# Patient Record
Sex: FEMALE | Race: WHITE | NOT HISPANIC OR LATINO | Employment: FULL TIME | URBAN - METROPOLITAN AREA
[De-identification: names, ages, dates, MRNs, and addresses within clinical notes are randomized per-mention and may not be internally consistent; named-entity substitution may affect disease eponyms.]

---

## 2017-04-25 ENCOUNTER — OFFICE VISIT (OUTPATIENT)
Dept: FAMILY MEDICINE | Facility: CLINIC | Age: 52
End: 2017-04-25
Payer: COMMERCIAL

## 2017-04-25 VITALS
DIASTOLIC BLOOD PRESSURE: 70 MMHG | WEIGHT: 170 LBS | OXYGEN SATURATION: 96 % | HEART RATE: 94 BPM | HEIGHT: 67 IN | BODY MASS INDEX: 26.68 KG/M2 | SYSTOLIC BLOOD PRESSURE: 122 MMHG | TEMPERATURE: 99 F

## 2017-04-25 DIAGNOSIS — M25.531 RIGHT WRIST PAIN: Primary | ICD-10-CM

## 2017-04-25 DIAGNOSIS — R23.2 HOT FLASHES: ICD-10-CM

## 2017-04-25 PROCEDURE — 1160F RVW MEDS BY RX/DR IN RCRD: CPT | Mod: S$GLB,,, | Performed by: INTERNAL MEDICINE

## 2017-04-25 PROCEDURE — 99999 PR PBB SHADOW E&M-EST. PATIENT-LVL III: CPT | Mod: PBBFAC,,, | Performed by: INTERNAL MEDICINE

## 2017-04-25 PROCEDURE — 99214 OFFICE O/P EST MOD 30 MIN: CPT | Mod: S$GLB,,, | Performed by: INTERNAL MEDICINE

## 2017-04-25 NOTE — PROGRESS NOTES
Complaint: Problems with wrist and discuss hot flashes    51-year-old white female new to me whose prior PCP retired.  I probably did see her remotely.  For 1 week she's had pain in the right wrist proximal to the fifth finger.  No trauma.  She cannot remember any overuse.  It hurts to rotate the wrist as well as pick things up.  It's a shooting pain over the ulnar aspect of the wrist.  Moderate severity rated 8 out of 10 at times.  She does have a brace at home from some prior carpal tunnel syndrome.  No current symptoms of carpal tunnel syndrome.  She also has had hot flashes for about 6 months.  Moderate severity.  Her last menses was about a year and a half ago.  His been about 3 years since she saw GYN is discussed potential options, breast cancer risk, we need to balance estrogen and progesterone since she has a uterus as well as other options for hot flashes and she should probably have an evaluation and discuss all these options with her GYN    ROS:   CONST: No fevers or chills or night sweats, no other myalgias arthralgias or neurological some      Past Medical History:   Diagnosis Date    Chest pain     NEGATIVE STRESS ECHO 2002    GERD (gastroesophageal reflux disease)     Lower back pain     Migraines     Mild allergic rhinitis     hot flashes      Past Surgical History:   Procedure Laterality Date    TONSILLECTOMY      TUBAL LIGATION       Social History     Social History    Marital status:      Spouse name: N/A    Number of children: N/A    Years of education: N/A     Occupational History    Not on file.     Social History Main Topics    Smoking status: Never Smoker    Smokeless tobacco: Not on file    Alcohol use Not on file    Drug use: Not on file    Sexual activity: Not on file     Other Topics Concern    Not on file     Social History Narrative     Family history: Denies any family history of breast cancer    Vital signs as above  Gen.: Pleasant  Musculoskeletal: Both  "wrists and hands and forearms and elbows all full range of motion, no defects, the joints are stable and strength 5 out of 5.  She has no pain in the fifth MCP or carpal metacarpal joint.  There is pain on rotation and ulnar deviation of the wrist and some palpable pain with the ligaments would be and no pain on resistance to the flexor or extensor tendons of the fingers on the right.  Skin no rashes, warm to touch.    Sugar LARES was seen today for wrist pain.    Diagnoses and all orders for this visit:    Right wrist pain, new problem, new to me, no trauma and so no need for x-ray, I suspect she had an unrecognized overuse sprain of the right wrist and she will use of bracing, restrict activities in the last several weeks for healing.  Should there be ongoing or recurrent problems we discussed seeing a hand orthopedic    Hot flashes, new problem, discussed as above    Clinical note will be sensitive since I do not want any discrepancies in the available history to cause issues with the patient"This note will not be shared with the patient."       "

## 2017-05-04 DIAGNOSIS — Z12.31 OTHER SCREENING MAMMOGRAM: ICD-10-CM

## 2018-05-11 DIAGNOSIS — Z12.11 COLON CANCER SCREENING: ICD-10-CM

## 2018-07-13 DIAGNOSIS — Z12.39 BREAST CANCER SCREENING: ICD-10-CM

## 2018-11-12 ENCOUNTER — OFFICE VISIT (OUTPATIENT)
Dept: FAMILY MEDICINE | Facility: CLINIC | Age: 53
End: 2018-11-12
Payer: COMMERCIAL

## 2018-11-12 VITALS
BODY MASS INDEX: 24.85 KG/M2 | SYSTOLIC BLOOD PRESSURE: 118 MMHG | TEMPERATURE: 98 F | HEIGHT: 67 IN | DIASTOLIC BLOOD PRESSURE: 72 MMHG | OXYGEN SATURATION: 97 % | HEART RATE: 96 BPM | WEIGHT: 158.31 LBS

## 2018-11-12 DIAGNOSIS — S39.012A LUMBOSACRAL STRAIN, INITIAL ENCOUNTER: Primary | ICD-10-CM

## 2018-11-12 PROCEDURE — 99999 PR PBB SHADOW E&M-EST. PATIENT-LVL III: CPT | Mod: PBBFAC,,, | Performed by: INTERNAL MEDICINE

## 2018-11-12 PROCEDURE — 99214 OFFICE O/P EST MOD 30 MIN: CPT | Mod: S$GLB,,, | Performed by: INTERNAL MEDICINE

## 2018-11-12 PROCEDURE — 3008F BODY MASS INDEX DOCD: CPT | Mod: CPTII,S$GLB,, | Performed by: INTERNAL MEDICINE

## 2018-11-12 RX ORDER — CELECOXIB 200 MG/1
200 CAPSULE ORAL DAILY
Qty: 30 CAPSULE | Refills: 6 | Status: SHIPPED | OUTPATIENT
Start: 2018-11-12 | End: 2019-07-01 | Stop reason: SDUPTHER

## 2018-11-12 NOTE — PROGRESS NOTES
Complaint:  Back pain    53-year-old white female made her appointment today in an opening.  She reports several years of lower back pain. It is across the lower back.  It is worse in the morning and actually gets better when she moves around a little bit.  No problems during the day.  Since has been ongoing for years she feels she can't take it anymore.  She did see Dr. Serna in Orthopedics in the past for some right hip problems. Years ago she was given Celebrex.  She has been off of that for a year.  She does have some pain in the right anterior thigh area but no other pain radiating down past the knee.  She has not had any imaging x-rays or MRI.  She did lose about 30 lb and that did help her low back pain but has been frustrating and ongoing.  No acute worsening lately.  No injuries lately.  ROS:   CONST:  No other myalgias arthralgias, no neurological symptoms or deficits      Past Medical History:   Diagnosis Date    Chest pain     NEGATIVE STRESS ECHO 2002    GERD (gastroesophageal reflux disease)     Lower back pain     Migraines     Mild allergic rhinitis     hot flashes      Past Surgical History:   Procedure Laterality Date    TONSILLECTOMY      TUBAL LIGATION       Social History     Socioeconomic History    Marital status:      Spouse name: Not on file    Number of children: Not on file    Years of education: Not on file    Highest education level: Not on file   Social Needs    Financial resource strain: Not on file    Food insecurity - worry: Not on file    Food insecurity - inability: Not on file    Transportation needs - medical: Not on file    Transportation needs - non-medical: Not on file   Occupational History    Not on file   Tobacco Use    Smoking status: Never Smoker   Substance and Sexual Activity    Alcohol use: Not on file    Drug use: Not on file    Sexual activity: Not on file   Other Topics Concern    Not on file   Social History Narrative    Not on file      Family history: Denies any family history of breast cancer    Vital signs as above  Gen.: Pleasant  BACK: spine with no defect, good ROM all the way to floor w/ discomfort, good extension w/ min discomfort, stable. Both legs full ROM, no defects, joints stable, strength 5/5. Negative straight leg testing bilaterally. Skin no rashes. Paraspinal muscles tender bilateral and lower slightly.      Sugar was seen today for back pain.    Diagnoses and all orders for this visit:    Lumbosacral strain, initial encounter, appears to be a fairly classic chronic lower lumbar muscular strain, likely recurrent injury.  Explained the pathophysiology of the injury and healing.  She also has likely a chronic lumbosacral strain syndrome and I think it is time for her to rehabilitate the back in the Healthy back program.  I do not think there is any place for imaging.  I do not suspect any spine arthritis as the cause of her pain and has no sign all of any nerve impingement to suggest disc disease or significant disc disease. She does have some symptoms possibly suggestive of left lateral femoral cutaneous nerve entrapment and we discussed avoiding tight clothes and so forth.  We discussed all the negative issues of anti-inflammatories.  We discussed that the anti-inflammatories such as Celebrex will not cause any accelerated healing and only treat inflammation.  I think resuming the Celebrex at this point is a good idea along with the frequent application of heat, Healthy back program and so forth.  She agrees and accepts reassurance and better understanding about the problem.  Should she get any symptoms of facet arthropathy, nerve impingement and so forth there may be a place for imaging, referral to pain management and so forth.  -     Ambulatory Consult to [facility]    Other orders  -     celecoxib (CELEBREX) 200 MG capsule; Take 1 capsule (200 mg total) by mouth once daily.             Clinical note will be  sensitive based on patient's obvious expressed anxiety referable to the issues above, access would not be therapeutic at this pointTotal time over 25 minutes with over 50% counseling.

## 2018-12-21 ENCOUNTER — CLINICAL SUPPORT (OUTPATIENT)
Dept: REHABILITATION | Facility: HOSPITAL | Age: 53
End: 2018-12-21
Attending: INTERNAL MEDICINE
Payer: COMMERCIAL

## 2018-12-21 DIAGNOSIS — G89.29 CHRONIC BILATERAL LOW BACK PAIN WITHOUT SCIATICA: ICD-10-CM

## 2018-12-21 DIAGNOSIS — M53.86 DECREASED ROM OF LUMBAR SPINE: ICD-10-CM

## 2018-12-21 DIAGNOSIS — M54.50 CHRONIC BILATERAL LOW BACK PAIN WITHOUT SCIATICA: ICD-10-CM

## 2018-12-21 PROCEDURE — 97161 PT EVAL LOW COMPLEX 20 MIN: CPT | Mod: PN

## 2018-12-21 PROCEDURE — 97110 THERAPEUTIC EXERCISES: CPT | Mod: PN

## 2018-12-21 NOTE — PATIENT INSTRUCTIONS
Top 10 tips for back and neck pain    The spine is the pillar of the body, providing the foundation for the upper and lower extremities to attach.  Our spines withstand significant forces all day long.  There are many ways in which we can take care of our backs.  Here are a couple tips to help you keep your back in action.    1. Watch your posture in sitting.     Sit in chairs with back supports, and use a lumbar roll to maintain the normal curve of your low back. Ensure the height of your chair is such that your feet rest flat on the floor with your knees and hips level.  The average American sits 9 hours a day.  Do not sit longer than 1 hour without getting up to stretch or move.     2. Watch your posture in standing.   Maintain the normal curves of your spine in standing.   When standing tall, you should be able to draw a line down through your ear, shoulder, hip, and ankle.  Wear good shoes and consider using a standing desk mat if you stand a lot during work.  Take breaks from standing.    3. Lift correctly   Lift objects by using the strong muscles in your legs.  Get close to the object, bend your knees and your hips, and maintain the normal curve of your low back. Do not twist when lifting or carrying items. Think about the tasks you perform daily at work or home, and minimize lifting and carrying objects.  Use rolling carts or other strategies to reduce back strain.    4. Exercise regularly  Individuals who exercise regularly generally experience better health, reduced back pain, and less stress.  A good exercise program has a stretching component, a strengthening component, and an aerobic component.   Maintain the mobility of your spine by stretching daily. Strengthen your core and extremities several times a week.   Get regular cardiovascular exercise, 3-5/week.  Choose activities you like such as walking, swimming, dancing, or riding a bike.     5. Quit Smoking  Smoking increases the likelihood of  back pain.  It is thought that smoking reduces the blood supply to the discs between the vertebrae and this may lead to degeneration of these discs.  Talk to your Physician about quitting.  There are many smoking cessation options that may work for you.    6. Keep moving even when you have pain  Motion is lotion.   The majority of back pain is mechanical in nature, and will likely reduce with gentle movements, stretching, and walking.  As tempting as it may be to stay in bed when you are hurting, remember that you will likely feel better by getting up and gently moving and walking.  Limit bed rest.      7. Maintain a healthy diet  Try to maintain a healthy diet and weight.        8. Stay hydrated  The average adult is approximately 60 % water.  Staying hydrated is beneficial for all aspects of health.  In general, an adult should drink half of their body weight in ounces.  For example, if you weight 180 lbs, you should drink 90 ounces of water daily.     9. Get regular sleep   Ensure that you get a good nights rest on a regular basis. The discs in your spine hydrate when you lie down to sleep. Your spine needs the rest too.     10. See Your Physician    Make an appointment to see your Physician for back pain that is progressively worsening, and for back pain that is no better or worse with changing positions and activities.        Z LIE POSITION  Z Lie is a position that you can use to unload your back and assist with pain reduction.  Lie on your back and rest your calves on the seat on a chair or a bench.  Viewed from the side, you should resemble a Z.  Your therapist may suggest sliding the chair closer to you, so your knees are over your stomach.   Your therapist may also suggest a pillow under your buttock if needed.  Follow the directions from your therapist.  The goal of this position is to reduce your symptoms.    Z lie can be done in a variety of ways.  It can be done on a bed resting your legs on a  light and easy to lift chair

## 2018-12-21 NOTE — PLAN OF CARE
[facility] - PHYSICAL THERAPY EVALUATION     Name: Sugar Diaz  [facility] Number: [id number]      Diagnosis:   Encounter Diagnoses   Name Primary?    Chronic bilateral low back pain without sciatica     Decreased ROM of lumbar spine      Physician: James Hillman MD  Treatment Orders: PT Eval and Treat    Past Medical History:   Diagnosis Date    Chest pain     NEGATIVE STRESS ECHO 2002    GERD (gastroesophageal reflux disease)     Lower back pain     Migraines     Mild allergic rhinitis      Current Outpatient Medications   Medication Sig    celecoxib (CELEBREX) 200 MG capsule Take 1 capsule (200 mg total) by mouth once daily.     No current facility-administered medications for this visit.      Review of patient's allergies indicates:  No Known Allergies    Precautions: standard     Pattern of pain determined: 1 PEN    Evaluation Date: 12/21/2018  Authorization Period Expiration: 12/31/18  Plan of Care Expiration: 3/22/19  Reassessment Due: 1/21/19  Visit # / Visits authorized: 1/ 12    Time In: 0905  Time Out: 1030  Total Billable Time: 85 minutes     HISTORY   History of Present Illness: Pt reports that she has been having back pain for around 8 years; insidious onset without provocation.  She reports that she works in [employer] and has a sedentary desk job through the week, however, she tries to be active over the weekend with grandchildren.  Pt reports most pain with static standing with aching into back as well as aching into R hip and anterior thigh; improved with walking as well as sitting/lying down.  Pt does have a little pain in hip with sleeping on L side also.  Pt reports that she had a non specific hip fx 3 years ago which she thought was a pulled groin, NWB for 12 weeks and this healed without continued issue.  Pt reports that celebrex really helps but she wants to get off of medication as she has family hx of heart conditions/HA; notices big difference when on medication  related to pain, has not taken since Wednesday and plans to restart tomorrow.      Diagnostic Tests: From EPIC none    Pain Scale: Sugar rates pain on a scale of 0-10 to be 8 at worst; 5 currently; 1 at best using VAS.   Pain location: low back    Aggravating factors: standing  Easing Factors: better with celebrex, walking, sitting, lying down   Disturbed Sleep: none     Pattern of pain questions:  1.  Where is your pain the worst? back  2.  Is your pain constant or intermittent? constant  3.  Does bending forward make your typical pain worse? no  4.  Since the start of your back pain, has there been a change in your bowel or bladder? no  5.  What can't you do now that you use to be able to do? Able with pain    Prior Treatment: Pt was seeing pt for hip and was given several stretches which helped a little  Prior functional status: independent  DME owned/used: none  Occupation:  Works in [employer] job    Leisure: hiking, swimming, playing with grandchildren                     Pts goals:  Get off medication    Red Flag Screening:   Cough  Sneeze  Strain: (--)  Bladder/ bowel: (--)  Falls: (--)  Night pain: (--)  Unexplained weight loss: (--)  General health: good, chest pain 2 years ago (paternal hx of HA at age 40)    OBJECTIVE     Postural examination/scapula alignment: R shoulder depression (mild), forward head rounded shoulders with decreased lumbar lordosis.  Joint integrity: increased mobility in lower lumbar spine L3-5  Skin integrity: no deficit  Edema: none  Sitting: poor posture, slouched  Standing: mild R shoulder depression, mild anterior pelvic tilt   Correction of posture: better with lumbar roll    MOVEMENT LOSS    ROM Loss   Flexion minimal loss   Extension minimal loss   Side bending Right minimal loss   Side bending Left minimal loss, mild pain L hip   Rotation Right within functional limits   Rotation Left within functional limits     Lower Extremity Strength  Right LE  Left LE    Hip  flexion: 4/5 Hip flexion: 4/5   Hip extension:  4-/5 Hip extension: 3+/5, L hip pain   Hip abduction: 4/5 Hip abduction: 3+/5 L hip pain   Hip adduction:  4+/5 Hip adduction:  4+/5   Hip Internal rotation   4+/5 Hip Internal rotation 4+/5   Knee Flexion 5/5 Knee Flexion 5/5   Knee Extension 5/5 Knee Extension 5/5   Ankle dorsiflexion: 4+/5 Ankle dorsiflexion: 4+/5   Ankle plantarflexion: 5/5 Ankle plantarflexion: 5/5     Gillet's: pain with R hip flexion, L hip stance in L lateral and anterior hip  GAIT:  Assistive Device used: none  Level of Assistance: independent  Patient displays the following gait deviations:  Decreased pelvic translation, stiff gait, no antalgia noted    Special Tests:   Test Name  Test Result   Prone Instability Test (--)   SI Joint Provocation Test (--)   Straight Leg Raise (+) for L low back pain, no neural sx   Neural Tension Test (--)   Crossed Straight Leg Raise (+) L hip pain, no neural sx   Walking on toes (--)   Walking on heels  (--)     (-) scour B  (+) FADDIR on L     NEUROLOGICAL SCREENING     Sensory deficit: SILT    Reflexes:    Left Right   Patella Tendon 2+ 2+   Babinski  (--) (--)   Clonus (--) (--)     REPEATED TEST MOVEMENTS:  Repeated Flexion in Standing end range pain/stiffness in low back B   Repeated Extension in Standing better   Repeated Flexion in lying no effect   Repeated Extension in lying  better       STATIC TESTS   Sitting slouched  no effect   Sitting erect better   Standing slouched worse   Standing erect  better   Lying prone in extension  no worse     Flexibility:   90/90 HS  R-20 degrees, R low back pain  L-10 degrees without pain    Decreased B quad length, +Ely's with L testing with L hip and low back pain (120 deg knee flexion)  Decreased L hip ER with pain ~50 degrees      Baseline Isometric Testing on Med X equipment: Testing administered by PT  Date of testin18  ROM 0-60 deg   Max Peak Torque 82    Min Peak Torque 18    Flex/Ext Ratio 4.55/1    % below normative data 44   Counter weight 224   femur 4   Seat pad 0     Treatment   Time In: 0905  Time Out: 1030    PT Evaluation Completed? Yes  Discussed Plan of Care with patient: Yes    [facility] 12/21/2018   Visit Number 1   VAS Pain Rating 5   Lumbar Extension Seat Pad 0   Femur Restraint 4   Top Dead Center 24   Counterweight 224   Lumbar Flexion 60   Lumbar Extension 0   Lumbar Peak Torque 82   Min Torque 18   Test Percent Below Normative Data 66   Lumbar Weight 41       Home Exercise Program as follows:   Handouts were given to the patient. Pt demo good understanding of the education provided. Sugar demonstrated good return demonstration of activities.     - Patient received education regarding proper posture and body mechanics.  Patient was given top 10 tips handout which discusses posture seated, standing, lifting correctly, components of exercise, importance of nutrition and hydration, and importance of sleep.    - Patient received a handout regarding anticipated muscular soreness following the isometric test and strategies for management were reviewed with patient including stretching, using ice and scheduled rest.     Pt was instructed in and performed the following:   Sugar received therapeutic exercises to develop/improve posture, lumbar/cervical ROM, strength and muscular endurance for 10 minutes including the following exercises:   +supine clams, YTB  +PPT with TA activation  +figure 4 glute stretch    Assessment   This is a 53 y.o. female referred to [facility] and presents with a medical diagnosis of   Encounter Diagnoses   Name Primary?    Chronic bilateral low back pain without sciatica     Decreased ROM of lumbar spine     and demonstrates limitations as described below. Pt rehab potential is Good. Pt presents with chronic low back and more recent L hip pain that does not appear to be related to lumbar spine; no radicular sx are present.  Pt presents with s/s  consistent with decreased lumbar stability, muscular guarding including L QL and paraspinals, and L hip trochanteric bursitis as evidenced by painful palpation posterior greater trochanter, pain with passive hip IR, and painful contraction of hip abductors in standing and SL positions.  Pt to benefit from participation in skilled PT to address each issue concurrently.  Pt was issued HEP to address; cues given for proper performance of activities for home completion.     Pain Pattern: 1 PEN       Patient received education on the [facility] program, purpose of the isometric test, progression of back strengthening as well as wellness approach and systemic strengthening.  Details of the program were discussed.  Reviewed that patient should feel support/pressure from med ex restraints but no pain or discomfort and patient expressed understanding.    Based on the above history and physical examination an active physical therapy program is recommended.  Pt will continue to benefit from skilled outpatient physical therapy to address the deficits listed below in the chart, provide pt/family education and to maximize pt's level of independence in the home and community environment. .     No environmental, cultural, spiritual, developmental or education needs expressed or noted    Medical necessity is demonstrated by the following problem list.    Pt presents with the following impairments:     History  Co-morbidities and personal factors that may impact the plan of care Co-morbidities:   GERD, migraines, chest pain    Personal Factors:   no deficits     low   Examination  Body Structures and Functions, activity limitations and participation restrictions that may impact the plan of care Body Regions:   back  lower extremities  trunk    Body Systems:    ROM  strength  gross coordinated movement  motor control    Participation Restrictions:   Hiking with grandchildren, household chores, cooking/washing dishes    Activity  limitations:   Learning and applying knowledge  standing    General Tasks and Commands  no deficits    Communication  no deficits    Mobility  no deficits    Self care  no deficits    Domestic Life  shopping  cooking  doing house work (cleaning house, washing dishes, laundry)    Interactions/Relationships  no deficits    Life Areas  no deficits    Community and Social Life  community life  recreation and leisure         low   Clinical Presentation stable and uncomplicated low   Decision Making/ Complexity Score: low       GOALS: Pt is in agreement with the following goals.    Short term goals:  6 weeks or 10 visits   1.  Pt will demonstrate increased lumbar ROM by at least 3 degrees from the initial ROM value with improvements noted in functional ROM and ability to perform ADLs  2.  Pt will demonstrate increased maximum isometric torque value by 10% when compared to the initial value resulting in improved ability to perform bending, lifting, and carrying activities safely, confidently.    3.  Patient report a reduction in worst pain score by 1-2 points for improved tolerance during work and recreational activities  4.  Pt able to perform HEP correctly with minimal cueing or supervision for therapist      Long term goals: 13 weeks or 20 visits   1. Pt will demonstrate increased lumbar ROM by at least 6 degrees from initial ROM value, resulting in improved ability to perform functional fwd bending while standing and sitting.   2. Pt will demonstrate increased maximum isometric torque value by 25% when compared to the initial value resulting in improved ability to perform bending, lifting, and carrying activities safely, confidently.  3. Pt to demonstrate ability to independently control and reduce their pain through posture positioning and mechanical movements throughout a typical day.  4.  Patient will demonstrate ability to perform SLS in clinic without pain in L hip to show improved performance and decreased  "reactivity of hip complex.       Plan   Outpatient physical therapy 2x week for 13 weeks or 20 visits to include the following:   - Patient education  - Therapeutic exercise  - Manual therapy  - Performance testing   - Neuromuscular Re-education  - Therapeutic activity   - Modalities    Pt may be seen by PTA as part of the rehabilitation team.     Therapist: Louisa Brennan, PT  12/21/2018    "I certify the need for these services furnished under this plan of treatment and while under my care."    ____________________________________  Physician/Referring Practitioner    _______________  Date of Signature          "

## 2018-12-21 NOTE — PROGRESS NOTES
[facility] - PHYSICAL THERAPY EVALUATION     Name: Sugar Diaz  [facility] Number: [id number]      Diagnosis:   Encounter Diagnoses   Name Primary?    Chronic bilateral low back pain without sciatica     Decreased ROM of lumbar spine      Physician: James Hillman MD  Treatment Orders: PT Eval and Treat    Past Medical History:   Diagnosis Date    Chest pain     NEGATIVE STRESS ECHO 2002    GERD (gastroesophageal reflux disease)     Lower back pain     Migraines     Mild allergic rhinitis      Current Outpatient Medications   Medication Sig    celecoxib (CELEBREX) 200 MG capsule Take 1 capsule (200 mg total) by mouth once daily.     No current facility-administered medications for this visit.      Review of patient's allergies indicates:  No Known Allergies    Precautions: standard     Pattern of pain determined: 1 PEN    Evaluation Date: 12/21/2018  Authorization Period Expiration: 12/31/18  Plan of Care Expiration: 3/22/19  Reassessment Due: 1/21/19  Visit # / Visits authorized: 1/ 12    Time In: 0905  Time Out: 1030  Total Billable Time: 85 minutes     HISTORY   History of Present Illness: Pt reports that she has been having back pain for around 8 years; insidious onset without provocation.  She reports that she works in [employer] and has a sedentary desk job through the week, however, she tries to be active over the weekend with grandchildren.  Pt reports most pain with static standing with aching into back as well as aching into R hip and anterior thigh; improved with walking as well as sitting/lying down.  Pt does have a little pain in hip with sleeping on L side also.  Pt reports that she had a non specific hip fx 3 years ago which she thought was a pulled groin, NWB for 12 weeks and this healed without continued issue.  Pt reports that celebrex really helps but she wants to get off of medication as she has family hx of heart conditions/HA; notices big difference when on medication  related to pain, has not taken since Wednesday and plans to restart tomorrow.      Diagnostic Tests: From EPIC none    Pain Scale: Sugar rates pain on a scale of 0-10 to be 8 at worst; 5 currently; 1 at best using VAS.   Pain location: low back    Aggravating factors: standing  Easing Factors: better with celebrex, walking, sitting, lying down   Disturbed Sleep: none     Pattern of pain questions:  1.  Where is your pain the worst? back  2.  Is your pain constant or intermittent? constant  3.  Does bending forward make your typical pain worse? no  4.  Since the start of your back pain, has there been a change in your bowel or bladder? no  5.  What can't you do now that you use to be able to do? Able with pain    Prior Treatment: Pt was seeing pt for hip and was given several stretches which helped a little  Prior functional status: independent  DME owned/used: none  Occupation:  Works in [employer] job    Leisure: hiking, swimming, playing with grandchildren                     Pts goals:  Get off medication    Red Flag Screening:   Cough  Sneeze  Strain: (--)  Bladder/ bowel: (--)  Falls: (--)  Night pain: (--)  Unexplained weight loss: (--)  General health: good, chest pain 2 years ago (paternal hx of HA at age 42)    OBJECTIVE     Postural examination/scapula alignment: R shoulder depression (mild), forward head rounded shoulders with decreased lumbar lordosis.  Joint integrity: increased mobility in lower lumbar spine L3-5  Skin integrity: no deficit  Edema: none  Sitting: poor posture, slouched  Standing: mild R shoulder depression, mild anterior pelvic tilt   Correction of posture: better with lumbar roll    MOVEMENT LOSS    ROM Loss   Flexion minimal loss   Extension minimal loss   Side bending Right minimal loss   Side bending Left minimal loss, mild pain L hip   Rotation Right within functional limits   Rotation Left within functional limits     Lower Extremity Strength  Right LE  Left LE    Hip  flexion: 4/5 Hip flexion: 4/5   Hip extension:  4-/5 Hip extension: 3+/5, L hip pain   Hip abduction: 4/5 Hip abduction: 3+/5 L hip pain   Hip adduction:  4+/5 Hip adduction:  4+/5   Hip Internal rotation   4+/5 Hip Internal rotation 4+/5   Knee Flexion 5/5 Knee Flexion 5/5   Knee Extension 5/5 Knee Extension 5/5   Ankle dorsiflexion: 4+/5 Ankle dorsiflexion: 4+/5   Ankle plantarflexion: 5/5 Ankle plantarflexion: 5/5     Gillet's: pain with R hip flexion, L hip stance in L lateral and anterior hip  GAIT:  Assistive Device used: none  Level of Assistance: independent  Patient displays the following gait deviations:  Decreased pelvic translation, stiff gait, no antalgia noted    Special Tests:   Test Name  Test Result   Prone Instability Test (--)   SI Joint Provocation Test (--)   Straight Leg Raise (+) for L low back pain, no neural sx   Neural Tension Test (--)   Crossed Straight Leg Raise (+) L hip pain, no neural sx   Walking on toes (--)   Walking on heels  (--)     (-) scour B  (+) FADDIR on L     NEUROLOGICAL SCREENING     Sensory deficit: SILT    Reflexes:    Left Right   Patella Tendon 2+ 2+   Babinski  (--) (--)   Clonus (--) (--)     REPEATED TEST MOVEMENTS:  Repeated Flexion in Standing end range pain/stiffness in low back B   Repeated Extension in Standing better   Repeated Flexion in lying no effect   Repeated Extension in lying  better       STATIC TESTS   Sitting slouched  no effect   Sitting erect better   Standing slouched worse   Standing erect  better   Lying prone in extension  no worse     Flexibility:   90/90 HS  R-20 degrees, R low back pain  L-10 degrees without pain    Decreased B quad length, +Ely's with L testing with L hip and low back pain (120 deg knee flexion)  Decreased L hip ER with pain ~50 degrees      Baseline Isometric Testing on Med X equipment: Testing administered by PT  Date of testin18  ROM 0-60 deg   Max Peak Torque 82    Min Peak Torque 18    Flex/Ext Ratio 4.55/1    % below normative data 44   Counter weight 224   femur 4   Seat pad 0     Treatment   Time In: 0905  Time Out: 1030    PT Evaluation Completed? Yes  Discussed Plan of Care with patient: Yes    [facility] 12/21/2018   Visit Number 1   VAS Pain Rating 5   Lumbar Extension Seat Pad 0   Femur Restraint 4   Top Dead Center 24   Counterweight 224   Lumbar Flexion 60   Lumbar Extension 0   Lumbar Peak Torque 82   Min Torque 18   Test Percent Below Normative Data 66   Lumbar Weight 41       Home Exercise Program as follows:   Handouts were given to the patient. Pt demo good understanding of the education provided. Sugar demonstrated good return demonstration of activities.     - Patient received education regarding proper posture and body mechanics.  Patient was given top 10 tips handout which discusses posture seated, standing, lifting correctly, components of exercise, importance of nutrition and hydration, and importance of sleep.    - Patient received a handout regarding anticipated muscular soreness following the isometric test and strategies for management were reviewed with patient including stretching, using ice and scheduled rest.     Pt was instructed in and performed the following:   Sugar received therapeutic exercises to develop/improve posture, lumbar/cervical ROM, strength and muscular endurance for 10 minutes including the following exercises:   +supine clams, YTB  +PPT with TA activation  +figure 4 glute stretch    Assessment   This is a 53 y.o. female referred to [facility] and presents with a medical diagnosis of   Encounter Diagnoses   Name Primary?    Chronic bilateral low back pain without sciatica     Decreased ROM of lumbar spine     and demonstrates limitations as described below. Pt rehab potential is Good. Pt presents with chronic low back and more recent L hip pain that does not appear to be related to lumbar spine; no radicular sx are present.  Pt presents with s/s  consistent with decreased lumbar stability, muscular guarding including L QL and paraspinals, and L hip trochanteric bursitis as evidenced by painful palpation posterior greater trochanter, pain with passive hip IR, and painful contraction of hip abductors in standing and SL positions.  Pt to benefit from participation in skilled PT to address each issue concurrently.  Pt was issued HEP to address; cues given for proper performance of activities for home completion.     Pain Pattern: 1 PEN       Patient received education on the [facility] program, purpose of the isometric test, progression of back strengthening as well as wellness approach and systemic strengthening.  Details of the program were discussed.  Reviewed that patient should feel support/pressure from med ex restraints but no pain or discomfort and patient expressed understanding.    Based on the above history and physical examination an active physical therapy program is recommended.  Pt will continue to benefit from skilled outpatient physical therapy to address the deficits listed below in the chart, provide pt/family education and to maximize pt's level of independence in the home and community environment. .     No environmental, cultural, spiritual, developmental or education needs expressed or noted    Medical necessity is demonstrated by the following problem list.    Pt presents with the following impairments:     History  Co-morbidities and personal factors that may impact the plan of care Co-morbidities:   GERD, migraines, chest pain    Personal Factors:   no deficits     low   Examination  Body Structures and Functions, activity limitations and participation restrictions that may impact the plan of care Body Regions:   back  lower extremities  trunk    Body Systems:    ROM  strength  gross coordinated movement  motor control    Participation Restrictions:   Hiking with grandchildren, household chores, cooking/washing dishes    Activity  limitations:   Learning and applying knowledge  standing    General Tasks and Commands  no deficits    Communication  no deficits    Mobility  no deficits    Self care  no deficits    Domestic Life  shopping  cooking  doing house work (cleaning house, washing dishes, laundry)    Interactions/Relationships  no deficits    Life Areas  no deficits    Community and Social Life  community life  recreation and leisure         low   Clinical Presentation stable and uncomplicated low   Decision Making/ Complexity Score: low       GOALS: Pt is in agreement with the following goals.    Short term goals:  6 weeks or 10 visits   1.  Pt will demonstrate increased lumbar ROM by at least 3 degrees from the initial ROM value with improvements noted in functional ROM and ability to perform ADLs  2.  Pt will demonstrate increased maximum isometric torque value by 10% when compared to the initial value resulting in improved ability to perform bending, lifting, and carrying activities safely, confidently.    3.  Patient report a reduction in worst pain score by 1-2 points for improved tolerance during work and recreational activities  4.  Pt able to perform HEP correctly with minimal cueing or supervision for therapist      Long term goals: 13 weeks or 20 visits   1. Pt will demonstrate increased lumbar ROM by at least 6 degrees from initial ROM value, resulting in improved ability to perform functional fwd bending while standing and sitting.   2. Pt will demonstrate increased maximum isometric torque value by 25% when compared to the initial value resulting in improved ability to perform bending, lifting, and carrying activities safely, confidently.  3. Pt to demonstrate ability to independently control and reduce their pain through posture positioning and mechanical movements throughout a typical day.  4.  Patient will demonstrate ability to perform SLS in clinic without pain in L hip to show improved performance and decreased  "reactivity of hip complex.       Plan   Outpatient physical therapy 2x week for 13 weeks or 20 visits to include the following:   - Patient education  - Therapeutic exercise  - Manual therapy  - Performance testing   - Neuromuscular Re-education  - Therapeutic activity   - Modalities    Pt may be seen by PTA as part of the rehabilitation team.     Therapist: Louisa Brennan, PT  12/21/2018    "I certify the need for these services furnished under this plan of treatment and while under my care."    ____________________________________  Physician/Referring Practitioner    _______________  Date of Signature        "

## 2019-01-02 ENCOUNTER — CLINICAL SUPPORT (OUTPATIENT)
Dept: REHABILITATION | Facility: HOSPITAL | Age: 54
End: 2019-01-02
Attending: INTERNAL MEDICINE
Payer: COMMERCIAL

## 2019-01-02 DIAGNOSIS — M53.86 DECREASED ROM OF LUMBAR SPINE: ICD-10-CM

## 2019-01-02 DIAGNOSIS — G89.29 CHRONIC BILATERAL LOW BACK PAIN WITHOUT SCIATICA: ICD-10-CM

## 2019-01-02 DIAGNOSIS — M54.50 CHRONIC BILATERAL LOW BACK PAIN WITHOUT SCIATICA: ICD-10-CM

## 2019-01-02 PROCEDURE — 97110 THERAPEUTIC EXERCISES: CPT | Mod: PN

## 2019-01-02 NOTE — PROGRESS NOTES
[facility] Physical Therapy Treatment      Name: Sugar Ball [facility]  Clinic Number: [id number]  Diagnosis:   Encounter Diagnoses   Name Primary?    Chronic bilateral low back pain without sciatica     Decreased ROM of lumbar spine      Physician: James Hillman MD  Precautions: Standard   Visit #: 2    VILLALTA: 2018  PTA Visit #: 0  Time In: 400   Time Out: 500  Total Treatment Time: 60 mins (1:1 with PT for 30 mins)    Pain Pattern: 1 PEN  Date POC Signed: Send to Physician     Subjective     Sugar reports her pain isn't so bad, but when she starts walking she can feel the pain start to aggravate her lateral left hip.      Patient reports their pain to be 4 out of 10 on a 0-10 scale with 0 being no pain and 10 being the worst pain imaginable.    Pain Location: low back     Prior functional status: independent  DME owned/used: none  Occupation:  Works in [employer], [employer] job   Leisure: hiking, swimming, playing with grandchildren  Pts goals:  Get off medication     Objective     Baseline IM Testing Results:   Date of testin18  ROM 0-60 deg   Max Peak Torque 82   Min Peak Torque 18    Flex/Ext Ratio 4.55/1   % below normative data 44       Treatment      Sugar received individual therapeutic exercises to develop strength, endurance, ROM, flexibility, posture and core stabilization for 55 minutes including:    Treadmill x14 min -- next treatment decrease to 10 minutes   Hamstring stretch 3x30 sec  Glute stretch 3x30 sec  Bridges 3x10   Hooklying hip abduction 3x10 with red TBand    [facility] 2019   Visit Number 2   VAS Pain Rating 4   Treadmill Time (in min.) 14   Speed 1.8   Incline 0   Lumbar Extension Seat Pad -   Femur Restraint -   Top Dead Center -   Counterweight -   Lumbar Flexion 60   Lumbar Extension 0   Lumbar Peak Torque -   Min Torque -   Test Percent Below Normative Data -   Lumbar Weight 45   Repetitions 20   Rating of Perceived Exertion 2   Ice - Z Lie  (in min.) 10       Not performed:   Supine hip abduction 2x10  Dead bug 3x10   Quadruped hip extension 2x10     Peripheral muscle strengthening which included 1 set of 15-20 repetitions at a slow, controlled 7 second per rep pace focused on strengthening supporting musculature for improved body mechanics and functional mobility. Pt and therapist focused on proper form during treatment to ensure optimal strengthening of each targeted muscle group. Machines were utilized including torso rotation, leg extension, leg curl, chest press, upright row, tricep extension, bicep curl, leg press, and hip abduction.    Sugar received the following manual therapy techniques: Soft tissue Mobilization were applied to the: N/a for 00 minutes including:      Written Home Exercises Provided:   Pt demo good understanding of the education provided. Sugar demonstrated good return demonstration of activities.     Education provided re:   Sugar verbalized good understanding of education provided.   No spiritual or educational barriers to learning provided      Assessment     Patient tolerated initial therapy well. Pt demonstrated good tolerance to Medx lumbar extension. Pt tolerated 45 ft.lbs, 20 reps, with RPE of 2 in Medx lumbar extension. Pt was able to tolerated UE muscle strengthen without any adverse effects. Plan to exercises hip due to pain during walking. Pt cont skilled PT services to decrease functional limitations.     This is a 53 y.o. female referred to outpatient physical therapy and presents with a medical diagnosis of Chronic lower back pain without sciatica  and demonstrates limitations as described in the problem list. Pt prognosis is Good. Pt will continue to benefit from skilled outpatient physical therapy to address the deficits listed in the problem list, provide pt/family education and to maximize pt's level of independence in the home and community environment.     Goals as follows:     Short term goals:  6 weeks  or 10 visits   1.  Pt will demonstrate increased lumbar ROM by at least 3 degrees from the initial ROM value with improvements noted in functional ROM and ability to perform ADLs  2.  Pt will demonstrate increased maximum isometric torque value by 10% when compared to the initial value resulting in improved ability to perform bending, lifting, and carrying activities safely, confidently.  3.  Patient report a reduction in worst pain score by 1-2 points for improved tolerance during work and recreational activities  4.  Pt able to perform HEP correctly with minimal cueing or supervision for therapist        Long term goals: 13 weeks or 20 visits   1. Pt will demonstrate increased lumbar ROM by at least 6 degrees from initial ROM value, resulting in improved ability to perform functional fwd bending while standing and sitting.   2. Pt will demonstrate increased maximum isometric torque value by 25% when compared to the initial value resulting in improved ability to perform bending, lifting, and carrying activities safely, confidently.  3. Pt to demonstrate ability to independently control and reduce their pain through posture positioning and mechanical movements throughout a typical day.  4.  Patient will demonstrate ability to perform SLS in clinic without pain in L hip to show improved performance and decreased reactivity of hip complex.        Plan     Continue with established Plan of Care towards established PT goals.     Certification Period: 12/21/2018  to 3/21/19  Therapist: Yair Hilliard, PT  01/02/2019

## 2019-01-04 ENCOUNTER — CLINICAL SUPPORT (OUTPATIENT)
Dept: REHABILITATION | Facility: HOSPITAL | Age: 54
End: 2019-01-04
Attending: INTERNAL MEDICINE
Payer: COMMERCIAL

## 2019-01-04 DIAGNOSIS — M53.86 DECREASED ROM OF LUMBAR SPINE: ICD-10-CM

## 2019-01-04 DIAGNOSIS — M54.50 CHRONIC BILATERAL LOW BACK PAIN WITHOUT SCIATICA: ICD-10-CM

## 2019-01-04 DIAGNOSIS — G89.29 CHRONIC BILATERAL LOW BACK PAIN WITHOUT SCIATICA: ICD-10-CM

## 2019-01-04 PROCEDURE — 97110 THERAPEUTIC EXERCISES: CPT | Mod: PN

## 2019-01-04 NOTE — PROGRESS NOTES
[facility] Physical [facility] Treatment      Name: Sugar Ball [facility]  Clinic Number: [id number]  Diagnosis:   Encounter Diagnoses   Name Primary?    Chronic bilateral low back pain without sciatica     Decreased ROM of lumbar spine      Physician: James Hillman MD  Precautions: Standard   Visit #: 3    VILLALTA: 2018  PTA Visit #: 0  Time In: 1300   Time Out: 1400  Total Treatment Time: 60 mins (1:1 with PT for 30 mins)    Pain Pattern: 1 PEN  Date POC Signed: Send to Physician     Subjective     Sugar reports that pain in hip is getting much better.  She has been doing exercises at home and feels better overall. Continues to have stiffness in low back that was a little worse upon waking this morning.     Patient reports their pain to be 6 out of 10 on a 0-10 scale with 0 being no pain and 10 being the worst pain imaginable.    Pain Location: low back     Prior functional status: independent  DME owned/used: none  Occupation:  Works in [place], [employer] job   Leisure: hiking, swimming, playing with grandchildren  Pts goals:  Get off medication     Objective     Baseline IM Testing Results:   Date of testin18  ROM 0-60 deg   Max Peak Torque 82   Min Peak Torque 18    Flex/Ext Ratio 4.55/1   % below normative data 44       Treatment      Sugar received individual therapeutic exercises to develop strength, endurance, ROM, flexibility, posture and core stabilization for 55 minutes including:    Treadmill x10 min  Hamstring stretch 3x30 sec  Glute stretch 3x30 sec  Bridges 3x10   Hooklying hip abduction 3x10 with red TBand      [facility] 2019   Visit Number 3   VAS Pain Rating 6   Treadmill Time (in min.) 10   Speed 1.6   Incline 0   Lumbar Weight 45   Repetitions 2   Rating of Perceived Exertion -   Ice - Z Lie (in min.) 8       Not performed:   Supine hip abduction 2x10  Dead bug 3x10   Quadruped hip extension 2x10     Peripheral muscle strengthening which included 1 set of  15-20 repetitions at a slow, controlled 7 second per rep pace focused on strengthening supporting musculature for improved body mechanics and functional mobility. Pt and therapist focused on proper form during treatment to ensure optimal strengthening of each targeted muscle group. Machines were utilized including torso rotation, leg extension, leg curl, chest press, upright row, tricep extension, bicep curl, leg press, and hip abduction.    Sugar received the following manual therapy techniques: Soft tissue Mobilization were applied to the: N/a for 00 minutes including:      Written Home Exercises Provided:   Pt demo good understanding of the education provided. Sugar demonstrated good return demonstration of activities.     Education provided re:   Sugar verbalized good understanding of education provided.   No spiritual or educational barriers to learning provided      Assessment     Patient shows very poor tolerance to completion of MedX extension machine this visit.  Due to good response from last session, weight increase attempted to 48ft.lbs with pt able to complete 1 rep with reports of high pain in low back.  Weight decreased to 45ft.lb and re attempted, however, pt only able to complete 2 repetitions with same reports.  MedX not completed this visit due to pain with 2 attempts.  Pt was able to complete all peripheral machines with addition of LE's today without complaint.  Pt to benefit from addition of lumbar stretching next visit; manual care as needed.  Cont per POC.     This is a 53 y.o. female referred to outpatient physical therapy and presents with a medical diagnosis of Chronic lower back pain without sciatica  and demonstrates limitations as described in the problem list. Pt prognosis is Good. Pt will continue to benefit from skilled outpatient physical therapy to address the deficits listed in the problem list, provide pt/family education and to maximize pt's level of independence in the home and  community environment.     Goals as follows:     Short term goals:  6 weeks or 10 visits   1.  Pt will demonstrate increased lumbar ROM by at least 3 degrees from the initial ROM value with improvements noted in functional ROM and ability to perform ADLs  2.  Pt will demonstrate increased maximum isometric torque value by 10% when compared to the initial value resulting in improved ability to perform bending, lifting, and carrying activities safely, confidently.  3.  Patient report a reduction in worst pain score by 1-2 points for improved tolerance during work and recreational activities  4.  Pt able to perform HEP correctly with minimal cueing or supervision for therapist        Long term goals: 13 weeks or 20 visits   1. Pt will demonstrate increased lumbar ROM by at least 6 degrees from initial ROM value, resulting in improved ability to perform functional fwd bending while standing and sitting.   2. Pt will demonstrate increased maximum isometric torque value by 25% when compared to the initial value resulting in improved ability to perform bending, lifting, and carrying activities safely, confidently.  3. Pt to demonstrate ability to independently control and reduce their pain through posture positioning and mechanical movements throughout a typical day.  4.  Patient will demonstrate ability to perform SLS in clinic without pain in L hip to show improved performance and decreased reactivity of hip complex.        Plan     Continue with established Plan of Care towards established PT goals.     Certification Period: 12/21/2018  to 3/21/19  Therapist: Louisa Brennan, PT  01/04/2019

## 2019-01-07 ENCOUNTER — CLINICAL SUPPORT (OUTPATIENT)
Dept: REHABILITATION | Facility: HOSPITAL | Age: 54
End: 2019-01-07
Attending: INTERNAL MEDICINE
Payer: COMMERCIAL

## 2019-01-07 DIAGNOSIS — G89.29 CHRONIC BILATERAL LOW BACK PAIN WITHOUT SCIATICA: ICD-10-CM

## 2019-01-07 DIAGNOSIS — M53.86 DECREASED ROM OF LUMBAR SPINE: ICD-10-CM

## 2019-01-07 DIAGNOSIS — M54.50 CHRONIC BILATERAL LOW BACK PAIN WITHOUT SCIATICA: ICD-10-CM

## 2019-01-07 PROCEDURE — 97110 THERAPEUTIC EXERCISES: CPT | Mod: PN

## 2019-01-07 NOTE — PROGRESS NOTES
[facility]      Name: Sugar Frankelaney  Clinic Number: [id number]  Diagnosis:   Encounter Diagnoses   Name Primary?    Chronic bilateral low back pain without sciatica     Decreased ROM of lumbar spine      Physician: James Hillman MD  Precautions: Standard   Visit #: 3    VILLALTA: 2018  PTA Visit #: 0  Time In: 1300   Time Out: 1345  Total Treatment Time: 45mins (1:1 with PT for 45 mins)    Pain Pattern: 1 PEN  Date POC Signed: Send to Physician     Subjective     Sugar reports that she is feeling well. Pt states she does not have any pain today.   Patient reports their pain to be 0 out of 10 on a 0-10 scale with 0 being no pain and 10 being the worst pain imaginable.    Pain Location: low back     Prior functional status: independent  DME owned/used: none  Occupation:  Works in [employer] job   Leisure: hiking, swimming, playing with grandchildren  Pts goals:  Get off medication     Objective     Baseline IM Testing Results:   Date of testin18  ROM 0-60 deg   Max Peak Torque 82   Min Peak Torque 18    Flex/Ext Ratio 4.55/1   % below normative data 44       Treatment      Sugar received individual therapeutic exercises to develop strength, endurance, ROM, flexibility, posture and core stabilization for 45 minutes including:    Treadmill x10 min  Hamstring stretch 3x30 sec  Glute stretch 3x30 sec   Bridges 3x10   Hooklying hip abduction 3x10 with red TBand     Seated ball roll out trunk flexion 2 min     [facility] 2019   Visit Number 4   VAS Pain Rating 0   Treadmill Time (in min.) 10   Speed 2   Incline 0   Lumbar Extension Seat Pad -   Femur Restraint -   Top Dead Center -   Counterweight -   Lumbar Flexion 60   Lumbar Extension 0   Lumbar Peak Torque -   Min Torque -   Test Percent Below Normative Data -   Lumbar Weight 50   Repetitions 20   Rating of Perceived Exertion 2   Ice - Z Lie (in min.) 0         Not performed:   Supine  hip abduction 2x10  Dead bug 3x10   Quadruped hip extension 2x10     Peripheral muscle strengthening which included 1 set of 15-20 repetitions at a slow, controlled 7 second per rep pace focused on strengthening supporting musculature for improved body mechanics and functional mobility. Pt and therapist focused on proper form during treatment to ensure optimal strengthening of each targeted muscle group. Machines were utilized including torso rotation, leg extension, leg curl, chest press, upright row, tricep extension, bicep curl, leg press, and hip abduction.    Sugar received the following manual therapy techniques: Soft tissue Mobilization were applied to the: N/a for 00 minutes including:      Written Home Exercises Provided:   Pt demo good understanding of the education provided. Sugar demonstrated good return demonstration of activities.     Education provided re:   Sugar verbalized good understanding of education provided.   No spiritual or educational barriers to learning provided      Assessment     Patient tolerated therapy well today. Pt did not presented with any provocation in lower back pain. Pt was able to perform 20 reps,50 ft.lbs, RPE of 2 on Medx Lumbar extension. Pt did not presented with any issues with peripheral muscle strengthening. Cont per POC.     This is a 53 y.o. female referred to outpatient physical therapy and presents with a medical diagnosis of Chronic lower back pain without sciatica  and demonstrates limitations as described in the problem list. Pt prognosis is Good. Pt will continue to benefit from skilled outpatient physical therapy to address the deficits listed in the problem list, provide pt/family education and to maximize pt's level of independence in the home and community environment.     Goals as follows:     Short term goals:  6 weeks or 10 visits   1.  Pt will demonstrate increased lumbar ROM by at least 3 degrees from the initial ROM value with improvements noted in  functional ROM and ability to perform ADLs  2.  Pt will demonstrate increased maximum isometric torque value by 10% when compared to the initial value resulting in improved ability to perform bending, lifting, and carrying activities safely, confidently.  3.  Patient report a reduction in worst pain score by 1-2 points for improved tolerance during work and recreational activities  4.  Pt able to perform HEP correctly with minimal cueing or supervision for therapist        Long term goals: 13 weeks or 20 visits   1. Pt will demonstrate increased lumbar ROM by at least 6 degrees from initial ROM value, resulting in improved ability to perform functional fwd bending while standing and sitting.   2. Pt will demonstrate increased maximum isometric torque value by 25% when compared to the initial value resulting in improved ability to perform bending, lifting, and carrying activities safely, confidently.  3. Pt to demonstrate ability to independently control and reduce their pain through posture positioning and mechanical movements throughout a typical day.  4.  Patient will demonstrate ability to perform SLS in clinic without pain in L hip to show improved performance and decreased reactivity of hip complex.        Plan     Continue with established Plan of Care towards established PT goals.     Certification Period: 12/21/2018  to 3/21/19  Therapist: Yair Hilliard, PT  01/07/2019

## 2019-01-09 ENCOUNTER — CLINICAL SUPPORT (OUTPATIENT)
Dept: REHABILITATION | Facility: HOSPITAL | Age: 54
End: 2019-01-09
Attending: INTERNAL MEDICINE
Payer: COMMERCIAL

## 2019-01-09 DIAGNOSIS — M53.86 DECREASED ROM OF LUMBAR SPINE: ICD-10-CM

## 2019-01-09 DIAGNOSIS — G89.29 CHRONIC BILATERAL LOW BACK PAIN WITHOUT SCIATICA: ICD-10-CM

## 2019-01-09 DIAGNOSIS — M54.50 CHRONIC BILATERAL LOW BACK PAIN WITHOUT SCIATICA: ICD-10-CM

## 2019-01-09 PROCEDURE — 97110 THERAPEUTIC EXERCISES: CPT | Mod: PN

## 2019-01-09 NOTE — PROGRESS NOTES
[facility]      Name: Sugar Ball [facility] Number: [id number]  Diagnosis:   Encounter Diagnoses   Name Primary?    Chronic bilateral low back pain without sciatica     Decreased ROM of lumbar spine      Physician: James Hillman MD  Precautions: Standard   Visit #:    VILLALTA: 2018  PTA Visit #: 0  Time In: 4:37 pm  Time Out: 5:30 pm  Total Treatment Time: 53mins (1:1 with PT for 53 mins)    Pain Pattern: 1 PEN  Date POC Signed: Send to Physician     Subjective     Sugar reports that she is feeling well. Pt states she does not have any pain today.   Patient reports their pain to be 0 out of 10 on a 0-10 scale with 0 being no pain and 10 being the worst pain imaginable.    Pain Location: low back     Prior functional status: independent  DME owned/used: none  Occupation:  Works in [employer] job   Leisure: hiking, swimming, playing with grandchildren  Pts goals:  Get off medication     Objective     Baseline IM Testing Results:   Date of testin18  ROM 0-60 deg   Max Peak Torque 82   Min Peak Torque 18    Flex/Ext Ratio 4.55/1   % below normative data 44       Treatment      Sugar received individual therapeutic exercises to develop strength, endurance, ROM, flexibility, posture and core stabilization for 53 minutes including:    Treadmill x10 min  Hamstring stretch 3x30 sec  Glute stretch 3x30 sec   Bridges 3x10   Hooklying hip abduction 3x10 with red TBand     Seated ball roll out trunk flexion 2 min     [facility] 2019   Visit Number 5   VAS Pain Rating 0   Treadmill Time (in min.) 10   Speed 2.5   Incline 0   Lumbar Extension Seat Pad -   Femur Restraint -   Top Dead Center -   Counterweight -   Lumbar Flexion -   Lumbar Extension -   Lumbar Peak Torque -   Min Torque -   Test Percent Below Normative Data -   Lumbar Weight 57   Repetitions 20   Rating of Perceived Exertion 3   Ice - Z Lie (in min.) 0       Not performed:    Supine hip abduction 2x10  Dead bug 3x10   Quadruped hip extension 2x10     Peripheral muscle strengthening which included 1 set of 15-20 repetitions at a slow, controlled 7 second per rep pace focused on strengthening supporting musculature for improved body mechanics and functional mobility. Pt and therapist focused on proper form during treatment to ensure optimal strengthening of each targeted muscle group. Machines were utilized including torso rotation, leg extension, leg curl, chest press, upright row, tricep extension, bicep curl, leg press, and hip abduction.    Sugar received the following manual therapy techniques: Soft tissue Mobilization were applied to the: N/a for 00 minutes including:      Written Home Exercises Provided:   Pt demo good understanding of the education provided. Sugar demonstrated good return demonstration of activities.     Education provided re:   Sugar verbalized good understanding of education provided.   No spiritual or educational barriers to learning provided      Assessment     Patient tolerated therapy well today. Pt did not presented with any provocation in lower back pain. Pt was able to perform 20 reps,57 ft.lbs, RPE of 3 on Medx Lumbar extension. Pt tolerated increase of 5 lbs in all UE and LE peripheral muscle strengthening today. Pt was fatigue in the end of The session  Pt is progressing well in therapy. Cont per POC.     This is a 53 y.o. female referred to outpatient physical therapy and presents with a medical diagnosis of Chronic lower back pain without sciatica  and demonstrates limitations as described in the problem list. Pt prognosis is Good. Pt will continue to benefit from skilled outpatient physical therapy to address the deficits listed in the problem list, provide pt/family education and to maximize pt's level of independence in the home and community environment.     Goals as follows:     Short term goals:  6 weeks or 10 visits   1.  Pt will demonstrate  increased lumbar ROM by at least 3 degrees from the initial ROM value with improvements noted in functional ROM and ability to perform ADLs  2.  Pt will demonstrate increased maximum isometric torque value by 10% when compared to the initial value resulting in improved ability to perform bending, lifting, and carrying activities safely, confidently.  3.  Patient report a reduction in worst pain score by 1-2 points for improved tolerance during work and recreational activities  4.  Pt able to perform HEP correctly with minimal cueing or supervision for therapist        Long term goals: 13 weeks or 20 visits   1. Pt will demonstrate increased lumbar ROM by at least 6 degrees from initial ROM value, resulting in improved ability to perform functional fwd bending while standing and sitting.   2. Pt will demonstrate increased maximum isometric torque value by 25% when compared to the initial value resulting in improved ability to perform bending, lifting, and carrying activities safely, confidently.  3. Pt to demonstrate ability to independently control and reduce their pain through posture positioning and mechanical movements throughout a typical day.  4.  Patient will demonstrate ability to perform SLS in clinic without pain in L hip to show improved performance and decreased reactivity of hip complex.        Plan     Continue with established Plan of Care towards established PT goals.     Certification Period: 12/21/2018  to 3/21/19  Therapist: Yair Hilliard, PT  01/09/2019

## 2019-01-14 ENCOUNTER — CLINICAL SUPPORT (OUTPATIENT)
Dept: REHABILITATION | Facility: HOSPITAL | Age: 54
End: 2019-01-14
Attending: INTERNAL MEDICINE
Payer: COMMERCIAL

## 2019-01-14 DIAGNOSIS — G89.29 CHRONIC BILATERAL LOW BACK PAIN WITHOUT SCIATICA: ICD-10-CM

## 2019-01-14 DIAGNOSIS — M54.50 CHRONIC BILATERAL LOW BACK PAIN WITHOUT SCIATICA: ICD-10-CM

## 2019-01-14 DIAGNOSIS — M53.86 DECREASED ROM OF LUMBAR SPINE: ICD-10-CM

## 2019-01-14 PROCEDURE — 97110 THERAPEUTIC EXERCISES: CPT | Mod: PN

## 2019-01-14 NOTE — PROGRESS NOTES
" [facility]      [id number]  Diagnosis:   Encounter Diagnoses   Name Primary?    Chronic bilateral low back pain without sciatica     Decreased ROM of lumbar spine      Physician: James Hillman MD  Precautions: Standard   Visit #: 5 of 20 (new referral; total visits 6)   VILLALTA: 2019  PTA Visit #: 1  Time In: 1305  Time Out: 1353  Total Treatment Time: 48 minutes (1:1 with PTA for 25 mins)    Pain Pattern: 1 PEN  Date POC Signed: Send to Physician     Subjective     Sugar reports "today is a good day." Patient denies any pain currently.  Patient reports their pain to be 0 out of 10 on a 0-10 scale with 0 being no pain and 10 being the worst pain imaginable.    Pain Location: low back     Prior functional status: independent  DME owned/used: none  Occupation:  Works in [employer] job   Leisure: hiking, swimming, playing with grandchildren  Pts goals:  Get off medication     Objective     Baseline IM Testing Results:   Date of testin18  ROM 0-60 deg   Max Peak Torque 82   Min Peak Torque 18    Flex/Ext Ratio 4.55/1   % below normative data 44       Treatment      Sugar received individual therapeutic exercises to develop strength, endurance, ROM, flexibility, posture and core stabilization for 48 minutes including:    Treadmill x 10 min  Supine Hamstring stretch with strap: 3 x 30 sec ea.  Glute stretch 3x30 sec   Bridges 3 x 10   Hooklying hip abduction 3x10 with red TBand     Seated ball roll out trunk flexion x 2 minutes     [facility] 2019   Visit Number 6   VAS Pain Rating 0   Treadmill Time (in min.) 10   Speed 2.2   Incline 0   Flexion in Sitting 10   Manual Therapy 0   Lumbar Weight 60   Repetitions 15   Rating of Perceived Exertion 4   Ice - Z Lie (in min.) 0     Not performed:   Supine hip abduction 2x10  Dead bug 3x10   Quadruped hip extension 2x10     Peripheral muscle strengthening which " included 1 set of 15-20 repetitions at a slow, controlled 7 second per rep pace focused on strengthening supporting musculature for improved body mechanics and functional mobility. Pt and therapist focused on proper form during treatment to ensure optimal strengthening of each targeted muscle group. Machines were utilized including torso rotation, leg extension, leg curl, chest press, upright row, tricep extension, bicep curl, leg press, and hip abduction.    Sugar received the following manual therapy techniques: none    Written Home Exercises Provided:   Pt demo good understanding of the education provided. Sugar demonstrated good return demonstration of activities.     Education provided re:   Sugar verbalized good understanding of education provided.   No spiritual or educational barriers to learning provided      Assessment     Patient tolerated treatment session good today. Patient with fair tolerance to 5% increase in ft/lbs on MedX Lumbar Extension machine reporting increased muscle fatigue after 15 repetitions and an RPE of 4. Limited progression of weights on UE and LE peripheral machines secondary to high RPE last visit. Continue progressing patient per [facility] protocol.     This is a 53 y.o. female referred to outpatient physical therapy and presents with a medical diagnosis of Chronic lower back pain without sciatica  and demonstrates limitations as described in the problem list. Pt prognosis is Good. Pt will continue to benefit from skilled outpatient physical therapy to address the deficits listed in the problem list, provide pt/family education and to maximize pt's level of independence in the home and community environment.     Goals as follows:     Short term goals:  6 weeks or 10 visits   1.  Pt will demonstrate increased lumbar ROM by at least 3 degrees from the initial ROM value with improvements noted in functional ROM and ability to perform ADLs  2.  Pt will demonstrate increased maximum  isometric torque value by 10% when compared to the initial value resulting in improved ability to perform bending, lifting, and carrying activities safely, confidently.  3.  Patient report a reduction in worst pain score by 1-2 points for improved tolerance during work and recreational activities  4.  Pt able to perform HEP correctly with minimal cueing or supervision for therapist     Long term goals: 13 weeks or 20 visits   1. Pt will demonstrate increased lumbar ROM by at least 6 degrees from initial ROM value, resulting in improved ability to perform functional fwd bending while standing and sitting.   2. Pt will demonstrate increased maximum isometric torque value by 25% when compared to the initial value resulting in improved ability to perform bending, lifting, and carrying activities safely, confidently.  3. Pt to demonstrate ability to independently control and reduce their pain through posture positioning and mechanical movements throughout a typical day.  4.  Patient will demonstrate ability to perform SLS in clinic without pain in L hip to show improved performance and decreased reactivity of hip complex.     Plan     Continue with established Plan of Care towards established PT goals.     Certification Period: 12/21/2018  to 3/21/19    Therapist: Dee Luna, PTA  01/14/2019

## 2019-01-16 ENCOUNTER — CLINICAL SUPPORT (OUTPATIENT)
Dept: REHABILITATION | Facility: HOSPITAL | Age: 54
End: 2019-01-16
Attending: INTERNAL MEDICINE
Payer: COMMERCIAL

## 2019-01-16 DIAGNOSIS — M54.50 CHRONIC BILATERAL LOW BACK PAIN WITHOUT SCIATICA: ICD-10-CM

## 2019-01-16 DIAGNOSIS — M53.86 DECREASED ROM OF LUMBAR SPINE: ICD-10-CM

## 2019-01-16 DIAGNOSIS — G89.29 CHRONIC BILATERAL LOW BACK PAIN WITHOUT SCIATICA: ICD-10-CM

## 2019-01-16 PROCEDURE — 97110 THERAPEUTIC EXERCISES: CPT | Mod: PN

## 2019-01-16 NOTE — PROGRESS NOTES
[facility]      Name: Sugar Ball [facility] Number: [id number]  Diagnosis:   Encounter Diagnoses   Name Primary?    Chronic bilateral low back pain without sciatica     Decreased ROM of lumbar spine      Physician: James Hillman MD  Precautions: Standard   Visit #: 6 of 20 (new referral; total visits 6)   VILLALTA: 2019  PTA Visit #: 1  Time In: 1303 pm  Time Out: 13:50 pm  Total Treatment Time: 47 minutes (1:1 with PT for 30 mins)    Pain Pattern: 1 PEN  Date POC Signed: Send to Physician     Subjective     Sugar reports that she does not have any lower back pain. Pt states she is feeling good today.   Patient reports their pain to be 0 out of 10 on a 0-10 scale with 0 being no pain and 10 being the worst pain imaginable.    Pain Location: low back     Prior functional status: independent  DME owned/used: none  Occupation:  Works in [employer] job   Leisure: hiking, swimming, playing with grandchildren  Pts goals:  Get off medication     Objective     Baseline IM Testing Results:   Date of testin18  ROM 0-60 deg   Max Peak Torque 82   Min Peak Torque 18    Flex/Ext Ratio 4.55/1   % below normative data 44       Treatment      Sugar received individual therapeutic exercises to develop strength, endurance, ROM, flexibility, posture and core stabilization for 47 minutes including:    Supine Hamstring stretch with strap: 3 x 30 sec ea.  Glute stretch 3x30 sec   Bridges 3 x 10   Hooklying hip abduction 3x10 with red TBand     Seated ball roll out trunk flexion x 2 minutes   Freemotion chop (high to low)  3x10   Free motion chop (low to high) 3x10  Freemotion side step 1x10     [facility] 2019   Visit Number 7   VAS Pain Rating 0   Treadmill Time (in min.) 10   Speed 2.7   Incline 0   Flexion in Sitting -   Manual Therapy -   Lumbar Extension Seat Pad -   Femur Restraint -   Top Dead Center -   Counterweight -   Lumbar Flexion 60   Lumbar  Extension 0   Lumbar Peak Torque -   Min Torque -   Test Percent Below Normative Data -   Lumbar Weight 60   Repetitions 18   Rating of Perceived Exertion 4   Ice - Z Lie (in min.) 0         Not performed:   Supine hip abduction 2x10  Dead bug 3x10   Quadruped hip extension 2x10     Peripheral muscle strengthening which included 1 set of 15-20 repetitions at a slow, controlled 7 second per rep pace focused on strengthening supporting musculature for improved body mechanics and functional mobility. Pt and therapist focused on proper form during treatment to ensure optimal strengthening of each targeted muscle group. Machines were utilized including torso rotation, leg extension, leg curl, chest press, upright row, tricep extension, bicep curl, leg press, and hip abduction.    Sugar received the following manual therapy techniques: none    Written Home Exercises Provided:   Pt demo good understanding of the education provided. Sugar demonstrated good return demonstration of activities.     Education provided re:   Sugar verbalized good understanding of education provided.   No spiritual or educational barriers to learning provided      Assessment     Patient tolerated treatment session good today. Fait tolerance to Mexx lumbar extension with 60 ft.lbs. Pt was able to perform 18 reps with RPE of 4. Pt demonstrated minor increase of lower back pain. Pt was not able to complete 20 reps. Pt tolerated well progression of therapeutic exercises to improve core strengthening.Pt tolerated all peripheral muscle strengthning well with no adverse effectst. Continue progressing patient per [facility] protocol.     This is a 53 y.o. female referred to outpatient physical therapy and presents with a medical diagnosis of Chronic lower back pain without sciatica  and demonstrates limitations as described in the problem list. Pt prognosis is Good. Pt will continue to benefit from skilled outpatient physical therapy to address the  deficits listed in the problem list, provide pt/family education and to maximize pt's level of independence in the home and community environment.     Goals as follows:     Short term goals:  6 weeks or 10 visits   1.  Pt will demonstrate increased lumbar ROM by at least 3 degrees from the initial ROM value with improvements noted in functional ROM and ability to perform ADLs  2.  Pt will demonstrate increased maximum isometric torque value by 10% when compared to the initial value resulting in improved ability to perform bending, lifting, and carrying activities safely, confidently.  3.  Patient report a reduction in worst pain score by 1-2 points for improved tolerance during work and recreational activities  4.  Pt able to perform HEP correctly with minimal cueing or supervision for therapist     Long term goals: 13 weeks or 20 visits   1. Pt will demonstrate increased lumbar ROM by at least 6 degrees from initial ROM value, resulting in improved ability to perform functional fwd bending while standing and sitting.   2. Pt will demonstrate increased maximum isometric torque value by 25% when compared to the initial value resulting in improved ability to perform bending, lifting, and carrying activities safely, confidently.  3. Pt to demonstrate ability to independently control and reduce their pain through posture positioning and mechanical movements throughout a typical day.  4.  Patient will demonstrate ability to perform SLS in clinic without pain in L hip to show improved performance and decreased reactivity of hip complex.     Plan     Continue with established Plan of Care towards established PT goals.     Certification Period: 12/21/2018  to 3/21/19    Therapist: Yair Hilliard, PT  01/16/2019

## 2019-01-23 ENCOUNTER — CLINICAL SUPPORT (OUTPATIENT)
Dept: REHABILITATION | Facility: HOSPITAL | Age: 54
End: 2019-01-23
Attending: INTERNAL MEDICINE
Payer: COMMERCIAL

## 2019-01-23 DIAGNOSIS — G89.29 CHRONIC BILATERAL LOW BACK PAIN WITHOUT SCIATICA: ICD-10-CM

## 2019-01-23 DIAGNOSIS — M54.50 CHRONIC BILATERAL LOW BACK PAIN WITHOUT SCIATICA: ICD-10-CM

## 2019-01-23 DIAGNOSIS — M53.86 DECREASED ROM OF LUMBAR SPINE: ICD-10-CM

## 2019-01-23 PROCEDURE — 97110 THERAPEUTIC EXERCISES: CPT | Mod: PN

## 2019-01-23 NOTE — PROGRESS NOTES
[facility]      Name: Sugar Ball [facility] Number: [id number]  Diagnosis:   Encounter Diagnoses   Name Primary?    Chronic bilateral low back pain without sciatica     Decreased ROM of lumbar spine      Physician: James Hillman MD  Precautions: Standard   Visit #: 7 of 20 (new referral; total visits 6)   VILLALTA: 2019  PTA Visit #: 1  Time In: 3:04 pm  Time Out: 3:45 pm  Total Treatment Time: 43 minutes (1:1 with PT for 40 mins)    Pain Pattern: 1 PEN  Date POC Signed: Send to Physician     Subjective     Sugar reports that she does not have any lower back pain and it has been that way for some time. Pt states she is feeling good today.   Patient reports their pain to be 0 out of 10 on a 0-10 scale with 0 being no pain and 10 being the worst pain imaginable.    Pain Location: low back     Prior functional status: independent  DME owned/used: none  Occupation:  Works in [employer] job   Leisure: hiking, swimming, playing with grandchildren  Pts goals:  Get off medication     Objective     Baseline IM Testing Results:   Date of testin18  ROM 0-60 deg   Max Peak Torque 82   Min Peak Torque 18    Flex/Ext Ratio 4.55/1   % below normative data 44       Treatment      Sugar received individual therapeutic exercises to develop strength, endurance, ROM, flexibility, posture and core stabilization for 43 minutes including:    Supine Hamstring stretch with strap: 3 x 30 sec ea.  Glute stretch 3x30 sec   Bridges 3 x 10   Hooklying hip abduction 3x10 with red TBand     Seated ball roll out trunk flexion x 2 minutes   Freemotion chop (high to low)  3x10   Free motion chop (low to high) 3x10  Freemotion side step 3x10     [facility] 2019   Visit Number 8   VAS Pain Rating 0   Treadmill Time (in min.) 10   Speed 2.7   Incline 0   Flexion in Sitting -   Manual Therapy -   Lumbar Extension Seat Pad -   Femur Restraint -   Top Dead Center -    Counterweight -   Lumbar Flexion -   Lumbar Extension -   Lumbar Peak Torque -   Min Torque -   Test Percent Below Normative Data -   Lumbar Weight 60   Repetitions 15   Rating of Perceived Exertion 4   Ice - Z Lie (in min.) 0         Not performed:   Supine hip abduction 2x10  Dead bug 3x10   Quadruped hip extension 2x10     Peripheral muscle strengthening which included 1 set of 15-20 repetitions at a slow, controlled 7 second per rep pace focused on strengthening supporting musculature for improved body mechanics and functional mobility. Pt and therapist focused on proper form during treatment to ensure optimal strengthening of each targeted muscle group. Machines were utilized including torso rotation, leg extension, leg curl, chest press, upright row, tricep extension, bicep curl, leg press, and hip abduction.    Sugar received the following manual therapy techniques: none    Written Home Exercises Provided:   Pt demo good understanding of the education provided. Sugar demonstrated good return demonstration of activities.     Education provided re:   Sugar verbalized good understanding of education provided.   No spiritual or educational barriers to learning provided      Assessment     Patient tolerated treatment session good today. Mexx lumbar extension with 60 ft.lbs. Pt was able to perform 15 reps with RPE of 4. Pt did not demonstrated any lower back pain... Pt tolerated well progression of therapeutic exercises to improve core strengthening.Pt tolerated all peripheral muscle strengthning well with no adverse effectst. Continue progressing patient per [facility] protocol.     This is a 53 y.o. female referred to outpatient physical therapy and presents with a medical diagnosis of Chronic lower back pain without sciatica  and demonstrates limitations as described in the problem list. Pt prognosis is Good. Pt will continue to benefit from skilled outpatient physical therapy to address the deficits listed  in the problem list, provide pt/family education and to maximize pt's level of independence in the home and community environment.     Goals as follows:     Short term goals:  6 weeks or 10 visits   1.  Pt will demonstrate increased lumbar ROM by at least 3 degrees from the initial ROM value with improvements noted in functional ROM and ability to perform ADLs  2.  Pt will demonstrate increased maximum isometric torque value by 10% when compared to the initial value resulting in improved ability to perform bending, lifting, and carrying activities safely, confidently.  3.  Patient report a reduction in worst pain score by 1-2 points for improved tolerance during work and recreational activities  4.  Pt able to perform HEP correctly with minimal cueing or supervision for therapist     Long term goals: 13 weeks or 20 visits   1. Pt will demonstrate increased lumbar ROM by at least 6 degrees from initial ROM value, resulting in improved ability to perform functional fwd bending while standing and sitting.   2. Pt will demonstrate increased maximum isometric torque value by 25% when compared to the initial value resulting in improved ability to perform bending, lifting, and carrying activities safely, confidently.  3. Pt to demonstrate ability to independently control and reduce their pain through posture positioning and mechanical movements throughout a typical day.  4.  Patient will demonstrate ability to perform SLS in clinic without pain in L hip to show improved performance and decreased reactivity of hip complex.     Plan     Continue with established Plan of Care towards established PT goals.     Certification Period: 12/21/2018  to 3/21/19    Therapist: Yair Hilliard, PT  01/23/2019

## 2019-01-24 NOTE — PROGRESS NOTES
[facility]      Name: Sugar Ball [facility] Number: [id number]  Diagnosis:   Encounter Diagnoses   Name Primary?    Chronic bilateral low back pain without sciatica     Decreased ROM of lumbar spine      Physician: James Hillman MD  Precautions: Standard   Visit #: 8 of 20 (new referral; total visits 6)   VILLALTA: 2019  PTA Visit #: 1  Time In: 7:02 am  Time Out: 7:50 am  Total Treatment Time: 48 minutes (1:1 with PT for 48 mins)    Pain Pattern: 1 PEN  Date POC Signed: Send to Physician     Subjective     Sugar reports that she does not have any lower back pain today. She states that yesterday she didn't take her celebrex, and she didn't have any pain but she felt stiff. She reports she still remembers the stretches that she can perform from the beginning but she hasn't been doing them.   Patient reports their pain to be 0 out of 10 on a 0-10 scale with 0 being no pain and 10 being the worst pain imaginable.    Pain Location: low back     Prior functional status: independent  DME owned/used: none  Occupation:  Works in [employer] job   Leisure: hiking, swimming, playing with grandchildren  Pts goals:  Get off medication     Objective     Baseline IM Testing Results:   Date of testin18  ROM 0-60 deg   Max Peak Torque 82   Min Peak Torque 18    Flex/Ext Ratio 4.55/1   % below normative data 44       Treatment      Sugar received individual therapeutic exercises to develop strength, endurance, ROM, flexibility, posture and core stabilization for 48 minutes including:     Freemotion chop (high to low)  3x10, 7#  Free motion chop (low to high) 3x10, 7#  Freemotion side step 3x10, 3#    [facility] 2019   Visit Number 9   VAS Pain Rating 0   Treadmill Time (in min.) 10   Speed 2.7   Incline 0   Flexion in Sitting -   Manual Therapy -   Lumbar Extension Seat Pad -   Femur Restraint -   Top Dead Center -   Counterweight -   Lumbar Flexion -    Lumbar Extension -   Lumbar Peak Torque -   Min Torque -   Test Percent Below Normative Data -   Lumbar Weight 60   Repetitions 15   Rating of Perceived Exertion 4   Ice - Z Lie (in min.) 0           Not performed:   Supine Hamstring stretch with strap: 3 x 30 sec ea.  Glute stretch 3x30 sec   Bridges 3 x 10   Hooklying hip abduction 3x10 with red TBand     Seated ball roll out trunk flexion x 2 minutes  Supine hip abduction 2x10  Dead bug 3x10   Quadruped hip extension 2x10     Peripheral muscle strengthening which included 1 set of 15-20 repetitions at a slow, controlled 7 second per rep pace focused on strengthening supporting musculature for improved body mechanics and functional mobility. Pt and therapist focused on proper form during treatment to ensure optimal strengthening of each targeted muscle group. Machines were utilized including torso rotation, leg extension, leg curl, chest press, upright row, tricep extension, bicep curl, leg press, and hip abduction.    Sugar received the following manual therapy techniques: none    Written Home Exercises Provided:   Pt demo good understanding of the education provided. Sugar demonstrated good return demonstration of activities.     Education provided re:   Sugar verbalized good understanding of education provided.   No spiritual or educational barriers to learning provided      Assessment     Patient tolerated treatment session good today. Pt was educated on the wellness program through [facility] that she is able to complete after discharge from therapy.  Medx lumbar extension with 60 lbs. Pt was able to perform 15 reps with RPE of 4. Pt had minimal tightness in her back after exercise. Through all exercises, pt was unable to do 20 repetitions due to muscle fatigue. Pt tolerated progression well with focus on increasing LE, UE, core, and back strengthening. Pt tolerated all peripheral muscle strengthning well with no adverse effects. Continue progressing patient  per [facility] protocol. Pt will be reassessed next visit.     This is a 53 y.o. female referred to outpatient physical therapy and presents with a medical diagnosis of Chronic lower back pain without sciatica  and demonstrates limitations as described in the problem list. Pt prognosis is Good. Pt will continue to benefit from skilled outpatient physical therapy to address the deficits listed in the problem list, provide pt/family education and to maximize pt's level of independence in the home and community environment.     Goals as follows:     Short term goals:  6 weeks or 10 visits   1.  Pt will demonstrate increased lumbar ROM by at least 3 degrees from the initial ROM value with improvements noted in functional ROM and ability to perform ADLs  2.  Pt will demonstrate increased maximum isometric torque value by 10% when compared to the initial value resulting in improved ability to perform bending, lifting, and carrying activities safely, confidently.  3.  Patient report a reduction in worst pain score by 1-2 points for improved tolerance during work and recreational activities  4.  Pt able to perform HEP correctly with minimal cueing or supervision for therapist     Long term goals: 13 weeks or 20 visits   1. Pt will demonstrate increased lumbar ROM by at least 6 degrees from initial ROM value, resulting in improved ability to perform functional fwd bending while standing and sitting.   2. Pt will demonstrate increased maximum isometric torque value by 25% when compared to the initial value resulting in improved ability to perform bending, lifting, and carrying activities safely, confidently.  3. Pt to demonstrate ability to independently control and reduce their pain through posture positioning and mechanical movements throughout a typical day.  4.  Patient will demonstrate ability to perform SLS in clinic without pain in L hip to show improved performance and decreased reactivity of hip complex.     Plan      Continue with established Plan of Care towards established PT goals.     Certification Period: 12/21/2018  to 3/21/19    Therapist: Yair Hilliard PT & Manuela Olmstead, SPT   01/25/2019

## 2019-01-25 ENCOUNTER — CLINICAL SUPPORT (OUTPATIENT)
Dept: REHABILITATION | Facility: HOSPITAL | Age: 54
End: 2019-01-25
Attending: INTERNAL MEDICINE
Payer: COMMERCIAL

## 2019-01-25 DIAGNOSIS — M53.86 DECREASED ROM OF LUMBAR SPINE: ICD-10-CM

## 2019-01-25 DIAGNOSIS — G89.29 CHRONIC BILATERAL LOW BACK PAIN WITHOUT SCIATICA: ICD-10-CM

## 2019-01-25 DIAGNOSIS — M54.50 CHRONIC BILATERAL LOW BACK PAIN WITHOUT SCIATICA: ICD-10-CM

## 2019-01-25 PROCEDURE — 97110 THERAPEUTIC EXERCISES: CPT | Mod: PN

## 2019-02-01 ENCOUNTER — CLINICAL SUPPORT (OUTPATIENT)
Dept: REHABILITATION | Facility: HOSPITAL | Age: 54
End: 2019-02-01
Attending: INTERNAL MEDICINE
Payer: COMMERCIAL

## 2019-02-01 DIAGNOSIS — M54.50 CHRONIC BILATERAL LOW BACK PAIN WITHOUT SCIATICA: ICD-10-CM

## 2019-02-01 DIAGNOSIS — G89.29 CHRONIC BILATERAL LOW BACK PAIN WITHOUT SCIATICA: ICD-10-CM

## 2019-02-01 DIAGNOSIS — M53.86 DECREASED ROM OF LUMBAR SPINE: ICD-10-CM

## 2019-02-01 PROCEDURE — 97750 PHYSICAL PERFORMANCE TEST: CPT | Mod: PN

## 2019-02-01 PROCEDURE — 97110 THERAPEUTIC EXERCISES: CPT | Mod: PN

## 2019-02-01 NOTE — PROGRESS NOTES
[facility]      Name: Sugar Ball [facility] Number: [id number]  Diagnosis:   Encounter Diagnoses   Name Primary?    Chronic bilateral low back pain without sciatica     Decreased ROM of lumbar spine      Physician: James Hillman MD  Precautions: Standard   Visit #: 10 of 20 (new referral; total visits 6)   JIN: 2019  PTA Visit #: 1  Time In: 1:32 pm  Time Out: 2:30 pm  Total Treatment Time: 45 minutes     Pain Pattern: 1 PEN  Date POC Signed: Send to Physician     Subjective     Sugar reports that she jin does not have any back pain today.   Patient reports their pain to be 0 out of 10 on a 0-10 scale with 0 being no pain and 10 being the worst pain imaginable.    Pain Location: low back     Prior functional status: independent  DME owned/used: none  Occupation:  Works in [employer] job   Leisure: hiking, swimming, playing with grandchildren  Pts goals:  Get off medication     Objective     Baseline IM Testing Results:   Date of testin18  ROM 0-60 deg   Max Peak Torque 82   Min Peak Torque 18    Flex/Ext Ratio 4.55/1   % below normative data 44     Baseline IM Testing Results:   Date of testin19  ROM 0-63 deg   Max Peak Torque 158 ft.lbs   Min Peak Torque 93 ft.lbs   Flex/Ext Ratio 4.55/1   % above normative data 3%   Test Percent Gain in Strength from Initial  242%     Treatment      Sugar received individual therapeutic exercises to develop strength, endurance, ROM, flexibility, posture and core stabilization for 48 minutes including:     Freemotion chop (high to low)  3x10, 7#  Free motion chop (low to high) 3x10, 7#  Freemotion side step 3x10, 3#    Not performed:   Supine Hamstring stretch with strap: 3 x 30 sec ea.  Glute stretch 3x30 sec   Bridges 3 x 10   Hooklying hip abduction 3x10 with red TBand     Seated ball roll out trunk flexion x 2 minutes  Supine hip abduction 2x10  Dead bug 3x10   Quadruped hip extension 2x10      [facility] 2/1/2019   Visit Number 10   VAS Pain Rating 0   Treadmill Time (in min.) 10   Speed 2.7   Incline 0   Flexion in Sitting -   Manual Therapy -   Lumbar Extension Seat Pad 0   Femur Restraint 4   Top Dead Center 24   Counterweight 224   Lumbar Flexion 63   Lumbar Extension 0   Lumbar Peak Torque 158   Min Torque 93   Test Percent Below Normative Data -   Test Percent Gain in Strength from Initial  242   Lumbar Weight 60   Repetitions -   Rating of Perceived Exertion -   Ice - Z Lie (in min.) 0     Peripheral muscle strengthening which included 1 set of 15-20 repetitions at a slow, controlled 7 second per rep pace focused on strengthening supporting musculature for improved body mechanics and functional mobility. Pt and therapist focused on proper form during treatment to ensure optimal strengthening of each targeted muscle group. Machines were utilized including torso rotation, leg extension, leg curl, chest press, upright row, tricep extension, bicep curl, leg press, and hip abduction.    Sugar received the following manual therapy techniques: none    Written Home Exercises Provided:   Pt demo good understanding of the education provided. Sugar demonstrated good return demonstration of activities.     Education provided re:   Sugar verbalized good understanding of education provided.   No spiritual or educational barriers to learning provided      Assessment      Patient has attended 10 visits at [facility] which included MD evaluation, PT evaluation with isometric testing, and physical therapy treatment including HEP instruction, education, aerobic work, dynamic strengthening on med ex equipment for the spine, and whole body strengthening on med ex equipment with increasing weight loads.  Patient  is demonstrating increased ability to reduce symptoms, improved posture, improved   ROM, and improved  strength on med ex test average. Pt scored 3% above the normative data today. Pt was  44% below normative data in the initial eval. Pt demonstrated an 242% improvement on Medx lumbar extension muscle strength comparing to his initial data. Pt demonstrated Max peak torque of 158 ft.lbs  and Min peak torque of 93 ft.lbs today. Pt demonstrated an improvement since initial eval (Max peak torque of  82 ft.lbs and Min peak torque of 18 ft.lbs ). Pt has met 4/4 STGs today. Overall, pt is tolerating HB program well. Cont skilled PT services to decrease functional limitations.       This is a 53 y.o. female referred to outpatient physical therapy and presents with a medical diagnosis of Chronic lower back pain without sciatica  and demonstrates limitations as described in the problem list. Pt prognosis is Good. Pt will continue to benefit from skilled outpatient physical therapy to address the deficits listed in the problem list, provide pt/family education and to maximize pt's level of independence in the home and community environment.     Goals as follows:     Short term goals:  6 weeks or 10 visits updated 2/1/19  1.  Pt will demonstrate increased lumbar ROM by at least 3 degrees from the initial ROM value with improvements noted in functional ROM and ability to perform ADLs (goal met)  2.  Pt will demonstrate increased maximum isometric torque value by 10% when compared to the initial value resulting in improved ability to perform bending, lifting, and carrying activities safely, confidently. (goal met)  3.  Patient report a reduction in worst pain score by 1-2 points for improved tolerance during work and recreational activities (goal met)  4.  Pt able to perform HEP correctly with minimal cueing or supervision for therapist (goal met)     Long term goals: 13 weeks or 20 visits   1. Pt will demonstrate increased lumbar ROM by at least 6 degrees from initial ROM value, resulting in improved ability to perform functional fwd bending while standing and sitting.   2. Pt will demonstrate increased maximum  isometric torque value by 25% when compared to the initial value resulting in improved ability to perform bending, lifting, and carrying activities safely, confidently.  3. Pt to demonstrate ability to independently control and reduce their pain through posture positioning and mechanical movements throughout a typical day.  4.  Patient will demonstrate ability to perform SLS in clinic without pain in L hip to show improved performance and decreased reactivity of hip complex.     Plan   Plan to be d/c 2/8/19 to Wellness program.     Continue with established Plan of Care towards established PT goals.     Certification Period: 12/21/2018  to 3/21/19    Therapist: Yair Hilliard, PT   02/01/2019

## 2019-02-01 NOTE — PROGRESS NOTES
[facility]      Name: Sugar Ball [facility] Number: [id number]  Diagnosis:   Encounter Diagnoses   Name Primary?    Chronic bilateral low back pain without sciatica     Decreased ROM of lumbar spine      Physician: James Hillman MD  Precautions: Standard   Visit #:  20 (new referral; total visits 6)   JIN: 2019  PTA Visit #: 1  Time In: 1:00 pm  Time Out: 1:50  pm  Total Treatment Time: 30 minutes     Pain Pattern: 1 PEN  Date POC Signed: Send to Physician     Subjective     Sugar reports that she jin does not have any back pain today.   Patient reports their pain to be 0 out of 10 on a 0-10 scale with 0 being no pain and 10 being the worst pain imaginable.    Pain Location: low back     Prior functional status: independent  DME owned/used: none  Occupation:  Works in [employer] job   Leisure: hiking, swimming, playing with grandchildren  Pts goals:  Get off medication     Objective     Baseline IM Testing Results:   Date of testin18  ROM 0-60 deg   Max Peak Torque 82   Min Peak Torque 18    Flex/Ext Ratio 4.55/1   % below normative data 44     Baseline IM Testing Results:   Date of testin19  ROM 0-63 deg   Max Peak Torque 158 ft.lbs   Min Peak Torque 93 ft.lbs   Flex/Ext Ratio 4.55/1   % above normative data 3%   Test Percent Gain in Strength from Initial  242%     Treatment      Sugar received individual therapeutic exercises to develop strength, endurance, ROM, flexibility, posture and core stabilization for 48 minutes including:     Freemotion chop (high to low)  3x10, 7#  Free motion chop (low to high) 3x10, 7#  Freemotion side step 3x10, 3#    Not performed:   Supine Hamstring stretch with strap: 3 x 30 sec ea.  Glute stretch 3x30 sec   Bridges 3 x 10   Hooklying hip abduction 3x10 with red TBand     Seated ball roll out trunk flexion x 2 minutes  Supine hip abduction 2x10  Dead bug 3x10   Quadruped hip extension 2x10      [facility] 2/4/2019   Visit Number 11   VAS Pain Rating 0   Treadmill Time (in min.) 10   Speed 2.7   Incline 0   Flexion in Sitting -   Manual Therapy -   Lumbar Extension Seat Pad -   Femur Restraint -   Top Dead Center -   Counterweight -   Lumbar Flexion 63   Lumbar Extension 0   Lumbar Peak Torque -   Min Torque -   Test Percent Below Normative Data -   Test Percent Gain in Strength from Initial  -   Lumbar Weight 60   Repetitions 15   Rating of Perceived Exertion 3   Ice - Z Lie (in min.) 0       Peripheral muscle strengthening which included 1 set of 15-20 repetitions at a slow, controlled 7 second per rep pace focused on strengthening supporting musculature for improved body mechanics and functional mobility. Pt and therapist focused on proper form during treatment to ensure optimal strengthening of each targeted muscle group. Machines were utilized including torso rotation, leg extension, leg curl, chest press, upright row, tricep extension, bicep curl, leg press, and hip abduction.    Sugar received the following manual therapy techniques: none    Written Home Exercises Provided:   Pt demo good understanding of the education provided. Sugar demonstrated good return demonstration of activities.     Education provided re:   Sugar verbalized good understanding of education provided.   No spiritual or educational barriers to learning provided      Assessment     Pt tolerated PT session well. Pt will be d/c from  program on 2/8/19 to [facility]. Pt tolerated Medx lumbar extension with 60 ft. Lbs, 20 reps and RPE of 3. Pt cont to demonstrated improvement in lumbar extensors muscles. Pt does not have any lower back pain. Pt will be discharged to [facility] to maintain her Lumbar extensors, UE and LE muscle strength. Plan to be d/c on 2/8/2019.       This is a 53 y.o. female referred to outpatient physical therapy and presents with a medical diagnosis of Chronic lower  back pain without sciatica  and demonstrates limitations as described in the problem list. Pt prognosis is Good. Pt will continue to benefit from skilled outpatient physical therapy to address the deficits listed in the problem list, provide pt/family education and to maximize pt's level of independence in the home and community environment.     Goals as follows:     Short term goals:  6 weeks or 10 visits updated 2/1/19  1.  Pt will demonstrate increased lumbar ROM by at least 3 degrees from the initial ROM value with improvements noted in functional ROM and ability to perform ADLs (goal met)  2.  Pt will demonstrate increased maximum isometric torque value by 10% when compared to the initial value resulting in improved ability to perform bending, lifting, and carrying activities safely, confidently. (goal met)  3.  Patient report a reduction in worst pain score by 1-2 points for improved tolerance during work and recreational activities (goal met)  4.  Pt able to perform HEP correctly with minimal cueing or supervision for therapist (goal met)     Long term goals: 13 weeks or 20 visits   1. Pt will demonstrate increased lumbar ROM by at least 6 degrees from initial ROM value, resulting in improved ability to perform functional fwd bending while standing and sitting.   2. Pt will demonstrate increased maximum isometric torque value by 25% when compared to the initial value resulting in improved ability to perform bending, lifting, and carrying activities safely, confidently.  3. Pt to demonstrate ability to independently control and reduce their pain through posture positioning and mechanical movements throughout a typical day.  4.  Patient will demonstrate ability to perform SLS in clinic without pain in L hip to show improved performance and decreased reactivity of hip complex.     Plan   Plan to be d/c 2/8/19 to Wellness program.     Continue with established Plan of Care towards established PT goals.      Certification Period: 12/21/2018  to 3/21/19    Therapist: Yair Hilliard, PT   02/04/2019

## 2019-02-04 ENCOUNTER — CLINICAL SUPPORT (OUTPATIENT)
Dept: REHABILITATION | Facility: HOSPITAL | Age: 54
End: 2019-02-04
Attending: INTERNAL MEDICINE
Payer: COMMERCIAL

## 2019-02-04 DIAGNOSIS — G89.29 CHRONIC BILATERAL LOW BACK PAIN WITHOUT SCIATICA: ICD-10-CM

## 2019-02-04 DIAGNOSIS — M53.86 DECREASED ROM OF LUMBAR SPINE: ICD-10-CM

## 2019-02-04 DIAGNOSIS — M54.50 CHRONIC BILATERAL LOW BACK PAIN WITHOUT SCIATICA: ICD-10-CM

## 2019-02-04 PROCEDURE — 97110 THERAPEUTIC EXERCISES: CPT | Mod: PN

## 2019-02-07 NOTE — PROGRESS NOTES
[facility]      Name: Sugar Ball [facility] Number: [id number]  Diagnosis:   Encounter Diagnoses   Name Primary?    Chronic bilateral low back pain without sciatica     Decreased ROM of lumbar spine      Physician: James Hillman MD  Precautions: Standard   Visit #:  20 (new referral; total visits 6)   VILLALTA: 2019  PTA Visit #: 1  Time In: 2:00 pm  Time Out: 240  pm  Total Treatment Time: 40 minutes     Pain Pattern: 1 PEN  Date POC Signed: Send to Physician     Subjective     Sugar reports doing well and ready for discharge.    Patient reports their pain to be 0 out of 10 on a 0-10 scale with 0 being no pain and 10 being the worst pain imaginable.    Pain Location: low back     Prior functional status: independent  DME owned/used: none  Occupation:  Works in [employer] job   Leisure: hiking, swimming, playing with grandchildren  Pts goals:  Get off medication     Objective     Baseline IM Testing Results:   Date of testin18  ROM 0-60 deg   Max Peak Torque 82   Min Peak Torque 18    Flex/Ext Ratio 4.55/1   % below normative data 44     Baseline IM Testing Results:   Date of testin19  ROM 0-63 deg   Max Peak Torque 158 ft.lbs   Min Peak Torque 93 ft.lbs   Flex/Ext Ratio 4.55/1   % above normative data 3%   Test Percent Gain in Strength from Initial  242%     Treatment      Sugar received individual therapeutic exercises to develop strength, endurance, ROM, flexibility, posture and core stabilization for 40 minutes including:     Freemotion chop (high to low)  3x10, 7#  Free motion chop (low to high) 3x10, 7#  Freemotion side step 3x10, 3#    Not performed:   Supine Hamstring stretch with strap: 3 x 30 sec ea.  Glute stretch 3x30 sec   Bridges 3 x 10   Hooklying hip abduction 3x10 with red TBand     Seated ball roll out trunk flexion x 2 minutes  Supine hip abduction 2x10  Dead bug 3x10   Quadruped hip extension 2x10     [facility] 2/8/2019   Visit Number 12   VAS Pain Rating 0   Treadmill Time (in min.) 10   Speed 2.8   Incline 0   Lumbar Weight 60   Repetitions 15   Rating of Perceived Exertion 3   Ice - Z Lie (in min.) 0       Peripheral muscle strengthening which included 1 set of 15-20 repetitions at a slow, controlled 7 second per rep pace focused on strengthening supporting musculature for improved body mechanics and functional mobility. Pt and therapist focused on proper form during treatment to ensure optimal strengthening of each targeted muscle group. Machines were utilized including torso rotation, leg extension, leg curl, chest press, upright row, tricep extension, bicep curl, leg press, and hip abduction.    Sugar received the following manual therapy techniques: none    Written Home Exercises Provided:   Pt demo good understanding of the education provided. Sugar demonstrated good return demonstration of activities.     Education provided re:   Sugar verbalized good understanding of education provided.   No spiritual or educational barriers to learning provided      Assessment     Advised pt that Reddy from the wellness program will be calling her to schedule those appointments.  She demonstrates she has met all, but one of her goals.  She demonstrates a 242% increase in her strength which was measured on 2/1/19.  She is able to report 0/10 pain multiple sessions and in her daily life.  Patient is pleased with her progress and is ready for discharge to the wellness program.  She was advised on the benefits of regular exercise and transitioning to a gym as well.  She verbalizes understanding of the information provided.        This is a 53 y.o. female referred to outpatient physical therapy and presents with a medical diagnosis of Chronic lower back pain without sciatica  and demonstrates limitations as described in the problem list. Pt prognosis is Good.     Goals as follows:     Short term goals:  6 weeks or 10 visits  updated 2/1/19  1.  Pt will demonstrate increased lumbar ROM by at least 3 degrees from the initial ROM value with improvements noted in functional ROM and ability to perform ADLs (goal met)  2.  Pt will demonstrate increased maximum isometric torque value by 10% when compared to the initial value resulting in improved ability to perform bending, lifting, and carrying activities safely, confidently. (goal met)  3.  Patient report a reduction in worst pain score by 1-2 points for improved tolerance during work and recreational activities (goal met)  4.  Pt able to perform HEP correctly with minimal cueing or supervision for therapist (goal met)     Long term goals: 13 weeks or 20 visits   1. Pt will demonstrate increased lumbar ROM by at least 6 degrees from initial ROM value, resulting in improved ability to perform functional fwd bending while standing and sitting.  ROM is WFL and painfree - Maximum ROM increase was 3 degrees  2. Pt will demonstrate increased maximum isometric torque value by 25% when compared to the initial value resulting in improved ability to perform bending, lifting, and carrying activities safely, confidently.  (goal met)  3. Pt to demonstrate ability to independently control and reduce their pain through posture positioning and mechanical movements throughout a typical day.  (goal met)  4.  Patient will demonstrate ability to perform SLS in clinic without pain in L hip to show improved performance and decreased reactivity of hip complex.   (goal met)    Plan   D/c to Wellness program.     Certification Period: 12/21/2018  to 3/21/19    Therapist: Etta Mccain, PT   02/08/2019

## 2019-02-08 ENCOUNTER — CLINICAL SUPPORT (OUTPATIENT)
Dept: REHABILITATION | Facility: HOSPITAL | Age: 54
End: 2019-02-08
Attending: INTERNAL MEDICINE
Payer: COMMERCIAL

## 2019-02-08 DIAGNOSIS — M54.50 CHRONIC BILATERAL LOW BACK PAIN WITHOUT SCIATICA: ICD-10-CM

## 2019-02-08 DIAGNOSIS — M53.86 DECREASED ROM OF LUMBAR SPINE: ICD-10-CM

## 2019-02-08 DIAGNOSIS — G89.29 CHRONIC BILATERAL LOW BACK PAIN WITHOUT SCIATICA: ICD-10-CM

## 2019-02-08 PROCEDURE — 97110 THERAPEUTIC EXERCISES: CPT | Mod: PN

## 2019-03-22 LAB
CHOL/HDLC RATIO: 5.4
CHOLESTEROL, TOTAL: 217
HDLC SERPL-MCNC: 40 MG/DL
LDLC SERPL CALC-MCNC: 151 MG/DL
NONHDLC SERPL-MCNC: 177 MG/DL
TRIGL SERPL-MCNC: 135 MG/DL

## 2019-05-17 DIAGNOSIS — Z12.11 COLON CANCER SCREENING: ICD-10-CM

## 2019-07-02 RX ORDER — CELECOXIB 200 MG/1
CAPSULE ORAL
Qty: 30 CAPSULE | Refills: 0 | Status: SHIPPED | OUTPATIENT
Start: 2019-07-02 | End: 2019-08-04 | Stop reason: SDUPTHER

## 2019-08-05 RX ORDER — CELECOXIB 200 MG/1
CAPSULE ORAL
Qty: 30 CAPSULE | Refills: 0 | Status: SHIPPED | OUTPATIENT
Start: 2019-08-05 | End: 2019-11-18 | Stop reason: SDUPTHER

## 2019-09-19 DIAGNOSIS — Z12.39 BREAST CANCER SCREENING: ICD-10-CM

## 2019-11-15 ENCOUNTER — PATIENT MESSAGE (OUTPATIENT)
Dept: FAMILY MEDICINE | Facility: CLINIC | Age: 54
End: 2019-11-15

## 2019-11-18 ENCOUNTER — OFFICE VISIT (OUTPATIENT)
Dept: FAMILY MEDICINE | Facility: CLINIC | Age: 54
End: 2019-11-18
Payer: COMMERCIAL

## 2019-11-18 VITALS
HEIGHT: 67 IN | DIASTOLIC BLOOD PRESSURE: 80 MMHG | BODY MASS INDEX: 26.4 KG/M2 | OXYGEN SATURATION: 97 % | WEIGHT: 168.19 LBS | SYSTOLIC BLOOD PRESSURE: 120 MMHG | HEART RATE: 80 BPM | TEMPERATURE: 99 F

## 2019-11-18 DIAGNOSIS — L98.9 SKIN LESION OF BACK: ICD-10-CM

## 2019-11-18 DIAGNOSIS — B96.89 ACUTE BACTERIAL SINUSITIS: Primary | ICD-10-CM

## 2019-11-18 DIAGNOSIS — M54.50 CHRONIC BILATERAL LOW BACK PAIN WITHOUT SCIATICA: ICD-10-CM

## 2019-11-18 DIAGNOSIS — J01.90 ACUTE BACTERIAL SINUSITIS: Primary | ICD-10-CM

## 2019-11-18 DIAGNOSIS — G89.29 CHRONIC BILATERAL LOW BACK PAIN WITHOUT SCIATICA: ICD-10-CM

## 2019-11-18 PROCEDURE — 99999 PR PBB SHADOW E&M-EST. PATIENT-LVL IV: CPT | Mod: PBBFAC,,, | Performed by: PHYSICIAN ASSISTANT

## 2019-11-18 PROCEDURE — 99214 PR OFFICE/OUTPT VISIT, EST, LEVL IV, 30-39 MIN: ICD-10-PCS | Mod: S$GLB,,, | Performed by: PHYSICIAN ASSISTANT

## 2019-11-18 PROCEDURE — 3008F PR BODY MASS INDEX (BMI) DOCUMENTED: ICD-10-PCS | Mod: CPTII,S$GLB,, | Performed by: PHYSICIAN ASSISTANT

## 2019-11-18 PROCEDURE — 99999 PR PBB SHADOW E&M-EST. PATIENT-LVL IV: ICD-10-PCS | Mod: PBBFAC,,, | Performed by: PHYSICIAN ASSISTANT

## 2019-11-18 PROCEDURE — 3008F BODY MASS INDEX DOCD: CPT | Mod: CPTII,S$GLB,, | Performed by: PHYSICIAN ASSISTANT

## 2019-11-18 PROCEDURE — 99214 OFFICE O/P EST MOD 30 MIN: CPT | Mod: S$GLB,,, | Performed by: PHYSICIAN ASSISTANT

## 2019-11-18 RX ORDER — CELECOXIB 200 MG/1
CAPSULE ORAL
Qty: 30 CAPSULE | Refills: 0 | Status: SHIPPED | OUTPATIENT
Start: 2019-11-18 | End: 2020-05-26

## 2019-11-18 RX ORDER — AMOXICILLIN AND CLAVULANATE POTASSIUM 875; 125 MG/1; MG/1
1 TABLET, FILM COATED ORAL 2 TIMES DAILY
Qty: 20 TABLET | Refills: 0 | Status: SHIPPED | OUTPATIENT
Start: 2019-11-18 | End: 2019-11-28

## 2019-11-18 NOTE — PATIENT INSTRUCTIONS
If you have not been contacted in a week about your referral, please call Referrals Coordinator at [phone]       Acute Bacterial Rhinosinusitis (ABRS)    Acute bacterial rhinosinusitis (ABRS) is an infection of your nasal cavity and sinuses. Its caused by bacteria. Acute means that youve had symptoms for less than 12 weeks.  Understanding your sinuses  The nasal cavity is the large air-filled space behind your nose. The sinuses are a group of spaces formed by the bones of your face. They connect with your nasal cavity. ABRS causes the tissue lining these spaces to become inflamed. Mucus may not drain normally. This leads to facial pain and other symptoms.  What causes ABRS?  ABRS most often follows an upper respiratory infection caused by a virus. Bacteria then infect the lining of your nasal cavity and sinuses. But you can also get ABRS if you have:  · Nasal allergies  · Long-term nasal swelling and congestion not caused by allergies  · Blockage in the nose  Symptoms of ABRS  The symptoms of ABRS may be different for each person, and can include:  · Nasal congestion  · Runny nose  · Fluid draining from the nose down the throat (postnasal drip)  · Headache  · Cough  · Pain in the sinuses  · Thick, colored fluid from the nose (mucus)  · Fever  Diagnosing ABRS  ABRS may be diagnosed if youve had an upper respiratory infection like a cold and cough for longer than 10 to 14 days. Your health care provider will ask about your symptoms and your medical history. The provider will check your vital signs, including your temperature. Youll have a physical exam. The health care provider will check your ears, nose, and throat. You likely wont need any tests. If ABRS comes back, you may have a culture or other tests.  Treatment for ABRS  Treatment may include:  · Antibiotic medicine. This is for symptoms that last for at least 10 to 14 days.  · Nasal corticosteroid medicine. Drops or spray used in the nose can lessen  swelling and congestion.  · Over-the-counter pain medicine. This is to lessen sinus pain and pressure.  · Nasal decongestant medicine. Spray or drops may help to lessen congestion. Do not use them for more than a few days.  · Salt wash (saline irrigation). This can help to loosen mucus.  Possible complications of ABRS  ABRS may come back or become long-term (chronic).  In rare cases, ABRS may cause complications such as:   · Inflamed tissue around the brain and spinal cord (meningitis)  · Inflamed tissue around the eyes (orbital cellulitis)  · Inflamed bones around the sinuses (osteitis)  These problems may need to be treated in a hospital with intravenous (IV) antibiotic medicine or surgery.  When to call the health care provider  Call your health care provider if you have any of the following:  · Symptoms that dont get better, or get worse  · Symptoms that dont get better after 3 to 5 days on antibiotics  · Trouble seeing  · Swelling around your eyes  · Confusion or trouble staying awake   Date Last Reviewed: 3/3/2015  © 1141-6754 [employer]. [address] [place] [address], [place] [address]. All rights reserved. This information is not intended as a substitute for professional medical care. Always follow your healthcare professional's instructions.

## 2019-11-18 NOTE — PROGRESS NOTES
"Patient Name: Sugar Diaz    : 1965  MRN: [id number]    Subjective:  Sugar is a 54 y.o. female who presents today for:    Chief Complaint   Patient presents with    Sinus Problem    Cough    check lump on back       HPI  Patient has multiple medical diagnoses as listed below in the history. Patient is new to me, but known to the clinic. She complains of sinus pain and congestion for 2 weeks. She has been using Flonase and Claritin. Symptoms have worsened. The symptoms are worse in the evening and early morning. She has associated dry cough and HA. She denies fever, chills, body aches, wheezing, dyspnea or chest pain.    Additionally, she complains of "lump" on her left upper back for months. Most recently she developed tenderness. The lump has grown in size. No color change or discharge.       Past Medical History  Past Medical History:   Diagnosis Date    Chest pain     NEGATIVE STRESS ECHO     GERD (gastroesophageal reflux disease)     Lower back pain     Migraines     Mild allergic rhinitis        Past Surgical History  Past Surgical History:   Procedure Laterality Date    TONSILLECTOMY      TUBAL LIGATION         Family History  No family history on file.    Social History  Social History     Socioeconomic History    Marital status:      Spouse name: Not on file    Number of children: Not on file    Years of education: Not on file    Highest education level: Not on file   Occupational History    Not on file   Social Needs    Financial resource strain: Not hard at all    Food insecurity:     Worry: Never true     Inability: Never true    Transportation needs:     Medical: No     Non-medical: No   Tobacco Use    Smoking status: Never Smoker    Smokeless tobacco: Never Used   Substance and Sexual Activity    Alcohol use: Not on file    Drug use: Not on file    Sexual activity: Not on file   Lifestyle    Physical activity:     Days per week: 5 days     Minutes per " "session: 30 min    Stress: Not at all   Relationships    Social connections:     Talks on phone: Patient refused     Gets together: Patient refused     Attends Evangelical service: Not on file     Active member of club or organization: Patient refused     Attends meetings of clubs or organizations: Patient refused     Relationship status:    Other Topics Concern    Not on file   Social History Narrative    Not on file       Current Medications  Current Outpatient Medications on File Prior to Visit   Medication Sig Dispense Refill    [DISCONTINUED] celecoxib (CELEBREX) 200 MG capsule TAKE 1 CAPSULE(200 MG) BY MOUTH EVERY DAY 30 capsule 0     No current facility-administered medications on file prior to visit.        Allergies   Review of patient's allergies indicates:  No Known Allergies      ROS  Review of Systems   Constitutional: Negative for chills and fever.   HENT: Positive for ear pain, postnasal drip and sore throat. Negative for rhinorrhea.    Respiratory: Positive for cough. Negative for shortness of breath and wheezing.    Cardiovascular: Negative for chest pain.   Gastrointestinal: Negative for nausea.   Musculoskeletal: Negative for myalgias.   Skin: Negative for rash.        Positive skin lesion   Allergic/Immunologic: Negative for environmental allergies.   Neurological: Positive for headaches.   Hematological: Negative for adenopathy.         Objective:    /80 (BP Location: Right arm, Patient Position: Sitting, BP Method: Small (Manual))   Pulse 80   Temp 98.7 °F (37.1 °C) (Oral)   Ht 5' 7" (1.702 m)   Wt 76.3 kg (168 lb 3.4 oz)   LMP 11/15/2012   SpO2 97%   BMI 26.35 kg/m²     Physical Exam   Constitutional: Vital signs are normal.   HENT:   Head: Normocephalic.   Right Ear: Hearing, tympanic membrane, external ear and ear canal normal. Tympanic membrane is not bulging. No middle ear effusion.   Left Ear: Hearing, tympanic membrane, external ear and ear canal normal. Tympanic " membrane is not bulging.  No middle ear effusion.   Nose: Mucosal edema present. Right sinus exhibits maxillary sinus tenderness. Right sinus exhibits no frontal sinus tenderness. Left sinus exhibits maxillary sinus tenderness. Left sinus exhibits no frontal sinus tenderness.   Mouth/Throat: Uvula is midline and mucous membranes are normal. Posterior oropharyngeal erythema present. No oropharyngeal exudate or posterior oropharyngeal edema.   Cobblestoning posterior oropharynx     Eyes: Pupils are equal, round, and reactive to light. Conjunctivae, EOM and lids are normal.   Cardiovascular: Normal rate, regular rhythm, S1 normal and S2 normal.   Pulmonary/Chest: Effort normal and breath sounds normal. She has no wheezes.   Lymphadenopathy:     She has no cervical adenopathy.        Right: No supraclavicular adenopathy present.        Left: No supraclavicular adenopathy present.   Neurological: She is alert.   Skin: Skin is warm, dry and intact. Lesion noted. No rash noted.   2x2 hard, tender, mobile lesion, no color abnormality   Left upper back, trapezius    Psychiatric: She has a normal mood and affect. Judgment normal.       Assessment/Plan:  Sugar Diaz is a 54 y.o. female who presents today for :    Sugar was seen today for sinus problem, cough and check lump on back.    Diagnoses and all orders for this visit:    Acute bacterial sinusitis  -     amoxicillin-clavulanate 875-125mg (AUGMENTIN) 875-125 mg per tablet; Take 1 tablet by mouth 2 (two) times daily. for 10 days  Discussed symptomatology of acute bacterial rhinosinusitis, including risk factors and proposed benefit, side effects/risks of antibiotic therapy   Counseled patient on the clinical course of condition including symptomatology, treatment and prevention.  Counseled regarding risks, benefits, and limitations of medications.  Advised patient to seek urgent/emergent care if symptoms intensify.  Sent patient with informational material about  diagnosis.  Patient gave verbal understanding and agreement of plan.    Skin lesion of back  -     Ambulatory referral to Dermatology  Likely benign cyst or lipoma  Derm consult    Chronic bilateral low back pain without sciatica  -     celecoxib (CELEBREX) 200 MG capsule; TAKE 1 CAPSULE(200 MG) BY MOUTH EVERY DAY      Problem list issues addressed during this visit    Chronic bilateral low back pain without sciatica  Stable, controlled with NSAIDs  Continue current treatment plan  followed by PCP         Health maintenance reviewed and discussed, deferred at this time due to acute illness      I spent >50% of this 25 minute encounter counseling the patient on diagnoses, risk factors, and treatments.         Encouraged to call/return to clinic if symptoms persist or worsen    Mirtha Olivares PA-C  [facility]      Answers for HPI/ROS submitted by the patient on 11/15/2019   Cough  Chronicity: new  Onset: in the past 7 days  Progression since onset: gradually worsening  Frequency: hourly  Cough characteristics: productive of brown sputum  ear congestion: Yes  heartburn: No  hemoptysis: No  nasal congestion: Yes  sweats: No  weight loss: No  Aggravated by: cold air, lying down  asthma: No  bronchiectasis: No  bronchitis: No  COPD: No  emphysema: No  pneumonia: No  Treatments tried: OTC cough suppressant  Improvement on treatment: mild

## 2019-11-22 ENCOUNTER — TELEPHONE (OUTPATIENT)
Dept: FAMILY MEDICINE | Facility: CLINIC | Age: 54
End: 2019-11-22

## 2019-11-22 NOTE — LETTER
November 22, 2019    Sugar Frankelaney  [address]  [place] [address]             [facility]  [address]  [place] [address]  Phone: [phone]  Fax: [phone] Dear Ms. Diaz:    I have been unable to reach you by phone for your appointment to Dermatology. Please call me at the clinic [phone] to book your appointment.      If you have any questions or concerns, please don't hesitate to call.    Sincerely,        Yessica Hinds MA

## 2019-11-27 ENCOUNTER — PATIENT MESSAGE (OUTPATIENT)
Dept: FAMILY MEDICINE | Facility: CLINIC | Age: 54
End: 2019-11-27

## 2019-11-27 RX ORDER — VALACYCLOVIR HYDROCHLORIDE 1 G/1
TABLET, FILM COATED ORAL
Qty: 30 TABLET | Refills: 11 | Status: SHIPPED | OUTPATIENT
Start: 2019-11-27 | End: 2022-04-21

## 2019-12-03 ENCOUNTER — PATIENT MESSAGE (OUTPATIENT)
Dept: FAMILY MEDICINE | Facility: CLINIC | Age: 54
End: 2019-12-03

## 2019-12-03 DIAGNOSIS — J01.00 ACUTE NON-RECURRENT MAXILLARY SINUSITIS: Primary | ICD-10-CM

## 2019-12-03 RX ORDER — LORATADINE 10 MG/1
10 TABLET ORAL DAILY
Qty: 30 TABLET | Refills: 11 | Status: SHIPPED | OUTPATIENT
Start: 2019-12-03 | End: 2024-03-19

## 2019-12-03 RX ORDER — AZELASTINE 1 MG/ML
1 SPRAY, METERED NASAL 2 TIMES DAILY
Qty: 30 ML | Refills: 0 | Status: SHIPPED | OUTPATIENT
Start: 2019-12-03 | End: 2023-08-22 | Stop reason: CLARIF

## 2019-12-09 ENCOUNTER — PATIENT MESSAGE (OUTPATIENT)
Dept: FAMILY MEDICINE | Facility: CLINIC | Age: 54
End: 2019-12-09

## 2019-12-13 DIAGNOSIS — G89.29 CHRONIC BILATERAL LOW BACK PAIN WITHOUT SCIATICA: ICD-10-CM

## 2019-12-13 DIAGNOSIS — M54.50 CHRONIC BILATERAL LOW BACK PAIN WITHOUT SCIATICA: ICD-10-CM

## 2019-12-13 RX ORDER — CELECOXIB 200 MG/1
CAPSULE ORAL
Qty: 30 CAPSULE | Refills: 0 | OUTPATIENT
Start: 2019-12-13

## 2020-01-15 ENCOUNTER — PATIENT MESSAGE (OUTPATIENT)
Dept: FAMILY MEDICINE | Facility: CLINIC | Age: 55
End: 2020-01-15

## 2020-01-15 ENCOUNTER — OFFICE VISIT (OUTPATIENT)
Dept: FAMILY MEDICINE | Facility: CLINIC | Age: 55
End: 2020-01-15
Payer: COMMERCIAL

## 2020-01-15 VITALS
TEMPERATURE: 99 F | HEIGHT: 67 IN | SYSTOLIC BLOOD PRESSURE: 118 MMHG | BODY MASS INDEX: 26.37 KG/M2 | DIASTOLIC BLOOD PRESSURE: 60 MMHG | OXYGEN SATURATION: 97 % | HEART RATE: 78 BPM | WEIGHT: 168 LBS

## 2020-01-15 DIAGNOSIS — H92.02 CHRONIC LEFT EAR PAIN: Primary | ICD-10-CM

## 2020-01-15 DIAGNOSIS — G89.29 CHRONIC LEFT EAR PAIN: Primary | ICD-10-CM

## 2020-01-15 PROCEDURE — 3008F PR BODY MASS INDEX (BMI) DOCUMENTED: ICD-10-PCS | Mod: CPTII,S$GLB,, | Performed by: PHYSICIAN ASSISTANT

## 2020-01-15 PROCEDURE — 99214 PR OFFICE/OUTPT VISIT, EST, LEVL IV, 30-39 MIN: ICD-10-PCS | Mod: S$GLB,,, | Performed by: PHYSICIAN ASSISTANT

## 2020-01-15 PROCEDURE — 99999 PR PBB SHADOW E&M-EST. PATIENT-LVL IV: CPT | Mod: PBBFAC,,, | Performed by: PHYSICIAN ASSISTANT

## 2020-01-15 PROCEDURE — 99214 OFFICE O/P EST MOD 30 MIN: CPT | Mod: S$GLB,,, | Performed by: PHYSICIAN ASSISTANT

## 2020-01-15 PROCEDURE — 99999 PR PBB SHADOW E&M-EST. PATIENT-LVL IV: ICD-10-PCS | Mod: PBBFAC,,, | Performed by: PHYSICIAN ASSISTANT

## 2020-01-15 PROCEDURE — 3008F BODY MASS INDEX DOCD: CPT | Mod: CPTII,S$GLB,, | Performed by: PHYSICIAN ASSISTANT

## 2020-01-15 NOTE — PATIENT INSTRUCTIONS
If you have not been contacted in 3-5 days about your referral, please call Referrals Coordinator at [phone]

## 2020-01-15 NOTE — PROGRESS NOTES
Patient Name: Sugar Diaz    : 1965  MRN: [id number]    Subjective:  Sugar is a 54 y.o. female who presents today for:    Chief Complaint   Patient presents with    Otalgia       HPI  Patient has multiple medical diagnoses as listed below in the history. She complains of left ear pain for 3 months. The pain is dull and intermittent. She denies noticeable hearing loss or tinnitus. No dizziness or mastoid tenderness. No discharge. Patient was treated for a sinus infection which preceded the ear pain back in Nov. She reports some relief of ear pain with sinus therapies. The pain returned shortly after recovery from sinus symptoms. She denies sinus pain, nasal congestion, or past nasal drip. No fever or chills. No radiating pain. She takes Claritin daily.     Past Medical History  Past Medical History:   Diagnosis Date    Chest pain     NEGATIVE STRESS ECHO     GERD (gastroesophageal reflux disease)     Lower back pain     Migraines     Mild allergic rhinitis        Past Surgical History  Past Surgical History:   Procedure Laterality Date    TONSILLECTOMY      TUBAL LIGATION         Family History  No family history on file.    Social History  Social History     Socioeconomic History    Marital status:      Spouse name: Not on file    Number of children: Not on file    Years of education: Not on file    Highest education level: Not on file   Occupational History    Not on file   Social Needs    Financial resource strain: Not hard at all    Food insecurity:     Worry: Never true     Inability: Never true    Transportation needs:     Medical: No     Non-medical: No   Tobacco Use    Smoking status: Never Smoker    Smokeless tobacco: Never Used   Substance and Sexual Activity    Alcohol use: Not on file    Drug use: Not on file    Sexual activity: Not on file   Lifestyle    Physical activity:     Days per week: 5 days     Minutes per session: 30 min    Stress: Not at all  "  Relationships    Social connections:     Talks on phone: Patient refused     Gets together: Patient refused     Attends [employer] service: Not on file     Active member of club or organization: Patient refused     Attends meetings of clubs or organizations: Patient refused     Relationship status:    Other Topics Concern    Not on file   Social History Narrative    Not on file       Current Medications  Current Outpatient Medications on File Prior to Visit   Medication Sig Dispense Refill    azelastine (ASTELIN) 137 mcg (0.1 %) nasal spray 1 spray (137 mcg total) by Nasal route 2 (two) times daily. 30 mL 0    celecoxib (CELEBREX) 200 MG capsule TAKE 1 CAPSULE(200 MG) BY MOUTH EVERY DAY 30 capsule 0    loratadine (CLARITIN) 10 mg tablet Take 1 tablet (10 mg total) by mouth once daily. 30 tablet 11    valACYclovir (VALTREX) 1000 MG tablet Two pills at onset of fever blister and then repeat 2 pills 12 hr later 30 tablet 11     No current facility-administered medications on file prior to visit.        Allergies   Review of patient's allergies indicates:  No Known Allergies      ROS  Review of Systems   Constitutional: Negative for chills, fatigue and fever.   HENT: Positive for ear pain and hearing loss. Negative for congestion, ear discharge, postnasal drip, rhinorrhea, sinus pressure, sinus pain, sneezing, sore throat and tinnitus.    Respiratory: Negative for cough, shortness of breath and wheezing.    Cardiovascular: Negative for chest pain and palpitations.   Gastrointestinal: Negative for abdominal pain and nausea.   Musculoskeletal: Negative for myalgias.   Skin: Negative for rash.   Neurological: Negative for light-headedness and headaches.   Hematological: Negative for adenopathy.         Objective:    /60 (BP Location: Right arm, Patient Position: Sitting, BP Method: Medium (Manual))   Pulse 78   Temp 98.7 °F (37.1 °C) (Oral)   Ht 5' 7" (1.702 m)   Wt 76.2 kg (167 lb 15.9 oz)   LMP " 11/15/2012   SpO2 97%   BMI 26.31 kg/m²     Physical Exam   Constitutional: Vital signs are normal.   HENT:   Head: Normocephalic.   Right Ear: Hearing, external ear and ear canal normal. No tenderness. No foreign bodies. No mastoid tenderness. Tympanic membrane is not perforated, not erythematous and not bulging. A middle ear effusion is present. No decreased hearing is noted.   Left Ear: Hearing, external ear and ear canal normal. No tenderness. No foreign bodies. No mastoid tenderness. Tympanic membrane is not perforated, not erythematous and not bulging. A middle ear effusion is present. No decreased hearing is noted.   Nose: Nose normal. Right sinus exhibits no maxillary sinus tenderness and no frontal sinus tenderness. Left sinus exhibits no maxillary sinus tenderness and no frontal sinus tenderness.   Mouth/Throat: Uvula is midline, oropharynx is clear and moist and mucous membranes are normal. No oropharyngeal exudate, posterior oropharyngeal edema or posterior oropharyngeal erythema.   Bilateral serous middle ear effusion L>R   Eyes: Pupils are equal, round, and reactive to light. Conjunctivae, EOM and lids are normal.   Cardiovascular: Normal rate, regular rhythm, S1 normal and S2 normal.   Pulmonary/Chest: Effort normal and breath sounds normal. She has no wheezes.   Lymphadenopathy:     She has no cervical adenopathy.        Right: No supraclavicular adenopathy present.        Left: No supraclavicular adenopathy present.   Neurological: She is alert.   Skin: Skin is warm, dry and intact. No rash noted.   Psychiatric: She has a normal mood and affect. Judgment normal.       Assessment/Plan:  Sugar Diaz is a 54 y.o. female who presents today for :    Sugar was seen today for otalgia.    Diagnoses and all orders for this visit:    Chronic left ear pain  -     Ambulatory referral to ENT  I am uncertain as to the cause of the patient's ear pain. Patient does have serous fluid in bilateral middle  L>R. No signs of bacterial infection or perforated TM. Recommended tylenol PRN for pain. Continue with claritin and flonase.   Given chronicity of symptoms referral to ENT for further evaluation      I spent >50% of this 25 minute encounter counseling the patient on diagnoses, risk factors, and treatments.       Encouraged to call/return to clinic if symptoms persist or worsen    Mirtha Olivares PA-C  [facility]

## 2020-01-27 ENCOUNTER — TELEPHONE (OUTPATIENT)
Dept: FAMILY MEDICINE | Facility: CLINIC | Age: 55
End: 2020-01-27

## 2020-02-07 DIAGNOSIS — Z11.59 NEED FOR HEPATITIS C SCREENING TEST: ICD-10-CM

## 2020-03-19 ENCOUNTER — PATIENT MESSAGE (OUTPATIENT)
Dept: FAMILY MEDICINE | Facility: CLINIC | Age: 55
End: 2020-03-19

## 2020-03-20 NOTE — TELEPHONE ENCOUNTER
Whenever there is a message about another person, the message needs to be copied into that other person's chart and I need a message on the actual person, thanks      Please also forward this message back so I can respond to the daughter since the mother may not be on the my [facility]

## 2020-03-20 NOTE — TELEPHONE ENCOUNTER
Patient's daughter informed of message below.  She verbalized understanding and intent to  note today.

## 2020-03-20 NOTE — TELEPHONE ENCOUNTER
Still have not received a message on the patient in question.  It is okay to give a note for her mom stating that based on patient's age and recent medical issues, it has been medically advise that she isolate and keep social distance.  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

## 2020-05-18 DIAGNOSIS — Z12.11 COLON CANCER SCREENING: ICD-10-CM

## 2020-05-26 ENCOUNTER — OFFICE VISIT (OUTPATIENT)
Dept: URGENT CARE | Facility: CLINIC | Age: 55
End: 2020-05-26
Payer: COMMERCIAL

## 2020-05-26 VITALS
HEART RATE: 85 BPM | TEMPERATURE: 99 F | OXYGEN SATURATION: 99 % | RESPIRATION RATE: 18 BRPM | DIASTOLIC BLOOD PRESSURE: 67 MMHG | SYSTOLIC BLOOD PRESSURE: 121 MMHG

## 2020-05-26 DIAGNOSIS — S93.602A SPRAIN OF LEFT FOOT, INITIAL ENCOUNTER: Primary | ICD-10-CM

## 2020-05-26 DIAGNOSIS — S90.32XA CONTUSION OF LEFT FOOT, INITIAL ENCOUNTER: ICD-10-CM

## 2020-05-26 DIAGNOSIS — S99.922A FOOT TRAUMA, LEFT, INITIAL ENCOUNTER: ICD-10-CM

## 2020-05-26 PROCEDURE — 99214 PR OFFICE/OUTPT VISIT, EST, LEVL IV, 30-39 MIN: ICD-10-PCS | Mod: S$GLB,,, | Performed by: PHYSICIAN ASSISTANT

## 2020-05-26 PROCEDURE — 73630 XR FOOT COMPLETE 3 VIEW LEFT: ICD-10-PCS | Mod: LT,S$GLB,, | Performed by: RADIOLOGY

## 2020-05-26 PROCEDURE — 73630 X-RAY EXAM OF FOOT: CPT | Mod: LT,S$GLB,, | Performed by: RADIOLOGY

## 2020-05-26 PROCEDURE — 99214 OFFICE O/P EST MOD 30 MIN: CPT | Mod: S$GLB,,, | Performed by: PHYSICIAN ASSISTANT

## 2020-05-26 RX ORDER — MELOXICAM 7.5 MG/1
15 TABLET ORAL DAILY
Qty: 40 TABLET | Refills: 0 | Status: SHIPPED | OUTPATIENT
Start: 2020-05-26 | End: 2020-06-15

## 2020-05-26 RX ORDER — ACETAMINOPHEN AND CODEINE PHOSPHATE 300; 30 MG/1; MG/1
1 TABLET ORAL EVERY 4 HOURS PRN
Qty: 15 TABLET | Refills: 0 | Status: SHIPPED | OUTPATIENT
Start: 2020-05-26 | End: 2020-05-31

## 2020-05-26 NOTE — PATIENT INSTRUCTIONS
General Discharge Instructions     Rest, ice, repeat. (DO NOT APPLY ICE DIRECTLY TO THE SKIN.  DO NOT LEAVE ON AFFECTED BODY PART FOR MORE THAN 15 MINUTES AT AT TIME TO AVOID INJURY TO SOFT TISSUE) and elevate the affected area keeping the ace wrap on for compression.   Repeat X ray in 1 week if you still have pain. Follow up with orthopedics if the symptoms are not resolving as you thing they should     If you were prescribed a narcotic or controlled medication, do not drive or operate heavy equipment or machinery while taking these medications.  If you were prescribed antibiotics, please take them to completion.  You must understand that you've received an Urgent Care treatment only and that you may be released before all your medical problems are known or treated. You, the patient, will arrange for follow up care as instructed.  Follow up with your PCP or specialty clinic as directed in the next 1-2 weeks if not improved or as needed.  You can call [phone] to schedule an appointment with the appropriate provider.  If your condition worsens we recommend that you receive another evaluation at the emergency room immediately or contact your primary medical clinics after hours call service to discuss your concerns.  Please return here or go to the Emergency Department for any concerns or worsening of condition.      Foot Sprain    A sprain is a stretching or tearing of the ligaments that hold a joint together. There are no broken bones. Sprains generally take from 3-6 weeks to heal. A sprain may be treated with a splint, walking cast, or special boot. Mild sprains may not need any additional support.  Home care  The following guidelines will help you care for your injury at home:  · Keep your leg elevated when sitting or lying down. This is very important during the first 48 hours to reduce swelling. Stay off the injured foot as much as possible until you can walk on it without pain. If needed, you may use  crutches during the first week for this purpose. Crutches can be rented at many pharmacies or surgical/orthopedic supply stores.  · You may be given a cast shoe to wear to prevent movement in your foot. If not, you can use a sandal or any shoe that does not put pressure on the injured area until the swelling and pain go away. If using a sandal, be careful not to hit your foot against anything, since another injury could make the sprain worse.  · Apply an ice pack over the injured area for 15 to 20 minutes every 3 to 6 hours. You should do this for the first 24 to 48 hours. You can make an ice pack by filling a plastic bag that seals at the top with ice cubes and then wrapping it with a thin towel. Continue to use ice packs for relief of pain and swelling as needed. As the ice melts, avoid getting any wrap, splint, or cast wet. After 48 hours, apply heat from a warm shower or bath for 20 minutes several times daily. Alternating ice and heat may also be helpful.  · You may use over-the-counter pain medicine to control pain, unless another medicine was prescribed. If you have chronic liver or kidney disease or ever had a stomach ulcer or GI bleeding, talk with your healthcare provider before using these medicines.  · If you were given a splint or cast, keep it dry. Bathe with your splint or cast well out of the water, protected with 2 large plastic bags, rubber-banded at the top end. If a fiberglass splint or cast gets wet, you can dry it with a hair dryer.  · You may return to sports after healing, when you can run without pain.  Follow-up care  Follow up with your healthcare provider as directed. Sometimes fractures dont show up on the first X-ray. Bruises and sprains can sometimes hurt as much as a fracture. These injuries can take time to heal completely. If your symptoms dont improve or they get worse, talk with your healthcare provider. You may need a repeat X-ray.  When to seek medical advice  Call your  healthcare provider right away if any of these occur:  · The plaster cast or splint gets wet or soft  · The fiberglass cast or splint gets wet and does not dry for 24 hours  · Pain or swelling increases, or redness appears  · A bad odor comes from within the cast  · Fever of 100.4°F (38°C) or above lasting for 24 to 48 hours  · Toes on the injured foot become cold, blue, numb, or tingly  Date Last Reviewed: 11/20/2015  © 9233-6954 [employer]. [address] [place] [address], [place] [id number]. All rights reserved. This information is not intended as a substitute for professional medical care. Always follow your healthcare professional's instructions.            Foot Contusion  You have a contusion. This is also called a bruise. There is swelling and some bleeding under the skin, but no broken bones. This injury generally takes a few days to a few weeks to heal.  During that time, the bruise will typically change in color from reddish, to purple-blue, to greenish-yellow, then to yellow-brown.  Home care  · Elevate the foot to reduce pain and swelling. As much as possible, sit or lie down with the foot raised about the level of your heart. This is especially important during the first 48 hours.  · Ice the foot to help reduce pain and swelling. Wrap a cold source (ice pack or ice cubes in a plastic bag) in a thin towel. Apply to the bruised area for 20 minutes every 1 to 2 hours the first day. Continue this 3 to 4 times a day until the pain and swelling goes away.  · Unless another medicine was prescribed, you can take acetaminophen, ibuprofen, or naproxen to control pain. (If you have chronic liver or kidney disease or ever had a stomach ulcer or gastrointestinal bleeding, talk with your healthcare provider before using these medicines.)  Follow up  Follow up with your healthcare provider or our staff as advised. Call if you are not improving within 1 to 2 weeks.  When to seek medical advice   Call your  healthcare provider right away if you have any of the following:  · Increased pain or swelling  · Foot or leg becomes cold, blue, numb or tingly  · Signs of infection: Warmth, drainage, or increased redness or pain around the bruise  · Inability to move the injured foot   · Frequent bruising for unknown reasons  Date Last Reviewed: 2/1/2017  © 2307-0921 [employer]. [address] [place], [place] [id number]. All rights reserved. This information is not intended as a substitute for professional medical care. Always follow your healthcare professional's instructions.

## 2020-05-26 NOTE — LETTER
May 26, 2020      [facility] [address] [facility] [address] [facility] [address] [place] [address]  Phone: [phone]  Fax: [phone]       Patient: Sugar Diaz   YOB: 1965  Date of Visit: 05/26/2020    To Whom It May Concern:    Jovan Diaz  was at [facility] on 05/26/2020. She may return to work/school on 5/27/2020 with no restrictions. If you have any questions or concerns, or if I can be of further assistance, please do not hesitate to contact me.    Sincerely,    Scottie Naranjo PA-C

## 2020-05-26 NOTE — PROGRESS NOTES
Subjective:       Patient ID: Sugar Diaz is a 54 y.o. female.    Vitals:  temperature is 98.9 °F (37.2 °C). Her blood pressure is 121/67 and her pulse is 85. Her respiration is 18 and oxygen saturation is 99%.     Chief Complaint: Foot Injury    Patient presenting with left foot pain s/p fall this morning about 1 hour ago. Reports that she slipped on a wet surface and landed on her left foot. States that her right foot slipped out from under her and she landed on her left foot and heard a crack. States that she is unsure if the crack was just from her falling or if it came from her foot. Reports that she's fractured her left foot twice in the past before. Pain is mostly in the mid-foot but radiates to the toes (especially the 2nd digit) and to the ankle. She has not taken anything for pain. Denies any head trauma, LOC.     Foot Injury    The incident occurred 1 to 3 hours ago. The incident occurred at home. The injury mechanism was a fall. The pain is present in the left ankle. The quality of the pain is described as aching and shooting. The pain is at a severity of 10/10. The pain is severe. The pain has been constant since onset. Associated symptoms include an inability to bear weight. She reports no foreign bodies present. The symptoms are aggravated by movement and weight bearing. She has tried nothing for the symptoms.       Constitution: Negative for fatigue.   HENT: Negative for facial swelling and facial trauma.    Neck: Negative for neck stiffness.   Cardiovascular: Negative for chest trauma.   Eyes: Negative for eye trauma, double vision and blurred vision.   Gastrointestinal: Negative for abdominal trauma, abdominal pain and rectal bleeding.   Genitourinary: Negative for hematuria, missed menses, genital trauma and pelvic pain.   Musculoskeletal: Positive for pain, trauma, joint pain and joint swelling. Negative for abnormal ROM of joint.   Skin: Negative for color change, wound, abrasion,  laceration and bruising.   Neurological: Negative for dizziness, history of vertigo, light-headedness, coordination disturbances, altered mental status and loss of consciousness.   Hematologic/Lymphatic: Negative for history of bleeding disorder.   Psychiatric/Behavioral: Negative for altered mental status.       Objective:      Physical Exam   Constitutional: She is oriented to person, place, and time. She appears well-developed and well-nourished. She is cooperative.  Non-toxic appearance. She does not appear ill. No distress.   HENT:   Head: Normocephalic and atraumatic.   Right Ear: Hearing, tympanic membrane, external ear and ear canal normal.   Left Ear: Hearing, tympanic membrane, external ear and ear canal normal.   Nose: Nose normal. No mucosal edema, rhinorrhea or nasal deformity. No epistaxis. Right sinus exhibits no maxillary sinus tenderness and no frontal sinus tenderness. Left sinus exhibits no maxillary sinus tenderness and no frontal sinus tenderness.   Mouth/Throat: Uvula is midline, oropharynx is clear and moist and mucous membranes are normal. No trismus in the jaw. Normal dentition. No uvula swelling. No posterior oropharyngeal erythema.   Eyes: Conjunctivae and lids are normal. Right eye exhibits no discharge. Left eye exhibits no discharge. No scleral icterus.   Neck: Trachea normal, normal range of motion, full passive range of motion without pain and phonation normal. Neck supple.   Cardiovascular: Normal rate, regular rhythm, normal heart sounds, intact distal pulses and normal pulses.   Pulmonary/Chest: Effort normal and breath sounds normal. No respiratory distress.   Abdominal: Soft. Normal appearance and bowel sounds are normal. She exhibits no distension, no pulsatile midline mass and no mass. There is no tenderness.   Musculoskeletal: She exhibits no edema or deformity.        Left foot: There is decreased range of motion, tenderness, bony tenderness and swelling.         Feet:    Neurological: She is alert and oriented to person, place, and time. She exhibits normal muscle tone. Coordination normal.   Skin: Skin is warm, dry, intact, not diaphoretic and not pale.   Psychiatric: She has a normal mood and affect. Her speech is normal and behavior is normal. Judgment and thought content normal. Cognition and memory are normal.   Nursing note and vitals reviewed.        X-ray Foot Complete 3 View Left    Result Date: 5/26/2020  EXAMINATION: XR FOOT COMPLETE 3 VIEW LEFT CLINICAL HISTORY: .  Injury, unspecified, initial encounter TECHNIQUE: AP, lateral and oblique views of the left foot were performed. COMPARISON: Two thousand fourteen FINDINGS: Remote healed fracture mid shaft 3rd metatarsal.  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     No acute fracture dislocation. Electronically signed by: Chester Ling MD Date:    05/26/2020 Time:    08:28    Assessment:       1. Sprain of left foot, initial encounter    2. Foot trauma, left, initial encounter    3. Contusion of left foot, initial encounter        Plan:         Sprain of left foot, initial encounter  -     NON-PNEUMATIC WALKING BOOT FOR HOME USE  -     meloxicam (MOBIC) 7.5 MG tablet; Take 2 tablets (15 mg total) by mouth once daily. for 20 days  Dispense: 40 tablet; Refill: 0  -     acetaminophen-codeine 300-30mg (TYLENOL #3) 300-30 mg Tab; Take 1 tablet by mouth every 4 (four) hours as needed.  Dispense: 15 tablet; Refill: 0    Foot trauma, left, initial encounter  -     X-Ray Foot Complete 3 view Left; Future; Expected date: 05/26/2020    Contusion of left foot, initial encounter  -     NON-PNEUMATIC WALKING BOOT FOR HOME USE  -     meloxicam (MOBIC) 7.5 MG tablet; Take 2 tablets (15 mg total) by mouth once daily. for 20 days  Dispense: 40 tablet; Refill: 0  -     acetaminophen-codeine 300-30mg (TYLENOL #3) 300-30 mg Tab; Take 1 tablet by mouth every 4 (four) hours as  needed.  Dispense: 15 tablet; Refill: 0      Patient Instructions   General Discharge Instructions     Rest, ice, repeat. (DO NOT APPLY ICE DIRECTLY TO THE SKIN.  DO NOT LEAVE ON AFFECTED BODY PART FOR MORE THAN 15 MINUTES AT AT TIME TO AVOID INJURY TO SOFT TISSUE) and elevate the affected area keeping the ace wrap on for compression.   Repeat X ray in 1 week if you still have pain. Follow up with orthopedics if the symptoms are not resolving as you thing they should     If you were prescribed a narcotic or controlled medication, do not drive or operate heavy equipment or machinery while taking these medications.  If you were prescribed antibiotics, please take them to completion.  You must understand that you've received an Urgent Care treatment only and that you may be released before all your medical problems are known or treated. You, the patient, will arrange for follow up care as instructed.  Follow up with your PCP or specialty clinic as directed in the next 1-2 weeks if not improved or as needed.  You can call [phone] to schedule an appointment with the appropriate provider.  If your condition worsens we recommend that you receive another evaluation at the emergency room immediately or contact your primary medical clinics after hours call service to discuss your concerns.  Please return here or go to the Emergency Department for any concerns or worsening of condition.      Foot Sprain    A sprain is a stretching or tearing of the ligaments that hold a joint together. There are no broken bones. Sprains generally take from 3-6 weeks to heal. A sprain may be treated with a splint, walking cast, or special boot. Mild sprains may not need any additional support.  Home care  The following guidelines will help you care for your injury at home:  · Keep your leg elevated when sitting or lying down. This is very important during the first 48 hours to reduce swelling. Stay off the injured foot as much as  possible until you can walk on it without pain. If needed, you may use crutches during the first week for this purpose. Crutches can be rented at many pharmacies or surgical/orthopedic supply stores.  · You may be given a cast shoe to wear to prevent movement in your foot. If not, you can use a sandal or any shoe that does not put pressure on the injured area until the swelling and pain go away. If using a sandal, be careful not to hit your foot against anything, since another injury could make the sprain worse.  · Apply an ice pack over the injured area for 15 to 20 minutes every 3 to 6 hours. You should do this for the first 24 to 48 hours. You can make an ice pack by filling a plastic bag that seals at the top with ice cubes and then wrapping it with a thin towel. Continue to use ice packs for relief of pain and swelling as needed. As the ice melts, avoid getting any wrap, splint, or cast wet. After 48 hours, apply heat from a warm shower or bath for 20 minutes several times daily. Alternating ice and heat may also be helpful.  · You may use over-the-counter pain medicine to control pain, unless another medicine was prescribed. If you have chronic liver or kidney disease or ever had a stomach ulcer or GI bleeding, talk with your healthcare provider before using these medicines.  · If you were given a splint or cast, keep it dry. Bathe with your splint or cast well out of the water, protected with 2 large plastic bags, rubber-banded at the top end. If a fiberglass splint or cast gets wet, you can dry it with a hair dryer.  · You may return to sports after healing, when you can run without pain.  Follow-up care  Follow up with your healthcare provider as directed. Sometimes fractures dont show up on the first X-ray. Bruises and sprains can sometimes hurt as much as a fracture. These injuries can take time to heal completely. If your symptoms dont improve or they get worse, talk with your healthcare provider. You  may need a repeat X-ray.  When to seek medical advice  Call your healthcare provider right away if any of these occur:  · The plaster cast or splint gets wet or soft  · The fiberglass cast or splint gets wet and does not dry for 24 hours  · Pain or swelling increases, or redness appears  · A bad odor comes from within the cast  · Fever of 100.4°F (38°C) or above lasting for 24 to 48 hours  · Toes on the injured foot become cold, blue, numb, or tingly  Date Last Reviewed: 11/20/2015 © 2000-2017 [employer]. [address] [place] [address]. All rights reserved. This information is not intended as a substitute for professional medical care. Always follow your healthcare professional's instructions.            Foot Contusion  You have a contusion. This is also called a bruise. There is swelling and some bleeding under the skin, but no broken bones. This injury generally takes a few days to a few weeks to heal.  During that time, the bruise will typically change in color from reddish, to purple-blue, to greenish-yellow, then to yellow-brown.  Home care  · Elevate the foot to reduce pain and swelling. As much as possible, sit or lie down with the foot raised about the level of your heart. This is especially important during the first 48 hours.  · Ice the foot to help reduce pain and swelling. Wrap a cold source (ice pack or ice cubes in a plastic bag) in a thin towel. Apply to the bruised area for 20 minutes every 1 to 2 hours the first day. Continue this 3 to 4 times a day until the pain and swelling goes away.  · Unless another medicine was prescribed, you can take acetaminophen, ibuprofen, or naproxen to control pain. (If you have chronic liver or kidney disease or ever had a stomach ulcer or gastrointestinal bleeding, talk with your healthcare provider before using these medicines.)  Follow up  Follow up with your healthcare provider or our staff as advised. Call if you are not improving  within 1 to 2 weeks.  When to seek medical advice   Call your healthcare provider right away if you have any of the following:  · Increased pain or swelling  · Foot or leg becomes cold, blue, numb or tingly  · Signs of infection: Warmth, drainage, or increased redness or pain around the bruise  · Inability to move the injured foot   · Frequent bruising for unknown reasons  Date Last Reviewed: 2/1/2017  © 3066-9747 "[employer]. [address] [place] [address], [place] [address]. All rights reserved. This information is not intended as a substitute for professional medical care. Always follow your healthcare professional's instructions.

## 2020-06-29 ENCOUNTER — PATIENT OUTREACH (OUTPATIENT)
Dept: ADMINISTRATIVE | Facility: HOSPITAL | Age: 55
End: 2020-06-29

## 2020-08-17 ENCOUNTER — TELEPHONE (OUTPATIENT)
Dept: FAMILY MEDICINE | Facility: CLINIC | Age: 55
End: 2020-08-17

## 2020-08-17 NOTE — TELEPHONE ENCOUNTER
Please advise of the [facility] and CDC policy that COVID testing is not recommended for return to work purposes.    The test can stay positive for many months and so therefore it is not done.    I believe there is a letter or form that can be printed for her to bring to work if necessary regarding this policy.

## 2020-08-17 NOTE — TELEPHONE ENCOUNTER
Spoke with patient request COVID testing D/T exposure asymtomatic,need testing to return to work.Please advise,Thanks

## 2020-08-17 NOTE — TELEPHONE ENCOUNTER
----- Message from Keli Londono sent at 8/17/2020  8:37 AM CDT -----  Type: Patient Call Back    Who called:self    What is the request in detail:t is requesting a covid test    Can the clinic reply by [facility]?no    Would the patient rather a call back or a response via [facility]? call    Best call back number

## 2020-12-11 LAB
CHOL/HDLC RATIO: 4.5
CHOLESTEROL, TOTAL: 191
HDLC SERPL-MCNC: 42 MG/DL
LDLC SERPL CALC-MCNC: 128 MG/DL
NON HDL CHOL (CALC): 149
TRIGL SERPL-MCNC: 99 MG/DL

## 2021-03-23 ENCOUNTER — PATIENT OUTREACH (OUTPATIENT)
Dept: ADMINISTRATIVE | Facility: HOSPITAL | Age: 56
End: 2021-03-23

## 2021-03-23 DIAGNOSIS — Z12.39 ENCOUNTER FOR SCREENING FOR MALIGNANT NEOPLASM OF BREAST, UNSPECIFIED SCREENING MODALITY: ICD-10-CM

## 2021-03-23 DIAGNOSIS — Z12.4 SCREENING FOR CERVICAL CANCER: Primary | ICD-10-CM

## 2021-03-23 DIAGNOSIS — Z11.59 NEED FOR HEPATITIS C SCREENING TEST: ICD-10-CM

## 2021-04-05 ENCOUNTER — PATIENT MESSAGE (OUTPATIENT)
Dept: ADMINISTRATIVE | Facility: HOSPITAL | Age: 56
End: 2021-04-05

## 2021-04-12 ENCOUNTER — PATIENT MESSAGE (OUTPATIENT)
Dept: ADMINISTRATIVE | Facility: HOSPITAL | Age: 56
End: 2021-04-12

## 2021-04-15 ENCOUNTER — PATIENT MESSAGE (OUTPATIENT)
Dept: RESEARCH | Facility: HOSPITAL | Age: 56
End: 2021-04-15

## 2021-05-27 ENCOUNTER — PATIENT MESSAGE (OUTPATIENT)
Dept: ADMINISTRATIVE | Facility: HOSPITAL | Age: 56
End: 2021-05-27

## 2021-05-27 ENCOUNTER — PATIENT OUTREACH (OUTPATIENT)
Dept: ADMINISTRATIVE | Facility: HOSPITAL | Age: 56
End: 2021-05-27

## 2021-07-26 ENCOUNTER — OFFICE VISIT (OUTPATIENT)
Dept: INTERNAL MEDICINE | Facility: CLINIC | Age: 56
End: 2021-07-26
Payer: COMMERCIAL

## 2021-07-26 VITALS
HEIGHT: 67 IN | WEIGHT: 168.19 LBS | BODY MASS INDEX: 26.4 KG/M2 | SYSTOLIC BLOOD PRESSURE: 125 MMHG | DIASTOLIC BLOOD PRESSURE: 70 MMHG | HEART RATE: 75 BPM

## 2021-07-26 DIAGNOSIS — R11.0 NAUSEA: ICD-10-CM

## 2021-07-26 DIAGNOSIS — L02.91 ABSCESS: ICD-10-CM

## 2021-07-26 DIAGNOSIS — L02.212 BACK ABSCESS: Primary | ICD-10-CM

## 2021-07-26 PROCEDURE — 99999 PR PBB SHADOW E&M-EST. PATIENT-LVL III: CPT | Mod: PBBFAC,,, | Performed by: STUDENT IN AN ORGANIZED HEALTH CARE EDUCATION/TRAINING PROGRAM

## 2021-07-26 PROCEDURE — 99213 OFFICE O/P EST LOW 20 MIN: CPT | Mod: S$GLB,,, | Performed by: STUDENT IN AN ORGANIZED HEALTH CARE EDUCATION/TRAINING PROGRAM

## 2021-07-26 PROCEDURE — 99213 PR OFFICE/OUTPT VISIT, EST, LEVL III, 20-29 MIN: ICD-10-PCS | Mod: S$GLB,,, | Performed by: STUDENT IN AN ORGANIZED HEALTH CARE EDUCATION/TRAINING PROGRAM

## 2021-07-26 PROCEDURE — 99999 PR PBB SHADOW E&M-EST. PATIENT-LVL III: ICD-10-PCS | Mod: PBBFAC,,, | Performed by: STUDENT IN AN ORGANIZED HEALTH CARE EDUCATION/TRAINING PROGRAM

## 2021-07-26 RX ORDER — ONDANSETRON 4 MG/1
4 TABLET, FILM COATED ORAL EVERY 8 HOURS PRN
Qty: 15 TABLET | Refills: 0 | Status: SHIPPED | OUTPATIENT
Start: 2021-07-26 | End: 2022-04-21

## 2021-07-26 RX ORDER — CLINDAMYCIN HYDROCHLORIDE 300 MG/1
300 CAPSULE ORAL EVERY 6 HOURS
Qty: 28 CAPSULE | Refills: 0 | Status: SHIPPED | OUTPATIENT
Start: 2021-07-26 | End: 2021-08-02

## 2021-07-27 ENCOUNTER — PATIENT OUTREACH (OUTPATIENT)
Dept: ADMINISTRATIVE | Facility: OTHER | Age: 56
End: 2021-07-27

## 2021-07-27 ENCOUNTER — OFFICE VISIT (OUTPATIENT)
Dept: SURGERY | Facility: CLINIC | Age: 56
End: 2021-07-27
Payer: COMMERCIAL

## 2021-07-27 VITALS
SYSTOLIC BLOOD PRESSURE: 129 MMHG | HEIGHT: 67 IN | WEIGHT: 169.31 LBS | BODY MASS INDEX: 26.57 KG/M2 | HEART RATE: 92 BPM | DIASTOLIC BLOOD PRESSURE: 76 MMHG

## 2021-07-27 DIAGNOSIS — Z12.11 COLON CANCER SCREENING: Primary | ICD-10-CM

## 2021-07-27 DIAGNOSIS — L02.212 BACK ABSCESS: ICD-10-CM

## 2021-07-27 PROCEDURE — 1160F PR REVIEW ALL MEDS BY PRESCRIBER/CLIN PHARMACIST DOCUMENTED: ICD-10-PCS | Mod: CPTII,S$GLB,, | Performed by: SURGERY

## 2021-07-27 PROCEDURE — 99204 OFFICE O/P NEW MOD 45 MIN: CPT | Mod: S$GLB,,, | Performed by: SURGERY

## 2021-07-27 PROCEDURE — 1159F PR MEDICATION LIST DOCUMENTED IN MEDICAL RECORD: ICD-10-PCS | Mod: CPTII,S$GLB,, | Performed by: SURGERY

## 2021-07-27 PROCEDURE — 1159F MED LIST DOCD IN RCRD: CPT | Mod: CPTII,S$GLB,, | Performed by: SURGERY

## 2021-07-27 PROCEDURE — 99999 PR PBB SHADOW E&M-EST. PATIENT-LVL III: CPT | Mod: PBBFAC,,, | Performed by: SURGERY

## 2021-07-27 PROCEDURE — 99204 PR OFFICE/OUTPT VISIT, NEW, LEVL IV, 45-59 MIN: ICD-10-PCS | Mod: S$GLB,,, | Performed by: SURGERY

## 2021-07-27 PROCEDURE — 99999 PR PBB SHADOW E&M-EST. PATIENT-LVL III: ICD-10-PCS | Mod: PBBFAC,,, | Performed by: SURGERY

## 2021-07-27 PROCEDURE — 3008F PR BODY MASS INDEX (BMI) DOCUMENTED: ICD-10-PCS | Mod: CPTII,S$GLB,, | Performed by: SURGERY

## 2021-07-27 PROCEDURE — 3008F BODY MASS INDEX DOCD: CPT | Mod: CPTII,S$GLB,, | Performed by: SURGERY

## 2021-07-27 PROCEDURE — 1160F RVW MEDS BY RX/DR IN RCRD: CPT | Mod: CPTII,S$GLB,, | Performed by: SURGERY

## 2021-07-27 PROCEDURE — 1125F PR PAIN SEVERITY QUANTIFIED, PAIN PRESENT: ICD-10-PCS | Mod: CPTII,S$GLB,, | Performed by: SURGERY

## 2021-07-27 PROCEDURE — 1125F AMNT PAIN NOTED PAIN PRSNT: CPT | Mod: CPTII,S$GLB,, | Performed by: SURGERY

## 2021-08-03 ENCOUNTER — OFFICE VISIT (OUTPATIENT)
Dept: SURGERY | Facility: CLINIC | Age: 56
End: 2021-08-03
Payer: COMMERCIAL

## 2021-08-03 VITALS
SYSTOLIC BLOOD PRESSURE: 132 MMHG | DIASTOLIC BLOOD PRESSURE: 72 MMHG | HEART RATE: 91 BPM | WEIGHT: 169 LBS | BODY MASS INDEX: 26.53 KG/M2 | HEIGHT: 67 IN

## 2021-08-03 DIAGNOSIS — L02.212 BACK ABSCESS: Primary | ICD-10-CM

## 2021-08-03 PROCEDURE — 99999 PR PBB SHADOW E&M-EST. PATIENT-LVL III: ICD-10-PCS | Mod: PBBFAC,,, | Performed by: SURGERY

## 2021-08-03 PROCEDURE — 99213 PR OFFICE/OUTPT VISIT, EST, LEVL III, 20-29 MIN: ICD-10-PCS | Mod: S$GLB,,, | Performed by: SURGERY

## 2021-08-03 PROCEDURE — 3075F PR MOST RECENT SYSTOLIC BLOOD PRESS GE 130-139MM HG: ICD-10-PCS | Mod: CPTII,S$GLB,, | Performed by: SURGERY

## 2021-08-03 PROCEDURE — 1159F MED LIST DOCD IN RCRD: CPT | Mod: CPTII,S$GLB,, | Performed by: SURGERY

## 2021-08-03 PROCEDURE — 3078F DIAST BP <80 MM HG: CPT | Mod: CPTII,S$GLB,, | Performed by: SURGERY

## 2021-08-03 PROCEDURE — 3075F SYST BP GE 130 - 139MM HG: CPT | Mod: CPTII,S$GLB,, | Performed by: SURGERY

## 2021-08-03 PROCEDURE — 3008F BODY MASS INDEX DOCD: CPT | Mod: CPTII,S$GLB,, | Performed by: SURGERY

## 2021-08-03 PROCEDURE — 1126F AMNT PAIN NOTED NONE PRSNT: CPT | Mod: CPTII,S$GLB,, | Performed by: SURGERY

## 2021-08-03 PROCEDURE — 1126F PR PAIN SEVERITY QUANTIFIED, NO PAIN PRESENT: ICD-10-PCS | Mod: CPTII,S$GLB,, | Performed by: SURGERY

## 2021-08-03 PROCEDURE — 3008F PR BODY MASS INDEX (BMI) DOCUMENTED: ICD-10-PCS | Mod: CPTII,S$GLB,, | Performed by: SURGERY

## 2021-08-03 PROCEDURE — 3078F PR MOST RECENT DIASTOLIC BLOOD PRESSURE < 80 MM HG: ICD-10-PCS | Mod: CPTII,S$GLB,, | Performed by: SURGERY

## 2021-08-03 PROCEDURE — 99999 PR PBB SHADOW E&M-EST. PATIENT-LVL III: CPT | Mod: PBBFAC,,, | Performed by: SURGERY

## 2021-08-03 PROCEDURE — 99213 OFFICE O/P EST LOW 20 MIN: CPT | Mod: S$GLB,,, | Performed by: SURGERY

## 2021-08-03 PROCEDURE — 1159F PR MEDICATION LIST DOCUMENTED IN MEDICAL RECORD: ICD-10-PCS | Mod: CPTII,S$GLB,, | Performed by: SURGERY

## 2021-08-20 ENCOUNTER — TELEPHONE (OUTPATIENT)
Dept: FAMILY MEDICINE | Facility: CLINIC | Age: 56
End: 2021-08-20

## 2021-08-20 ENCOUNTER — PATIENT MESSAGE (OUTPATIENT)
Dept: FAMILY MEDICINE | Facility: CLINIC | Age: 56
End: 2021-08-20

## 2021-08-24 ENCOUNTER — PATIENT MESSAGE (OUTPATIENT)
Dept: FAMILY MEDICINE | Facility: CLINIC | Age: 56
End: 2021-08-24

## 2021-09-09 ENCOUNTER — TELEPHONE (OUTPATIENT)
Dept: SURGERY | Facility: CLINIC | Age: 56
End: 2021-09-09

## 2021-09-16 ENCOUNTER — TELEPHONE (OUTPATIENT)
Dept: GASTROENTEROLOGY | Facility: CLINIC | Age: 56
End: 2021-09-16

## 2021-09-20 ENCOUNTER — PATIENT MESSAGE (OUTPATIENT)
Dept: ADMINISTRATIVE | Facility: HOSPITAL | Age: 56
End: 2021-09-20

## 2021-09-20 ENCOUNTER — PATIENT OUTREACH (OUTPATIENT)
Dept: ADMINISTRATIVE | Facility: HOSPITAL | Age: 56
End: 2021-09-20

## 2021-12-15 ENCOUNTER — PATIENT OUTREACH (OUTPATIENT)
Dept: ADMINISTRATIVE | Facility: HOSPITAL | Age: 56
End: 2021-12-15
Payer: COMMERCIAL

## 2022-01-31 ENCOUNTER — OFFICE VISIT (OUTPATIENT)
Dept: OBSTETRICS AND GYNECOLOGY | Facility: CLINIC | Age: 57
End: 2022-01-31
Payer: COMMERCIAL

## 2022-01-31 VITALS
WEIGHT: 174 LBS | DIASTOLIC BLOOD PRESSURE: 72 MMHG | HEIGHT: 67 IN | BODY MASS INDEX: 27.31 KG/M2 | SYSTOLIC BLOOD PRESSURE: 138 MMHG

## 2022-01-31 DIAGNOSIS — Z12.31 BREAST CANCER SCREENING BY MAMMOGRAM: ICD-10-CM

## 2022-01-31 DIAGNOSIS — N95.0 PMB (POSTMENOPAUSAL BLEEDING): ICD-10-CM

## 2022-01-31 DIAGNOSIS — Z01.419 ENCOUNTER FOR GYNECOLOGICAL EXAMINATION WITHOUT ABNORMAL FINDING: Primary | ICD-10-CM

## 2022-01-31 PROCEDURE — 87624 HPV HI-RISK TYP POOLED RSLT: CPT | Performed by: OBSTETRICS & GYNECOLOGY

## 2022-01-31 PROCEDURE — 3075F SYST BP GE 130 - 139MM HG: CPT | Mod: CPTII,S$GLB,, | Performed by: OBSTETRICS & GYNECOLOGY

## 2022-01-31 PROCEDURE — 3075F PR MOST RECENT SYSTOLIC BLOOD PRESS GE 130-139MM HG: ICD-10-PCS | Mod: CPTII,S$GLB,, | Performed by: OBSTETRICS & GYNECOLOGY

## 2022-01-31 PROCEDURE — 3078F PR MOST RECENT DIASTOLIC BLOOD PRESSURE < 80 MM HG: ICD-10-PCS | Mod: CPTII,S$GLB,, | Performed by: OBSTETRICS & GYNECOLOGY

## 2022-01-31 PROCEDURE — 1159F MED LIST DOCD IN RCRD: CPT | Mod: CPTII,S$GLB,, | Performed by: OBSTETRICS & GYNECOLOGY

## 2022-01-31 PROCEDURE — 99386 PREV VISIT NEW AGE 40-64: CPT | Mod: S$GLB,,, | Performed by: OBSTETRICS & GYNECOLOGY

## 2022-01-31 PROCEDURE — 3008F PR BODY MASS INDEX (BMI) DOCUMENTED: ICD-10-PCS | Mod: CPTII,S$GLB,, | Performed by: OBSTETRICS & GYNECOLOGY

## 2022-01-31 PROCEDURE — 3078F DIAST BP <80 MM HG: CPT | Mod: CPTII,S$GLB,, | Performed by: OBSTETRICS & GYNECOLOGY

## 2022-01-31 PROCEDURE — 88175 CYTOPATH C/V AUTO FLUID REDO: CPT | Performed by: OBSTETRICS & GYNECOLOGY

## 2022-01-31 PROCEDURE — 1159F PR MEDICATION LIST DOCUMENTED IN MEDICAL RECORD: ICD-10-PCS | Mod: CPTII,S$GLB,, | Performed by: OBSTETRICS & GYNECOLOGY

## 2022-01-31 PROCEDURE — 99999 PR PBB SHADOW E&M-EST. PATIENT-LVL III: CPT | Mod: PBBFAC,,, | Performed by: OBSTETRICS & GYNECOLOGY

## 2022-01-31 PROCEDURE — 3008F BODY MASS INDEX DOCD: CPT | Mod: CPTII,S$GLB,, | Performed by: OBSTETRICS & GYNECOLOGY

## 2022-01-31 PROCEDURE — 99386 PR PREVENTIVE VISIT,NEW,40-64: ICD-10-PCS | Mod: S$GLB,,, | Performed by: OBSTETRICS & GYNECOLOGY

## 2022-01-31 PROCEDURE — 99999 PR PBB SHADOW E&M-EST. PATIENT-LVL III: ICD-10-PCS | Mod: PBBFAC,,, | Performed by: OBSTETRICS & GYNECOLOGY

## 2022-01-31 NOTE — PROGRESS NOTES
Subjective:       Patient ID: Sugar Diaz is a 56 y.o. female.    Chief Complaint:  Gynecologic Exam      History of Present Illness  HPI  Annual Exam-Postmenopausal  Patient presents for annual exam. The patient has no complaints today. The patient is sexually active. GYN screening history: last pap: was normal. The patient is not taking hormone replacement therapy. Patient reports post-menopausal vaginal bleeding. The patient wears seatbelts: yes. The patient participates in regular exercise: yes. Has the patient ever been transfused or tattooed?: yes. The patient reports that there is not domestic violence in her life.    Reports having some vaginal spotting last week for 1 day.    GYN & OB History  Patient's last menstrual period was 11/15/2012.   Date of Last Pap: 2022    OB History    Para Term  AB Living   3 3 3     3   SAB IAB Ectopic Multiple Live Births           3      # Outcome Date GA Lbr Bryson/2nd Weight Sex Delivery Anes PTL Lv   3 Term      Vag-Spont      2 Term      Vag-Spont      1 Term      Vag-Spont          Review of Systems  Review of Systems   Constitutional: Negative.    HENT: Negative.    Eyes: Negative.    Respiratory: Negative.    Cardiovascular: Negative.    Gastrointestinal: Negative.    Endocrine: Negative.    Genitourinary: Negative.  Positive for postmenopausal bleeding.   Musculoskeletal: Negative.    Integumentary:  Negative.   Neurological: Negative.    Hematological: Negative.    Psychiatric/Behavioral: Negative.    Breast: negative.            Objective:    Physical Exam:   Constitutional: She is oriented to person, place, and time. She appears well-nourished.    HENT:   Head: Normocephalic and atraumatic.    Eyes: EOM are normal. Right eye exhibits normal extraocular motion. Left eye exhibits normal extraocular motion.    Neck: No thyromegaly present.    Cardiovascular: Normal rate.     Pulmonary/Chest: Effort normal. No respiratory distress. Right  breast exhibits no mass, no skin change and no tenderness. Left breast exhibits no mass, no skin change and no tenderness. Breasts are symmetrical.        Abdominal: Soft. She exhibits no distension and no mass. There is no abdominal tenderness.     Genitourinary:    Vagina, uterus, right adnexa and left adnexa normal.      Pelvic exam was performed with patient supine.   The external female genitalia was normal.   No external genitalia lesions identified,Genitalia hair distrobution normal .   Labial bartholins normal.There is no rash or lesion on the right labia. There is no rash or lesion on the left labia. Cervix is normal. Right adnexum displays no tenderness and no fullness. Left adnexum displays no tenderness and no fullness. No  no vaginal discharge or bleeding in the vagina.    No signs of injury in the vagina.   Vagina was moist.Cervix exhibits no motion tenderness and no friability. Uterus consistancy normal. and Uerus contour normal  Uterus is not tender. Normal urethral meatus.Urethral Meatus exhibits: urethral lesion and prolapsedUrethra findings: no urethral mass and no tendernessBladder findings: no bladder distention and no bladder tenderness          Musculoskeletal: Normal range of motion.      Lymphadenopathy:     She has no cervical adenopathy.    Neurological: She is oriented to person, place, and time.   Cranial Nerves II-XII grossly intact.    Skin: No rash noted. No erythema.    Psychiatric: She has a normal mood and affect. Her behavior is normal.          Assessment:        1. Encounter for gynecological examination without abnormal finding    2. Breast cancer screening by mammogram    3. PMB (postmenopausal bleeding)               Plan:      1. Encounter for gynecological examination without abnormal finding  - Pap and HPV done today.  -   Screening tests as ordered.  - Diet and exercise encouraged.    Counseling: injury prevention: Driving under the influence of  alcohol  Seatbelts  Perimenopause/Menopause  Stress management techniques  indications for and frequency of periodic gynecologic exam  reviewed current Pap guidelines. Explained new understanding of natural history of cervical disease and improved Paps. Recommended guideline concordant care.  Healthy life style choices including diet and exercise, 1200 mg calcium and 600 IU until age 71 then 800 IU vitamin D supplementation daily, healthy sexual decision making, development of healthy and safe relationships.     - Liquid-Based Pap Smear, Screening  - HPV High Risk Genotypes, PCR    2. Breast cancer screening by mammogram  - Self breast exams encouraged   - Mammo Digital Screening Bilat w/ Octavio; Future    3. PMB (postmenopausal bleeding)  - Pelvic ultrasound ordered.  Will call patient with results.  - Patient instructed to call office if she has not heard anything 2 wks after ultrasound.   - US Pelvis Comp with Transvag NON-OB (xpd; Future

## 2022-02-08 ENCOUNTER — PATIENT MESSAGE (OUTPATIENT)
Dept: OBSTETRICS AND GYNECOLOGY | Facility: CLINIC | Age: 57
End: 2022-02-08
Payer: COMMERCIAL

## 2022-02-11 ENCOUNTER — PATIENT MESSAGE (OUTPATIENT)
Dept: OBSTETRICS AND GYNECOLOGY | Facility: CLINIC | Age: 57
End: 2022-02-11
Payer: COMMERCIAL

## 2022-04-11 ENCOUNTER — OFFICE VISIT (OUTPATIENT)
Dept: SURGERY | Facility: CLINIC | Age: 57
End: 2022-04-11
Payer: COMMERCIAL

## 2022-04-11 VITALS
DIASTOLIC BLOOD PRESSURE: 74 MMHG | BODY MASS INDEX: 27.3 KG/M2 | HEIGHT: 67 IN | SYSTOLIC BLOOD PRESSURE: 118 MMHG | HEART RATE: 85 BPM | WEIGHT: 173.94 LBS

## 2022-04-11 DIAGNOSIS — L02.212 BACK ABSCESS: Primary | ICD-10-CM

## 2022-04-11 DIAGNOSIS — L72.9 INFECTED CYST OF SKIN: ICD-10-CM

## 2022-04-11 DIAGNOSIS — L08.9 INFECTED CYST OF SKIN: ICD-10-CM

## 2022-04-11 PROCEDURE — 1159F PR MEDICATION LIST DOCUMENTED IN MEDICAL RECORD: ICD-10-PCS | Mod: CPTII,S$GLB,, | Performed by: SURGERY

## 2022-04-11 PROCEDURE — 10060 INCISION & DRAINAGE: ICD-10-PCS | Mod: S$GLB,,, | Performed by: SURGERY

## 2022-04-11 PROCEDURE — 3078F DIAST BP <80 MM HG: CPT | Mod: CPTII,S$GLB,, | Performed by: SURGERY

## 2022-04-11 PROCEDURE — 99214 OFFICE O/P EST MOD 30 MIN: CPT | Mod: 25,S$GLB,, | Performed by: SURGERY

## 2022-04-11 PROCEDURE — 10060 I&D ABSCESS SIMPLE/SINGLE: CPT | Mod: S$GLB,,, | Performed by: SURGERY

## 2022-04-11 PROCEDURE — 3074F PR MOST RECENT SYSTOLIC BLOOD PRESSURE < 130 MM HG: ICD-10-PCS | Mod: CPTII,S$GLB,, | Performed by: SURGERY

## 2022-04-11 PROCEDURE — 3008F PR BODY MASS INDEX (BMI) DOCUMENTED: ICD-10-PCS | Mod: CPTII,S$GLB,, | Performed by: SURGERY

## 2022-04-11 PROCEDURE — 99999 PR PBB SHADOW E&M-EST. PATIENT-LVL IV: ICD-10-PCS | Mod: PBBFAC,,, | Performed by: SURGERY

## 2022-04-11 PROCEDURE — 99999 PR PBB SHADOW E&M-EST. PATIENT-LVL IV: CPT | Mod: PBBFAC,,, | Performed by: SURGERY

## 2022-04-11 PROCEDURE — 99214 PR OFFICE/OUTPT VISIT, EST, LEVL IV, 30-39 MIN: ICD-10-PCS | Mod: 25,S$GLB,, | Performed by: SURGERY

## 2022-04-11 PROCEDURE — 3008F BODY MASS INDEX DOCD: CPT | Mod: CPTII,S$GLB,, | Performed by: SURGERY

## 2022-04-11 PROCEDURE — 3078F PR MOST RECENT DIASTOLIC BLOOD PRESSURE < 80 MM HG: ICD-10-PCS | Mod: CPTII,S$GLB,, | Performed by: SURGERY

## 2022-04-11 PROCEDURE — 1159F MED LIST DOCD IN RCRD: CPT | Mod: CPTII,S$GLB,, | Performed by: SURGERY

## 2022-04-11 PROCEDURE — 3074F SYST BP LT 130 MM HG: CPT | Mod: CPTII,S$GLB,, | Performed by: SURGERY

## 2022-04-11 RX ORDER — SODIUM CHLORIDE 9 MG/ML
INJECTION, SOLUTION INTRAVENOUS CONTINUOUS
Status: CANCELLED | OUTPATIENT
Start: 2022-04-11

## 2022-04-11 RX ORDER — CLINDAMYCIN HYDROCHLORIDE 300 MG/1
300 CAPSULE ORAL EVERY 6 HOURS
Qty: 20 CAPSULE | Refills: 0 | Status: CANCELLED | OUTPATIENT
Start: 2022-04-11 | End: 2022-04-16

## 2022-04-11 NOTE — H&P (VIEW-ONLY)
Surgery Clinic Note    Sugar Diaz is a 56 y.o. year old female in clinic today for follow up of left upper back abscess/infected cyst. It has becomes infected again. I&D today (see procedure note).  Will plan for excision in OR.  No f/c/n/v/sob/cp    ROS:  Negative except above      PE:  Vitals:    04/11/22 1053   BP: 118/74   Pulse: 85     NAD  No belabored breathing  Left upper back: 2x2 cm fluctuant cellulitic cyst    A/P:  Sugar Diaz is a 56 y.o. year old female w infected cyst of left upper back    -I&D today. See procedure note  -clinda for 5 days  -pre op for definitive excision in OR on 4/28/22. Risks and side effects discussed: recurrence, pain, bleeding, scar.    Thomas Hurst  General Surgery - [facility]  4/11/2022

## 2022-04-11 NOTE — PROCEDURES
"Incision & Drainage    Date/Time: 4/11/2022 3:45 PM  Performed by: Thomas Hurst MD  Authorized by: Thomas Hurst MD     Time out: Immediately prior to procedure a "time out" was called to verify the correct patient, procedure, equipment, support staff and site/side marked as required.    Consent Done?:  Yes (Verbal)    Type:  Cyst  Body area:  Trunk  Location details:  Back  Scalpel size:  11  Incision type:  Single straight  Incision depth: dermal    Complexity:  Complex  Drainage:  Pus  Drainage amount:  Moderate  Wound treatment:  Incision, wound left open, drainage, removal of cyst capsule and expression of material      "

## 2022-04-12 ENCOUNTER — TELEPHONE (OUTPATIENT)
Dept: SURGERY | Facility: CLINIC | Age: 57
End: 2022-04-12
Payer: COMMERCIAL

## 2022-04-12 RX ORDER — CLINDAMYCIN HYDROCHLORIDE 300 MG/1
300 CAPSULE ORAL EVERY 6 HOURS
Qty: 20 CAPSULE | Refills: 0 | Status: SHIPPED | OUTPATIENT
Start: 2022-04-12 | End: 2022-04-21 | Stop reason: ALTCHOICE

## 2022-04-12 NOTE — TELEPHONE ENCOUNTER
Spoke with  and informed her that her prescription was sent over to her pharmacy, pt verbalized understanding.    ----- Message from Hayley Gordillo sent at 4/12/2022 10:41 AM CDT -----  Type: Patient Call Back    Who called: self     What is the request in detail: Patient states at her appointment yesterday she was told she was being prescribed an antibiotic. Patient called her pharmacy and was told they do not have a prescription for her. Would like to know if she is still receiving this medication.     [id number] - [place], [place] - [address] AT [facility] [address]  [address]  [address]  [place] [address]  Phone: [phone] Fax: [phone]    Can the clinic reply by [facility]? No     Would the patient rather a call back or a response via [facility]? Call back     Best call back number: [phone]

## 2022-04-21 ENCOUNTER — HOSPITAL ENCOUNTER (OUTPATIENT)
Dept: PREADMISSION TESTING | Facility: HOSPITAL | Age: 57
Discharge: HOME | End: 2022-04-21
Attending: SURGERY
Payer: COMMERCIAL

## 2022-04-21 VITALS
HEIGHT: 67 IN | TEMPERATURE: 98 F | BODY MASS INDEX: 27.48 KG/M2 | DIASTOLIC BLOOD PRESSURE: 66 MMHG | OXYGEN SATURATION: 99 % | WEIGHT: 175.06 LBS | HEART RATE: 85 BPM | SYSTOLIC BLOOD PRESSURE: 119 MMHG | RESPIRATION RATE: 18 BRPM

## 2022-04-21 DIAGNOSIS — Z01.818 PREOPERATIVE TESTING: Primary | ICD-10-CM

## 2022-04-21 LAB
ALBUMIN SERPL BCP-MCNC: 3.7 G/DL (ref 3.5–5.2)
ALP SERPL-CCNC: 68 U/L (ref 55–135)
ALT SERPL W/O P-5'-P-CCNC: 20 U/L (ref 10–44)
ANION GAP SERPL CALC-SCNC: 8 MMOL/L (ref 8–16)
AST SERPL-CCNC: 20 U/L (ref 10–40)
BASOPHILS # BLD AUTO: 0.04 K/UL (ref 0–0.2)
BASOPHILS NFR BLD: 0.5 % (ref 0–1.9)
BILIRUB SERPL-MCNC: 0.3 MG/DL (ref 0.1–1)
BUN SERPL-MCNC: 11 MG/DL (ref 6–20)
CALCIUM SERPL-MCNC: 8.9 MG/DL (ref 8.7–10.5)
CHLORIDE SERPL-SCNC: 105 MMOL/L (ref 95–110)
CO2 SERPL-SCNC: 26 MMOL/L (ref 23–29)
CREAT SERPL-MCNC: 0.8 MG/DL (ref 0.5–1.4)
DIFFERENTIAL METHOD: NORMAL
EOSINOPHIL # BLD AUTO: 0.2 K/UL (ref 0–0.5)
EOSINOPHIL NFR BLD: 2.6 % (ref 0–8)
ERYTHROCYTE [DISTWIDTH] IN BLOOD BY AUTOMATED COUNT: 12.5 % (ref 11.5–14.5)
EST. GFR  (AFRICAN AMERICAN): >60 ML/MIN/1.73 M^2
EST. GFR  (NON AFRICAN AMERICAN): >60 ML/MIN/1.73 M^2
GLUCOSE SERPL-MCNC: 178 MG/DL (ref 70–110)
HCT VFR BLD AUTO: 41.5 % (ref 37–48.5)
HGB BLD-MCNC: 14.1 G/DL (ref 12–16)
IMM GRANULOCYTES # BLD AUTO: 0.02 K/UL (ref 0–0.04)
IMM GRANULOCYTES NFR BLD AUTO: 0.3 % (ref 0–0.5)
LYMPHOCYTES # BLD AUTO: 2.3 K/UL (ref 1–4.8)
LYMPHOCYTES NFR BLD: 28.3 % (ref 18–48)
MCH RBC QN AUTO: 30.8 PG (ref 27–31)
MCHC RBC AUTO-ENTMCNC: 34 G/DL (ref 32–36)
MCV RBC AUTO: 91 FL (ref 82–98)
MONOCYTES # BLD AUTO: 0.7 K/UL (ref 0.3–1)
MONOCYTES NFR BLD: 8.7 % (ref 4–15)
NEUTROPHILS # BLD AUTO: 4.8 K/UL (ref 1.8–7.7)
NEUTROPHILS NFR BLD: 59.6 % (ref 38–73)
NRBC BLD-RTO: 0 /100 WBC
PLATELET # BLD AUTO: 372 K/UL (ref 150–450)
PMV BLD AUTO: 9.2 FL (ref 9.2–12.9)
POTASSIUM SERPL-SCNC: 3.9 MMOL/L (ref 3.5–5.1)
PROT SERPL-MCNC: 7 G/DL (ref 6–8.4)
RBC # BLD AUTO: 4.58 M/UL (ref 4–5.4)
SODIUM SERPL-SCNC: 139 MMOL/L (ref 136–145)
WBC # BLD AUTO: 7.96 K/UL (ref 3.9–12.7)

## 2022-04-21 PROCEDURE — 93010 EKG 12-LEAD: ICD-10-PCS | Mod: ,,, | Performed by: INTERNAL MEDICINE

## 2022-04-21 PROCEDURE — 93010 ELECTROCARDIOGRAM REPORT: CPT | Mod: ,,, | Performed by: INTERNAL MEDICINE

## 2022-04-21 PROCEDURE — 93005 ELECTROCARDIOGRAM TRACING: CPT

## 2022-04-21 PROCEDURE — 85025 COMPLETE CBC W/AUTO DIFF WBC: CPT | Performed by: SURGERY

## 2022-04-21 PROCEDURE — 80053 COMPREHEN METABOLIC PANEL: CPT | Performed by: SURGERY

## 2022-04-21 NOTE — PRE-PROCEDURE INSTRUCTIONS
Pre-operative instructions, medication directives and pain scales reviewed with patient. All questions the patient had were answered. Re-assurance about surgical procedure and day of surgery routine given as needed, patient verbalized understanding of the pre-op instructions.    Pt instructed to maintain NPO status starting the midnight before surgery (AM meds OK with a sip of water) and to avoid NSAIDs and vitamin/herbal supplements until after surgery. She was given Hibiclens and instructed on its use. She verbalized understanding. Her  will accompany her on the day of surgery. Pre-Op Center contact info provided for any additional questions.

## 2022-04-21 NOTE — DISCHARGE INSTRUCTIONS
Before 7 AM, enter through the Emergency Entrance..   After 7 AM enter through the Main Entrance.      Your procedure  is scheduled for Thursday, April 28, 2022.     Call [phone] between 2pm and 5pm on Wednesday, April 27, 2022 to find out your arrival time for the day of surgery.    You may use the main entrance to the hospital on the [facility] side, or the entrance that is next to the garage.    You may have two visitors.  Visiting hours for non-COVID-19 patients expanded to 24/7 (still restricted to one visitor)  Youth visitation changed from age 18 to age 12.      You will be going to the Same Day Surgery Unit on the 2nd floor of the hospital.    Important instructions:  Do not eat anything after midnight.  You may have plain water, non carbonated.  You may also have Gatorade or Powerade after midnight.    Stop all fluids 2 hours before your surgery.    It is okay to brush your teeth.  Do not have gum, candy or mints.    SEE MEDICATION SHEET.   TAKE MEDICATIONS AS DIRECTED WITH SIPS OF WATER.    STOP taking Aspirin, Ibuprofen,  Advil, Motrin, Mobic(meloxicam), Aleve (naproxen), Fish oil, and Vitamin E for at least 7 days before your surgery.     You may take Tylenol if needed which is not a blood thinner.    Please shower the night before and the morning of your surgery.        Use Hibiclens soap as instructed by your pre op nurse.   Please place clean linens on your bed the night before surgery. Please wear fresh clean clothing after each shower.    No shaving of procedural area at least 4-5 days before surgery due to increased risk of skin irritation and/or possible infection.    Female patients may be asked for a urine specimen on the morning of the surgery.  Please check with your nurse before using the restroom.  Contact lenses and removable denture work may not be worn during your procedure.    You may wear deodorant only. If you are having breast surgery, do not wear deodorant on the  operative side.    Do not wear powder, body lotion, perfume/cologne or make-up.    Do not wear any jewelry or have any metal on your body.    You will be asked to remove any dentures or partials for the procedure.    If you are going home on the same day of surgery, you must arrange for a family member or a friend to drive you home.  Public transportation is prohibited.  You will not be able to drive home if you were given anesthesia or sedation.    Patients who want to have their Post-op prescriptions filled from our in-house [facility], bring a Credit/Debit Card  or cash with you. A co-pay may be required.  The pharmacy closes at 5:30 pm.    Children under 18 years of age require a parent/guardian present the entire time that they are here.    Wear loose fitting clothes allowing for bandages.    Please leave money and valuables home.      You may bring your cell phone.    Call the doctor if fever or illness should occur before your surgery.    Call [phone] to contact us here if needed.

## 2022-04-27 ENCOUNTER — ANESTHESIA EVENT (OUTPATIENT)
Dept: SURGERY | Facility: HOSPITAL | Age: 57
End: 2022-04-27
Payer: COMMERCIAL

## 2022-04-28 ENCOUNTER — ANESTHESIA (OUTPATIENT)
Dept: SURGERY | Facility: HOSPITAL | Age: 57
End: 2022-04-28
Payer: COMMERCIAL

## 2022-04-28 ENCOUNTER — HOSPITAL ENCOUNTER (OUTPATIENT)
Facility: HOSPITAL | Age: 57
Discharge: HOME | End: 2022-04-28
Attending: SURGERY | Admitting: SURGERY
Payer: COMMERCIAL

## 2022-04-28 VITALS
TEMPERATURE: 97 F | HEART RATE: 74 BPM | RESPIRATION RATE: 18 BRPM | DIASTOLIC BLOOD PRESSURE: 58 MMHG | OXYGEN SATURATION: 100 % | WEIGHT: 175.06 LBS | SYSTOLIC BLOOD PRESSURE: 108 MMHG | BODY MASS INDEX: 27.42 KG/M2

## 2022-04-28 DIAGNOSIS — L08.9 INFECTED CYST OF SKIN: Primary | ICD-10-CM

## 2022-04-28 DIAGNOSIS — Z01.818 PREOPERATIVE TESTING: ICD-10-CM

## 2022-04-28 DIAGNOSIS — L02.212 BACK ABSCESS: ICD-10-CM

## 2022-04-28 DIAGNOSIS — L72.9 INFECTED CYST OF SKIN: Primary | ICD-10-CM

## 2022-04-28 LAB — POCT GLUCOSE: 124 MG/DL (ref 70–110)

## 2022-04-28 PROCEDURE — 25000003 PHARM REV CODE 250: Performed by: SURGERY

## 2022-04-28 PROCEDURE — 25000003 PHARM REV CODE 250: Performed by: STUDENT IN AN ORGANIZED HEALTH CARE EDUCATION/TRAINING PROGRAM

## 2022-04-28 PROCEDURE — 71000016 HC POSTOP RECOV ADDL HR: Performed by: SURGERY

## 2022-04-28 PROCEDURE — 37000009 HC ANESTHESIA EA ADD 15 MINS: Performed by: SURGERY

## 2022-04-28 PROCEDURE — 12031 PR LAYR CLOS WND TRUNK,ARM,LEG <2.5 CM: ICD-10-PCS | Mod: ,,, | Performed by: SURGERY

## 2022-04-28 PROCEDURE — D9220A PRA ANESTHESIA: Mod: ANES,,, | Performed by: ANESTHESIOLOGY

## 2022-04-28 PROCEDURE — 36000706: Performed by: SURGERY

## 2022-04-28 PROCEDURE — 88304 TISSUE EXAM BY PATHOLOGIST: CPT | Mod: 26,,, | Performed by: PATHOLOGY

## 2022-04-28 PROCEDURE — 88304 TISSUE EXAM BY PATHOLOGIST: CPT | Performed by: PATHOLOGY

## 2022-04-28 PROCEDURE — 12031 INTMD RPR S/A/T/EXT 2.5 CM/<: CPT | Mod: ,,, | Performed by: SURGERY

## 2022-04-28 PROCEDURE — 88304 PR  SURG PATH,LEVEL III: ICD-10-PCS | Mod: 26,,, | Performed by: PATHOLOGY

## 2022-04-28 PROCEDURE — D9220A PRA ANESTHESIA: ICD-10-PCS | Mod: CRNA,,, | Performed by: STUDENT IN AN ORGANIZED HEALTH CARE EDUCATION/TRAINING PROGRAM

## 2022-04-28 PROCEDURE — 11401 PR EXC SKIN BENIG 0.6-1 CM TRUNK,ARM,LEG: ICD-10-PCS | Mod: 51,,, | Performed by: SURGERY

## 2022-04-28 PROCEDURE — 11401 EXC TR-EXT B9+MARG 0.6-1 CM: CPT | Mod: 51,,, | Performed by: SURGERY

## 2022-04-28 PROCEDURE — 63600175 PHARM REV CODE 636 W HCPCS: Performed by: SURGERY

## 2022-04-28 PROCEDURE — 25000003 PHARM REV CODE 250: Performed by: ANESTHESIOLOGY

## 2022-04-28 PROCEDURE — 82962 GLUCOSE BLOOD TEST: CPT | Performed by: SURGERY

## 2022-04-28 PROCEDURE — 63600175 PHARM REV CODE 636 W HCPCS: Performed by: STUDENT IN AN ORGANIZED HEALTH CARE EDUCATION/TRAINING PROGRAM

## 2022-04-28 PROCEDURE — D9220A PRA ANESTHESIA: ICD-10-PCS | Mod: ANES,,, | Performed by: ANESTHESIOLOGY

## 2022-04-28 PROCEDURE — 36000707: Performed by: SURGERY

## 2022-04-28 PROCEDURE — D9220A PRA ANESTHESIA: Mod: CRNA,,, | Performed by: STUDENT IN AN ORGANIZED HEALTH CARE EDUCATION/TRAINING PROGRAM

## 2022-04-28 PROCEDURE — 71000015 HC POSTOP RECOV 1ST HR: Performed by: SURGERY

## 2022-04-28 PROCEDURE — 37000008 HC ANESTHESIA 1ST 15 MINUTES: Performed by: SURGERY

## 2022-04-28 RX ORDER — PROPOFOL 10 MG/ML
VIAL (ML) INTRAVENOUS
Status: DISCONTINUED | OUTPATIENT
Start: 2022-04-28 | End: 2022-04-28

## 2022-04-28 RX ORDER — ACETAMINOPHEN 500 MG
1000 TABLET ORAL
Status: COMPLETED | OUTPATIENT
Start: 2022-04-28 | End: 2022-04-28

## 2022-04-28 RX ORDER — LIDOCAINE HYDROCHLORIDE 20 MG/ML
INJECTION INTRAVENOUS
Status: DISCONTINUED | OUTPATIENT
Start: 2022-04-28 | End: 2022-04-28

## 2022-04-28 RX ORDER — LIDOCAINE HYDROCHLORIDE 10 MG/ML
1 INJECTION, SOLUTION EPIDURAL; INFILTRATION; INTRACAUDAL; PERINEURAL ONCE
Status: DISCONTINUED | OUTPATIENT
Start: 2022-04-28 | End: 2022-04-28 | Stop reason: HOSPADM

## 2022-04-28 RX ORDER — CEFAZOLIN SODIUM 1 G/50ML
2 SOLUTION INTRAVENOUS
Status: COMPLETED | OUTPATIENT
Start: 2022-04-28 | End: 2022-04-28

## 2022-04-28 RX ORDER — PHENYLEPHRINE HYDROCHLORIDE 10 MG/ML
INJECTION INTRAVENOUS
Status: DISCONTINUED | OUTPATIENT
Start: 2022-04-28 | End: 2022-04-28

## 2022-04-28 RX ORDER — ONDANSETRON 2 MG/ML
INJECTION INTRAMUSCULAR; INTRAVENOUS
Status: DISCONTINUED | OUTPATIENT
Start: 2022-04-28 | End: 2022-04-28

## 2022-04-28 RX ORDER — SODIUM CHLORIDE 9 MG/ML
INJECTION, SOLUTION INTRAVENOUS CONTINUOUS
Status: DISCONTINUED | OUTPATIENT
Start: 2022-04-28 | End: 2022-04-28 | Stop reason: HOSPADM

## 2022-04-28 RX ORDER — PROPOFOL 10 MG/ML
VIAL (ML) INTRAVENOUS CONTINUOUS PRN
Status: DISCONTINUED | OUTPATIENT
Start: 2022-04-28 | End: 2022-04-28

## 2022-04-28 RX ORDER — LIDOCAINE HYDROCHLORIDE 20 MG/ML
INJECTION, SOLUTION INFILTRATION; PERINEURAL
Status: DISCONTINUED | OUTPATIENT
Start: 2022-04-28 | End: 2022-04-28 | Stop reason: HOSPADM

## 2022-04-28 RX ORDER — MIDAZOLAM HYDROCHLORIDE 1 MG/ML
INJECTION INTRAMUSCULAR; INTRAVENOUS
Status: DISCONTINUED | OUTPATIENT
Start: 2022-04-28 | End: 2022-04-28

## 2022-04-28 RX ORDER — ACETAMINOPHEN 10 MG/ML
1000 INJECTION, SOLUTION INTRAVENOUS ONCE
Status: DISCONTINUED | OUTPATIENT
Start: 2022-04-28 | End: 2022-04-28 | Stop reason: HOSPADM

## 2022-04-28 RX ORDER — FENTANYL CITRATE 50 UG/ML
INJECTION, SOLUTION INTRAMUSCULAR; INTRAVENOUS
Status: DISCONTINUED | OUTPATIENT
Start: 2022-04-28 | End: 2022-04-28

## 2022-04-28 RX ORDER — HYDROCODONE BITARTRATE AND ACETAMINOPHEN 5; 325 MG/1; MG/1
1 TABLET ORAL EVERY 6 HOURS PRN
Qty: 15 TABLET | Refills: 0 | Status: SHIPPED | OUTPATIENT
Start: 2022-04-28 | End: 2023-03-06

## 2022-04-28 RX ORDER — SODIUM CHLORIDE 0.9 % (FLUSH) 0.9 %
10 SYRINGE (ML) INJECTION
Status: DISCONTINUED | OUTPATIENT
Start: 2022-04-28 | End: 2022-04-28 | Stop reason: HOSPADM

## 2022-04-28 RX ADMIN — CEFAZOLIN SODIUM 1 G: 1 SOLUTION INTRAVENOUS at 09:04

## 2022-04-28 RX ADMIN — ACETAMINOPHEN 1000 MG: 500 TABLET ORAL at 08:04

## 2022-04-28 RX ADMIN — SODIUM CHLORIDE: 0.9 INJECTION, SOLUTION INTRAVENOUS at 09:04

## 2022-04-28 RX ADMIN — FENTANYL CITRATE 25 MCG: 50 INJECTION, SOLUTION INTRAMUSCULAR; INTRAVENOUS at 09:04

## 2022-04-28 RX ADMIN — PROPOFOL 50 MG: 10 INJECTION, EMULSION INTRAVENOUS at 09:04

## 2022-04-28 RX ADMIN — MIDAZOLAM HYDROCHLORIDE 2 MG: 1 INJECTION, SOLUTION INTRAMUSCULAR; INTRAVENOUS at 09:04

## 2022-04-28 RX ADMIN — PHENYLEPHRINE HYDROCHLORIDE 200 MCG: 10 INJECTION INTRAVENOUS at 09:04

## 2022-04-28 RX ADMIN — LIDOCAINE HYDROCHLORIDE 100 MG: 20 INJECTION, SOLUTION INTRAVENOUS at 09:04

## 2022-04-28 RX ADMIN — PROPOFOL 60 MCG/KG/MIN: 10 INJECTION, EMULSION INTRAVENOUS at 09:04

## 2022-04-28 RX ADMIN — ONDANSETRON 4 MG: 2 INJECTION, SOLUTION INTRAMUSCULAR; INTRAVENOUS at 09:04

## 2022-04-28 RX ADMIN — PROPOFOL 10 MG: 10 INJECTION, EMULSION INTRAVENOUS at 09:04

## 2022-04-28 NOTE — TRANSFER OF CARE
Anesthesia Transfer of Care Note    Patient: Sugar Diaz    Procedure(s) Performed: Procedure(s) (LRB):  EXCISION, CYST-BACK (Left)    Patient location: [facility]    Anesthesia Type: general and MAC    Transport from OR: Transported from OR on room air with adequate spontaneous ventilation    Post pain: adequate analgesia    Post assessment: no apparent anesthetic complications and tolerated procedure well    Post vital signs: stable    Level of consciousness: awake    Nausea/Vomiting: no nausea/vomiting    Complications: none    Transfer of care protocol was followed      Last vitals:   Visit Vitals  /60   Pulse 83   Temp 36.6 °C (97.8 °F) (Oral)   Resp 18   Wt 79.4 kg (175 lb 0.7 oz)   LMP 11/15/2012   SpO2 95%   Breastfeeding No   BMI 27.42 kg/m²

## 2022-04-28 NOTE — INTERVAL H&P NOTE
The patient has been examined and the H&P has been reviewed:    I concur with the findings and no changes have occurred since H&P was written.    Surgery risks, benefits and alternative options discussed and understood by patient/family.    There are no hospital problems to display for this patient.

## 2022-04-28 NOTE — DISCHARGE SUMMARY
[facility] - Surgery  Discharge Note  Short Stay    Procedure(s) (LRB):  EXCISION, CYST-BACK (Left)    OUTCOME: Patient tolerated treatment/procedure well without complication and is now ready for discharge.    DISPOSITION: Home or Self Care    FINAL DIAGNOSIS: Skin Cyst, Upper back    FOLLOWUP: In clinic    DISCHARGE INSTRUCTIONS:    Discharge Procedure Orders   Diet Adult Regular     Notify your health care provider if you experience any of the following:  temperature >100.4     Notify your health care provider if you experience any of the following:  persistent nausea and vomiting or diarrhea     Notify your health care provider if you experience any of the following:  severe uncontrolled pain     Notify your health care provider if you experience any of the following:  redness, tenderness, or signs of infection (pain, swelling, redness, odor or green/yellow discharge around incision site)     Notify your health care provider if you experience any of the following:  difficulty breathing or increased cough     No dressing needed   Order Comments: Keep wound clean and dry. Ok to rinse with soap and water. Do not soak in tub/pool.     Activity as tolerated        TIME SPENT ON DISCHARGE: 15 minutes

## 2022-04-28 NOTE — DISCHARGE INSTRUCTIONS
[facility]  Office # [phone]    Discharge Instructions for Same Day Surgery     Call the office for and appointment if one has not already been made.     Diet: Drink plenty of fluids the first 48 hours and you may resume your   usual diet.     Activity: No heavy lifting (over 10 pounds), pushing or pulling until your   post op visit. Your doctor's office may have told you to limit your lifting to less weight, or even no weight.  Be sure to follow those instructions.    Note: You may ride in a car and you may drive when comfortable.     Do not drive, drink alcohol, or sign legal documents for 24 hours, or if taking narcotic pain medication.    Dressings: Remove the dressing 24 hours after surgery. You may shower  24 hours after surgery and you may wash your hair.     If you have steri strips ( appears to be strips of white tape) on   your incision, leave them on. In 5-7 days they will begin to fall off.    Drains: If you have a drain, measure and record the drainage. Bring this information with you on your office visit.     Medical: Call the doctor for any of the following problems: fever above 101,   severe pain, bleeding, or abdominal distention (swelling).   If constipated you may take any stool softener you choose.     Occasionally small areas of skin numbness or an unpleasant skin sensation can result. Also, you may find that your incision is swollen and tender for a few days.  Some redness around sutures and staples is a normal reaction, but if the discomfort persists or worsens, call you doctor.             Fall Prevention  Millions of people fall every year and injure themselves. You may have had anesthesia or sedation which may increase your risk of falling. You may have health issues that put you at an increased risk of falling.     Here are ways to reduce your risk of falling.    Make your home safe by keeping walkways clear of objects you may trip over.  Use non-slip pads under  rugs. Do not use area rugs or small throw rugs.  Use non-slip mats in bathtubs and showers.  Install handrails and lights on staircases.  Do not walk in poorly lit areas.  Do not stand on chairs or wobbly ladders.  Use caution when reaching overhead or looking upward. This position can cause a loss of balance.  Be sure your shoes fit properly, have non-slip bottoms and are in good condition.   Wear shoes both inside and out. Avoid going barefoot or wearing slippers.  Be cautious when going up and down stairs, curbs, and when walking on uneven sidewalks.  If your balance is poor, consider using a cane or walker.  If your fall was related to alcohol use, stop or limit alcohol intake.   If your fall was related to use of sleeping medicines, talk to your doctor about this. You may need to reduce your dosage at bedtime if you awaken during the night to go to the bathroom.    To reduce the need for nighttime bathroom trips:  Avoid drinking fluids for several hours before going to bed  Empty your bladder before going to bed  Men can keep a urinal at the bedside  Stay as active as you can. Balance, flexibility, strength, and endurance all come from exercise. They all play a role in preventing falls. Ask your healthcare provider which types of activity are right for you.  Get your vision checked on a regular basis.  If you have pets, know where they are before you stand up or walk so you don't trip over them.  Use night lights.

## 2022-04-28 NOTE — ANESTHESIA POSTPROCEDURE EVALUATION
Anesthesia Post Evaluation    Patient: Sugar Diaz    Procedure(s) Performed: Procedure(s) (LRB):  EXCISION, CYST-BACK (Left)    Final Anesthesia Type: MAC      Patient location during evaluation: [facility]  Patient participation: Yes- Able to Participate  Level of consciousness: awake and alert  Post-procedure vital signs: reviewed and stable  Pain management: adequate  Airway patency: patent    PONV status at discharge: No PONV  Anesthetic complications: no      Cardiovascular status: hemodynamically stable  Respiratory status: unassisted and spontaneous ventilation  Hydration status: euvolemic  Follow-up not needed.          Vitals Value Taken Time   /58 04/28/22 1109   Temp 36.3 °C (97.4 °F) 04/28/22 1109   Pulse 74 04/28/22 1109   Resp 18 04/28/22 1109   SpO2 100 % 04/28/22 1109         No case tracking events are documented in the log.      Pain/Cruz Score: Pain Rating Prior to Med Admin: 0 (4/28/2022  8:48 AM)  Cruz Score: 10 (4/28/2022 11:19 AM)  Modified Cruz Score: 20 (4/28/2022 11:19 AM)

## 2022-04-28 NOTE — ANESTHESIA PREPROCEDURE EVALUATION
04/28/2022  Sugar Diaz is a 56 y.o., female.      Pre-op Assessment    I have reviewed the Patient Summary Reports.     I have reviewed the Nursing Notes.    I have reviewed the Medications.     Review of Systems  Anesthesia Hx:  No problems with previous Anesthesia Denies Hx of Anesthetic complications  Neg history of prior surgery. Denies Family Hx of Anesthesia complications.   Denies Personal Hx of Anesthesia complications.   Social:  Non-Smoker, No Alcohol Use    Hematology/Oncology:  Hematology Normal   Oncology Normal     EENT/Dental:EENT/Dental Normal   Cardiovascular:  Cardiovascular Normal Exercise tolerance: good     Pulmonary:  Pulmonary Normal    Renal/:  Renal/ Normal     Musculoskeletal:  Musculoskeletal Normal    Neurological:  Neurology Normal    Endocrine:  Endocrine Normal    Dermatological:  Skin Normal    Psych:  Psychiatric Normal           Physical Exam  General: Well nourished    Airway:  Mallampati: II   Mouth Opening: Normal  Tongue: Normal  Neck ROM: Normal ROM    Dental:  Intact    Chest/Lungs:  Clear to auscultation    Heart:  Rate: Normal  Rhythm: Regular Rhythm  Sounds: Normal        Anesthesia Plan  Type of Anesthesia, risks & benefits discussed:    Anesthesia Type: MAC  Intra-op Monitoring Plan: Standard ASA Monitors  Induction:  IV  Informed Consent: Informed consent signed with the Patient and all parties understand the risks and agree with anesthesia plan.  All questions answered. Patient consented to blood products? Yes  ASA Score: 1    Ready For Surgery From Anesthesia Perspective.     .

## 2022-04-28 NOTE — OP NOTE
[facility] [place]ative Note    SUMMARY     Surgery Date: 4/28/2022     Surgeon(s) and Role:     * Thomas Hurst MD - Primary    Assisting Surgeon: None    Pre-op Diagnosis:  Back abscess [L02.212]    Post-op Diagnosis:  Post-Op Diagnosis Codes:     * Back abscess [L02.212]    Procedure(s) (LRB):  EXCISION, CYST-BACK (Left)    Anesthesia: Local MAC    Indication for procedure: Sugar Diaz is 56 y.o. female with hx of recurrent infected cyst of the back, most recently had a recurrence and is status post incision and drainage. After discussion of disease process, we discussed options and have elected for operation to perform excision of cyst of the skin of the back.    Description of Procedure: After consent was obtained, patient was taken to the OR. The upper back was prepped and draped in a standard sterile fashion after local MAC anesthesia was started. Time out was performed. Antibiotics were started with Ancef. We began the procedure by drawing an ellipse palpating this scar and the punctum of the prior cyst infection site.  We injected lidocaine through the subcutaneous tissue a proposed incision site and then cut with a 15 blade down through the skin and dermis into subcutaneous fat around the cyst.  We then used Bovie electrocautery and encircled the cyst.  Of note we did inject the cyst saline this helped define the edges and it enlarged to proximally 1 cm diameter spherical specimen.  We excised completely getting around the recurrent cyst so that we had normal fat circumferentially.  We then used Bovie electrocautery for hemostasis and sent the specimen to pathology as back cyst.  We then closed the skin with 2 layers running dermal 3-0 Vicryl followed by subcuticular 4 Monocryl and covered this with Dermabond.  All counts were correct x2. Patient was aroused from anesthesia and taken to the recovery room in a stable condition having suffered no issues at this time.    Description of the  findings of the procedure:  1 cm x 1 cm x 1 cm subcutaneous cyst    Estimated Blood Loss: * No values recorded between 4/28/2022  9:44 AM and 4/28/2022 10:02 AM *         Specimens:   Specimen (24h ago, onward)             Start     Ordered    04/28/22 0949  Specimen to Pathology, Surgery General Surgery  Once        Comments: Pre-op Diagnosis: Back abscess [L02.212]Procedure(s):EXCISION, CYST-BACK Number of specimens: 1Name of specimens: back cyst     References:    Click here for ordering Quick Tip   Question Answer Comment   Procedure Type: General Surgery    Specimen Class: Routine/Screening    Which provider would you like to cc? KRYSTEN ERNST    Release to patient Immediate        04/28/22 0956                Drains/Implants: None

## 2022-05-02 LAB
FINAL PATHOLOGIC DIAGNOSIS: NORMAL
GROSS: NORMAL
Lab: NORMAL

## 2022-05-04 ENCOUNTER — OFFICE VISIT (OUTPATIENT)
Dept: SURGERY | Facility: CLINIC | Age: 57
End: 2022-05-04
Payer: COMMERCIAL

## 2022-05-04 VITALS
HEIGHT: 67 IN | SYSTOLIC BLOOD PRESSURE: 104 MMHG | DIASTOLIC BLOOD PRESSURE: 66 MMHG | BODY MASS INDEX: 27.48 KG/M2 | WEIGHT: 175.06 LBS | HEART RATE: 83 BPM

## 2022-05-04 DIAGNOSIS — L02.212 BACK ABSCESS: Primary | ICD-10-CM

## 2022-05-04 PROCEDURE — 99024 POSTOP FOLLOW-UP VISIT: CPT | Mod: S$GLB,,, | Performed by: SURGERY

## 2022-05-04 PROCEDURE — 1159F MED LIST DOCD IN RCRD: CPT | Mod: CPTII,S$GLB,, | Performed by: SURGERY

## 2022-05-04 PROCEDURE — 1159F PR MEDICATION LIST DOCUMENTED IN MEDICAL RECORD: ICD-10-PCS | Mod: CPTII,S$GLB,, | Performed by: SURGERY

## 2022-05-04 PROCEDURE — 3078F DIAST BP <80 MM HG: CPT | Mod: CPTII,S$GLB,, | Performed by: SURGERY

## 2022-05-04 PROCEDURE — 99999 PR PBB SHADOW E&M-EST. PATIENT-LVL III: CPT | Mod: PBBFAC,,, | Performed by: SURGERY

## 2022-05-04 PROCEDURE — 3074F PR MOST RECENT SYSTOLIC BLOOD PRESSURE < 130 MM HG: ICD-10-PCS | Mod: CPTII,S$GLB,, | Performed by: SURGERY

## 2022-05-04 PROCEDURE — 99024 PR POST-OP FOLLOW-UP VISIT: ICD-10-PCS | Mod: S$GLB,,, | Performed by: SURGERY

## 2022-05-04 PROCEDURE — 3074F SYST BP LT 130 MM HG: CPT | Mod: CPTII,S$GLB,, | Performed by: SURGERY

## 2022-05-04 PROCEDURE — 3078F PR MOST RECENT DIASTOLIC BLOOD PRESSURE < 80 MM HG: ICD-10-PCS | Mod: CPTII,S$GLB,, | Performed by: SURGERY

## 2022-05-04 PROCEDURE — 3008F PR BODY MASS INDEX (BMI) DOCUMENTED: ICD-10-PCS | Mod: CPTII,S$GLB,, | Performed by: SURGERY

## 2022-05-04 PROCEDURE — 99999 PR PBB SHADOW E&M-EST. PATIENT-LVL III: ICD-10-PCS | Mod: PBBFAC,,, | Performed by: SURGERY

## 2022-05-04 PROCEDURE — 3008F BODY MASS INDEX DOCD: CPT | Mod: CPTII,S$GLB,, | Performed by: SURGERY

## 2022-05-04 NOTE — PROGRESS NOTES
Surgery Clinic Note    Sugar Diaz is a 56 y.o. year old female in clinic today for follow up of back cyst excision. Doing well.  No f/c/n/v/sob/cp    ROS:  Negative except above    Pathology:  4/28/22  Final Pathologic Diagnosis Skin and subcutaneous tissue (submitted as back cyst):   -Cystic cavity within the deep dermis lined by chronic inflammation with   foreign body giant cell reaction and peripheral organizing fibrous tissue.      PE:  Vitals:    05/04/22 1056   BP: 104/66   Pulse: 83     NAD  No belabored breathing  No skin changes  Incision c/d/I    A/P:  Sugar Diaz is a 56 y.o. year old female s/p excision cyst excision    -rtc prn    Thomas Hurst  General Surgery - [facility]  5/4/2022

## 2022-05-31 ENCOUNTER — PATIENT MESSAGE (OUTPATIENT)
Dept: ADMINISTRATIVE | Facility: HOSPITAL | Age: 57
End: 2022-05-31
Payer: COMMERCIAL

## 2022-06-14 ENCOUNTER — PATIENT MESSAGE (OUTPATIENT)
Dept: FAMILY MEDICINE | Facility: CLINIC | Age: 57
End: 2022-06-14
Payer: COMMERCIAL

## 2022-06-14 RX ORDER — VALACYCLOVIR HYDROCHLORIDE 1 G/1
TABLET, FILM COATED ORAL
Qty: 30 TABLET | Refills: 11 | Status: SHIPPED | OUTPATIENT
Start: 2022-06-14

## 2023-03-02 ENCOUNTER — PATIENT MESSAGE (OUTPATIENT)
Dept: FAMILY MEDICINE | Facility: CLINIC | Age: 58
End: 2023-03-02
Payer: COMMERCIAL

## 2023-03-02 NOTE — LETTER
March 2, 2023      [facility]  [address]  [place] [address]  Phone: [phone]  Fax: [phone]       Patient: Sugar Diaz  YOB: 1965  Date of Visit: 03/02/2023    To Whom It May Concern:    Sugar Diaz was seen on  03/02/2023. She may return to workl on 3/3/2023 with no restrictions. If you have any questions or concerns, or if I can be of further assistance, please do not hesitate to contact my office.    Sincerely,        Paul Orozco LPN

## 2023-03-06 ENCOUNTER — PATIENT OUTREACH (OUTPATIENT)
Dept: ADMINISTRATIVE | Facility: HOSPITAL | Age: 58
End: 2023-03-06
Payer: COMMERCIAL

## 2023-03-06 ENCOUNTER — OFFICE VISIT (OUTPATIENT)
Dept: FAMILY MEDICINE | Facility: CLINIC | Age: 58
End: 2023-03-06
Payer: COMMERCIAL

## 2023-03-06 VITALS
RESPIRATION RATE: 18 BRPM | WEIGHT: 166.25 LBS | OXYGEN SATURATION: 97 % | HEIGHT: 67 IN | SYSTOLIC BLOOD PRESSURE: 112 MMHG | DIASTOLIC BLOOD PRESSURE: 58 MMHG | HEART RATE: 103 BPM | BODY MASS INDEX: 26.09 KG/M2 | TEMPERATURE: 98 F

## 2023-03-06 DIAGNOSIS — Z12.11 COLON CANCER SCREENING: Primary | ICD-10-CM

## 2023-03-06 DIAGNOSIS — B96.89 ACUTE BACTERIAL BRONCHITIS: Primary | ICD-10-CM

## 2023-03-06 DIAGNOSIS — Z12.11 ENCOUNTER FOR COLORECTAL CANCER SCREENING: ICD-10-CM

## 2023-03-06 DIAGNOSIS — E11.00 TYPE 2 DIABETES MELLITUS WITH HYPEROSMOLARITY WITHOUT COMA, WITHOUT LONG-TERM CURRENT USE OF INSULIN: ICD-10-CM

## 2023-03-06 DIAGNOSIS — J20.8 ACUTE BACTERIAL BRONCHITIS: Primary | ICD-10-CM

## 2023-03-06 DIAGNOSIS — Z12.12 ENCOUNTER FOR COLORECTAL CANCER SCREENING: ICD-10-CM

## 2023-03-06 DIAGNOSIS — Z12.31 ENCOUNTER FOR SCREENING MAMMOGRAM FOR BREAST CANCER: ICD-10-CM

## 2023-03-06 PROCEDURE — 3008F BODY MASS INDEX DOCD: CPT | Mod: CPTII,S$GLB,, | Performed by: PHYSICIAN ASSISTANT

## 2023-03-06 PROCEDURE — 99999 PR PBB SHADOW E&M-EST. PATIENT-LVL IV: CPT | Mod: PBBFAC,,, | Performed by: PHYSICIAN ASSISTANT

## 2023-03-06 PROCEDURE — 99214 PR OFFICE/OUTPT VISIT, EST, LEVL IV, 30-39 MIN: ICD-10-PCS | Mod: S$GLB,,, | Performed by: PHYSICIAN ASSISTANT

## 2023-03-06 PROCEDURE — 99214 OFFICE O/P EST MOD 30 MIN: CPT | Mod: S$GLB,,, | Performed by: PHYSICIAN ASSISTANT

## 2023-03-06 PROCEDURE — 3074F PR MOST RECENT SYSTOLIC BLOOD PRESSURE < 130 MM HG: ICD-10-PCS | Mod: CPTII,S$GLB,, | Performed by: PHYSICIAN ASSISTANT

## 2023-03-06 PROCEDURE — 3008F PR BODY MASS INDEX (BMI) DOCUMENTED: ICD-10-PCS | Mod: CPTII,S$GLB,, | Performed by: PHYSICIAN ASSISTANT

## 2023-03-06 PROCEDURE — 3078F DIAST BP <80 MM HG: CPT | Mod: CPTII,S$GLB,, | Performed by: PHYSICIAN ASSISTANT

## 2023-03-06 PROCEDURE — 1159F PR MEDICATION LIST DOCUMENTED IN MEDICAL RECORD: ICD-10-PCS | Mod: CPTII,S$GLB,, | Performed by: PHYSICIAN ASSISTANT

## 2023-03-06 PROCEDURE — 1160F RVW MEDS BY RX/DR IN RCRD: CPT | Mod: CPTII,S$GLB,, | Performed by: PHYSICIAN ASSISTANT

## 2023-03-06 PROCEDURE — 1160F PR REVIEW ALL MEDS BY PRESCRIBER/CLIN PHARMACIST DOCUMENTED: ICD-10-PCS | Mod: CPTII,S$GLB,, | Performed by: PHYSICIAN ASSISTANT

## 2023-03-06 PROCEDURE — 99999 PR PBB SHADOW E&M-EST. PATIENT-LVL IV: ICD-10-PCS | Mod: PBBFAC,,, | Performed by: PHYSICIAN ASSISTANT

## 2023-03-06 PROCEDURE — 3074F SYST BP LT 130 MM HG: CPT | Mod: CPTII,S$GLB,, | Performed by: PHYSICIAN ASSISTANT

## 2023-03-06 PROCEDURE — 3078F PR MOST RECENT DIASTOLIC BLOOD PRESSURE < 80 MM HG: ICD-10-PCS | Mod: CPTII,S$GLB,, | Performed by: PHYSICIAN ASSISTANT

## 2023-03-06 PROCEDURE — 1159F MED LIST DOCD IN RCRD: CPT | Mod: CPTII,S$GLB,, | Performed by: PHYSICIAN ASSISTANT

## 2023-03-06 RX ORDER — PROMETHAZINE HYDROCHLORIDE AND DEXTROMETHORPHAN HYDROBROMIDE 6.25; 15 MG/5ML; MG/5ML
5 SYRUP ORAL NIGHTLY PRN
Qty: 180 ML | Refills: 0 | Status: SHIPPED | OUTPATIENT
Start: 2023-03-06 | End: 2023-03-16

## 2023-03-06 RX ORDER — BENZONATATE 200 MG/1
200 CAPSULE ORAL 3 TIMES DAILY PRN
Qty: 30 CAPSULE | Refills: 0 | Status: SHIPPED | OUTPATIENT
Start: 2023-03-06 | End: 2023-03-16

## 2023-03-06 RX ORDER — AZITHROMYCIN 250 MG/1
TABLET, FILM COATED ORAL
Qty: 6 TABLET | Refills: 0 | Status: SHIPPED | OUTPATIENT
Start: 2023-03-06 | End: 2023-05-18 | Stop reason: CLARIF

## 2023-03-06 NOTE — PROGRESS NOTES
Subjective:       Patient ID: Sugar Diaz is a 57 y.o. female.    Chief Complaint: Cough, Headache, and Fatigue    Cough  This is a new problem. The problem has been unchanged. The cough is Productive of sputum. Associated symptoms include ear congestion, a fever (101.3), headaches, myalgias and a sore throat (with coughing). Pertinent negatives include no chest pain, ear pain, hemoptysis, postnasal drip, shortness of breath or wheezing. Treatments tried: tamiflu, tylenol, ibuprofen, nyquil, dayquil. Her past medical history is significant for environmental allergies. There is no history of asthma, bronchitis or pneumonia.   Social History     Socioeconomic History    Marital status:    Tobacco Use    Smoking status: Never    Smokeless tobacco: Never   Substance and Sexual Activity    Alcohol use: Not Currently    Drug use: Never    Sexual activity: Yes     Partners: Male     Birth control/protection: Post-menopausal       Review of Systems   Constitutional:  Positive for fatigue and fever (101.3).   HENT:  Positive for sore throat (with coughing). Negative for ear pain and postnasal drip.    Respiratory:  Positive for cough. Negative for hemoptysis, shortness of breath and wheezing.    Cardiovascular:  Negative for chest pain.   Musculoskeletal:  Positive for myalgias.   Allergic/Immunologic: Positive for environmental allergies.   Neurological:  Positive for headaches.       Objective:      Physical Exam  Constitutional:       Appearance: Normal appearance.   HENT:      Head: Normocephalic and atraumatic.      Right Ear: Tympanic membrane normal.      Left Ear: Tympanic membrane normal.      Nose:      Right Sinus: No maxillary sinus tenderness or frontal sinus tenderness.      Left Sinus: No maxillary sinus tenderness or frontal sinus tenderness.   Cardiovascular:      Rate and Rhythm: Normal rate and regular rhythm.   Pulmonary:      Effort: Pulmonary effort is normal. No respiratory distress.       Breath sounds: Normal breath sounds. No wheezing.   Neurological:      General: No focal deficit present.      Mental Status: She is alert and oriented to person, place, and time.       Assessment:       Problem List Items Addressed This Visit    None  Visit Diagnoses       Acute bacterial bronchitis    -  Primary    Relevant Medications    azithromycin (Z-NATALIA) 250 MG tablet    promethazine-dextromethorphan (PROMETHAZINE-DM) 6.25-15 mg/5 mL Syrp    benzonatate (TESSALON) 200 MG capsule    Encounter for colorectal cancer screening        Encounter for screening mammogram for breast cancer        Relevant Orders    Mammo Digital Screening Bilat              Plan:         Sugar was seen today for cough, headache and fatigue.    Diagnoses and all orders for this visit:    Acute bacterial bronchitis  -     azithromycin (Z-NATALIA) 250 MG tablet; Take 2 pills day 1, then 1 pill day 2-5  -     promethazine-dextromethorphan (PROMETHAZINE-DM) 6.25-15 mg/5 mL Syrp; Take 5 mLs by mouth nightly as needed.  -     benzonatate (TESSALON) 200 MG capsule; Take 1 capsule (200 mg total) by mouth 3 (three) times daily as needed for Cough.  -     fluids, rest, call if no relief    Encounter for colorectal cancer screening  -     Cancel: Ambulatory referral/consult to Endo Procedure ; Future    Encounter for screening mammogram for breast cancer  -     Mammo Digital Screening Bilat; Future

## 2023-03-06 NOTE — PROGRESS NOTES
Health Maintenance Due   Topic     Hepatitis C Screening  Consult pcp    HIV Screening  Consult pcp    TETANUS VACCINE  Consult pcp    Mammogram  Pending order    Hemoglobin A1c (Diabetic Prevention Screening)  Consult pcp    Colorectal Cancer Screening  Pending order    Shingles Vaccine (1 of 2)     COVID-19 Vaccine (2 - Booster for Speedy series) Not offered at this office

## 2023-03-06 NOTE — LETTER
March 6, 2023    Sugar Diaz  [address]  [place] [address]             [facility]  [address] [facility] [place] [address]  Phone: [phone]  Fax: [phone] Sugar Diaz was seen in my clinic on 3/6/23. Please excuse her absence.  She may return to work on 3/8/23.    If you have any questions or concerns, please don't hesitate to call.    Sincerely,       Aarti Matt PA-C

## 2023-03-29 ENCOUNTER — PATIENT MESSAGE (OUTPATIENT)
Dept: OBSTETRICS AND GYNECOLOGY | Facility: CLINIC | Age: 58
End: 2023-03-29
Payer: COMMERCIAL

## 2023-03-29 DIAGNOSIS — N95.0 PMB (POSTMENOPAUSAL BLEEDING): Primary | ICD-10-CM

## 2023-04-12 ENCOUNTER — PATIENT MESSAGE (OUTPATIENT)
Dept: ADMINISTRATIVE | Facility: HOSPITAL | Age: 58
End: 2023-04-12
Payer: COMMERCIAL

## 2023-04-13 ENCOUNTER — HOSPITAL ENCOUNTER (OUTPATIENT)
Dept: RADIOLOGY | Facility: HOSPITAL | Age: 58
Discharge: HOME | End: 2023-04-13
Attending: OBSTETRICS & GYNECOLOGY
Payer: COMMERCIAL

## 2023-04-13 DIAGNOSIS — N95.0 PMB (POSTMENOPAUSAL BLEEDING): ICD-10-CM

## 2023-04-13 PROCEDURE — 76830 US PELVIS COMP WITH TRANSVAG NON-OB (XPD): ICD-10-PCS | Mod: 26,,, | Performed by: RADIOLOGY

## 2023-04-13 PROCEDURE — 76830 TRANSVAGINAL US NON-OB: CPT | Mod: 26,,, | Performed by: RADIOLOGY

## 2023-04-13 PROCEDURE — 76856 US EXAM PELVIC COMPLETE: CPT | Mod: TC

## 2023-04-13 PROCEDURE — 76856 US EXAM PELVIC COMPLETE: CPT | Mod: 26,,, | Performed by: RADIOLOGY

## 2023-04-13 PROCEDURE — 76856 US PELVIS COMP WITH TRANSVAG NON-OB (XPD): ICD-10-PCS | Mod: 26,,, | Performed by: RADIOLOGY

## 2023-04-27 ENCOUNTER — PROCEDURE VISIT (OUTPATIENT)
Dept: OBSTETRICS AND GYNECOLOGY | Facility: CLINIC | Age: 58
End: 2023-04-27
Payer: COMMERCIAL

## 2023-04-27 VITALS
BODY MASS INDEX: 26.14 KG/M2 | DIASTOLIC BLOOD PRESSURE: 80 MMHG | SYSTOLIC BLOOD PRESSURE: 124 MMHG | WEIGHT: 166.88 LBS

## 2023-04-27 DIAGNOSIS — N88.2 CERVICAL STENOSIS (UTERINE CERVIX): ICD-10-CM

## 2023-04-27 DIAGNOSIS — N95.0 PMB (POSTMENOPAUSAL BLEEDING): Primary | ICD-10-CM

## 2023-04-27 PROCEDURE — 58100 PR BIOPSY OF UTERUS LINING: ICD-10-PCS | Mod: 53,S$GLB,, | Performed by: OBSTETRICS & GYNECOLOGY

## 2023-04-27 PROCEDURE — 58100 BIOPSY OF UTERUS LINING: CPT | Mod: 53,S$GLB,, | Performed by: OBSTETRICS & GYNECOLOGY

## 2023-04-27 NOTE — PROCEDURES
Procedures    Procedure Note:  Biopsy (Gynecological)  Date/Time: 2023 /3:10 PM   Performed by: Omaira Evans MD  Authorized by: Omaira Evans MD     Consent Done?:  Yes (Written)  Patient was prepped and draped in the normal sterile fashion.  Local anesthesia used?: No      Biopsy Location:  Uterus  Estimated blood loss (cc):  0  Patient tolerated the procedure well with no immediate complications.    57 y.o.  here for endometrial biopsy. Indication: postmenopausal bleeding    Patient's last menstrual period was 11/15/2012.   Possibility of pregnancy: no.     Risks reviewed and consent signed. Prior to performing the procedure, a time-out was performed.  The following components of the time out were conducted:  Verification of patient identity  Verification of the exact nature of procedure  Verification of surgical site and side  Review of allergies  Verification of the presence of a signed informed consent  Patient queried about history of vasovagal reaction/fainting    Procedure:     Uterus palpated: anteverted     Vagina prepared.     Tenaculum applied.   Unable to perform procedure secondary to cervical stenosis and patient discomfort.  Procedure terminated.  Will do hysteroscopy D&C in the OR.    Procedure performed without complication.  Procedure caused discomfort and should only be repeated with alternative approach    Follow-up by telephone to review results in 1-2 weeks.      Omaira Evans MD  2023 3:10 PM        1. PMB (postmenopausal bleeding)  - Specimen to Pathology, Ob/Gyn  - Endometrial biopsy    2. Cervical stenosis (uterine cervix)  - Case Request Operating Room: HYSTEROSCOPY, WITH DILATION AND CURETTAGE OF UTERUS

## 2023-05-02 ENCOUNTER — PATIENT MESSAGE (OUTPATIENT)
Dept: OBSTETRICS AND GYNECOLOGY | Facility: CLINIC | Age: 58
End: 2023-05-02
Payer: COMMERCIAL

## 2023-05-15 ENCOUNTER — ANESTHESIA EVENT (OUTPATIENT)
Dept: SURGERY | Facility: HOSPITAL | Age: 58
End: 2023-05-15
Payer: COMMERCIAL

## 2023-05-18 ENCOUNTER — OFFICE VISIT (OUTPATIENT)
Dept: OBSTETRICS AND GYNECOLOGY | Facility: CLINIC | Age: 58
End: 2023-05-18
Payer: COMMERCIAL

## 2023-05-18 ENCOUNTER — HOSPITAL ENCOUNTER (OUTPATIENT)
Dept: PREADMISSION TESTING | Facility: HOSPITAL | Age: 58
Discharge: HOME | End: 2023-05-18
Attending: OBSTETRICS & GYNECOLOGY
Payer: COMMERCIAL

## 2023-05-18 VITALS
WEIGHT: 165.38 LBS | OXYGEN SATURATION: 99 % | TEMPERATURE: 98 F | RESPIRATION RATE: 16 BRPM | HEIGHT: 67 IN | DIASTOLIC BLOOD PRESSURE: 70 MMHG | SYSTOLIC BLOOD PRESSURE: 110 MMHG | HEART RATE: 89 BPM | BODY MASS INDEX: 25.96 KG/M2

## 2023-05-18 VITALS — SYSTOLIC BLOOD PRESSURE: 110 MMHG | BODY MASS INDEX: 25.9 KG/M2 | WEIGHT: 165.38 LBS | DIASTOLIC BLOOD PRESSURE: 70 MMHG

## 2023-05-18 DIAGNOSIS — Z01.818 PREOP TESTING: Primary | ICD-10-CM

## 2023-05-18 DIAGNOSIS — N95.0 PMB (POSTMENOPAUSAL BLEEDING): ICD-10-CM

## 2023-05-18 DIAGNOSIS — N88.2 CERVICAL STENOSIS (UTERINE CERVIX): ICD-10-CM

## 2023-05-18 DIAGNOSIS — N95.0 PMB (POSTMENOPAUSAL BLEEDING): Primary | ICD-10-CM

## 2023-05-18 LAB
ALBUMIN SERPL BCP-MCNC: 3.7 G/DL (ref 3.5–5.2)
ALP SERPL-CCNC: 81 U/L (ref 55–135)
ALT SERPL W/O P-5'-P-CCNC: 16 U/L (ref 10–44)
ANION GAP SERPL CALC-SCNC: 12 MMOL/L (ref 8–16)
AST SERPL-CCNC: 18 U/L (ref 10–40)
BASOPHILS # BLD AUTO: 0.02 K/UL (ref 0–0.2)
BASOPHILS NFR BLD: 0.2 % (ref 0–1.9)
BILIRUB SERPL-MCNC: 0.2 MG/DL (ref 0.1–1)
BUN SERPL-MCNC: 11 MG/DL (ref 6–20)
CALCIUM SERPL-MCNC: 9.3 MG/DL (ref 8.7–10.5)
CHLORIDE SERPL-SCNC: 104 MMOL/L (ref 95–110)
CO2 SERPL-SCNC: 24 MMOL/L (ref 23–29)
CREAT SERPL-MCNC: 0.8 MG/DL (ref 0.5–1.4)
DIFFERENTIAL METHOD: NORMAL
EOSINOPHIL # BLD AUTO: 0.2 K/UL (ref 0–0.5)
EOSINOPHIL NFR BLD: 1.7 % (ref 0–8)
ERYTHROCYTE [DISTWIDTH] IN BLOOD BY AUTOMATED COUNT: 12.7 % (ref 11.5–14.5)
EST. GFR  (NO RACE VARIABLE): >60 ML/MIN/1.73 M^2
GLUCOSE SERPL-MCNC: 201 MG/DL (ref 70–110)
HCT VFR BLD AUTO: 40.7 % (ref 37–48.5)
HGB BLD-MCNC: 13.8 G/DL (ref 12–16)
IMM GRANULOCYTES # BLD AUTO: 0.01 K/UL (ref 0–0.04)
IMM GRANULOCYTES NFR BLD AUTO: 0.1 % (ref 0–0.5)
LYMPHOCYTES # BLD AUTO: 2.2 K/UL (ref 1–4.8)
LYMPHOCYTES NFR BLD: 26.1 % (ref 18–48)
MCH RBC QN AUTO: 29.9 PG (ref 27–31)
MCHC RBC AUTO-ENTMCNC: 33.9 G/DL (ref 32–36)
MCV RBC AUTO: 88 FL (ref 82–98)
MONOCYTES # BLD AUTO: 0.6 K/UL (ref 0.3–1)
MONOCYTES NFR BLD: 6.4 % (ref 4–15)
NEUTROPHILS # BLD AUTO: 5.6 K/UL (ref 1.8–7.7)
NEUTROPHILS NFR BLD: 65.5 % (ref 38–73)
NRBC BLD-RTO: 0 /100 WBC
PLATELET # BLD AUTO: 394 K/UL (ref 150–450)
PMV BLD AUTO: 9.6 FL (ref 9.2–12.9)
POTASSIUM SERPL-SCNC: 3.4 MMOL/L (ref 3.5–5.1)
PROT SERPL-MCNC: 7 G/DL (ref 6–8.4)
RBC # BLD AUTO: 4.62 M/UL (ref 4–5.4)
SODIUM SERPL-SCNC: 140 MMOL/L (ref 136–145)
WBC # BLD AUTO: 8.59 K/UL (ref 3.9–12.7)

## 2023-05-18 PROCEDURE — 36415 COLL VENOUS BLD VENIPUNCTURE: CPT | Performed by: OBSTETRICS & GYNECOLOGY

## 2023-05-18 PROCEDURE — 93010 ELECTROCARDIOGRAM REPORT: CPT | Mod: ,,, | Performed by: INTERNAL MEDICINE

## 2023-05-18 PROCEDURE — 99499 UNLISTED E&M SERVICE: CPT | Mod: S$GLB,,, | Performed by: OBSTETRICS & GYNECOLOGY

## 2023-05-18 PROCEDURE — 80053 COMPREHEN METABOLIC PANEL: CPT | Performed by: OBSTETRICS & GYNECOLOGY

## 2023-05-18 PROCEDURE — 99999 PR PBB SHADOW E&M-EST. PATIENT-LVL III: ICD-10-PCS | Mod: PBBFAC,,, | Performed by: OBSTETRICS & GYNECOLOGY

## 2023-05-18 PROCEDURE — 99499 NO LOS: ICD-10-PCS | Mod: S$GLB,,, | Performed by: OBSTETRICS & GYNECOLOGY

## 2023-05-18 PROCEDURE — 93010 EKG 12-LEAD: ICD-10-PCS | Mod: ,,, | Performed by: INTERNAL MEDICINE

## 2023-05-18 PROCEDURE — 85025 COMPLETE CBC W/AUTO DIFF WBC: CPT | Performed by: OBSTETRICS & GYNECOLOGY

## 2023-05-18 PROCEDURE — 93005 ELECTROCARDIOGRAM TRACING: CPT

## 2023-05-18 PROCEDURE — 99999 PR PBB SHADOW E&M-EST. PATIENT-LVL III: CPT | Mod: PBBFAC,,, | Performed by: OBSTETRICS & GYNECOLOGY

## 2023-05-18 RX ORDER — SODIUM CHLORIDE, SODIUM LACTATE, POTASSIUM CHLORIDE, CALCIUM CHLORIDE 600; 310; 30; 20 MG/100ML; MG/100ML; MG/100ML; MG/100ML
INJECTION, SOLUTION INTRAVENOUS CONTINUOUS
Status: CANCELLED | OUTPATIENT
Start: 2023-05-18

## 2023-05-18 RX ORDER — MUPIROCIN 20 MG/G
OINTMENT TOPICAL
Status: CANCELLED | OUTPATIENT
Start: 2023-05-18

## 2023-05-18 RX ORDER — FAMOTIDINE 20 MG/1
20 TABLET, FILM COATED ORAL
Status: CANCELLED | OUTPATIENT
Start: 2023-05-18

## 2023-05-18 NOTE — H&P
Subjective:      Patient ID: Sugar Diaz is a 57 y.o. female.    Chief Complaint:  Pre-op Exam      History of Present Illness  HPI  Patient has been having postmenopausal bleeding.  Ultrasound showed a thickened endometrium.  In office endometrial biopsy was attempted, but unsuccessful due to cervical stenosis.    GYN & OB History  Patient's last menstrual period was 11/15/2012.   Date of Last Pap: No result found    OB History    Para Term  AB Living   3 3 3     3   SAB IAB Ectopic Multiple Live Births           3      # Outcome Date GA Lbr Bryson/2nd Weight Sex Delivery Anes PTL Lv   3 Term      Vag-Spont      2 Term      Vag-Spont      1 Term      Vag-Spont        Past Medical History:  Past Medical History:   Diagnosis Date    Chest pain     NEGATIVE STRESS ECHO     GERD (gastroesophageal reflux disease)     Lower back pain     Migraines     Mild allergic rhinitis        Past Surgical History:  Past Surgical History:   Procedure Laterality Date    CYST REMOVAL Left 2022    Procedure: EXCISION, CYST-BACK;  Surgeon: Thomas Hurst MD;  Location: [facility];  Service: General;  Laterality: Left;  left upper back  RN PREOP ON 22-NF    TONSILLECTOMY      TUBAL LIGATION         Family History:  Family History   Problem Relation Age of Onset    No Known Problems Mother     Heart disease Father     No Known Problems Sister     No Known Problems Sister        Allergies:  Review of patient's allergies indicates:  No Known Allergies    Medications:  Current Outpatient Medications on File Prior to Visit   Medication Sig Dispense Refill    azithromycin (Z-NATALIA) 250 MG tablet Take 2 pills day 1, then 1 pill day 2-5 6 tablet 0    valACYclovir (VALTREX) 1000 MG tablet Two pills at onset of fever blister and then repeat 2 pills 12 hr later 30 tablet 11    azelastine (ASTELIN) 137 mcg (0.1 %) nasal spray 1 spray (137 mcg total) by Nasal route 2 (two) times daily. 30 mL 0    loratadine (CLARITIN)  10 mg tablet Take 1 tablet (10 mg total) by mouth once daily. 30 tablet 11     No current facility-administered medications on file prior to visit.       Social History:  Social History     Tobacco Use    Smoking status: Never    Smokeless tobacco: Never   Substance Use Topics    Alcohol use: Not Currently    Drug use: Never             Review of Systems  Review of Systems   Constitutional: Negative.    HENT: Negative.     Eyes: Negative.    Respiratory: Negative.     Cardiovascular: Negative.    Gastrointestinal: Negative.    Endocrine: Negative.    Genitourinary: Negative.  Positive for postmenopausal bleeding.   Musculoskeletal: Negative.    Integumentary:  Negative.   Neurological: Negative.    Hematological: Negative.    Psychiatric/Behavioral: Negative.     Breast: negative.         Objective:     Physical Exam:   Constitutional: She is oriented to person, place, and time. She appears well-developed.    HENT:   Head: Normocephalic and atraumatic.    Eyes: EOM are normal.     Cardiovascular:  Normal rate.             Pulmonary/Chest: Effort normal.        Abdominal: Soft. There is no abdominal tenderness.             Musculoskeletal: Normal range of motion.       Neurological: She is oriented to person, place, and time.    Skin: Skin is warm.    Psychiatric: She has a normal mood and affect.       Assessment:     1. PMB (postmenopausal bleeding)    2. Cervical stenosis (uterine cervix)               Plan:     - Risks, benefits, and alternatives of  hysteroscopy D&C reviewed with patient.  She voiced understanding.  All questions answered.  Consents are signed and on the chart.     1. PMB (postmenopausal bleeding)  - Place in Outpatient; Standing  - Full code; Standing  - Vital signs; Standing  - Insert peripheral IV; Standing  - POCT glucose; Standing  - Notify physician if BS > 180 for hysterectomy patients; Standing  - Chlorhexidine (CHG) 2% Wipes; Standing  - Notify Physician/Vital Signs Parameters Urine  output less than 0.5mL/kg/hr (with indwelling catheter) or 30 mL/hr (without indwelling catheter) or blood glucose greater than 200 mg/dL; Standing  - Notify physician; Standing  - Notify Physician - Potential Need of Opioid Reversal; Standing  - Diet NPO; Standing  - Place sequential compression device; Standing  - Chlorohexidine Gluconate Bath; Standing  - CBC auto differential; Standing  - Pregnancy, urine rapid; Standing  - Type & Screen Pre Op; Standing    2. Cervical stenosis (uterine cervix)  - Place in Outpatient; Standing  - Full code; Standing  - Vital signs; Standing  - Insert peripheral IV; Standing  - POCT glucose; Standing  - Notify physician if BS > 180 for hysterectomy patients; Standing  - Chlorhexidine (CHG) 2% Wipes; Standing  - Notify Physician/Vital Signs Parameters Urine output less than 0.5mL/kg/hr (with indwelling catheter) or 30 mL/hr (without indwelling catheter) or blood glucose greater than 200 mg/dL; Standing  - Notify physician; Standing  - Notify Physician - Potential Need of Opioid Reversal; Standing  - Diet NPO; Standing  - Place sequential compression device; Standing  - Chlorohexidine Gluconate Bath; Standing  - CBC auto differential; Standing  - Pregnancy, urine rapid; Standing  - Type & Screen Pre Op; Standing

## 2023-05-18 NOTE — H&P (VIEW-ONLY)
Subjective:      Patient ID: Sugar Diaz is a 57 y.o. female.    Chief Complaint:  Pre-op Exam      History of Present Illness  HPI  Patient has been having postmenopausal bleeding.  Ultrasound showed a thickened endometrium.  In office endometrial biopsy was attempted, but unsuccessful due to cervical stenosis.    GYN & OB History  Patient's last menstrual period was 11/15/2012.   Date of Last Pap: No result found    OB History    Para Term  AB Living   3 3 3     3   SAB IAB Ectopic Multiple Live Births           3      # Outcome Date GA Lbr Bryson/2nd Weight Sex Delivery Anes PTL Lv   3 Term      Vag-Spont      2 Term      Vag-Spont      1 Term      Vag-Spont        Past Medical History:  Past Medical History:   Diagnosis Date    Chest pain     NEGATIVE STRESS ECHO     GERD (gastroesophageal reflux disease)     Lower back pain     Migraines     Mild allergic rhinitis        Past Surgical History:  Past Surgical History:   Procedure Laterality Date    CYST REMOVAL Left 2022    Procedure: EXCISION, CYST-BACK;  Surgeon: Thomas Hurst MD;  Location: [facility];  Service: General;  Laterality: Left;  left upper back  RN PREOP ON 22-NF    TONSILLECTOMY      TUBAL LIGATION         Family History:  Family History   Problem Relation Age of Onset    No Known Problems Mother     Heart disease Father     No Known Problems Sister     No Known Problems Sister        Allergies:  Review of patient's allergies indicates:  No Known Allergies    Medications:  Current Outpatient Medications on File Prior to Visit   Medication Sig Dispense Refill    azithromycin (Z-NATALIA) 250 MG tablet Take 2 pills day 1, then 1 pill day 2-5 6 tablet 0    valACYclovir (VALTREX) 1000 MG tablet Two pills at onset of fever blister and then repeat 2 pills 12 hr later 30 tablet 11    azelastine (ASTELIN) 137 mcg (0.1 %) nasal spray 1 spray (137 mcg total) by Nasal route 2 (two) times daily. 30 mL 0    loratadine (CLARITIN)  10 mg tablet Take 1 tablet (10 mg total) by mouth once daily. 30 tablet 11     No current facility-administered medications on file prior to visit.       Social History:  Social History     Tobacco Use    Smoking status: Never    Smokeless tobacco: Never   Substance Use Topics    Alcohol use: Not Currently    Drug use: Never             Review of Systems  Review of Systems   Constitutional: Negative.    HENT: Negative.     Eyes: Negative.    Respiratory: Negative.     Cardiovascular: Negative.    Gastrointestinal: Negative.    Endocrine: Negative.    Genitourinary: Negative.  Positive for postmenopausal bleeding.   Musculoskeletal: Negative.    Integumentary:  Negative.   Neurological: Negative.    Hematological: Negative.    Psychiatric/Behavioral: Negative.     Breast: negative.         Objective:     Physical Exam:   Constitutional: She is oriented to person, place, and time. She appears well-developed.    HENT:   Head: Normocephalic and atraumatic.    Eyes: EOM are normal.     Cardiovascular:  Normal rate.             Pulmonary/Chest: Effort normal.        Abdominal: Soft. There is no abdominal tenderness.             Musculoskeletal: Normal range of motion.       Neurological: She is oriented to person, place, and time.    Skin: Skin is warm.    Psychiatric: She has a normal mood and affect.       Assessment:     1. PMB (postmenopausal bleeding)    2. Cervical stenosis (uterine cervix)               Plan:     - Risks, benefits, and alternatives of  hysteroscopy D&C reviewed with patient.  She voiced understanding.  All questions answered.  Consents are signed and on the chart.     1. PMB (postmenopausal bleeding)  - Place in Outpatient; Standing  - Full code; Standing  - Vital signs; Standing  - Insert peripheral IV; Standing  - POCT glucose; Standing  - Notify physician if BS > 180 for hysterectomy patients; Standing  - Chlorhexidine (CHG) 2% Wipes; Standing  - Notify Physician/Vital Signs Parameters Urine  output less than 0.5mL/kg/hr (with indwelling catheter) or 30 mL/hr (without indwelling catheter) or blood glucose greater than 200 mg/dL; Standing  - Notify physician; Standing  - Notify Physician - Potential Need of Opioid Reversal; Standing  - Diet NPO; Standing  - Place sequential compression device; Standing  - Chlorohexidine Gluconate Bath; Standing  - CBC auto differential; Standing  - Pregnancy, urine rapid; Standing  - Type & Screen Pre Op; Standing    2. Cervical stenosis (uterine cervix)  - Place in Outpatient; Standing  - Full code; Standing  - Vital signs; Standing  - Insert peripheral IV; Standing  - POCT glucose; Standing  - Notify physician if BS > 180 for hysterectomy patients; Standing  - Chlorhexidine (CHG) 2% Wipes; Standing  - Notify Physician/Vital Signs Parameters Urine output less than 0.5mL/kg/hr (with indwelling catheter) or 30 mL/hr (without indwelling catheter) or blood glucose greater than 200 mg/dL; Standing  - Notify physician; Standing  - Notify Physician - Potential Need of Opioid Reversal; Standing  - Diet NPO; Standing  - Place sequential compression device; Standing  - Chlorohexidine Gluconate Bath; Standing  - CBC auto differential; Standing  - Pregnancy, urine rapid; Standing  - Type & Screen Pre Op; Standing

## 2023-05-18 NOTE — DISCHARGE INSTRUCTIONS
Before 7 AM, enter through the Emergency Entrance..   After 7 AM enter through the Main Entrance.      Your procedure  is scheduled for __5/23/23________.    Call [phone] between 2pm and 5pm on ___5/22/23____to find out your arrival time for the day of surgery.    You may have one visitor.  No children allowed.     You will be going to the Same Day Surgery Unit on the 2nd floor of the hospital.    Important instructions:  Do not eat anything after midnight.  You may have plain water, non carbonated.  You may also have Gatorade or Powerade after midnight.    Stop all fluids 2 hours before your surgery.    It is okay to brush your teeth.  Do not have gum, candy or mints.    SEE MEDICATION SHEET.   TAKE MEDICATIONS AS DIRECTED WITH SIPS OF WATER.      STOP taking Aspirin, Ibuprofen,  Advil, Motrin, Mobic(meloxicam), Aleve (naproxen), Fish oil, and Vitamin E for at least 7 days before your surgery.     You may take Tylenol if needed which is not a blood thinner.    Please shower the night before and the morning of your surgery.      Follow any Prep Instructions given by your surgeon.    Use Chlorhexidine soap as instructed by your pre op nurse.   Please place clean linens on your bed the night before surgery. Please wear fresh clean clothing after each shower.    No shaving of procedural area at least 4-5 days before surgery due to increased risk of skin irritation and/or possible infection.    Female patients may be asked for a urine specimen on the morning of the surgery.  Please check with your nurse before using the restroom.    Contact lenses and removable denture work may not be worn during your procedure.    You may wear deodorant only. If you are having breast surgery, do not wear deodorant on the operative side.    Do not wear powder, body lotion, perfume/cologne or make-up.    Do not wear any jewelry or have any metal on your body.    You will be asked to remove any dentures or partials for the  procedure.    If you are going home on the same day of surgery, you must arrange for a family member or a friend to drive you home.  Public transportation is prohibited.  You will not be able to drive home if you were given anesthesia or sedation.    Patients who want to have their Post-op prescriptions filled from our in-house [facility], bring a Credit/Debit Card or cash with you. A co-pay may be required.  The pharmacy closes at 5:30 pm.    Wear loose fitting clothes allowing for bandages.    Please leave money and valuables home.      You may bring your cell phone.    Call the doctor if fever or illness should occur before your surgery.    Call [phone] to contact us here if needed.                            CLOTHES ON DAY OF SURGERY  ABDOMINAL surgery:  wear loose, comfortable clothing.  Nothing tight around the abdomen.  NO JEANS

## 2023-05-18 NOTE — ANESTHESIA PREPROCEDURE EVALUATION
05/18/2023    Pre-operative evaluation for Procedure(s) (LRB):  HYSTEROSCOPY, WITH DILATION AND CURETTAGE OF UTERUS (N/A)    Sugar Diaz is a 57 y.o. female     Patient Active Problem List   Diagnosis    Chronic bilateral low back pain without sciatica    Decreased ROM of lumbar spine       Review of patient's allergies indicates:  No Known Allergies    No current facility-administered medications on file prior to encounter.     Current Outpatient Medications on File Prior to Encounter   Medication Sig Dispense Refill    azelastine (ASTELIN) 137 mcg (0.1 %) nasal spray 1 spray (137 mcg total) by Nasal route 2 (two) times daily. 30 mL 0    loratadine (CLARITIN) 10 mg tablet Take 1 tablet (10 mg total) by mouth once daily. 30 tablet 11    valACYclovir (VALTREX) 1000 MG tablet Two pills at onset of fever blister and then repeat 2 pills 12 hr later 30 tablet 11       Past Surgical History:   Procedure Laterality Date    CYST REMOVAL Left 4/28/2022    Procedure: EXCISION, CYST-BACK;  Surgeon: Thomas Hurst MD;  Location: [facility] OR;  Service: General;  Laterality: Left;  left upper back  RN PREOP ON 4/21/22-NF    TONSILLECTOMY      TUBAL LIGATION         Social History     Socioeconomic History    Marital status:    Tobacco Use    Smoking status: Never    Smokeless tobacco: Never   Substance and Sexual Activity    Alcohol use: Not Currently    Drug use: Never    Sexual activity: Yes     Partners: Male     Birth control/protection: Post-menopausal     LABS  CBC  Lab Results   Component Value Date    WBC 8.59 05/18/2023    HGB 13.8 05/18/2023    HCT 40.7 05/18/2023    MCV 88 05/18/2023     05/18/2023       BMP  Lab Results   Component Value Date     05/18/2023    K 3.4 (L) 05/18/2023     05/18/2023    CO2 24 05/18/2023    BUN 11 05/18/2023    CREATININE 0.8 05/18/2023    CALCIUM 9.3 05/18/2023    ANIONGAP 12 05/18/2023    EGFRNORACEVR >60 05/18/2023         Pre-op  Assessment    I have reviewed the Patient Summary Reports.     I have reviewed the Nursing Notes. I have reviewed the NPO Status.   I have reviewed the Medications.     Review of Systems  Anesthesia Hx:  No problems with previous Anesthesia  Denies Family Hx of Anesthesia complications.   Denies Personal Hx of Anesthesia complications.   Social:  Non-Smoker, No Alcohol Use    Hematology/Oncology:  Hematology Normal   Oncology Normal     EENT/Dental:EENT/Dental Normal   Cardiovascular:   Exercise tolerance: good Denies Hypertension.     Pulmonary:  Pulmonary Normal    Renal/:  Renal/ Normal     Hepatic/GI:   Denies GERD.    Musculoskeletal:  Musculoskeletal Normal    Neurological:   Denies CVA. Headaches Denies Seizures.    Endocrine:  Endocrine Normal    Dermatological:  Skin Normal    Psych:  Psychiatric Normal           Physical Exam  General: Well nourished, Cooperative and Alert    Airway:  Mouth Opening: Normal  TM Distance: Normal  Tongue: Normal  Neck ROM: Normal ROM    Dental:  Intact    Chest/Lungs:  Normal Respiratory Rate    Heart:  Rate: Normal  Rhythm: Regular Rhythm  Sounds: Normal        Anesthesia Plan  Type of Anesthesia, risks & benefits discussed:    Anesthesia Type: Gen Supraglottic Airway  Intra-op Monitoring Plan: Standard ASA Monitors  Induction:  IV  Informed Consent: Informed consent signed with the Patient and all parties understand the risks and agree with anesthesia plan.  All questions answered. Patient consented to blood products? Yes  ASA Score: 2    Ready For Surgery From Anesthesia Perspective.     .

## 2023-05-22 ENCOUNTER — LAB VISIT (OUTPATIENT)
Dept: LAB | Facility: HOSPITAL | Age: 58
End: 2023-05-22
Attending: OBSTETRICS & GYNECOLOGY
Payer: COMMERCIAL

## 2023-05-22 DIAGNOSIS — Z01.818 PREOP TESTING: ICD-10-CM

## 2023-05-22 LAB
ABO + RH BLD: NORMAL
B-HCG UR QL: NEGATIVE
BLD GP AB SCN CELLS X3 SERPL QL: NORMAL
SPECIMEN OUTDATE: NORMAL

## 2023-05-22 PROCEDURE — 86900 BLOOD TYPING SEROLOGIC ABO: CPT | Performed by: OBSTETRICS & GYNECOLOGY

## 2023-05-22 PROCEDURE — 36415 COLL VENOUS BLD VENIPUNCTURE: CPT | Performed by: OBSTETRICS & GYNECOLOGY

## 2023-05-22 PROCEDURE — 81025 URINE PREGNANCY TEST: CPT | Performed by: OBSTETRICS & GYNECOLOGY

## 2023-05-23 ENCOUNTER — ANESTHESIA (OUTPATIENT)
Dept: SURGERY | Facility: HOSPITAL | Age: 58
End: 2023-05-23
Payer: COMMERCIAL

## 2023-05-23 ENCOUNTER — HOSPITAL ENCOUNTER (OUTPATIENT)
Facility: HOSPITAL | Age: 58
Discharge: HOME | End: 2023-05-23
Attending: OBSTETRICS & GYNECOLOGY | Admitting: OBSTETRICS & GYNECOLOGY
Payer: COMMERCIAL

## 2023-05-23 VITALS
RESPIRATION RATE: 16 BRPM | TEMPERATURE: 98 F | OXYGEN SATURATION: 98 % | HEART RATE: 72 BPM | DIASTOLIC BLOOD PRESSURE: 68 MMHG | SYSTOLIC BLOOD PRESSURE: 147 MMHG

## 2023-05-23 DIAGNOSIS — N88.2 CERVICAL STENOSIS (UTERINE CERVIX): ICD-10-CM

## 2023-05-23 DIAGNOSIS — N95.0 PMB (POSTMENOPAUSAL BLEEDING): ICD-10-CM

## 2023-05-23 DIAGNOSIS — Z98.890 STATUS POST HYSTEROSCOPY: Primary | ICD-10-CM

## 2023-05-23 LAB — POCT GLUCOSE: 110 MG/DL (ref 70–110)

## 2023-05-23 PROCEDURE — 63600175 PHARM REV CODE 636 W HCPCS: Performed by: NURSE ANESTHETIST, CERTIFIED REGISTERED

## 2023-05-23 PROCEDURE — 36000706: Performed by: OBSTETRICS & GYNECOLOGY

## 2023-05-23 PROCEDURE — 25000003 PHARM REV CODE 250: Performed by: NURSE ANESTHETIST, CERTIFIED REGISTERED

## 2023-05-23 PROCEDURE — 88381 MICRODISSECTION MANUAL: CPT | Performed by: PATHOLOGY

## 2023-05-23 PROCEDURE — 88342 CHG IMMUNOCYTOCHEMISTRY: ICD-10-PCS | Mod: 26,,, | Performed by: PATHOLOGY

## 2023-05-23 PROCEDURE — 27201423 OPTIME MED/SURG SUP & DEVICES STERILE SUPPLY: Performed by: OBSTETRICS & GYNECOLOGY

## 2023-05-23 PROCEDURE — 37000009 HC ANESTHESIA EA ADD 15 MINS: Performed by: OBSTETRICS & GYNECOLOGY

## 2023-05-23 PROCEDURE — 88305 TISSUE EXAM BY PATHOLOGIST: CPT | Performed by: PATHOLOGY

## 2023-05-23 PROCEDURE — 81288 MLH1 GENE: CPT | Performed by: PATHOLOGY

## 2023-05-23 PROCEDURE — 36000707: Performed by: OBSTETRICS & GYNECOLOGY

## 2023-05-23 PROCEDURE — 88341 IMHCHEM/IMCYTCHM EA ADD ANTB: CPT | Performed by: PATHOLOGY

## 2023-05-23 PROCEDURE — 37000008 HC ANESTHESIA 1ST 15 MINUTES: Performed by: OBSTETRICS & GYNECOLOGY

## 2023-05-23 PROCEDURE — 58558 HYSTEROSCOPY BIOPSY: CPT | Mod: ,,, | Performed by: OBSTETRICS & GYNECOLOGY

## 2023-05-23 PROCEDURE — 71000015 HC POSTOP RECOV 1ST HR: Performed by: OBSTETRICS & GYNECOLOGY

## 2023-05-23 PROCEDURE — 25000003 PHARM REV CODE 250: Performed by: ANESTHESIOLOGY

## 2023-05-23 PROCEDURE — 88342 IMHCHEM/IMCYTCHM 1ST ANTB: CPT | Performed by: PATHOLOGY

## 2023-05-23 PROCEDURE — 88342 IMHCHEM/IMCYTCHM 1ST ANTB: CPT | Mod: 26,,, | Performed by: PATHOLOGY

## 2023-05-23 PROCEDURE — 88305 TISSUE EXAM BY PATHOLOGIST: ICD-10-PCS | Mod: 26,,, | Performed by: PATHOLOGY

## 2023-05-23 PROCEDURE — D9220A PRA ANESTHESIA: Mod: CRNA,,, | Performed by: NURSE ANESTHETIST, CERTIFIED REGISTERED

## 2023-05-23 PROCEDURE — 71000016 HC POSTOP RECOV ADDL HR: Performed by: OBSTETRICS & GYNECOLOGY

## 2023-05-23 PROCEDURE — 71000033 HC RECOVERY, INTIAL HOUR: Performed by: OBSTETRICS & GYNECOLOGY

## 2023-05-23 PROCEDURE — 88341 IMHCHEM/IMCYTCHM EA ADD ANTB: CPT | Mod: 26,,, | Performed by: PATHOLOGY

## 2023-05-23 PROCEDURE — D9220A PRA ANESTHESIA: ICD-10-PCS | Mod: ANES,,, | Performed by: ANESTHESIOLOGY

## 2023-05-23 PROCEDURE — D9220A PRA ANESTHESIA: ICD-10-PCS | Mod: CRNA,,, | Performed by: NURSE ANESTHETIST, CERTIFIED REGISTERED

## 2023-05-23 PROCEDURE — 25000003 PHARM REV CODE 250: Performed by: OBSTETRICS & GYNECOLOGY

## 2023-05-23 PROCEDURE — 88341 PR IHC OR ICC EACH ADD'L SINGLE ANTIBODY  STAINPR: ICD-10-PCS | Mod: 26,,, | Performed by: PATHOLOGY

## 2023-05-23 PROCEDURE — D9220A PRA ANESTHESIA: Mod: ANES,,, | Performed by: ANESTHESIOLOGY

## 2023-05-23 PROCEDURE — 63600175 PHARM REV CODE 636 W HCPCS: Performed by: ANESTHESIOLOGY

## 2023-05-23 PROCEDURE — 88305 TISSUE EXAM BY PATHOLOGIST: CPT | Mod: 26,,, | Performed by: PATHOLOGY

## 2023-05-23 PROCEDURE — 58558 PR HYSTEROSCOPY,W/ENDO BX: ICD-10-PCS | Mod: ,,, | Performed by: OBSTETRICS & GYNECOLOGY

## 2023-05-23 RX ORDER — KETOROLAC TROMETHAMINE 30 MG/ML
30 INJECTION, SOLUTION INTRAMUSCULAR; INTRAVENOUS ONCE
Status: DISCONTINUED | OUTPATIENT
Start: 2023-05-23 | End: 2023-05-23 | Stop reason: HOSPADM

## 2023-05-23 RX ORDER — PROCHLORPERAZINE EDISYLATE 5 MG/ML
5 INJECTION INTRAMUSCULAR; INTRAVENOUS EVERY 6 HOURS PRN
Status: DISCONTINUED | OUTPATIENT
Start: 2023-05-23 | End: 2023-05-23 | Stop reason: HOSPADM

## 2023-05-23 RX ORDER — SODIUM CHLORIDE, SODIUM LACTATE, POTASSIUM CHLORIDE, CALCIUM CHLORIDE 600; 310; 30; 20 MG/100ML; MG/100ML; MG/100ML; MG/100ML
INJECTION, SOLUTION INTRAVENOUS CONTINUOUS
Status: DISCONTINUED | OUTPATIENT
Start: 2023-05-23 | End: 2023-05-23 | Stop reason: HOSPADM

## 2023-05-23 RX ORDER — MORPHINE SULFATE 4 MG/ML
3 INJECTION, SOLUTION INTRAMUSCULAR; INTRAVENOUS
Status: DISCONTINUED | OUTPATIENT
Start: 2023-05-23 | End: 2023-05-23 | Stop reason: HOSPADM

## 2023-05-23 RX ORDER — LIDOCAINE HYDROCHLORIDE 20 MG/ML
INJECTION INTRAVENOUS
Status: DISCONTINUED | OUTPATIENT
Start: 2023-05-23 | End: 2023-05-23

## 2023-05-23 RX ORDER — SODIUM CHLORIDE 0.9 % (FLUSH) 0.9 %
10 SYRINGE (ML) INJECTION
Status: DISCONTINUED | OUTPATIENT
Start: 2023-05-23 | End: 2023-05-23 | Stop reason: HOSPADM

## 2023-05-23 RX ORDER — ACETAMINOPHEN 500 MG
1000 TABLET ORAL EVERY 6 HOURS PRN
Qty: 100 TABLET | Refills: 2 | Status: ON HOLD | OUTPATIENT
Start: 2023-05-23 | End: 2023-11-09 | Stop reason: HOSPADM

## 2023-05-23 RX ORDER — DIPHENHYDRAMINE HCL 25 MG
25 CAPSULE ORAL EVERY 4 HOURS PRN
Status: DISCONTINUED | OUTPATIENT
Start: 2023-05-23 | End: 2023-05-23 | Stop reason: HOSPADM

## 2023-05-23 RX ORDER — HYDROCODONE BITARTRATE AND ACETAMINOPHEN 5; 325 MG/1; MG/1
1 TABLET ORAL EVERY 4 HOURS PRN
Status: DISCONTINUED | OUTPATIENT
Start: 2023-05-23 | End: 2023-05-23 | Stop reason: HOSPADM

## 2023-05-23 RX ORDER — MIDAZOLAM HYDROCHLORIDE 1 MG/ML
INJECTION, SOLUTION INTRAMUSCULAR; INTRAVENOUS
Status: DISCONTINUED | OUTPATIENT
Start: 2023-05-23 | End: 2023-05-23

## 2023-05-23 RX ORDER — PHENYLEPHRINE HYDROCHLORIDE 10 MG/ML
INJECTION INTRAVENOUS
Status: DISCONTINUED | OUTPATIENT
Start: 2023-05-23 | End: 2023-05-23

## 2023-05-23 RX ORDER — DEXAMETHASONE SODIUM PHOSPHATE 4 MG/ML
INJECTION, SOLUTION INTRA-ARTICULAR; INTRALESIONAL; INTRAMUSCULAR; INTRAVENOUS; SOFT TISSUE
Status: DISCONTINUED | OUTPATIENT
Start: 2023-05-23 | End: 2023-05-23

## 2023-05-23 RX ORDER — ONDANSETRON 4 MG/1
8 TABLET, ORALLY DISINTEGRATING ORAL EVERY 8 HOURS PRN
Status: DISCONTINUED | OUTPATIENT
Start: 2023-05-23 | End: 2023-05-23 | Stop reason: HOSPADM

## 2023-05-23 RX ORDER — HYDROMORPHONE HYDROCHLORIDE 2 MG/ML
0.2 INJECTION, SOLUTION INTRAMUSCULAR; INTRAVENOUS; SUBCUTANEOUS EVERY 5 MIN PRN
Status: DISCONTINUED | OUTPATIENT
Start: 2023-05-23 | End: 2023-05-23 | Stop reason: HOSPADM

## 2023-05-23 RX ORDER — IBUPROFEN 600 MG/1
600 TABLET ORAL EVERY 6 HOURS PRN
Status: DISCONTINUED | OUTPATIENT
Start: 2023-05-23 | End: 2023-05-23 | Stop reason: HOSPADM

## 2023-05-23 RX ORDER — FENTANYL CITRATE 50 UG/ML
INJECTION, SOLUTION INTRAMUSCULAR; INTRAVENOUS
Status: DISCONTINUED | OUTPATIENT
Start: 2023-05-23 | End: 2023-05-23

## 2023-05-23 RX ORDER — PROPOFOL 10 MG/ML
VIAL (ML) INTRAVENOUS
Status: DISCONTINUED | OUTPATIENT
Start: 2023-05-23 | End: 2023-05-23

## 2023-05-23 RX ORDER — MUPIROCIN 20 MG/G
OINTMENT TOPICAL
Status: DISCONTINUED | OUTPATIENT
Start: 2023-05-23 | End: 2023-05-23 | Stop reason: HOSPADM

## 2023-05-23 RX ORDER — HYDROMORPHONE HYDROCHLORIDE 2 MG/ML
1 INJECTION, SOLUTION INTRAMUSCULAR; INTRAVENOUS; SUBCUTANEOUS EVERY 6 HOURS PRN
Status: DISCONTINUED | OUTPATIENT
Start: 2023-05-23 | End: 2023-05-23 | Stop reason: HOSPADM

## 2023-05-23 RX ORDER — ACETAMINOPHEN 500 MG
1000 TABLET ORAL
Status: COMPLETED | OUTPATIENT
Start: 2023-05-23 | End: 2023-05-23

## 2023-05-23 RX ORDER — DIPHENHYDRAMINE HYDROCHLORIDE 50 MG/ML
25 INJECTION INTRAMUSCULAR; INTRAVENOUS EVERY 4 HOURS PRN
Status: DISCONTINUED | OUTPATIENT
Start: 2023-05-23 | End: 2023-05-23 | Stop reason: HOSPADM

## 2023-05-23 RX ORDER — FAMOTIDINE 20 MG/1
20 TABLET, FILM COATED ORAL
Status: COMPLETED | OUTPATIENT
Start: 2023-05-23 | End: 2023-05-23

## 2023-05-23 RX ORDER — ONDANSETRON 2 MG/ML
INJECTION INTRAMUSCULAR; INTRAVENOUS
Status: DISCONTINUED | OUTPATIENT
Start: 2023-05-23 | End: 2023-05-23

## 2023-05-23 RX ORDER — IBUPROFEN 800 MG/1
800 TABLET ORAL EVERY 8 HOURS PRN
Qty: 60 TABLET | Refills: 2 | Status: ON HOLD | OUTPATIENT
Start: 2023-05-23 | End: 2023-11-09 | Stop reason: HOSPADM

## 2023-05-23 RX ADMIN — ACETAMINOPHEN 1000 MG: 500 TABLET ORAL at 09:05

## 2023-05-23 RX ADMIN — FENTANYL CITRATE 50 MCG: 0.05 INJECTION, SOLUTION INTRAMUSCULAR; INTRAVENOUS at 01:05

## 2023-05-23 RX ADMIN — FAMOTIDINE 20 MG: 20 TABLET ORAL at 09:05

## 2023-05-23 RX ADMIN — DEXAMETHASONE SODIUM PHOSPHATE 4 MG: 4 INJECTION, SOLUTION INTRAMUSCULAR; INTRAVENOUS at 01:05

## 2023-05-23 RX ADMIN — MIDAZOLAM HYDROCHLORIDE 2 MG: 1 INJECTION, SOLUTION INTRAMUSCULAR; INTRAVENOUS at 12:05

## 2023-05-23 RX ADMIN — PROPOFOL 200 MG: 10 INJECTION, EMULSION INTRAVENOUS at 12:05

## 2023-05-23 RX ADMIN — ONDANSETRON 4 MG: 2 INJECTION, SOLUTION INTRAMUSCULAR; INTRAVENOUS at 01:05

## 2023-05-23 RX ADMIN — PHENYLEPHRINE HYDROCHLORIDE 100 MCG: 10 INJECTION INTRAVENOUS at 01:05

## 2023-05-23 RX ADMIN — SODIUM CHLORIDE, SODIUM LACTATE, POTASSIUM CHLORIDE, AND CALCIUM CHLORIDE: .6; .31; .03; .02 INJECTION, SOLUTION INTRAVENOUS at 12:05

## 2023-05-23 RX ADMIN — LIDOCAINE HYDROCHLORIDE 100 MG: 20 INJECTION, SOLUTION INTRAVENOUS at 12:05

## 2023-05-23 RX ADMIN — GLYCOPYRROLATE 0.2 MG: 0.2 INJECTION, SOLUTION INTRAMUSCULAR; INTRAVITREAL at 12:05

## 2023-05-23 RX ADMIN — FENTANYL CITRATE 50 MCG: 0.05 INJECTION, SOLUTION INTRAMUSCULAR; INTRAVENOUS at 12:05

## 2023-05-23 NOTE — ANESTHESIA POSTPROCEDURE EVALUATION
Anesthesia Post Evaluation    Patient: Sugar Diaz    Procedure(s) Performed: Procedure(s) (LRB):  HYSTEROSCOPY, WITH DILATION AND CURETTAGE OF UTERUS (N/A)    Final Anesthesia Type: general      Patient location during evaluation: PACU  Patient participation: Yes- Able to Participate  Level of consciousness: awake and alert and oriented  Post-procedure vital signs: reviewed and stable  Pain management: adequate  Airway patency: patent    PONV status at discharge: No PONV  Anesthetic complications: no      Cardiovascular status: blood pressure returned to baseline, hemodynamically stable and stable  Respiratory status: unassisted, spontaneous ventilation and room air  Hydration status: euvolemic  Follow-up not needed.          Vitals Value Taken Time   /70 05/23/23 1423   Temp 36.7 °C (98 °F) 05/23/23 1423   Pulse 67 05/23/23 1423   Resp 16 05/23/23 1423   SpO2 98 % 05/23/23 1423         Event Time   Out of Recovery 14:12:13         Pain/Cruz Score: Pain Rating Prior to Med Admin: 0 (5/23/2023  9:51 AM)  Cruz Score: 10 (5/23/2023  2:23 PM)

## 2023-05-23 NOTE — BRIEF OP NOTE
[facility] [place] - Surgery  OBGYN  Brief Operative Note    SUMMARY     Date of Procedure: 5/23/2023     Procedure: Procedure(s) (LRB):  HYSTEROSCOPY, WITH DILATION AND CURETTAGE OF UTERUS (N/A)       Surgeon(s) and Role:     * Omaira Evans MD - Primary    Pre-Operative Diagnosis: Cervical stenosis (uterine cervix) [N88.2]    Post-Operative Diagnosis: Post-Op Diagnosis Codes:     * Cervical stenosis (uterine cervix) [N88.2]    Anesthesia: Choice    Description of the Findings of the Procedure:   - fungating tissues in the posterior aspect of the uterus  - uterus sounded to 10 cm.      Complications: No    Estimated Blood Loss (EBL): minimal    Implants: * No implants in log *    Specimens:   Specimen (24h ago, onward)      None                    Condition: Good    Disposition: PACU - hemodynamically stable.    Attestation: I was present and scrubbed for the entire procedure.

## 2023-05-23 NOTE — TRANSFER OF CARE
Anesthesia Transfer of Care Note    Patient: Sugar Diaz    Procedure(s) Performed: Procedure(s) (LRB):  HYSTEROSCOPY, WITH DILATION AND CURETTAGE OF UTERUS (N/A)    Patient location: PACU    Anesthesia Type: general    Transport from OR: Transported from OR on room air with adequate spontaneous ventilation    Post pain: adequate analgesia    Post assessment: no apparent anesthetic complications and tolerated procedure well    Post vital signs: stable    Level of consciousness: awake    Nausea/Vomiting: no nausea/vomiting    Complications: none    Transfer of care protocol was followed      Last vitals:   Visit Vitals  BP (!) 164/90 (BP Location: Left arm, Patient Position: Lying)   Pulse 83   Temp 37 °C (98.6 °F) (Oral)   Resp 14   LMP 11/15/2012   SpO2 97%   Breastfeeding No

## 2023-05-23 NOTE — ANESTHESIA PROCEDURE NOTES
Intubation    Date/Time: 5/23/2023 12:56 PM  Performed by: Carey Bess CRNA  Authorized by: Cynthia Cochran MD     Intubation:     Induction:  Intravenous    Intubated:  Postinduction    Mask Ventilation:  N/a    Attempts:  1    Attempted By:  Student    Difficult Airway Encountered?: No      Complications:  None    Airway Device:  Supraglottic airway/LMA    Airway Device Size:  4.0    Style/Cuff Inflation:  Cuffed (inflated to minimal occlusive pressure)    Secured at:  The lips    Placement Verified By:  Capnometry    Complicating Factors:  Obesity    Findings Post-Intubation:  BS equal bilateral and atraumatic/condition of teeth unchanged

## 2023-05-23 NOTE — DISCHARGE SUMMARY
[facility]  Obstetrics & Gynecology  Discharge Summary    Patient Name: Sugar Diaz  MRN: [id number]  Admission Date: 5/23/2023  Hospital Length of Stay: 0 days  Discharge Date and Time:  05/23/2023 1:43 PM  Attending Physician: Omaira Evans MD   Discharging Provider: Omaira Evans MD  Primary Care Provider: James Hillman MD    HPI: Patient admitted for scheduled hysteroscopy D&C.    Hospital Course: Patient was admitted and a hysteroscopy D&C was performed on 5/23/2023. Please see operative report for complete  details. Following surgery, patient was transferred to the floor for routine post operative care. She had  an uncomplicated post operative course with no acute events. Her vital signs remained stable and  afebrile throughout her hospital stay. Her urine output was adequate and her physical exam was  appropriate. She was meeting all post operative milestones upon discharge.  She was ambulating and voiding without difficulty. She was tolerating a regular diet, and her vital signs  were stable and afebrile.     Procedure(s) (LRB):  HYSTEROSCOPY, WITH DILATION AND CURETTAGE OF UTERUS (N/A)     Consults:     Significant Diagnostic Studies: Labs: CBC No results for input(s): WBC, HGB, HCT, PLT in the last 48 hours.    Pending Diagnostic Studies:       Procedure Component Value Units Date/Time    Specimen to Pathology, Surgery Gynecology and Obstetrics [[id number]] Collected: 05/23/23 1342    Order Status: Sent Lab Status: In process Updated: 05/23/23 1342    Specimen: Tissue           Final Active Diagnoses:    Diagnosis Date Noted POA    PRINCIPAL PROBLEM:  Status post hysteroscopy [Z98.890] 05/23/2023 Not Applicable    Cervical stenosis (uterine cervix) [N88.2] 05/23/2023 Unknown    PMB (postmenopausal bleeding) [N95.0] 05/23/2023 Unknown      Problems Resolved During this Admission:        Discharged Condition: good    Disposition:     Follow Up:   Follow-up Information       Omaira Evans  MD. Go in 2 week(s).    Specialty: Obstetrics and Gynecology  Why: For wound re-check  Contact information:  [address]  [address]  [facility] [phone]                           Patient Instructions:      Diet general     Pelvic Rest     No dressing needed     Call MD for:  temperature >100.4     Call MD for:  persistent nausea and vomiting     Call MD for:  severe uncontrolled pain     Call MD for:  difficulty breathing, headache or visual disturbances     Call MD for:  redness, tenderness, or signs of infection (pain, swelling, redness, odor or green/yellow discharge around incision site)     Call MD for:  hives     Call MD for:  persistent dizziness or light-headedness     Call MD for:  extreme fatigue     Medications:  Reconciled Home Medications:      Medication List        START taking these medications      acetaminophen 500 MG tablet  Commonly known as: TYLENOL EXTRA STRENGTH  Take 2 tablets (1,000 mg total) by mouth every 6 (six) hours as needed for Pain.     ibuprofen 800 MG tablet  Commonly known as: ADVIL,MOTRIN  Take 1 tablet (800 mg total) by mouth every 8 (eight) hours as needed for Pain.            CONTINUE taking these medications      azelastine 137 mcg (0.1 %) nasal spray  Commonly known as: ASTELIN  1 spray (137 mcg total) by Nasal route 2 (two) times daily.     loratadine 10 mg tablet  Commonly known as: CLARITIN  Take 1 tablet (10 mg total) by mouth once daily.     valACYclovir 1000 MG tablet  Commonly known as: VALTREX  Two pills at onset of fever blister and then repeat 2 pills 12 hr later              Omaira Evans MD  Obstetrics & Gynecology  [facility]

## 2023-05-23 NOTE — DISCHARGE INSTRUCTIONS
BATHING:                   You may shower after your dressing is removed, but no tub baths, hot tubs, saunas or swimming until you see the doctor.        ACTIVITY LEVEL: If you have received sedation or an anesthetic, you may feel sleepy for   several hours. Rest until you are more awake. Gradually resume your normal activities  No heavy lifting or straining, nothing over 10 lbs., like a gallon of milk.  Pelvic rest- no sex, tampons or douching until follow up or instructed by doctor.  DIET:  You may resume your home diet. If nausea is present, increase your diet gradually with fluids and bland foods.    Medications:  Pain medication should be taken only if needed and as directed. If antibiotics are prescribed, the medication should be taken until completed. You will be given an updated list of you medications.  No driving, alcoholic beverages or signing legal documents for next 24 hours or while taking pain medication    CALL THE DOCTOR:   For any obvious bleeding (some dried blood over the incision is normal).     Redness, swelling, foul smell around incision or fever over 101.  Shortness of breath, Coughing up Bloody Sputum or Pains or Swelling in your calves.  Persistent pain or nausea not relieved by medication.  If vaginal bleeding is in excess of a normal period.  Problems urinating    If any unusual problems or difficulties occur contact your doctor. If you cannot contact your doctor but feel your signs and symptoms warrant a physicians attention return to the emergency room.    Fall Prevention  Millions of people fall every year and injure themselves. You may have had anesthesia or sedation which may increase your risk of falling. You may have health issues that put you at an increased risk of falling.     Here are ways to reduce your risk of falling.    Make your home safe by keeping walkways clear of objects you may trip over.  Use non-slip pads under rugs. Do not use area rugs or small throw  rugs.  Use non-slip mats in bathtubs and showers.  Install handrails and lights on staircases.  Do not walk in poorly lit areas.  Do not stand on chairs or wobbly ladders.  Use caution when reaching overhead or looking upward. This position can cause a loss of balance.  Be sure your shoes fit properly, have non-slip bottoms and are in good condition.   Wear shoes both inside and out. Avoid going barefoot or wearing slippers.  Be cautious when going up and down stairs, curbs, and when walking on uneven sidewalks.  If your balance is poor, consider using a cane or walker.  If your fall was related to alcohol use, stop or limit alcohol intake.   If your fall was related to use of sleeping medicines, talk to your doctor about this. You may need to reduce your dosage at bedtime if you awaken during the night to go to the bathroom.    To reduce the need for nighttime bathroom trips:  Avoid drinking fluids for several hours before going to bed  Empty your bladder before going to bed  Men can keep a urinal at the bedside  Stay as active as you can. Balance, flexibility, strength, and endurance all come from exercise. They all play a role in preventing falls. Ask your healthcare provider which types of activity are right for you.  Get your vision checked on a regular basis.  If you have pets, know where they are before you stand up or walk so you don't trip over them.  Use night lights.

## 2023-05-23 NOTE — OP NOTE
[facility] - [place] - Surgery  OBGYN  Operative Note    SUMMARY     Date of Procedure: 5/23/2023     Procedure: Procedure(s) (LRB):  HYSTEROSCOPY, WITH DILATION AND CURETTAGE OF UTERUS (N/A)       Surgeon(s) and Role:     * Omaira Evans MD - Primary    Pre-Operative Diagnosis: Cervical stenosis (uterine cervix) [N88.2]    Post-Operative Diagnosis: Post-Op Diagnosis Codes:     * Cervical stenosis (uterine cervix) [N88.2]    Anesthesia: Choice    Technical Procedures Used:   Procedure in Detail:  The patient was taken to the operating room where anesthesia was obtained without difficulty.  The patient was placed in the dorsal lithotomy position and prepped and draped in a normal sterile fashion.  A time-out was performed.    A speculum was inserted into the vagina.  The cervix was visualized and grasped with a tenaculum for retraction.  The cervix was then gently serially dilated using Chon dilators.      A diagnostic hysteroscope was inserted through the cervical os and the endometrial cavity was visualized.  A fungating lesions was on the posterior aspect of the uterus.  The tubal ostia were visualized bilaterally.  The hysteroscope was removed, and sharp curettage of the endometrium was then performed.  The specimen was sent for pathologic diagnosis.    All instruments were removed from the vagina.  Hemostasis was noted.  Sponge, lap, and needle counts were correct x 2.  The patient was taken to the recovery room in stable condition.        Description of the Findings of the Procedure:   - fungating tissues in the posterior aspect of the uterus  - uterus sounded to 10 cm.      Complications: No    Estimated Blood Loss (EBL): minimal    Implants: * No implants in log *    Specimens:   Specimen (24h ago, onward)      None                    Condition: Good    Disposition: PACU - hemodynamically stable.    Attestation: I was present and scrubbed for the entire procedure.

## 2023-05-31 ENCOUNTER — PATIENT MESSAGE (OUTPATIENT)
Dept: OBSTETRICS AND GYNECOLOGY | Facility: CLINIC | Age: 58
End: 2023-05-31

## 2023-05-31 ENCOUNTER — OFFICE VISIT (OUTPATIENT)
Dept: OBSTETRICS AND GYNECOLOGY | Facility: CLINIC | Age: 58
End: 2023-05-31
Payer: COMMERCIAL

## 2023-05-31 VITALS — WEIGHT: 166 LBS | SYSTOLIC BLOOD PRESSURE: 110 MMHG | DIASTOLIC BLOOD PRESSURE: 70 MMHG | BODY MASS INDEX: 26 KG/M2

## 2023-05-31 DIAGNOSIS — Z98.890 STATUS POST HYSTEROSCOPY: ICD-10-CM

## 2023-05-31 DIAGNOSIS — C54.1 ENDOMETRIAL CANCER: ICD-10-CM

## 2023-05-31 DIAGNOSIS — N95.0 PMB (POSTMENOPAUSAL BLEEDING): Primary | ICD-10-CM

## 2023-05-31 PROCEDURE — 3008F BODY MASS INDEX DOCD: CPT | Mod: CPTII,S$GLB,, | Performed by: OBSTETRICS & GYNECOLOGY

## 2023-05-31 PROCEDURE — 1159F MED LIST DOCD IN RCRD: CPT | Mod: CPTII,S$GLB,, | Performed by: OBSTETRICS & GYNECOLOGY

## 2023-05-31 PROCEDURE — 3074F PR MOST RECENT SYSTOLIC BLOOD PRESSURE < 130 MM HG: ICD-10-PCS | Mod: CPTII,S$GLB,, | Performed by: OBSTETRICS & GYNECOLOGY

## 2023-05-31 PROCEDURE — 3074F SYST BP LT 130 MM HG: CPT | Mod: CPTII,S$GLB,, | Performed by: OBSTETRICS & GYNECOLOGY

## 2023-05-31 PROCEDURE — 3078F PR MOST RECENT DIASTOLIC BLOOD PRESSURE < 80 MM HG: ICD-10-PCS | Mod: CPTII,S$GLB,, | Performed by: OBSTETRICS & GYNECOLOGY

## 2023-05-31 PROCEDURE — 99999 PR PBB SHADOW E&M-EST. PATIENT-LVL III: CPT | Mod: PBBFAC,,, | Performed by: OBSTETRICS & GYNECOLOGY

## 2023-05-31 PROCEDURE — 1159F PR MEDICATION LIST DOCUMENTED IN MEDICAL RECORD: ICD-10-PCS | Mod: CPTII,S$GLB,, | Performed by: OBSTETRICS & GYNECOLOGY

## 2023-05-31 PROCEDURE — 3078F DIAST BP <80 MM HG: CPT | Mod: CPTII,S$GLB,, | Performed by: OBSTETRICS & GYNECOLOGY

## 2023-05-31 PROCEDURE — 99024 PR POST-OP FOLLOW-UP VISIT: ICD-10-PCS | Mod: S$GLB,,, | Performed by: OBSTETRICS & GYNECOLOGY

## 2023-05-31 PROCEDURE — 99024 POSTOP FOLLOW-UP VISIT: CPT | Mod: S$GLB,,, | Performed by: OBSTETRICS & GYNECOLOGY

## 2023-05-31 PROCEDURE — 99999 PR PBB SHADOW E&M-EST. PATIENT-LVL III: ICD-10-PCS | Mod: PBBFAC,,, | Performed by: OBSTETRICS & GYNECOLOGY

## 2023-05-31 PROCEDURE — 3008F PR BODY MASS INDEX (BMI) DOCUMENTED: ICD-10-PCS | Mod: CPTII,S$GLB,, | Performed by: OBSTETRICS & GYNECOLOGY

## 2023-05-31 NOTE — PROGRESS NOTES
Subjective:      Patient ID: Sugar Diaz is a 57 y.o. female.    Chief Complaint:  No chief complaint on file.      History of Present Illness  HPI  Postoperative Follow-up  Patient presents to the clinic 1 weeks status post hysteroscopy D&C for abnormal uterine bleeding. Eating a regular diet without difficulty. Bowel movements are normal. The patient is not having any pain.    GYN & OB History  Patient's last menstrual period was 11/15/2012.   Date of Last Pap: No result found    OB History    Para Term  AB Living   3 3 3     3   SAB IAB Ectopic Multiple Live Births           3      # Outcome Date GA Lbr Bryson/2nd Weight Sex Delivery Anes PTL Lv   3 Term      Vag-Spont      2 Term      Vag-Spont      1 Term      Vag-Spont          Review of Systems  Review of Systems   Constitutional: Negative.    HENT: Negative.     Eyes: Negative.    Respiratory: Negative.     Cardiovascular: Negative.    Gastrointestinal: Negative.    Endocrine: Negative.    Genitourinary: Negative.    Musculoskeletal: Negative.    Integumentary:  Negative.   Neurological: Negative.    Hematological: Negative.    Psychiatric/Behavioral: Negative.     Breast: negative.         Objective:     Physical Exam:   Constitutional: She is oriented to person, place, and time. She appears well-developed.    HENT:   Head: Normocephalic and atraumatic.    Eyes: EOM are normal.     Cardiovascular:  Normal rate.             Pulmonary/Chest: Effort normal.        Abdominal: Soft. There is no abdominal tenderness.             Musculoskeletal: Normal range of motion.       Neurological: She is oriented to person, place, and time.    Skin: Skin is warm.    Psychiatric: She has a normal mood and affect.         Adenocarcinoma  FIGO 1  Assessment:     1. PMB (postmenopausal bleeding)    2. Status post hysteroscopy    3. Endometrial cancer               Plan:     1. PMB (postmenopausal bleeding)  2. Status post hysteroscopy  - Pathology  reviewed in detail.  All questions answered.    3. Endometrial cancer  - Ambulatory referral/consult to Gynecologic Oncology; Future

## 2023-06-01 ENCOUNTER — TELEPHONE (OUTPATIENT)
Dept: GYNECOLOGIC ONCOLOGY | Facility: CLINIC | Age: 58
End: 2023-06-01
Payer: COMMERCIAL

## 2023-06-01 NOTE — TELEPHONE ENCOUNTER
Spoke with our patient about her insurance, schedule appointment she voiced understanding of the date, time and location. All questions answered Provider Scheduling Coord.  Gynecologic Oncology MA/PAR /Preceptor Ahmet Dawson   [id number] Detailed

## 2023-06-01 NOTE — TELEPHONE ENCOUNTER
----- Message from Ahmet Dawson MA sent at 6/1/2023  3:08 PM CDT -----  Left voicemail Dr. Rodríguez can see the patient on tomorrow Friday the 2nd referral -Dr. Evans.

## 2023-06-01 NOTE — TELEPHONE ENCOUNTER
Left voicemail Dr. Rodríguez can see the patient on tomorrow Friday the 2nd referral -Dr. Evans. ---- Message from Lavell Rodríguez MD sent at 5/31/2023  2:08 PM CDT -----  Thanks Omaira!    I am OK adding her on Ahmet.  Thank you.  ----- Message -----  From: Omaira Evans MD  Sent: 5/31/2023   1:40 PM CDT  To: Lavell Rodríguez MD, [facility] Staff    Hey,    I have a patient that the path showed endometrial adenocarcinoma Figo Grade 1.  They are waiting on a stain, so the final pathology report is not back yet.    I have talked to her and she is aware of the diagnosis.  I was hoping you could take care of her.  I just put in the GYN/ONC referral.      Thanks,  Omaira Evans

## 2023-06-02 ENCOUNTER — ANESTHESIA EVENT (OUTPATIENT)
Dept: SURGERY | Facility: OTHER | Age: 58
End: 2023-06-02
Payer: COMMERCIAL

## 2023-06-02 ENCOUNTER — OFFICE VISIT (OUTPATIENT)
Dept: GYNECOLOGIC ONCOLOGY | Facility: CLINIC | Age: 58
End: 2023-06-02
Payer: COMMERCIAL

## 2023-06-02 ENCOUNTER — HOSPITAL ENCOUNTER (OUTPATIENT)
Dept: PREADMISSION TESTING | Facility: OTHER | Age: 58
Discharge: HOME | End: 2023-06-02
Attending: OBSTETRICS & GYNECOLOGY
Payer: COMMERCIAL

## 2023-06-02 ENCOUNTER — TELEPHONE (OUTPATIENT)
Dept: GYNECOLOGIC ONCOLOGY | Facility: CLINIC | Age: 58
End: 2023-06-02

## 2023-06-02 ENCOUNTER — PATIENT MESSAGE (OUTPATIENT)
Dept: ADMINISTRATIVE | Facility: HOSPITAL | Age: 58
End: 2023-06-02
Payer: COMMERCIAL

## 2023-06-02 VITALS
WEIGHT: 165.5 LBS | BODY MASS INDEX: 25.98 KG/M2 | DIASTOLIC BLOOD PRESSURE: 61 MMHG | OXYGEN SATURATION: 97 % | SYSTOLIC BLOOD PRESSURE: 134 MMHG | HEART RATE: 81 BPM | HEIGHT: 67 IN

## 2023-06-02 VITALS
DIASTOLIC BLOOD PRESSURE: 65 MMHG | HEIGHT: 67 IN | HEART RATE: 88 BPM | WEIGHT: 165.5 LBS | SYSTOLIC BLOOD PRESSURE: 137 MMHG | BODY MASS INDEX: 25.98 KG/M2

## 2023-06-02 DIAGNOSIS — Z01.818 PREOP TESTING: Primary | ICD-10-CM

## 2023-06-02 DIAGNOSIS — E66.3 OVERWEIGHT (BMI 25.0-29.9): ICD-10-CM

## 2023-06-02 DIAGNOSIS — C54.1 MALIGNANT NEOPLASM OF ENDOMETRIUM: Primary | ICD-10-CM

## 2023-06-02 LAB
ABO + RH BLD: NORMAL
BLD GP AB SCN CELLS X3 SERPL QL: NORMAL
SPECIMEN OUTDATE: NORMAL

## 2023-06-02 PROCEDURE — 99205 PR OFFICE/OUTPT VISIT, NEW, LEVL V, 60-74 MIN: ICD-10-PCS | Mod: 57,S$GLB,, | Performed by: OBSTETRICS & GYNECOLOGY

## 2023-06-02 PROCEDURE — 99999 PR PBB SHADOW E&M-EST. PATIENT-LVL V: CPT | Mod: PBBFAC,,, | Performed by: OBSTETRICS & GYNECOLOGY

## 2023-06-02 PROCEDURE — 86900 BLOOD TYPING SEROLOGIC ABO: CPT | Performed by: ANESTHESIOLOGY

## 2023-06-02 PROCEDURE — 3008F BODY MASS INDEX DOCD: CPT | Mod: CPTII,S$GLB,, | Performed by: OBSTETRICS & GYNECOLOGY

## 2023-06-02 PROCEDURE — 1159F MED LIST DOCD IN RCRD: CPT | Mod: CPTII,S$GLB,, | Performed by: OBSTETRICS & GYNECOLOGY

## 2023-06-02 PROCEDURE — 1160F PR REVIEW ALL MEDS BY PRESCRIBER/CLIN PHARMACIST DOCUMENTED: ICD-10-PCS | Mod: CPTII,S$GLB,, | Performed by: OBSTETRICS & GYNECOLOGY

## 2023-06-02 PROCEDURE — 3078F DIAST BP <80 MM HG: CPT | Mod: CPTII,S$GLB,, | Performed by: OBSTETRICS & GYNECOLOGY

## 2023-06-02 PROCEDURE — 1160F RVW MEDS BY RX/DR IN RCRD: CPT | Mod: CPTII,S$GLB,, | Performed by: OBSTETRICS & GYNECOLOGY

## 2023-06-02 PROCEDURE — 99205 OFFICE O/P NEW HI 60 MIN: CPT | Mod: 57,S$GLB,, | Performed by: OBSTETRICS & GYNECOLOGY

## 2023-06-02 PROCEDURE — 3075F PR MOST RECENT SYSTOLIC BLOOD PRESS GE 130-139MM HG: ICD-10-PCS | Mod: CPTII,S$GLB,, | Performed by: OBSTETRICS & GYNECOLOGY

## 2023-06-02 PROCEDURE — 99999 PR PBB SHADOW E&M-EST. PATIENT-LVL V: ICD-10-PCS | Mod: PBBFAC,,, | Performed by: OBSTETRICS & GYNECOLOGY

## 2023-06-02 PROCEDURE — 3075F SYST BP GE 130 - 139MM HG: CPT | Mod: CPTII,S$GLB,, | Performed by: OBSTETRICS & GYNECOLOGY

## 2023-06-02 PROCEDURE — 3008F PR BODY MASS INDEX (BMI) DOCUMENTED: ICD-10-PCS | Mod: CPTII,S$GLB,, | Performed by: OBSTETRICS & GYNECOLOGY

## 2023-06-02 PROCEDURE — 1159F PR MEDICATION LIST DOCUMENTED IN MEDICAL RECORD: ICD-10-PCS | Mod: CPTII,S$GLB,, | Performed by: OBSTETRICS & GYNECOLOGY

## 2023-06-02 PROCEDURE — 36415 COLL VENOUS BLD VENIPUNCTURE: CPT | Performed by: ANESTHESIOLOGY

## 2023-06-02 PROCEDURE — 3078F PR MOST RECENT DIASTOLIC BLOOD PRESSURE < 80 MM HG: ICD-10-PCS | Mod: CPTII,S$GLB,, | Performed by: OBSTETRICS & GYNECOLOGY

## 2023-06-02 RX ORDER — LIDOCAINE HYDROCHLORIDE 10 MG/ML
1 INJECTION, SOLUTION EPIDURAL; INFILTRATION; INTRACAUDAL; PERINEURAL ONCE
Status: CANCELLED | OUTPATIENT
Start: 2023-06-02 | End: 2023-06-02

## 2023-06-02 RX ORDER — FAMOTIDINE 20 MG/1
20 TABLET, FILM COATED ORAL
Status: CANCELLED | OUTPATIENT
Start: 2023-06-02 | End: 2023-06-02

## 2023-06-02 RX ORDER — ACETAMINOPHEN 500 MG
1000 TABLET ORAL
Status: CANCELLED | OUTPATIENT
Start: 2023-06-02 | End: 2023-06-02

## 2023-06-02 RX ORDER — HEPARIN SODIUM 5000 [USP'U]/ML
5000 INJECTION, SOLUTION INTRAVENOUS; SUBCUTANEOUS ONCE
Status: CANCELLED | OUTPATIENT
Start: 2023-06-02

## 2023-06-02 RX ORDER — SODIUM CHLORIDE, SODIUM LACTATE, POTASSIUM CHLORIDE, CALCIUM CHLORIDE 600; 310; 30; 20 MG/100ML; MG/100ML; MG/100ML; MG/100ML
INJECTION, SOLUTION INTRAVENOUS CONTINUOUS
Status: CANCELLED | OUTPATIENT
Start: 2023-06-02

## 2023-06-02 RX ORDER — DIPHENHYDRAMINE HCL 25 MG
25 CAPSULE ORAL EVERY 6 HOURS PRN
COMMUNITY

## 2023-06-02 RX ORDER — LIDOCAINE HYDROCHLORIDE 10 MG/ML
0.5 INJECTION, SOLUTION EPIDURAL; INFILTRATION; INTRACAUDAL; PERINEURAL ONCE
Status: CANCELLED | OUTPATIENT
Start: 2023-06-02 | End: 2023-06-02

## 2023-06-02 NOTE — H&P (VIEW-ONLY)
REFERRING PROVIDER  Omaira Eavns MD     HISTORY OF PRESENT CONDITION  CC: type 1 endometrial cancer  Sugar Diaz is a 57 y.o.  who presents in consultation at for an opinion regarding grade 1 endometrioid endometrial cancer.    +PO. -N/V. +Flatus. +BM. +VB characterized as 4 pads/day. -VD, changes in stool or urine. -Abdominal or pelvic pain. -Unintentional weight loss.      REVIEW OF SYSTEMS  All systems reviewed and negative except as noted in HPI.    OBJECTIVE   Vitals:    23 0859   BP: 137/65   Pulse: 88      Body mass index is 25.92 kg/m².      1. General: Well appearing, no apparent distress, alert and oriented.  2. Lymph: Neck symmetric without cervical or supraclavicular adenopathy or mass.  3. Lungs: Normal respiratory rate, no accessory muscle use.  4. Cardiac: Normal rate  5. Psych: Normal affect.  6. Abdomen:  non-distended, soft, non-tender, are no masses, no ascites, no hepatosplenomegaly.  7. Skin: Warm, dry, no rashes or lesions.   8. Extremities: Bilateral lower extremities without edema or tenderness.  9. Genitourinary: Deferred               ECOG status: 0    LABORATORY DATA  Lab data reviewed.    RADIOLOGICAL DATA  Radiology data reviewed.    PATHOLOGY DATA  Pathology data reviewed.    ASSESSMENT / PLAN     1. Malignant neoplasm of endometrium    2. Overweight (BMI 25.0-29.9)       23: Hb 13.8, Plt 394, Cr 0.8  23: Hysteroscopy, D&C: grade 1 endometrioid EC  23: Pelvic US: 7 cm uterus  22: Pap: NIL    I discussed with the patient that the primary treatment for type 1 endometrial cancer is surgery. This includes a total hysterectomy, bilateral salpingo-oophorectomy, and sentinel lymph node biopsies. I will plan to perform this in a laparoscopic fashion using the robot. The procedure and its risks and benefits were discussed in detail.      PATIENT EDUCATION  Ready to learn, no apparent learning barriers were identified; learning preferences include  listening.  Explained diagnosis and treatment plan; patient expressed understanding of the content.    INFORMED CONSENT  Discussed the risks, benefits, and alternatives of the procedure and of possible blood transfusion.  Discussed the necessity of other members of the healthcare team participating in the procedure.  All questions answered and consent given.    Lavell Rodríguez

## 2023-06-02 NOTE — ANESTHESIA PREPROCEDURE EVALUATION
06/02/2023    Pre-operative evaluation for Procedure(s) (LRB):  HYSTEROSCOPY, WITH DILATION AND CURETTAGE OF UTERUS (N/A)    Sugar Diaz is a 57 y.o. female     Patient Active Problem List   Diagnosis    Chronic bilateral low back pain without sciatica    Decreased ROM of lumbar spine    Status post hysteroscopy    Cervical stenosis (uterine cervix)    PMB (postmenopausal bleeding)       Review of patient's allergies indicates:  No Known Allergies    Current Outpatient Medications on File Prior to Visit   Medication Sig Dispense Refill    acetaminophen (TYLENOL EXTRA STRENGTH) 500 MG tablet Take 2 tablets (1,000 mg total) by mouth every 6 (six) hours as needed for Pain. 100 tablet 2    azelastine (ASTELIN) 137 mcg (0.1 %) nasal spray 1 spray (137 mcg total) by Nasal route 2 (two) times daily. 30 mL 0    diphenhydrAMINE (BENADRYL) 25 mg capsule Take 25 mg by mouth every 6 (six) hours as needed for Itching.      ibuprofen (ADVIL,MOTRIN) 800 MG tablet Take 1 tablet (800 mg total) by mouth every 8 (eight) hours as needed for Pain. 60 tablet 2    loratadine (CLARITIN) 10 mg tablet Take 1 tablet (10 mg total) by mouth once daily. 30 tablet 11    valACYclovir (VALTREX) 1000 MG tablet Two pills at onset of fever blister and then repeat 2 pills 12 hr later 30 tablet 11     No current facility-administered medications on file prior to visit.       Past Surgical History:   Procedure Laterality Date    CYST REMOVAL Left 04/28/2022    Procedure: EXCISION, CYST-BACK;  Surgeon: Thomas Hurst MD;  Location: [facility] OR;  Service: General;  Laterality: Left;  left upper back  RN PREOP ON 4/21/22-NF    HYSTEROSCOPY WITH DILATION AND CURETTAGE OF UTERUS N/A 05/23/2023    Procedure: HYSTEROSCOPY, WITH DILATION AND CURETTAGE OF UTERUS;  Surgeon: Omaira Evans MD;  Location: [facility] OR;  Service: OB/GYN;  Laterality: N/A;  DESTINY HAMMAD MARY  [phone] TEXTED HAMMAD ON 5/2/2023 @ 3:51PM. HAMMAD RESPONDED ON  5/2/2023 @ 3:51PM-  RN PREOP 5/18/23  T & S; UPT ORDERED AND SCHEDULED 5/22/23    TONSILLECTOMY      TONSILLECTOMY      TUBAL LIGATION         Social History     Socioeconomic History    Marital status:    Tobacco Use    Smoking status: Never    Smokeless tobacco: Never   Substance and Sexual Activity    Alcohol use: Not Currently    Drug use: Never    Sexual activity: Yes     Partners: Male     Birth control/protection: Post-menopausal     Social Determinants of Health     Financial Resource Strain: Low Risk     Difficulty of Paying Living Expenses: Not hard at all   Food Insecurity: No Food Insecurity    Worried About Running Out of Food in the Last Year: Never true    Ran Out of Food in the Last Year: Never true   Transportation Needs: No Transportation Needs    Lack of Transportation (Medical): No    Lack of Transportation (Non-Medical): No   Physical Activity: Insufficiently Active    Days of Exercise per Week: 3 days    Minutes of Exercise per Session: 20 min   Stress: No Stress Concern Present    Feeling of Stress : Not at all   Social Connections: Unknown    Frequency of Communication with Friends and Family: More than three times a week    Frequency of Social Gatherings with Friends and Family: More than three times a week    Active Member of Clubs or Organizations: Patient refused    Attends Club or Organization Meetings: Patient refused    Marital Status:    Housing Stability: Low Risk     Unable to Pay for Housing in the Last Year: No    Number of Places Lived in the Last Year: 1    Unstable Housing in the Last Year: No     LABS  CBC  Lab Results   Component Value Date    WBC 8.59 05/18/2023    HGB 13.8 05/18/2023    HCT 40.7 05/18/2023    MCV 88 05/18/2023     05/18/2023       BMP  Lab Results   Component Value Date     05/18/2023    K 3.4 (L) 05/18/2023     05/18/2023    CO2 24 05/18/2023    BUN 11 05/18/2023    CREATININE 0.8 05/18/2023     CALCIUM 9.3 05/18/2023    ANIONGAP 12 05/18/2023    EGFRNORACEVR >60 05/18/2023         Pre-op Assessment    I have reviewed the Patient Summary Reports.     I have reviewed the Nursing Notes. I have reviewed the NPO Status.   I have reviewed the Medications.     Review of Systems  Anesthesia Hx:  No problems with previous Anesthesia  History of prior surgery of interest to airway management or planning: Previous anesthesia: General 5/23 D&C with general anesthesia.  Airway issues documented on chart review include laryngeal mask airway used  Denies Family Hx of Anesthesia complications.   Denies Personal Hx of Anesthesia complications.   Social:  Non-Smoker, No Alcohol Use    Hematology/Oncology:  Hematology Normal   Oncology Normal     EENT/Dental:   chronic allergic rhinitis   Cardiovascular:   Exercise tolerance: good Denies Hypertension.     Pulmonary:  Pulmonary Normal    Renal/:  Renal/ Normal     Hepatic/GI:  Hepatic/GI Normal  Denies GERD.    Musculoskeletal:  Musculoskeletal Normal    Neurological:  Neurology Normal    Endocrine:  Endocrine Normal    Dermatological:  Skin Normal    Psych:  Psychiatric Normal           Physical Exam  General: Well nourished, Cooperative, Alert and Oriented    Airway:  Mouth Opening: Normal  TM Distance: Normal  Tongue: Normal  Neck ROM: Normal ROM    Dental:  Intact        Anesthesia Plan  Type of Anesthesia, risks & benefits discussed:    Anesthesia Type: Gen ETT  Intra-op Monitoring Plan: Standard ASA Monitors  Post Op Pain Control Plan: multimodal analgesia  Induction:  IV  Airway Plan: Video  Informed Consent: Informed consent signed with the Patient and all parties understand the risks and agree with anesthesia plan.  All questions answered. Patient consented to blood products? Yes  ASA Score: 2  Anesthesia Plan Notes: Lab in epic WNL x K 3.4  T&S today    Ready For Surgery From Anesthesia Perspective.     .

## 2023-06-02 NOTE — PROGRESS NOTES
REFERRING PROVIDER  Omaira Evans MD     HISTORY OF PRESENT CONDITION  CC: type 1 endometrial cancer  Sugar Diaz is a 57 y.o.  who presents in consultation at for an opinion regarding grade 1 endometrioid endometrial cancer.    +PO. -N/V. +Flatus. +BM. +VB characterized as 4 pads/day. -VD, changes in stool or urine. -Abdominal or pelvic pain. -Unintentional weight loss.      REVIEW OF SYSTEMS  All systems reviewed and negative except as noted in HPI.    OBJECTIVE   Vitals:    23 0859   BP: 137/65   Pulse: 88      Body mass index is 25.92 kg/m².      1. General: Well appearing, no apparent distress, alert and oriented.  2. Lymph: Neck symmetric without cervical or supraclavicular adenopathy or mass.  3. Lungs: Normal respiratory rate, no accessory muscle use.  4. Cardiac: Normal rate  5. Psych: Normal affect.  6. Abdomen:  non-distended, soft, non-tender, are no masses, no ascites, no hepatosplenomegaly.  7. Skin: Warm, dry, no rashes or lesions.   8. Extremities: Bilateral lower extremities without edema or tenderness.  9. Genitourinary: Deferred               ECOG status: 0    LABORATORY DATA  Lab data reviewed.    RADIOLOGICAL DATA  Radiology data reviewed.    PATHOLOGY DATA  Pathology data reviewed.    ASSESSMENT / PLAN     1. Malignant neoplasm of endometrium    2. Overweight (BMI 25.0-29.9)       23: Hb 13.8, Plt 394, Cr 0.8  23: Hysteroscopy, D&C: grade 1 endometrioid EC  23: Pelvic US: 7 cm uterus  22: Pap: NIL    I discussed with the patient that the primary treatment for type 1 endometrial cancer is surgery. This includes a total hysterectomy, bilateral salpingo-oophorectomy, and sentinel lymph node biopsies. I will plan to perform this in a laparoscopic fashion using the robot. The procedure and its risks and benefits were discussed in detail.      PATIENT EDUCATION  Ready to learn, no apparent learning barriers were identified; learning preferences include  listening.  Explained diagnosis and treatment plan; patient expressed understanding of the content.    INFORMED CONSENT  Discussed the risks, benefits, and alternatives of the procedure and of possible blood transfusion.  Discussed the necessity of other members of the healthcare team participating in the procedure.  All questions answered and consent given.    Lavell Rodríguez

## 2023-06-02 NOTE — DISCHARGE INSTRUCTIONS
Information to Prepare you for your Surgery    PRE-ADMIT TESTING -  [phone]    [address] [facility]          Your surgery has been scheduled at [facility]. We are pleased to have the opportunity to serve you. For Further Information please call [phone].    On the day of surgery please report to the Information Desk on the 1st floor.    CONTACT YOUR PHYSICIAN'S OFFICE THE DAY PRIOR TO YOUR SURGERY TO OBTAIN YOUR ARRIVAL TIME.     The evening before surgery do not eat anything after 9 p.m. ( this includes hard candy, chewing gum and mints).  You may only have GATORADE, POWERADE AND WATER  from 9 p.m. until you leave your home.   DO NOT DRINK ANY LIQUIDS ON THE WAY TO THE HOSPITAL.      Why does your anesthesiologist allow you to drink Gatorade/Powerade before surgery?  Gatorade/Powerade helps to increase your comfort before surgery and to decrease your nausea after surgery. The carbohydrates in Gatorade/Powerade help reduce your body's stress response to surgery.  If you are a diabetic-drink only water prior to surgery.       Patients may have 2 visitors pre and post procedure. Only 2 visitors will be allowed in the Surgical building with the patient. No one under the age of 12 will be allowed into the facility.    SPECIAL MEDICATION INSTRUCTIONS: TAKE medications checked off by the Anesthesiologist on your Medication List.    Angiogram Patients: Take medications as instructed by your physician, including aspirin.     Surgery Patients:    If you take ASPIRIN - Your PHYSICIAN/SURGEON will need to inform you IF/OR when you need to stop taking aspirin prior to your surgery.     The week prior to surgery do not ot take any medications containing IBUPROFEN or NSAIDS ( Advil, Motrin, Goodys, BC, Aleve, Naproxen etc) If you are not sure if you should take a medicine please call your surgeon's office.  Ok to take Tylenol    Do Not Wear any make-up  (especially eye make-up) to surgery. Please remove any false eyelashes or eyelash extensions. If you arrive the day of surgery with makeup/eyelashes on you will be required to remove prior to surgery. (There is a risk of corneal abrasions if eye makeup/eyelash extensions are not removed)      Leave all valuables at home.   Do Not wear any jewelry or watches, including any metal in body piercings. Jewelry must be removed prior to coming to the hospital.  There is a possibility that rings that are unable to be removed may be cut off if they are on the surgical extremity.    Please remove all hair extensions, wigs, clips and any other metal accessories/ ornaments from your hair.  These items may pose a flammable/fire risk in Surgery and must be removed.    Do not shave your surgical area at least 5 days prior to your surgery. The surgical prep will be performed at the hospital according to Infection Control regulations.    Contact Lens must be removed before surgery. Either do not wear the contact lens or bring a case and solution for storage.  Please bring a container for eyeglasses or dentures as required.  Bring any paperwork your physician has provided, such as consent forms,  history and physicals, doctor's orders, etc.   Bring comfortable clothes that are loose fitting to wear upon discharge. Take into consideration the type of surgery being performed.  Maintain your diet as advised per your physician the day prior to surgery.      Adequate rest the night before surgery is advised.   Park in the Parking lot behind the hospital or in the [facility] across the street from the parking lot. Parking is complimentary.  If you will be discharged the same day as your procedure, please arrange for a responsible adult to drive you home or to accompany you if traveling by taxi.   YOU WILL NOT BE PERMITTED TO DRIVE OR TO LEAVE THE HOSPITAL ALONE AFTER SURGERY.   If you are being discharged the same day, it is  strongly recommended that you arrange for someone to remain with you for the first 24 hrs following your surgery.    The Surgeon will speak to your family/visitor after your surgery regarding the outcome of your surgery and post op care.  The Surgeon may speak to you after your surgery, but there is a possibility you may not remember the details.  Please check with your family members regarding the conversation with the Surgeon.    We strongly recommend whoever is bringing you home be present for discharge instructions.  This will ensure a thorough understanding for your post op home care.    ALL CHILDREN MUST ALWAYS BE ACCOMPANIED BY AN ADULT.    Visitors-Refer to current Visitor policy handouts.    Thank you for your cooperation.  The Staff of [facility].            Bathing Instructions with Hibiclens    Shower the evening before and morning of your procedure with Chlorhexidine (Hibiclens)  do not use Chlorhexidine on your face or genitals. Do not get in your eyes.  Wash your face with water and your regular face wash/soap  Use your regular shampoo  Apply Chlorhexidine (Hibiclens) directly on your skin or on a wet washcloth and wash gently. When showering: Move away from the shower stream when applying Chlorhexidine (Hibiclens) to avoid rinsing off too soon.  Rinse thoroughly with warm water  Do not dilute Chlorhexidine (Hibiclens)   Dry off as usual, do not use any deodorant, powder, body lotions, perfume, after shave or cologne.

## 2023-06-05 ENCOUNTER — HOSPITAL ENCOUNTER (OUTPATIENT)
Facility: OTHER | Age: 58
Discharge: HOME | End: 2023-06-06
Attending: OBSTETRICS & GYNECOLOGY | Admitting: OBSTETRICS & GYNECOLOGY
Payer: COMMERCIAL

## 2023-06-05 ENCOUNTER — ANESTHESIA (OUTPATIENT)
Dept: SURGERY | Facility: OTHER | Age: 58
End: 2023-06-05
Payer: COMMERCIAL

## 2023-06-05 DIAGNOSIS — Z90.710 STATUS POST HYSTERECTOMY: Primary | ICD-10-CM

## 2023-06-05 DIAGNOSIS — C54.1 ENDOMETRIAL CANCER: ICD-10-CM

## 2023-06-05 DIAGNOSIS — C54.1 MALIGNANT NEOPLASM OF ENDOMETRIUM: ICD-10-CM

## 2023-06-05 PROBLEM — C55 ENDOMETRIOID ADENOCARCINOMA OF UTERUS: Status: ACTIVE | Noted: 2023-06-05

## 2023-06-05 PROCEDURE — 71000039 HC RECOVERY, EACH ADD'L HOUR: Performed by: OBSTETRICS & GYNECOLOGY

## 2023-06-05 PROCEDURE — 64763: ICD-10-PCS | Mod: AS,51,RT, | Performed by: PHYSICIAN ASSISTANT

## 2023-06-05 PROCEDURE — D9220A PRA ANESTHESIA: Mod: ANES,,, | Performed by: ANESTHESIOLOGY

## 2023-06-05 PROCEDURE — 38900 IO MAP OF SENT LYMPH NODE: CPT | Mod: 50,,, | Performed by: OBSTETRICS & GYNECOLOGY

## 2023-06-05 PROCEDURE — 25000003 PHARM REV CODE 250: Performed by: OBSTETRICS & GYNECOLOGY

## 2023-06-05 PROCEDURE — 37000009 HC ANESTHESIA EA ADD 15 MINS: Performed by: OBSTETRICS & GYNECOLOGY

## 2023-06-05 PROCEDURE — D9220A PRA ANESTHESIA: Mod: CRNA,,, | Performed by: NURSE ANESTHETIST, CERTIFIED REGISTERED

## 2023-06-05 PROCEDURE — 25000003 PHARM REV CODE 250: Performed by: STUDENT IN AN ORGANIZED HEALTH CARE EDUCATION/TRAINING PROGRAM

## 2023-06-05 PROCEDURE — 38571 PR LAP,PELVIC LYMPHADENECTOMY: ICD-10-PCS | Mod: 51,,, | Performed by: OBSTETRICS & GYNECOLOGY

## 2023-06-05 PROCEDURE — P9045 ALBUMIN (HUMAN), 5%, 250 ML: HCPCS | Mod: JZ,JG | Performed by: NURSE ANESTHETIST, CERTIFIED REGISTERED

## 2023-06-05 PROCEDURE — 38571 LAPAROSCOPY LYMPHADENECTOMY: CPT | Mod: 51,,, | Performed by: OBSTETRICS & GYNECOLOGY

## 2023-06-05 PROCEDURE — 97162 PT EVAL MOD COMPLEX 30 MIN: CPT

## 2023-06-05 PROCEDURE — 97116 GAIT TRAINING THERAPY: CPT

## 2023-06-05 PROCEDURE — 36000712 HC OR TIME LEV V 1ST 15 MIN: Performed by: OBSTETRICS & GYNECOLOGY

## 2023-06-05 PROCEDURE — 38900 PR INTRAOPERATIVE SENTINEL LYMPH NODE ID W DYE INJECTION: ICD-10-PCS | Mod: AS,50,, | Performed by: PHYSICIAN ASSISTANT

## 2023-06-05 PROCEDURE — 27201423 OPTIME MED/SURG SUP & DEVICES STERILE SUPPLY: Performed by: OBSTETRICS & GYNECOLOGY

## 2023-06-05 PROCEDURE — 88112 PR  CYTOPATH, CELL ENHANCE TECH: ICD-10-PCS | Mod: 26,,, | Performed by: STUDENT IN AN ORGANIZED HEALTH CARE EDUCATION/TRAINING PROGRAM

## 2023-06-05 PROCEDURE — 38571 PR LAP,PELVIC LYMPHADENECTOMY: ICD-10-PCS | Mod: AS,51,, | Performed by: PHYSICIAN ASSISTANT

## 2023-06-05 PROCEDURE — 38571 LAPAROSCOPY LYMPHADENECTOMY: CPT | Mod: AS,51,, | Performed by: PHYSICIAN ASSISTANT

## 2023-06-05 PROCEDURE — 25000003 PHARM REV CODE 250: Performed by: NURSE ANESTHETIST, CERTIFIED REGISTERED

## 2023-06-05 PROCEDURE — 38900 IO MAP OF SENT LYMPH NODE: CPT | Mod: AS,50,, | Performed by: PHYSICIAN ASSISTANT

## 2023-06-05 PROCEDURE — 71000033 HC RECOVERY, INTIAL HOUR: Performed by: OBSTETRICS & GYNECOLOGY

## 2023-06-05 PROCEDURE — 63600175 PHARM REV CODE 636 W HCPCS: Performed by: OBSTETRICS & GYNECOLOGY

## 2023-06-05 PROCEDURE — 58571 PR LAPAROSCOPY W TOT HYSTERECTUTERUS <=250 GRAM  W TUBE/OVARY: ICD-10-PCS | Mod: 22,,, | Performed by: OBSTETRICS & GYNECOLOGY

## 2023-06-05 PROCEDURE — 64763: ICD-10-PCS | Mod: 51,RT,, | Performed by: OBSTETRICS & GYNECOLOGY

## 2023-06-05 PROCEDURE — 58571 TLH W/T/O 250 G OR LESS: CPT | Mod: 22,,, | Performed by: OBSTETRICS & GYNECOLOGY

## 2023-06-05 PROCEDURE — 88112 CYTOPATH CELL ENHANCE TECH: CPT | Mod: 26,,, | Performed by: STUDENT IN AN ORGANIZED HEALTH CARE EDUCATION/TRAINING PROGRAM

## 2023-06-05 PROCEDURE — 25000003 PHARM REV CODE 250: Performed by: ANESTHESIOLOGY

## 2023-06-05 PROCEDURE — 88305 TISSUE EXAM BY PATHOLOGIST: CPT | Performed by: STUDENT IN AN ORGANIZED HEALTH CARE EDUCATION/TRAINING PROGRAM

## 2023-06-05 PROCEDURE — 63600175 PHARM REV CODE 636 W HCPCS: Performed by: ANESTHESIOLOGY

## 2023-06-05 PROCEDURE — 88112 CYTOPATH CELL ENHANCE TECH: CPT | Performed by: STUDENT IN AN ORGANIZED HEALTH CARE EDUCATION/TRAINING PROGRAM

## 2023-06-05 PROCEDURE — 58571 PR LAPAROSCOPY W TOT HYSTERECTUTERUS <=250 GRAM  W TUBE/OVARY: ICD-10-PCS | Mod: AS,22,, | Performed by: PHYSICIAN ASSISTANT

## 2023-06-05 PROCEDURE — D9220A PRA ANESTHESIA: ICD-10-PCS | Mod: CRNA,,, | Performed by: NURSE ANESTHETIST, CERTIFIED REGISTERED

## 2023-06-05 PROCEDURE — D9220A PRA ANESTHESIA: ICD-10-PCS | Mod: ANES,,, | Performed by: ANESTHESIOLOGY

## 2023-06-05 PROCEDURE — 36000713 HC OR TIME LEV V EA ADD 15 MIN: Performed by: OBSTETRICS & GYNECOLOGY

## 2023-06-05 PROCEDURE — 58571 TLH W/T/O 250 G OR LESS: CPT | Mod: AS,22,, | Performed by: PHYSICIAN ASSISTANT

## 2023-06-05 PROCEDURE — 38900 PR INTRAOPERATIVE SENTINEL LYMPH NODE ID W DYE INJECTION: ICD-10-PCS | Mod: 50,,, | Performed by: OBSTETRICS & GYNECOLOGY

## 2023-06-05 PROCEDURE — 63600175 PHARM REV CODE 636 W HCPCS: Performed by: NURSE ANESTHETIST, CERTIFIED REGISTERED

## 2023-06-05 PROCEDURE — 64763 INCISE HIP/THIGH NERVE: CPT | Mod: AS,51,RT, | Performed by: PHYSICIAN ASSISTANT

## 2023-06-05 PROCEDURE — 88305 TISSUE EXAM BY PATHOLOGIST: ICD-10-PCS | Mod: 26,,, | Performed by: STUDENT IN AN ORGANIZED HEALTH CARE EDUCATION/TRAINING PROGRAM

## 2023-06-05 PROCEDURE — 88305 TISSUE EXAM BY PATHOLOGIST: CPT | Mod: 26,,, | Performed by: STUDENT IN AN ORGANIZED HEALTH CARE EDUCATION/TRAINING PROGRAM

## 2023-06-05 PROCEDURE — 94761 N-INVAS EAR/PLS OXIMETRY MLT: CPT

## 2023-06-05 PROCEDURE — 63600175 PHARM REV CODE 636 W HCPCS: Performed by: STUDENT IN AN ORGANIZED HEALTH CARE EDUCATION/TRAINING PROGRAM

## 2023-06-05 PROCEDURE — 37000008 HC ANESTHESIA 1ST 15 MINUTES: Performed by: OBSTETRICS & GYNECOLOGY

## 2023-06-05 PROCEDURE — 64763 INCISE HIP/THIGH NERVE: CPT | Mod: 51,RT,, | Performed by: OBSTETRICS & GYNECOLOGY

## 2023-06-05 RX ORDER — CELECOXIB 100 MG/1
100 CAPSULE ORAL DAILY
Status: DISCONTINUED | OUTPATIENT
Start: 2023-06-06 | End: 2023-06-06 | Stop reason: HOSPADM

## 2023-06-05 RX ORDER — SENNOSIDES 8.6 MG/1
8.6 TABLET ORAL 2 TIMES DAILY
Status: DISCONTINUED | OUTPATIENT
Start: 2023-06-05 | End: 2023-06-06 | Stop reason: HOSPADM

## 2023-06-05 RX ORDER — SODIUM CHLORIDE 0.9 % (FLUSH) 0.9 %
3 SYRINGE (ML) INJECTION
Status: DISCONTINUED | OUTPATIENT
Start: 2023-06-05 | End: 2023-06-05 | Stop reason: HOSPADM

## 2023-06-05 RX ORDER — ROCURONIUM BROMIDE 10 MG/ML
INJECTION, SOLUTION INTRAVENOUS
Status: DISCONTINUED | OUTPATIENT
Start: 2023-06-05 | End: 2023-06-05

## 2023-06-05 RX ORDER — MUPIROCIN 20 MG/G
OINTMENT TOPICAL 2 TIMES DAILY
Status: DISCONTINUED | OUTPATIENT
Start: 2023-06-05 | End: 2023-06-06 | Stop reason: HOSPADM

## 2023-06-05 RX ORDER — SENNOSIDES 8.6 MG/1
1 TABLET ORAL DAILY PRN
Qty: 60 TABLET | Refills: 0 | Status: SHIPPED | OUTPATIENT
Start: 2023-06-05 | End: 2023-08-01 | Stop reason: SDUPTHER

## 2023-06-05 RX ORDER — DEXTROMETHORPHAN HYDROBROMIDE, GUAIFENESIN 5; 100 MG/5ML; MG/5ML
650 LIQUID ORAL EVERY 6 HOURS PRN
Qty: 60 TABLET | Refills: 0 | Status: SHIPPED | OUTPATIENT
Start: 2023-06-05

## 2023-06-05 RX ORDER — IBUPROFEN 600 MG/1
600 TABLET ORAL EVERY 6 HOURS PRN
Qty: 60 TABLET | Refills: 1 | Status: SHIPPED | OUTPATIENT
Start: 2023-06-05 | End: 2023-08-22 | Stop reason: CLARIF

## 2023-06-05 RX ORDER — ZOLPIDEM TARTRATE 5 MG/1
5 TABLET ORAL NIGHTLY PRN
Status: DISCONTINUED | OUTPATIENT
Start: 2023-06-05 | End: 2023-06-06 | Stop reason: HOSPADM

## 2023-06-05 RX ORDER — NALOXONE HCL 0.4 MG/ML
0.02 VIAL (ML) INJECTION
Status: DISCONTINUED | OUTPATIENT
Start: 2023-06-05 | End: 2023-06-06 | Stop reason: HOSPADM

## 2023-06-05 RX ORDER — FAMOTIDINE 20 MG/1
20 TABLET, FILM COATED ORAL
Status: COMPLETED | OUTPATIENT
Start: 2023-06-05 | End: 2023-06-05

## 2023-06-05 RX ORDER — INDOCYANINE GREEN AND WATER 25 MG
KIT INJECTION
Status: DISCONTINUED | OUTPATIENT
Start: 2023-06-05 | End: 2023-06-05 | Stop reason: HOSPADM

## 2023-06-05 RX ORDER — HEPARIN SODIUM 5000 [USP'U]/ML
5000 INJECTION, SOLUTION INTRAVENOUS; SUBCUTANEOUS ONCE
Status: COMPLETED | OUTPATIENT
Start: 2023-06-05 | End: 2023-06-05

## 2023-06-05 RX ORDER — LIDOCAINE HYDROCHLORIDE 10 MG/ML
0.5 INJECTION, SOLUTION EPIDURAL; INFILTRATION; INTRACAUDAL; PERINEURAL ONCE
Status: DISCONTINUED | OUTPATIENT
Start: 2023-06-05 | End: 2023-06-05 | Stop reason: HOSPADM

## 2023-06-05 RX ORDER — MIDAZOLAM HYDROCHLORIDE 1 MG/ML
INJECTION INTRAMUSCULAR; INTRAVENOUS
Status: DISCONTINUED | OUTPATIENT
Start: 2023-06-05 | End: 2023-06-05

## 2023-06-05 RX ORDER — SODIUM CHLORIDE, SODIUM LACTATE, POTASSIUM CHLORIDE, CALCIUM CHLORIDE 600; 310; 30; 20 MG/100ML; MG/100ML; MG/100ML; MG/100ML
INJECTION, SOLUTION INTRAVENOUS CONTINUOUS
Status: DISCONTINUED | OUTPATIENT
Start: 2023-06-05 | End: 2023-06-06 | Stop reason: HOSPADM

## 2023-06-05 RX ORDER — FENTANYL CITRATE 50 UG/ML
INJECTION, SOLUTION INTRAMUSCULAR; INTRAVENOUS
Status: DISCONTINUED | OUTPATIENT
Start: 2023-06-05 | End: 2023-06-05

## 2023-06-05 RX ORDER — ACETAMINOPHEN 500 MG
1000 TABLET ORAL
Status: COMPLETED | OUTPATIENT
Start: 2023-06-05 | End: 2023-06-05

## 2023-06-05 RX ORDER — KETAMINE HCL IN 0.9 % NACL 50 MG/5 ML
SYRINGE (ML) INTRAVENOUS
Status: DISCONTINUED | OUTPATIENT
Start: 2023-06-05 | End: 2023-06-05

## 2023-06-05 RX ORDER — MEPERIDINE HYDROCHLORIDE 25 MG/ML
12.5 INJECTION INTRAMUSCULAR; INTRAVENOUS; SUBCUTANEOUS ONCE AS NEEDED
Status: DISCONTINUED | OUTPATIENT
Start: 2023-06-05 | End: 2023-06-05 | Stop reason: HOSPADM

## 2023-06-05 RX ORDER — OXYCODONE HYDROCHLORIDE 5 MG/1
5 TABLET ORAL
Status: DISCONTINUED | OUTPATIENT
Start: 2023-06-05 | End: 2023-06-05 | Stop reason: HOSPADM

## 2023-06-05 RX ORDER — PROPOFOL 10 MG/ML
VIAL (ML) INTRAVENOUS
Status: DISCONTINUED | OUTPATIENT
Start: 2023-06-05 | End: 2023-06-05

## 2023-06-05 RX ORDER — HYDROMORPHONE HYDROCHLORIDE 2 MG/ML
0.4 INJECTION, SOLUTION INTRAMUSCULAR; INTRAVENOUS; SUBCUTANEOUS EVERY 5 MIN PRN
Status: DISCONTINUED | OUTPATIENT
Start: 2023-06-05 | End: 2023-06-05 | Stop reason: HOSPADM

## 2023-06-05 RX ORDER — HYDROMORPHONE HYDROCHLORIDE 2 MG/ML
0.4 INJECTION, SOLUTION INTRAMUSCULAR; INTRAVENOUS; SUBCUTANEOUS
Status: DISCONTINUED | OUTPATIENT
Start: 2023-06-05 | End: 2023-06-06 | Stop reason: HOSPADM

## 2023-06-05 RX ORDER — ADHESIVE BANDAGE
30 BANDAGE TOPICAL 2 TIMES DAILY
Status: DISCONTINUED | OUTPATIENT
Start: 2023-06-05 | End: 2023-06-06 | Stop reason: HOSPADM

## 2023-06-05 RX ORDER — OXYCODONE HYDROCHLORIDE 5 MG/1
5 TABLET ORAL EVERY 4 HOURS PRN
Status: DISCONTINUED | OUTPATIENT
Start: 2023-06-05 | End: 2023-06-06 | Stop reason: HOSPADM

## 2023-06-05 RX ORDER — OXYCODONE HYDROCHLORIDE 5 MG/1
5 TABLET ORAL EVERY 4 HOURS PRN
Qty: 10 TABLET | Refills: 0 | Status: SHIPPED | OUTPATIENT
Start: 2023-06-05 | End: 2023-08-22 | Stop reason: CLARIF

## 2023-06-05 RX ORDER — CEFAZOLIN SODIUM 1 G/3ML
2 INJECTION, POWDER, FOR SOLUTION INTRAMUSCULAR; INTRAVENOUS
Status: COMPLETED | OUTPATIENT
Start: 2023-06-05 | End: 2023-06-05

## 2023-06-05 RX ORDER — KETOROLAC TROMETHAMINE 30 MG/ML
15 INJECTION, SOLUTION INTRAMUSCULAR; INTRAVENOUS EVERY 6 HOURS
Status: COMPLETED | OUTPATIENT
Start: 2023-06-05 | End: 2023-06-06

## 2023-06-05 RX ORDER — PROCHLORPERAZINE EDISYLATE 5 MG/ML
5 INJECTION INTRAMUSCULAR; INTRAVENOUS EVERY 30 MIN PRN
Status: DISCONTINUED | OUTPATIENT
Start: 2023-06-05 | End: 2023-06-05 | Stop reason: HOSPADM

## 2023-06-05 RX ORDER — LIDOCAINE HYDROCHLORIDE 20 MG/ML
INJECTION INTRAVENOUS
Status: DISCONTINUED | OUTPATIENT
Start: 2023-06-05 | End: 2023-06-05

## 2023-06-05 RX ORDER — BUPIVACAINE HYDROCHLORIDE 2.5 MG/ML
INJECTION, SOLUTION EPIDURAL; INFILTRATION; INTRACAUDAL
Status: DISCONTINUED | OUTPATIENT
Start: 2023-06-05 | End: 2023-06-05 | Stop reason: HOSPADM

## 2023-06-05 RX ORDER — KETOROLAC TROMETHAMINE 30 MG/ML
INJECTION, SOLUTION INTRAMUSCULAR; INTRAVENOUS
Status: DISCONTINUED | OUTPATIENT
Start: 2023-06-05 | End: 2023-06-05

## 2023-06-05 RX ORDER — DEXAMETHASONE SODIUM PHOSPHATE 4 MG/ML
INJECTION, SOLUTION INTRA-ARTICULAR; INTRALESIONAL; INTRAMUSCULAR; INTRAVENOUS; SOFT TISSUE
Status: DISCONTINUED | OUTPATIENT
Start: 2023-06-05 | End: 2023-06-05

## 2023-06-05 RX ORDER — ACETAMINOPHEN 500 MG
1000 TABLET ORAL EVERY 6 HOURS
Status: DISCONTINUED | OUTPATIENT
Start: 2023-06-05 | End: 2023-06-06 | Stop reason: HOSPADM

## 2023-06-05 RX ORDER — ONDANSETRON 2 MG/ML
INJECTION INTRAMUSCULAR; INTRAVENOUS
Status: DISCONTINUED | OUTPATIENT
Start: 2023-06-05 | End: 2023-06-05

## 2023-06-05 RX ORDER — IBUPROFEN 200 MG
200 TABLET ORAL EVERY 6 HOURS
Status: DISCONTINUED | OUTPATIENT
Start: 2023-06-06 | End: 2023-06-06 | Stop reason: HOSPADM

## 2023-06-05 RX ORDER — PROCHLORPERAZINE EDISYLATE 5 MG/ML
5 INJECTION INTRAMUSCULAR; INTRAVENOUS EVERY 6 HOURS PRN
Status: DISCONTINUED | OUTPATIENT
Start: 2023-06-05 | End: 2023-06-06 | Stop reason: HOSPADM

## 2023-06-05 RX ORDER — SODIUM CHLORIDE, SODIUM LACTATE, POTASSIUM CHLORIDE, CALCIUM CHLORIDE 600; 310; 30; 20 MG/100ML; MG/100ML; MG/100ML; MG/100ML
INJECTION, SOLUTION INTRAVENOUS CONTINUOUS
Status: DISCONTINUED | OUTPATIENT
Start: 2023-06-05 | End: 2023-06-06

## 2023-06-05 RX ORDER — OXYCODONE HYDROCHLORIDE 5 MG/1
10 TABLET ORAL EVERY 4 HOURS PRN
Status: DISCONTINUED | OUTPATIENT
Start: 2023-06-05 | End: 2023-06-06 | Stop reason: HOSPADM

## 2023-06-05 RX ORDER — ALBUMIN HUMAN 50 G/1000ML
SOLUTION INTRAVENOUS CONTINUOUS PRN
Status: DISCONTINUED | OUTPATIENT
Start: 2023-06-05 | End: 2023-06-05

## 2023-06-05 RX ORDER — LIDOCAINE HYDROCHLORIDE 10 MG/ML
1 INJECTION, SOLUTION EPIDURAL; INFILTRATION; INTRACAUDAL; PERINEURAL ONCE
Status: DISCONTINUED | OUTPATIENT
Start: 2023-06-05 | End: 2023-06-05 | Stop reason: HOSPADM

## 2023-06-05 RX ORDER — ONDANSETRON 2 MG/ML
4 INJECTION INTRAMUSCULAR; INTRAVENOUS EVERY 6 HOURS PRN
Status: DISCONTINUED | OUTPATIENT
Start: 2023-06-05 | End: 2023-06-06 | Stop reason: HOSPADM

## 2023-06-05 RX ADMIN — ALBUMIN (HUMAN): 2.5 SOLUTION INTRAVENOUS at 07:06

## 2023-06-05 RX ADMIN — FENTANYL CITRATE 100 MCG: 50 INJECTION, SOLUTION INTRAMUSCULAR; INTRAVENOUS at 07:06

## 2023-06-05 RX ADMIN — MIDAZOLAM HYDROCHLORIDE 2 MG: 1 INJECTION, SOLUTION INTRAMUSCULAR; INTRAVENOUS at 06:06

## 2023-06-05 RX ADMIN — ONDANSETRON HYDROCHLORIDE 4 MG: 2 INJECTION INTRAMUSCULAR; INTRAVENOUS at 10:06

## 2023-06-05 RX ADMIN — HEPARIN SODIUM 5000 UNITS: 5000 INJECTION INTRAVENOUS; SUBCUTANEOUS at 05:06

## 2023-06-05 RX ADMIN — Medication 25 MG: at 07:06

## 2023-06-05 RX ADMIN — MUPIROCIN: 20 OINTMENT TOPICAL at 01:06

## 2023-06-05 RX ADMIN — KETOROLAC TROMETHAMINE 30 MG: 30 INJECTION, SOLUTION INTRAMUSCULAR; INTRAVENOUS at 10:06

## 2023-06-05 RX ADMIN — ROCURONIUM BROMIDE 50 MG: 10 SOLUTION INTRAVENOUS at 07:06

## 2023-06-05 RX ADMIN — ACETAMINOPHEN 1000 MG: 500 TABLET, FILM COATED ORAL at 05:06

## 2023-06-05 RX ADMIN — SENNOSIDES 8.6 MG: 8.6 TABLET, FILM COATED ORAL at 01:06

## 2023-06-05 RX ADMIN — HYDROMORPHONE HYDROCHLORIDE 0.4 MG: 2 INJECTION INTRAMUSCULAR; INTRAVENOUS; SUBCUTANEOUS at 11:06

## 2023-06-05 RX ADMIN — FENTANYL CITRATE 50 MCG: 50 INJECTION, SOLUTION INTRAMUSCULAR; INTRAVENOUS at 10:06

## 2023-06-05 RX ADMIN — DEXAMETHASONE SODIUM PHOSPHATE 4 MG: 4 INJECTION, SOLUTION INTRAMUSCULAR; INTRAVENOUS at 10:06

## 2023-06-05 RX ADMIN — FAMOTIDINE 20 MG: 20 TABLET, FILM COATED ORAL at 05:06

## 2023-06-05 RX ADMIN — ROCURONIUM BROMIDE 20 MG: 10 SOLUTION INTRAVENOUS at 09:06

## 2023-06-05 RX ADMIN — CEFAZOLIN 2 G: 330 INJECTION, POWDER, FOR SOLUTION INTRAMUSCULAR; INTRAVENOUS at 07:06

## 2023-06-05 RX ADMIN — SENNOSIDES 8.6 MG: 8.6 TABLET, FILM COATED ORAL at 09:06

## 2023-06-05 RX ADMIN — ZOLPIDEM TARTRATE 5 MG: 5 TABLET ORAL at 09:06

## 2023-06-05 RX ADMIN — OXYCODONE HYDROCHLORIDE 5 MG: 5 TABLET ORAL at 09:06

## 2023-06-05 RX ADMIN — LIDOCAINE HYDROCHLORIDE 100 MG: 20 INJECTION, SOLUTION INTRAVENOUS at 07:06

## 2023-06-05 RX ADMIN — PROPOFOL 160 MG: 10 INJECTION, EMULSION INTRAVENOUS at 07:06

## 2023-06-05 RX ADMIN — ACETAMINOPHEN 1000 MG: 500 TABLET, FILM COATED ORAL at 01:06

## 2023-06-05 RX ADMIN — SUGAMMADEX 150 MG: 100 INJECTION, SOLUTION INTRAVENOUS at 10:06

## 2023-06-05 RX ADMIN — SODIUM CHLORIDE, POTASSIUM CHLORIDE, SODIUM LACTATE AND CALCIUM CHLORIDE: 600; 310; 30; 20 INJECTION, SOLUTION INTRAVENOUS at 12:06

## 2023-06-05 RX ADMIN — MAGNESIUM HYDROXIDE 2400 MG: 400 SUSPENSION ORAL at 01:06

## 2023-06-05 RX ADMIN — ROCURONIUM BROMIDE 30 MG: 10 SOLUTION INTRAVENOUS at 08:06

## 2023-06-05 RX ADMIN — MAGNESIUM HYDROXIDE 2400 MG: 400 SUSPENSION ORAL at 09:06

## 2023-06-05 RX ADMIN — KETOROLAC TROMETHAMINE 15 MG: 30 INJECTION, SOLUTION INTRAMUSCULAR; INTRAVENOUS at 05:06

## 2023-06-05 RX ADMIN — Medication 25 MG: at 09:06

## 2023-06-05 RX ADMIN — SODIUM CHLORIDE, SODIUM LACTATE, POTASSIUM CHLORIDE, AND CALCIUM CHLORIDE: 600; 310; 30; 20 INJECTION, SOLUTION INTRAVENOUS at 06:06

## 2023-06-05 NOTE — OR NURSING
Pt states pain tolerable. Falls off to sleep. No change from previous assessment. Prepared for transfer to inpt room. Report by phone to Shannan CARROLL.  updated by phone and sent to room. Pt placed on transport.

## 2023-06-05 NOTE — PT/OT/SLP EVAL
"Physical Therapy Evaluation    Patient Name:  Sugar Diaz   MRN:  [id number]    Recommendations:     Discharge Recommendations: outpatient PT   Discharge Equipment Recommendations: walker, rolling   Barriers to discharge: None    Assessment:     Sugar Diaz is a 57 y.o. female admitted with a medical diagnosis of Status post hysterectomy.  She presents with the following impairments/functional limitations: weakness, impaired balance, impaired endurance, impaired cardiopulmonary response to activity, impaired functional mobility, gait instability, decreased lower extremity function, impaired muscle length, pain .     Patient evaluated by PT and goals established. Patient ambulated 30 feet in room with CGA using RW, no significant gait deficits noted. Patient required minimal assist for bed mobility/movement of RLE due to pain and difficulty with hip adduction on the RLE.  Discharge recommendation home with outpatient PT. Discharge DME includes RW. PT will continue to follow and progress as tolerated. Please see progress note for detailed plan of care and recommendations.     Rehab Prognosis: Good; patient would benefit from acute skilled PT services to address these deficits and reach maximum level of function.    Recent Surgery: Procedure(s) (LRB):  XI ROBOTIC HYSTERECTOMY,WITH SALPINGO-OOPHORECTOMY (Bilateral)  BIOPSY, sentinel LYMPH NODE (Left)  injection, LYMPH NODE, SENTINEL (Bilateral)  LYMPHADENECTOMY, PELVIS (Right)  REPAIR, OBTURATOR NERVE (Right) Day of Surgery    Plan:     During this hospitalization, patient to be seen 3 x/week to address the identified rehab impairments via gait training, therapeutic activities and progress toward the following goals:    Plan of Care Expires:  07/05/23    Subjective     Chief Complaint: "my right leg feels so weird, and my stomach area hurts"  Patient/Family Comments/goals: Patient agrees to participate in PT intervention.   Pain/Comfort:  Pain Rating 1:  " (Patient reports abdominal pain and pain in anterior/medial portion of RLE but patient does not provide pain rating .)  Pain Addressed 1: Reposition, Distraction    Patients cultural, spiritual, Scientologist conflicts given the current situation: no    Living Environment:  Patient lives with her  in a H with no steps to enter.   Prior to admission, patients level of function was independent with all functional mobility and ADLs.  Equipment used at home: none.  DME owned (not currently used): wheelchair.  Upon discharge, patient will have assistance from  .    Objective:     Communicated with nursing prior to session.  Patient found supine with peripheral IV, blood pressure cuff  upon PT entry to room.    General Precautions: Standard, fall  Orthopedic Precautions:N/A   Braces: N/A  Respiratory Status: Room air    Exams:  Cognition:   Patient is oriented to person, place, time and situation.  Pt follows approximately 100% of verbal  commands.    Mood: Pleasant and cooperative.   Safety Awareness: good  Musculoskeletal:  BMI: 25  Posture:  slouched shoulders  LE ROM/Strength:   R ROM: WNL  L ROM: Wnl  R Strength:   Knee extension:4 /5  Dorsiflexion: 4/5   L Strength:   Knee extension: 5/5  Dorsiflexion: 5/5   Neuromuscular:  Sensation: Intact to light touch bilateral LEs.   Tone/Reflexes: No impairments identified with functional mobility. No formal testing performed.   Balance:   Static sitting: good  Static standing: good  Dynamic standing: fair   Visual-vestibular: No impairments identified with functional mobility. No formal testing performed.  Integument: Visible skin intact  Cardiopulmonary:  Edema: none noted       Functional Mobility:  Bed Mobility:     Supine to Sit: minimum assistance  Sit to Supine: minimum assistance  Transfers:     Sit to Stand:  contact guard assistance with rolling walker  Gait: Patient ambulated 30 feet around room with CGA using the RW. Fair  walker, velocity and step  length noted with no major gait deficits. Patient complains of pain in RLE/ groin area with ambulation. Patient educated on safe and proper use of RW with good carry over       AM-PAC 6 CLICK MOBILITY  Total Score:17       Treatment & Education:  Patient educated on POC, purpose of PT, discharge planning, transfers, bed mobility, log rolling, safe use of AD, and gait.     Gait training as stated above.     Patient educated on and performed the following supine exercises:   Quad sets, ankle pumps, glute sets, heel slides, hip abduction and adduction (patient unable to activity adduct RLE and requires minimal assist, patient instructed to ask for assisstance from family when performing this exercise if needed)    Patient left supine with all lines intact, call button in reach, and family present.    GOALS:   Multidisciplinary Problems       Physical Therapy Goals          Problem: Physical Therapy    Goal Priority Disciplines Outcome Goal Variances Interventions   Physical Therapy Goal     PT, PT/OT Ongoing, Progressing     Description: Goals to be met by: 2023    Patient will increase functional independence with mobility by performin. Sit<>stand with mod (I) with LRAD.  2. Gait x 150 feet with mod (I) with LRAD.  3.Bed mobility with independence.                           History:     Past Medical History:   Diagnosis Date    Chest pain     NEGATIVE STRESS ECHO 2002    GERD (gastroesophageal reflux disease)     Lower back pain     Migraines     Mild allergic rhinitis     Postmenopausal bleeding        Past Surgical History:   Procedure Laterality Date    CYST REMOVAL Left 2022    Procedure: EXCISION, CYST-BACK;  Surgeon: Thomas Hurst MD;  Location: [facility];  Service: General;  Laterality: Left;  left upper back  RN PREOP ON 22-NF    HYSTEROSCOPY WITH DILATION AND CURETTAGE OF UTERUS N/A 2023    Procedure: HYSTEROSCOPY, WITH DILATION AND CURETTAGE OF UTERUS;  Surgeon: Omaira COMER  MD Nathan;  Location: [facility];  Service: OB/GYN;  Laterality: N/A;  DESTINY HERNANDEZ  [phone] TEXTED [facility] ON 5/2/2023 @ 3:51PM. [facility] RESPONDED ON 5/2/2023 @ 3:51PM-LO  RN PREOP 5/18/23  T & S; UPT ORDERED AND SCHEDULED 5/22/23    TONSILLECTOMY      TONSILLECTOMY      TUBAL LIGATION         Time Tracking:     PT Received On: 06/05/23  PT Start Time: 1657     PT Stop Time: 1716  PT Total Time (min): 19 min     Billable Minutes: Evaluation 10 and Gait Training 9      06/05/2023

## 2023-06-05 NOTE — CONSULTS
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  This consultation was requested by:    The patient location is:Spiritism       Inpatient consult to Neurology  Consult performed by: Donny Marti MD  Consult ordered by: Elsa Owen MD         Subjective:     History of Present Illness:  Sugar Diaz is a 57 y.o. female with a history of endometrial carcinoma who was s/p robotic hysterectomy and bilateral salpingo-oophorectomy (POD0).  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  Neurology consulted for further recommendations.    Upon evaluation, she is still recovering from anesthesia but is oriented X 3 and follows commands appropriately.  She complains of low back pain but denies any radiating pain down the lower extremities.    Patient information was obtained from patient and primary team.    Past Medical History:   Diagnosis Date    Chest pain 2002    NEGATIVE STRESS ECHO 2002    GERD (gastroesophageal reflux disease)     Lower back pain     Migraines     Mild allergic rhinitis     Postmenopausal bleeding        Past Surgical History:   Procedure Laterality Date    CYST REMOVAL Left 04/28/2022    Procedure: EXCISION, CYST-BACK;  Surgeon: Thomas Hurst MD;  Location: [facility] OR;  Service: General;  Laterality: Left;  left upper back  RN PREOP ON 4/21/22-NF    HYSTEROSCOPY WITH DILATION AND CURETTAGE OF UTERUS N/A 05/23/2023    Procedure: HYSTEROSCOPY, WITH DILATION AND CURETTAGE OF UTERUS;  Surgeon: Omaira Evans MD;  Location: [facility] OR;  Service: OB/GYN;  Laterality: N/A;  DESTINY HERNANDEZ  [phone] TEXTED [facility] ON 5/2/2023 @ 3:51PM. [facility] RESPONDED ON 5/2/2023 @ 3:51PM-  RN PREOP 5/18/23  T & S; UPT ORDERED AND SCHEDULED 5/22/23    TONSILLECTOMY      TONSILLECTOMY      TUBAL LIGATION         Family History   Problem Relation Age of Onset  "   No Known Problems Mother     Heart disease Father     No Known Problems Sister     No Known Problems Sister         Social History     Tobacco Use    Smoking status: Never    Smokeless tobacco: Never   Substance Use Topics    Alcohol use: Not Currently        Medications:   Current Facility-Administered Medications:     acetaminophen tablet 1,000 mg, 1,000 mg, Oral, Q6H, Sera Hernandez MD    HYDROmorphone (PF) injection 0.4 mg, 0.4 mg, Intravenous, Q2H PRN, Sera Hernandez MD    ketorolac injection 15 mg, 15 mg, Intravenous, Q6H **FOLLOWED BY** [START ON 6/6/2023] ibuprofen tablet 200 mg, 200 mg, Oral, Q6H, Sera Hernandez MD    lactated ringers infusion, , Intravenous, Continuous, Kaye Aguirre MD, New Bag at 06/05/23 0622    lactated ringers infusion, , Intravenous, Continuous, Sera Hernandez MD    magnesium hydroxide 400 mg/5 ml suspension 2,400 mg, 30 mL, Oral, BID, Sera Hernandez MD    mupirocin 2 % ointment, , Nasal, BID, Sera Hernandez MD    naloxone 0.4 mg/mL injection 0.02 mg, 0.02 mg, Intravenous, PRN, Sera Hernandez MD    ondansetron injection 4 mg, 4 mg, Intravenous, Q6H PRN, Sera Hernandez MD    oxyCODONE immediate release tablet 10 mg, 10 mg, Oral, Q4H PRN, Sera Hernandez MD    oxyCODONE immediate release tablet 5 mg, 5 mg, Oral, Q4H PRN, Sera Hernandez MD    prochlorperazine injection Soln 5 mg, 5 mg, Intravenous, Q6H PRN, Sera Hernandez MD    senna tablet 8.6 mg, 8.6 mg, Oral, BID, Sera Hernandez MD    zolpidem tablet 5 mg, 5 mg, Oral, Nightly PRN, Sera Hernandez MD    Allergies: Review of patient's allergies indicates:  No Known Allergies    Review Of Systems: ROS      Objective:     Vitals: Blood pressure 133/67, pulse 75, temperature 97.8 °F (36.6 °C), temperature source Temporal, resp. rate 16, height 5' 7" (1.702 m), weight 74.8 kg (165 lb), last menstrual period 11/15/2012, SpO2 99 %, not currently breastfeeding. Reviewed for " date of service     Constitutional: Well-developed, well-nourished, appears stated age    Neurological:    Mental status: Alert and oriented to person, place, time, and situation; follows commands  Cranial nerves:   CN III, IV, VI: Extraocular movements full, no nystagmus visualized  CN V: Symmetric sensation to touch     Motor:  5/5 in all major muscle groups of the upper and lower extremities.  In the right lower extremity, there is maybe subtle weakness with right hip adduction (4+/5)  Sensory: Intact to touch in all four extremities.  No sensory loss over the obturator nerve distribution.  Deep tendon reflexes:  DTRs 3+ in the lower extremities.  Plantar downgoing.  No clonus.      Labs:Reviewed for date of service  Radiology (i.e. PET Scan, X-ray, CT, MRI): Reviewed for date of service    Assessment - Diagnosis - Goals:     Impression and recommendations: Sugar Diaz 57 y.o. female with a history of endometrial carcinoma who underwent robotic hysterectomy with bilateral salpingo-oophorectomy today.  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  There is no evidence of any gross weakness with hip adduction on the right - maybe 4+/5, although she is currently in pain and this could be a confounding factor.  Prognosis will likely be good, although another exam at a later date would be helpful once she has recovered from the effects of anesthesia and her pain is well controlled.  Recommend PT/OT      Consultation ended:  3:39 PM     Total time spent with patient: < 30 Minutes      The attending portion of this evaluation, treatment, and documentation was performed per Donny Marti MD via audiovisual.    This is a consult performed through audio-visual using [employer]. Patient and provider are in different locations. History and physical exam are limited due to the nature of this encounter.       Thank you for  this consult. Please do not hesitate to contact us with questions.

## 2023-06-05 NOTE — OP NOTE
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  Normal liver capsule.  Normal omentum.  8 cm uterus.  Normal tubes and ovaries.  The sigmoid colon was densely adhered to the left posterior cul-de-sac.  Bilateral sentinel lymph node mapping: left obturator space and right internal iliac vein.  4 cm bulky obturator space lymph node with obvious extracapsular invasion into the obturator vein and obturator nerve.  There was brisk bleeding from the obturator vein which was controlled with bipolar coagulation.  There was also bleeding from the lymph node during the dissection which made it more difficult to dissect it off the structures.  The capsule of the lymph node was entered during dissection which resulted in gross spillage in the obturator space.  The capsule grossly involved the obturator space and resulted in full transection of the obturator nerve during dissection; removal of the nerve was inherent to this portion of the procedure.  There was no gross residual disease.  The edges were re approximated but due to tension I elected against "cleaning the cauterized edges" before re approximation.  The obturator nerve was re-approximated with an end-to-end anastomosis using 4 interrupted 4-0 Prolene sutures on a vascular needle. Frozen pathology of the bulky right obturator lymph node was consistent with metastatic adenocarcinoma.     Repair of obturator nerve:          Procedure in Detail: The " patient was prepped and draped in synchronous position in [facility]. After sterile prep and drape, the cervix was injected at 3 and 9 o'clock with ICG dye, and a Hanson catheter was then inserted. Gloves and gowns were changed, and we began by directly entering the abdomen.  A small incision was made at the umbilicus using a Hossan trocar, the peritoneal cavity was entered, and a pneumoperitoneum was established including trocar placement.  The laparoscope was inserted and examination of the peritoneal cavity revealed the operative findings above.  Intraperitoneal anatomy was normal and we proceeded with placement of our robotic trocars..  The remaining 3 robotic ports and assistant port were placed under direct vision and the robot was docked.  Intraperitoneal anatomy was normal and we proceeded with the assigned procedure..  We began by dividing the round ligament, developing the pararectal space, and identifying the ureter. [place] lymph nodes were visualized and removed unilaterally on the left side with special attention to key structures.  There was obvious metastatic disease in the right obturator space.  The paravesical and pararectal spaces were developed, and a right pelvic lymphadenectomy was performed in to remove the bulky lymph node in the obturator space.  Please see the operative findings above.  The obturator nerve was fully transected and re-approximated with interrupted 4-0 Prolene sutures.  The right pelvic lymph node was placed in a bag and removed through the vagina at the end of the case.  We then proceeded with hysterectomy.  Beginning on the right side, the ureter was identified, and the ovarian vessels were sealed and transected using bipolar coagulation. The cardinal ligaments were skeletonized, the ureter visualized laterally, and the uterine vessels were ligated and divided using bipolar coagulation. An identical procedure was performed on the left side.  The bladder flap was  developed and the uterosacral ligaments were transected.   The vagina was incised, and a circumferential incision was made.  The tubes, ovaries, uterus, and cervix were removed through the vagina..  The vagina was closed in a double fashion using  Stratafix suture, incorporating the uterosacral ligaments into the cuff closure. The ureters were followed from the pelvic brim to the vaginal cuff closure, and were not compromised. Hemostasis was satisfactory. The trocars were removed, and the fascia of assistant port was closed with a Pascual-Mccloud using Vicryl suture. The camera port was closed with Vicryl suture. The skin of all sites was closed with 4-0 Monocryl.  The patient was taken to recovery in stable condition.    Salt, needle, sponge, and instrument count was correct.  I was present and scrubbed for the entirety of the case.     ASSISTANT SURGEONS  Kaity Dimas's presence was critical to the completion of this case due to a qualified resident not being available.    UNUSUAL CIRCUMSTANCES  Yes. The extent of the disease, including disease distribution, and the aggressive biology of the cancer made the procedure significantly more difficult to perform.    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 LEFT SENTINEL LYMPH NODE2. RIGHT PELVIC LYMPH NODES (SENT FROZEN) 3. UTERUS CERVIX BILATERAL TUBES AND OVARIES4. RIGHT PELVIC LYMPH NODES #2     References:    Click here for ordering Quick Tip   Question Answer Comment   Procedure Type: Gynecology and Obstetrics    Specimen Class: Known or suspected malignancy    Which provider would  you like to cc? RAYMOND TORRES    Release to patient Immediate        06/05/23 1012    06/05/23 0826  Cytology, Fluid/Wash/Brush  Once        Question Answer Comment   Source: Peritoneal/abdominal/pelvic wash    Clinical Information: pelvic washings    Specific Site: pelvis    Other Requests: n/a    Release to patient Immediate        06/05/23 0825    06/05/23 0816  Cytology, Fluid/Wash/Brush  Once,   Status:  Canceled        Question Answer Comment   Source: Peritoneal/abdominal/pelvic wash    Clinical Information: pelvic washigns    Specific Site: bilateral pelvic    Other Requests: n/a    Release to patient Immediate        06/05/23 0816                     ESTIMATED BLOOD LOSS  50 mL

## 2023-06-05 NOTE — TRANSFER OF CARE
Anesthesia Transfer of Care Note    Patient: Sugar Diaz    Procedure(s) Performed: Procedure(s) (LRB):  XI ROBOTIC HYSTERECTOMY,WITH SALPINGO-OOPHORECTOMY (Bilateral)  BIOPSY, sentinel LYMPH NODE (Left)  injection, LYMPH NODE, SENTINEL (Bilateral)  LYMPHADENECTOMY, PELVIS (Right)  REPAIR, OBTURATOR NERVE (Right)    Patient location: PACU    Anesthesia Type: general    Transport from OR: Transported from OR on 2-3 L/min O2 by NC with adequate spontaneous ventilation    Post pain: adequate analgesia    Post assessment: no apparent anesthetic complications    Post vital signs: stable    Level of consciousness: responds to stimulation and sedated    Nausea/Vomiting: no nausea/vomiting    Complications: none    Transfer of care protocol was followed      Last vitals:   Visit Vitals  BP (!) 140/73 (BP Location: Right arm, Patient Position: Lying)   Pulse 83   Temp 36.6 °C (97.8 °F) (Skin)   Resp 16   LMP 11/15/2012   SpO2 96%   Breastfeeding No

## 2023-06-05 NOTE — ANESTHESIA POSTPROCEDURE EVALUATION
Anesthesia Post Evaluation    Patient: Sugar Diaz    Procedure(s) Performed: Procedure(s) (LRB):  XI ROBOTIC HYSTERECTOMY,WITH SALPINGO-OOPHORECTOMY (Bilateral)  BIOPSY, sentinel LYMPH NODE (Left)  injection, LYMPH NODE, SENTINEL (Bilateral)  LYMPHADENECTOMY, PELVIS (Right)  REPAIR, OBTURATOR NERVE (Right)    Final Anesthesia Type: general      Patient location during evaluation: PACU  Patient participation: Yes- Able to Participate  Level of consciousness: awake and alert  Post-procedure vital signs: reviewed and stable  Pain management: adequate  Airway patency: patent    PONV status at discharge: No PONV  Anesthetic complications: no      Cardiovascular status: blood pressure returned to baseline  Respiratory status: unassisted, spontaneous ventilation and room air  Hydration status: euvolemic  Follow-up not needed.          Vitals Value Taken Time   /57 06/05/23 1134   Temp 36.6 °C (97.8 °F) 06/05/23 1131   Pulse 76 06/05/23 1142   Resp 16 06/05/23 1129   SpO2 98 % 06/05/23 1142   Vitals shown include unvalidated device data.      Event Time   Out of Recovery 11:48:45         Pain/Cruz Score: Pain Rating Prior to Med Admin: 7 (6/5/2023 11:29 AM)  Cruz Score: 8 (6/5/2023 11:16 AM)

## 2023-06-05 NOTE — PLAN OF CARE
Problem: Physical Therapy  Goal: Physical Therapy Goal  Description: Goals to be met by: 2023    Patient will increase functional independence with mobility by performin. Sit<>stand with mod (I) with LRAD.  2. Gait x 150 feet with mod (I) with LRAD.  3.Bed mobility with independence.      Outcome: Ongoing, Progressing     Patient evaluated by PT and goals established. Patient ambulated 30 feet in room with CGA using RW, no significant gait deficits noted. Patient required minimal assist for bed mobility/movement of RLE due to pain and difficulty with hip adduction on the RLE.  Discharge recommendation home with outpatient PT. Discharge DME includes RW. PT will continue to follow and progress as tolerated. Please see progress note for detailed plan of care and recommendations.

## 2023-06-05 NOTE — OPERATIVE NOTE ADDENDUM
Certification of Assistant at Surgery       Surgery Date: 6/5/2023     Participating Surgeons:  Surgeon(s) and Role:     * Lavell Rodríguez MD - Primary     * Elsa Owen MD - Resident - Assisting    Procedures:  Procedure(s) (LRB):  XI ROBOTIC HYSTERECTOMY,WITH SALPINGO-OOPHORECTOMY (Bilateral)  BIOPSY, sentinel LYMPH NODE (Left)  injection, LYMPH NODE, SENTINEL (Bilateral)  LYMPHADENECTOMY, PELVIS (Right)  REPAIR, OBTURATOR NERVE (Right)    Assistant Surgeon's Certification of Necessity:  I understand that section 1842 (b) (6) (d) of the Social Security Act generally prohibits Medicare Part B reasonable charge payment for the services of assistants at surgery in teaching hospitals when qualified residents are available to furnish such services. I certify that the services for which payment is claimed were medically necessary, and that no qualified resident was available to perform the services. I further understand that these services are subject to post-payment review by the Medicare carrier.    Kaity Santos PA-C    06/05/2023  1:45 PM

## 2023-06-05 NOTE — ANESTHESIA PROCEDURE NOTES
Intubation    Date/Time: 6/5/2023 7:09 AM  Performed by: Maria Del Carmen Driscoll CRNA  Authorized by: Rashid Lobo MD     Intubation:     Induction:  Inhalational - mask    Intubated:  Postinduction    Mask Ventilation:  Easy with oral airway    Attempted By:  CRNA    Method of Intubation:  Video laryngoscopy    Blade:  Aldo 3    Laryngeal View Grade: Grade I - full view of cords      Difficult Airway Encountered?: No      Complications:  Dental trauma    Airway Device:  Oral endotracheal tube    Airway Device Size:  7.5    Style/Cuff Inflation:  Cuffed    Inflation Amount (mL):  5    Tube secured:  22    Secured at:  The lips    Placement Verified By:  Capnometry    Complicating Factors:  None

## 2023-06-05 NOTE — INTERVAL H&P NOTE
The patient has been examined and the H&P has been reviewed:    I concur with the findings and no changes have occurred since H&P was written.    Surgery risks, benefits and alternative options discussed and understood by patient/family.    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

## 2023-06-06 VITALS
HEART RATE: 89 BPM | BODY MASS INDEX: 25.9 KG/M2 | TEMPERATURE: 98 F | RESPIRATION RATE: 16 BRPM | HEIGHT: 67 IN | OXYGEN SATURATION: 96 % | WEIGHT: 165 LBS | SYSTOLIC BLOOD PRESSURE: 127 MMHG | DIASTOLIC BLOOD PRESSURE: 62 MMHG

## 2023-06-06 PROCEDURE — 94761 N-INVAS EAR/PLS OXIMETRY MLT: CPT

## 2023-06-06 PROCEDURE — 97165 OT EVAL LOW COMPLEX 30 MIN: CPT

## 2023-06-06 PROCEDURE — 25000003 PHARM REV CODE 250: Performed by: STUDENT IN AN ORGANIZED HEALTH CARE EDUCATION/TRAINING PROGRAM

## 2023-06-06 PROCEDURE — 63600175 PHARM REV CODE 636 W HCPCS: Performed by: STUDENT IN AN ORGANIZED HEALTH CARE EDUCATION/TRAINING PROGRAM

## 2023-06-06 PROCEDURE — 97116 GAIT TRAINING THERAPY: CPT

## 2023-06-06 PROCEDURE — 99900035 HC TECH TIME PER 15 MIN (STAT)

## 2023-06-06 PROCEDURE — 25000003 PHARM REV CODE 250: Performed by: OBSTETRICS & GYNECOLOGY

## 2023-06-06 RX ORDER — POTASSIUM CHLORIDE 20 MEQ/1
40 TABLET, EXTENDED RELEASE ORAL ONCE
Status: COMPLETED | OUTPATIENT
Start: 2023-06-06 | End: 2023-06-06

## 2023-06-06 RX ORDER — HEPARIN SODIUM 5000 [USP'U]/ML
5000 INJECTION, SOLUTION INTRAVENOUS; SUBCUTANEOUS EVERY 8 HOURS
Status: DISCONTINUED | OUTPATIENT
Start: 2023-06-06 | End: 2023-06-06 | Stop reason: HOSPADM

## 2023-06-06 RX ADMIN — ACETAMINOPHEN 1000 MG: 500 TABLET, FILM COATED ORAL at 12:06

## 2023-06-06 RX ADMIN — KETOROLAC TROMETHAMINE 15 MG: 30 INJECTION, SOLUTION INTRAMUSCULAR; INTRAVENOUS at 01:06

## 2023-06-06 RX ADMIN — KETOROLAC TROMETHAMINE 15 MG: 30 INJECTION, SOLUTION INTRAMUSCULAR; INTRAVENOUS at 05:06

## 2023-06-06 RX ADMIN — CELECOXIB 100 MG: 100 CAPSULE ORAL at 09:06

## 2023-06-06 RX ADMIN — KETOROLAC TROMETHAMINE 15 MG: 30 INJECTION, SOLUTION INTRAMUSCULAR; INTRAVENOUS at 12:06

## 2023-06-06 RX ADMIN — HEPARIN SODIUM 5000 UNITS: 5000 INJECTION INTRAVENOUS; SUBCUTANEOUS at 05:06

## 2023-06-06 RX ADMIN — SENNOSIDES 8.6 MG: 8.6 TABLET, FILM COATED ORAL at 09:06

## 2023-06-06 RX ADMIN — POTASSIUM CHLORIDE 40 MEQ: 1500 TABLET, EXTENDED RELEASE ORAL at 06:06

## 2023-06-06 RX ADMIN — MAGNESIUM HYDROXIDE 2400 MG: 400 SUSPENSION ORAL at 09:06

## 2023-06-06 RX ADMIN — HEPARIN SODIUM 5000 UNITS: 5000 INJECTION INTRAVENOUS; SUBCUTANEOUS at 01:06

## 2023-06-06 RX ADMIN — ACETAMINOPHEN 1000 MG: 500 TABLET, FILM COATED ORAL at 05:06

## 2023-06-06 RX ADMIN — MUPIROCIN: 20 OINTMENT TOPICAL at 09:06

## 2023-06-06 NOTE — PLAN OF CARE
MSW met patient and  (Jimmy) at bedside.  Patient alert, oriented x3.  Patient is independent prior to admission and denies the use of HH or DME.  Patient address and PCP is correct on facesheet.  Patient  will transport patient home at discharge.     06/06/23 1010   Discharge Assessment   Assessment Type Discharge Planning Assessment   Confirmed/corrected address, phone number and insurance Yes   Confirmed Demographics Correct on Facesheet   Source of Information patient   Communicated AVELINA with patient/caregiver Date not available/Unable to determine   People in Home spouse   Do you expect to return to your current living situation? Yes   Prior to hospitilization cognitive status: Alert/Oriented   Current cognitive status: Alert/Oriented   Equipment Currently Used at Home none   Readmission within 30 days? No   Do you take prescription medications? No   How do you get to doctors appointments? car, drives self   Are you on dialysis? No   Do you take coumadin? No   Discharge Plan A Home with family   DME Needed Upon Discharge  none   Discharge Plan discussed with: Patient   Transition of Care Barriers None     [facility] ([address])  Initial Discharge Assessment       Primary Care Provider: James Hillman MD    Admission Diagnosis: Malignant neoplasm of endometrium [C54.1]  Endometrial cancer [C54.1]  History of robot-assisted laparoscopic hysterectomy [Z90.710]    Admission Date: 6/5/2023  Expected Discharge Date:     Transition of Care Barriers: (P) None    Payor: [employer] / Plan: [facility] / Product Type: Commercial /     Extended Emergency Contact Information  Primary Emergency Contact: Jimmy Diaz  Address: [facility]           [place] [address] [place]  Mobile Phone: [phone]  Relation: Spouse    Discharge Plan A: (P) Home with family         [id number] - [place] - [address] AT [facility]  [address]  [address]  [place] [address]  Phone: [phone] Fax: [phone]      Initial Assessment (most recent)       Adult Discharge Assessment - 06/06/23 1010          Discharge Assessment    Assessment Type Discharge Planning Assessment (P)      Confirmed/corrected address, phone number and insurance Yes (P)      Confirmed Demographics Correct on Facesheet (P)      Source of Information patient (P)      Communicated AVELINA with patient/caregiver Date not available/Unable to determine (P)      People in Home spouse (P)      Do you expect to return to your current living situation? Yes (P)      Prior to hospitilization cognitive status: Alert/Oriented (P)      Current cognitive status: Alert/Oriented (P)      Equipment Currently Used at Home none (P)      Readmission within 30 days? No (P)      Do you take prescription medications? No (P)      How do you get to doctors appointments? car, drives self (P)      Are you on dialysis? No (P)      Do you take coumadin? No (P)      Discharge Plan A Home with family (P)      DME Needed Upon Discharge  none (P)      Discharge Plan discussed with: Patient (P)      Transition of Care Barriers None (P)

## 2023-06-06 NOTE — PROGRESS NOTES
Progress Note  Gynecology    Admit Date: 2023  LOS: 0    Reason for Admission:  Status post hysterectomy    SUBJECTIVE:     Sugar Diaz is a 57 y.o.  who is POD#1 s/p RA-TLH/BSO/SLND for the treatment of Grade 1 Endometriod Endometrial Cancer.    Pt doing well this morning. Pain is well controlled. She has had minimal PO intake. She denies N/V. Ambulating without difficulty. Voiding without difficulty. Passing flatus. She is not yet having bowel movements.    OBJECTIVE:     Vital Signs   Temp:  [97.6 °F (36.4 °C)-98.6 °F (37 °C)] 98.4 °F (36.9 °C)  Pulse:  [75-87] 78  Resp:  [16-20] 18  SpO2:  [95 %-99 %] 95 %  BP: (133-154)/(65-75) 154/72      Intake/Output Summary (Last 24 hours) at 2023 0615  Last data filed at 2023 1131  Gross per 24 hour   Intake 1750 ml   Output 550 ml   Net 1200 ml         Physical Exam:  Gen: A&Ox3, NAD  CV: RR  Pulm: normal respiratory effort  Abd: active bowel sounds, soft, non-distended, non-tender to palpation without rebound or guarding  Inc: 5 port site, c/d/i  Ext: PPP, no peripheral edema, TEDs/SCDs in place  : Deferred  MSK: no sensory loss over obturator nerve distribution bilaterally. Mild weakness in right hip adduction     Laboratory:  No results found for this or any previous visit (from the past 24 hour(s)).      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       Assessment: 57 y.o.  POD#1 RATLH/BSO/SLND/Obturator Nerve Repair    Plan:   1. Post-op   - Routine post-op advances  - Continue PRN pain medications  - Encourage ambulation   - Encourage IS  - Voiding spontaneously, multiple unmeasured voids overnight  - Continue IVF until tolerating regular diet.  - Antiemetics prn nausea/vomiting.    2. Obturator Nerve Injury Intraop  - No sensory deficits noted at obturator  nerve distribution. Mild weakness with right hip adduction  -Neurology consulted, recommend PT/OT  - PT/OT evaluated> will need home health PT/OT at discharge    Dispo: As patient meets appropriate post-op milestones, plan for discharge to home.    Alyssa Coulter MD PGY-3  Obstetrics and Gynecology

## 2023-06-06 NOTE — PT/OT/SLP PROGRESS
"Physical Therapy Treatment    Patient Name:  Sugar Diaz   MRN:  [id number]    Recommendations:     Discharge Recommendations: outpatient PT (MD / pt requesting HH)  Discharge Equipment Recommendations: walker, rolling  Barriers to discharge: None    Assessment:     Sugar Diaz is a 57 y.o. female admitted with a medical diagnosis of Status post hysterectomy.  She presents with the following impairments/functional limitations: weakness, impaired endurance, impaired sensation, impaired self care skills, impaired functional mobility, decreased lower extremity function, pain.    Pt tolerated treatment well with no adverse reactions. Pt is progressing toward meeting goals. Pt performed in-room ambulation without RW. Demonstrated improved tolerance to out of bed mobility, however, continues to be limited secondary to RLE pain / impaired sensation. Pt and  instructed on activity modifications for bed mobility so that patient can utilize leg abductors as opposed to leg adductors. PT will continue to follow and progress goals as tolerated. Recommend discharge to home with outpatient PT.     Rehab Prognosis: Good; patient would benefit from acute skilled PT services to address these deficits and reach maximum level of function.    Recent Surgery: Procedure(s) (LRB):  XI ROBOTIC HYSTERECTOMY,WITH SALPINGO-OOPHORECTOMY (Bilateral)  BIOPSY, sentinel LYMPH NODE (Left)  injection, LYMPH NODE, SENTINEL (Bilateral)  LYMPHADENECTOMY, PELVIS (Right)  REPAIR, OBTURATOR NERVE (Right) 1 Day Post-Op    Plan:     During this hospitalization, patient to be seen 3 x/week to address the identified rehab impairments via gait training, therapeutic activities, therapeutic exercises, neuromuscular re-education and progress toward the following goals:    Plan of Care Expires:  07/05/23    Subjective     Chief Complaint: right leg pain / "weird" sensation  Patient/Family Comments/goals: Pt expressed concern about re-damaging " nerve  Pain/Comfort:  Pain Rating 1:  (Patient reports abdominal pain and pain in anterior/medial portion of RLE but patient does not provide pain rating)  Pain Addressed 1: Reposition, Distraction      Objective:     Patient found HOB elevated with peripheral IV, SCD upon PT entry to room.     General Precautions: Standard, fall  Orthopedic Precautions: N/A  Braces: N/A  Respiratory Status: Room air     Functional Mobility:  Bed Mobility:     Supine to Sit: minimum assistance (for assistance at trunk)   Sit to Supine: minimum assistance (assistance for lifting of RLE)  Transfers:     Sit to Stand:  stand by assistance with no AD  Gait: x45 and x15 feet with stand by assistance with no AD. Pt with decreased speed, walker, and bilateral step length. Decreased stability, however, no LOB noted.     AM-PAC 6 CLICK MOBILITY  Turning over in bed (including adjusting bedclothes, sheets and blankets)?: 3  Sitting down on and standing up from a chair with arms (e.g., wheelchair, bedside commode, etc.): 3  Moving from lying on back to sitting on the side of the bed?: 3  Moving to and from a bed to a chair (including a wheelchair)?: 3  Need to walk in hospital room?: 3  Climbing 3-5 steps with a railing?: 3  Basic Mobility Total Score: 18     Treatment & Education:  Bed mobility, transfer, and gait training as described above.    PT educated patient and spouse re:   PT plan of care/role of PT  Use of activity modifications  Fall and safety precautions  Gait deviations  Use of call light (don't get up without assistance)  Pt and spouse verbalized understanding     The patient is safe to transfer with RN assist  Patient encouraged to sit up in chair throughout the day to prevent deconditioning.     Patient left HOB elevated with all lines intact, call button in reach, and  present..    GOALS:   Multidisciplinary Problems       Physical Therapy Goals          Problem: Physical Therapy    Goal Priority Disciplines Outcome  Goal Variances Interventions   Physical Therapy Goal     PT, PT/OT Ongoing, Progressing     Description: Goals to be met by: 2023    Patient will increase functional independence with mobility by performin. Sit<>stand with mod (I) with LRAD.  2. Gait x 150 feet with mod (I) with LRAD.  3.Bed mobility with independence.                           Time Tracking:     PT Received On: 23  PT Start Time: 826     PT Stop Time: 836  PT Total Time (min): 10 min     Billable Minutes: Gait Training 10    Treatment Type: Treatment  PT/PTA: PT     Number of PTA visits since last PT visit: 0     2023

## 2023-06-06 NOTE — PT/OT/SLP EVAL
Occupational Therapy   Evaluation & Discharge Summary    Name: Sugar Diaz  MRN: [id number]  Admitting Diagnosis: Status post hysterectomy  Recent Surgery: Procedure(s) (LRB):  XI ROBOTIC HYSTERECTOMY,WITH SALPINGO-OOPHORECTOMY (Bilateral)  BIOPSY, sentinel LYMPH NODE (Left)  injection, LYMPH NODE, SENTINEL (Bilateral)  LYMPHADENECTOMY, PELVIS (Right)  REPAIR, OBTURATOR NERVE (Right) 1 Day Post-Op    Recommendations:     Discharge Recommendations: outpatient PT  Discharge Equipment Recommendations:  walker, rolling  Barriers to discharge:  None    Assessment:     Sugar Diaz is a 57 y.o. female with a medical diagnosis of Status post hysterectomy.  She presents with no pain. Performance deficits affecting function: weakness, decreased lower extremity function, impaired functional mobility, pain, impaired sensation.  Pt agreeable to participating in therapy upon arrival to room.  Pt demonstrates strength and ROM in (B) UE needed for ADLs that is WNL, and is able to perform sit <> stand transfer independently.  Pt able to perform grooming task standing at sink without assist.    Overall, pt tolerated therapy well.  PTA pt reports being (I) with ADLs and functional mobility. Pt does not appear to have any acute OT needs; however, some weakness and decreased sensation noted in RLE during bed mobility.  Pt to d/c from OT. OP PT is recommended upon d/c from acute care to further address deficits and help pt improve overall functional independence.       Rehab Prognosis: Good; patient would benefit from acute skilled OT services to address these deficits and reach maximum level of function.       Plan:     Patient to d/c from OT; OP PT recommended.    Subjective     Chief Complaint: weakness in RLE with hip adduction  Patient/Family Comments/goals: Return home; resume PLOF    Occupational Profile:  Living Environment: Pt lives with  in [facility], [address].  Bathroom has tub/shower.  Previous level of function:  (I) with ADLs, iADLs, and mobility   Roles and Routines: Wife, did not mention hobbies  Equipment Used at Home: none  Assistance upon Discharge:  or other family members able to provide assist as needed    Pain/Comfort:  Pain Rating 1: 0/10 (no pain at rest; pt indicated pain with movement)  Pain Rating Post-Intervention 1: 0/10    Patients cultural, spiritual, Episcopal conflicts given the current situation: no    Objective:     Communicated with: PT prior to session.  Patient found HOB elevated with peripheral IV, SCD upon OT entry to room.   present during session.    General Precautions: Standard, fall  Orthopedic Precautions: N/A  Braces: N/A  Respiratory Status: Room air    Occupational Performance:    Bed Mobility:    Supine <> sit: Min A  Scooting: CGA    Functional Mobility/Transfers:  Sit <> Stand: Independent x 1 trial from EOB  Functional Mobility: Pt ambulated  ~15 ft in room with supervision-independence.  No instances of LOB noted.    Activities of Daily Living:  Grooming: Independent for washing hands while standing at sink.    Cognitive/Visual Perceptual:  Cognitive/Psychosocial Skills:    -       Oriented to: Person, Place, Time, and Situation   -       Follows Commands/attention: Follows multistep commands  -       Communication: clear/fluent  -       Memory: No Deficits noted  -       Safety awareness/insight to disability: intact   -       Mood/Affect/Coping skills/emotional control: Cooperative and Pleasant    Physical Exam:  Postural examination/scapula alignment:    -       No postural abnormalities identified  Skin integrity: Visible skin intact  Edema:  None noted  Sensation: -       Intact for (B UE; reports some numbness in RLE  Motor Planning: WNL  Dominant hand: Right  Upper Extremity Range of Motion:    -       Right Upper Extremity: WNL  -       Left Upper Extremity: WNL  Upper Extremity Strength:   -       Right Upper Extremity: WNL; 5/5 all muscle groups  -       Left  Upper Extremity: WNL; 5/5 all muscle groups   Strength: 5/5 both hands  Fine Motor Coordination: -       Intact  Gross motor coordination: WFL  Balance: Sitting- Independent; Standing- Independent     AMPAC 6 Click ADL:  AMPAC Total Score: 21    Treatment & Education:  *Pt educated on role of OT in acute care setting and log rolling technique with bed mobility     Patient left sitting EOB with all lines intact, call button in reach, MD notified, and  present    GOALS:   Multidisciplinary Problems       Occupational Therapy Goals       Not on file              Multidisciplinary Problems (Resolved)          Problem: Occupational Therapy    Goal Priority Disciplines Outcome Interventions   Occupational Therapy Goal   (Resolved)     OT, PT/OT Met                        History:     Past Medical History:   Diagnosis Date    Chest pain 2002    NEGATIVE STRESS ECHO 2002    GERD (gastroesophageal reflux disease)     Lower back pain     Migraines     Mild allergic rhinitis     Postmenopausal bleeding          Past Surgical History:   Procedure Laterality Date    CYST REMOVAL Left 04/28/2022    Procedure: EXCISION, CYST-BACK;  Surgeon: Thomas Hurst MD;  Location: [facility] OR;  Service: General;  Laterality: Left;  left upper back  RN PREOP ON 4/21/22-NF    HYSTEROSCOPY WITH DILATION AND CURETTAGE OF UTERUS N/A 05/23/2023    Procedure: HYSTEROSCOPY, WITH DILATION AND CURETTAGE OF UTERUS;  Surgeon: Omaira Evans MD;  Location: [facility] OR;  Service: OB/GYN;  Laterality: N/A;  [facility]  [phone] TEXTED H[facility] ON 5/2/2023 @ 3:51PM. [facility] RESPONDED ON 5/2/2023 @ 3:51PM-LO  RN PREOP 5/18/23  T & S; UPT ORDERED AND SCHEDULED 5/22/23    LYMPH NODE BIOPSY Left 6/5/2023    Procedure: BIOPSY, sentinel LYMPH NODE;  Surgeon: Lavell Rodríguez MD;  Location: [facility] OR;  Service: OB/GYN;  Laterality: Left;    LYMPHADENECTOMY, PELVIS Right 6/5/2023    Procedure: LYMPHADENECTOMY, PELVIS;  Surgeon: Lavell Rodríguez MD;   Location: [facility] OR;  Service: OB/GYN;  Laterality: Right;    MAPPING, LYMPH NODE, SENTINEL Bilateral 6/5/2023    Procedure: injection, LYMPH NODE, SENTINEL;  Surgeon: Lavell Rodríguez MD;  Location: [facility] OR;  Service: OB/GYN;  Laterality: Bilateral;    NERVE REPAIR Right 6/5/2023    Procedure: REPAIR, OBTURATOR NERVE;  Surgeon: Lavell Rodríguez MD;  Location: [facility] OR;  Service: OB/GYN;  Laterality: Right;    TONSILLECTOMY      TONSILLECTOMY      TUBAL LIGATION      XI ROBOTIC HYSTERECTOMY, WITH SALPINGO-OOPHORECTOMY Bilateral 6/5/2023    Procedure: XI ROBOTIC HYSTERECTOMY,WITH SALPINGO-OOPHORECTOMY;  Surgeon: Lavell Rodríguez MD;  Location: [facility];  Service: OB/GYN;  Laterality: Bilateral;  removed through vagina       Time Tracking:     OT Date of Treatment: 06/06/23  OT Start Time: 1341  OT Stop Time: 1350  OT Total Time (min): 9 min    Billable Minutes:Evaluation 9    JUSTINA Guerrier  6/6/2023

## 2023-06-06 NOTE — DISCHARGE SUMMARY
[facility] ([address])  Obstetrics & Gynecology  Discharge Summary    Patient Name: Sugar Diaz  MRN: [id number]  Admission Date: 6/5/2023  Hospital Length of Stay: 0 days  Discharge Date and Time:  06/06/2023 1:50 PM  Attending Physician: Lavell Rodríguez MD   Discharging Provider: Alyssa Coulter MD  Primary Care Provider: James Hillman MD    HPI: Ms. Diaz is a 57 year old female with endometroid endometrial cancer who presents for RA-TLH/BSO/SNLD    Hospital Course:   06/05/2023: POD 0 s/p RA-TLH/BSO/SLND/Obturator nerve repair. See op note for details. Neurology and PT/OT consulted due to right obturator nerve injury.  06/06/2023: POD 1:  Ambulating well. PT and neurology recommend outpatient PT/OT. No sensory deficits noted at obturator nerve distribution. Mild weakness with right hip adduction. Meeting post op milestones and stable for discharge.    Procedure(s) (LRB):  XI ROBOTIC HYSTERECTOMY,WITH SALPINGO-OOPHORECTOMY (Bilateral)  BIOPSY, sentinel LYMPH NODE (Left)  injection, LYMPH NODE, SENTINEL (Bilateral)  LYMPHADENECTOMY, PELVIS (Right)  REPAIR, OBTURATOR NERVE (Right)     Consults:   Consults (From admission, onward)          Status Ordering Provider     Inpatient consult to Social Work  Once        Provider:  (Not yet assigned)    ALYSSA Cramer     Inpatient consult to Neurology  Once        Provider:  Donny Marti MD    Completed KATHLEEN BALES            Significant Diagnostic Studies: N/A    Pending Diagnostic Studies:       Procedure Component Value Units Date/Time    Cytology, Fluid/Wash/Brush [[id number]] Collected: 06/05/23 0826    Order Status: Sent Lab Status: In process Updated: 06/05/23 0840    Specimen: Body Fluid     Specimen to Pathology, Surgery Gynecology and Obstetrics [[id number]] Collected: 06/05/20 0942    Order Status: Sent Lab Status: In process Updated: 06/05/23 1513    Specimen: Tissue           Final Active Diagnoses:    Diagnosis  "Date Noted POA    PRINCIPAL PROBLEM:  S/p RA-TLH/BSO/SLND [Z90.710] 06/05/2023 No    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    Specialty: Gynecologic Oncology  Why: Post-op Visit  Contact information:  [address]  [address]  [place] [address]  [phone]                           Patient Instructions:      WALKER FOR HOME USE     Order Specific Question Answer Comments   Type of Walker: Adult (5'4"-6'6")    With wheels? Yes    Height: 5' 7" (1.702 m)    Weight: 74.8 kg (165 lb)    Length of need (1-99 months): 99    Does patient have medical equipment at home? none    Please check all that apply: Patient's condition impairs ambulation.      Ambulatory referral/consult to Physical/Occupational Therapy   Standing Status: Future   Referral Priority: Routine Referral Type: Physical Medicine   Referral Reason: Specialty Services Required   Number of Visits Requested: 1     Diet general     Lifting restrictions   Order Comments: No lifting greater than 15 pounds for six weeks.     Other restrictions (specify):   Order Comments: PELVIC REST (IF YOU HAD A HYSTERECTOMY):  No douching, tampons, or intercourse for 6 weeks.    If prescribed, vaginal estrogen cream may be used during the postoperative period.     DRIVING:  No driving while on narcotics. Driving may be resumed initially with a competent passenger one to two weeks after surgery if no longer taking narcotics.     EXERCISE:  For six weeks your exercise should be limited to walking. You may walk as far as you wish, as long as you increase your level of exertion gradually and avoid slippery surfaces. You may climb stairs as needed to get around, but should not use stair climbing for exercise.     Remove dressing in 24 hours   Order Comments: If you have a " bandage on wound, you may remove it the day after dismissal.  If you had steri-strips remove them once they begin to peel off (usually 2 weeks).  If your steri-strips still haven't come off in 2 weeks, please remove them.  Keep incision clean and dry.  Inspect the incision daily for signs and symptoms of infection.     Wound care routine (specify)   Order Comments: WOUND CARE:  If you have a band-aid or bandage on your wound, you may remove it the day after dismissal.  You may wash the wound with mild soap and water.   You may shower at any time but should avoid immersing any abdominal incisions in water for at least two weeks after surgery or until the wound is completely healed. If given, please shower with Hibiclens soap until bottle is completely finished. Keep your wound clean and dry.  You should observe your incision for signs of infection which include redness, warmth, drainage or fever.     Call MD for:  temperature >100.4     Call MD for:  persistent nausea and vomiting     Call MD for:  severe uncontrolled pain     Call MD for:  difficulty breathing, headache or visual disturbances     Call MD for:  redness, tenderness, or signs of infection (pain, swelling, redness, odor or green/yellow discharge around incision site)     Call MD for:  hives     Call MD for:   Order Comments: inability to void,urine is ketchup colored or you have large clots, vaginal bleeding is heavier than a period.    VAGINAL DISCHARGE: You may develop a vaginal discharge and intermittent vaginal spotting after surgery and up to 6 weeks postoperatively.  The discharge may have an odor and may change in color but it is normal.  This is due to dissolving stiches.  Contact your surgical team if you develop vaginal or vulvar irritation along with a discharge.  Also contact your surgical team if you have vaginal discharge that smells like urine or stool.    CONSTIPATION REMEDIES: Patients are often constipated after surgery or with use of  oral narcotic medicine. You should continue to take the stool softener, Senokot-S during the next six weeks, and consume adequate amounts of water.  If you have not had a bowel movement for 3 days after dismissal, or are uncomfortable and unable to pass stool, please try one or all of the following measures:  1.  Milk of Magnesia - 30 cc by mouth every 12 hours   2.  Dulcolax suppository - One suppository per rectum every 4-6 hours   3.  Metamucil, Fibercon or other bulk former - use as directed  4.  Fleets Enema      PAIN MEDICATIONS:   Take your pain medications as instructed. It is best to take pain medications before your pain becomes severe. This will allow you to take less medication yet have better pain relief. For the first 2 or 3 days it may be helpful to take your pain medications on a regular schedule (e.g. every 4 to 6 hours). This will help you to keep your pain under better control. You should then begin to take fewer medications each day until you no longer need them. Do not take pain medication on an empty stomach. This may lead to nausea and vomiting.     Activity as tolerated   Order Comments: PELVIC REST:  No douching, tampons, or intercourse for 6 weeks.    If prescribed, vaginal estrogen cream may be used during the postoperative period.     DRIVING:  No driving while on narcotics. Driving may be resumed initially with a competent passenger one to two weeks after surgery if no longer taking narcotics.     EXERCISE:  For six weeks your exercise should be limited to walking. You may walk as far as you wish, as long as you increase your level of exertion gradually and avoid slippery surfaces. You may climb stairs as needed to get around, but should not use stair climbing for exercise.     Shower on day dressing removed (No bath)   Order Comments: You may shower at any time but should avoid immersing any abdominal incisions in water for at least 2 weeks after surgery or until the wound is completely  healed.  If given, please shower with Hibaclens soap until bottle is completely finish     Medications:  Reconciled Home Medications:      Medication List        START taking these medications      oxyCODONE 5 MG immediate release tablet  Commonly known as: ROXICODONE  Take 1 tablet (5 mg total) by mouth every 4 (four) hours as needed for Pain.     SENNA 8.6 mg tablet  Generic drug: senna  Take 1 tablet by mouth daily as needed for Constipation.            CHANGE how you take these medications      * acetaminophen 500 MG tablet  Commonly known as: TYLENOL EXTRA STRENGTH  Take 2 tablets (1,000 mg total) by mouth every 6 (six) hours as needed for Pain.  What changed: Another medication with the same name was added. Make sure you understand how and when to take each.     * acetaminophen 650 MG Tbsr  Commonly known as: TYLENOL  Take 1 tablet (650 mg total) by mouth every 6 (six) hours as needed (pain).  What changed: You were already taking a medication with the same name, and this prescription was added. Make sure you understand how and when to take each.     * ibuprofen 800 MG tablet  Commonly known as: ADVIL,MOTRIN  Take 1 tablet (800 mg total) by mouth every 8 (eight) hours as needed for Pain.  What changed: Another medication with the same name was added. Make sure you understand how and when to take each.     * ibuprofen 600 MG tablet  Commonly known as: ADVIL,MOTRIN  Take 1 tablet (600 mg total) by mouth every 6 (six) hours as needed for Pain.  What changed: You were already taking a medication with the same name, and this prescription was added. Make sure you understand how and when to take each.           * This list has 4 medication(s) that are the same as other medications prescribed for you. Read the directions carefully, and ask your doctor or other care provider to review them with you.                CONTINUE taking these medications      azelastine 137 mcg (0.1 %) nasal spray  Commonly known as:  ASTELIN  1 spray (137 mcg total) by Nasal route 2 (two) times daily.     diphenhydrAMINE 25 mg capsule  Commonly known as: BENADRYL  Take 25 mg by mouth every 6 (six) hours as needed for Itching.     loratadine 10 mg tablet  Commonly known as: CLARITIN  Take 1 tablet (10 mg total) by mouth once daily.     valACYclovir 1000 MG tablet  Commonly known as: VALTREX  Two pills at onset of fever blister and then repeat 2 pills 12 hr later              Alyssa Coulter MD  Obstetrics & Gynecology  [facility] ([address])

## 2023-06-06 NOTE — PLAN OF CARE
Patient is AAOx4. Spouse at bedside. Patient reported pain during the night and medications were administered per orders. Incisions to abdomen, CDI. Ambulates with stand-by assist. No acute events to note. Patient resting comfortably at present. Bed locked in lowest position with side rails up x 2. Call light within reach of patient. Will continue purposeful rounding.

## 2023-06-06 NOTE — PLAN OF CARE
Problem: Occupational Therapy  Goal: Occupational Therapy Goal  Outcome: Met     OT evaluation complete with no major deficits identified with ADLs or functional mobility as part of daily routine.  Pt is performing at baseline and does not require OT services in the acute care setting at this time.      Pt to d/c from OT.  Pt is experiencing some decreased sensation and weakness in RLE.  OP PT is recommended upon d/c from acute care to further address deficits and help pt improve overall functional independence. Full evaluation note to follow.    JUSTINA Guerrier  6/6/2023

## 2023-06-07 DIAGNOSIS — C54.1 MALIGNANT NEOPLASM OF ENDOMETRIUM: ICD-10-CM

## 2023-06-07 LAB
FINAL PATHOLOGIC DIAGNOSIS: NORMAL
Lab: NORMAL

## 2023-06-08 ENCOUNTER — PATIENT MESSAGE (OUTPATIENT)
Dept: GYNECOLOGIC ONCOLOGY | Facility: CLINIC | Age: 58
End: 2023-06-08
Payer: COMMERCIAL

## 2023-06-08 DIAGNOSIS — Z90.710 STATUS POST HYSTERECTOMY: Primary | ICD-10-CM

## 2023-06-08 DIAGNOSIS — C54.1 MALIGNANT NEOPLASM OF ENDOMETRIUM: Primary | ICD-10-CM

## 2023-06-08 DIAGNOSIS — C54.1 MALIGNANT NEOPLASM OF ENDOMETRIUM: ICD-10-CM

## 2023-06-08 DIAGNOSIS — S74.8X9A: ICD-10-CM

## 2023-06-08 DIAGNOSIS — G47.01 INSOMNIA DUE TO MEDICAL CONDITION: ICD-10-CM

## 2023-06-08 RX ORDER — ZOLPIDEM TARTRATE 5 MG/1
5 TABLET ORAL NIGHTLY PRN
Qty: 20 TABLET | Refills: 0 | Status: SHIPPED | OUTPATIENT
Start: 2023-06-08 | End: 2023-09-12 | Stop reason: SDUPTHER

## 2023-06-09 ENCOUNTER — CLINICAL SUPPORT (OUTPATIENT)
Dept: REHABILITATION | Facility: OTHER | Age: 58
End: 2023-06-09
Payer: COMMERCIAL

## 2023-06-09 DIAGNOSIS — R29.898 RIGHT LEG WEAKNESS: ICD-10-CM

## 2023-06-09 DIAGNOSIS — C54.1 MALIGNANT NEOPLASM OF ENDOMETRIUM: ICD-10-CM

## 2023-06-09 DIAGNOSIS — S74.8X9A: ICD-10-CM

## 2023-06-09 DIAGNOSIS — Z74.09 DECREASED MOBILITY AND ENDURANCE: ICD-10-CM

## 2023-06-09 PROBLEM — M54.50 CHRONIC BILATERAL LOW BACK PAIN WITHOUT SCIATICA: Status: RESOLVED | Noted: 2018-12-21 | Resolved: 2023-06-09

## 2023-06-09 PROBLEM — M53.86 DECREASED ROM OF LUMBAR SPINE: Status: RESOLVED | Noted: 2018-12-21 | Resolved: 2023-06-09

## 2023-06-09 PROBLEM — G89.29 CHRONIC BILATERAL LOW BACK PAIN WITHOUT SCIATICA: Status: RESOLVED | Noted: 2018-12-21 | Resolved: 2023-06-09

## 2023-06-09 PROCEDURE — 97161 PT EVAL LOW COMPLEX 20 MIN: CPT | Mod: PN | Performed by: PHYSICAL THERAPIST

## 2023-06-09 PROCEDURE — 97110 THERAPEUTIC EXERCISES: CPT | Mod: PN | Performed by: PHYSICAL THERAPIST

## 2023-06-09 NOTE — PLAN OF CARE
[facility]   Physical Therapy Initial Evaluation      Date: 6/9/2023   Name: Sugar Ball [facility] Number: [id number]    Therapy Diagnosis:   Encounter Diagnoses   Name Primary?    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 She notes difficulty with lateral stepping and rolling over. She is taking half her prescription dose of pain medication to make it last. She is doing better today than she was a few days ago.    Falls: none    Imaging: no imaging pertinent to this injury    Prior Therapy: [facility] program ([id number])  Social History: lives in a house with stairs, with family  Prior Level of Function: no deficits  Current Level of Function: significant pain and difficulty with certain movements    Pain: mild to severe R anterior/lateral hip pain  Aggravating Factors: movement, transfers  Easing Factors: activity, modification, rest, pain medication    Patients goals: improve pain, increase mobility     Medical History:   Past Medical History:   Diagnosis Date    Chest pain 2002    NEGATIVE STRESS ECHO 2002    GERD (gastroesophageal reflux disease)     Lower back pain     Migraines     Mild allergic rhinitis     Postmenopausal bleeding         Surgical History:   Sugar Diaz  has a past surgical history that includes TONSILLECTOMY; Tubal ligation; Cyst Removal (Left, 04/28/2022); Hysteroscopy with dilation and curettage of uterus (N/A, 05/23/2023); Tonsillectomy; xi robotic hysterectomy, with salpingo-oophorectomy (Bilateral, 6/5/2023); Lymph node biopsy (Left, 6/5/2023); mapping, lymph node, sentinel (Bilateral, 6/5/2023); lymphadenectomy, pelvis (Right, 6/5/2023); and Nerve repair (Right, 6/5/2023).    Medications:   Sugar has a current medication list which includes the following prescription(s): acetaminophen, acetaminophen, azelastine, diphenhydramine, ibuprofen, ibuprofen, loratadine, oxycodone, senna, valacyclovir, and zolpidem.    Allergies:   Review of patient's allergies indicates:  No Known Allergies     OBJECTIVE     Posture: slouched  Palpation: tenderness to palpation of R iliopsoas, rectus femoris, gracilis, adductor longus, adductor ronald  Transfers: independent, but grimaces  Gait: hesitant, no significant antalgia, increased double-stance time      Lower Extremity Strength 6/9/23   R hip flexion 3-/5   R hip external rotation 3/5   R hip abduction 3-/5   R hip adduction 2/5   R knee flexion 3/5   R knee extension 3/5   L hip flexion 4/5   L hip external rotation 4/5   L hip abduction 4/5   L hip adduction 4/5   L knee flexion 4/5   L knee extension 4/5   *gentle MMT due to post-op status    Hip Range of Motion 6/9/23   R flexion 90°   R extension 0°   R external rotation 15°   R internal rotation 15°   R abduction 15°   R adduction WNL   L flexion WNL   L extension WNL   L external rotation WNL   L internal rotation WNL   L abduction WNL   L adduction WNL       Limitation/Restriction for FOTO Upper Leg Survey    Therapist reviewed FOTO scores for Sugar Diaz on 6/9/2023.   FOTO documents entered into EPIC - see Media section.    Limitation Score: 34% (56% impairment)     TREATMENT     Total Treatment time  (time-based codes) separate from Evaluation: 20 minutes   Sugar received the treatments listed below:      Therapeutic exercises to develop strength, endurance and core stabilization for 20 minutes -  Supine heel slides (R), x3 - painful but doable  Hooklying hip external rotation/internal rotation (R) - too painful  Supine quad set (R), x2  Seated long-arc quads (R), x5  Seated hip adduction isometric with ball, x6 with 25% effort  Home Exercise Program building/review    PATIENT EDUCATION AND HOME EXERCISES     Education provided: obturator nerve anatomy, timelines for nerve healing, pt prognosis, PT plan of care    Written Home Exercises Provided: yes. Exercises were reviewed and Sugar was able to demonstrate them prior to the end of the session. Sugar demonstrated good understanding of the education provided. See EMR under 'Patient Instructions' for exercises provided during therapy sessions.    ASSESSMENT     Sugar is a 57 y.o. female referred to outpatient physical therapy with a medical diagnosis of malignant neoplasm of endometrium, injury of obturator nerve during surgery. Patient presents with deficits to functional mobility, R lower extremity strength and R hip range of motion, as well as myofascial dysfunction about the R anterior hip. Symptoms are consistent with obturator nerve injury and post-op pain. Symptoms inhibit her ability to perform transfers/rolling and ADLs.    Patient prognosis is Good.   Patient will benefit from skilled outpatient physical therapy to address the deficits stated above and in the chart below, provide patient /family education, and to maximize patient's level of independence.     Plan of care discussed with patient: yes  Patient's spiritual, cultural and educational needs considered and patient is agreeable to the plan of care and goals as stated below:     Anticipated Barriers for therapy: none    Medical Necessity is demonstrated by the following  History  Co-morbidities  and personal factors that may impact the plan of care Co-morbidities:   TONSILLECTOMY; Tubal ligation; Cyst Removal (Left, 04/28/2022); Hysteroscopy with dilation and curettage of uterus (N/A, 05/23/2023); Tonsillectomy; xi robotic hysterectomy, with salpingo-oophorectomy (Bilateral, 6/5/2023); Lymph node biopsy (Left, 6/5/2023); mapping, lymph node, sentinel (Bilateral, 6/5/2023); lymphadenectomy, pelvis (Right, 6/5/2023); and Nerve repair (Right, 6/5/2023)  Chest pain    NEGATIVE STRESS ECHO 2002   GERD (gastroesophageal reflux disease)    Lower back pain    Migraines    Mild allergic rhinitis    Postmenopausal bleeding        Personal Factors:   no deficits     moderate   Examination  Body Structures and Functions, activity limitations and participation restrictions that may impact the plan of care Body Regions:   lower extremities    Body Systems:    ROM  strength  balance  gait  transfers    Participation Restrictions:   Transfers, rolling, ADLs    Activity limitations:   Learning and applying knowledge  no deficits    General Tasks and Commands  no deficits    Communication  no deficits    Mobility  Walking, lifting/carrying objects, driving, transfers    Self care  no deficits    Domestic Life  doing house work (cleaning house, washing dishes, laundry)    Interactions/Relationships  no deficits    Life Areas  no deficits    Community and Social Life  community life  recreation and leisure         low   Clinical Presentation stable and uncomplicated low   Decision Making/ Complexity Score: low         Short Term Goals (6 weeks)  Pt will report pain of no more than moderate in the R hip with regular activity  Pt will demonstrate R knee and hip strength of at least 4-/5 for improved support of the hip with functional mobility  Pt will demonstrate log roll transfer with minimal/no cueing to reduce strain on abdominal wall/R hip with bed/mat transfers  Pt will be independent with introductory HEP    Long Term  Goals (12 weeks)  Pt will report pain of no more than mild in the R hip with regular activity  Pt will demonstrate R hip and knee strength of at least 4+/5 for improved support of the hip with functional mobility  Pt will demonstrate R hip range of motion WFL for improved positional tolerance and functional mobility  Pt will score at least 67% on FOTO Upper Leg Survey  to demonstrate overall functional improvement  Pt will be independent with comprehensive HEP      PLAN     Plan of Care Certification: 6/9/2023 to 9/9/2023    Outpatient Physical Therapy: 2 times weekly for 12 weeks to include the following interventions - Therapeutic Exercise, Manual Therapy, Therapeutic Activity, Neuromuscular Re-education, Electrical Stimulation (Unattended)      Jesusita Rapp, PT, DPT

## 2023-06-13 ENCOUNTER — PATIENT MESSAGE (OUTPATIENT)
Dept: GYNECOLOGIC ONCOLOGY | Facility: CLINIC | Age: 58
End: 2023-06-13
Payer: COMMERCIAL

## 2023-06-13 ENCOUNTER — CLINICAL SUPPORT (OUTPATIENT)
Dept: REHABILITATION | Facility: OTHER | Age: 58
End: 2023-06-13
Payer: COMMERCIAL

## 2023-06-13 ENCOUNTER — DOCUMENTATION ONLY (OUTPATIENT)
Dept: REHABILITATION | Facility: OTHER | Age: 58
End: 2023-06-13

## 2023-06-13 DIAGNOSIS — R29.898 RIGHT LEG WEAKNESS: Primary | ICD-10-CM

## 2023-06-13 DIAGNOSIS — Z74.09 DECREASED MOBILITY AND ENDURANCE: ICD-10-CM

## 2023-06-13 DIAGNOSIS — C77.2 SECONDARY MALIGNANCY OF RETROPERITONEAL LYMPH NODES: ICD-10-CM

## 2023-06-13 DIAGNOSIS — Z90.710 STATUS POST HYSTERECTOMY: ICD-10-CM

## 2023-06-13 DIAGNOSIS — C54.1 MALIGNANT NEOPLASM OF ENDOMETRIUM: Primary | ICD-10-CM

## 2023-06-13 LAB
COMMENT: NORMAL
FINAL PATHOLOGIC DIAGNOSIS: NORMAL
FINAL PATHOLOGIC DIAGNOSIS: NORMAL
FROZEN SECTION DIAGNOSIS: NORMAL
GROSS: NORMAL
Lab: NORMAL
Lab: NORMAL
SUPPLEMENTAL DIAGNOSIS: NORMAL
SUPPLEMENTAL DIAGNOSIS: NORMAL

## 2023-06-13 PROCEDURE — 97112 NEUROMUSCULAR REEDUCATION: CPT | Mod: PN,CQ

## 2023-06-13 PROCEDURE — 97110 THERAPEUTIC EXERCISES: CPT | Mod: PN,CQ

## 2023-06-13 NOTE — PROGRESS NOTES
"[facility] OUTPATIENT THERAP[facility] AND [facility]LLNESS   Physical Therapy Treatment Note     Name: Sugar Ball [facility] Number: [id number]    Therapy Diagnosis:   Encounter Diagnoses   Name Primary?    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 States she has been performing her HEP as directed.   She was compliant with home exercise program.  Response to previous treatment: tolerated well.   Functional change: n/a    Prior Level of Function: no deficits  Current Level of Function: significant pain and difficulty with certain movements     Pain: mild to severe R anterior/lateral hip pain  Aggravating Factors: movement, transfers  Easing Factors: activity, modification, rest, pain medication     Patients goals: improve pain, increase mobility    OBJECTIVE     Objective Measures updated at progress report unless specified.     Treatment     Sugar received the treatments listed below:      Therapeutic exercises to develop strength, endurance and core stabilization for 20 minutes -  Supine heel slides (R), x3 - too painful today     R quad set, 5" hold, 10x - painful  +R SAQ, 5"  hold 2x10  R LAQ, 3x10  +Supine glute sets 5" hold 20x        neuromuscular re-education activities to improve: Balance, Coordination, Kinesthetic, Sense, and Proprioception for 23 minutes. The following activities were " "included:    +Supine abdominal bracing with red theraball, 5" hold x20  Seated hip adduction isometric with ball, 2x10 with approx 25% effort  +Hooklying hip isometrics into pilates ring, 5" hold 20x with approx 25% effort  +Supine TrA 5" hold x20    therapeutic activities to improve functional performance for 00 minutes, including:  NP        Patient Education and Home Exercises     Home Exercises Provided and Patient Education Provided     Education provided:   - Exercise form and rationale     Written Home Exercises Provided: Patient instructed to cont prior HEP. Exercises were reviewed and Sugar was able to demonstrate them prior to the end of the session.  Sugar demonstrated good  understanding of the education provided. See EMR under Patient Instructions for exercises provided during therapy sessions    ASSESSMENT     Sugar tolerated exercise fair this visit. Weakness in glutes and quads are notable as well as with hip flexors with transitional movements. Added gentle core/hip strengthening with emphasis on TrA for improved core strength. Limited tx today secondary to late arrival/time constraints and low endurance.     Sugar Is progressing well towards her goals.   Patient prognosis is Good.     Pt will continue to benefit from skilled outpatient physical therapy to address the deficits listed in the problem list box on initial evaluation, provide pt/family education and to maximize pt's level of independence in the home and community environment.     Pt's spiritual, cultural and educational needs considered and pt agreeable to plan of care and goals.     Anticipated Barriers for therapy: none    Short Term Goals (6 weeks)  Pt will report pain of no more than moderate in the R hip with regular activity, (progressing, not met)   Pt will demonstrate R knee and hip strength of at least 4-/5 for improved support of the hip with functional mobility. ( progressing, not met )   Pt will demonstrate log roll transfer " with minimal/no cueing to reduce strain on abdominal wall/R hip with bed/mat transfers.  (progressing, not met )  Pt will be independent with introductory HEP (progressing, not met )     Long Term Goals (12 weeks)  Pt will report pain of no more than mild in the R hip with regular activity (progressing, not met )  Pt will demonstrate R hip and knee strength of at least 4+/5 for improved support of the hip with functional mobility (progressing, not met )  Pt will demonstrate R hip range of motion WFL for improved positional tolerance and functional mobility (progressing, not met )  Pt will score at least 67% on FOTO Upper Leg Survey  to demonstrate overall functional improvement (progressing, not met )  Pt will be independent with comprehensive HEP (progressing, not met )    PLAN     Plan of Care Certification: 6/9/2023 to 9/9/2023    Focus on hip/LE ROM as well as strength and endurance with emphasis on ADL performance.      Outpatient Physical Therapy: 2 times weekly for 12 weeks to include the following interventions - Therapeutic Exercise, Manual Therapy, Therapeutic Activity, Neuromuscular Re-education, Electrical Stimulation (Unattended)    Jose Brooke, PTA

## 2023-06-13 NOTE — PROGRESS NOTES
Physical Therapist and Physical Therapist Assistant met face to face to discuss patient's treatment plan and progress towards established goals. Pt will be seen by a physical therapist minimally every 6th visit or every 30 days.    Jose Brooke, PTA  6/13/2023

## 2023-06-14 ENCOUNTER — TELEPHONE (OUTPATIENT)
Dept: GYNECOLOGIC ONCOLOGY | Facility: CLINIC | Age: 58
End: 2023-06-14
Payer: COMMERCIAL

## 2023-06-14 DIAGNOSIS — C55 ENDOMETRIOID ADENOCARCINOMA OF UTERUS: Primary | ICD-10-CM

## 2023-06-14 DIAGNOSIS — C54.1 MALIGNANT NEOPLASM OF ENDOMETRIUM: ICD-10-CM

## 2023-06-14 NOTE — TELEPHONE ENCOUNTER
----- Message from Lavell Rodríguez MD sent at 6/13/2023  5:16 PM CDT -----  Ahmet, please schedule CT ASAP and schedule a VV some time after.  Please don't overbook.    Indira, please let me know about chemotherapy approval and schedule CBC, CMP, cycle #1 with her RONC.  She will also need to be in the chemotherapy class.  Lastly, she would like a return to work note which I am OK with.  No lifting > 10 lbs for 4 more weeks.    Reji, referral is in for vaginal VBT.  I know she has risk factors for pelvic recurrence and EBRT is tempting but I injured her obturator nerve so I think it's best to just do adjuvant VBT.  Please let me know if you disagree.    Thank you.

## 2023-06-14 NOTE — TELEPHONE ENCOUNTER
Spoke with our patient about her schedule appointment she voiced understanding of the date, time and location. All questions answered  Provider Scheduling Coord.  Gynecologic Oncology MA/PAR /Preceptor Ahmet Dawson

## 2023-06-14 NOTE — TELEPHONE ENCOUNTER
Spoke with our patient about her  reschedule appointment she voiced understanding of the date, time and location. All questions answered appointment mail. Provider Scheduling Coord.  Gynecologic Oncology MA/PAR /Preceptor Ahmet Dawson

## 2023-06-15 ENCOUNTER — CLINICAL SUPPORT (OUTPATIENT)
Dept: REHABILITATION | Facility: OTHER | Age: 58
End: 2023-06-15
Payer: COMMERCIAL

## 2023-06-15 DIAGNOSIS — Z74.09 DECREASED MOBILITY AND ENDURANCE: ICD-10-CM

## 2023-06-15 DIAGNOSIS — R29.898 RIGHT LEG WEAKNESS: Primary | ICD-10-CM

## 2023-06-15 PROCEDURE — 97110 THERAPEUTIC EXERCISES: CPT | Mod: PN,CQ

## 2023-06-15 PROCEDURE — 97112 NEUROMUSCULAR REEDUCATION: CPT | Mod: PN,CQ

## 2023-06-15 NOTE — PROGRESS NOTES
"[facility]   Physical Therapy Treatment Note     Name: Sugar Ball [facility] Number: [id number]    Therapy Diagnosis:   Encounter Diagnoses   Name Primary?    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 States she has been performing her HEP as directed.   She was compliant with home exercise program.  Response to previous treatment: felt sore  Functional change: n/a    Prior Level of Function: no deficits  Current Level of Function: significant pain and difficulty with certain movements     Pain: mild to severe R anterior/lateral hip pain  Aggravating Factors: movement, transfers  Easing Factors: activity, modification, rest, pain medication     Patients goals: improve pain, increase mobility    OBJECTIVE     Objective Measures updated at progress report unless specified.     Treatment     Sugar received the treatments listed below:      Therapeutic exercises to develop strength, endurance and core stabilization for 30 minutes -  Supine heel slides (R), x3 - too painful today     R quad set, 5" hold, 10x - painful  R SAQ, 5"  hold 2x10  R LAQ, 3x10  Supine glute sets 5" hold 20x  +Standing hip abd in // , 2x10 R/L  +Standing hip flex in // , 2x10 R/L  +Standing HR in // 3x10  +Mini squats in // 2x10        neuromuscular re-education activities to improve: Balance, " "Coordination, Kinesthetic, Sense, and Proprioception for 23 minutes. The following activities were included:    Supine abdominal bracing with red theraball, 5" hold x20  Seated hip adduction isometric with ball, 2x10 with approx 25% effort  Hooklying hip abd isometrics into pilates ring, 5" hold 20x with approx 25% effort  Supine TrA 5" hold x20  +Diaphragmatic breathing, x5 min  +Seated rows + TrA, YTB, 2x10  +Seated wand flex + TrA, 1# 3x10    therapeutic activities to improve functional performance for 00 minutes, including:  NP        Patient Education and Home Exercises     Home Exercises Provided and Patient Education Provided     Education provided:   - Exercise form and rationale     Written Home Exercises Provided: Patient instructed to cont prior HEP. Exercises were reviewed and Sugra was able to demonstrate them prior to the end of the session.  Sugar demonstrated good  understanding of the education provided. See EMR under Patient Instructions for exercises provided during therapy sessions    ASSESSMENT     Continued with gentle core/LE strengthening. Added diaphragmatic breathing to help improve pulmonary / muscular function. Slightly improved endurance this visit with exercises however weakness in LE continues to remain presents especially with hip flexion with transitions from supine<>sit. .     Sugar Is progressing well towards her goals.   Patient prognosis is Good.     Pt will continue to benefit from skilled outpatient physical therapy to address the deficits listed in the problem list box on initial evaluation, provide pt/family education and to maximize pt's level of independence in the home and community environment.     Pt's spiritual, cultural and educational needs considered and pt agreeable to plan of care and goals.     Anticipated Barriers for therapy: none    Short Term Goals (6 weeks)  Pt will report pain of no more than moderate in the R hip with regular activity, (progressing, not " met)   Pt will demonstrate R knee and hip strength of at least 4-/5 for improved support of the hip with functional mobility. ( progressing, not met )   Pt will demonstrate log roll transfer with minimal/no cueing to reduce strain on abdominal wall/R hip with bed/mat transfers.  (progressing, not met )  Pt will be independent with introductory HEP (progressing, not met )     Long Term Goals (12 weeks)  Pt will report pain of no more than mild in the R hip with regular activity (progressing, not met )  Pt will demonstrate R hip and knee strength of at least 4+/5 for improved support of the hip with functional mobility (progressing, not met )  Pt will demonstrate R hip range of motion WFL for improved positional tolerance and functional mobility (progressing, not met )  Pt will score at least 67% on FOTO Upper Leg Survey  to demonstrate overall functional improvement (progressing, not met )  Pt will be independent with comprehensive HEP (progressing, not met )    PLAN     Plan of Care Certification: 6/9/2023 to 9/9/2023    Focus on hip/LE ROM as well as strength and endurance with emphasis on ADL performance.      Outpatient Physical Therapy: 2 times weekly for 12 weeks to include the following interventions - Therapeutic Exercise, Manual Therapy, Therapeutic Activity, Neuromuscular Re-education, Electrical Stimulation (Unattended)    Jose Brooke, PTA

## 2023-06-16 ENCOUNTER — TELEPHONE (OUTPATIENT)
Dept: GYNECOLOGIC ONCOLOGY | Facility: CLINIC | Age: 58
End: 2023-06-16
Payer: COMMERCIAL

## 2023-06-16 DIAGNOSIS — C77.2 SECONDARY MALIGNANCY OF RETROPERITONEAL LYMPH NODES: ICD-10-CM

## 2023-06-16 DIAGNOSIS — C54.1 MALIGNANT NEOPLASM OF ENDOMETRIUM: Primary | ICD-10-CM

## 2023-06-19 ENCOUNTER — TELEPHONE (OUTPATIENT)
Dept: RADIATION ONCOLOGY | Facility: CLINIC | Age: 58
End: 2023-06-19
Payer: COMMERCIAL

## 2023-06-19 ENCOUNTER — HOSPITAL ENCOUNTER (OUTPATIENT)
Dept: RADIOLOGY | Facility: HOSPITAL | Age: 58
Discharge: HOME | End: 2023-06-19
Attending: OBSTETRICS & GYNECOLOGY
Payer: COMMERCIAL

## 2023-06-19 ENCOUNTER — TELEPHONE (OUTPATIENT)
Dept: GYNECOLOGIC ONCOLOGY | Facility: CLINIC | Age: 58
End: 2023-06-19
Payer: COMMERCIAL

## 2023-06-19 DIAGNOSIS — C77.2 SECONDARY MALIGNANCY OF RETROPERITONEAL LYMPH NODES: ICD-10-CM

## 2023-06-19 DIAGNOSIS — C54.1 MALIGNANT NEOPLASM OF ENDOMETRIUM: ICD-10-CM

## 2023-06-19 PROCEDURE — 74177 CT CHEST ABDOMEN PELVIS WITH CONTRAST (XPD): ICD-10-PCS | Mod: 26,,, | Performed by: RADIOLOGY

## 2023-06-19 PROCEDURE — 74177 CT ABD & PELVIS W/CONTRAST: CPT | Mod: 26,,, | Performed by: RADIOLOGY

## 2023-06-19 PROCEDURE — 25500020 PHARM REV CODE 255: Performed by: OBSTETRICS & GYNECOLOGY

## 2023-06-19 PROCEDURE — 71260 CT THORAX DX C+: CPT | Mod: 26,,, | Performed by: RADIOLOGY

## 2023-06-19 PROCEDURE — 74177 CT ABD & PELVIS W/CONTRAST: CPT | Mod: TC

## 2023-06-19 PROCEDURE — 71260 CT CHEST ABDOMEN PELVIS WITH CONTRAST (XPD): ICD-10-PCS | Mod: 26,,, | Performed by: RADIOLOGY

## 2023-06-19 PROCEDURE — 71260 CT THORAX DX C+: CPT | Mod: TC

## 2023-06-19 RX ADMIN — IOHEXOL 75 ML: 350 INJECTION, SOLUTION INTRAVENOUS at 03:06

## 2023-06-19 NOTE — TELEPHONE ENCOUNTER
Call to pt to confirm appt with Dr Babb on Friday 6/23/23 at 9:00 AM at [facility]. Pt confirmed and accepts this appt.

## 2023-06-19 NOTE — TELEPHONE ENCOUNTER
----- Message from Dee Dunlap sent at 6/19/2023 10:11 AM CDT -----  Contact: RTIA ORELLANA [[id number]]  Type:  Patient Returning Call      Who Called:   RITA ORELLANA [[id number]]      Who Left Message for Patient: Unsure      Does the patient know what this is regarding?:       Would the patient rather a call back or a response via [facility]?  Call back       Best Call Back Number: [phone] ([place])       Additional Information:

## 2023-06-20 ENCOUNTER — PATIENT MESSAGE (OUTPATIENT)
Dept: GYNECOLOGIC ONCOLOGY | Facility: CLINIC | Age: 58
End: 2023-06-20

## 2023-06-20 ENCOUNTER — OFFICE VISIT (OUTPATIENT)
Dept: GYNECOLOGIC ONCOLOGY | Facility: CLINIC | Age: 58
End: 2023-06-20
Payer: COMMERCIAL

## 2023-06-20 DIAGNOSIS — C77.2 SECONDARY MALIGNANCY OF RETROPERITONEAL LYMPH NODES: ICD-10-CM

## 2023-06-20 DIAGNOSIS — E66.3 OVERWEIGHT (BMI 25.0-29.9): ICD-10-CM

## 2023-06-20 DIAGNOSIS — C54.1 MALIGNANT NEOPLASM OF ENDOMETRIUM: Primary | ICD-10-CM

## 2023-06-20 DIAGNOSIS — S74.8X9A: ICD-10-CM

## 2023-06-20 PROCEDURE — 99215 PR OFFICE/OUTPT VISIT, EST, LEVL V, 40-54 MIN: ICD-10-PCS | Mod: 24,95,, | Performed by: OBSTETRICS & GYNECOLOGY

## 2023-06-20 PROCEDURE — 99215 OFFICE O/P EST HI 40 MIN: CPT | Mod: 24,95,, | Performed by: OBSTETRICS & GYNECOLOGY

## 2023-06-20 RX ORDER — DEXAMETHASONE 4 MG/1
TABLET ORAL
Qty: 30 TABLET | Refills: 2 | Status: SHIPPED | OUTPATIENT
Start: 2023-06-20 | End: 2024-03-24

## 2023-06-20 RX ORDER — PROCHLORPERAZINE MALEATE 5 MG
TABLET ORAL
Qty: 30 TABLET | Refills: 2 | Status: SHIPPED | OUTPATIENT
Start: 2023-06-20 | End: 2024-03-24

## 2023-06-20 RX ORDER — LOPERAMIDE HYDROCHLORIDE 2 MG/1
2 CAPSULE ORAL 4 TIMES DAILY PRN
Qty: 30 CAPSULE | Refills: 3 | Status: SHIPPED | OUTPATIENT
Start: 2023-06-20 | End: 2023-07-20

## 2023-06-20 NOTE — Clinical Note
Unfortunately Ms. Diaz had a large obturator nerve that involved the obturator nerve.  It also looks like on her post-operative CT she has some para-aortic lymph nodes.  Her disease is advanced, and she will need CT and RT.  I will keep you updated and thanks for sending her.

## 2023-06-20 NOTE — Clinical Note
Report isn't back yet but looks like on CT she has a low R para-aortic.  I am going to present her at TB next week to talk about the role of adjuvant RT.

## 2023-06-20 NOTE — PROGRESS NOTES
The patient location is: home  The chief complaint leading to consultation is: discuss adjuvant chemotherapy    Visit type: audiovisual    Face to Face time with patient: 15  60 minutes of total time spent on the encounter, which includes face to face time and non-face to face time preparing to see the patient (eg, review of tests), Obtaining and/or reviewing separately obtained history, Documenting clinical information in the electronic or other health record, Independently interpreting results (not separately reported) and communicating results to the patient/family/caregiver, or Care coordination (not separately reported).         Each patient to whom he or she provides medical services by telemedicine is:  (1) informed of the relationship between the physician and patient and the respective role of any other health care provider with respect to management of the patient; and (2) notified that he or she may decline to receive medical services by telemedicine and may withdraw from such care at any time.    Notes:      REFERRING PROVIDER  Omaira Evans MD     REASON FOR CONSULT  Sugar Diaz  is a 57 y.o.  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      HISTORY OF PRESENT ILLNESS    Chemotherapy regimen: Carboplatin AUC 5/Paclitaxel 135 mg/m2/Dostarlimab 1000mg q3-6 weeks  Cycle: 1  Previous dose limiting toxicities: N  Previous dose reductions: N  Previous breaks: N  Previous changes in pre-medications: N    Energy level: baseline  Appetite: baseline  Fevers/chills/nights: no  Nausea: no  Diarrhea: no  Emesis: no  Neuropathy: no  Discoloration of lower extremities: no  Mucositis: no  Maculopapular rashes: no  Dyspnea or coughing: yes      Oncology History   Malignant neoplasm of endometrium   5/23/2023 Surgery    Hysteroscopy, D&C: grade 1 endometrioid EC     6/5/2023 Surgery    TRH/BSO/BSLN/RPLN/repair of obturator nerve    Final pathology c/w MMRd, p53 WT  stage IIIC1 grade 1 endometrioid EC. 4.5 cm, grade 1, 96% MI (22/23 mm), +LUE, -LVSI, +MELF, -cervix, 1/2+ RPLN, 1/1+ LPLN, -ascites     6/19/2023 Imaging Significant Findings    CT C/A/P w/: High L para-aortic at the level of the renal vessels, 1.5 cm.  Low R para-aortic at the LEAH, 4.1 cm in greatest dimension.          The following portions of the patient's history were reviewed and updated as appropriate: allergies, current medications, family history, medical history, social history and surgical history.    REVIEW OF SYSTEMS  All systems reviewed and negative except as noted in HPI.    OBJECTIVE   There were no vitals filed for this visit.   There is no height or weight on file to calculate BMI.      1. General: Well appearing, no apparent distress, alert and oriented.  2. Lymph: Neck symmetric without cervical or supraclavicular adenopathy or mass.  3. Lungs: Normal respiratory rate, no accessory muscle use.  4. Cardiac: Normal rate  5. Psych: Normal affect.  6. Abdomen:  non-distended  7. Skin: Warm, dry, no rashes or lesions.     ECOG status: 1    LABORATORY DATA  Lab data reviewed.    RADIOLOGICAL DATA  Radiology data reviewed.    ASSESSMENT / PLAN     1. 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 Injury of obturator nerve during surgery    4. Overweight (BMI 25.0-29.9)       F/U CBC, CMP, TSH, T4  F/U CT C/A/P w/  F/U RONC  F/U TB      PATIENT EDUCATION  Ready to learn, no apparent learning barriers were identified; learning preferences include listening. Explained diagnosis and treatment plan; patient expressed understanding of the content.    ADMINISTRATIVE BILLING  Greater than 50% of was spent in counseling.

## 2023-06-23 ENCOUNTER — OFFICE VISIT (OUTPATIENT)
Dept: RADIATION ONCOLOGY | Facility: CLINIC | Age: 58
End: 2023-06-23
Payer: COMMERCIAL

## 2023-06-23 VITALS
WEIGHT: 162 LBS | HEART RATE: 83 BPM | HEIGHT: 67 IN | TEMPERATURE: 98 F | DIASTOLIC BLOOD PRESSURE: 58 MMHG | SYSTOLIC BLOOD PRESSURE: 121 MMHG | BODY MASS INDEX: 25.43 KG/M2

## 2023-06-23 DIAGNOSIS — C77.2 SECONDARY MALIGNANCY OF RETROPERITONEAL LYMPH NODES: ICD-10-CM

## 2023-06-23 DIAGNOSIS — C54.1 MALIGNANT NEOPLASM OF ENDOMETRIUM: Primary | ICD-10-CM

## 2023-06-23 DIAGNOSIS — Z90.710 STATUS POST HYSTERECTOMY: ICD-10-CM

## 2023-06-23 PROCEDURE — 1159F PR MEDICATION LIST DOCUMENTED IN MEDICAL RECORD: ICD-10-PCS | Mod: CPTII,S$GLB,, | Performed by: RADIOLOGY

## 2023-06-23 PROCEDURE — 99205 PR OFFICE/OUTPT VISIT, NEW, LEVL V, 60-74 MIN: ICD-10-PCS | Mod: S$GLB,,, | Performed by: RADIOLOGY

## 2023-06-23 PROCEDURE — 3008F BODY MASS INDEX DOCD: CPT | Mod: CPTII,S$GLB,, | Performed by: RADIOLOGY

## 2023-06-23 PROCEDURE — 3078F PR MOST RECENT DIASTOLIC BLOOD PRESSURE < 80 MM HG: ICD-10-PCS | Mod: CPTII,S$GLB,, | Performed by: RADIOLOGY

## 2023-06-23 PROCEDURE — 1159F MED LIST DOCD IN RCRD: CPT | Mod: CPTII,S$GLB,, | Performed by: RADIOLOGY

## 2023-06-23 PROCEDURE — 99999 PR PBB SHADOW E&M-EST. PATIENT-LVL V: CPT | Mod: PBBFAC,,, | Performed by: RADIOLOGY

## 2023-06-23 PROCEDURE — 3074F PR MOST RECENT SYSTOLIC BLOOD PRESSURE < 130 MM HG: ICD-10-PCS | Mod: CPTII,S$GLB,, | Performed by: RADIOLOGY

## 2023-06-23 PROCEDURE — 3008F PR BODY MASS INDEX (BMI) DOCUMENTED: ICD-10-PCS | Mod: CPTII,S$GLB,, | Performed by: RADIOLOGY

## 2023-06-23 PROCEDURE — 99205 OFFICE O/P NEW HI 60 MIN: CPT | Mod: S$GLB,,, | Performed by: RADIOLOGY

## 2023-06-23 PROCEDURE — 3078F DIAST BP <80 MM HG: CPT | Mod: CPTII,S$GLB,, | Performed by: RADIOLOGY

## 2023-06-23 PROCEDURE — 3074F SYST BP LT 130 MM HG: CPT | Mod: CPTII,S$GLB,, | Performed by: RADIOLOGY

## 2023-06-23 PROCEDURE — 99999 PR PBB SHADOW E&M-EST. PATIENT-LVL V: ICD-10-PCS | Mod: PBBFAC,,, | Performed by: RADIOLOGY

## 2023-06-23 NOTE — PROGRESS NOTES
PATIENT IDENTIFICATION:  Patient Name: Sugar Diaz  MRN: [id number]  : 1965    DIAGNOSIS:  Cancer Staging   Malignant neoplasm of endometrium  Staging form: Corpus Uteri - Carcinoma and Carcinosarcoma, AJCC 8th Edition  - Pathologic stage from 2023: FIGO Stage IIIC1, calculated as Stage IIIC2 (pT3a, cN2a(sn), cM0) - Signed by Adriane Babb MD on 2023      HISTORY OF PRESENT ILLNESS:   The patient is a 57-year-old woman with a FIGO stage IIIC2 endometrial cancer.  She is status post robotic assisted hysterectomy, bilateral salpingo-oophorectomy and sentinel lymph node biopsy.  The patient has been referred for consideration of adjuvant vaginal cuff brachytherapy.      The patient was taken to the operating room on 2023 for robotic assisted hysterectomy.  Final pathology confirmed the presence of a FIGO grade 1 endometrioid endometrial adenocarcinoma involving greater than 50% of the myometrium.  The tumor measured 4.5 cm in greatest dimension.  The tumor invaded 22 out of a myometrial thickness of 23 mm.  Tumor involved the lower uterine segment.  There was no cervical involvement.  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  The patient underwent sentinel lymph node dissection.  One pelvic lymph node was positive for metastatic carcinoma with a deposit measuring 4.5 cm.  One left sentinel lymph node was positive for micro metastatic carcinoma with a deposit measuring 1 mm.    Postoperative imaging included a CT scan of the abdomen and pelvis which was performed on 2023.  Imaging revealed:  Relative asymmetric fullness at the left aspect of the vaginal cuff with part loculated intrapelvic fluid.  Sequela of postprocedural change or residual disease not excluded.  Retroperitoneal/aortocaval adenopathy concerning for metastasis.    Oncology History   Malignant neoplasm of endometrium   2023 Surgery    Hysteroscopy, D&C: grade 1 endometrioid EC     2023  Surgery    TRH/BSO/BSLN/RPLN/repair of obturator nerve    Final pathology c/w MMRd, p53 WT stage IIIC1 grade 1 endometrioid EC. 4.5 cm, grade 1, 96% MI (22/23 mm), +LUE, -LVSI, +MELF, -cervix, 1/2+ RPLN, 1/1+ LPLN, -ascites     6/19/2023 Imaging Significant Findings    CT C/A/P w/: High L para-aortic at the level of the renal vessels, 1.5 cm.  Low R para-aortic at the LEAH, 4.1 cm in greatest dimension.      Cancer Staged           REVIEW OF SYSTEMS:   Review of Systems   Constitutional:  Positive for malaise/fatigue and weight loss. Negative for fever.   HENT:  Negative for ear pain, hearing loss, sinus pain and sore throat.    Eyes:  Negative for blurred vision, double vision and pain.   Respiratory:  Negative for cough, hemoptysis, shortness of breath and wheezing.    Cardiovascular:  Positive for leg swelling. Negative for chest pain and palpitations.   Gastrointestinal:  Negative for abdominal pain, blood in stool, constipation, diarrhea, heartburn, nausea and vomiting.   Genitourinary:  Negative for dysuria, frequency, hematuria and urgency.   Musculoskeletal:  Positive for back pain. Negative for joint pain.   Skin:  Negative for itching and rash.   Neurological:  Positive for tingling and sensory change. Negative for focal weakness, seizures and headaches.   Psychiatric/Behavioral:  Negative for depression. The patient is nervous/anxious.        PAST MEDICAL HISTORY:  Past Medical History:   Diagnosis Date    Chest pain 2002    NEGATIVE STRESS ECHO 2002    GERD (gastroesophageal reflux disease)     Lower back pain     Migraines     Mild allergic rhinitis     Postmenopausal bleeding        PAST SURGICAL HISTORY:  Past Surgical History:   Procedure Laterality Date    CYST REMOVAL Left 04/28/2022    Procedure: EXCISION, CYST-BACK;  Surgeon: Thomas Hurst MD;  Location: [facility] OR;  Service: General;  Laterality: Left;  left upper back  RN PREOP ON 4/21/22-NF    HYSTEROSCOPY WITH DILATION AND CURETTAGE OF UTERUS  N/A 05/23/2023    Procedure: HYSTEROSCOPY, WITH DILATION AND CURETTAGE OF UTERUS;  Surgeon: Omaira Evans MD;  Location: [facility] OR;  Service: OB/GYN;  Laterality: N/A;  DESTINY HERNANDEZ  [phone] TEXTED [facility] ON 5/2/2023 @ 3:51PM. [facility] RESPONDED ON 5/2/2023 @ 3:51PM-LO  RN PREOP 5/18/23  T & S; UPT ORDERED AND SCHEDULED 5/22/23    LYMPH NODE BIOPSY Left 6/5/2023    Procedure: BIOPSY, sentinel LYMPH NODE;  Surgeon: Lavell Rodríguez MD;  Location: [facility] OR;  Service: OB/GYN;  Laterality: Left;    LYMPHADENECTOMY, PELVIS Right 6/5/2023    Procedure: LYMPHADENECTOMY, PELVIS;  Surgeon: Lavell Rodríguez MD;  Location: [facility] OR;  Service: OB/GYN;  Laterality: Right;    MAPPING, LYMPH NODE, SENTINEL Bilateral 6/5/2023    Procedure: injection, LYMPH NODE, SENTINEL;  Surgeon: Lavell Rodríguez MD;  Location: [facility] OR;  Service: OB/GYN;  Laterality: Bilateral;    NERVE REPAIR Right 6/5/2023    Procedure: REPAIR, OBTURATOR NERVE;  Surgeon: Lavell Rodríguez MD;  Location: [facility] OR;  Service: OB/GYN;  Laterality: Right;    TONSILLECTOMY      TONSILLECTOMY      TUBAL LIGATION      XI ROBOTIC HYSTERECTOMY, WITH SALPINGO-OOPHORECTOMY Bilateral 6/5/2023    Procedure: XI ROBOTIC HYSTERECTOMY,WITH SALPINGO-OOPHORECTOMY;  Surgeon: Lavell Rodríguez MD;  Location: [facility];  Service: OB/GYN;  Laterality: Bilateral;  removed through vagina       ALLERGIES:   Review of patient's allergies indicates:  No Known Allergies    MEDICATIONS:  Current Outpatient Medications   Medication Sig    diphenhydrAMINE (BENADRYL) 25 mg capsule Take 25 mg by mouth every 6 (six) hours as needed for Itching.    ibuprofen (ADVIL,MOTRIN) 800 MG tablet Take 1 tablet (800 mg total) by mouth every 8 (eight) hours as needed for Pain.    SENNA 8.6 mg tablet Take 1 tablet by mouth daily as needed for Constipation.    valACYclovir (VALTREX) 1000 MG tablet Two pills at onset of fever blister and then repeat 2 pills 12 hr later    zolpidem (AMBIEN) 5 MG Tab Take 1 tablet (5 mg  total) by mouth nightly as needed (insomnia).    acetaminophen (TYLENOL EXTRA STRENGTH) 500 MG tablet Take 2 tablets (1,000 mg total) by mouth every 6 (six) hours as needed for Pain.    acetaminophen (TYLENOL) 650 MG TbSR Take 1 tablet (650 mg total) by mouth every 6 (six) hours as needed (pain).    azelastine (ASTELIN) 137 mcg (0.1 %) nasal spray 1 spray (137 mcg total) by Nasal route 2 (two) times daily.    dexAMETHasone (DECADRON) 4 MG Tab Take daily beginning the day after chemotherapy for 3 days (Patient not taking: Reported on 6/23/2023)    ibuprofen (ADVIL,MOTRIN) 600 MG tablet Take 1 tablet (600 mg total) by mouth every 6 (six) hours as needed for Pain.    loperamide (IMODIUM) 2 mg capsule Take 1 capsule (2 mg total) by mouth 4 (four) times daily as needed for Diarrhea (is more than 4 loose stools/day for 2 days while on chemotherapy). (Patient not taking: Reported on 6/23/2023)    loratadine (CLARITIN) 10 mg tablet Take 1 tablet (10 mg total) by mouth once daily.    oxyCODONE (ROXICODONE) 5 MG immediate release tablet Take 1 tablet (5 mg total) by mouth every 4 (four) hours as needed for Pain. (Patient not taking: Reported on 6/23/2023)    prochlorperazine (COMPAZINE) 5 MG tablet Take every 6 hours for 3 days beginning the day after chemotherapy (Patient not taking: Reported on 6/23/2023)     No current facility-administered medications for this visit.       SOCIAL HISTORY:  Social History     Socioeconomic History    Marital status:    Tobacco Use    Smoking status: Never    Smokeless tobacco: Never   Substance and Sexual Activity    Alcohol use: Not Currently    Drug use: Never    Sexual activity: Yes     Partners: Male     Birth control/protection: Post-menopausal     Social Determinants of Health     Financial Resource Strain: Low Risk     Difficulty of Paying Living Expenses: Not hard at all   Food Insecurity: No Food Insecurity    Worried About Running Out of Food in the Last Year: Never true     Ran Out of Food in the Last Year: Never true   Transportation Needs: No Transportation Needs    Lack of Transportation (Medical): No    Lack of Transportation (Non-Medical): No   Physical Activity: Insufficiently Active    Days of Exercise per Week: 3 days    Minutes of Exercise per Session: 20 min   Stress: No Stress Concern Present    Feeling of Stress : Not at all   Social Connections: Unknown    Frequency of Communication with Friends and Family: More than three times a week    Frequency of Social Gatherings with Friends and Family: More than three times a week    Active Member of Clubs or Organizations: Patient refused    Attends Club or Organization Meetings: Patient refused    Marital Status:    Housing Stability: Low Risk     Unable to Pay for Housing in the Last Year: No    Number of Places Lived in the Last Year: 1    Unstable Housing in the Last Year: No       FAMILY HISTORY:  Family History   Problem Relation Age of Onset    No Known Problems Mother     Heart disease Father     No Known Problems Sister     No Known Problems Sister          PHYSICAL EXAMINATION:  ECO  Vitals:    23 0920   BP: (!) 121/58   Pulse: 83   Temp: 98.1 °F (36.7 °C)     Body mass index is 25.37 kg/m².    Physical Exam  Constitutional:       Appearance: Normal appearance.   HENT:      Head: Normocephalic and atraumatic.      Nose: Nose normal.      Mouth/Throat:      Mouth: Mucous membranes are moist.   Cardiovascular:      Rate and Rhythm: Normal rate.   Pulmonary:      Effort: Pulmonary effort is normal.   Abdominal:      General: Abdomen is flat.      Palpations: Abdomen is soft.   Musculoskeletal:         General: Normal range of motion.      Cervical back: Normal range of motion.   Skin:     General: Skin is warm and dry.   Neurological:      General: No focal deficit present.      Mental Status: She is alert. Mental status is at baseline.           ASSESSMENT/PLAN:  Sugar was seen today for endometrial  cancer.    Diagnoses and all orders for this visit:    Malignant neoplasm of endometrium  -     Ambulatory referral/consult to Radiation Oncology    S/p RA-TLH/BSO/SLND    Secondary malignancy of retroperitoneal lymph nodes  -     Ambulatory referral/consult to Radiation Oncology      The patient is a 57-year-old woman with a FIGO stage IIIC2 endometrial cancer.  She is planned for chemotherapy with carbo Taxol x6 cycles.  The patient is recommended vaginal cuff brachytherapy to decrease risk of local recurrence.  She will receive a dose of 21 Gy delivered in 3 fractions over 3 weeks.  We will have the patient return to our department in approximately 6 weeks to check healing of vaginal cuff and CT simulation in anticipation of brachytherapy.    The risks and benefits of treatment have been discussed with the patient and she expressed full understanding. she understands the treatment plan and willing to proceed accordingly.    I spent approximately 60 minutes reviewing the available records and evaluating the patient, out of which over 50% of the time was spent face to face with the patient in counseling and coordinating this patient's care.

## 2023-06-23 NOTE — PATIENT INSTRUCTIONS
Vaginal brachytherapy -- Vaginal brachytherapy (VB) delivers radiation from a device that is temporarily placed inside the vagina. This device delivers a high dose of radiation directly to the area where cancer cells are most likely to be found, and this helps to minimize the effects of radiation on healthy tissues. There are two types of VB: low-dose rate and high-dose rate.  ?High-dose rate brachytherapy uses a device that delivers radiation through the vagina. The device is placed in the vagina for only a few minutes at a time once a day, and treatment is generally repeated three to five times. This treatment is generally given as an outpatient, and women who get high-dose rate brachytherapy do not have to stay in the hospital overnight. They can usually continue their normal daily activities during treatment.    Side effects of radiation therapy -- Radiation can cause both short-term and long-term side effects. The short-term side effects may include:  ?Feeling tired  ?Needing to empty your bladder frequently  ?Discomfort with urination  ?Loose stools and feeling the need to have a bowel movement frequently  In addition to the short-term side effects, which usually resolve after treatment is completed, there are long-term side effects that may not appear until months after treatment is completed, and they may become more chronic problems. These include:  ?Narrowing, scarring, and dryness of the vagina

## 2023-06-25 LAB
DNA RANGE(S) EXAMINED NAR: NORMAL
GENE DIS ANL INTERP-IMP: POSITIVE
GENE DIS ASSESSED: NORMAL
GENE MUT TESTED BLD/T: 21.6 M/MB
MSI CA SPEC-IMP: NORMAL
REASON FOR STUDY: NORMAL
TEMPUS FUSIONADDENDUM: NORMAL
TEMPUS LCA: NORMAL
TEMPUS PORTAL: NORMAL
TEMPUS THERAPY1: NORMAL
TEMPUS THERAPYCOUNT: 1
TEMPUS TRIAL1: NORMAL
TEMPUS TRIAL2: NORMAL
TEMPUS TRIAL3: NORMAL
TEMPUS TRIALCOUNT: 3

## 2023-06-26 ENCOUNTER — PATIENT MESSAGE (OUTPATIENT)
Dept: GYNECOLOGIC ONCOLOGY | Facility: CLINIC | Age: 58
End: 2023-06-26
Payer: COMMERCIAL

## 2023-06-27 NOTE — PROGRESS NOTES
REFERRING PROVIDER  Omaira Evans MD     REASON FOR CONSULT  Sugar Diaz  is a 57 y.o.  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      HISTORY OF PRESENT ILLNESS    Chemotherapy regimen: Carboplatin AUC 5/Paclitaxel 135 mg/m2/Dostarlimab 1000mg q3-6 weeks  Cycle: 1  Previous dose limiting toxicities: N  Previous dose reductions: N  Previous breaks: N  Previous changes in pre-medications: N    Energy level: baseline  Appetite: baseline  Fevers/chills/nights: no  Nausea: no  Diarrhea: no  Emesis: no  Neuropathy: no  Discoloration of lower extremities: no  Mucositis: no  Maculopapular rashes: no  Dyspnea or coughing: yes      Oncology History   Malignant neoplasm of endometrium   5/23/2023 Surgery    Hysteroscopy, D&C: grade 1 endometrioid EC     6/5/2023 Surgery    TRH/BSO/BSLN/RPLN/repair of obturator nerve    Final pathology c/w MMRd, p53 WT stage IIIC1 grade 1 endometrioid EC. 4.5 cm, grade 1, 96% MI (22/23 mm), +LUE, -LVSI, +MELF, -cervix, 1/2+ RPLN, 1/1+ LPLN, -ascites     6/19/2023 Imaging Significant Findings    CT C/A/P w/: High L para-aortic at the level of the renal vessels, 1.5 cm.  Low R para-aortic at the LEAH, 4.1 cm in greatest dimension.     6/25/2023 Genomic Testing    [employer] (NGS): ARD1A, CHEK1, CTCF, , HDAC2, HNF1A, JAK1, KRAS, MAX, MSH6, P1K3R1, PPM1D, PTEN, ZFHX3, ZSR2     6/28/2023 Tumor Conference    No rule for debulking of para-aortic lymph nodes.  Adjuvant VBT.  Represent after completion of adjuvant therapy to discuss EBRT.             The following portions of the patient's history were reviewed and updated as appropriate: allergies, current medications, family history, medical history, social history and surgical history.    REVIEW OF SYSTEMS  All systems reviewed and negative except as noted in HPI.    OBJECTIVE   Vitals:    07/05/23 0938   BP: 130/61   Pulse: 80      Body mass index is 25.37 kg/m².      1. General: Well  appearing, no apparent distress, alert and oriented.  2. Lymph: Neck symmetric without cervical or supraclavicular adenopathy or mass.  3. Lungs: Normal respiratory rate, no accessory muscle use.  4. Psych: Normal affect.  5. Abdomen:  non-distended, soft, non-tender, are no masses, no ascites, no hepatosplenomegaly; incision C/D/I  6. Skin: Warm, dry, no rashes or lesions.   7. Extremities: Bilateral lower extremities without edema or tenderness.  8. Genitourinary               Pelvic Examination including:                a. External genitalia are normal in appearance. No lesions noted.               b. Urethral meatus is normal size, location, and appearance.               c. Urethra is negative.               d. Bladder is nontender. No masses noted.               e. Vagina has normal mucosa with physiologic discharge. No lesions noted.              f. Uterus absent              g. Adnexa absent   h. Rectum absent     ECOG status: 1    LABORATORY DATA  Lab data reviewed.    RADIOLOGICAL DATA  Radiology data reviewed.    ASSESSMENT / PLAN     1. 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 Injury of obturator nerve during surgery    4. Overweight (BMI 25.0-29.9)        F/U TSH, T4     No dose limiting toxicities or signs of recurrence.  Administer cycle #1.  CBC, CMP, TSH, T4, cycle #2, RONC 3 weeks.  Continue with home PT.        PATIENT EDUCATION  Ready to learn, no apparent learning barriers were identified; learning preferences include listening. Explained diagnosis and treatment plan; patient expressed understanding of the content.    ADMINISTRATIVE BILLING  Greater than 50% of was spent in counseling.   .[facility]

## 2023-07-05 ENCOUNTER — LAB VISIT (OUTPATIENT)
Dept: LAB | Facility: OTHER | Age: 58
End: 2023-07-05
Attending: INTERNAL MEDICINE
Payer: COMMERCIAL

## 2023-07-05 ENCOUNTER — OFFICE VISIT (OUTPATIENT)
Dept: GYNECOLOGIC ONCOLOGY | Facility: CLINIC | Age: 58
End: 2023-07-05
Payer: COMMERCIAL

## 2023-07-05 VITALS
WEIGHT: 162 LBS | BODY MASS INDEX: 25.37 KG/M2 | HEART RATE: 80 BPM | SYSTOLIC BLOOD PRESSURE: 130 MMHG | DIASTOLIC BLOOD PRESSURE: 61 MMHG

## 2023-07-05 DIAGNOSIS — S74.8X9A: ICD-10-CM

## 2023-07-05 DIAGNOSIS — C54.1 MALIGNANT NEOPLASM OF ENDOMETRIUM: Primary | ICD-10-CM

## 2023-07-05 DIAGNOSIS — C54.1 MALIGNANT NEOPLASM OF ENDOMETRIUM: ICD-10-CM

## 2023-07-05 DIAGNOSIS — E66.3 OVERWEIGHT (BMI 25.0-29.9): ICD-10-CM

## 2023-07-05 DIAGNOSIS — C77.2 SECONDARY MALIGNANCY OF RETROPERITONEAL LYMPH NODES: ICD-10-CM

## 2023-07-05 LAB
ALBUMIN SERPL BCP-MCNC: 3.9 G/DL (ref 3.5–5.2)
ALP SERPL-CCNC: 73 U/L (ref 55–135)
ALT SERPL W/O P-5'-P-CCNC: 18 U/L (ref 10–44)
ANION GAP SERPL CALC-SCNC: 10 MMOL/L (ref 8–16)
AST SERPL-CCNC: 18 U/L (ref 10–40)
BASOPHILS # BLD AUTO: 0.03 K/UL (ref 0–0.2)
BASOPHILS NFR BLD: 0.4 % (ref 0–1.9)
BILIRUB SERPL-MCNC: 0.4 MG/DL (ref 0.1–1)
BUN SERPL-MCNC: 15 MG/DL (ref 6–20)
CALCIUM SERPL-MCNC: 9.2 MG/DL (ref 8.7–10.5)
CHLORIDE SERPL-SCNC: 107 MMOL/L (ref 95–110)
CO2 SERPL-SCNC: 26 MMOL/L (ref 23–29)
CREAT SERPL-MCNC: 0.7 MG/DL (ref 0.5–1.4)
DIFFERENTIAL METHOD: ABNORMAL
EOSINOPHIL # BLD AUTO: 0.6 K/UL (ref 0–0.5)
EOSINOPHIL NFR BLD: 8.1 % (ref 0–8)
ERYTHROCYTE [DISTWIDTH] IN BLOOD BY AUTOMATED COUNT: 12.1 % (ref 11.5–14.5)
EST. GFR  (NO RACE VARIABLE): >60 ML/MIN/1.73 M^2
GLUCOSE SERPL-MCNC: 123 MG/DL (ref 70–110)
HCT VFR BLD AUTO: 43 % (ref 37–48.5)
HGB BLD-MCNC: 14.5 G/DL (ref 12–16)
IMM GRANULOCYTES # BLD AUTO: 0.02 K/UL (ref 0–0.04)
IMM GRANULOCYTES NFR BLD AUTO: 0.3 % (ref 0–0.5)
LYMPHOCYTES # BLD AUTO: 1.9 K/UL (ref 1–4.8)
LYMPHOCYTES NFR BLD: 23.9 % (ref 18–48)
MCH RBC QN AUTO: 30.9 PG (ref 27–31)
MCHC RBC AUTO-ENTMCNC: 33.7 G/DL (ref 32–36)
MCV RBC AUTO: 92 FL (ref 82–98)
MONOCYTES # BLD AUTO: 0.5 K/UL (ref 0.3–1)
MONOCYTES NFR BLD: 5.8 % (ref 4–15)
NEUTROPHILS # BLD AUTO: 4.9 K/UL (ref 1.8–7.7)
NEUTROPHILS NFR BLD: 61.5 % (ref 38–73)
NRBC BLD-RTO: 0 /100 WBC
PLATELET # BLD AUTO: 320 K/UL (ref 150–450)
PMV BLD AUTO: 9.4 FL (ref 9.2–12.9)
POTASSIUM SERPL-SCNC: 3.7 MMOL/L (ref 3.5–5.1)
PROT SERPL-MCNC: 6.9 G/DL (ref 6–8.4)
RBC # BLD AUTO: 4.7 M/UL (ref 4–5.4)
SODIUM SERPL-SCNC: 143 MMOL/L (ref 136–145)
T4 FREE SERPL-MCNC: 1.04 NG/DL (ref 0.71–1.51)
TSH SERPL DL<=0.005 MIU/L-ACNC: 1.68 UIU/ML (ref 0.4–4)
WBC # BLD AUTO: 7.88 K/UL (ref 3.9–12.7)

## 2023-07-05 PROCEDURE — 99215 OFFICE O/P EST HI 40 MIN: CPT | Mod: 24,S$GLB,, | Performed by: OBSTETRICS & GYNECOLOGY

## 2023-07-05 PROCEDURE — 3008F PR BODY MASS INDEX (BMI) DOCUMENTED: ICD-10-PCS | Mod: CPTII,S$GLB,, | Performed by: OBSTETRICS & GYNECOLOGY

## 2023-07-05 PROCEDURE — 3075F SYST BP GE 130 - 139MM HG: CPT | Mod: CPTII,S$GLB,, | Performed by: OBSTETRICS & GYNECOLOGY

## 2023-07-05 PROCEDURE — 84443 ASSAY THYROID STIM HORMONE: CPT | Performed by: OBSTETRICS & GYNECOLOGY

## 2023-07-05 PROCEDURE — 99999 PR PBB SHADOW E&M-EST. PATIENT-LVL III: CPT | Mod: PBBFAC,,, | Performed by: OBSTETRICS & GYNECOLOGY

## 2023-07-05 PROCEDURE — 1159F PR MEDICATION LIST DOCUMENTED IN MEDICAL RECORD: ICD-10-PCS | Mod: CPTII,S$GLB,, | Performed by: OBSTETRICS & GYNECOLOGY

## 2023-07-05 PROCEDURE — 1159F MED LIST DOCD IN RCRD: CPT | Mod: CPTII,S$GLB,, | Performed by: OBSTETRICS & GYNECOLOGY

## 2023-07-05 PROCEDURE — 36415 COLL VENOUS BLD VENIPUNCTURE: CPT | Performed by: OBSTETRICS & GYNECOLOGY

## 2023-07-05 PROCEDURE — 3008F BODY MASS INDEX DOCD: CPT | Mod: CPTII,S$GLB,, | Performed by: OBSTETRICS & GYNECOLOGY

## 2023-07-05 PROCEDURE — 3078F DIAST BP <80 MM HG: CPT | Mod: CPTII,S$GLB,, | Performed by: OBSTETRICS & GYNECOLOGY

## 2023-07-05 PROCEDURE — 99999 PR PBB SHADOW E&M-EST. PATIENT-LVL III: ICD-10-PCS | Mod: PBBFAC,,, | Performed by: OBSTETRICS & GYNECOLOGY

## 2023-07-05 PROCEDURE — 1160F PR REVIEW ALL MEDS BY PRESCRIBER/CLIN PHARMACIST DOCUMENTED: ICD-10-PCS | Mod: CPTII,S$GLB,, | Performed by: OBSTETRICS & GYNECOLOGY

## 2023-07-05 PROCEDURE — 1160F RVW MEDS BY RX/DR IN RCRD: CPT | Mod: CPTII,S$GLB,, | Performed by: OBSTETRICS & GYNECOLOGY

## 2023-07-05 PROCEDURE — 3075F PR MOST RECENT SYSTOLIC BLOOD PRESS GE 130-139MM HG: ICD-10-PCS | Mod: CPTII,S$GLB,, | Performed by: OBSTETRICS & GYNECOLOGY

## 2023-07-05 PROCEDURE — 80053 COMPREHEN METABOLIC PANEL: CPT | Performed by: OBSTETRICS & GYNECOLOGY

## 2023-07-05 PROCEDURE — 85025 COMPLETE CBC W/AUTO DIFF WBC: CPT | Performed by: OBSTETRICS & GYNECOLOGY

## 2023-07-05 PROCEDURE — 84439 ASSAY OF FREE THYROXINE: CPT | Performed by: OBSTETRICS & GYNECOLOGY

## 2023-07-05 PROCEDURE — 99215 PR OFFICE/OUTPT VISIT, EST, LEVL V, 40-54 MIN: ICD-10-PCS | Mod: 24,S$GLB,, | Performed by: OBSTETRICS & GYNECOLOGY

## 2023-07-05 PROCEDURE — 3078F PR MOST RECENT DIASTOLIC BLOOD PRESSURE < 80 MM HG: ICD-10-PCS | Mod: CPTII,S$GLB,, | Performed by: OBSTETRICS & GYNECOLOGY

## 2023-07-05 RX ORDER — HEPARIN 100 UNIT/ML
500 SYRINGE INTRAVENOUS
Status: CANCELLED | OUTPATIENT
Start: 2023-07-05

## 2023-07-05 RX ORDER — SODIUM CHLORIDE 0.9 % (FLUSH) 0.9 %
10 SYRINGE (ML) INJECTION
Status: CANCELLED | OUTPATIENT
Start: 2023-07-05

## 2023-07-05 RX ORDER — DIPHENHYDRAMINE HYDROCHLORIDE 50 MG/ML
50 INJECTION INTRAMUSCULAR; INTRAVENOUS ONCE AS NEEDED
Status: CANCELLED | OUTPATIENT
Start: 2023-07-05

## 2023-07-05 RX ORDER — FAMOTIDINE 10 MG/ML
20 INJECTION INTRAVENOUS
Status: CANCELLED | OUTPATIENT
Start: 2023-07-05

## 2023-07-05 RX ORDER — EPINEPHRINE 0.3 MG/.3ML
0.3 INJECTION SUBCUTANEOUS ONCE AS NEEDED
Status: CANCELLED | OUTPATIENT
Start: 2023-07-05

## 2023-07-05 RX ORDER — PROCHLORPERAZINE EDISYLATE 5 MG/ML
5-10 INJECTION INTRAMUSCULAR; INTRAVENOUS ONCE AS NEEDED
Status: CANCELLED
Start: 2023-07-05

## 2023-07-07 ENCOUNTER — INFUSION (OUTPATIENT)
Dept: INFUSION THERAPY | Facility: OTHER | Age: 58
End: 2023-07-07
Attending: INTERNAL MEDICINE
Payer: COMMERCIAL

## 2023-07-07 VITALS
OXYGEN SATURATION: 96 % | HEART RATE: 68 BPM | DIASTOLIC BLOOD PRESSURE: 59 MMHG | TEMPERATURE: 98 F | BODY MASS INDEX: 25.44 KG/M2 | SYSTOLIC BLOOD PRESSURE: 118 MMHG | HEIGHT: 67 IN | RESPIRATION RATE: 16 BRPM | WEIGHT: 162.06 LBS

## 2023-07-07 DIAGNOSIS — C54.1 MALIGNANT NEOPLASM OF ENDOMETRIUM: Primary | ICD-10-CM

## 2023-07-07 PROBLEM — R29.898 RIGHT LEG WEAKNESS: Status: RESOLVED | Noted: 2023-06-09 | Resolved: 2023-07-07

## 2023-07-07 PROBLEM — Z74.09 DECREASED MOBILITY AND ENDURANCE: Status: RESOLVED | Noted: 2023-06-09 | Resolved: 2023-07-07

## 2023-07-07 PROCEDURE — 96361 HYDRATE IV INFUSION ADD-ON: CPT

## 2023-07-07 PROCEDURE — 96417 CHEMO IV INFUS EACH ADDL SEQ: CPT

## 2023-07-07 PROCEDURE — 25000003 PHARM REV CODE 250: Performed by: OBSTETRICS & GYNECOLOGY

## 2023-07-07 PROCEDURE — 96375 TX/PRO/DX INJ NEW DRUG ADDON: CPT

## 2023-07-07 PROCEDURE — 96367 TX/PROPH/DG ADDL SEQ IV INF: CPT

## 2023-07-07 PROCEDURE — 63600175 PHARM REV CODE 636 W HCPCS: Performed by: OBSTETRICS & GYNECOLOGY

## 2023-07-07 PROCEDURE — 96415 CHEMO IV INFUSION ADDL HR: CPT

## 2023-07-07 PROCEDURE — 96413 CHEMO IV INFUSION 1 HR: CPT

## 2023-07-07 RX ORDER — EPINEPHRINE 0.3 MG/.3ML
0.3 INJECTION SUBCUTANEOUS ONCE AS NEEDED
Status: DISCONTINUED | OUTPATIENT
Start: 2023-07-07 | End: 2023-07-07 | Stop reason: HOSPADM

## 2023-07-07 RX ORDER — HEPARIN 100 UNIT/ML
500 SYRINGE INTRAVENOUS
Status: DISCONTINUED | OUTPATIENT
Start: 2023-07-07 | End: 2023-07-07 | Stop reason: HOSPADM

## 2023-07-07 RX ORDER — SODIUM CHLORIDE 0.9 % (FLUSH) 0.9 %
10 SYRINGE (ML) INJECTION
Status: DISCONTINUED | OUTPATIENT
Start: 2023-07-07 | End: 2023-07-07 | Stop reason: HOSPADM

## 2023-07-07 RX ORDER — FAMOTIDINE 10 MG/ML
20 INJECTION INTRAVENOUS
Status: COMPLETED | OUTPATIENT
Start: 2023-07-07 | End: 2023-07-07

## 2023-07-07 RX ORDER — DIPHENHYDRAMINE HYDROCHLORIDE 50 MG/ML
50 INJECTION INTRAMUSCULAR; INTRAVENOUS ONCE AS NEEDED
Status: COMPLETED | OUTPATIENT
Start: 2023-07-07 | End: 2023-07-07

## 2023-07-07 RX ADMIN — DIPHENHYDRAMINE HYDROCHLORIDE 50 MG: 50 INJECTION, SOLUTION INTRAMUSCULAR; INTRAVENOUS at 10:07

## 2023-07-07 RX ADMIN — FOSAPREPITANT 150 MG: 150 INJECTION, POWDER, LYOPHILIZED, FOR SOLUTION INTRAVENOUS at 09:07

## 2023-07-07 RX ADMIN — DEXAMETHASONE SODIUM PHOSPHATE 0.25 MG: 4 INJECTION, SOLUTION INTRA-ARTICULAR; INTRALESIONAL; INTRAMUSCULAR; INTRAVENOUS; SOFT TISSUE at 10:07

## 2023-07-07 RX ADMIN — SODIUM CHLORIDE 500 ML: 9 INJECTION, SOLUTION INTRAVENOUS at 02:07

## 2023-07-07 RX ADMIN — SODIUM CHLORIDE 500 MG: 9 INJECTION, SOLUTION INTRAVENOUS at 09:07

## 2023-07-07 RX ADMIN — FAMOTIDINE 20 MG: 10 INJECTION INTRAVENOUS at 09:07

## 2023-07-07 RX ADMIN — SODIUM CHLORIDE 640 MG: 9 INJECTION, SOLUTION INTRAVENOUS at 02:07

## 2023-07-07 RX ADMIN — PACLITAXEL 330 MG: 6 INJECTION, SOLUTION INTRAVENOUS at 11:07

## 2023-07-07 NOTE — PLAN OF CARE
Vital Signs Stable, No apparent distress noted; Pt tolerated _Dostarlimab/Taxol/Carbo  w/o difficulty.  AVS/Discharge instructions given;all questions answered ;Pt verbalizes understanding  .Next appointments scheduled and sent via [employer] (pt prefers over paper)  Ambulated from clinic in [facility], accompanied by family/friend

## 2023-07-11 ENCOUNTER — PATIENT MESSAGE (OUTPATIENT)
Dept: GYNECOLOGIC ONCOLOGY | Facility: CLINIC | Age: 58
End: 2023-07-11
Payer: COMMERCIAL

## 2023-07-12 ENCOUNTER — TELEPHONE (OUTPATIENT)
Dept: HEMATOLOGY/ONCOLOGY | Facility: CLINIC | Age: 58
End: 2023-07-12
Payer: COMMERCIAL

## 2023-07-23 NOTE — PROGRESS NOTES
REFERRING PROVIDER  Omaira Evans MD     REASON FOR CONSULT  Sugar Diaz  is a 57 y.o.  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      HISTORY OF PRESENT ILLNESS    Chemotherapy regimen: Carboplatin AUC 5/Paclitaxel 135 mg/m2/Dostarlimab 1000mg q3-6 weeks  Cycle: 2  Previous dose limiting toxicities: N  Previous dose reductions: N  Previous breaks: N  Previous changes in pre-medications: N    Energy level: baseline  Appetite: baseline  Fevers/chills/nights: no  Nausea: no  Diarrhea: no  Emesis: no  Neuropathy: no  Discoloration of lower extremities: no  Mucositis: no  Maculopapular rashes: no  Dyspnea or coughing: yes      Oncology History   Malignant neoplasm of endometrium   5/23/2023 Surgery    Hysteroscopy, D&C: grade 1 endometrioid EC     6/5/2023 Surgery    TRH/BSO/BSLN/RPLN/repair of obturator nerve    Final pathology c/w MMRd, p53 WT stage IIIC1 grade 1 endometrioid EC. 4.5 cm, grade 1, 96% MI (22/23 mm), +LUE, -LVSI, +MELF, -cervix, 1/2+ RPLN, 1/1+ LPLN, -ascites     6/19/2023 Imaging Significant Findings    CT C/A/P w/: High L para-aortic at the level of the renal vessels, 1.5 cm.  Low R para-aortic at the LEAH, 4.1 cm in greatest dimension.     6/25/2023 Genomic Testing    [employer] (NGS): ARD1A, CHEK1, CTCF, , HDAC2, HNF1A, JAK1, KRAS, MAX, MSH6, P1K3R1, PPM1D, PTEN, ZFHX3, ZSR2     6/28/2023 Tumor Conference    No rule for debulking of para-aortic lymph nodes.  Adjuvant VBT.  Represent after completion of adjuvant therapy to discuss EBRT.             The following portions of the patient's history were reviewed and updated as appropriate: allergies, current medications, family history, medical history, social history and surgical history.    REVIEW OF SYSTEMS  All systems reviewed and negative except as noted in HPI.    OBJECTIVE   Vitals:    07/26/23 1044   BP: 130/63   Pulse: 73        Body mass index is 25.45 kg/m².      1. General: Well  appearing, no apparent distress, alert and oriented.  2. Lymph: Neck symmetric without cervical or supraclavicular adenopathy or mass.  3. Lungs: Normal respiratory rate, no accessory muscle use.  4. Psych: Normal affect.  5. Abdomen:  non-distended, soft, non-tender, are no masses, no ascites, no hepatosplenomegaly  6. Skin: Warm, dry, no rashes or lesions.   7. Extremities: Bilateral lower extremities without edema or tenderness.  8. Genitourinary: Deferred          ECOG status: 1    LABORATORY DATA  Lab data reviewed.    RADIOLOGICAL DATA  Radiology data reviewed.    ASSESSMENT / PLAN     1. Malignant neoplasm of endometrium    2. Encounter for antineoplastic chemotherapy    3. Secondary malignancy of retroperitoneal lymph nodes    4. Injury of obturator nerve during surgery    5. Overweight (BMI 25.0-29.9)         No dose limiting toxicities or signs of recurrence.  Administer cycle #2.  CBC, CMP, TSH, T4, cycle #3, RONC 3 weeks.  Continue with home PT.        PATIENT EDUCATION  Ready to learn, no apparent learning barriers were identified; learning preferences include listening. Explained diagnosis and treatment plan; patient expressed understanding of the content.    ADMINISTRATIVE BILLING  Greater than 50% of was spent in counseling.   .dtmonc

## 2023-07-26 ENCOUNTER — OFFICE VISIT (OUTPATIENT)
Dept: GYNECOLOGIC ONCOLOGY | Facility: CLINIC | Age: 58
End: 2023-07-26
Payer: COMMERCIAL

## 2023-07-26 ENCOUNTER — LAB VISIT (OUTPATIENT)
Dept: LAB | Facility: OTHER | Age: 58
End: 2023-07-26
Attending: OBSTETRICS & GYNECOLOGY
Payer: COMMERCIAL

## 2023-07-26 VITALS
HEART RATE: 73 BPM | HEIGHT: 67 IN | WEIGHT: 162.5 LBS | DIASTOLIC BLOOD PRESSURE: 63 MMHG | BODY MASS INDEX: 25.51 KG/M2 | SYSTOLIC BLOOD PRESSURE: 130 MMHG

## 2023-07-26 DIAGNOSIS — S74.8X9A: ICD-10-CM

## 2023-07-26 DIAGNOSIS — C77.2 SECONDARY MALIGNANCY OF RETROPERITONEAL LYMPH NODES: ICD-10-CM

## 2023-07-26 DIAGNOSIS — Z51.11 ENCOUNTER FOR ANTINEOPLASTIC CHEMOTHERAPY: ICD-10-CM

## 2023-07-26 DIAGNOSIS — E66.3 OVERWEIGHT (BMI 25.0-29.9): ICD-10-CM

## 2023-07-26 DIAGNOSIS — C54.1 MALIGNANT NEOPLASM OF ENDOMETRIUM: Primary | ICD-10-CM

## 2023-07-26 DIAGNOSIS — C54.1 MALIGNANT NEOPLASM OF ENDOMETRIUM: ICD-10-CM

## 2023-07-26 LAB
ALBUMIN SERPL BCP-MCNC: 3.6 G/DL (ref 3.5–5.2)
ALP SERPL-CCNC: 76 U/L (ref 55–135)
ALT SERPL W/O P-5'-P-CCNC: 25 U/L (ref 10–44)
ANION GAP SERPL CALC-SCNC: 8 MMOL/L (ref 8–16)
AST SERPL-CCNC: 23 U/L (ref 10–40)
BASOPHILS # BLD AUTO: 0.04 K/UL (ref 0–0.2)
BASOPHILS NFR BLD: 0.7 % (ref 0–1.9)
BILIRUB SERPL-MCNC: 0.2 MG/DL (ref 0.1–1)
BUN SERPL-MCNC: 12 MG/DL (ref 6–20)
CALCIUM SERPL-MCNC: 9.3 MG/DL (ref 8.7–10.5)
CHLORIDE SERPL-SCNC: 106 MMOL/L (ref 95–110)
CO2 SERPL-SCNC: 26 MMOL/L (ref 23–29)
CREAT SERPL-MCNC: 0.7 MG/DL (ref 0.5–1.4)
DIFFERENTIAL METHOD: ABNORMAL
EOSINOPHIL # BLD AUTO: 0.1 K/UL (ref 0–0.5)
EOSINOPHIL NFR BLD: 2 % (ref 0–8)
ERYTHROCYTE [DISTWIDTH] IN BLOOD BY AUTOMATED COUNT: 13 % (ref 11.5–14.5)
EST. GFR  (NO RACE VARIABLE): >60 ML/MIN/1.73 M^2
GLUCOSE SERPL-MCNC: 94 MG/DL (ref 70–110)
HCT VFR BLD AUTO: 38.8 % (ref 37–48.5)
HGB BLD-MCNC: 13.3 G/DL (ref 12–16)
IMM GRANULOCYTES # BLD AUTO: 0.02 K/UL (ref 0–0.04)
IMM GRANULOCYTES NFR BLD AUTO: 0.4 % (ref 0–0.5)
LYMPHOCYTES # BLD AUTO: 1.9 K/UL (ref 1–4.8)
LYMPHOCYTES NFR BLD: 35.2 % (ref 18–48)
MCH RBC QN AUTO: 30.9 PG (ref 27–31)
MCHC RBC AUTO-ENTMCNC: 34.3 G/DL (ref 32–36)
MCV RBC AUTO: 90 FL (ref 82–98)
MONOCYTES # BLD AUTO: 0.7 K/UL (ref 0.3–1)
MONOCYTES NFR BLD: 12.6 % (ref 4–15)
NEUTROPHILS # BLD AUTO: 2.7 K/UL (ref 1.8–7.7)
NEUTROPHILS NFR BLD: 49.1 % (ref 38–73)
NRBC BLD-RTO: 0 /100 WBC
PLATELET # BLD AUTO: 288 K/UL (ref 150–450)
PMV BLD AUTO: 8.7 FL (ref 9.2–12.9)
POTASSIUM SERPL-SCNC: 3.9 MMOL/L (ref 3.5–5.1)
PROT SERPL-MCNC: 6.5 G/DL (ref 6–8.4)
RBC # BLD AUTO: 4.3 M/UL (ref 4–5.4)
SODIUM SERPL-SCNC: 140 MMOL/L (ref 136–145)
T4 FREE SERPL-MCNC: 0.96 NG/DL (ref 0.71–1.51)
TSH SERPL DL<=0.005 MIU/L-ACNC: 1.42 UIU/ML (ref 0.4–4)
WBC # BLD AUTO: 5.4 K/UL (ref 3.9–12.7)

## 2023-07-26 PROCEDURE — 36415 COLL VENOUS BLD VENIPUNCTURE: CPT | Performed by: OBSTETRICS & GYNECOLOGY

## 2023-07-26 PROCEDURE — 99215 PR OFFICE/OUTPT VISIT, EST, LEVL V, 40-54 MIN: ICD-10-PCS | Mod: 24,S$GLB,, | Performed by: OBSTETRICS & GYNECOLOGY

## 2023-07-26 PROCEDURE — 99999 PR PBB SHADOW E&M-EST. PATIENT-LVL III: CPT | Mod: PBBFAC,,, | Performed by: OBSTETRICS & GYNECOLOGY

## 2023-07-26 PROCEDURE — 3075F SYST BP GE 130 - 139MM HG: CPT | Mod: CPTII,S$GLB,, | Performed by: OBSTETRICS & GYNECOLOGY

## 2023-07-26 PROCEDURE — 80053 COMPREHEN METABOLIC PANEL: CPT | Performed by: OBSTETRICS & GYNECOLOGY

## 2023-07-26 PROCEDURE — 84439 ASSAY OF FREE THYROXINE: CPT | Performed by: OBSTETRICS & GYNECOLOGY

## 2023-07-26 PROCEDURE — 3075F PR MOST RECENT SYSTOLIC BLOOD PRESS GE 130-139MM HG: ICD-10-PCS | Mod: CPTII,S$GLB,, | Performed by: OBSTETRICS & GYNECOLOGY

## 2023-07-26 PROCEDURE — 3078F DIAST BP <80 MM HG: CPT | Mod: CPTII,S$GLB,, | Performed by: OBSTETRICS & GYNECOLOGY

## 2023-07-26 PROCEDURE — 85025 COMPLETE CBC W/AUTO DIFF WBC: CPT | Performed by: OBSTETRICS & GYNECOLOGY

## 2023-07-26 PROCEDURE — 3008F BODY MASS INDEX DOCD: CPT | Mod: CPTII,S$GLB,, | Performed by: OBSTETRICS & GYNECOLOGY

## 2023-07-26 PROCEDURE — 99215 OFFICE O/P EST HI 40 MIN: CPT | Mod: 24,S$GLB,, | Performed by: OBSTETRICS & GYNECOLOGY

## 2023-07-26 PROCEDURE — 1159F PR MEDICATION LIST DOCUMENTED IN MEDICAL RECORD: ICD-10-PCS | Mod: CPTII,S$GLB,, | Performed by: OBSTETRICS & GYNECOLOGY

## 2023-07-26 PROCEDURE — 1159F MED LIST DOCD IN RCRD: CPT | Mod: CPTII,S$GLB,, | Performed by: OBSTETRICS & GYNECOLOGY

## 2023-07-26 PROCEDURE — 84443 ASSAY THYROID STIM HORMONE: CPT | Performed by: OBSTETRICS & GYNECOLOGY

## 2023-07-26 PROCEDURE — 99999 PR PBB SHADOW E&M-EST. PATIENT-LVL III: ICD-10-PCS | Mod: PBBFAC,,, | Performed by: OBSTETRICS & GYNECOLOGY

## 2023-07-26 PROCEDURE — 3008F PR BODY MASS INDEX (BMI) DOCUMENTED: ICD-10-PCS | Mod: CPTII,S$GLB,, | Performed by: OBSTETRICS & GYNECOLOGY

## 2023-07-26 PROCEDURE — 3078F PR MOST RECENT DIASTOLIC BLOOD PRESSURE < 80 MM HG: ICD-10-PCS | Mod: CPTII,S$GLB,, | Performed by: OBSTETRICS & GYNECOLOGY

## 2023-07-26 RX ORDER — HEPARIN 100 UNIT/ML
500 SYRINGE INTRAVENOUS
Status: CANCELLED | OUTPATIENT
Start: 2023-07-26

## 2023-07-26 RX ORDER — DIPHENHYDRAMINE HYDROCHLORIDE 50 MG/ML
50 INJECTION INTRAMUSCULAR; INTRAVENOUS ONCE AS NEEDED
Status: CANCELLED | OUTPATIENT
Start: 2023-07-26

## 2023-07-26 RX ORDER — SODIUM CHLORIDE 0.9 % (FLUSH) 0.9 %
10 SYRINGE (ML) INJECTION
Status: CANCELLED | OUTPATIENT
Start: 2023-07-26

## 2023-07-26 RX ORDER — EPINEPHRINE 0.3 MG/.3ML
0.3 INJECTION SUBCUTANEOUS ONCE AS NEEDED
Status: CANCELLED | OUTPATIENT
Start: 2023-07-26

## 2023-07-26 RX ORDER — FAMOTIDINE 10 MG/ML
20 INJECTION INTRAVENOUS
Status: CANCELLED | OUTPATIENT
Start: 2023-07-26

## 2023-07-26 RX ORDER — PROCHLORPERAZINE EDISYLATE 5 MG/ML
5-10 INJECTION INTRAMUSCULAR; INTRAVENOUS ONCE AS NEEDED
Status: CANCELLED
Start: 2023-07-26

## 2023-07-27 ENCOUNTER — INFUSION (OUTPATIENT)
Dept: INFUSION THERAPY | Facility: OTHER | Age: 58
End: 2023-07-27
Attending: INTERNAL MEDICINE
Payer: COMMERCIAL

## 2023-07-27 VITALS
HEIGHT: 67 IN | BODY MASS INDEX: 25.74 KG/M2 | OXYGEN SATURATION: 99 % | SYSTOLIC BLOOD PRESSURE: 120 MMHG | HEART RATE: 84 BPM | DIASTOLIC BLOOD PRESSURE: 58 MMHG | RESPIRATION RATE: 78 BRPM | WEIGHT: 164 LBS | TEMPERATURE: 98 F

## 2023-07-27 DIAGNOSIS — C54.1 MALIGNANT NEOPLASM OF ENDOMETRIUM: Primary | ICD-10-CM

## 2023-07-27 PROCEDURE — 63600175 PHARM REV CODE 636 W HCPCS: Performed by: OBSTETRICS & GYNECOLOGY

## 2023-07-27 PROCEDURE — 96375 TX/PRO/DX INJ NEW DRUG ADDON: CPT

## 2023-07-27 PROCEDURE — 96367 TX/PROPH/DG ADDL SEQ IV INF: CPT

## 2023-07-27 PROCEDURE — 96361 HYDRATE IV INFUSION ADD-ON: CPT

## 2023-07-27 PROCEDURE — 96413 CHEMO IV INFUSION 1 HR: CPT

## 2023-07-27 PROCEDURE — 25000003 PHARM REV CODE 250: Performed by: OBSTETRICS & GYNECOLOGY

## 2023-07-27 PROCEDURE — 96415 CHEMO IV INFUSION ADDL HR: CPT

## 2023-07-27 PROCEDURE — 96417 CHEMO IV INFUS EACH ADDL SEQ: CPT

## 2023-07-27 RX ORDER — DIPHENHYDRAMINE HYDROCHLORIDE 50 MG/ML
50 INJECTION INTRAMUSCULAR; INTRAVENOUS ONCE AS NEEDED
Status: DISCONTINUED | OUTPATIENT
Start: 2023-07-27 | End: 2023-07-27 | Stop reason: HOSPADM

## 2023-07-27 RX ORDER — FAMOTIDINE 10 MG/ML
20 INJECTION INTRAVENOUS
Status: COMPLETED | OUTPATIENT
Start: 2023-07-27 | End: 2023-07-27

## 2023-07-27 RX ORDER — SODIUM CHLORIDE 0.9 % (FLUSH) 0.9 %
10 SYRINGE (ML) INJECTION
Status: DISCONTINUED | OUTPATIENT
Start: 2023-07-27 | End: 2023-07-27 | Stop reason: HOSPADM

## 2023-07-27 RX ORDER — EPINEPHRINE 0.3 MG/.3ML
0.3 INJECTION SUBCUTANEOUS ONCE AS NEEDED
Status: DISCONTINUED | OUTPATIENT
Start: 2023-07-27 | End: 2023-07-27 | Stop reason: HOSPADM

## 2023-07-27 RX ORDER — HEPARIN 100 UNIT/ML
500 SYRINGE INTRAVENOUS
Status: DISCONTINUED | OUTPATIENT
Start: 2023-07-27 | End: 2023-07-27 | Stop reason: HOSPADM

## 2023-07-27 RX ADMIN — CARBOPLATIN 640 MG: 10 INJECTION, SOLUTION INTRAVENOUS at 03:07

## 2023-07-27 RX ADMIN — DIPHENHYDRAMINE HYDROCHLORIDE 50 MG: 50 INJECTION, SOLUTION INTRAMUSCULAR; INTRAVENOUS at 12:07

## 2023-07-27 RX ADMIN — SODIUM CHLORIDE 500 ML: 9 INJECTION, SOLUTION INTRAVENOUS at 02:07

## 2023-07-27 RX ADMIN — DEXAMETHASONE SODIUM PHOSPHATE 0.25 MG: 4 INJECTION, SOLUTION INTRA-ARTICULAR; INTRALESIONAL; INTRAMUSCULAR; INTRAVENOUS; SOFT TISSUE at 11:07

## 2023-07-27 RX ADMIN — FAMOTIDINE 20 MG: 10 INJECTION, SOLUTION INTRAVENOUS at 11:07

## 2023-07-27 RX ADMIN — FOSAPREPITANT 150 MG: 150 INJECTION, POWDER, LYOPHILIZED, FOR SOLUTION INTRAVENOUS at 11:07

## 2023-07-27 RX ADMIN — PACLITAXEL 330 MG: 6 INJECTION, SOLUTION, CONCENTRATE INTRAVENOUS at 12:07

## 2023-07-27 RX ADMIN — SODIUM CHLORIDE: 9 INJECTION, SOLUTION INTRAVENOUS at 10:07

## 2023-07-27 NOTE — PLAN OF CARE
Dostarlimab infusion, Paclitaxel chemotherapy infusion and Carboplatin chemotherapy infusion administered no reaction. Patient tolerated well. Patient accompanied by spouse for discharge home. 24 gauge IV removed, catheter intact. No apparent distress noted. Discharge instructions given to patient. Patient understands instructions. Follow up appointment scheduled.

## 2023-07-31 ENCOUNTER — PATIENT MESSAGE (OUTPATIENT)
Dept: GYNECOLOGIC ONCOLOGY | Facility: CLINIC | Age: 58
End: 2023-07-31
Payer: COMMERCIAL

## 2023-08-01 RX ORDER — SENNOSIDES 8.6 MG/1
1 TABLET ORAL DAILY PRN
Qty: 60 TABLET | Refills: 0 | Status: ON HOLD | OUTPATIENT
Start: 2023-08-01 | End: 2023-08-27 | Stop reason: HOSPADM

## 2023-08-02 ENCOUNTER — OFFICE VISIT (OUTPATIENT)
Dept: RADIATION ONCOLOGY | Facility: CLINIC | Age: 58
End: 2023-08-02
Payer: COMMERCIAL

## 2023-08-02 ENCOUNTER — HOSPITAL ENCOUNTER (OUTPATIENT)
Dept: RADIATION THERAPY | Facility: HOSPITAL | Age: 58
Discharge: HOME | End: 2023-08-02
Attending: RADIOLOGY
Payer: COMMERCIAL

## 2023-08-02 VITALS
DIASTOLIC BLOOD PRESSURE: 67 MMHG | OXYGEN SATURATION: 98 % | TEMPERATURE: 98 F | WEIGHT: 162.88 LBS | SYSTOLIC BLOOD PRESSURE: 138 MMHG | BODY MASS INDEX: 25.51 KG/M2 | HEART RATE: 118 BPM

## 2023-08-02 DIAGNOSIS — C54.1 MALIGNANT NEOPLASM OF ENDOMETRIUM: Primary | ICD-10-CM

## 2023-08-02 DIAGNOSIS — Z90.710 STATUS POST HYSTERECTOMY: ICD-10-CM

## 2023-08-02 PROCEDURE — 77290 THER RAD SIMULAJ FIELD CPLX: CPT | Mod: TC | Performed by: RADIOLOGY

## 2023-08-02 PROCEDURE — 77014 HC CT GUIDANCE RADIATION THERAPY FLDS PLACEMENT: CPT | Mod: TC | Performed by: RADIOLOGY

## 2023-08-02 PROCEDURE — 99999 PR PBB SHADOW E&M-EST. PATIENT-LVL III: CPT | Mod: PBBFAC,,, | Performed by: RADIOLOGY

## 2023-08-02 PROCEDURE — 3078F DIAST BP <80 MM HG: CPT | Mod: CPTII,S$GLB,, | Performed by: RADIOLOGY

## 2023-08-02 PROCEDURE — 77263 THER RADIOLOGY TX PLNG CPLX: CPT | Mod: ,,, | Performed by: RADIOLOGY

## 2023-08-02 PROCEDURE — 99999 PR PBB SHADOW E&M-EST. PATIENT-LVL III: ICD-10-PCS | Mod: PBBFAC,,, | Performed by: RADIOLOGY

## 2023-08-02 PROCEDURE — 3075F PR MOST RECENT SYSTOLIC BLOOD PRESS GE 130-139MM HG: ICD-10-PCS | Mod: CPTII,S$GLB,, | Performed by: RADIOLOGY

## 2023-08-02 PROCEDURE — 77334 RADIATION TREATMENT AID(S): CPT | Mod: 26,,, | Performed by: RADIOLOGY

## 2023-08-02 PROCEDURE — 77263 PR  RADIATION THERAPY PLAN COMPLEX: ICD-10-PCS | Mod: ,,, | Performed by: RADIOLOGY

## 2023-08-02 PROCEDURE — 99024 POSTOP FOLLOW-UP VISIT: CPT | Mod: S$GLB,,, | Performed by: RADIOLOGY

## 2023-08-02 PROCEDURE — 3008F BODY MASS INDEX DOCD: CPT | Mod: CPTII,S$GLB,, | Performed by: RADIOLOGY

## 2023-08-02 PROCEDURE — 1159F PR MEDICATION LIST DOCUMENTED IN MEDICAL RECORD: ICD-10-PCS | Mod: CPTII,S$GLB,, | Performed by: RADIOLOGY

## 2023-08-02 PROCEDURE — 77290 THER RAD SIMULAJ FIELD CPLX: CPT | Mod: 26,,, | Performed by: RADIOLOGY

## 2023-08-02 PROCEDURE — 3075F SYST BP GE 130 - 139MM HG: CPT | Mod: CPTII,S$GLB,, | Performed by: RADIOLOGY

## 2023-08-02 PROCEDURE — 99024 PR POST-OP FOLLOW-UP VISIT: ICD-10-PCS | Mod: S$GLB,,, | Performed by: RADIOLOGY

## 2023-08-02 PROCEDURE — 77334 RADIATION TREATMENT AID(S): CPT | Mod: TC | Performed by: RADIOLOGY

## 2023-08-02 PROCEDURE — 1159F MED LIST DOCD IN RCRD: CPT | Mod: CPTII,S$GLB,, | Performed by: RADIOLOGY

## 2023-08-02 PROCEDURE — 3008F PR BODY MASS INDEX (BMI) DOCUMENTED: ICD-10-PCS | Mod: CPTII,S$GLB,, | Performed by: RADIOLOGY

## 2023-08-02 PROCEDURE — 77334 PR  RADN TREATMENT AID(S) COMPLX: ICD-10-PCS | Mod: 26,,, | Performed by: RADIOLOGY

## 2023-08-02 PROCEDURE — 3078F PR MOST RECENT DIASTOLIC BLOOD PRESSURE < 80 MM HG: ICD-10-PCS | Mod: CPTII,S$GLB,, | Performed by: RADIOLOGY

## 2023-08-02 PROCEDURE — 77290 PR  SET RADN THERAPY FIELD COMPLEX: ICD-10-PCS | Mod: 26,,, | Performed by: RADIOLOGY

## 2023-08-02 NOTE — PROGRESS NOTES
The patient is a 57-year-old woman with a FIGO stage IIIC2 endometrial cancer.  She is status post robotic assisted hysterectomy, bilateral salpingo-oophorectomy and sentinel lymph node biopsy.  She is currently receiving chemotherapy with carbo taxol.  She presents today for CT simulation in anticipation of vaginal cuff brachytherapy.  On exam, vaginal cuff has healed.      The patient is recommended vaginal cuff brachytherapy to decrease risk of local recurrence.  She will receive a dose of 21 Gy delivered in 3 fractions over 3 weeks.

## 2023-08-08 PROCEDURE — 77295 3-D RADIOTHERAPY PLAN: CPT | Mod: 26,,, | Performed by: RADIOLOGY

## 2023-08-08 PROCEDURE — 77295 3-D RADIOTHERAPY PLAN: CPT | Mod: TC | Performed by: RADIOLOGY

## 2023-08-08 PROCEDURE — 77470 SPECIAL RADIATION TREATMENT: CPT | Mod: 26,59,, | Performed by: RADIOLOGY

## 2023-08-08 PROCEDURE — 77470 PR  SPECIAL RADIATION TREATMENT: ICD-10-PCS | Mod: 26,59,, | Performed by: RADIOLOGY

## 2023-08-08 PROCEDURE — 77300 RADIATION THERAPY DOSE PLAN: CPT | Mod: 26,,, | Performed by: RADIOLOGY

## 2023-08-08 PROCEDURE — 77300 PR RADIATION THERAPY,DOSIMETRY PLAN: ICD-10-PCS | Mod: 26,,, | Performed by: RADIOLOGY

## 2023-08-08 PROCEDURE — 77470 SPECIAL RADIATION TREATMENT: CPT | Mod: 59,TC | Performed by: RADIOLOGY

## 2023-08-08 PROCEDURE — 77370 RADIATION PHYSICS CONSULT: CPT | Performed by: RADIOLOGY

## 2023-08-08 PROCEDURE — 77295 PR 3D RADIOTHERAPY PLAN: ICD-10-PCS | Mod: 26,,, | Performed by: RADIOLOGY

## 2023-08-08 PROCEDURE — 77300 RADIATION THERAPY DOSE PLAN: CPT | Mod: TC | Performed by: RADIOLOGY

## 2023-08-09 ENCOUNTER — PROCEDURE VISIT (OUTPATIENT)
Dept: RADIATION ONCOLOGY | Facility: CLINIC | Age: 58
End: 2023-08-09
Payer: COMMERCIAL

## 2023-08-09 DIAGNOSIS — Z90.710 STATUS POST HYSTERECTOMY: ICD-10-CM

## 2023-08-09 DIAGNOSIS — C54.1 MALIGNANT NEOPLASM OF ENDOMETRIUM: Primary | ICD-10-CM

## 2023-08-09 PROCEDURE — 77770 HDR RDNCL NTRSTL/ICAV BRCHTX: CPT | Mod: TC | Performed by: RADIOLOGY

## 2023-08-09 PROCEDURE — 57156 INS VAG BRACHYTX DEVICE: CPT | Mod: TC | Performed by: RADIOLOGY

## 2023-08-09 PROCEDURE — C1717 BRACHYTX, NON-STR,HDR IR-192: HCPCS | Performed by: RADIOLOGY

## 2023-08-09 PROCEDURE — 77770 PR HDR RDNCL NTRSTL/ICAV BRCHTX 1 CH: ICD-10-PCS | Mod: 26,,, | Performed by: RADIOLOGY

## 2023-08-09 PROCEDURE — 77770 HDR RDNCL NTRSTL/ICAV BRCHTX: CPT | Mod: 26,,, | Performed by: RADIOLOGY

## 2023-08-09 NOTE — PROCEDURES
Procedures      PATIENT IDENTIFICATION:  Patient Name: Sugar Diaz  MRN: [id number]  : 1965    DIAGNOSIS: Cancer Staging   Malignant neoplasm of endometrium  Staging form: Corpus Uteri - Carcinoma and Carcinosarcoma, AJCC 8th Edition  - Pathologic stage from 2023: FIGO Stage IIIC1, calculated as Stage IIIC2 (pT3a, cN2a(sn), cM0) - Signed by Adriane Babb MD on 2023    Malignant neoplasm of endometrium [C54.1]      REFERRING PHYSICIAN: No referring provider defined for this encounter.    Date of procedure: 2023    PATIENT INFORMATION: The patient is a 57-year-old woman with a FIGO stage IIIC2 endometrial cancer.  She is status post robotic assisted hysterectomy, bilateral salpingo-oophorectomy and sentinel lymph node biopsy.  She is currently receiving chemotherapy with carbo taxol.       PROCEDURE: Intracavitary vaginal cylinder #1     AREA TREATED: Upper 5 cm of vagina     TREATMENT METHOD: Insertion of 3 cm vaginal cylinder     RADIATION: Iridium 192, high dose rate     DEPTH OF CALCULATION: vaginal surface     DOSE: 7 Gy      Time out:   Performed by Andreas Fleming RN     Identifiers: Name and date of birth     The patient was brought to the HDR delivery room and  the vaginal cylinder was placed into the vagina.  The brachytherapy catheter was connected to the cylinder and the treatment was delivered.  She received the 1 out of 3 planned fractions of HDR vaginal brachytherapy as noted above.  She tolerated the treatment well without any unexpected events.

## 2023-08-09 NOTE — PROCEDURES
08/09/2023  Nurse: Andreas Fleming    Procedure: Vaginal Cylinder    10:48 AM Patient arrived from Home.  Time out performed.      10:55 AM Positioned on Stretcher in the Frog Leg position. Positioned with Knee Immobilizer by myself and 3.o CM vaginal applicator inserted. HDR treatment given with a full bladder.      11:10 AM Discharge instructions reviewed with patient.  Patient verbalized understanding.  Patient discharged to home with family.

## 2023-08-16 ENCOUNTER — TELEPHONE (OUTPATIENT)
Dept: RADIATION ONCOLOGY | Facility: CLINIC | Age: 58
End: 2023-08-16
Payer: COMMERCIAL

## 2023-08-16 ENCOUNTER — OFFICE VISIT (OUTPATIENT)
Dept: GYNECOLOGIC ONCOLOGY | Facility: CLINIC | Age: 58
End: 2023-08-16
Payer: COMMERCIAL

## 2023-08-16 ENCOUNTER — LAB VISIT (OUTPATIENT)
Dept: LAB | Facility: OTHER | Age: 58
End: 2023-08-16
Attending: OBSTETRICS & GYNECOLOGY
Payer: COMMERCIAL

## 2023-08-16 VITALS
DIASTOLIC BLOOD PRESSURE: 61 MMHG | HEART RATE: 89 BPM | SYSTOLIC BLOOD PRESSURE: 134 MMHG | BODY MASS INDEX: 25.25 KG/M2 | WEIGHT: 160.88 LBS | HEIGHT: 67 IN

## 2023-08-16 DIAGNOSIS — C54.1 MALIGNANT NEOPLASM OF ENDOMETRIUM: ICD-10-CM

## 2023-08-16 DIAGNOSIS — S74.8X9A: ICD-10-CM

## 2023-08-16 DIAGNOSIS — C54.1 MALIGNANT NEOPLASM OF ENDOMETRIUM: Primary | ICD-10-CM

## 2023-08-16 DIAGNOSIS — Z51.11 ENCOUNTER FOR ANTINEOPLASTIC CHEMOTHERAPY: ICD-10-CM

## 2023-08-16 DIAGNOSIS — C77.2 SECONDARY MALIGNANCY OF RETROPERITONEAL LYMPH NODES: ICD-10-CM

## 2023-08-16 LAB
ALBUMIN SERPL BCP-MCNC: 3.8 G/DL (ref 3.5–5.2)
ALP SERPL-CCNC: 86 U/L (ref 55–135)
ALT SERPL W/O P-5'-P-CCNC: 23 U/L (ref 10–44)
ANION GAP SERPL CALC-SCNC: 11 MMOL/L (ref 8–16)
AST SERPL-CCNC: 21 U/L (ref 10–40)
BASOPHILS # BLD AUTO: 0.02 K/UL (ref 0–0.2)
BASOPHILS NFR BLD: 0.4 % (ref 0–1.9)
BILIRUB SERPL-MCNC: 0.2 MG/DL (ref 0.1–1)
BUN SERPL-MCNC: 9 MG/DL (ref 6–20)
CALCIUM SERPL-MCNC: 9.7 MG/DL (ref 8.7–10.5)
CHLORIDE SERPL-SCNC: 105 MMOL/L (ref 95–110)
CO2 SERPL-SCNC: 25 MMOL/L (ref 23–29)
CREAT SERPL-MCNC: 0.8 MG/DL (ref 0.5–1.4)
DIFFERENTIAL METHOD: ABNORMAL
EOSINOPHIL # BLD AUTO: 0 K/UL (ref 0–0.5)
EOSINOPHIL NFR BLD: 0.6 % (ref 0–8)
ERYTHROCYTE [DISTWIDTH] IN BLOOD BY AUTOMATED COUNT: 14.5 % (ref 11.5–14.5)
EST. GFR  (NO RACE VARIABLE): >60 ML/MIN/1.73 M^2
GLUCOSE SERPL-MCNC: 104 MG/DL (ref 70–110)
HCT VFR BLD AUTO: 40.2 % (ref 37–48.5)
HGB BLD-MCNC: 13.7 G/DL (ref 12–16)
IMM GRANULOCYTES # BLD AUTO: 0.01 K/UL (ref 0–0.04)
IMM GRANULOCYTES NFR BLD AUTO: 0.2 % (ref 0–0.5)
LYMPHOCYTES # BLD AUTO: 1.5 K/UL (ref 1–4.8)
LYMPHOCYTES NFR BLD: 28.7 % (ref 18–48)
MCH RBC QN AUTO: 31.1 PG (ref 27–31)
MCHC RBC AUTO-ENTMCNC: 34.1 G/DL (ref 32–36)
MCV RBC AUTO: 91 FL (ref 82–98)
MONOCYTES # BLD AUTO: 0.5 K/UL (ref 0.3–1)
MONOCYTES NFR BLD: 10.1 % (ref 4–15)
NEUTROPHILS # BLD AUTO: 3.1 K/UL (ref 1.8–7.7)
NEUTROPHILS NFR BLD: 60 % (ref 38–73)
NRBC BLD-RTO: 0 /100 WBC
PLATELET # BLD AUTO: 299 K/UL (ref 150–450)
PMV BLD AUTO: 8.3 FL (ref 9.2–12.9)
POTASSIUM SERPL-SCNC: 4.1 MMOL/L (ref 3.5–5.1)
PROT SERPL-MCNC: 7.4 G/DL (ref 6–8.4)
RBC # BLD AUTO: 4.4 M/UL (ref 4–5.4)
SODIUM SERPL-SCNC: 141 MMOL/L (ref 136–145)
T4 FREE SERPL-MCNC: 1.25 NG/DL (ref 0.71–1.51)
TSH SERPL DL<=0.005 MIU/L-ACNC: 0.14 UIU/ML (ref 0.4–4)
WBC # BLD AUTO: 5.15 K/UL (ref 3.9–12.7)

## 2023-08-16 PROCEDURE — 99999 PR PBB SHADOW E&M-EST. PATIENT-LVL III: CPT | Mod: PBBFAC,,, | Performed by: OBSTETRICS & GYNECOLOGY

## 2023-08-16 PROCEDURE — 3008F PR BODY MASS INDEX (BMI) DOCUMENTED: ICD-10-PCS | Mod: CPTII,S$GLB,, | Performed by: OBSTETRICS & GYNECOLOGY

## 2023-08-16 PROCEDURE — 3075F SYST BP GE 130 - 139MM HG: CPT | Mod: CPTII,S$GLB,, | Performed by: OBSTETRICS & GYNECOLOGY

## 2023-08-16 PROCEDURE — 99999 PR PBB SHADOW E&M-EST. PATIENT-LVL III: ICD-10-PCS | Mod: PBBFAC,,, | Performed by: OBSTETRICS & GYNECOLOGY

## 2023-08-16 PROCEDURE — 3078F DIAST BP <80 MM HG: CPT | Mod: CPTII,S$GLB,, | Performed by: OBSTETRICS & GYNECOLOGY

## 2023-08-16 PROCEDURE — 84439 ASSAY OF FREE THYROXINE: CPT | Performed by: OBSTETRICS & GYNECOLOGY

## 2023-08-16 PROCEDURE — 99215 OFFICE O/P EST HI 40 MIN: CPT | Mod: 24,S$GLB,, | Performed by: OBSTETRICS & GYNECOLOGY

## 2023-08-16 PROCEDURE — 85025 COMPLETE CBC W/AUTO DIFF WBC: CPT | Performed by: OBSTETRICS & GYNECOLOGY

## 2023-08-16 PROCEDURE — 99215 PR OFFICE/OUTPT VISIT, EST, LEVL V, 40-54 MIN: ICD-10-PCS | Mod: 24,S$GLB,, | Performed by: OBSTETRICS & GYNECOLOGY

## 2023-08-16 PROCEDURE — 36415 COLL VENOUS BLD VENIPUNCTURE: CPT | Performed by: OBSTETRICS & GYNECOLOGY

## 2023-08-16 PROCEDURE — 3008F BODY MASS INDEX DOCD: CPT | Mod: CPTII,S$GLB,, | Performed by: OBSTETRICS & GYNECOLOGY

## 2023-08-16 PROCEDURE — 84443 ASSAY THYROID STIM HORMONE: CPT | Performed by: OBSTETRICS & GYNECOLOGY

## 2023-08-16 PROCEDURE — 80053 COMPREHEN METABOLIC PANEL: CPT | Performed by: OBSTETRICS & GYNECOLOGY

## 2023-08-16 PROCEDURE — 3078F PR MOST RECENT DIASTOLIC BLOOD PRESSURE < 80 MM HG: ICD-10-PCS | Mod: CPTII,S$GLB,, | Performed by: OBSTETRICS & GYNECOLOGY

## 2023-08-16 PROCEDURE — 3075F PR MOST RECENT SYSTOLIC BLOOD PRESS GE 130-139MM HG: ICD-10-PCS | Mod: CPTII,S$GLB,, | Performed by: OBSTETRICS & GYNECOLOGY

## 2023-08-16 RX ORDER — EPINEPHRINE 0.3 MG/.3ML
0.3 INJECTION SUBCUTANEOUS ONCE AS NEEDED
Status: CANCELLED | OUTPATIENT
Start: 2023-08-16

## 2023-08-16 RX ORDER — HEPARIN 100 UNIT/ML
500 SYRINGE INTRAVENOUS
Status: CANCELLED | OUTPATIENT
Start: 2023-08-16

## 2023-08-16 RX ORDER — FAMOTIDINE 10 MG/ML
20 INJECTION INTRAVENOUS
Status: CANCELLED | OUTPATIENT
Start: 2023-08-16

## 2023-08-16 RX ORDER — DIPHENHYDRAMINE HYDROCHLORIDE 50 MG/ML
50 INJECTION INTRAMUSCULAR; INTRAVENOUS ONCE AS NEEDED
Status: CANCELLED | OUTPATIENT
Start: 2023-08-16

## 2023-08-16 RX ORDER — PROCHLORPERAZINE EDISYLATE 5 MG/ML
5-10 INJECTION INTRAMUSCULAR; INTRAVENOUS ONCE AS NEEDED
Status: CANCELLED
Start: 2023-08-16

## 2023-08-16 RX ORDER — SODIUM CHLORIDE 0.9 % (FLUSH) 0.9 %
10 SYRINGE (ML) INJECTION
Status: CANCELLED | OUTPATIENT
Start: 2023-08-16

## 2023-08-16 NOTE — TELEPHONE ENCOUNTER
Call to pt to inform her that appts for HDR on 8/23 and 8/30 need to be moved to the afternoon. Appt booked at 3:00 PM on both days. Pt verbalized understanding.

## 2023-08-16 NOTE — PROGRESS NOTES
REFERRING PROVIDER  Omaira Evans MD      REASON FOR CONSULT  Sugar Diaz  is a 57 y.o.  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       HISTORY OF PRESENT ILLNESS     Chemotherapy regimen: Carboplatin AUC 5/Paclitaxel 135 mg/m2/Dostarlimab 1000mg q3-6 weeks  Cycle: 3  Previous dose limiting toxicities: N  Previous dose reductions: N  Previous breaks: N  Previous changes in pre-medications: N     Energy level: fatigue increased  Appetite: baseline  Fevers/chills/nights: no  Nausea: mild  Diarrhea: since starting VBT 8/9/2023  Emesis: no  Neuropathy: no  Discoloration of lower extremities: no  Mucositis: no  Maculopapular rashes: no  Dyspnea or coughing: no  Muscle or joint pain: yes - antihistamine alternating tylenol and ibuprofen          Oncology History   Malignant neoplasm of endometrium   5/23/2023 Surgery     Hysteroscopy, D&C: grade 1 endometrioid EC      6/5/2023 Surgery     TRH/BSO/BSLN/RPLN/repair of obturator nerve     Final pathology c/w MMRd, p53 WT stage IIIC1 grade 1 endometrioid EC. 4.5 cm, grade 1, 96% MI (22/23 mm), +LUE, -LVSI, +MELF, -cervix, 1/2+ RPLN, 1/1+ LPLN, -ascites      6/19/2023 Imaging Significant Findings     CT C/A/P w/: High L para-aortic at the level of the renal vessels, 1.5 cm.  Low R para-aortic at the LEAH, 4.1 cm in greatest dimension.      6/25/2023 Genomic Testing     [facility] (NGS): ARD1A, CHEK1, CTCF, , HDAC2, HNF1A, JAK1, KRAS, MAX, MSH6, P1K3R1, PPM1D, PTEN, ZFHX3, ZSR2      6/28/2023 Tumor Conference     No rule for debulking of para-aortic lymph nodes.  Adjuvant VBT.  Represent after completion of adjuvant therapy to discuss EBRT.          8/9/2023 VBT started (plan 3 fx)           The following portions of the patient's history were reviewed and updated as appropriate: allergies, current medications, family history, medical history, social history and surgical history.     REVIEW OF SYSTEMS  All systems  reviewed and negative except as noted in HPI.     OBJECTIVE       Vitals:     07/26/23 1044   BP: 130/63   Pulse: 73         Body mass index is 25.45 kg/m².      1. General: Well appearing, no apparent distress, alert and oriented.  2. Lymph: Neck symmetric without cervical or supraclavicular adenopathy or mass.  3. Lungs: Normal respiratory rate, no accessory muscle use.  4. Psych: Normal affect.  5. Abdomen:  non-distended, soft, non-tender, are no masses, no ascites, no hepatosplenomegaly  6. Skin: Warm, dry, no rashes or lesions.   7. Extremities: Bilateral lower extremities without edema or tenderness.  8. Genitourinary: Deferred          ECOG status: 1     LABORATORY DATA  Lab data reviewed.     RADIOLOGICAL DATA  Radiology data reviewed.     ASSESSMENT / PLAN      1. Malignant neoplasm of endometrium    2. Encounter for antineoplastic chemotherapy    3. Secondary malignancy of retroperitoneal lymph nodes    4. Injury of obturator nerve during surgery    5. Overweight (BMI 25.0-29.9)          No dose limiting toxicities or signs of recurrence.  Administer cycle #3.  CBC, CMP, TSH, T4, cycle #4, RONC 3 weeks.  Continue with home PT.  Continue VBT.

## 2023-08-17 ENCOUNTER — INFUSION (OUTPATIENT)
Dept: INFUSION THERAPY | Facility: OTHER | Age: 58
End: 2023-08-17
Attending: INTERNAL MEDICINE
Payer: COMMERCIAL

## 2023-08-17 VITALS
HEART RATE: 85 BPM | TEMPERATURE: 98 F | DIASTOLIC BLOOD PRESSURE: 56 MMHG | HEIGHT: 67 IN | SYSTOLIC BLOOD PRESSURE: 100 MMHG | WEIGHT: 161.63 LBS | RESPIRATION RATE: 16 BRPM | OXYGEN SATURATION: 96 % | BODY MASS INDEX: 25.37 KG/M2

## 2023-08-17 DIAGNOSIS — C54.1 MALIGNANT NEOPLASM OF ENDOMETRIUM: Primary | ICD-10-CM

## 2023-08-17 PROCEDURE — A4216 STERILE WATER/SALINE, 10 ML: HCPCS | Performed by: OBSTETRICS & GYNECOLOGY

## 2023-08-17 PROCEDURE — 96361 HYDRATE IV INFUSION ADD-ON: CPT

## 2023-08-17 PROCEDURE — 96415 CHEMO IV INFUSION ADDL HR: CPT

## 2023-08-17 PROCEDURE — 25000003 PHARM REV CODE 250: Performed by: OBSTETRICS & GYNECOLOGY

## 2023-08-17 PROCEDURE — 96367 TX/PROPH/DG ADDL SEQ IV INF: CPT

## 2023-08-17 PROCEDURE — 96413 CHEMO IV INFUSION 1 HR: CPT

## 2023-08-17 PROCEDURE — 63600175 PHARM REV CODE 636 W HCPCS: Performed by: OBSTETRICS & GYNECOLOGY

## 2023-08-17 PROCEDURE — 96375 TX/PRO/DX INJ NEW DRUG ADDON: CPT

## 2023-08-17 PROCEDURE — 96417 CHEMO IV INFUS EACH ADDL SEQ: CPT

## 2023-08-17 RX ORDER — EPINEPHRINE 0.3 MG/.3ML
0.3 INJECTION SUBCUTANEOUS ONCE AS NEEDED
Status: DISCONTINUED | OUTPATIENT
Start: 2023-08-17 | End: 2023-08-17 | Stop reason: HOSPADM

## 2023-08-17 RX ORDER — DIPHENHYDRAMINE HYDROCHLORIDE 50 MG/ML
50 INJECTION INTRAMUSCULAR; INTRAVENOUS ONCE AS NEEDED
Status: DISCONTINUED | OUTPATIENT
Start: 2023-08-17 | End: 2023-08-17 | Stop reason: HOSPADM

## 2023-08-17 RX ORDER — FAMOTIDINE 10 MG/ML
20 INJECTION INTRAVENOUS
Status: COMPLETED | OUTPATIENT
Start: 2023-08-17 | End: 2023-08-17

## 2023-08-17 RX ORDER — SODIUM CHLORIDE 0.9 % (FLUSH) 0.9 %
10 SYRINGE (ML) INJECTION
Status: DISCONTINUED | OUTPATIENT
Start: 2023-08-17 | End: 2023-08-17 | Stop reason: HOSPADM

## 2023-08-17 RX ORDER — HEPARIN 100 UNIT/ML
500 SYRINGE INTRAVENOUS
Status: DISCONTINUED | OUTPATIENT
Start: 2023-08-17 | End: 2023-08-17 | Stop reason: HOSPADM

## 2023-08-17 RX ADMIN — FAMOTIDINE 20 MG: 10 INJECTION, SOLUTION INTRAVENOUS at 10:08

## 2023-08-17 RX ADMIN — SODIUM CHLORIDE 150 MG: 9 INJECTION, SOLUTION INTRAVENOUS at 10:08

## 2023-08-17 RX ADMIN — DIPHENHYDRAMINE HYDROCHLORIDE 50 MG: 50 INJECTION, SOLUTION INTRAMUSCULAR; INTRAVENOUS at 11:08

## 2023-08-17 RX ADMIN — PACLITAXEL 330 MG: 6 INJECTION, SOLUTION INTRAVENOUS at 11:08

## 2023-08-17 RX ADMIN — SODIUM CHLORIDE 500 MG: 9 INJECTION, SOLUTION INTRAVENOUS at 09:08

## 2023-08-17 RX ADMIN — CARBOPLATIN 575 MG: 10 INJECTION, SOLUTION INTRAVENOUS at 02:08

## 2023-08-17 RX ADMIN — Medication 10 ML: at 09:08

## 2023-08-17 RX ADMIN — DEXAMETHASONE SODIUM PHOSPHATE 0.25 MG: 4 INJECTION, SOLUTION INTRA-ARTICULAR; INTRALESIONAL; INTRAMUSCULAR; INTRAVENOUS; SOFT TISSUE at 10:08

## 2023-08-17 RX ADMIN — SODIUM CHLORIDE 500 ML: 9 INJECTION, SOLUTION INTRAVENOUS at 02:08

## 2023-08-17 NOTE — PLAN OF CARE
Vital Signs Stable, No apparent distress noted; Pt tolerated _Dostarlimab/Taxol/Carbo__ infusion w/o difficulty.  24g piv removed;No bleeding,swelling or drainage noted to the site.  AVS/Discharge instructions given;all questions answered ;Pt verbalizes understanding. Next appointments scheduled and sent via mychart; ambulated from clinic in [facility], accompanied by .

## 2023-08-22 ENCOUNTER — TELEPHONE (OUTPATIENT)
Dept: GYNECOLOGIC ONCOLOGY | Facility: CLINIC | Age: 58
End: 2023-08-22
Payer: COMMERCIAL

## 2023-08-22 ENCOUNTER — NURSE TRIAGE (OUTPATIENT)
Dept: ADMINISTRATIVE | Facility: CLINIC | Age: 58
End: 2023-08-22
Payer: COMMERCIAL

## 2023-08-22 ENCOUNTER — TELEPHONE (OUTPATIENT)
Dept: RADIATION ONCOLOGY | Facility: CLINIC | Age: 58
End: 2023-08-22
Payer: COMMERCIAL

## 2023-08-22 ENCOUNTER — HOSPITAL ENCOUNTER (INPATIENT)
Facility: HOSPITAL | Age: 58
LOS: 3 days | Discharge: HOME | DRG: 392 | End: 2023-08-27
Attending: EMERGENCY MEDICINE | Admitting: EMERGENCY MEDICINE
Payer: COMMERCIAL

## 2023-08-22 DIAGNOSIS — R07.9 CHEST PAIN: ICD-10-CM

## 2023-08-22 DIAGNOSIS — R19.7 DIARRHEA: Primary | ICD-10-CM

## 2023-08-22 PROBLEM — C54.1 MALIGNANT NEOPLASM OF ENDOMETRIUM: Chronic | Status: ACTIVE | Noted: 2023-06-07

## 2023-08-22 LAB
ALBUMIN SERPL BCP-MCNC: 3.3 G/DL (ref 3.5–5.2)
ALP SERPL-CCNC: 70 U/L (ref 55–135)
ALT SERPL W/O P-5'-P-CCNC: 30 U/L (ref 10–44)
ANION GAP SERPL CALC-SCNC: 9 MMOL/L (ref 8–16)
AST SERPL-CCNC: 22 U/L (ref 10–40)
BASOPHILS # BLD AUTO: 0.01 K/UL (ref 0–0.2)
BASOPHILS NFR BLD: 0.2 % (ref 0–1.9)
BILIRUB SERPL-MCNC: 0.3 MG/DL (ref 0.1–1)
BILIRUB UR QL STRIP: NEGATIVE
BUN SERPL-MCNC: 16 MG/DL (ref 6–20)
CALCIUM SERPL-MCNC: 9.1 MG/DL (ref 8.7–10.5)
CHLORIDE SERPL-SCNC: 104 MMOL/L (ref 95–110)
CLARITY UR: CLEAR
CO2 SERPL-SCNC: 23 MMOL/L (ref 23–29)
COLOR UR: COLORLESS
CREAT SERPL-MCNC: 0.6 MG/DL (ref 0.5–1.4)
DIFFERENTIAL METHOD: ABNORMAL
EOSINOPHIL # BLD AUTO: 0.1 K/UL (ref 0–0.5)
EOSINOPHIL NFR BLD: 1.6 % (ref 0–8)
ERYTHROCYTE [DISTWIDTH] IN BLOOD BY AUTOMATED COUNT: 13.8 % (ref 11.5–14.5)
EST. GFR  (NO RACE VARIABLE): >60 ML/MIN/1.73 M^2
GLUCOSE SERPL-MCNC: 123 MG/DL (ref 70–110)
GLUCOSE UR QL STRIP: NEGATIVE
HCT VFR BLD AUTO: 35.5 % (ref 37–48.5)
HGB BLD-MCNC: 12.8 G/DL (ref 12–16)
HGB UR QL STRIP: NEGATIVE
IMM GRANULOCYTES # BLD AUTO: 0.03 K/UL (ref 0–0.04)
IMM GRANULOCYTES NFR BLD AUTO: 0.7 % (ref 0–0.5)
KETONES UR QL STRIP: NEGATIVE
LACTATE SERPL-SCNC: 1.5 MMOL/L (ref 0.5–2.2)
LEUKOCYTE ESTERASE UR QL STRIP: NEGATIVE
LIPASE SERPL-CCNC: 14 U/L (ref 4–60)
LYMPHOCYTES # BLD AUTO: 1.3 K/UL (ref 1–4.8)
LYMPHOCYTES NFR BLD: 29.8 % (ref 18–48)
MCH RBC QN AUTO: 31.8 PG (ref 27–31)
MCHC RBC AUTO-ENTMCNC: 36.1 G/DL (ref 32–36)
MCV RBC AUTO: 88 FL (ref 82–98)
MONOCYTES # BLD AUTO: 0.1 K/UL (ref 0.3–1)
MONOCYTES NFR BLD: 1.9 % (ref 4–15)
NEUTROPHILS # BLD AUTO: 2.8 K/UL (ref 1.8–7.7)
NEUTROPHILS NFR BLD: 65.8 % (ref 38–73)
NITRITE UR QL STRIP: NEGATIVE
NRBC BLD-RTO: 0 /100 WBC
PH UR STRIP: 7 [PH] (ref 5–8)
PLATELET # BLD AUTO: 308 K/UL (ref 150–450)
PMV BLD AUTO: 9.1 FL (ref 9.2–12.9)
POTASSIUM SERPL-SCNC: 3.7 MMOL/L (ref 3.5–5.1)
PROT SERPL-MCNC: 6.7 G/DL (ref 6–8.4)
PROT UR QL STRIP: NEGATIVE
RBC # BLD AUTO: 4.02 M/UL (ref 4–5.4)
SODIUM SERPL-SCNC: 136 MMOL/L (ref 136–145)
SP GR UR STRIP: 1.01 (ref 1–1.03)
URN SPEC COLLECT METH UR: ABNORMAL
UROBILINOGEN UR STRIP-ACNC: NEGATIVE EU/DL
WBC # BLD AUTO: 4.3 K/UL (ref 3.9–12.7)

## 2023-08-22 PROCEDURE — G0378 HOSPITAL OBSERVATION PER HR: HCPCS

## 2023-08-22 PROCEDURE — 96360 HYDRATION IV INFUSION INIT: CPT

## 2023-08-22 PROCEDURE — 99285 EMERGENCY DEPT VISIT HI MDM: CPT | Mod: 25

## 2023-08-22 PROCEDURE — 87209 SMEAR COMPLEX STAIN: CPT | Performed by: EMERGENCY MEDICINE

## 2023-08-22 PROCEDURE — 81003 URINALYSIS AUTO W/O SCOPE: CPT | Performed by: EMERGENCY MEDICINE

## 2023-08-22 PROCEDURE — 87449 NOS EACH ORGANISM AG IA: CPT | Performed by: HOSPITALIST

## 2023-08-22 PROCEDURE — 87427 SHIGA-LIKE TOXIN AG IA: CPT | Performed by: EMERGENCY MEDICINE

## 2023-08-22 PROCEDURE — 96361 HYDRATE IV INFUSION ADD-ON: CPT

## 2023-08-22 PROCEDURE — 93010 ELECTROCARDIOGRAM REPORT: CPT | Mod: ,,, | Performed by: INTERNAL MEDICINE

## 2023-08-22 PROCEDURE — 63600175 PHARM REV CODE 636 W HCPCS: Performed by: HOSPITALIST

## 2023-08-22 PROCEDURE — 83690 ASSAY OF LIPASE: CPT | Performed by: EMERGENCY MEDICINE

## 2023-08-22 PROCEDURE — 89055 LEUKOCYTE ASSESSMENT FECAL: CPT | Performed by: EMERGENCY MEDICINE

## 2023-08-22 PROCEDURE — 80053 COMPREHEN METABOLIC PANEL: CPT | Performed by: EMERGENCY MEDICINE

## 2023-08-22 PROCEDURE — 25500020 PHARM REV CODE 255: Performed by: EMERGENCY MEDICINE

## 2023-08-22 PROCEDURE — 87046 STOOL CULTR AEROBIC BACT EA: CPT | Performed by: EMERGENCY MEDICINE

## 2023-08-22 PROCEDURE — 83605 ASSAY OF LACTIC ACID: CPT | Performed by: EMERGENCY MEDICINE

## 2023-08-22 PROCEDURE — 85025 COMPLETE CBC W/AUTO DIFF WBC: CPT | Performed by: EMERGENCY MEDICINE

## 2023-08-22 PROCEDURE — 93010 EKG 12-LEAD: ICD-10-PCS | Mod: ,,, | Performed by: INTERNAL MEDICINE

## 2023-08-22 PROCEDURE — 87045 FECES CULTURE AEROBIC BACT: CPT | Performed by: EMERGENCY MEDICINE

## 2023-08-22 PROCEDURE — 93005 ELECTROCARDIOGRAM TRACING: CPT

## 2023-08-22 PROCEDURE — 63600175 PHARM REV CODE 636 W HCPCS: Performed by: EMERGENCY MEDICINE

## 2023-08-22 PROCEDURE — 25000003 PHARM REV CODE 250: Performed by: EMERGENCY MEDICINE

## 2023-08-22 PROCEDURE — 96372 THER/PROPH/DIAG INJ SC/IM: CPT | Performed by: HOSPITALIST

## 2023-08-22 RX ORDER — SODIUM CHLORIDE 0.9 % (FLUSH) 0.9 %
10 SYRINGE (ML) INJECTION EVERY 8 HOURS PRN
Status: DISCONTINUED | OUTPATIENT
Start: 2023-08-22 | End: 2023-08-27 | Stop reason: HOSPADM

## 2023-08-22 RX ORDER — NALOXONE HCL 0.4 MG/ML
0.02 VIAL (ML) INJECTION
Status: DISCONTINUED | OUTPATIENT
Start: 2023-08-22 | End: 2023-08-27 | Stop reason: HOSPADM

## 2023-08-22 RX ORDER — OXYCODONE HYDROCHLORIDE 5 MG/1
5 TABLET ORAL EVERY 4 HOURS PRN
Status: DISCONTINUED | OUTPATIENT
Start: 2023-08-22 | End: 2023-08-27 | Stop reason: HOSPADM

## 2023-08-22 RX ORDER — ONDANSETRON 2 MG/ML
4 INJECTION INTRAMUSCULAR; INTRAVENOUS EVERY 8 HOURS PRN
Status: DISCONTINUED | OUTPATIENT
Start: 2023-08-22 | End: 2023-08-27 | Stop reason: HOSPADM

## 2023-08-22 RX ORDER — IBUPROFEN 200 MG
24 TABLET ORAL
Status: DISCONTINUED | OUTPATIENT
Start: 2023-08-22 | End: 2023-08-27 | Stop reason: HOSPADM

## 2023-08-22 RX ORDER — ZOLPIDEM TARTRATE 5 MG/1
5 TABLET ORAL NIGHTLY PRN
Status: DISCONTINUED | OUTPATIENT
Start: 2023-08-22 | End: 2023-08-27 | Stop reason: HOSPADM

## 2023-08-22 RX ORDER — PROCHLORPERAZINE EDISYLATE 5 MG/ML
5 INJECTION INTRAMUSCULAR; INTRAVENOUS EVERY 6 HOURS PRN
Status: DISCONTINUED | OUTPATIENT
Start: 2023-08-22 | End: 2023-08-27 | Stop reason: HOSPADM

## 2023-08-22 RX ORDER — ENOXAPARIN SODIUM 100 MG/ML
40 INJECTION SUBCUTANEOUS EVERY 24 HOURS
Status: DISCONTINUED | OUTPATIENT
Start: 2023-08-22 | End: 2023-08-27 | Stop reason: HOSPADM

## 2023-08-22 RX ORDER — IBUPROFEN 200 MG
16 TABLET ORAL
Status: DISCONTINUED | OUTPATIENT
Start: 2023-08-22 | End: 2023-08-27 | Stop reason: HOSPADM

## 2023-08-22 RX ORDER — GLUCAGON 1 MG
1 KIT INJECTION
Status: DISCONTINUED | OUTPATIENT
Start: 2023-08-22 | End: 2023-08-27 | Stop reason: HOSPADM

## 2023-08-22 RX ORDER — ACETAMINOPHEN 325 MG/1
650 TABLET ORAL EVERY 6 HOURS PRN
Status: DISCONTINUED | OUTPATIENT
Start: 2023-08-22 | End: 2023-08-27 | Stop reason: HOSPADM

## 2023-08-22 RX ORDER — POLYETHYLENE GLYCOL 3350 17 G/17G
17 POWDER, FOR SOLUTION ORAL DAILY
Status: DISCONTINUED | OUTPATIENT
Start: 2023-08-22 | End: 2023-08-23

## 2023-08-22 RX ORDER — NALOXONE HCL 0.4 MG/ML
0.4 VIAL (ML) INJECTION
Status: DISCONTINUED | OUTPATIENT
Start: 2023-08-22 | End: 2023-08-22

## 2023-08-22 RX ORDER — LOPERAMIDE HYDROCHLORIDE 2 MG/1
2 CAPSULE ORAL 4 TIMES DAILY PRN
Status: DISCONTINUED | OUTPATIENT
Start: 2023-08-22 | End: 2023-08-24

## 2023-08-22 RX ORDER — SIMETHICONE 80 MG
1 TABLET,CHEWABLE ORAL 4 TIMES DAILY PRN
Status: DISCONTINUED | OUTPATIENT
Start: 2023-08-22 | End: 2023-08-27 | Stop reason: HOSPADM

## 2023-08-22 RX ORDER — MAG HYDROX/ALUMINUM HYD/SIMETH 200-200-20
30 SUSPENSION, ORAL (FINAL DOSE FORM) ORAL 4 TIMES DAILY PRN
Status: DISCONTINUED | OUTPATIENT
Start: 2023-08-22 | End: 2023-08-27 | Stop reason: HOSPADM

## 2023-08-22 RX ADMIN — PREDNISONE 70 MG: 50 TABLET ORAL at 02:08

## 2023-08-22 RX ADMIN — LOPERAMIDE HYDROCHLORIDE 2 MG: 2 CAPSULE ORAL at 10:08

## 2023-08-22 RX ADMIN — ENOXAPARIN SODIUM 40 MG: 40 INJECTION SUBCUTANEOUS at 05:08

## 2023-08-22 RX ADMIN — SODIUM CHLORIDE, POTASSIUM CHLORIDE, SODIUM LACTATE AND CALCIUM CHLORIDE 1000 ML: 600; 310; 30; 20 INJECTION, SOLUTION INTRAVENOUS at 11:08

## 2023-08-22 RX ADMIN — IOHEXOL 75 ML: 350 INJECTION, SOLUTION INTRAVENOUS at 02:08

## 2023-08-22 RX ADMIN — SODIUM CHLORIDE, POTASSIUM CHLORIDE, SODIUM LACTATE AND CALCIUM CHLORIDE 1000 ML: 600; 310; 30; 20 INJECTION, SOLUTION INTRAVENOUS at 01:08

## 2023-08-22 NOTE — TELEPHONE ENCOUNTER
"Pt co diarrhea x several days and currently on radiation pt reports every time she drinks fluids she has diarrhea states " I cant keep anything in me." Pt instructed to go to ED now.  Reason for Disposition   Patient sounds very sick or weak to the triager    Additional Information   Negative: Shock suspected (e.g., cold/pale/clammy skin, too weak to stand, low BP, rapid pulse)   Negative: Difficult to awaken or acting confused (e.g., disoriented, slurred speech)   Negative: Sounds like a life-threatening emergency to the triager   Negative: SEVERE abdominal pain (e.g., excruciating) and present > 1 hour   Negative: SEVERE abdominal pain and age > 60 years   Negative: Bloody, black, or tarry bowel movements  (Exception: Chronic-unchanged black-grey bowel movements and is taking iron pills or Pepto-Bismol.)   Negative: SEVERE diarrhea (e.g., 7 or more times / day more than normal) and age > 60 years   Negative: Constant abdominal pain lasting > 2 hours   Negative: Drinking very little and dehydration suspected (e.g., no urine > 12 hours, very dry mouth, very lightheaded)    Protocols used: Diarrhea-A-OH    "

## 2023-08-22 NOTE — TELEPHONE ENCOUNTER
----- Message from Daniel Brito sent at 8/22/2023  9:23 AM CDT -----   Name of Who is Calling:     What is the request in detail:  patient request call back in reference to want to be seen today  / possible dehydration  / has been advised by on call nurse  Please contact to further discuss and advise      Can the clinic reply by [facility]:     What Number [facility] Back [facility]  [phone] / parveen / yasmeen

## 2023-08-22 NOTE — ED TRIAGE NOTES
Pt reports to the ED for CC of diarrhea x2.5 weeks. Pt was referred to the ED for possible dehydration. Pt MM are dry. Pt states she has felt more week over the last couple of weeks but states it could be from her Chemo. Last treatment was last Thursday. Pt denies SOB, chest pain, fever, chills, HA. Pt does endorse nausea and body aches but states chemo causes these. Pt is alert and oriented, tachycardic, but in no apparent distress at this moment in time. Wifes  at bedside.

## 2023-08-22 NOTE — ED PROVIDER NOTES
Encounter Date: 8/22/2023    SCRIBE #1 NOTE: I, Janetvignesh Naranjo, am scribing for, and in the presence of,  Flako Aldana Jr., MD. I have scribed the following portions of the note - Other sections scribed: HPI, ROS.       History     Chief Complaint   Patient presents with    Diarrhea     Pt an oncology patient c/o diarrhea x 2 weeks. MM pink and moist. Sent to ED for possible dehydration. Last chemo last Thursday.      This is a 57 y. o. female with a PMHx of endometrial cancer, and PSHx of hysterectomy (06/05/2023) who presents to the ED for a chief complaint of episodes of watery diarrhea onset 2 weeks ago. Patient reports her diarrhea started after she got radiation treatment 2 weeks ago for endometrial cancer. Further reports experiencing weakness, nausea, and generalized myalgias over the last couple of weeks, and states it could be from her chemotherapy, which was on last Thursday. No attempted Tx for the Sx. Endorses numbness to the abdomen secondary to recent hysterectomy. No other alleviating or exacerbating factors. Denies Hx of heart or kidneys problem. Also denies any recent hospitalization relating to her condition. Further denies any fever, chills, melena, hematochezia, headache, abdominal pain, vomiting, rashes, chest pain, shortness of breath, urinary s/s, or other associated symptoms. NKDA. Patient's oncologist: Dr. Rodríguez.        The history is provided by the patient. No  was used.     Review of patient's allergies indicates:  No Known Allergies  Past Medical History:   Diagnosis Date    Chest pain 2002    NEGATIVE STRESS ECHO 2002    GERD (gastroesophageal reflux disease)     Lower back pain     Migraines     Mild allergic rhinitis     Postmenopausal bleeding      Past Surgical History:   Procedure Laterality Date    CYST REMOVAL Left 04/28/2022    Procedure: EXCISION, CYST-BACK;  Surgeon: Thomas Hurst MD;  Location: [facility] OR;  Service: General;  Laterality: Left;   left upper back  RN PREOP ON 4/21/22-NF    HYSTEROSCOPY WITH DILATION AND CURETTAGE OF UTERUS N/A 05/23/2023    Procedure: HYSTEROSCOPY, WITH DILATION AND CURETTAGE OF UTERUS;  Surgeon: Omaira Evans MD;  Location: [facility];  Service: OB/GYN;  Laterality: N/A;  DESTINY HERNANDEZ  [phone] TEXTED [facility] ON 5/2/2023 @ 3:51PM. [facility] RESPONDED ON 5/2/2023 @ 3:51PM-LO  RN PREOP 5/18/23  T & S; UPT ORDERED AND SCHEDULED 5/22/23    LYMPH NODE BIOPSY Left 6/5/2023    Procedure: BIOPSY, sentinel LYMPH NODE;  Surgeon: Lavell Rodríguez MD;  Location: [facility];  Service: OB/GYN;  Laterality: Left;    LYMPHADENECTOMY, PELVIS Right 6/5/2023    Procedure: LYMPHADENECTOMY, PELVIS;  Surgeon: Lavell Rodríguez MD;  Location: [facility];  Service: OB/GYN;  Laterality: Right;    MAPPING, LYMPH NODE, SENTINEL Bilateral 6/5/2023    Procedure: injection, LYMPH NODE, SENTINEL;  Surgeon: Lavell Rodríguez MD;  Location: [facility];  Service: OB/GYN;  Laterality: Bilateral;    NERVE REPAIR Right 6/5/2023    Procedure: REPAIR, OBTURATOR NERVE;  Surgeon: Lavell Rodríguez MD;  Location: [facility];  Service: OB/GYN;  Laterality: Right;    TONSILLECTOMY      TONSILLECTOMY      TUBAL LIGATION      XI ROBOTIC HYSTERECTOMY, WITH SALPINGO-OOPHORECTOMY Bilateral 6/5/2023    Procedure: XI ROBOTIC HYSTERECTOMY,WITH SALPINGO-OOPHORECTOMY;  Surgeon: Lavell Rodríguez MD;  Location: [facility];  Service: OB/GYN;  Laterality: Bilateral;  removed through vagina     Family History   Problem Relation Age of Onset    No Known Problems Mother     Heart disease Father     No Known Problems Sister     No Known Problems Sister      Social History     Tobacco Use    Smoking status: Never    Smokeless tobacco: Never   Substance Use Topics    Alcohol use: Not Currently    Drug use: Never     Review of Systems   Constitutional:  Negative for chills and fever.   Respiratory:  Negative for shortness of breath.    Cardiovascular:  Negative for chest pain.   Gastrointestinal:  Positive  for diarrhea (watery) and nausea (2nd to chemo). Negative for abdominal pain, blood in stool and vomiting.        (-) melena   Genitourinary:  Negative for dysuria, frequency and hematuria.   Musculoskeletal:  Positive for myalgias (generalized, 2nd to chemo).   Skin:  Negative for rash.   Neurological:  Positive for weakness (2nd to chemo) and numbness (to the abdomen, 2nd to hysterectomy). Negative for headaches.       Physical Exam     Initial Vitals [08/22/23 1041]   BP Pulse Resp Temp SpO2   (!) 139/97 (!) 126 18 98.5 °F (36.9 °C) 98 %      MAP       --         Physical Exam    Nursing note and vitals reviewed.  Constitutional: She appears well-developed and well-nourished. She is not diaphoretic. No distress.   HENT:   Head: Normocephalic and atraumatic.   Right Ear: External ear normal.   Left Ear: External ear normal.   Nose: Nose normal.   Dry oral mucosa.   Eyes: Conjunctivae and EOM are normal. Pupils are equal, round, and reactive to light. Right eye exhibits no discharge. Left eye exhibits no discharge. No scleral icterus.   Neck: Neck supple.   Normal range of motion.  Cardiovascular:  Regular rhythm, normal heart sounds and intact distal pulses.           No murmur heard.  Tachycardic, regular rhythm.   Pulmonary/Chest: Breath sounds normal. No respiratory distress. She has no wheezes. She has no rhonchi. She has no rales.   Abdominal: Abdomen is soft. Bowel sounds are normal. She exhibits no distension and no mass. There is no abdominal tenderness. There is no rebound and no guarding.   Musculoskeletal:         General: No tenderness or edema. Normal range of motion.      Cervical back: Normal range of motion and neck supple.     Neurological: She is alert and oriented to person, place, and time. She has normal strength. No cranial nerve deficit or sensory deficit.   Skin: Skin is warm and dry. No rash noted. No erythema. No pallor.   Psychiatric: She has a normal mood and affect. Her behavior is  normal. Judgment and thought content normal.         ED Course   Procedures  Labs Reviewed   URINALYSIS, REFLEX TO URINE CULTURE - Abnormal; Notable for the following components:       Result Value    Color, UA Colorless (*)     All other components within normal limits    Narrative:     Specimen Source->Urine   CBC W/ AUTO DIFFERENTIAL - Abnormal; Notable for the following components:    Hematocrit 35.5 (*)     MCH 31.8 (*)     MCHC 36.1 (*)     MPV 9.1 (*)     Immature Granulocytes 0.7 (*)     Mono # 0.1 (*)     Mono % 1.9 (*)     All other components within normal limits   COMPREHENSIVE METABOLIC PANEL - Abnormal; Notable for the following components:    Glucose 123 (*)     Albumin 3.3 (*)     All other components within normal limits   CULTURE, STOOL   ENTEROHEMORRHAGIC E.COLI   LIPASE   LACTIC ACID, PLASMA   STOOL EXAM-OVA,CYSTS,PARASITES   WBC, STOOL     EKG Readings: (Independently Interpreted)   Initial Reading: No STEMI. Ectopy: No Ectopy.   EKG reviewed and interpreted by me shows sinus rhythm at a rate of 96 beats per minute.  The AL, QRS, QTC intervals are within normal limits.  There is normal axis.  There is normal R-wave progression across the precordium.  There are no ST segment or T-wave ischemic findings.         Imaging Results              CT Abdomen Pelvis With Contrast (Final result)  Result time 08/22/23 15:27:02      Final result by Ferny oLpez MD (08/22/23 15:27:02)                   Impression:      1. Liquid stool is noted throughout the colon and rectum which are otherwise grossly unremarkable in appearance.  2. Interval decrease in size of all previously noted retroperitoneal lymph nodes suggesting a positive response to therapy.      Electronically signed by: Ferny Lopez  Date:    08/22/2023  Time:    15:27               Narrative:    EXAMINATION:  CT ABDOMEN PELVIS WITH CONTRAST    CLINICAL HISTORY:  Nausea/vomiting;Patient on immune check point inhibitor.  Hematology concern for  possible enterocolitis.;    TECHNIQUE:  Low dose axial images, sagittal and coronal reformations were obtained from the lung bases to the pubic symphysis following the IV administration of 75 mL of Omnipaque 350.  Oral contrast was not administered, limiting evaluation of hollow viscus organs    COMPARISON:  CT chest/abdomen/pelvis 06/19/2023    FINDINGS:  Abdomen:    - Lower thorax:Heart is not enlarged.  No significant pericardial thickening in the field of view.    - Lung bases: 2 punctate micro nodular opacities in the lateral aspect of the left lower lobe, not significantly changed.    - Liver: Liver is normal in size, attenuations and morphology without appreciable surface nodularity. No appreciable sizable suspicious lesion.  No intrahepatic/extrahepatic biliary dilatation.    - Gallbladder: No radiodense gallstones, mural thickening, pericholecystic fluid or pericholecystic fat stranding.    - Pancreas: Pancreas is normal in size and attenuation without the surrounding inflammatory fat stranding, suspicious mass or fluid/fluid collection.    - Spleen: Spleen is normal in size, morphology and attenuation without appreciable suspicious lesion.    - Adrenals: Bilateral adrenal glands are normal in size and morphology without appreciable nodularity.    - Kidneys: Kidneys are normal in size, location, morphology and attenuation. No appreciable suspicious sizable lesion, nephroliths or hydroureteronephrosis bilaterally.  Urinary bladder is well distended and grossly unremarkable without mural thickening or appreciable nodularity.    - Bowel/Mesentery: Liquid stool is noted throughout the colon and rectum which are otherwise grossly unremarkable in appearance.  Otherwise, the enhanced but unopacified stomach, small bowel and colon are normal in course and caliber.  No evidence of small bowel obstruction, significant inflammatory fat stranding, free air or free fluid.  Appendix is normal.  Bowel mesentery is grossly  unremarkable.    - Retroperitoneum:  Aorta is normal in course and caliber without evidence of aneurysmal degeneration.  1.1-cm (previously 1.6-cm) abnormally enlarged lymph node just inferior to the ante-aortic left renal vein.  0.5-cm (previously 0.9-cm) retroperitoneal lymph node just anterior to the retroaortic left renal vein.  1.0-cm (previously 1.6-cm) retroperitoneal lymph node interposed between the distal IVC and distal aorta.    Pelvis:    - Reproductive organs: Uterus appears to be surgically absent, unchanged.  No suspicious sizable pelvic mass, lymphadenopathy or fluid.    - Soft tissues:  Imaged soft tissues are grossly unremarkable.    - Bones:  Multilevel degenerative changes of the imaged spine.  No acute displaced fracture, dislocation or suspicious lytic/blastic lesion.                                       Medications   loperamide capsule 2 mg (has no administration in time range)   lactated ringers bolus 1,000 mL (0 mLs Intravenous Stopped 8/22/23 1330)   lactated ringers bolus 1,000 mL (0 mLs Intravenous Stopped 8/22/23 1330)   lactated ringers bolus 1,000 mL (0 mLs Intravenous Stopped 8/22/23 1448)   predniSONE tablet 70 mg (70 mg Oral Given 8/22/23 1447)   iohexoL (OMNIPAQUE 350) injection 75 mL (75 mLs Intravenous Given 8/22/23 1430)     Medical Decision Making  This is the emergent evaluation of a 57-year-old female presents emergency department for evaluation of intractable watery diarrhea and feeling of dehydration.  Differential diagnosis at the time of initial evaluation included, but was not limited to:   Dehydration, acute kidney injury, infection.  Patient responded well to IV fluids.  She is still mildly tachycardic but mostly with a heart rate in the 90s and 80s.  She feels much better.  She had a watery bowel movement in the emergency department.  Stool studies have been ordered.  Case was discussed by secure chat with the patient's oncologist, Dr. Gutierrez.  Given the patient's  frequency and duration of symptoms, there is concern for immune check point inhibitor in her colitis.  He strongly recommends admission/observation for stool studies, GI consult, and prednisone 1 milligram/kilogram per day.  I have started prednisone in the emergency department.  Stool studies have been ordered.  The case was discussed with the observation hospitalist team.  Patient is amenable to the above plan.    Per conversation with Dr. Gutierrez: ASCO guideline for Immune Checkpoint Colitis.  If she is having > 3 episodes of watery stools daily (Grade 2 diarrhea) she would warrant a battery of labs, stool studies,  GI consult, prednisone 1 mg/kg/day until diarrhea grade 1 or resolved.      Amount and/or Complexity of Data Reviewed  External Data Reviewed: notes.  Labs: ordered. Decision-making details documented in ED Course.  Radiology: ordered. Decision-making details documented in ED Course.  ECG/medicine tests: ordered and independent interpretation performed. Decision-making details documented in ED Course.    Risk  Prescription drug management.            Scribe Attestation:   Scribe #1: I performed the above scribed service and the documentation accurately describes the services I performed. I attest to the accuracy of the note.                      Scribe attestation: I, Flako Aldana MD, personally performed the services described in this documentation. All medical record entries made by the scribe were at my direction and in my presence.  I have reviewed the chart and agree that the record reflects my personal performance and is accurate and complete.    Clinical Impression:   Final diagnoses:  [R19.7] Diarrhea        ED Disposition Condition    Observation Stable                Flako Aldana Jr., MD  08/22/23 1522       Flako Aldana Jr., MD  08/22/23 1530

## 2023-08-22 NOTE — ED NOTES
Pt was able to tolerate soft foods without any episodes of emesis. Informed pt of need for more stool. Pt will call nurse when she is able to produce another bowel movement. Pt instructed not to force stool out and to allow it to come naturally. Pt is in no distress and expresses no needs at this moment.  at bedside.

## 2023-08-22 NOTE — Clinical Note
Diagnosis: Diarrhea [787.91.ICD-9-CM]   Future Attending Provider: ANGIE SCHROEDER [[id number]]   Requested Bed Type: Standard [1]   Admitting Provider:: QUANG CORDOVA JR [[id number]]   Special Needs:: No Special Needs [1]

## 2023-08-22 NOTE — HPI
57 y.o. female with endometrial cancer presents with a complaint of diarrhea.  Acute onset, duration 2 weeks, reports 5-6 watery stools daily, onset after radiation treatment 2 weeks ago, also on chemotherapy.  No known exacerbating or alleviating contacts, no recent antibiotic use.  Denies fever, chills, cough, SOB, chest pain, palpitations, dizziness, syncope, nausea, vomiting, or abdominal pain.  In the ED, routine labs, UA, and CT abdomen pelvis were all unremarkable for any acute abnormality.  Her oncologist Dr. Rodríguez recommends observation, prednisone, and GI consult.

## 2023-08-22 NOTE — ED NOTES
Went to collect pt stool sample from bedside commode but it was not a good sample. Gave pt a hat to use for next bowel movement so the specimen isnt contaminated. Pt will notify nurse once she is able to produce another bowel movement.

## 2023-08-22 NOTE — TELEPHONE ENCOUNTER
Call to pt after receiving a message from [facility] regarding pt having diarrhea. Pt's daughter answered call. Pt due for vag cuff brachytherapy tomorrow. Per Dr Babb, tomorrow's procedure to be cancelled and rescheduled. Pt's next chemo is 9/7 so will wait until 9/13 for last vag cuff brachytherapy.

## 2023-08-22 NOTE — SUBJECTIVE & OBJECTIVE
Past Medical History:   Diagnosis Date    Chest pain 2002    NEGATIVE STRESS ECHO 2002    GERD (gastroesophageal reflux disease)     Lower back pain     Migraines     Mild allergic rhinitis     Postmenopausal bleeding        Past Surgical History:   Procedure Laterality Date    CYST REMOVAL Left 04/28/2022    Procedure: EXCISION, CYST-BACK;  Surgeon: Thomas Hurst MD;  Location: [facility] OR;  Service: General;  Laterality: Left;  left upper back  RN PREOP ON 4/21/22-NF    HYSTEROSCOPY WITH DILATION AND CURETTAGE OF UTERUS N/A 05/23/2023    Procedure: HYSTEROSCOPY, WITH DILATION AND CURETTAGE OF UTERUS;  Surgeon: Omaira Evans MD;  Location: [facility] OR;  Service: OB/GYN;  Laterality: N/A;  [facility] HERNAN[facility]  [phone] TEXTED HAMMAD ON 5/2/2023 @ 3:51PM. [facility] RESPONDED ON 5/2/2023 @ 3:51PM-LO  RN PREOP 5/18/23  T & S; UPT ORDERED AND SCHEDULED 5/22/23    LYMPH NODE BIOPSY Left 6/5/2023    Procedure: BIOPSY, sentinel LYMPH NODE;  Surgeon: Lavell Rodríguez MD;  Location: [facility];  Service: OB/GYN;  Laterality: Left;    LYMPHADENECTOMY, PELVIS Right 6/5/2023    Procedure: LYMPHADENECTOMY, PELVIS;  Surgeon: Lavell Rodríguez MD;  Location: [facility];  Service: OB/GYN;  Laterality: Right;    MAPPING, LYMPH NODE, SENTINEL Bilateral 6/5/2023    Procedure: injection, LYMPH NODE, SENTINEL;  Surgeon: Lavell Rodríguez MD;  Location: [facility];  Service: OB/GYN;  Laterality: Bilateral;    NERVE REPAIR Right 6/5/2023    Procedure: REPAIR, OBTURATOR NERVE;  Surgeon: Lavell Rodríguez MD;  Location: [facility];  Service: OB/GYN;  Laterality: Right;    TONSILLECTOMY      TONSILLECTOMY      TUBAL LIGATION      XI ROBOTIC HYSTERECTOMY, WITH SALPINGO-OOPHORECTOMY Bilateral 6/5/2023    Procedure: XI ROBOTIC HYSTERECTOMY,WITH SALPINGO-OOPHORECTOMY;  Surgeon: Lavell Rodríguez MD;  Location: [facility];  Service: OB/GYN;  Laterality: Bilateral;  removed through vagina       Review of patient's allergies indicates:  No Known Allergies    No current  facility-administered medications on file prior to encounter.     Current Outpatient Medications on File Prior to Encounter   Medication Sig    acetaminophen (TYLENOL EXTRA STRENGTH) 500 MG tablet Take 2 tablets (1,000 mg total) by mouth every 6 (six) hours as needed for Pain.    acetaminophen (TYLENOL) 650 MG TbSR Take 1 tablet (650 mg total) by mouth every 6 (six) hours as needed (pain).    dexAMETHasone (DECADRON) 4 MG Tab Take daily beginning the day after chemotherapy for 3 days    diphenhydrAMINE (BENADRYL) 25 mg capsule Take 25 mg by mouth every 6 (six) hours as needed for Itching.    ibuprofen (ADVIL,MOTRIN) 800 MG tablet Take 1 tablet (800 mg total) by mouth every 8 (eight) hours as needed for Pain.    loratadine (CLARITIN) 10 mg tablet Take 1 tablet (10 mg total) by mouth once daily.    prochlorperazine (COMPAZINE) 5 MG tablet Take every 6 hours for 3 days beginning the day after chemotherapy    valACYclovir (VALTREX) 1000 MG tablet Two pills at onset of fever blister and then repeat 2 pills 12 hr later    zolpidem (AMBIEN) 5 MG Tab Take 1 tablet (5 mg total) by mouth nightly as needed (insomnia).    SENNA 8.6 mg tablet Take 1 tablet by mouth daily as needed for Constipation.    [DISCONTINUED] azelastine (ASTELIN) 137 mcg (0.1 %) nasal spray 1 spray (137 mcg total) by Nasal route 2 (two) times daily.    [DISCONTINUED] ibuprofen (ADVIL,MOTRIN) 600 MG tablet Take 1 tablet (600 mg total) by mouth every 6 (six) hours as needed for Pain.    [DISCONTINUED] oxyCODONE (ROXICODONE) 5 MG immediate release tablet Take 1 tablet (5 mg total) by mouth every 4 (four) hours as needed for Pain.     Family History       Problem Relation (Age of Onset)    Heart disease Father    No Known Problems Mother, Sister, Sister          Tobacco Use    Smoking status: Never    Smokeless tobacco: Never   Substance and Sexual Activity    Alcohol use: Not Currently    Drug use: Never    Sexual activity: Yes     Partners: Male     Birth  control/protection: Post-menopausal     Review of Systems   Constitutional:  Negative for chills, fatigue and fever.   HENT:  Negative for congestion and rhinorrhea.    Eyes:  Negative for photophobia and visual disturbance.   Respiratory:  Negative for cough and shortness of breath.    Cardiovascular:  Negative for chest pain, palpitations and leg swelling.   Gastrointestinal:  Positive for diarrhea. Negative for abdominal pain, nausea and vomiting.   Genitourinary:  Negative for dysuria, frequency and urgency.   Skin:  Negative for pallor, rash and wound.   Neurological:  Negative for light-headedness and headaches.   Psychiatric/Behavioral:  Negative for confusion and decreased concentration.      Objective:     Vital Signs (Most Recent):  Temp: 98.5 °F (36.9 °C) (08/22/23 1041)  Pulse: 100 (08/22/23 1502)  Resp: 15 (08/22/23 1502)  BP: 126/60 (08/22/23 1502)  SpO2: 98 % (08/22/23 1502) Vital Signs (24h Range):  Temp:  [98.5 °F (36.9 °C)] 98.5 °F (36.9 °C)  Pulse:  [] 100  Resp:  [13-21] 15  SpO2:  [96 %-100 %] 98 %  BP: (126-146)/(60-97) 126/60     Weight: 73 kg (161 lb)  Body mass index is 25.22 kg/m².     Physical Exam  Vitals and nursing note reviewed.   Constitutional:       General: She is not in acute distress.     Appearance: She is well-developed.   HENT:      Head: Normocephalic and atraumatic.      Right Ear: External ear normal.      Left Ear: External ear normal.      Nose: Nose normal.   Eyes:      Conjunctiva/sclera: Conjunctivae normal.   Cardiovascular:      Rate and Rhythm: Normal rate and regular rhythm.   Pulmonary:      Effort: Pulmonary effort is normal. No respiratory distress.   Abdominal:      General: There is no distension.      Palpations: Abdomen is soft.   Musculoskeletal:         General: Normal range of motion.   Skin:     General: Skin is warm and dry.   Neurological:      Mental Status: She is alert and oriented to person, place, and time.   Psychiatric:         Thought  Content: Thought content normal.                Significant Labs: All pertinent labs within the past 24 hours have been reviewed.    Significant Imaging: I have reviewed all pertinent imaging results/findings within the past 24 hours.

## 2023-08-23 ENCOUNTER — TELEPHONE (OUTPATIENT)
Dept: HEMATOLOGY/ONCOLOGY | Facility: CLINIC | Age: 58
End: 2023-08-23
Payer: COMMERCIAL

## 2023-08-23 LAB
ALBUMIN SERPL BCP-MCNC: 3.3 G/DL (ref 3.5–5.2)
ALP SERPL-CCNC: 73 U/L (ref 55–135)
ALT SERPL W/O P-5'-P-CCNC: 31 U/L (ref 10–44)
ANION GAP SERPL CALC-SCNC: 6 MMOL/L (ref 8–16)
AST SERPL-CCNC: 22 U/L (ref 10–40)
BASOPHILS # BLD AUTO: 0.01 K/UL (ref 0–0.2)
BASOPHILS NFR BLD: 0.3 % (ref 0–1.9)
BILIRUB SERPL-MCNC: 0.2 MG/DL (ref 0.1–1)
BUN SERPL-MCNC: 13 MG/DL (ref 6–20)
C DIFF GDH STL QL: NEGATIVE
C DIFF TOX A+B STL QL IA: NEGATIVE
CALCIUM SERPL-MCNC: 9.3 MG/DL (ref 8.7–10.5)
CHLORIDE SERPL-SCNC: 105 MMOL/L (ref 95–110)
CO2 SERPL-SCNC: 27 MMOL/L (ref 23–29)
CREAT SERPL-MCNC: 0.7 MG/DL (ref 0.5–1.4)
DIFFERENTIAL METHOD: ABNORMAL
E COLI SXT1 STL QL IA: NEGATIVE
E COLI SXT2 STL QL IA: NEGATIVE
EOSINOPHIL # BLD AUTO: 0 K/UL (ref 0–0.5)
EOSINOPHIL NFR BLD: 0.6 % (ref 0–8)
ERYTHROCYTE [DISTWIDTH] IN BLOOD BY AUTOMATED COUNT: 14.2 % (ref 11.5–14.5)
EST. GFR  (NO RACE VARIABLE): >60 ML/MIN/1.73 M^2
GLUCOSE SERPL-MCNC: 110 MG/DL (ref 70–110)
HCT VFR BLD AUTO: 37.8 % (ref 37–48.5)
HGB BLD-MCNC: 12.9 G/DL (ref 12–16)
IMM GRANULOCYTES # BLD AUTO: 0.02 K/UL (ref 0–0.04)
IMM GRANULOCYTES NFR BLD AUTO: 0.6 % (ref 0–0.5)
LYMPHOCYTES # BLD AUTO: 1 K/UL (ref 1–4.8)
LYMPHOCYTES NFR BLD: 31.1 % (ref 18–48)
MCH RBC QN AUTO: 31.5 PG (ref 27–31)
MCHC RBC AUTO-ENTMCNC: 34.1 G/DL (ref 32–36)
MCV RBC AUTO: 92 FL (ref 82–98)
MONOCYTES # BLD AUTO: 0.1 K/UL (ref 0.3–1)
MONOCYTES NFR BLD: 3 % (ref 4–15)
NEUTROPHILS # BLD AUTO: 2.1 K/UL (ref 1.8–7.7)
NEUTROPHILS NFR BLD: 64.4 % (ref 38–73)
NRBC BLD-RTO: 0 /100 WBC
PLATELET # BLD AUTO: 267 K/UL (ref 150–450)
PMV BLD AUTO: 9.7 FL (ref 9.2–12.9)
POTASSIUM SERPL-SCNC: 3.9 MMOL/L (ref 3.5–5.1)
PROT SERPL-MCNC: 6.6 G/DL (ref 6–8.4)
RBC # BLD AUTO: 4.1 M/UL (ref 4–5.4)
SODIUM SERPL-SCNC: 138 MMOL/L (ref 136–145)
WBC # BLD AUTO: 3.28 K/UL (ref 3.9–12.7)
WBC #/AREA STL HPF: NORMAL /[HPF]

## 2023-08-23 PROCEDURE — 36415 COLL VENOUS BLD VENIPUNCTURE: CPT | Performed by: HOSPITALIST

## 2023-08-23 PROCEDURE — 63600175 PHARM REV CODE 636 W HCPCS: Performed by: STUDENT IN AN ORGANIZED HEALTH CARE EDUCATION/TRAINING PROGRAM

## 2023-08-23 PROCEDURE — 80053 COMPREHEN METABOLIC PANEL: CPT | Performed by: HOSPITALIST

## 2023-08-23 PROCEDURE — 99205 PR OFFICE/OUTPT VISIT, NEW, LEVL V, 60-74 MIN: ICD-10-PCS | Mod: ,,, | Performed by: NURSE PRACTITIONER

## 2023-08-23 PROCEDURE — 25000003 PHARM REV CODE 250: Performed by: EMERGENCY MEDICINE

## 2023-08-23 PROCEDURE — G0378 HOSPITAL OBSERVATION PER HR: HCPCS

## 2023-08-23 PROCEDURE — 83993 ASSAY FOR CALPROTECTIN FECAL: CPT | Performed by: NURSE PRACTITIONER

## 2023-08-23 PROCEDURE — 96372 THER/PROPH/DIAG INJ SC/IM: CPT | Performed by: HOSPITALIST

## 2023-08-23 PROCEDURE — 85025 COMPLETE CBC W/AUTO DIFF WBC: CPT | Performed by: HOSPITALIST

## 2023-08-23 PROCEDURE — 63600175 PHARM REV CODE 636 W HCPCS: Performed by: HOSPITALIST

## 2023-08-23 PROCEDURE — 99205 OFFICE O/P NEW HI 60 MIN: CPT | Mod: ,,, | Performed by: NURSE PRACTITIONER

## 2023-08-23 RX ADMIN — LOPERAMIDE HYDROCHLORIDE 2 MG: 2 CAPSULE ORAL at 08:08

## 2023-08-23 RX ADMIN — ENOXAPARIN SODIUM 40 MG: 40 INJECTION SUBCUTANEOUS at 04:08

## 2023-08-23 RX ADMIN — LOPERAMIDE HYDROCHLORIDE 2 MG: 2 CAPSULE ORAL at 03:08

## 2023-08-23 RX ADMIN — LOPERAMIDE HYDROCHLORIDE 2 MG: 2 CAPSULE ORAL at 07:08

## 2023-08-23 RX ADMIN — PREDNISONE 70 MG: 50 TABLET ORAL at 03:08

## 2023-08-23 NOTE — CONSULTS
[facility] Consultation Note    Patient Complaint: diarrhea    PCP:   James Hillman       LOS: 0        Initial History of Present Illness (HPI):  This is a 57 y.o. 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 PMH endometrial cancer currently receiving chemo and radiation. Patient complaint of severe ongoing painless diarrhea with associated symptoms of weakness, dehydration and joint pain that began after radiation on 8/9. Denies fever, dizziness, lightheadedness, n/v, hematemesis, abdominal pain, BRBPR, constipation or dark stools. Denies smoking, drinking, oac or NSAID use. Reports stools average 6-10x daily.     Admit labs neutropenia  Imaging notes diarrhea         Medical History:  has a past medical history of Chest pain (2002), GERD (gastroesophageal reflux disease), Lower back pain, Migraines, Mild allergic rhinitis, and Postmenopausal bleeding.    Surgical History:  has a past surgical history that includes TONSILLECTOMY; Tubal ligation; Cyst Removal (Left, 04/28/2022); Hysteroscopy with dilation and curettage of uterus (N/A, 05/23/2023); Tonsillectomy; xi robotic hysterectomy, with salpingo-oophorectomy (Bilateral, 6/5/2023); Lymph node biopsy (Left, 6/5/2023); mapping, lymph node, sentinel (Bilateral, 6/5/2023); lymphadenectomy, pelvis (Right, 6/5/2023); and Nerve repair (Right, 6/5/2023).      Objective Findings:    Vital Signs:  Temp:  [98.1 °F (36.7 °C)-98.6 °F (37 °C)]   Pulse:  []   Resp:  [13-21]   BP: (117-148)/(60-97)   SpO2:  [96 %-100 %]   Body mass index is 25.17 kg/m².      Physical Exam  Vitals and nursing note reviewed.   Constitutional:       Appearance: Normal appearance.   HENT:      Head: Normocephalic.   Pulmonary:      Effort: Pulmonary effort is normal.   Abdominal:      General: Bowel sounds are normal.      Palpations: Abdomen is soft.   Skin:     General: Skin is warm and dry.   Neurological:      Mental  Status: She is alert and oriented to person, place, and time.   Psychiatric:         Mood and Affect: Mood normal.         Behavior: Behavior normal.         Thought Content: Thought content normal.         Judgment: Judgment normal.               Labs:  Lab Results   Component Value Date    WBC 3.28 (L) 2023    HGB 12.9 2023    HCT 37.8 2023     2023    CHOL 199 2008    TRIG 99 2020    HDL 42 2020    ALT 31 2023    AST 22 2023     2023    K 3.9 2023     2023    CREATININE 0.7 2023    BUN 13 2023    CO2 27 2023    TSH 0.137 (L) 2023             Imagin/22 CT abdomen pelvis-  Liquid stool is noted throughout the colon and rectum which are otherwise grossly unremarkable in appearance.  Otherwise, the enhanced but unopacified stomach, small bowel and colon are normal in course and caliber.  No evidence of small bowel obstruction, significant inflammatory fat stranding, free air or free fluid.  Appendix is normal.  Bowel mesentery is grossly unremarkable.       I have independently reviewed and interpreted the imaging above           Immune checkpoint inhibitor colitis. Diarrhea. Current chemotherapy. Current radiation therapy. Endometrial cancer.  Plan/ Recommendations:  1.   last dose of  Dostarlimab. Diarrhea unchanged, stool studies processing will follow results to determine plan of care. Rec check calprotectin. Ok to resume diet. Continue to monitor hydration and electrolytes. Rec consult heme/onc for further eval. Pending oncology eval consider: If infection is r/o rec methylprednisolone  80mg q12h for a max of 3 days. Watch for report of less than 4 stools daily. If symptoms improve prior to 3 day max transition to oral taper and taper by 10mg weekly with a goal to discontinue.        Thank you so much for allowing us to participate in the care of Sugar Diaz . Please contact us if  you have any additional questions.    Carmen Mae NP  Gastroenterology  [facility]

## 2023-08-23 NOTE — H&P
[facility] Medicine  History & Physical    Patient Name: Sugar Diaz  MRN: [id number]  Patient Class: OP- Observation  Admission Date: 8/22/2023  Attending Physician: Andrade Heath MD   Primary Care Provider: James Hillman MD         Patient information was obtained from patient, past medical records and ER records.     Subjective:     Principal Problem:Diarrhea    Chief Complaint:   Chief Complaint   Patient presents with    Diarrhea     Pt an oncology patient c/o diarrhea x 2 weeks. MM pink and moist. Sent to ED for possible dehydration. Last chemo last Thursday.         HPI: 57 y.o. female with endometrial cancer presents with a complaint of diarrhea.  Acute onset, duration 2 weeks, reports 5-6 watery stools daily, onset after radiation treatment 2 weeks ago, also on chemotherapy.  No known exacerbating or alleviating contacts, no recent antibiotic use.  Denies fever, chills, cough, SOB, chest pain, palpitations, dizziness, syncope, nausea, vomiting, or abdominal pain.  In the ED, routine labs, UA, and CT abdomen pelvis were all unremarkable for any acute abnormality.  Her oncologist Dr. Rodríguez recommends observation, prednisone, and GI consult.      Past Medical History:   Diagnosis Date    Chest pain 2002    NEGATIVE STRESS ECHO 2002    GERD (gastroesophageal reflux disease)     Lower back pain     Migraines     Mild allergic rhinitis     Postmenopausal bleeding        Past Surgical History:   Procedure Laterality Date    CYST REMOVAL Left 04/28/2022    Procedure: EXCISION, CYST-BACK;  Surgeon: Thomas Hurst MD;  Location: [facility] OR;  Service: General;  Laterality: Left;  left upper back  RN PREOP ON 4/21/22-NF    HYSTEROSCOPY WITH DILATION AND CURETTAGE OF UTERUS N/A 05/23/2023    Procedure: HYSTEROSCOPY, WITH DILATION AND CURETTAGE OF UTERUS;  Surgeon: Omaira Evans MD;  Location: [facility] OR;  Service: OB/GYN;  Laterality: N/A;  [facility]  [phone]  TEXTED H[facility] ON 5/2/2023 @ 3:51PM. HAMMAD RESPONDED ON 5/2/2023 @ 3:51PM-LO  RN PREOP 5/18/23  T & S; UPT ORDERED AND SCHEDULED 5/22/23    LYMPH NODE BIOPSY Left 6/5/2023    Procedure: BIOPSY, sentinel LYMPH NODE;  Surgeon: Lavell Rodríguez MD;  Location: [facility];  Service: OB/GYN;  Laterality: Left;    LYMPHADENECTOMY, PELVIS Right 6/5/2023    Procedure: LYMPHADENECTOMY, PELVIS;  Surgeon: Lavell Rodríguez MD;  Location: [facility] OR;  Service: OB/GYN;  Laterality: Right;    MAPPING, LYMPH NODE, SENTINEL Bilateral 6/5/2023    Procedure: injection, LYMPH NODE, SENTINEL;  Surgeon: Lavell Rodríguez MD;  Location: [facility] OR;  Service: OB/GYN;  Laterality: Bilateral;    NERVE REPAIR Right 6/5/2023    Procedure: REPAIR, OBTURATOR NERVE;  Surgeon: Lavell Rodríguez MD;  Location: [facility] OR;  Service: OB/GYN;  Laterality: Right;    TONSILLECTOMY      TONSILLECTOMY      TUBAL LIGATION      XI ROBOTIC HYSTERECTOMY, WITH SALPINGO-OOPHORECTOMY Bilateral 6/5/2023    Procedure: XI ROBOTIC HYSTERECTOMY,WITH SALPINGO-OOPHORECTOMY;  Surgeon: Lavell Rodríguez MD;  Location: [facility];  Service: OB/GYN;  Laterality: Bilateral;  removed through vagina       Review of patient's allergies indicates:  No Known Allergies    No current facility-administered medications on file prior to encounter.     Current Outpatient Medications on File Prior to Encounter   Medication Sig    acetaminophen (TYLENOL EXTRA STRENGTH) 500 MG tablet Take 2 tablets (1,000 mg total) by mouth every 6 (six) hours as needed for Pain.    acetaminophen (TYLENOL) 650 MG TbSR Take 1 tablet (650 mg total) by mouth every 6 (six) hours as needed (pain).    dexAMETHasone (DECADRON) 4 MG Tab Take daily beginning the day after chemotherapy for 3 days    diphenhydrAMINE (BENADRYL) 25 mg capsule Take 25 mg by mouth every 6 (six) hours as needed for Itching.    ibuprofen (ADVIL,MOTRIN) 800 MG tablet Take 1 tablet (800 mg total) by mouth every 8 (eight) hours as needed for Pain.     loratadine (CLARITIN) 10 mg tablet Take 1 tablet (10 mg total) by mouth once daily.    prochlorperazine (COMPAZINE) 5 MG tablet Take every 6 hours for 3 days beginning the day after chemotherapy    valACYclovir (VALTREX) 1000 MG tablet Two pills at onset of fever blister and then repeat 2 pills 12 hr later    zolpidem (AMBIEN) 5 MG Tab Take 1 tablet (5 mg total) by mouth nightly as needed (insomnia).    SENNA 8.6 mg tablet Take 1 tablet by mouth daily as needed for Constipation.    [DISCONTINUED] azelastine (ASTELIN) 137 mcg (0.1 %) nasal spray 1 spray (137 mcg total) by Nasal route 2 (two) times daily.    [DISCONTINUED] ibuprofen (ADVIL,MOTRIN) 600 MG tablet Take 1 tablet (600 mg total) by mouth every 6 (six) hours as needed for Pain.    [DISCONTINUED] oxyCODONE (ROXICODONE) 5 MG immediate release tablet Take 1 tablet (5 mg total) by mouth every 4 (four) hours as needed for Pain.     Family History       Problem Relation (Age of Onset)    Heart disease Father    No Known Problems Mother, Sister, Sister          Tobacco Use    Smoking status: Never    Smokeless tobacco: Never   Substance and Sexual Activity    Alcohol use: Not Currently    Drug use: Never    Sexual activity: Yes     Partners: Male     Birth control/protection: Post-menopausal     Review of Systems   Constitutional:  Negative for chills, fatigue and fever.   HENT:  Negative for congestion and rhinorrhea.    Eyes:  Negative for photophobia and visual disturbance.   Respiratory:  Negative for cough and shortness of breath.    Cardiovascular:  Negative for chest pain, palpitations and leg swelling.   Gastrointestinal:  Positive for diarrhea. Negative for abdominal pain, nausea and vomiting.   Genitourinary:  Negative for dysuria, frequency and urgency.   Skin:  Negative for pallor, rash and wound.   Neurological:  Negative for light-headedness and headaches.   Psychiatric/Behavioral:  Negative for confusion and decreased concentration.       Objective:     Vital Signs (Most Recent):  Temp: 98.5 °F (36.9 °C) (08/22/23 1041)  Pulse: 100 (08/22/23 1502)  Resp: 15 (08/22/23 1502)  BP: 126/60 (08/22/23 1502)  SpO2: 98 % (08/22/23 1502) Vital Signs (24h Range):  Temp:  [98.5 °F (36.9 °C)] 98.5 °F (36.9 °C)  Pulse:  [] 100  Resp:  [13-21] 15  SpO2:  [96 %-100 %] 98 %  BP: (126-146)/(60-97) 126/60     Weight: 73 kg (161 lb)  Body mass index is 25.22 kg/m².     Physical Exam  Vitals and nursing note reviewed.   Constitutional:       General: She is not in acute distress.     Appearance: She is well-developed.   HENT:      Head: Normocephalic and atraumatic.      Right Ear: External ear normal.      Left Ear: External ear normal.      Nose: Nose normal.   Eyes:      Conjunctiva/sclera: Conjunctivae normal.   Cardiovascular:      Rate and Rhythm: Normal rate and regular rhythm.   Pulmonary:      Effort: Pulmonary effort is normal. No respiratory distress.   Abdominal:      General: There is no distension.      Palpations: Abdomen is soft.   Musculoskeletal:         General: Normal range of motion.   Skin:     General: Skin is warm and dry.   Neurological:      Mental Status: She is alert and oriented to person, place, and time.   Psychiatric:         Thought Content: Thought content normal.                Significant Labs: All pertinent labs within the past 24 hours have been reviewed.    Significant Imaging: I have reviewed all pertinent imaging results/findings within the past 24 hours.    Assessment/Plan:     * Diarrhea  Stool studies pending, daily 1 mg per kg prednisone, GI consult, appreciate recs.    Malignant neoplasm of endometrium  Current treatment with Dr. Rodríguez radiation and chemotherapy.  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      VTE Risk Mitigation (From admission, onward)         Ordered     enoxaparin injection 40 mg  Daily         08/22/23 1543     IP VTE HIGH RISK PATIENT  Once         08/22/23 1543      Place sequential compression device  Until discontinued         08/22/23 1543                     On 08/22/2023, patient should be placed in hospital observation services under my care in collaboration with Andrade Heath MD.    Jaciel Davis Jr., APRN, [facility]-BC  Hospitalist - Department of Hospital Medicine  [facility] [address] [place] [address]  Office #: [phone]; Pager #: [phone]

## 2023-08-23 NOTE — NURSING
[facility]  Nurses Note -- 4 Eyes      8/23/2023       Skin assessed on: Q Shift      [x] No Pressure Injuries Present    [x]Prevention Measures Documented    [] Yes LDA  for Pressure Injury Previously documented     [] Yes New Pressure Injury Discovered   [] LDA for New Pressure Injury Added      Attending RN:  Joselyn Becerra RN     Second RN:  Mary Barrett RN

## 2023-08-23 NOTE — NURSING
[facility]  Nurses Note -- 4 Eyes      8/23/2023       Skin assessed on: Q Shift      [x] No Pressure Injuries Present    []Prevention Measures Documented    [] Yes LDA  for Pressure Injury Previously documented     [] Yes New Pressure Injury Discovered   [] LDA for New Pressure Injury Added      Attending RN:  Nieves Barnhart, RN     Second RN:  Joselyn Becerra

## 2023-08-23 NOTE — PROGRESS NOTES
[facility] Medicine  Progress Note    Patient Name: Sugar Diaz  MRN: [id number]  Patient Class: OP- Observation   Admission Date: 8/22/2023  Length of Stay: 0 days  Attending Physician: Andreas Mosley MD  Primary Care Provider: James Hillman MD        Subjective:     Principal Problem:Diarrhea        HPI:  57 y.o. female with endometrial cancer presents with a complaint of diarrhea.  Acute onset, duration 2 weeks, reports 5-6 watery stools daily, onset after radiation treatment 2 weeks ago, also on chemotherapy.  No known exacerbating or alleviating contacts, no recent antibiotic use.  Denies fever, chills, cough, SOB, chest pain, palpitations, dizziness, syncope, nausea, vomiting, or abdominal pain.  In the ED, routine labs, UA, and CT abdomen pelvis were all unremarkable for any acute abnormality.  Her oncologist Dr. Rodríguez recommends observation, prednisone, and GI consult.      Overview/Hospital Course:  No notes on file    Interval History:  Still with diarrhea overnight but given Imodium earlier this morning with improvement in symptoms.  No abdominal pain or fever.  E coli negative as well as C diff. discussed with GI, they are recommending oncology consultation before starting steroids.  Oncology consult placed.    Review of Systems  Objective:     Vital Signs (Most Recent):  Temp: 97 °F (36.1 °C) (08/23/23 1127)  Pulse: 107 (08/23/23 1127)  Resp: 18 (08/23/23 1127)  BP: (!) 147/109 (08/23/23 1127)  SpO2: 98 % (08/23/23 1127) Vital Signs (24h Range):  Temp:  [97 °F (36.1 °C)-98.6 °F (37 °C)] 97 °F (36.1 °C)  Pulse:  [] 107  Resp:  [15-18] 18  SpO2:  [96 %-98 %] 98 %  BP: (117-148)/() 147/109     Weight: 72.9 kg (160 lb 11.5 oz)  Body mass index is 25.17 kg/m².    Intake/Output Summary (Last 24 hours) at 8/23/2023 1445  Last data filed at 8/23/2023 1039  Gross per 24 hour   Intake 1119 ml   Output --   Net 1119 ml         Physical Exam  Vitals and nursing note  reviewed.   Constitutional:       General: She is not in acute distress.     Appearance: She is well-developed.   HENT:      Head: Normocephalic and atraumatic.   Cardiovascular:      Rate and Rhythm: Normal rate and regular rhythm.   Pulmonary:      Effort: Pulmonary effort is normal. No respiratory distress.   Abdominal:      General: There is no distension.      Palpations: Abdomen is soft.   Skin:     General: Skin is warm and dry.   Neurological:      Mental Status: She is alert and oriented to person, place, and time.   Psychiatric:         Thought Content: Thought content normal.             Significant Labs: All pertinent labs within the past 24 hours have been reviewed.    Significant Imaging: I have reviewed all pertinent imaging results/findings within the past 24 hours.      Assessment/Plan:      * Diarrhea  Persistent diarrhea over the last 2 weeks with more than 4 bowel movements daily.  Concern for check point inhibitor diarrhea versus colitis.  She is not having any abdominal pain or fevers and imaging does not show evidence of colitis.  E coli and C diff were negative.  GI following and recommending oncology consultation prior to starting steroids.  Oncology consulted.    Malignant neoplasm of endometrium  Current treatment with Dr. Rodríguez radiation and chemotherapy.  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  Follow-up as an outpatient      VTE Risk Mitigation (From admission, onward)         Ordered     enoxaparin injection 40 mg  Daily         08/22/23 1543     IP VTE HIGH RISK PATIENT  Once         08/22/23 1543     Place sequential compression device  Until discontinued         08/22/23 1543                Discharge Planning   AVELINA:      Code Status: Full Code   Is the patient medically ready for discharge?:     Reason for patient still in hospital (select all that apply): Treatment  Discharge Plan A: Home with family                  Andreas Mosley MD  Department of  [facility]

## 2023-08-23 NOTE — PLAN OF CARE
James Hillman MD    [id number] - [place] - [address] AT [facility] [place] [address]  [address]  [place] [address]  Phone: [phone] Fax: [phone]  Jimmy Diaz (Spouse)   [phone] (Mobile    Lives with spouse, independent. No DME.  Became dehydrated from chemo.  Chemo for endometrial CA.  Had diarrhea.  Spouse will drive home at discharge.  TN will continue to assess for discharge needs.    .   08/23/23 1056   Discharge Planning   Assessment Type Discharge Planning Brief Assessment   Resource/Environmental Concerns none   Support Systems Spouse/significant other   Equipment Currently Used at Home none   Current Living Arrangements home   Patient/Family Anticipates Transition to home with family   Patient/Family Anticipated Services at Transition none   DME Needed Upon Discharge  none   Discharge Plan A Home with family   Discharge Plan B Home with family

## 2023-08-23 NOTE — SUBJECTIVE & OBJECTIVE
Interval History:  Still with diarrhea overnight but given Imodium earlier this morning with improvement in symptoms.  No abdominal pain or fever.  E coli negative as well as C diff. discussed with GI, they are recommending oncology consultation before starting steroids.  Oncology consult placed.    Review of Systems  Objective:     Vital Signs (Most Recent):  Temp: 97 °F (36.1 °C) (08/23/23 1127)  Pulse: 107 (08/23/23 1127)  Resp: 18 (08/23/23 1127)  BP: (!) 147/109 (08/23/23 1127)  SpO2: 98 % (08/23/23 1127) Vital Signs (24h Range):  Temp:  [97 °F (36.1 °C)-98.6 °F (37 °C)] 97 °F (36.1 °C)  Pulse:  [] 107  Resp:  [15-18] 18  SpO2:  [96 %-98 %] 98 %  BP: (117-148)/() 147/109     Weight: 72.9 kg (160 lb 11.5 oz)  Body mass index is 25.17 kg/m².    Intake/Output Summary (Last 24 hours) at 8/23/2023 1445  Last data filed at 8/23/2023 1039  Gross per 24 hour   Intake 1119 ml   Output --   Net 1119 ml         Physical Exam  Vitals and nursing note reviewed.   Constitutional:       General: She is not in acute distress.     Appearance: She is well-developed.   HENT:      Head: Normocephalic and atraumatic.   Cardiovascular:      Rate and Rhythm: Normal rate and regular rhythm.   Pulmonary:      Effort: Pulmonary effort is normal. No respiratory distress.   Abdominal:      General: There is no distension.      Palpations: Abdomen is soft.   Skin:     General: Skin is warm and dry.   Neurological:      Mental Status: She is alert and oriented to person, place, and time.   Psychiatric:         Thought Content: Thought content normal.             Significant Labs: All pertinent labs within the past 24 hours have been reviewed.    Significant Imaging: I have reviewed all pertinent imaging results/findings within the past 24 hours.

## 2023-08-23 NOTE — TELEPHONE ENCOUNTER
Consult      Room#: 401    Admitting dr: Dimitri    Dx: checkpoint inhibitor colitis recommendation     [facility] OFFICE VISIT    CHIEF COMPLAINT:    Chief Complaint   Patient presents with   • Establish Care     Pt here to [facility]       SUBJECTIVE:  Graham Foley is a 9 year old female who presented requesting evaluation for establish care.    She has a past medical history of mild persistent asthma, eczema, elevated blood pressure reading without diagnosis of hypertension, and seasonal allergies.  She currently takes Flonase, Zyrtec, and albuterol p.r.n. She follows with dietitian for history of elevated BMI.  She would like a refill of all of her medications today.  She does endorse history of headaches.  She is supposed to wear glasses but does not wear them because of glasses fog while wearing a mask at school.     REVIEW OF SYSTEMS:    All other systems are reviewed and are negative except as documented in the history of present illness.     PROBLEM LIST:    Patient Active Problem List   Diagnosis   • Seasonal allergies   • Mild persistent asthma without complication   • Elevated blood pressure reading without diagnosis of hypertension       MEDICATIONS:  albuterol 108 (90 Base) MCG/ACT inhaler  albuterol (ProAir HFA) 108 (90 Base) MCG/ACT inhaler    No current facility-administered medications on file prior to visit.      PAST HISTORIES:  I have reviewed the past medical history, family history, social history, medications and allergies listed in the medical record as obtained by my nursing staff and support staff and agree with their documentation.     OBJECTIVE:  PHYSICAL EXAM:    Vital Signs:    Visit Vitals  /68 (BP Location: RUE - Right upper extremity, Patient Position: Sitting, Cuff Size: Regular)   Pulse 70   Temp 98.3 °F (36.8 °C) (Oral)   Ht 4' 11.5\" (1.511 m)   Wt (!) 83.4 kg (183 lb 15.6 oz)   SpO2 98%   BMI 36.54 kg/m²     General:  Alert, cooperative, conversive in no acute distress.  Head:  Normocephalic atraumatic.   Neck:  Trachea is midline. No adenopathy.  Normal thyroid  without mass or tenderness.  Eyes:  Normal conjunctivae and sclerae.  PERRL, EOMI.     ENT:   Mucous membranes are moist. No nasal drainage. Normal tympanic membranes and external auditory canals bilaterally.  No pharyngeal erythema or exudate.  No facial tenderness.  Normal nasal mucosa.  Cardiovascular:  Symmetrical pulses.  Regular rate and rhythm without murmur.  Respiratory:   Normal respiratory effort.  Clear to auscultation.  No wheezes, rales or rhonchi.  Gastrointestinal:  Soft and nontender. No rebound or guarding.   Neurologic:   Oriented x3.  CN II-XII grossly intact. No focal deficits.     LAB RESULTS: Pertinent results were reviewed.     ASSESSMENT AND PLAN:   Graham Fleming was seen today for establish care.    Diagnoses and all orders for this visit:    Encounter to establish care  Seasonal allergies  - Patient counseled on healthy food choices including increased fruits, vegetables, lean meats, and whole grains. Recommended a total of 150 minutes of increased physical activity each week for improvement of overall health.   - fluticasone (Flonase) 50 MCG/ACT nasal spray; Spray 2 sprays in each nostril daily.  -     cetirizine (ZyrTEC) 5 MG/5ML solution; Take 10 mLs by mouth daily. Indications: allergies  -     hydroCORTisone (CORTIZONE) 2.5 % ointment; Apply topically 2 times daily. Until rash is gone      Jacquie Foy MD

## 2023-08-24 LAB
ALBUMIN SERPL BCP-MCNC: 3.2 G/DL (ref 3.5–5.2)
ALP SERPL-CCNC: 78 U/L (ref 55–135)
ALT SERPL W/O P-5'-P-CCNC: 33 U/L (ref 10–44)
ANION GAP SERPL CALC-SCNC: 8 MMOL/L (ref 8–16)
AST SERPL-CCNC: 20 U/L (ref 10–40)
BASOPHILS # BLD AUTO: 0.02 K/UL (ref 0–0.2)
BASOPHILS NFR BLD: 0.5 % (ref 0–1.9)
BILIRUB SERPL-MCNC: 0.2 MG/DL (ref 0.1–1)
BUN SERPL-MCNC: 11 MG/DL (ref 6–20)
CALCIUM SERPL-MCNC: 9.3 MG/DL (ref 8.7–10.5)
CHLORIDE SERPL-SCNC: 106 MMOL/L (ref 95–110)
CO2 SERPL-SCNC: 25 MMOL/L (ref 23–29)
CREAT SERPL-MCNC: 0.6 MG/DL (ref 0.5–1.4)
DIFFERENTIAL METHOD: ABNORMAL
EOSINOPHIL # BLD AUTO: 0 K/UL (ref 0–0.5)
EOSINOPHIL NFR BLD: 0.8 % (ref 0–8)
ERYTHROCYTE [DISTWIDTH] IN BLOOD BY AUTOMATED COUNT: 13.8 % (ref 11.5–14.5)
EST. GFR  (NO RACE VARIABLE): >60 ML/MIN/1.73 M^2
GLUCOSE SERPL-MCNC: 98 MG/DL (ref 70–110)
HCT VFR BLD AUTO: 35.7 % (ref 37–48.5)
HGB BLD-MCNC: 12.3 G/DL (ref 12–16)
IMM GRANULOCYTES # BLD AUTO: 0.03 K/UL (ref 0–0.04)
IMM GRANULOCYTES NFR BLD AUTO: 0.8 % (ref 0–0.5)
LYMPHOCYTES # BLD AUTO: 1.5 K/UL (ref 1–4.8)
LYMPHOCYTES NFR BLD: 38 % (ref 18–48)
MCH RBC QN AUTO: 30.8 PG (ref 27–31)
MCHC RBC AUTO-ENTMCNC: 34.5 G/DL (ref 32–36)
MCV RBC AUTO: 90 FL (ref 82–98)
MONOCYTES # BLD AUTO: 0.1 K/UL (ref 0.3–1)
MONOCYTES NFR BLD: 2.5 % (ref 4–15)
NEUTROPHILS # BLD AUTO: 2.3 K/UL (ref 1.8–7.7)
NEUTROPHILS NFR BLD: 57.4 % (ref 38–73)
NRBC BLD-RTO: 0 /100 WBC
PLATELET # BLD AUTO: 306 K/UL (ref 150–450)
PMV BLD AUTO: 9.3 FL (ref 9.2–12.9)
POTASSIUM SERPL-SCNC: 3.6 MMOL/L (ref 3.5–5.1)
PROT SERPL-MCNC: 6.4 G/DL (ref 6–8.4)
RBC # BLD AUTO: 3.99 M/UL (ref 4–5.4)
SODIUM SERPL-SCNC: 139 MMOL/L (ref 136–145)
WBC # BLD AUTO: 4 K/UL (ref 3.9–12.7)

## 2023-08-24 PROCEDURE — 99232 PR SUBSEQUENT HOSPITAL CARE,LEVL II: ICD-10-PCS | Mod: ,,, | Performed by: NURSE PRACTITIONER

## 2023-08-24 PROCEDURE — 63600175 PHARM REV CODE 636 W HCPCS: Performed by: HOSPITALIST

## 2023-08-24 PROCEDURE — 11000001 HC ACUTE MED/SURG PRIVATE ROOM

## 2023-08-24 PROCEDURE — 85025 COMPLETE CBC W/AUTO DIFF WBC: CPT | Performed by: HOSPITALIST

## 2023-08-24 PROCEDURE — 99232 SBSQ HOSP IP/OBS MODERATE 35: CPT | Mod: ,,, | Performed by: NURSE PRACTITIONER

## 2023-08-24 PROCEDURE — 25000003 PHARM REV CODE 250: Performed by: STUDENT IN AN ORGANIZED HEALTH CARE EDUCATION/TRAINING PROGRAM

## 2023-08-24 PROCEDURE — 80053 COMPREHEN METABOLIC PANEL: CPT | Performed by: HOSPITALIST

## 2023-08-24 PROCEDURE — 63600175 PHARM REV CODE 636 W HCPCS: Performed by: NURSE PRACTITIONER

## 2023-08-24 PROCEDURE — 36415 COLL VENOUS BLD VENIPUNCTURE: CPT | Performed by: HOSPITALIST

## 2023-08-24 PROCEDURE — 63600175 PHARM REV CODE 636 W HCPCS: Performed by: STUDENT IN AN ORGANIZED HEALTH CARE EDUCATION/TRAINING PROGRAM

## 2023-08-24 RX ORDER — DIPHENOXYLATE HYDROCHLORIDE AND ATROPINE SULFATE 2.5; .025 MG/1; MG/1
1 TABLET ORAL 4 TIMES DAILY PRN
Status: DISCONTINUED | OUTPATIENT
Start: 2023-08-24 | End: 2023-08-27 | Stop reason: HOSPADM

## 2023-08-24 RX ADMIN — PREDNISONE 70 MG: 50 TABLET ORAL at 08:08

## 2023-08-24 RX ADMIN — METHYLPREDNISOLONE SODIUM SUCCINATE 60 MG: 40 INJECTION, POWDER, FOR SOLUTION INTRAMUSCULAR; INTRAVENOUS at 08:08

## 2023-08-24 RX ADMIN — ENOXAPARIN SODIUM 40 MG: 40 INJECTION SUBCUTANEOUS at 04:08

## 2023-08-24 RX ADMIN — DIPHENOXYLATE HYDROCHLORIDE AND ATROPINE SULFATE 1 TABLET: 2.5; .025 TABLET ORAL at 11:08

## 2023-08-24 NOTE — PROGRESS NOTES
[facility] Medicine  Progress Note    Patient Name: Sugar Diaz  MRN: [id number]  Patient Class: OP- Observation   Admission Date: 8/22/2023  Length of Stay: 0 days  Attending Physician: Andreas Mosley MD  Primary Care Provider: James Hillman MD        Subjective:     Principal Problem:Diarrhea        HPI:  57 y.o. female with endometrial cancer presents with a complaint of diarrhea.  Acute onset, duration 2 weeks, reports 5-6 watery stools daily, onset after radiation treatment 2 weeks ago, also on chemotherapy.  No known exacerbating or alleviating contacts, no recent antibiotic use.  Denies fever, chills, cough, SOB, chest pain, palpitations, dizziness, syncope, nausea, vomiting, or abdominal pain.  In the ED, routine labs, UA, and CT abdomen pelvis were all unremarkable for any acute abnormality.  Her oncologist Dr. Rodríguez recommends observation, prednisone, and GI consult.      Overview/Hospital Course:  No notes on file    Interval History:  still with significant amounts of diarrhea, had at least 6 episodes this morning. Discussed with GI about changing steroids to IV.     Review of Systems  Objective:     Vital Signs (Most Recent):  Temp: 98.6 °F (37 °C) (08/24/23 0738)  Pulse: 90 (08/24/23 0738)  Resp: 18 (08/24/23 0738)  BP: 136/65 (08/24/23 0738)  SpO2: 97 % (08/24/23 0738) Vital Signs (24h Range):  Temp:  [97 °F (36.1 °C)-99 °F (37.2 °C)] 98.6 °F (37 °C)  Pulse:  [] 90  Resp:  [18-20] 18  SpO2:  [97 %-98 %] 97 %  BP: (125-147)/() 136/65     Weight: 72.9 kg (160 lb 11.5 oz)  Body mass index is 25.17 kg/m².    Intake/Output Summary (Last 24 hours) at 8/24/2023 1053  Last data filed at 8/24/2023 0800  Gross per 24 hour   Intake 480 ml   Output --   Net 480 ml           Physical Exam  Vitals and nursing note reviewed.   Constitutional:       General: She is not in acute distress.     Appearance: She is well-developed.   HENT:      Head: Normocephalic and  atraumatic.   Cardiovascular:      Rate and Rhythm: Normal rate and regular rhythm.   Pulmonary:      Effort: Pulmonary effort is normal. No respiratory distress.   Abdominal:      General: There is no distension.      Palpations: Abdomen is soft.   Skin:     General: Skin is warm and dry.   Neurological:      Mental Status: She is alert and oriented to person, place, and time.   Psychiatric:         Thought Content: Thought content normal.             Significant Labs: All pertinent labs within the past 24 hours have been reviewed.    Significant Imaging: I have reviewed all pertinent imaging results/findings within the past 24 hours.      Assessment/Plan:      * Diarrhea  Persistent diarrhea over the last 2 weeks with more than 4 bowel movements daily.  Concern for check point inhibitor diarrhea versus colitis.  She is not having any abdominal pain or fevers and imaging does not show evidence of colitis.  E coli and C diff were negative.  GI following. Discussed with patient's Gyn/onc.  - start steroids, initially on PO but now switched to IV per GI recommendations  - start lomotil and stop immodium  - maintain hydration    Malignant neoplasm of endometrium  Current treatment with Dr. Rodríguez radiation and chemotherapy.  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  Follow-up as an outpatient      VTE Risk Mitigation (From admission, onward)         Ordered     enoxaparin injection 40 mg  Daily         08/22/23 1543     IP VTE HIGH RISK PATIENT  Once         08/22/23 1543     Place sequential compression device  Until discontinued         08/22/23 1543                Discharge Planning   AVELINA:      Code Status: Full Code   Is the patient medically ready for discharge?:     Reason for patient still in hospital (select all that apply): Treatment  Discharge Plan A: Home with family                  Andreas Mosley MD  Department of Hospital Medicine   [facility]

## 2023-08-24 NOTE — NURSING
[facility] [place]es [place] -- 4 Eyes      8/23/2023       Skin assessed on: Q Shift      [x] No Pressure Injuries Present    [x]Prevention Measures Documented    [] Yes LDA  for Pressure Injury Previously documented     [] Yes New Pressure Injury Discovered   [] LDA for New Pressure Injury Added      Attending RN:  Joselyn Becerra RN     Second RN:  Nieves Barnhart RN

## 2023-08-24 NOTE — NURSING
[facility], [facility]  Nurses Note -- 4 Eyes      8/24/2023       Skin assessed on: Q Shift      [x] No Pressure Injuries Present    []Prevention Measures Documented    [] Yes LDA  for Pressure Injury Previously documented     [] Yes New Pressure Injury Discovered   [] LDA for New Pressure Injury Added      Attending RN:  Nieves Barnhart, RN     Second RN:  Joselyn Becerra

## 2023-08-24 NOTE — SUBJECTIVE & OBJECTIVE
Interval History:  still with significant amounts of diarrhea, had at least 6 episodes this morning. Discussed with GI about changing steroids to IV.     Review of Systems  Objective:     Vital Signs (Most Recent):  Temp: 98.6 °F (37 °C) (08/24/23 0738)  Pulse: 90 (08/24/23 0738)  Resp: 18 (08/24/23 0738)  BP: 136/65 (08/24/23 0738)  SpO2: 97 % (08/24/23 0738) Vital Signs (24h Range):  Temp:  [97 °F (36.1 °C)-99 °F (37.2 °C)] 98.6 °F (37 °C)  Pulse:  [] 90  Resp:  [18-20] 18  SpO2:  [97 %-98 %] 97 %  BP: (125-147)/() 136/65     Weight: 72.9 kg (160 lb 11.5 oz)  Body mass index is 25.17 kg/m².    Intake/Output Summary (Last 24 hours) at 8/24/2023 1053  Last data filed at 8/24/2023 0800  Gross per 24 hour   Intake 480 ml   Output --   Net 480 ml           Physical Exam  Vitals and nursing note reviewed.   Constitutional:       General: She is not in acute distress.     Appearance: She is well-developed.   HENT:      Head: Normocephalic and atraumatic.   Cardiovascular:      Rate and Rhythm: Normal rate and regular rhythm.   Pulmonary:      Effort: Pulmonary effort is normal. No respiratory distress.   Abdominal:      General: There is no distension.      Palpations: Abdomen is soft.   Skin:     General: Skin is warm and dry.   Neurological:      Mental Status: She is alert and oriented to person, place, and time.   Psychiatric:         Thought Content: Thought content normal.             Significant Labs: All pertinent labs within the past 24 hours have been reviewed.    Significant Imaging: I have reviewed all pertinent imaging results/findings within the past 24 hours.

## 2023-08-25 LAB — BACTERIA STL CULT: NORMAL

## 2023-08-25 PROCEDURE — 36410 VNPNXR 3YR/> PHY/QHP DX/THER: CPT

## 2023-08-25 PROCEDURE — C1751 CATH, INF, PER/CENT/MIDLINE: HCPCS

## 2023-08-25 PROCEDURE — 25000003 PHARM REV CODE 250: Performed by: STUDENT IN AN ORGANIZED HEALTH CARE EDUCATION/TRAINING PROGRAM

## 2023-08-25 PROCEDURE — 99232 PR SUBSEQUENT HOSPITAL CARE,LEVL II: ICD-10-PCS | Mod: ,,, | Performed by: NURSE PRACTITIONER

## 2023-08-25 PROCEDURE — 63600175 PHARM REV CODE 636 W HCPCS: Performed by: NURSE PRACTITIONER

## 2023-08-25 PROCEDURE — 63600175 PHARM REV CODE 636 W HCPCS: Performed by: HOSPITALIST

## 2023-08-25 PROCEDURE — 11000001 HC ACUTE MED/SURG PRIVATE ROOM

## 2023-08-25 PROCEDURE — 99232 SBSQ HOSP IP/OBS MODERATE 35: CPT | Mod: ,,, | Performed by: NURSE PRACTITIONER

## 2023-08-25 RX ORDER — ALPRAZOLAM 0.5 MG/1
0.5 TABLET ORAL ONCE AS NEEDED
Status: COMPLETED | OUTPATIENT
Start: 2023-08-25 | End: 2023-08-25

## 2023-08-25 RX ADMIN — ALPRAZOLAM 0.5 MG: 0.5 TABLET ORAL at 01:08

## 2023-08-25 RX ADMIN — DIPHENOXYLATE HYDROCHLORIDE AND ATROPINE SULFATE 1 TABLET: 2.5; .025 TABLET ORAL at 10:08

## 2023-08-25 RX ADMIN — ENOXAPARIN SODIUM 40 MG: 40 INJECTION SUBCUTANEOUS at 04:08

## 2023-08-25 RX ADMIN — METHYLPREDNISOLONE SODIUM SUCCINATE 60 MG: 40 INJECTION, POWDER, FOR SOLUTION INTRAMUSCULAR; INTRAVENOUS at 02:08

## 2023-08-25 NOTE — NURSING
[facility], [facility]  Nurses Note -- 4 Eyes      8/25/2023       Skin assessed on: Q Shift      [x] No Pressure Injuries Present    [x]Prevention Measures Documented    [] Yes LDA  for Pressure Injury Previously documented     [] Yes New Pressure Injury Discovered   [] LDA for New Pressure Injury Added      Attending RN:  CARLY Desir/Lexy Cameron RN     Second RN:  LONA Lyon

## 2023-08-25 NOTE — NURSING
Scheduled medication given. No complaints of pain/discomfort. IV saline lock. No distress noted.  remains at bedside. Safety measures maintained. Will cont to monitor

## 2023-08-25 NOTE — SUBJECTIVE & OBJECTIVE
Interval History:  diarrhea improving some - still with 2-3 episodes overnight about 3 episodes this morning. Anxious about getting midline    Review of Systems  Objective:     Vital Signs (Most Recent):  Temp: 97.7 °F (36.5 °C) (08/25/23 0729)  Pulse: 95 (08/25/23 0729)  Resp: 18 (08/25/23 0729)  BP: (!) 124/58 (08/25/23 0729)  SpO2: 97 % (08/25/23 0729) Vital Signs (24h Range):  Temp:  [97.7 °F (36.5 °C)-99.2 °F (37.3 °C)] 97.7 °F (36.5 °C)  Pulse:  [] 95  Resp:  [16-20] 18  SpO2:  [95 %-98 %] 97 %  BP: (108-139)/(57-66) 124/58     Weight: 72.9 kg (160 lb 11.5 oz)  Body mass index is 25.17 kg/m².    Intake/Output Summary (Last 24 hours) at 8/25/2023 1051  Last data filed at 8/24/2023 1717  Gross per 24 hour   Intake 480 ml   Output --   Net 480 ml           Physical Exam  Vitals and nursing note reviewed.   Constitutional:       General: She is not in acute distress.     Appearance: She is well-developed.   HENT:      Head: Normocephalic and atraumatic.   Cardiovascular:      Rate and Rhythm: Normal rate and regular rhythm.   Pulmonary:      Effort: Pulmonary effort is normal. No respiratory distress.   Abdominal:      General: There is no distension.      Palpations: Abdomen is soft.   Skin:     General: Skin is warm and dry.   Neurological:      Mental Status: She is alert and oriented to person, place, and time.   Psychiatric:         Thought Content: Thought content normal.             Significant Labs: All pertinent labs within the past 24 hours have been reviewed.    Significant Imaging: I have reviewed all pertinent imaging results/findings within the past 24 hours.

## 2023-08-25 NOTE — PROGRESS NOTES
[facility] Progress Note    Patient Complaint: diarrhea    PCP:   James Hillman       LOS: 1        Initial History of Present Illness (HPI):  This is a 57 y.o. 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 PMH endometrial cancer currently receiving chemo and radiation. Patient complaint of severe ongoing painless diarrhea with associated symptoms of weakness, dehydration and joint pain that began after radiation on 8/9. Denies fever, dizziness, lightheadedness, n/v, hematemesis, abdominal pain, BRBPR, constipation or dark stools. Denies smoking, drinking, oac or NSAID use. Reports stools average 6-10x daily.     Admit labs neutropenia  Imaging notes diarrhea     Interval Hx  Reports 3 diarrhea stools this am s/p 1st dose of IV steroids on last night. Improvement from 6 diarrhea stools on yesterday am.      Medical History:  has a past medical history of Chest pain (2002), GERD (gastroesophageal reflux disease), Lower back pain, Migraines, Mild allergic rhinitis, and Postmenopausal bleeding.    Surgical History:  has a past surgical history that includes TONSILLECTOMY; Tubal ligation; Cyst Removal (Left, 04/28/2022); Hysteroscopy with dilation and curettage of uterus (N/A, 05/23/2023); Tonsillectomy; xi robotic hysterectomy, with salpingo-oophorectomy (Bilateral, 6/5/2023); Lymph node biopsy (Left, 6/5/2023); mapping, lymph node, sentinel (Bilateral, 6/5/2023); lymphadenectomy, pelvis (Right, 6/5/2023); and Nerve repair (Right, 6/5/2023).      Objective Findings:    Vital Signs:  Temp:  [97.7 °F (36.5 °C)-99.2 °F (37.3 °C)]   Pulse:  []   Resp:  [16-20]   BP: (108-139)/(57-66)   SpO2:  [95 %-98 %]   Body mass index is 25.17 kg/m².      Physical Exam  Vitals and nursing note reviewed.   Constitutional:       Appearance: Normal appearance.   HENT:      Head: Normocephalic.   Pulmonary:      Effort: Pulmonary effort is normal.   Abdominal:       General: Bowel sounds are normal.      Palpations: Abdomen is soft.   Skin:     General: Skin is warm and dry.   Neurological:      Mental Status: She is alert and oriented to person, place, and time.   Psychiatric:         Mood and Affect: Mood normal.         Behavior: Behavior normal.         Thought Content: Thought content normal.         Judgment: Judgment normal.               Labs:  Lab Results   Component Value Date    WBC 4.00 2023    HGB 12.3 2023    HCT 35.7 (L) 2023     2023    CHOL 199 2008    TRIG 99 2020    HDL 42 2020    ALT 33 2023    AST 20 2023     2023    K 3.6 2023     2023    CREATININE 0.6 2023    BUN 11 2023    CO2 25 2023    TSH 0.137 (L) 2023             Imagin/22 CT abdomen pelvis-  Liquid stool is noted throughout the colon and rectum which are otherwise grossly unremarkable in appearance.  Otherwise, the enhanced but unopacified stomach, small bowel and colon are normal in course and caliber.  No evidence of small bowel obstruction, significant inflammatory fat stranding, free air or free fluid.  Appendix is normal.  Bowel mesentery is grossly unremarkable.       I have independently reviewed and interpreted the imaging above           Immune checkpoint inhibitor colitis. Diarrhea. Current chemotherapy. Current radiation therapy. Endometrial cancer.  Plan/ Recommendations:  1.  Reporting improvement in number of stools after 1st dose of IV steroid. Continue IV methylprednisolone 60mg IV every 12 hrs.  Stool studies processing for calprotectin and ova parasites.  Cdiff, ecoli and stool wbc negative. Stool culture prelim negative.  Ok to resume diet. Continue to monitor hydration and electrolytes.   Plan: methylprednisolone  60mg q12h for a max of 3 days, started 2000. Watch for report of less than 4 stools daily. If symptoms improve prior to 3 day max transition to  oral taper and taper by 10mg weekly with a goal to discontinue.      Thank you so much for allowing us to participate in the care of Sugar Diaz . Please contact us if you have any additional questions.    Carmen Mae NP  Gastroenterology  [facility]

## 2023-08-25 NOTE — PROGRESS NOTES
[facility] Medicine  Progress Note    Patient Name: Sugar Diaz  MRN: [id number]  Patient Class: IP- Inpatient   Admission Date: 8/22/2023  Length of Stay: 1 days  Attending Physician: Andreas Mosley MD  Primary Care Provider: James Hillman MD        Subjective:     Principal Problem:Diarrhea        HPI:  57 y.o. female with endometrial cancer presents with a complaint of diarrhea.  Acute onset, duration 2 weeks, reports 5-6 watery stools daily, onset after radiation treatment 2 weeks ago, also on chemotherapy.  No known exacerbating or alleviating contacts, no recent antibiotic use.  Denies fever, chills, cough, SOB, chest pain, palpitations, dizziness, syncope, nausea, vomiting, or abdominal pain.  In the ED, routine labs, UA, and CT abdomen pelvis were all unremarkable for any acute abnormality.  Her oncologist Dr. Rodríguez recommends observation, prednisone, and GI consult.      Overview/Hospital Course:  No notes on file    Interval History:  diarrhea improving some - still with 2-3 episodes overnight about 3 episodes this morning. Anxious about getting midline    Review of Systems  Objective:     Vital Signs (Most Recent):  Temp: 97.7 °F (36.5 °C) (08/25/23 0729)  Pulse: 95 (08/25/23 0729)  Resp: 18 (08/25/23 0729)  BP: (!) 124/58 (08/25/23 0729)  SpO2: 97 % (08/25/23 0729) Vital Signs (24h Range):  Temp:  [97.7 °F (36.5 °C)-99.2 °F (37.3 °C)] 97.7 °F (36.5 °C)  Pulse:  [] 95  Resp:  [16-20] 18  SpO2:  [95 %-98 %] 97 %  BP: (108-139)/(57-66) 124/58     Weight: 72.9 kg (160 lb 11.5 oz)  Body mass index is 25.17 kg/m².    Intake/Output Summary (Last 24 hours) at 8/25/2023 1051  Last data filed at 8/24/2023 1717  Gross per 24 hour   Intake 480 ml   Output --   Net 480 ml           Physical Exam  Vitals and nursing note reviewed.   Constitutional:       General: She is not in acute distress.     Appearance: She is well-developed.   HENT:      Head: Normocephalic and atraumatic.    Cardiovascular:      Rate and Rhythm: Normal rate and regular rhythm.   Pulmonary:      Effort: Pulmonary effort is normal. No respiratory distress.   Abdominal:      General: There is no distension.      Palpations: Abdomen is soft.   Skin:     General: Skin is warm and dry.   Neurological:      Mental Status: She is alert and oriented to person, place, and time.   Psychiatric:         Thought Content: Thought content normal.             Significant Labs: All pertinent labs within the past 24 hours have been reviewed.    Significant Imaging: I have reviewed all pertinent imaging results/findings within the past 24 hours.      Assessment/Plan:      * Diarrhea  Persistent diarrhea over the last 2 weeks with more than 4 bowel movements daily.  Concern for check point inhibitor diarrhea versus colitis.  She is not having any abdominal pain or fevers and imaging does not show evidence of colitis.  E coli and C diff were negative.  GI following. Discussed with patient's Gyn/onc.  - start steroids, initially on PO but now switched to IV per GI recommendations  - start lomotil and stop immodium  - maintain hydration  - slowly improving, continue current plan    Malignant neoplasm of endometrium  Current treatment with Dr. Rodríguez radiation and chemotherapy.  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  Follow-up as an outpatient      VTE Risk Mitigation (From admission, onward)         Ordered     enoxaparin injection 40 mg  Daily         08/22/23 1543     IP VTE HIGH RISK PATIENT  Once         08/22/23 1543     Place sequential compression device  Until discontinued         08/22/23 1543                Discharge Planning   AVELINA:      Code Status: Full Code   Is the patient medically ready for discharge?:     Reason for patient still in hospital (select all that apply): Treatment  Discharge Plan A: Home with family                  Andreas Mosley MD  Department of [facility] Medicine   [facility]  Surg

## 2023-08-26 PROCEDURE — 11000001 HC ACUTE MED/SURG PRIVATE ROOM

## 2023-08-26 PROCEDURE — 99231 SBSQ HOSP IP/OBS SF/LOW 25: CPT | Mod: GT,,, | Performed by: INTERNAL MEDICINE

## 2023-08-26 PROCEDURE — 63600175 PHARM REV CODE 636 W HCPCS: Performed by: HOSPITALIST

## 2023-08-26 PROCEDURE — 99231 PR SUBSEQUENT HOSPITAL CARE,LEVL I: ICD-10-PCS | Mod: GT,,, | Performed by: INTERNAL MEDICINE

## 2023-08-26 PROCEDURE — 63600175 PHARM REV CODE 636 W HCPCS: Performed by: STUDENT IN AN ORGANIZED HEALTH CARE EDUCATION/TRAINING PROGRAM

## 2023-08-26 RX ORDER — PREDNISONE 20 MG/1
40 TABLET ORAL DAILY
Status: DISCONTINUED | OUTPATIENT
Start: 2023-08-26 | End: 2023-08-27 | Stop reason: HOSPADM

## 2023-08-26 RX ADMIN — ENOXAPARIN SODIUM 40 MG: 40 INJECTION SUBCUTANEOUS at 04:08

## 2023-08-26 RX ADMIN — PREDNISONE 40 MG: 20 TABLET ORAL at 11:08

## 2023-08-26 RX ADMIN — METHYLPREDNISOLONE SODIUM SUCCINATE 60 MG: 40 INJECTION, POWDER, FOR SOLUTION INTRAMUSCULAR; INTRAVENOUS at 02:08

## 2023-08-26 NOTE — PROGRESS NOTES
"[facility]    CC: diarrhea    HPI 57 y.o. female with past medical history of endometrial cancer on chemotherapy and radiation who presents with two weeks of severe, painless diarrhea with associated weakness consider for check point inhibitor colitis.    She was started on IV steroids yesterday and is feeling better.  She had one BM yesterday at 11am and one this morning which is much improved.  It was liquid.  She is tolerating oral intake.    Past Medical History  Past Medical History:   Diagnosis Date    Chest pain 2002    NEGATIVE STRESS ECHO 2002    GERD (gastroesophageal reflux disease)     Lower back pain     Migraines     Mild allergic rhinitis     Postmenopausal bleeding        Physical Examination  /69 (BP Location: Left arm, Patient Position: Lying)   Pulse 89   Temp 98.4 °F (36.9 °C) (Oral)   Resp 18   Ht 5' 7" (1.702 m)   Wt 72.9 kg (160 lb 11.5 oz)   LMP 11/15/2012   SpO2 97%   BMI 25.17 kg/m²   General appearance: alert, cooperative, no distress  HENT: Normocephalic, atraumatic, neck symmetrical, no nasal discharge   Lungs: clear to auscultation bilaterally, no dullness to percussion bilaterally  Heart: regular rate and rhythm without rub; no displacement of the PMI   Abdomen: soft, non-tender; bowel sounds normoactive; no organomegaly  Extremities: extremities symmetric; no clubbing, cyanosis, or edema  Neurologic: Alert and oriented X 3, moving all four extremities, intact sensation to light touch    Labs:  Lab Results   Component Value Date    WBC 4.00 08/24/2023    HGB 12.3 08/24/2023    HCT 35.7 (L) 08/24/2023    MCV 90 08/24/2023     08/24/2023         CMP  Sodium   Date Value Ref Range Status   08/24/2023 139 136 - 145 mmol/L Final     Potassium   Date Value Ref Range Status   08/24/2023 3.6 3.5 - 5.1 mmol/L Final     Chloride   Date Value Ref Range Status   08/24/2023 106 95 - 110 mmol/L Final     CO2   Date Value Ref Range Status   08/24/2023 25 23 - " 29 mmol/L Final     Glucose   Date Value Ref Range Status   08/24/2023 98 70 - 110 mg/dL Final     BUN   Date Value Ref Range Status   08/24/2023 11 6 - 20 mg/dL Final     Creatinine   Date Value Ref Range Status   08/24/2023 0.6 0.5 - 1.4 mg/dL Final     Calcium   Date Value Ref Range Status   08/24/2023 9.3 8.7 - 10.5 mg/dL Final     Total Protein   Date Value Ref Range Status   08/24/2023 6.4 6.0 - 8.4 g/dL Final     Albumin   Date Value Ref Range Status   08/24/2023 3.2 (L) 3.5 - 5.2 g/dL Final     Total Bilirubin   Date Value Ref Range Status   08/24/2023 0.2 0.1 - 1.0 mg/dL Final     Comment:     For infants and newborns, interpretation of results should be based  on gestational age, weight and in agreement with clinical  observations.    Premature Infant recommended reference ranges:  Up to 24 hours.............<8.0 mg/dL  Up to 48 hours............<12.0 mg/dL  3-5 days..................<15.0 mg/dL  6-29 days.................<15.0 mg/dL       Alkaline Phosphatase   Date Value Ref Range Status   08/24/2023 78 55 - 135 U/L Final     AST   Date Value Ref Range Status   08/24/2023 20 10 - 40 U/L Final     ALT   Date Value Ref Range Status   08/24/2023 33 10 - 44 U/L Final     Anion Gap   Date Value Ref Range Status   08/24/2023 8 8 - 16 mmol/L Final     eGFR   Date Value Ref Range Status   08/24/2023 >60 >60 mL/min/1.73 m^2 Final           Assessment:   The patient is a 56 yo female with endometrial cancer receiving chemo and radiation who presented with diarrhea concerning for check point inhibitor colitis which has improved with one day of IV steroids.    Plan:  -Recommend transition to Prednisone 40mg daily with oral taper by 10mg per week.  -GI clinic follow up to be arranged.  -Ok for discharge to home from a GI standpoint.    Marcia Weaver MD

## 2023-08-26 NOTE — ASSESSMENT & PLAN NOTE
Current treatment with Dr. Rodríguez radiation and chemotherapy.  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  
Current treatment with Dr. Rodríguez radiation and chemotherapy.  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  Follow-up as an outpatient  
Persistent diarrhea over the last 2 weeks with more than 4 bowel movements daily.  Concern for check point inhibitor diarrhea versus colitis.  She is not having any abdominal pain or fevers and imaging does not show evidence of colitis.  E coli and C diff were negative.  GI following and recommending oncology consultation prior to starting steroids.  Oncology consulted.  
Persistent diarrhea over the last 2 weeks with more than 4 bowel movements daily.  Concern for check point inhibitor diarrhea versus colitis.  She is not having any abdominal pain or fevers and imaging does not show evidence of colitis.  E coli and C diff were negative.  GI following. Discussed with patient's Gyn/onc.  - start steroids, initially on PO but now switched to IV per GI recommendations  - start lomotil and stop immodium  - maintain hydration  
Persistent diarrhea over the last 2 weeks with more than 4 bowel movements daily.  Concern for check point inhibitor diarrhea versus colitis.  She is not having any abdominal pain or fevers and imaging does not show evidence of colitis.  E coli and C diff were negative.  GI following. Discussed with patient's Gyn/onc.  - start steroids, initially on PO but now switched to IV per GI recommendations  - start lomotil and stop immodium  - maintain hydration  - slowly improving, continue current plan  
Persistent diarrhea over the last 2 weeks with more than 4 bowel movements daily.  Concern for check point inhibitor diarrhea versus colitis.  She is not having any abdominal pain or fevers and imaging does not show evidence of colitis.  E coli and C diff were negative.  GI following. Discussed with patient's Gyn/onc.  - start steroids, initially on PO but now switched to IV per GI recommendations  - start lomotil and stop immodium  - maintain hydration  - slowly improving, transition to PO steroids - monitor overnight, likely d/c home tomorrow  
Stool studies pending, daily 1 mg per kg prednisone, GI consult, appreciate recs.  
24-Oct-2022 14:58

## 2023-08-26 NOTE — NURSING
[facility], [facility]  Nurses Note -- 4 Eyes      8/26/2023       Skin assessed on: Q Shift      [x] No Pressure Injuries Present    []Prevention Measures Documented    [] Yes LDA  for Pressure Injury Previously documented     [] Yes New Pressure Injury Discovered   [] LDA for New Pressure Injury Added      Attending RN:  Nieves Barnhart RN     Second RN:  Nataliya Valerio RN

## 2023-08-26 NOTE — NURSING
[facility], [facility]  Nurses Note -- 4 Eyes      8/25/2023       Skin assessed on: Q Shift      [x] No Pressure Injuries Present    [x]Prevention Measures Documented    [] Yes LDA  for Pressure Injury Previously documented     [] Yes New Pressure Injury Discovered   [] LDA for New Pressure Injury Added      Attending RN:  Nataliya Valerio RN     Second RN:  CARLY Pugh

## 2023-08-26 NOTE — PROGRESS NOTES
[facility]  Progress Note    Patient Name: Sugar Diaz  MRN: [id number]  Patient Class: IP- Inpatient   Admission Date: 8/22/2023  Length of Stay: 2 days  Attending Physician: Andreas Mosley MD  Primary Care Provider: James Hillman MD        Subjective:     Principal Problem:Diarrhea        HPI:  57 y.o. female with endometrial cancer presents with a complaint of diarrhea.  Acute onset, duration 2 weeks, reports 5-6 watery stools daily, onset after radiation treatment 2 weeks ago, also on chemotherapy.  No known exacerbating or alleviating contacts, no recent antibiotic use.  Denies fever, chills, cough, SOB, chest pain, palpitations, dizziness, syncope, nausea, vomiting, or abdominal pain.  In the ED, routine labs, UA, and CT abdomen pelvis were all unremarkable for any acute abnormality.  Her oncologist Dr. Rodríguez recommends observation, prednisone, and GI consult.      Overview/Hospital Course:  No notes on file    Interval History:  diarrhea improving, transition to PO steroids today    Review of Systems  Objective:     Vital Signs (Most Recent):  Temp: 97.9 °F (36.6 °C) (08/26/23 1122)  Pulse: 90 (08/26/23 1122)  Resp: 18 (08/26/23 1122)  BP: 128/60 (08/26/23 1122)  SpO2: 99 % (08/26/23 1122) Vital Signs (24h Range):  Temp:  [97.8 °F (36.6 °C)-98.4 °F (36.9 °C)] 97.9 °F (36.6 °C)  Pulse:  [] 90  Resp:  [16-18] 18  SpO2:  [96 %-99 %] 99 %  BP: (102-168)/(53-73) 128/60     Weight: 72.9 kg (160 lb 11.5 oz)  Body mass index is 25.17 kg/m².    Intake/Output Summary (Last 24 hours) at 8/26/2023 1240  Last data filed at 8/25/2023 1700  Gross per 24 hour   Intake 240 ml   Output --   Net 240 ml           Physical Exam  Vitals and nursing note reviewed.   Constitutional:       General: She is not in acute distress.     Appearance: She is well-developed.   HENT:      Head: Normocephalic and atraumatic.   Cardiovascular:      Rate and Rhythm: Normal rate and regular rhythm.    Pulmonary:      Effort: Pulmonary effort is normal. No respiratory distress.   Abdominal:      General: There is no distension.      Palpations: Abdomen is soft.   Skin:     General: Skin is warm and dry.   Neurological:      Mental Status: She is alert and oriented to person, place, and time.   Psychiatric:         Thought Content: Thought content normal.             Significant Labs: All pertinent labs within the past 24 hours have been reviewed.    Significant Imaging: I have reviewed all pertinent imaging results/findings within the past 24 hours.      Assessment/Plan:      * Diarrhea  Persistent diarrhea over the last 2 weeks with more than 4 bowel movements daily.  Concern for check point inhibitor diarrhea versus colitis.  She is not having any abdominal pain or fevers and imaging does not show evidence of colitis.  E coli and C diff were negative.  GI following. Discussed with patient's Gyn/onc.  - start steroids, initially on PO but now switched to IV per GI recommendations  - start lomotil and stop immodium  - maintain hydration  - slowly improving, transition to PO steroids - monitor overnight, likely d/c home tomorrow    Malignant neoplasm of endometrium  Current treatment with Dr. Rodríguez radiation and chemotherapy.  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  Follow-up as an outpatient      VTE Risk Mitigation (From admission, onward)         Ordered     enoxaparin injection 40 mg  Daily         08/22/23 1543     IP VTE HIGH RISK PATIENT  Once         08/22/23 1543     Place sequential compression device  Until discontinued         08/22/23 1543                Discharge Planning   AVELINA:      Code Status: Full Code   Is the patient medically ready for discharge?:     Reason for patient still in hospital (select all that apply): Treatment  Discharge Plan A: Home with family                  Andreas Mosley MD  Department of Hospital Medicine   [facility]

## 2023-08-26 NOTE — NURSING
Pt alert able to make needs known, tolerates medications well by mouth.  no signs or symptoms of adverse reactions noted.  Repositioned self q 2hrs.  Denies nausea, vomiting  Plan of care explained.  Diet tolerated.  Remains free from falls and hospital acquired pressure injuries.  Safety maintained.  Will continue following plan of care.

## 2023-08-26 NOTE — SUBJECTIVE & OBJECTIVE
Interval History:  diarrhea improving, transition to PO steroids today    Review of Systems  Objective:     Vital Signs (Most Recent):  Temp: 97.9 °F (36.6 °C) (08/26/23 1122)  Pulse: 90 (08/26/23 1122)  Resp: 18 (08/26/23 1122)  BP: 128/60 (08/26/23 1122)  SpO2: 99 % (08/26/23 1122) Vital Signs (24h Range):  Temp:  [97.8 °F (36.6 °C)-98.4 °F (36.9 °C)] 97.9 °F (36.6 °C)  Pulse:  [] 90  Resp:  [16-18] 18  SpO2:  [96 %-99 %] 99 %  BP: (102-168)/(53-73) 128/60     Weight: 72.9 kg (160 lb 11.5 oz)  Body mass index is 25.17 kg/m².    Intake/Output Summary (Last 24 hours) at 8/26/2023 1240  Last data filed at 8/25/2023 1700  Gross per 24 hour   Intake 240 ml   Output --   Net 240 ml           Physical Exam  Vitals and nursing note reviewed.   Constitutional:       General: She is not in acute distress.     Appearance: She is well-developed.   HENT:      Head: Normocephalic and atraumatic.   Cardiovascular:      Rate and Rhythm: Normal rate and regular rhythm.   Pulmonary:      Effort: Pulmonary effort is normal. No respiratory distress.   Abdominal:      General: There is no distension.      Palpations: Abdomen is soft.   Skin:     General: Skin is warm and dry.   Neurological:      Mental Status: She is alert and oriented to person, place, and time.   Psychiatric:         Thought Content: Thought content normal.             Significant Labs: All pertinent labs within the past 24 hours have been reviewed.    Significant Imaging: I have reviewed all pertinent imaging results/findings within the past 24 hours.

## 2023-08-27 VITALS
DIASTOLIC BLOOD PRESSURE: 55 MMHG | BODY MASS INDEX: 25.22 KG/M2 | HEIGHT: 67 IN | SYSTOLIC BLOOD PRESSURE: 105 MMHG | WEIGHT: 160.69 LBS | TEMPERATURE: 99 F | OXYGEN SATURATION: 98 % | HEART RATE: 88 BPM | RESPIRATION RATE: 20 BRPM

## 2023-08-27 LAB
ANION GAP SERPL CALC-SCNC: 7 MMOL/L (ref 8–16)
BASOPHILS # BLD AUTO: 0.03 K/UL (ref 0–0.2)
BASOPHILS NFR BLD: 0.5 % (ref 0–1.9)
BUN SERPL-MCNC: 14 MG/DL (ref 6–20)
CALCIUM SERPL-MCNC: 8.7 MG/DL (ref 8.7–10.5)
CHLORIDE SERPL-SCNC: 106 MMOL/L (ref 95–110)
CO2 SERPL-SCNC: 26 MMOL/L (ref 23–29)
CREAT SERPL-MCNC: 0.6 MG/DL (ref 0.5–1.4)
DIFFERENTIAL METHOD: ABNORMAL
EOSINOPHIL # BLD AUTO: 0 K/UL (ref 0–0.5)
EOSINOPHIL NFR BLD: 0 % (ref 0–8)
ERYTHROCYTE [DISTWIDTH] IN BLOOD BY AUTOMATED COUNT: 14.9 % (ref 11.5–14.5)
EST. GFR  (NO RACE VARIABLE): >60 ML/MIN/1.73 M^2
GLUCOSE SERPL-MCNC: 101 MG/DL (ref 70–110)
HCT VFR BLD AUTO: 33.7 % (ref 37–48.5)
HGB BLD-MCNC: 11.5 G/DL (ref 12–16)
IMM GRANULOCYTES # BLD AUTO: 0.09 K/UL (ref 0–0.04)
IMM GRANULOCYTES NFR BLD AUTO: 1.6 % (ref 0–0.5)
LYMPHOCYTES # BLD AUTO: 3.5 K/UL (ref 1–4.8)
LYMPHOCYTES NFR BLD: 61.9 % (ref 18–48)
MCH RBC QN AUTO: 31.7 PG (ref 27–31)
MCHC RBC AUTO-ENTMCNC: 34.1 G/DL (ref 32–36)
MCV RBC AUTO: 93 FL (ref 82–98)
MONOCYTES # BLD AUTO: 0.8 K/UL (ref 0.3–1)
MONOCYTES NFR BLD: 14.3 % (ref 4–15)
NEUTROPHILS # BLD AUTO: 1.2 K/UL (ref 1.8–7.7)
NEUTROPHILS NFR BLD: 21.7 % (ref 38–73)
NRBC BLD-RTO: 1 /100 WBC
PLATELET # BLD AUTO: 284 K/UL (ref 150–450)
PMV BLD AUTO: 9.3 FL (ref 9.2–12.9)
POTASSIUM SERPL-SCNC: 3.9 MMOL/L (ref 3.5–5.1)
RBC # BLD AUTO: 3.63 M/UL (ref 4–5.4)
SODIUM SERPL-SCNC: 139 MMOL/L (ref 136–145)
WBC # BLD AUTO: 5.65 K/UL (ref 3.9–12.7)

## 2023-08-27 PROCEDURE — 36415 COLL VENOUS BLD VENIPUNCTURE: CPT | Performed by: STUDENT IN AN ORGANIZED HEALTH CARE EDUCATION/TRAINING PROGRAM

## 2023-08-27 PROCEDURE — 85025 COMPLETE CBC W/AUTO DIFF WBC: CPT | Performed by: STUDENT IN AN ORGANIZED HEALTH CARE EDUCATION/TRAINING PROGRAM

## 2023-08-27 PROCEDURE — 80048 BASIC METABOLIC PNL TOTAL CA: CPT | Performed by: STUDENT IN AN ORGANIZED HEALTH CARE EDUCATION/TRAINING PROGRAM

## 2023-08-27 PROCEDURE — 63600175 PHARM REV CODE 636 W HCPCS: Performed by: STUDENT IN AN ORGANIZED HEALTH CARE EDUCATION/TRAINING PROGRAM

## 2023-08-27 RX ORDER — PREDNISONE 20 MG/1
TABLET ORAL
Qty: 35 TABLET | Refills: 0 | Status: SHIPPED | OUTPATIENT
Start: 2023-08-28 | End: 2023-09-25

## 2023-08-27 RX ORDER — DIPHENOXYLATE HYDROCHLORIDE AND ATROPINE SULFATE 2.5; .025 MG/1; MG/1
1 TABLET ORAL 3 TIMES DAILY PRN
Qty: 30 TABLET | Refills: 0 | Status: SHIPPED | OUTPATIENT
Start: 2023-08-27 | End: 2023-09-06

## 2023-08-27 RX ADMIN — PREDNISONE 40 MG: 20 TABLET ORAL at 08:08

## 2023-08-27 NOTE — NURSING
[facility], [facility]  Nurses Note -- 4 Eyes      8/27/2023       Skin assessed on: Q Shift      [x] No Pressure Injuries Present    [x]Prevention Measures Documented    [] Yes LDA  for Pressure Injury Previously documented     [] Yes New Pressure Injury Discovered   [] LDA for New Pressure Injury Added      Attending RN:  Nataliya Valerio RN     Second RN:  CARLY Pickett

## 2023-08-27 NOTE — HOSPITAL COURSE
Ms. Diaz is a 57-year-old female with history of endometrial cancer on immunotherapy and radiation who presents to the emergency department for evaluation of persistent diarrhea and dehydration.  She was given IV fluids and CT scan did not show any colitis.  After further discussion with patient's gynecology oncologist, there is concern for immune check point inhibitor diarrhea.  No evidence of colitis, no abdominal pain fevers or blood in stool.  CT also did not show colitis.  C diff negative.  Stool cultures were negative.  She was started on supportive care and steroids.  Throughout hospital stay, patient's bowel movements significantly improved and she was tolerating diet.  GI following along.  Recommendations include to start prednisone 40 mg daily for 1 week and taper by 10 mg each week.  She was given a prescription for prednisone and Lomotil as needed.  All questions answered and she will be discharged home in stable condition.  She will need to follow-up with her gynecology oncologist and radiation specialist to determine ongoing treatment plans.

## 2023-08-27 NOTE — NURSING
Discharge instructions, follow-up appts., and prescription information  given and explained to patient. Patient verbalizes understanding of all. Midline removed, pt tolerated well. Patient refused pt escort. Ambulated to car without difficulty, pt  by her side.

## 2023-08-27 NOTE — DISCHARGE SUMMARY
[facility]  Discharge Summary      Patient Name: Sugar Diaz  MRN: [id number]  LALO: [id number]  Patient Class: IP- Inpatient  Admission Date: 8/22/2023  Hospital Length of Stay: 3 days  Discharge Date and Time: 8/27/2023 12:16 PM  Attending Physician: No att. providers found   Discharging Provider: Andreas Mosley MD  Primary Care Provider: James Hillman MD    Primary Care Team: Networked reference to record PCT     HPI:   57 y.o. female with endometrial cancer presents with a complaint of diarrhea.  Acute onset, duration 2 weeks, reports 5-6 watery stools daily, onset after radiation treatment 2 weeks ago, also on chemotherapy.  No known exacerbating or alleviating contacts, no recent antibiotic use.  Denies fever, chills, cough, SOB, chest pain, palpitations, dizziness, syncope, nausea, vomiting, or abdominal pain.  In the ED, routine labs, UA, and CT abdomen pelvis were all unremarkable for any acute abnormality.  Her oncologist Dr. Rodríguez recommends observation, prednisone, and GI consult.      * No surgery found *      Hospital Course:   Ms. Diaz is a 57-year-old female with history of endometrial cancer on immunotherapy and radiation who presents to the emergency department for evaluation of persistent diarrhea and dehydration.  She was given IV fluids and CT scan did not show any colitis.  After further discussion with patient's gynecology oncologist, there is concern for immune check point inhibitor diarrhea.  No evidence of colitis, no abdominal pain fevers or blood in stool.  CT also did not show colitis.  C diff negative.  Stool cultures were negative.  She was started on supportive care and steroids.  Throughout hospital stay, patient's bowel movements significantly improved and she was tolerating diet.  GI following along.  Recommendations include to start prednisone 40 mg daily for 1 week and taper by 10 mg each week.  She was given a prescription for  prednisone and Lomotil as needed.  All questions answered and she will be discharged home in stable condition.  She will need to follow-up with her gynecology oncologist and radiation specialist to determine ongoing treatment plans.       Goals of Care Treatment Preferences:  Code Status: Full Code      Consults:   Consults (From admission, onward)        Status Ordering Provider     Inpatient consult to Midline team  Once        Provider:  (Not yet assigned)    CLAUDIA Farris     Inpatient consult to Gastroenterology  Once        Provider:  Fercho Isidro MD    Completed QUANG CORDOVA JR          No new Assessment & Plan notes have been filed under this hospital service since the last note was generated.  Service: Hospital Medicine    Final Active Diagnoses:    Diagnosis Date Noted POA    PRINCIPAL PROBLEM:  Diarrhea [R19.7] 08/22/2023 Yes    Malignant neoplasm of endometrium [C54.1] 06/07/2023 Yes     Chronic      Problems Resolved During this Admission:       Discharged Condition: stable    Disposition: Home or Self Care    Follow Up:   Follow-up Information     James Hillman MD Follow up.    Specialty: Internal Medicine  Contact information:  [address] [place] [address]  [place] [id number] [phone]                       Patient Instructions:      Diet Adult Regular     Notify your health care provider if you experience any of the following:  temperature >100.4     Notify your health care provider if you experience any of the following:  persistent nausea and vomiting or diarrhea     Notify your health care provider if you experience any of the following:  severe uncontrolled pain     Notify your health care provider if you experience any of the following:  difficulty breathing or increased cough     Notify your health care provider if you experience any of the following:  severe persistent headache     Notify your health care provider if you experience any of the following:  worsening  rash     Notify your health care provider if you experience any of the following:  persistent dizziness, light-headedness, or visual disturbances     Notify your health care provider if you experience any of the following:  increased confusion or weakness     Activity as tolerated       Significant Diagnostic Studies: Labs: All labs within the past 24 hours have been reviewed    Pending Diagnostic Studies:     Procedure Component Value Units Date/Time    Calprotectin, Stool [[id number]] Collected: 08/23/23 1412    Order Status: Sent Lab Status: In process Updated: 08/23/23 1440    Specimen: Stool     Stool Exam-Ova,Cysts,Parasites [[id number]] Collected: 08/22/23 1511    Order Status: Sent Lab Status: In process Updated: 08/22/23 1547    Specimen: Stool          Medications:  Reconciled Home Medications:      Medication List      START taking these medications    diphenoxylate-atropine 2.5-0.025 mg 2.5-0.025 mg per tablet  Commonly known as: LOMOTIL  Take 1 tablet by mouth 3 (three) times daily as needed for Diarrhea.     predniSONE 20 MG tablet  Commonly known as: DELTASONE  Take 2 tablets (40 mg total) by mouth once daily for 7 days, THEN 1.5 tablets (30 mg total) once daily for 7 days, THEN 1 tablet (20 mg total) once daily for 7 days, THEN 0.5 tablets (10 mg total) once daily for 7 days.  Start taking on: August 28, 2023        CONTINUE taking these medications    * acetaminophen 500 MG tablet  Commonly known as: TYLENOL EXTRA STRENGTH  Take 2 tablets (1,000 mg total) by mouth every 6 (six) hours as needed for Pain.     * acetaminophen 650 MG Tbsr  Commonly known as: TYLENOL  Take 1 tablet (650 mg total) by mouth every 6 (six) hours as needed (pain).     dexAMETHasone 4 MG Tab  Commonly known as: DECADRON  Take daily beginning the day after chemotherapy for 3 days     diphenhydrAMINE 25 mg capsule  Commonly known as: BENADRYL  Take 25 mg by mouth every 6 (six) hours as needed for Itching.     ibuprofen 800 MG  tablet  Commonly known as: ADVIL,MOTRIN  Take 1 tablet (800 mg total) by mouth every 8 (eight) hours as needed for Pain.     loratadine 10 mg tablet  Commonly known as: CLARITIN  Take 1 tablet (10 mg total) by mouth once daily.     prochlorperazine 5 MG tablet  Commonly known as: COMPAZINE  Take every 6 hours for 3 days beginning the day after chemotherapy     valACYclovir 1000 MG tablet  Commonly known as: VALTREX  Two pills at onset of fever blister and then repeat 2 pills 12 hr later     zolpidem 5 MG Tab  Commonly known as: AMBIEN  Take 1 tablet (5 mg total) by mouth nightly as needed (insomnia).         * This list has 2 medication(s) that are the same as other medications prescribed for you. Read the directions carefully, and ask your doctor or other care provider to review them with you.            STOP taking these medications    senna 8.6 mg tablet  Commonly known as: SENNA            Indwelling Lines/Drains at time of discharge:   Lines/Drains/Airways     None                 Time spent on the discharge of patient: >35 minutes         Andreas Mosley MD  Department of Hospital Medicine  [facility]

## 2023-08-27 NOTE — PLAN OF CARE
[facility] [place] [facility]  Discharge Final Note    Primary Care Provider: James Hillman MD    Expected Discharge Date: 8/27/2023    Final Discharge Note (most recent)       Final Note - 08/27/23 1013          Final Note    Assessment Type Final Discharge Note     Anticipated Discharge Disposition Home or Self Care     What phone number can be called within the next 1-3 days to see how you are doing after discharge? [phone]     Hospital Resources/Appts/Education Provided Appointments scheduled and added to AVS        Post-Acute Status    Discharge Delays None known at this time                     Important Message from Medicare             Contact Info       James Hillman MD   Specialty: Internal Medicine   Relationship: PCP - General    [place] [id number]   Phone: [phone]       Next Steps: Follow up          Patient's post discharge f/u appt has been scheduled.  OK for patient to discharge from case management's standpoint.

## 2023-08-27 NOTE — NURSING
[facility], [facility]  Nurses Note -- 4 Eyes      8/27/2023       Skin assessed on: Q Shift      [x] No Pressure Injuries Present    []Prevention Measures Documented    [] Yes LDA  for Pressure Injury Previously documented     [] Yes New Pressure Injury Discovered   [] LDA for New Pressure Injury Added      Attending RN:  Nieves Barnhart RN     Second RN:  Nataliya Valerio RN

## 2023-08-28 ENCOUNTER — PATIENT MESSAGE (OUTPATIENT)
Dept: GYNECOLOGIC ONCOLOGY | Facility: CLINIC | Age: 58
End: 2023-08-28
Payer: COMMERCIAL

## 2023-08-29 ENCOUNTER — PATIENT OUTREACH (OUTPATIENT)
Dept: ADMINISTRATIVE | Facility: CLINIC | Age: 58
End: 2023-08-29
Payer: COMMERCIAL

## 2023-08-29 ENCOUNTER — PATIENT MESSAGE (OUTPATIENT)
Dept: RADIATION ONCOLOGY | Facility: CLINIC | Age: 58
End: 2023-08-29
Payer: COMMERCIAL

## 2023-08-29 LAB
CALPROTECTIN STL-MCNT: <27.1 MCG/G
O+P STL MICRO: NORMAL

## 2023-08-30 ENCOUNTER — PROCEDURE VISIT (OUTPATIENT)
Dept: RADIATION ONCOLOGY | Facility: CLINIC | Age: 58
End: 2023-08-30
Payer: COMMERCIAL

## 2023-08-30 DIAGNOSIS — C54.1 MALIGNANT NEOPLASM OF ENDOMETRIUM: Primary | ICD-10-CM

## 2023-08-30 DIAGNOSIS — Z90.710 STATUS POST HYSTERECTOMY: ICD-10-CM

## 2023-08-30 PROCEDURE — 57156 PR INSERT VAGINAL RADIATION DEVICE: ICD-10-PCS | Mod: ,,, | Performed by: RADIOLOGY

## 2023-08-30 PROCEDURE — 57156 INS VAG BRACHYTX DEVICE: CPT | Mod: ,,, | Performed by: RADIOLOGY

## 2023-08-30 PROCEDURE — 77770 PR HDR RDNCL NTRSTL/ICAV BRCHTX 1 CH: ICD-10-PCS | Mod: 26,,, | Performed by: RADIOLOGY

## 2023-08-30 PROCEDURE — C1717 BRACHYTX, NON-STR,HDR IR-192: HCPCS | Performed by: RADIOLOGY

## 2023-08-30 PROCEDURE — 57156 INS VAG BRACHYTX DEVICE: CPT | Mod: TC | Performed by: RADIOLOGY

## 2023-08-30 PROCEDURE — 77770 HDR RDNCL NTRSTL/ICAV BRCHTX: CPT | Mod: 26,,, | Performed by: RADIOLOGY

## 2023-08-30 PROCEDURE — 77770 HDR RDNCL NTRSTL/ICAV BRCHTX: CPT | Mod: TC | Performed by: RADIOLOGY

## 2023-08-30 NOTE — PROCEDURES
08/30/2023  Nurse: Andreas Fleming    Procedure: Vaginal Cylinder    3:04 PM Patient arrived from Home.  Time out performed.    3:20 PM Positioned on Stretcher in the Frog Leg position. Positioned with Knee Immobilizer by myself and 3.0 vaginal applicator inserted. HDR treatment given with a full bladder.         3:30 PM Discharge instructions reviewed with patient.  Patient verbalized understanding.  Patient discharged  to home with family.

## 2023-08-30 NOTE — PROCEDURES
Procedures      PATIENT IDENTIFICATION:  Patient Name: Sugar Diaz  MRN: [id number]  : 1965    DIAGNOSIS:  Cancer Staging   Malignant neoplasm of endometrium  Staging form: Corpus Uteri - Carcinoma and Carcinosarcoma, AJCC 8th Edition  - Pathologic stage from 2023: FIGO Stage IIIC1, calculated as Stage IIIC2 (pT3a, cN2a(sn), cM0) - Signed by Adriane Babb MD on 2023    Malignant neoplasm of endometrium [C54.1]      REFERRING PHYSICIAN: No referring provider defined for this encounter.    Date of procedure: 2023    PATIENT INFORMATION: The patient is a 57-year-old woman with a FIGO stage IIIC2 endometrial cancer.  She is status post robotic assisted hysterectomy, bilateral salpingo-oophorectomy and sentinel lymph node biopsy.  She is currently receiving chemotherapy with carbo taxol.       PROCEDURE: Intracavitary vaginal cylinder #2     AREA TREATED: Upper 5 cm of vagina     TREATMENT METHOD: Insertion of 3 cm vaginal cylinder     RADIATION: Iridium 192, high dose rate     DEPTH OF CALCULATION: vaginal surface     DOSE: 7 Gy      Time out:   Performed by Andreas Fleming RN     Identifiers: Name and date of birth     The patient was brought to the HDR delivery room and  the vaginal cylinder was placed into the vagina.  The brachytherapy catheter was connected to the cylinder and the treatment was delivered.  She received the 2 out of 3 planned fractions of HDR vaginal brachytherapy as noted above.  She tolerated the treatment well without any unexpected events.

## 2023-09-01 ENCOUNTER — HOSPITAL ENCOUNTER (OUTPATIENT)
Dept: RADIATION THERAPY | Facility: HOSPITAL | Age: 58
Discharge: HOME | End: 2023-09-01
Attending: RADIOLOGY
Payer: COMMERCIAL

## 2023-09-06 ENCOUNTER — LAB VISIT (OUTPATIENT)
Dept: LAB | Facility: OTHER | Age: 58
End: 2023-09-06
Attending: OBSTETRICS & GYNECOLOGY
Payer: COMMERCIAL

## 2023-09-06 ENCOUNTER — OFFICE VISIT (OUTPATIENT)
Dept: GYNECOLOGIC ONCOLOGY | Facility: CLINIC | Age: 58
End: 2023-09-06
Payer: COMMERCIAL

## 2023-09-06 VITALS
SYSTOLIC BLOOD PRESSURE: 119 MMHG | WEIGHT: 163.63 LBS | HEART RATE: 78 BPM | DIASTOLIC BLOOD PRESSURE: 59 MMHG | BODY MASS INDEX: 25.68 KG/M2 | HEIGHT: 67 IN

## 2023-09-06 DIAGNOSIS — C77.2 SECONDARY MALIGNANCY OF RETROPERITONEAL LYMPH NODES: ICD-10-CM

## 2023-09-06 DIAGNOSIS — Z51.11 ENCOUNTER FOR ANTINEOPLASTIC CHEMOTHERAPY: ICD-10-CM

## 2023-09-06 DIAGNOSIS — C54.1 MALIGNANT NEOPLASM OF ENDOMETRIUM: Primary | ICD-10-CM

## 2023-09-06 DIAGNOSIS — C54.1 MALIGNANT NEOPLASM OF ENDOMETRIUM: ICD-10-CM

## 2023-09-06 LAB
ALBUMIN SERPL BCP-MCNC: 3.8 G/DL (ref 3.5–5.2)
ALP SERPL-CCNC: 73 U/L (ref 55–135)
ALT SERPL W/O P-5'-P-CCNC: 38 U/L (ref 10–44)
ANION GAP SERPL CALC-SCNC: 10 MMOL/L (ref 8–16)
AST SERPL-CCNC: 20 U/L (ref 10–40)
BASOPHILS # BLD AUTO: 0.02 K/UL (ref 0–0.2)
BASOPHILS NFR BLD: 0.2 % (ref 0–1.9)
BILIRUB SERPL-MCNC: 0.3 MG/DL (ref 0.1–1)
BUN SERPL-MCNC: 10 MG/DL (ref 6–20)
CALCIUM SERPL-MCNC: 9.8 MG/DL (ref 8.7–10.5)
CHLORIDE SERPL-SCNC: 103 MMOL/L (ref 95–110)
CO2 SERPL-SCNC: 26 MMOL/L (ref 23–29)
CREAT SERPL-MCNC: 0.7 MG/DL (ref 0.5–1.4)
DIFFERENTIAL METHOD: ABNORMAL
EOSINOPHIL # BLD AUTO: 0 K/UL (ref 0–0.5)
EOSINOPHIL NFR BLD: 0.2 % (ref 0–8)
ERYTHROCYTE [DISTWIDTH] IN BLOOD BY AUTOMATED COUNT: 16.9 % (ref 11.5–14.5)
EST. GFR  (NO RACE VARIABLE): >60 ML/MIN/1.73 M^2
GLUCOSE SERPL-MCNC: 90 MG/DL (ref 70–110)
HCT VFR BLD AUTO: 40.5 % (ref 37–48.5)
HGB BLD-MCNC: 13.7 G/DL (ref 12–16)
IMM GRANULOCYTES # BLD AUTO: 0.07 K/UL (ref 0–0.04)
IMM GRANULOCYTES NFR BLD AUTO: 0.7 % (ref 0–0.5)
LYMPHOCYTES # BLD AUTO: 3.7 K/UL (ref 1–4.8)
LYMPHOCYTES NFR BLD: 38.6 % (ref 18–48)
MCH RBC QN AUTO: 32.5 PG (ref 27–31)
MCHC RBC AUTO-ENTMCNC: 33.8 G/DL (ref 32–36)
MCV RBC AUTO: 96 FL (ref 82–98)
MONOCYTES # BLD AUTO: 0.9 K/UL (ref 0.3–1)
MONOCYTES NFR BLD: 9.1 % (ref 4–15)
NEUTROPHILS # BLD AUTO: 4.9 K/UL (ref 1.8–7.7)
NEUTROPHILS NFR BLD: 51.2 % (ref 38–73)
NRBC BLD-RTO: 0 /100 WBC
PLATELET # BLD AUTO: 218 K/UL (ref 150–450)
PMV BLD AUTO: 8.7 FL (ref 9.2–12.9)
POTASSIUM SERPL-SCNC: 3.8 MMOL/L (ref 3.5–5.1)
PROT SERPL-MCNC: 7.6 G/DL (ref 6–8.4)
RBC # BLD AUTO: 4.21 M/UL (ref 4–5.4)
SODIUM SERPL-SCNC: 139 MMOL/L (ref 136–145)
T4 FREE SERPL-MCNC: 1.01 NG/DL (ref 0.71–1.51)
TSH SERPL DL<=0.005 MIU/L-ACNC: 3.29 UIU/ML (ref 0.4–4)
WBC # BLD AUTO: 9.52 K/UL (ref 3.9–12.7)

## 2023-09-06 PROCEDURE — 99215 PR OFFICE/OUTPT VISIT, EST, LEVL V, 40-54 MIN: ICD-10-PCS | Mod: S$GLB,,, | Performed by: OBSTETRICS & GYNECOLOGY

## 2023-09-06 PROCEDURE — 80053 COMPREHEN METABOLIC PANEL: CPT | Performed by: OBSTETRICS & GYNECOLOGY

## 2023-09-06 PROCEDURE — 85025 COMPLETE CBC W/AUTO DIFF WBC: CPT | Performed by: OBSTETRICS & GYNECOLOGY

## 2023-09-06 PROCEDURE — 1159F PR MEDICATION LIST DOCUMENTED IN MEDICAL RECORD: ICD-10-PCS | Mod: CPTII,S$GLB,, | Performed by: OBSTETRICS & GYNECOLOGY

## 2023-09-06 PROCEDURE — 99215 OFFICE O/P EST HI 40 MIN: CPT | Mod: S$GLB,,, | Performed by: OBSTETRICS & GYNECOLOGY

## 2023-09-06 PROCEDURE — 1111F PR DISCHARGE MEDS RECONCILED W/ CURRENT OUTPATIENT MED LIST: ICD-10-PCS | Mod: CPTII,S$GLB,, | Performed by: OBSTETRICS & GYNECOLOGY

## 2023-09-06 PROCEDURE — 1111F DSCHRG MED/CURRENT MED MERGE: CPT | Mod: CPTII,S$GLB,, | Performed by: OBSTETRICS & GYNECOLOGY

## 2023-09-06 PROCEDURE — 36415 COLL VENOUS BLD VENIPUNCTURE: CPT | Performed by: OBSTETRICS & GYNECOLOGY

## 2023-09-06 PROCEDURE — 3074F SYST BP LT 130 MM HG: CPT | Mod: CPTII,S$GLB,, | Performed by: OBSTETRICS & GYNECOLOGY

## 2023-09-06 PROCEDURE — 84439 ASSAY OF FREE THYROXINE: CPT | Performed by: OBSTETRICS & GYNECOLOGY

## 2023-09-06 PROCEDURE — 3074F PR MOST RECENT SYSTOLIC BLOOD PRESSURE < 130 MM HG: ICD-10-PCS | Mod: CPTII,S$GLB,, | Performed by: OBSTETRICS & GYNECOLOGY

## 2023-09-06 PROCEDURE — 3008F PR BODY MASS INDEX (BMI) DOCUMENTED: ICD-10-PCS | Mod: CPTII,S$GLB,, | Performed by: OBSTETRICS & GYNECOLOGY

## 2023-09-06 PROCEDURE — 84443 ASSAY THYROID STIM HORMONE: CPT | Performed by: OBSTETRICS & GYNECOLOGY

## 2023-09-06 PROCEDURE — 99999 PR PBB SHADOW E&M-EST. PATIENT-LVL III: CPT | Mod: PBBFAC,,, | Performed by: OBSTETRICS & GYNECOLOGY

## 2023-09-06 PROCEDURE — 3008F BODY MASS INDEX DOCD: CPT | Mod: CPTII,S$GLB,, | Performed by: OBSTETRICS & GYNECOLOGY

## 2023-09-06 PROCEDURE — 99999 PR PBB SHADOW E&M-EST. PATIENT-LVL III: ICD-10-PCS | Mod: PBBFAC,,, | Performed by: OBSTETRICS & GYNECOLOGY

## 2023-09-06 PROCEDURE — 1159F MED LIST DOCD IN RCRD: CPT | Mod: CPTII,S$GLB,, | Performed by: OBSTETRICS & GYNECOLOGY

## 2023-09-06 PROCEDURE — 3078F DIAST BP <80 MM HG: CPT | Mod: CPTII,S$GLB,, | Performed by: OBSTETRICS & GYNECOLOGY

## 2023-09-06 PROCEDURE — 3078F PR MOST RECENT DIASTOLIC BLOOD PRESSURE < 80 MM HG: ICD-10-PCS | Mod: CPTII,S$GLB,, | Performed by: OBSTETRICS & GYNECOLOGY

## 2023-09-06 RX ORDER — FAMOTIDINE 10 MG/ML
20 INJECTION INTRAVENOUS
Status: CANCELLED | OUTPATIENT
Start: 2023-09-06

## 2023-09-06 RX ORDER — EPINEPHRINE 0.3 MG/.3ML
0.3 INJECTION SUBCUTANEOUS ONCE AS NEEDED
Status: CANCELLED | OUTPATIENT
Start: 2023-09-06

## 2023-09-06 RX ORDER — DIPHENHYDRAMINE HYDROCHLORIDE 50 MG/ML
50 INJECTION INTRAMUSCULAR; INTRAVENOUS ONCE AS NEEDED
Status: CANCELLED | OUTPATIENT
Start: 2023-09-06

## 2023-09-06 RX ORDER — SODIUM CHLORIDE 0.9 % (FLUSH) 0.9 %
10 SYRINGE (ML) INJECTION
Status: CANCELLED | OUTPATIENT
Start: 2023-09-06

## 2023-09-06 RX ORDER — PROCHLORPERAZINE EDISYLATE 5 MG/ML
5-10 INJECTION INTRAMUSCULAR; INTRAVENOUS ONCE AS NEEDED
Status: CANCELLED
Start: 2023-09-06

## 2023-09-06 RX ORDER — HEPARIN 100 UNIT/ML
500 SYRINGE INTRAVENOUS
Status: CANCELLED | OUTPATIENT
Start: 2023-09-06

## 2023-09-06 NOTE — PROGRESS NOTES
REFERRING PROVIDER  Omaira Evans MD      REASON FOR CONSULT  Sugar Diaz  is a 57 y.o.  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      HISTORY OF PRESENT ILLNESS     Chemotherapy regimen: Carboplatin AUC 5/Paclitaxel 135 mg/m2/Dostarlimab 1000mg q3-6 weeks  Cycle: 4  Previous dose limiting toxicities: Y (diarrhea)  Previous dose reductions: N  Previous breaks: Y (holding dostarlimab cycle #4)  Previous changes in pre-medications: N   Energy level: fatigue increased  Appetite: baseline  Fevers/chills/nights: no  Nausea: mild  Diarrhea: Grade 2-3, Admitted 8/22 - 8/27 (see d/c summary 8/27), d/c on steroid taper  Emesis: no  Neuropathy: no  Discoloration of lower extremities: no  Mucositis: no  Maculopapular rashes: no  Dyspnea or coughing: no  Muscle or joint pain: yes - antihistamine alternating tylenol and ibuprofen            Oncology History   Malignant neoplasm of endometrium   5/23/2023 Surgery     Hysteroscopy, D&C: grade 1 endometrioid EC      6/5/2023 Surgery     TRH/BSO/BSLN/RPLN/repair of obturator nerve     Final pathology c/w MMRd, p53 WT stage IIIC1 grade 1 endometrioid EC. 4.5 cm, grade 1, 96% MI (22/23 mm), +LUE, -LVSI, +MELF, -cervix, 1/2+ RPLN, 1/1+ LPLN, -ascites      6/19/2023 Imaging Significant Findings     CT C/A/P w/: High L para-aortic at the level of the renal vessels, 1.5 cm.  Low R para-aortic at the LEAH, 4.1 cm in greatest dimension.      6/25/2023 Genomic Testing     [employer] (NGS): ARD1A, CHEK1, CTCF, , HDAC2, HNF1A, JAK1, KRAS, MAX, MSH6, P1K3R1, PPM1D, PTEN, ZFHX3, ZSR2      6/28/2023 Tumor Conference     No rule for debulking of para-aortic lymph nodes.  Adjuvant VBT.  Represent after completion of adjuvant therapy to discuss EBRT.          8/9/2023, 8/30/2023 VBT (plan 3 fx)          The following portions of the patient's history were reviewed and updated as appropriate: allergies, current medications, family history,  medical history, social history and surgical history.     REVIEW OF SYSTEMS  All systems reviewed and negative except as noted in HPI.    OBJECTIVE   Vitals:    09/06/23 0956   BP: (!) 119/59   Pulse: 78      Body mass index is 25.62 kg/m².     Physical Exam  Body mass index is 25.45 kg/m².      1. General: Well appearing, no apparent distress, alert and oriented.  2. Lymph: Neck symmetric without cervical or supraclavicular adenopathy or mass.  3. Lungs: Normal respiratory rate, no accessory muscle use.  4. Psych: Normal affect.  5. Abdomen:  non-distended, soft, non-tender, are no masses, no ascites, no hepatosplenomegaly  6. Skin: Warm, dry, no rashes or lesions.   7. Extremities: Bilateral lower extremities without edema or tenderness.  8. Genitourinary: Deferred     ECOG status: 1    LABORATORY DATA  Lab data reviewed.    RADIOLOGICAL DATA  Radiology data reviewed.    PATHOLOGY DATA  Pathology data reviewed.    ASSESSMENT    1. Malignant neoplasm of endometrium    2. Encounter for antineoplastic chemotherapy    3. Secondary malignancy of retroperitoneal lymph nodes    Admitted cycle #3 with ICI induced diarrhea (Grade 2-3).  Will hold Dostarlimab for cycle #4 while she completes steroid taper and VBT (next week) with plans to restart next cycle.       PLAN  No orders of the defined types were placed in this encounter.  Administer cycle #4 Carbo / Taxol.  CBC, CMP, TSH, T4, cycle #5, RONC 3 weeks. Continue with home PT.  Continue VBT.              Gabe Gutierrez MD

## 2023-09-07 ENCOUNTER — INFUSION (OUTPATIENT)
Dept: INFUSION THERAPY | Facility: OTHER | Age: 58
End: 2023-09-07
Attending: INTERNAL MEDICINE
Payer: COMMERCIAL

## 2023-09-07 VITALS
BODY MASS INDEX: 25.6 KG/M2 | TEMPERATURE: 99 F | HEIGHT: 67 IN | RESPIRATION RATE: 16 BRPM | WEIGHT: 163.13 LBS | OXYGEN SATURATION: 99 % | DIASTOLIC BLOOD PRESSURE: 60 MMHG | HEART RATE: 76 BPM | SYSTOLIC BLOOD PRESSURE: 119 MMHG

## 2023-09-07 DIAGNOSIS — C54.1 MALIGNANT NEOPLASM OF ENDOMETRIUM: Primary | ICD-10-CM

## 2023-09-07 PROCEDURE — 96375 TX/PRO/DX INJ NEW DRUG ADDON: CPT

## 2023-09-07 PROCEDURE — 96417 CHEMO IV INFUS EACH ADDL SEQ: CPT

## 2023-09-07 PROCEDURE — 96413 CHEMO IV INFUSION 1 HR: CPT

## 2023-09-07 PROCEDURE — 96367 TX/PROPH/DG ADDL SEQ IV INF: CPT

## 2023-09-07 PROCEDURE — 96415 CHEMO IV INFUSION ADDL HR: CPT

## 2023-09-07 PROCEDURE — 96361 HYDRATE IV INFUSION ADD-ON: CPT

## 2023-09-07 PROCEDURE — 63600175 PHARM REV CODE 636 W HCPCS: Performed by: OBSTETRICS & GYNECOLOGY

## 2023-09-07 PROCEDURE — 25000003 PHARM REV CODE 250: Performed by: OBSTETRICS & GYNECOLOGY

## 2023-09-07 RX ORDER — DIPHENHYDRAMINE HYDROCHLORIDE 50 MG/ML
50 INJECTION INTRAMUSCULAR; INTRAVENOUS ONCE AS NEEDED
Status: DISCONTINUED | OUTPATIENT
Start: 2023-09-07 | End: 2023-09-07 | Stop reason: HOSPADM

## 2023-09-07 RX ORDER — FAMOTIDINE 10 MG/ML
20 INJECTION INTRAVENOUS
Status: COMPLETED | OUTPATIENT
Start: 2023-09-07 | End: 2023-09-07

## 2023-09-07 RX ORDER — EPINEPHRINE 0.3 MG/.3ML
0.3 INJECTION SUBCUTANEOUS ONCE AS NEEDED
Status: DISCONTINUED | OUTPATIENT
Start: 2023-09-07 | End: 2023-09-07 | Stop reason: HOSPADM

## 2023-09-07 RX ORDER — SODIUM CHLORIDE 0.9 % (FLUSH) 0.9 %
10 SYRINGE (ML) INJECTION
Status: DISCONTINUED | OUTPATIENT
Start: 2023-09-07 | End: 2023-09-07 | Stop reason: HOSPADM

## 2023-09-07 RX ORDER — HEPARIN 100 UNIT/ML
500 SYRINGE INTRAVENOUS
Status: DISCONTINUED | OUTPATIENT
Start: 2023-09-07 | End: 2023-09-07 | Stop reason: HOSPADM

## 2023-09-07 RX ADMIN — DIPHENHYDRAMINE HYDROCHLORIDE 50 MG: 50 INJECTION, SOLUTION INTRAMUSCULAR; INTRAVENOUS at 09:09

## 2023-09-07 RX ADMIN — DEXAMETHASONE SODIUM PHOSPHATE 0.25 MG: 4 INJECTION, SOLUTION INTRA-ARTICULAR; INTRALESIONAL; INTRAMUSCULAR; INTRAVENOUS; SOFT TISSUE at 10:09

## 2023-09-07 RX ADMIN — FAMOTIDINE 20 MG: 10 INJECTION, SOLUTION INTRAVENOUS at 09:09

## 2023-09-07 RX ADMIN — SODIUM CHLORIDE 500 ML: 9 INJECTION, SOLUTION INTRAVENOUS at 01:09

## 2023-09-07 RX ADMIN — SODIUM CHLORIDE 640 MG: 9 INJECTION, SOLUTION INTRAVENOUS at 01:09

## 2023-09-07 RX ADMIN — PACLITAXEL 330 MG: 6 INJECTION, SOLUTION INTRAVENOUS at 10:09

## 2023-09-07 RX ADMIN — SODIUM CHLORIDE 150 MG: 9 INJECTION, SOLUTION INTRAVENOUS at 09:09

## 2023-09-07 NOTE — PLAN OF CARE
Vital Signs Stable, No apparent distress noted; Pt tolerated _Taxol/Carbo  w/o difficulty.  24g piv removed;No bleeding,swelling or drainage noted to the site.  AVS/Discharge instructions given;all questions answered ;Pt verbalizes understanding. Next appointments scheduled and sent via [facility]; ambulated from clinic in [place] accompanied by

## 2023-09-11 ENCOUNTER — PATIENT MESSAGE (OUTPATIENT)
Dept: GYNECOLOGIC ONCOLOGY | Facility: CLINIC | Age: 58
End: 2023-09-11
Payer: COMMERCIAL

## 2023-09-12 ENCOUNTER — PATIENT MESSAGE (OUTPATIENT)
Dept: GYNECOLOGIC ONCOLOGY | Facility: CLINIC | Age: 58
End: 2023-09-12
Payer: COMMERCIAL

## 2023-09-12 DIAGNOSIS — C54.1 MALIGNANT NEOPLASM OF ENDOMETRIUM: ICD-10-CM

## 2023-09-12 DIAGNOSIS — G47.01 INSOMNIA DUE TO MEDICAL CONDITION: ICD-10-CM

## 2023-09-12 DIAGNOSIS — M25.50 ARTHRALGIA, UNSPECIFIED JOINT: Primary | ICD-10-CM

## 2023-09-12 RX ORDER — ZOLPIDEM TARTRATE 5 MG/1
5 TABLET ORAL NIGHTLY PRN
Qty: 20 TABLET | Refills: 0 | Status: SHIPPED | OUTPATIENT
Start: 2023-09-12 | End: 2023-10-13 | Stop reason: SDUPTHER

## 2023-09-12 RX ORDER — DICLOFENAC SODIUM 10 MG/G
2 GEL TOPICAL 2 TIMES DAILY
Qty: 100 G | Refills: 0 | Status: SHIPPED | OUTPATIENT
Start: 2023-09-12

## 2023-09-13 ENCOUNTER — PROCEDURE VISIT (OUTPATIENT)
Dept: RADIATION ONCOLOGY | Facility: CLINIC | Age: 58
End: 2023-09-13
Payer: COMMERCIAL

## 2023-09-13 DIAGNOSIS — C54.1 MALIGNANT NEOPLASM OF ENDOMETRIUM: Primary | ICD-10-CM

## 2023-09-13 PROCEDURE — C1717 BRACHYTX, NON-STR,HDR IR-192: HCPCS | Performed by: RADIOLOGY

## 2023-09-13 PROCEDURE — 57156 INS VAG BRACHYTX DEVICE: CPT | Mod: ,,, | Performed by: RADIOLOGY

## 2023-09-13 PROCEDURE — 77770 PR HDR RDNCL NTRSTL/ICAV BRCHTX 1 CH: ICD-10-PCS | Mod: 26,,, | Performed by: RADIOLOGY

## 2023-09-13 PROCEDURE — 77770 HDR RDNCL NTRSTL/ICAV BRCHTX: CPT | Mod: 26,,, | Performed by: RADIOLOGY

## 2023-09-13 PROCEDURE — 57156 INS VAG BRACHYTX DEVICE: CPT | Mod: TC | Performed by: RADIOLOGY

## 2023-09-13 PROCEDURE — 77770 HDR RDNCL NTRSTL/ICAV BRCHTX: CPT | Mod: TC | Performed by: RADIOLOGY

## 2023-09-13 PROCEDURE — 57156 PR INSERT VAGINAL RADIATION DEVICE: ICD-10-PCS | Mod: ,,, | Performed by: RADIOLOGY

## 2023-09-13 NOTE — PROCEDURES
Procedures      PATIENT IDENTIFICATION:  Patient Name: Sugar Diaz  MRN: [id number]  : 1965    DIAGNOSIS:  Cancer Staging   Malignant neoplasm of endometrium  Staging form: Corpus Uteri - Carcinoma and Carcinosarcoma, AJCC 8th Edition  - Pathologic stage from 2023: FIGO Stage IIIC1, calculated as Stage IIIC2 (pT3a, cN2a(sn), cM0) - Signed by Adriane Babb MD on 2023    Malignant neoplasm of endometrium [C54.1]      REFERRING PHYSICIAN: No referring provider defined for this encounter.    Date of procedure: 2023    PATIENT INFORMATION: The patient is a 57-year-old woman with a FIGO stage IIIC2 endometrial cancer.  She is status post robotic assisted hysterectomy, bilateral salpingo-oophorectomy and sentinel lymph node biopsy.  She is currently receiving chemotherapy with carbo taxol.       PROCEDURE: Intracavitary vaginal cylinder #3     AREA TREATED: Upper 5 cm of vagina     TREATMENT METHOD: Insertion of 3 cm vaginal cylinder     RADIATION: Iridium 192, high dose rate     DEPTH OF CALCULATION: vaginal surface     DOSE: 7 Gy      Time out:   Performed by Andreas Fleming RN     Identifiers: Name and date of birth     The patient was brought to the HDR delivery room and  the vaginal cylinder was placed into the vagina.  The brachytherapy catheter was connected to the cylinder and the treatment was delivered.  She received the 3 out of 3 planned fractions of HDR vaginal brachytherapy as noted above.  She tolerated the treatment well without any unexpected events.

## 2023-09-14 NOTE — PROCEDURES
9/13/2023  Nurse: Andreas Fleming    Procedure: Vaginal Cylinder    1:47 PM Patient arrived from Home.  Time out performed.      2:08 PM Positioned on Stretcher in the Frog Leg position. Positioned with Knee Immobilizer by myself and 3.0 CM vaginal applicator inserted. HDR treatment given with a full bladder.        2:25 PM Discharge instructions reviewed with patient.  Patient verbalized understanding.  Patient discharged  to home with family.

## 2023-09-18 PROCEDURE — 77336 RADIATION PHYSICS CONSULT: CPT | Performed by: RADIOLOGY

## 2023-09-19 NOTE — PROGRESS NOTES
REFERRING PROVIDER  Omaira Evans MD     REASON FOR CONSULT  Sugar Diaz  is a 57 y.o.  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      HISTORY OF PRESENT ILLNESS    Chemotherapy regimen: Carboplatin AUC 5/Paclitaxel 135 mg/m2/Dostarlimab 200mg q3-6 weeks  Cycle: 5  Previous dose limiting toxicities: Y  Cycle 4: Grade 2 colitis s/p steroid taper  Previous dose reductions: N  Previous breaks: Y  Cycle 4: Dostarlimab omitted due to grade 2 colitis  Previous changes in pre-medications: N    Energy level: baseline  Appetite: baseline  Fevers/chills/nights: no  Nausea: no  Diarrhea: no  Emesis: no  Neuropathy: no  Discoloration of lower extremities: no  Mucositis: no  Maculopapular rashes: no  Dyspnea or coughing: yes      Oncology History   Malignant neoplasm of endometrium   5/23/2023 Surgery    Hysteroscopy, D&C: grade 1 endometrioid EC     6/5/2023 Surgery    TRH/BSO/BSLN/RPLN/repair of obturator nerve    Final pathology c/w MMRd, p53 WT stage IIIC1 grade 1 endometrioid EC. 4.5 cm, grade 1, 96% MI (22/23 mm), +LUE, -LVSI, +MELF, -cervix, 1/2+ RPLN, 1/1+ LPLN, -ascites     6/19/2023 Imaging Significant Findings    CT C/A/P w/: High L para-aortic at the level of the renal vessels, 1.5 cm.  Low R para-aortic at the LEAH, 4.1 cm in greatest dimension.     6/25/2023 Genomic Testing    [employer] (NGS): ARD1A, CHEK1, CTCF, , HDAC2, HNF1A, JAK1, KRAS, MAX, MSH6, P1K3R1, PPM1D, PTEN, ZFHX3, ZSR2     6/28/2023 Tumor Conference    No rule for debulking of para-aortic lymph nodes.  Adjuvant VBT.  Represent after completion of adjuvant therapy to discuss EBRT.        8/9/2023 - 9/13/2023 Radiation Therapy    Treating physician: Dr. Adriane Babb  Total Dose: 21 Gy HDR  Fractions: 3 vaginal cuff brachytherapy     8/22/2023 Imaging Significant Findings    CT A/P w/: No evidence of colitis, TN in high L para-aortic and low R para-aortic.          The following portions of  the patient's history were reviewed and updated as appropriate: allergies, current medications, family history, medical history, social history and surgical history.    REVIEW OF SYSTEMS  All systems reviewed and negative except as noted in HPI.    OBJECTIVE   Vitals:    09/27/23 1027   BP: 131/60   Pulse: 86          Body mass index is 26.11 kg/m².      1. General: Well appearing, no apparent distress, alert and oriented.  2. Lymph: Neck symmetric without cervical or supraclavicular adenopathy or mass.  3. Lungs: Normal respiratory rate, no accessory muscle use.  4. Psych: Normal affect.  5. Abdomen:  non-distended, soft, non-tender, are no masses, no ascites, no hepatosplenomegaly  6. Skin: Warm, dry, no rashes or lesions.   7. Extremities: Bilateral lower extremities without edema or tenderness.  8. Genitourinary: Deferred          ECOG status: 1    LABORATORY DATA  Lab data reviewed.    RADIOLOGICAL DATA  Radiology data reviewed.    ASSESSMENT / PLAN     1. 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 Encounter for antineoplastic chemotherapy    4. Injury of obturator nerve during surgery        No dose limiting toxicities or signs of recurrence.  Administer cycle #4.  CBC, CMP, TSH, T4, cycle #5, VV 3 weeks.  Continue with home PT.  Use Imodium schedule.  Strong precautions, and we will revisit virtually in 1 week to review bowel symptoms.    PATIENT EDUCATION  Ready to learn, no apparent learning barriers were identified; learning preferences include listening. Explained diagnosis and treatment plan; patient expressed understanding of the content.    ADMINISTRATIVE BILLING  Greater than 50% of was spent in counseling.   .[facility]

## 2023-09-27 ENCOUNTER — LAB VISIT (OUTPATIENT)
Dept: LAB | Facility: OTHER | Age: 58
End: 2023-09-27
Attending: OBSTETRICS & GYNECOLOGY
Payer: COMMERCIAL

## 2023-09-27 ENCOUNTER — OFFICE VISIT (OUTPATIENT)
Dept: GYNECOLOGIC ONCOLOGY | Facility: CLINIC | Age: 58
End: 2023-09-27
Payer: COMMERCIAL

## 2023-09-27 ENCOUNTER — TELEPHONE (OUTPATIENT)
Dept: GYNECOLOGIC ONCOLOGY | Facility: CLINIC | Age: 58
End: 2023-09-27
Payer: COMMERCIAL

## 2023-09-27 VITALS
HEIGHT: 67 IN | DIASTOLIC BLOOD PRESSURE: 60 MMHG | SYSTOLIC BLOOD PRESSURE: 131 MMHG | BODY MASS INDEX: 26.16 KG/M2 | WEIGHT: 166.69 LBS | HEART RATE: 86 BPM

## 2023-09-27 DIAGNOSIS — C54.1 MALIGNANT NEOPLASM OF ENDOMETRIUM: Primary | ICD-10-CM

## 2023-09-27 DIAGNOSIS — C54.1 MALIGNANT NEOPLASM OF ENDOMETRIUM: ICD-10-CM

## 2023-09-27 DIAGNOSIS — Z51.11 ENCOUNTER FOR ANTINEOPLASTIC CHEMOTHERAPY: ICD-10-CM

## 2023-09-27 DIAGNOSIS — S74.8X9A: ICD-10-CM

## 2023-09-27 DIAGNOSIS — C77.2 SECONDARY MALIGNANCY OF RETROPERITONEAL LYMPH NODES: ICD-10-CM

## 2023-09-27 LAB
ALBUMIN SERPL BCP-MCNC: 3.4 G/DL (ref 3.5–5.2)
ALBUMIN SERPL BCP-MCNC: 3.5 G/DL (ref 3.5–5.2)
ALP SERPL-CCNC: 67 U/L (ref 55–135)
ALP SERPL-CCNC: 68 U/L (ref 55–135)
ALT SERPL W/O P-5'-P-CCNC: 35 U/L (ref 10–44)
ALT SERPL W/O P-5'-P-CCNC: 35 U/L (ref 10–44)
ANION GAP SERPL CALC-SCNC: 10 MMOL/L (ref 8–16)
ANION GAP SERPL CALC-SCNC: 12 MMOL/L (ref 8–16)
AST SERPL-CCNC: 22 U/L (ref 10–40)
AST SERPL-CCNC: 28 U/L (ref 10–40)
BILIRUB SERPL-MCNC: 0.1 MG/DL (ref 0.1–1)
BILIRUB SERPL-MCNC: 0.1 MG/DL (ref 0.1–1)
BUN SERPL-MCNC: 10 MG/DL (ref 6–20)
BUN SERPL-MCNC: 11 MG/DL (ref 6–20)
CALCIUM SERPL-MCNC: 9.3 MG/DL (ref 8.7–10.5)
CALCIUM SERPL-MCNC: 9.5 MG/DL (ref 8.7–10.5)
CHLORIDE SERPL-SCNC: 104 MMOL/L (ref 95–110)
CHLORIDE SERPL-SCNC: 107 MMOL/L (ref 95–110)
CO2 SERPL-SCNC: 20 MMOL/L (ref 23–29)
CO2 SERPL-SCNC: 25 MMOL/L (ref 23–29)
CREAT SERPL-MCNC: 0.7 MG/DL (ref 0.5–1.4)
CREAT SERPL-MCNC: 0.8 MG/DL (ref 0.5–1.4)
ERYTHROCYTE [DISTWIDTH] IN BLOOD BY AUTOMATED COUNT: 17.7 % (ref 11.5–14.5)
EST. GFR  (NO RACE VARIABLE): >60 ML/MIN/1.73 M^2
EST. GFR  (NO RACE VARIABLE): >60 ML/MIN/1.73 M^2
GLUCOSE SERPL-MCNC: 111 MG/DL (ref 70–110)
GLUCOSE SERPL-MCNC: 171 MG/DL (ref 70–110)
HCT VFR BLD AUTO: 36.3 % (ref 37–48.5)
HGB BLD-MCNC: 12.6 G/DL (ref 12–16)
IMM GRANULOCYTES # BLD AUTO: 0.02 K/UL (ref 0–0.04)
MCH RBC QN AUTO: 34 PG (ref 27–31)
MCHC RBC AUTO-ENTMCNC: 34.7 G/DL (ref 32–36)
MCV RBC AUTO: 98 FL (ref 82–98)
NEUTROPHILS # BLD AUTO: 2.5 K/UL (ref 1.8–7.7)
PLATELET # BLD AUTO: 373 K/UL (ref 150–450)
PMV BLD AUTO: 8.6 FL (ref 9.2–12.9)
POTASSIUM SERPL-SCNC: 3.8 MMOL/L (ref 3.5–5.1)
POTASSIUM SERPL-SCNC: 4.1 MMOL/L (ref 3.5–5.1)
PROT SERPL-MCNC: 6.7 G/DL (ref 6–8.4)
PROT SERPL-MCNC: 6.7 G/DL (ref 6–8.4)
RBC # BLD AUTO: 3.71 M/UL (ref 4–5.4)
SODIUM SERPL-SCNC: 139 MMOL/L (ref 136–145)
SODIUM SERPL-SCNC: 139 MMOL/L (ref 136–145)
T4 FREE SERPL-MCNC: 0.94 NG/DL (ref 0.71–1.51)
T4 FREE SERPL-MCNC: 0.95 NG/DL (ref 0.71–1.51)
TSH SERPL DL<=0.005 MIU/L-ACNC: 5.56 UIU/ML (ref 0.4–4)
TSH SERPL DL<=0.005 MIU/L-ACNC: 6.47 UIU/ML (ref 0.4–4)
WBC # BLD AUTO: 4.67 K/UL (ref 3.9–12.7)

## 2023-09-27 PROCEDURE — 84443 ASSAY THYROID STIM HORMONE: CPT | Mod: 91 | Performed by: OBSTETRICS & GYNECOLOGY

## 2023-09-27 PROCEDURE — 80053 COMPREHEN METABOLIC PANEL: CPT | Mod: 91 | Performed by: OBSTETRICS & GYNECOLOGY

## 2023-09-27 PROCEDURE — 1159F PR MEDICATION LIST DOCUMENTED IN MEDICAL RECORD: ICD-10-PCS | Mod: CPTII,S$GLB,, | Performed by: OBSTETRICS & GYNECOLOGY

## 2023-09-27 PROCEDURE — 99999 PR PBB SHADOW E&M-EST. PATIENT-LVL III: ICD-10-PCS | Mod: PBBFAC,,, | Performed by: OBSTETRICS & GYNECOLOGY

## 2023-09-27 PROCEDURE — 84439 ASSAY OF FREE THYROXINE: CPT | Performed by: OBSTETRICS & GYNECOLOGY

## 2023-09-27 PROCEDURE — 85027 COMPLETE CBC AUTOMATED: CPT | Performed by: OBSTETRICS & GYNECOLOGY

## 2023-09-27 PROCEDURE — 84439 ASSAY OF FREE THYROXINE: CPT | Mod: 91 | Performed by: OBSTETRICS & GYNECOLOGY

## 2023-09-27 PROCEDURE — 3075F SYST BP GE 130 - 139MM HG: CPT | Mod: CPTII,S$GLB,, | Performed by: OBSTETRICS & GYNECOLOGY

## 2023-09-27 PROCEDURE — 3078F DIAST BP <80 MM HG: CPT | Mod: CPTII,S$GLB,, | Performed by: OBSTETRICS & GYNECOLOGY

## 2023-09-27 PROCEDURE — 3008F BODY MASS INDEX DOCD: CPT | Mod: CPTII,S$GLB,, | Performed by: OBSTETRICS & GYNECOLOGY

## 2023-09-27 PROCEDURE — 36415 COLL VENOUS BLD VENIPUNCTURE: CPT | Performed by: OBSTETRICS & GYNECOLOGY

## 2023-09-27 PROCEDURE — 3008F PR BODY MASS INDEX (BMI) DOCUMENTED: ICD-10-PCS | Mod: CPTII,S$GLB,, | Performed by: OBSTETRICS & GYNECOLOGY

## 2023-09-27 PROCEDURE — 80053 COMPREHEN METABOLIC PANEL: CPT | Performed by: OBSTETRICS & GYNECOLOGY

## 2023-09-27 PROCEDURE — 1159F MED LIST DOCD IN RCRD: CPT | Mod: CPTII,S$GLB,, | Performed by: OBSTETRICS & GYNECOLOGY

## 2023-09-27 PROCEDURE — 84443 ASSAY THYROID STIM HORMONE: CPT | Performed by: OBSTETRICS & GYNECOLOGY

## 2023-09-27 PROCEDURE — 3075F PR MOST RECENT SYSTOLIC BLOOD PRESS GE 130-139MM HG: ICD-10-PCS | Mod: CPTII,S$GLB,, | Performed by: OBSTETRICS & GYNECOLOGY

## 2023-09-27 PROCEDURE — 99215 OFFICE O/P EST HI 40 MIN: CPT | Mod: S$GLB,,, | Performed by: OBSTETRICS & GYNECOLOGY

## 2023-09-27 PROCEDURE — 99215 PR OFFICE/OUTPT VISIT, EST, LEVL V, 40-54 MIN: ICD-10-PCS | Mod: S$GLB,,, | Performed by: OBSTETRICS & GYNECOLOGY

## 2023-09-27 PROCEDURE — 3078F PR MOST RECENT DIASTOLIC BLOOD PRESSURE < 80 MM HG: ICD-10-PCS | Mod: CPTII,S$GLB,, | Performed by: OBSTETRICS & GYNECOLOGY

## 2023-09-27 PROCEDURE — 99999 PR PBB SHADOW E&M-EST. PATIENT-LVL III: CPT | Mod: PBBFAC,,, | Performed by: OBSTETRICS & GYNECOLOGY

## 2023-09-27 RX ORDER — DIPHENHYDRAMINE HYDROCHLORIDE 50 MG/ML
50 INJECTION INTRAMUSCULAR; INTRAVENOUS ONCE AS NEEDED
Status: CANCELLED | OUTPATIENT
Start: 2023-09-27

## 2023-09-27 RX ORDER — FAMOTIDINE 10 MG/ML
20 INJECTION INTRAVENOUS
Status: CANCELLED | OUTPATIENT
Start: 2023-09-27

## 2023-09-27 RX ORDER — SODIUM CHLORIDE 0.9 % (FLUSH) 0.9 %
10 SYRINGE (ML) INJECTION
Status: CANCELLED | OUTPATIENT
Start: 2023-09-27

## 2023-09-27 RX ORDER — EPINEPHRINE 0.3 MG/.3ML
0.3 INJECTION SUBCUTANEOUS ONCE AS NEEDED
Status: CANCELLED | OUTPATIENT
Start: 2023-09-27

## 2023-09-27 RX ORDER — PROCHLORPERAZINE EDISYLATE 5 MG/ML
5-10 INJECTION INTRAMUSCULAR; INTRAVENOUS ONCE AS NEEDED
Status: CANCELLED
Start: 2023-09-27

## 2023-09-27 RX ORDER — HEPARIN 100 UNIT/ML
500 SYRINGE INTRAVENOUS
Status: CANCELLED | OUTPATIENT
Start: 2023-09-27

## 2023-09-28 ENCOUNTER — PATIENT MESSAGE (OUTPATIENT)
Dept: GYNECOLOGIC ONCOLOGY | Facility: CLINIC | Age: 58
End: 2023-09-28
Payer: COMMERCIAL

## 2023-09-28 ENCOUNTER — INFUSION (OUTPATIENT)
Dept: INFUSION THERAPY | Facility: OTHER | Age: 58
End: 2023-09-28
Attending: INTERNAL MEDICINE
Payer: COMMERCIAL

## 2023-09-28 VITALS
TEMPERATURE: 99 F | HEART RATE: 90 BPM | DIASTOLIC BLOOD PRESSURE: 63 MMHG | SYSTOLIC BLOOD PRESSURE: 137 MMHG | RESPIRATION RATE: 18 BRPM | WEIGHT: 165.56 LBS | OXYGEN SATURATION: 97 % | BODY MASS INDEX: 25.93 KG/M2

## 2023-09-28 DIAGNOSIS — C54.1 MALIGNANT NEOPLASM OF ENDOMETRIUM: Primary | ICD-10-CM

## 2023-09-28 PROCEDURE — 96361 HYDRATE IV INFUSION ADD-ON: CPT

## 2023-09-28 PROCEDURE — 96375 TX/PRO/DX INJ NEW DRUG ADDON: CPT

## 2023-09-28 PROCEDURE — 96415 CHEMO IV INFUSION ADDL HR: CPT

## 2023-09-28 PROCEDURE — 96367 TX/PROPH/DG ADDL SEQ IV INF: CPT

## 2023-09-28 PROCEDURE — 25000003 PHARM REV CODE 250: Performed by: OBSTETRICS & GYNECOLOGY

## 2023-09-28 PROCEDURE — 63600175 PHARM REV CODE 636 W HCPCS: Performed by: OBSTETRICS & GYNECOLOGY

## 2023-09-28 PROCEDURE — 96413 CHEMO IV INFUSION 1 HR: CPT

## 2023-09-28 PROCEDURE — 96417 CHEMO IV INFUS EACH ADDL SEQ: CPT

## 2023-09-28 RX ORDER — HEPARIN 100 UNIT/ML
500 SYRINGE INTRAVENOUS
Status: DISCONTINUED | OUTPATIENT
Start: 2023-09-28 | End: 2023-09-28 | Stop reason: HOSPADM

## 2023-09-28 RX ORDER — DIPHENHYDRAMINE HYDROCHLORIDE 50 MG/ML
50 INJECTION INTRAMUSCULAR; INTRAVENOUS ONCE AS NEEDED
Status: DISCONTINUED | OUTPATIENT
Start: 2023-09-28 | End: 2023-09-28 | Stop reason: HOSPADM

## 2023-09-28 RX ORDER — FAMOTIDINE 10 MG/ML
20 INJECTION INTRAVENOUS
Status: COMPLETED | OUTPATIENT
Start: 2023-09-28 | End: 2023-09-28

## 2023-09-28 RX ORDER — EPINEPHRINE 0.3 MG/.3ML
0.3 INJECTION SUBCUTANEOUS ONCE AS NEEDED
Status: DISCONTINUED | OUTPATIENT
Start: 2023-09-28 | End: 2023-09-28 | Stop reason: HOSPADM

## 2023-09-28 RX ORDER — SODIUM CHLORIDE 0.9 % (FLUSH) 0.9 %
10 SYRINGE (ML) INJECTION
Status: DISCONTINUED | OUTPATIENT
Start: 2023-09-28 | End: 2023-09-28 | Stop reason: HOSPADM

## 2023-09-28 RX ADMIN — FAMOTIDINE 20 MG: 10 INJECTION INTRAVENOUS at 10:09

## 2023-09-28 RX ADMIN — PACLITAXEL 330 MG: 6 INJECTION, SOLUTION INTRAVENOUS at 11:09

## 2023-09-28 RX ADMIN — SODIUM CHLORIDE 500 ML: 9 INJECTION, SOLUTION INTRAVENOUS at 03:09

## 2023-09-28 RX ADMIN — FOSAPREPITANT 150 MG: 150 INJECTION, POWDER, LYOPHILIZED, FOR SOLUTION INTRAVENOUS at 10:09

## 2023-09-28 RX ADMIN — CARBOPLATIN 575 MG: 10 INJECTION, SOLUTION INTRAVENOUS at 02:09

## 2023-09-28 RX ADMIN — DEXAMETHASONE SODIUM PHOSPHATE 0.25 MG: 4 INJECTION, SOLUTION INTRA-ARTICULAR; INTRALESIONAL; INTRAMUSCULAR; INTRAVENOUS; SOFT TISSUE at 10:09

## 2023-09-28 RX ADMIN — SODIUM CHLORIDE 500 MG: 9 INJECTION, SOLUTION INTRAVENOUS at 09:09

## 2023-09-28 RX ADMIN — DIPHENHYDRAMINE HYDROCHLORIDE 50 MG: 50 INJECTION, SOLUTION INTRAMUSCULAR; INTRAVENOUS at 11:09

## 2023-09-28 NOTE — PLAN OF CARE
Vital Signs Stable, No apparent distress noted; Pt tolerated _Dostarlimab/Taxol/Carbo w/o difficulty.  24g piv removed;No bleeding,swelling or drainage noted to the site.  AVS/Discharge instructions given;all questions answered ;Pt verbalizes understanding.   Next appointments scheduled and sent via [facility]; ambulated from clinic in [facility], accompanied by

## 2023-09-29 DIAGNOSIS — R19.7 DIARRHEA, UNSPECIFIED TYPE: Primary | ICD-10-CM

## 2023-09-29 RX ORDER — DIPHENOXYLATE HYDROCHLORIDE AND ATROPINE SULFATE 2.5; .025 MG/1; MG/1
1 TABLET ORAL 4 TIMES DAILY PRN
Qty: 30 TABLET | Refills: 0 | Status: SHIPPED | OUTPATIENT
Start: 2023-09-29 | End: 2023-10-09

## 2023-10-02 ENCOUNTER — PATIENT MESSAGE (OUTPATIENT)
Dept: GYNECOLOGIC ONCOLOGY | Facility: CLINIC | Age: 58
End: 2023-10-02
Payer: COMMERCIAL

## 2023-10-02 ENCOUNTER — TELEPHONE (OUTPATIENT)
Dept: GYNECOLOGIC ONCOLOGY | Facility: CLINIC | Age: 58
End: 2023-10-02
Payer: COMMERCIAL

## 2023-10-04 ENCOUNTER — TELEPHONE (OUTPATIENT)
Dept: ENDOSCOPY | Facility: HOSPITAL | Age: 58
End: 2023-10-04
Payer: COMMERCIAL

## 2023-10-04 ENCOUNTER — OFFICE VISIT (OUTPATIENT)
Dept: GYNECOLOGIC ONCOLOGY | Facility: CLINIC | Age: 58
End: 2023-10-04
Payer: COMMERCIAL

## 2023-10-04 ENCOUNTER — TELEPHONE (OUTPATIENT)
Dept: GYNECOLOGIC ONCOLOGY | Facility: CLINIC | Age: 58
End: 2023-10-04
Payer: COMMERCIAL

## 2023-10-04 ENCOUNTER — PATIENT MESSAGE (OUTPATIENT)
Dept: GASTROENTEROLOGY | Facility: CLINIC | Age: 58
End: 2023-10-04
Payer: COMMERCIAL

## 2023-10-04 DIAGNOSIS — Z12.11 ENCOUNTER FOR SCREENING COLONOSCOPY: Primary | ICD-10-CM

## 2023-10-04 DIAGNOSIS — R19.7 DIARRHEA, UNSPECIFIED TYPE: Primary | ICD-10-CM

## 2023-10-04 DIAGNOSIS — K52.9 COLITIS: ICD-10-CM

## 2023-10-04 DIAGNOSIS — Z51.11 ENCOUNTER FOR ANTINEOPLASTIC CHEMOTHERAPY: ICD-10-CM

## 2023-10-04 DIAGNOSIS — C77.2 SECONDARY MALIGNANCY OF RETROPERITONEAL LYMPH NODES: ICD-10-CM

## 2023-10-04 DIAGNOSIS — C54.1 MALIGNANT NEOPLASM OF ENDOMETRIUM: Primary | ICD-10-CM

## 2023-10-04 PROCEDURE — 99215 PR OFFICE/OUTPT VISIT, EST, LEVL V, 40-54 MIN: ICD-10-PCS | Mod: 95,,, | Performed by: OBSTETRICS & GYNECOLOGY

## 2023-10-04 PROCEDURE — 99215 OFFICE O/P EST HI 40 MIN: CPT | Mod: 95,,, | Performed by: OBSTETRICS & GYNECOLOGY

## 2023-10-04 RX ORDER — SULFAMETHOXAZOLE AND TRIMETHOPRIM 800; 160 MG/1; MG/1
1 TABLET ORAL DAILY
Qty: 30 TABLET | Refills: 0 | Status: ON HOLD | OUTPATIENT
Start: 2023-10-04 | End: 2024-02-23 | Stop reason: HOSPADM

## 2023-10-04 RX ORDER — PREDNISONE 10 MG/1
TABLET ORAL
Qty: 42 TABLET | Refills: 0 | Status: SHIPPED | OUTPATIENT
Start: 2023-10-04 | End: 2023-10-11

## 2023-10-04 RX ORDER — FAMOTIDINE 40 MG/1
40 TABLET, FILM COATED ORAL DAILY
Qty: 30 TABLET | Refills: 11 | Status: SHIPPED | OUTPATIENT
Start: 2023-10-04 | End: 2024-10-03

## 2023-10-04 NOTE — Clinical Note
Paul Weaver, would it be possible for your team or someone else to do a colonoscopy on this patient next week?  I am worried about immunotherapy related colitis and restarted her on a prednisone taper.  She is also going to need a repeat colonoscopy after completing immunotherapy.  We would greatly appreciate it. Thank you.

## 2023-10-04 NOTE — PROGRESS NOTES
The patient location is: home  The chief complaint leading to consultation is: discuss immunotherapy adverse reactions    Visit type: audiovisual    Face to Face time with patient: home  10 minutes of total time spent on the encounter, which includes face to face time and non-face to face time preparing to see the patient (eg, review of tests), Obtaining and/or reviewing separately obtained history, Documenting clinical information in the electronic or other health record, Independently interpreting results (not separately reported) and communicating results to the patient/family/caregiver, or Care coordination (not separately reported).         Each patient to whom he or she provides medical services by telemedicine is:  (1) informed of the relationship between the physician and patient and the respective role of any other health care provider with respect to management of the patient; and (2) notified that he or she may decline to receive medical services by telemedicine and may withdraw from such care at any time.    Notes:      REFERRING PROVIDER  Omaira Evans MD     REASON FOR CONSULT  Sugar Diaz  is a 58 y.o.  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      HISTORY OF PRESENT ILLNESS    Chemotherapy regimen: Carboplatin AUC 5/Paclitaxel 135 mg/m2/Dostarlimab 200mg q3-6 weeks  Cycle: 5  Previous dose limiting toxicities: Y  Cycle 4: Grade 2 colitis s/p steroid taper  Cycle 5: Grade 3 colitis  Previous dose reductions: N  Previous breaks: Y  Cycle 4: Dostarlimab omitted due to grade 2 colitis  Cycle 6: Dostarlimab 2/2 grade 3 colitis  Previous changes in pre-medications: N    Energy level: baseline  Appetite: baseline  Fevers/chills/nights: no  Nausea: no  Diarrhea: no  Emesis: no  Neuropathy: no  Discoloration of lower extremities: no  Mucositis: no  Maculopapular rashes: no  Dyspnea or coughing: yes      Oncology History   Malignant neoplasm of  endometrium   5/23/2023 Surgery    Hysteroscopy, D&C: grade 1 endometrioid EC     6/5/2023 Surgery    TRH/BSO/BSLN/RPLN/repair of obturator nerve    Final pathology c/w MMRd, p53 WT stage IIIC1 grade 1 endometrioid EC. 4.5 cm, grade 1, 96% MI (22/23 mm), +LUE, -LVSI, +MELF, -cervix, 1/2+ RPLN, 1/1+ LPLN, -ascites     6/19/2023 Imaging Significant Findings    CT C/A/P w/: High L para-aortic at the level of the renal vessels, 1.5 cm.  Low R para-aortic at the LEAH, 4.1 cm in greatest dimension.     6/25/2023 Genomic Testing    [employer] (NGS): ARD1A, CHEK1, CTCF, , HDAC2, HNF1A, JAK1, KRAS, MAX, MSH6, P1K3R1, PPM1D, PTEN, ZFHX3, ZSR2     6/28/2023 Tumor Conference    No rule for debulking of para-aortic lymph nodes.  Adjuvant VBT.  Represent after completion of adjuvant therapy to discuss EBRT.        8/9/2023 - 9/13/2023 Radiation Therapy    Treating physician: Dr. Adriane Babb  Total Dose: 21 Gy HDR  Fractions: 3 vaginal cuff brachytherapy     8/22/2023 Imaging Significant Findings    CT A/P w/: No evidence of colitis, MA in high L para-aortic and low R para-aortic.          The following portions of the patient's history were reviewed and updated as appropriate: allergies, current medications, family history, medical history, social history and surgical history.    REVIEW OF SYSTEMS  All systems reviewed and negative except as noted in HPI.    OBJECTIVE   There were no vitals filed for this visit.         There is no height or weight on file to calculate BMI.      1. General: Well appearing, no apparent distress, alert and oriented.  2. Lymph: Neck symmetric without cervical or supraclavicular adenopathy or mass.  3. Lungs: Normal respiratory rate, no accessory muscle use.  4. Psych: Normal affect.  5. Abdomen:  non-distended, soft, non-tender, are no masses, no ascites, no hepatosplenomegaly  6. Skin: Warm, dry, no rashes or lesions.   7. Extremities: Bilateral lower extremities without edema or tenderness.  8.  Genitourinary: Deferred          ECOG status: 1    LABORATORY DATA  Lab data reviewed.    RADIOLOGICAL DATA  Radiology data reviewed.    ASSESSMENT / PLAN     1. 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 Encounter for antineoplastic chemotherapy      Currently experiencing grade 2 colitis. Continue to take Lomotil with meals with a maximum dose 20 mg/day.  My recommendation is to D/C Dostarlimab and initiate a Prednisone 1 mg/kg taper.  We also recommend a colonoscopy ASAP.  She will reach out to her GI and if they are unable to accommodate, I will speak to GI/CRS in [place].    She currently is well hydrated but if the clinical picture worsens she will need to be admitted to the hospital for CBC, CMP, TSH, ESR, CRP, HIV, Hep A, B, C (in case she requires infliximab), stool cultures including C. Diff, CMV, and CT A/P w/w/o.   We will tentatively plan to continue with platinum based doublet CT.    PATIENT EDUCATION  Ready to learn, no apparent learning barriers were identified; learning preferences include listening. Explained diagnosis and treatment plan; patient expressed understanding of the content.    ADMINISTRATIVE BILLING  Greater than 50% of was spent in counseling.   .[facility]

## 2023-10-04 NOTE — TELEPHONE ENCOUNTER
[facility] Note    Orders placed for an urgent colonoscopy due to diarrhea to evaluate for immunotherapy induced diarrhea.    Marcia Weaver MD

## 2023-10-04 NOTE — TELEPHONE ENCOUNTER
Spoke to patient to schedule procedure(s) Colonoscopy       Physician to perform procedure(s) Dr. SOULEYMANE Weaver   Date of Procedure (s) 10/06/2023  Arrival Time 7:45 AM   Time of Procedure(s) 8:45 AM    Location of Procedure(s) [address]  Type of Rx Prep sent to patient: Other  Instructions provided to patient via [facility]    Patient was informed on the following information and verbalized understanding. Screening questionnaire reviewed with patient and complete. If procedure requires anesthesia, a responsible adult needs to be present to accompany the patient home, patient cannot drive after receiving anesthesia. Appointment details are tentative, especially check-in time. Patient will receive a prep-op call 4 days prior to confirm check-in time for procedure. If applicable the patient should contact their pharmacy to verify Rx for procedure prep is ready for pick-up. Patient was advised to call the scheduling department at [phone] if pharmacy states no Rx is available. Patient was advised to call the endoscopy scheduling department if any questions or concerns arise.      [facility] Scheduling [facility]

## 2023-10-04 NOTE — TELEPHONE ENCOUNTER
"----- Message from Essence Martin sent at 10/4/2023  3:37 PM CDT -----  Regarding: FW: Needs colonoscopy ASAP within 1 week    ----- Message -----  From: Marcia Weaver MD  Sent: 10/4/2023   9:45 AM CDT  To: [facility]  Subject: Needs colonoscopy ASAP within 1 week             Procedure: Colonoscopy    Diagnosis: Diarrhea, evaluate for immunotherapy induced colitis    Procedure Timing: < 1 week    #If within 4 weeks selected, please jaxson as high priority#    #If greater than 12 weeks, please select "5-12 weeks" and delay sending until 3 months prior to requested date#     Provider: Any GI provider    Location: No Preference    Additional Scheduling Information: No scheduling concerns    Prep Specifications:Standard prep    Is the patient taking a GLP-1 Agonist:no    Have you attached a patient to this message: yes        "

## 2023-10-04 NOTE — TELEPHONE ENCOUNTER
Spoke with our patient and tech support  All questions answered Provider Scheduling Coord.  Gynecologic Oncology MA/JASMIN /Preceptor Ahmet Dawson----- Message from Lavell Rodríguez MD sent at 10/4/2023  8:51 AM CDT -----  This patient needs technical assistance with virtual visits.  If we could please have someone follow-up with her.  Thank you.

## 2023-10-05 RX ORDER — SODIUM, POTASSIUM,MAG SULFATES 17.5-3.13G
1 SOLUTION, RECONSTITUTED, ORAL ORAL DAILY
Qty: 1 KIT | Refills: 0 | Status: SHIPPED | OUTPATIENT
Start: 2023-10-05 | End: 2023-10-07

## 2023-10-05 NOTE — TELEPHONE ENCOUNTER
Spoke to patient to schedule procedure(s) Colonoscopy       Physician to perform procedure(s) Dr. SOULEYMANE Weaver   Date of Procedure (s) 10/06/2023  Arrival Time 7:45 AM  Time of Procedure(s) 8:45 AM    Location of Procedure(s) [address]  Type of Rx Prep sent to patient Suprep  Instructions provided to patient via [facility]    Patient was informed on the following information and verbalized understanding. Screening questionnaire reviewed with patient and complete. If procedure requires anesthesia, a responsible adult needs to be present to accompany the patient home, patient cannot drive after receiving anesthesia. Appointment details are tentative, especially check-in time. Patient will receive a prep-op call 4 days prior to confirm check-in time for procedure. If applicable the patient should contact their pharmacy to verify Rx for procedure prep is ready for pick-up. Patient was advised to call the scheduling department at [phone] if pharmacy states no Rx is available. Patient was advised to call the endoscopy scheduling department if any questions or concerns arise.      [facility] Scheduling Department

## 2023-10-06 ENCOUNTER — ANESTHESIA (OUTPATIENT)
Dept: ENDOSCOPY | Facility: HOSPITAL | Age: 58
End: 2023-10-06
Payer: COMMERCIAL

## 2023-10-06 ENCOUNTER — TELEPHONE (OUTPATIENT)
Dept: FAMILY MEDICINE | Facility: CLINIC | Age: 58
End: 2023-10-06
Payer: COMMERCIAL

## 2023-10-06 ENCOUNTER — HOSPITAL ENCOUNTER (OUTPATIENT)
Facility: HOSPITAL | Age: 58
Discharge: HOME | End: 2023-10-06
Attending: INTERNAL MEDICINE | Admitting: INTERNAL MEDICINE
Payer: COMMERCIAL

## 2023-10-06 ENCOUNTER — ANESTHESIA EVENT (OUTPATIENT)
Dept: ENDOSCOPY | Facility: HOSPITAL | Age: 58
End: 2023-10-06
Payer: COMMERCIAL

## 2023-10-06 VITALS
DIASTOLIC BLOOD PRESSURE: 58 MMHG | RESPIRATION RATE: 16 BRPM | OXYGEN SATURATION: 100 % | HEART RATE: 88 BPM | TEMPERATURE: 98 F | SYSTOLIC BLOOD PRESSURE: 124 MMHG

## 2023-10-06 DIAGNOSIS — R19.7 DIARRHEA, UNSPECIFIED TYPE: ICD-10-CM

## 2023-10-06 PROCEDURE — 88305 TISSUE EXAM BY PATHOLOGIST: CPT | Mod: 26,,, | Performed by: PATHOLOGY

## 2023-10-06 PROCEDURE — D9220A PRA ANESTHESIA: ICD-10-PCS | Mod: CRNA,,, | Performed by: STUDENT IN AN ORGANIZED HEALTH CARE EDUCATION/TRAINING PROGRAM

## 2023-10-06 PROCEDURE — D9220A PRA ANESTHESIA: Mod: CRNA,,, | Performed by: STUDENT IN AN ORGANIZED HEALTH CARE EDUCATION/TRAINING PROGRAM

## 2023-10-06 PROCEDURE — 37000008 HC ANESTHESIA 1ST 15 MINUTES: Performed by: INTERNAL MEDICINE

## 2023-10-06 PROCEDURE — 88305 TISSUE EXAM BY PATHOLOGIST: ICD-10-PCS | Mod: 26,,, | Performed by: PATHOLOGY

## 2023-10-06 PROCEDURE — 63600175 PHARM REV CODE 636 W HCPCS: Performed by: STUDENT IN AN ORGANIZED HEALTH CARE EDUCATION/TRAINING PROGRAM

## 2023-10-06 PROCEDURE — 45380 COLONOSCOPY AND BIOPSY: CPT | Performed by: INTERNAL MEDICINE

## 2023-10-06 PROCEDURE — D9220A PRA ANESTHESIA: ICD-10-PCS | Mod: ANES,,, | Performed by: ANESTHESIOLOGY

## 2023-10-06 PROCEDURE — 27201012 HC FORCEPS, HOT/COLD, DISP: Performed by: INTERNAL MEDICINE

## 2023-10-06 PROCEDURE — 45380 PR COLONOSCOPY,BIOPSY: ICD-10-PCS | Mod: ,,, | Performed by: INTERNAL MEDICINE

## 2023-10-06 PROCEDURE — 37000009 HC ANESTHESIA EA ADD 15 MINS: Performed by: INTERNAL MEDICINE

## 2023-10-06 PROCEDURE — 45380 COLONOSCOPY AND BIOPSY: CPT | Mod: ,,, | Performed by: INTERNAL MEDICINE

## 2023-10-06 PROCEDURE — 88305 TISSUE EXAM BY PATHOLOGIST: CPT | Performed by: PATHOLOGY

## 2023-10-06 PROCEDURE — D9220A PRA ANESTHESIA: Mod: ANES,,, | Performed by: ANESTHESIOLOGY

## 2023-10-06 PROCEDURE — 25000003 PHARM REV CODE 250: Performed by: STUDENT IN AN ORGANIZED HEALTH CARE EDUCATION/TRAINING PROGRAM

## 2023-10-06 RX ORDER — LIDOCAINE HYDROCHLORIDE 20 MG/ML
INJECTION INTRAVENOUS
Status: DISCONTINUED | OUTPATIENT
Start: 2023-10-06 | End: 2023-10-06

## 2023-10-06 RX ORDER — SODIUM CHLORIDE 9 MG/ML
INJECTION, SOLUTION INTRAVENOUS CONTINUOUS
Status: DISCONTINUED | OUTPATIENT
Start: 2023-10-06 | End: 2023-10-06 | Stop reason: HOSPADM

## 2023-10-06 RX ORDER — LIDOCAINE HYDROCHLORIDE 20 MG/ML
INJECTION, SOLUTION EPIDURAL; INFILTRATION; INTRACAUDAL; PERINEURAL
Status: DISCONTINUED
Start: 2023-10-06 | End: 2023-10-06 | Stop reason: HOSPADM

## 2023-10-06 RX ORDER — PROPOFOL 10 MG/ML
INJECTION, EMULSION INTRAVENOUS
Status: DISCONTINUED
Start: 2023-10-06 | End: 2023-10-06 | Stop reason: HOSPADM

## 2023-10-06 RX ORDER — PROPOFOL 10 MG/ML
VIAL (ML) INTRAVENOUS
Status: DISCONTINUED | OUTPATIENT
Start: 2023-10-06 | End: 2023-10-06

## 2023-10-06 RX ADMIN — PROPOFOL 30 MG: 10 INJECTION, EMULSION INTRAVENOUS at 08:10

## 2023-10-06 RX ADMIN — LIDOCAINE HYDROCHLORIDE 100 MG: 20 INJECTION, SOLUTION INTRAVENOUS at 08:10

## 2023-10-06 RX ADMIN — PROPOFOL 20 MG: 10 INJECTION, EMULSION INTRAVENOUS at 08:10

## 2023-10-06 RX ADMIN — PROPOFOL 70 MG: 10 INJECTION, EMULSION INTRAVENOUS at 08:10

## 2023-10-06 RX ADMIN — SODIUM CHLORIDE: 0.9 INJECTION, SOLUTION INTRAVENOUS at 08:10

## 2023-10-06 NOTE — H&P
Short Stay Endoscopy History and Physical    PCP - James Hillman MD    Procedure - Colonoscopy  ASA - per anesthesia  Mallampati - per anesthesia  History of Anesthesia problems - no  Family history Anesthesia problems - no   Plan of anesthesia - General    HPI:  This is a 58 y.o. female here for evaluation of : diarrhea      ROS:  Constitutional: No fevers, chills, No weight loss  CV: No chest pain  Pulm: No cough, No shortness of breath  GI: see HPI  Derm: No rash    Medical History:  has a past medical history of Chest pain (2002), GERD (gastroesophageal reflux disease), Lower back pain, Migraines, Mild allergic rhinitis, and Postmenopausal bleeding.    Surgical History:  has a past surgical history that includes TONSILLECTOMY; Tubal ligation; Cyst Removal (Left, 04/28/2022); Hysteroscopy with dilation and curettage of uterus (N/A, 05/23/2023); Tonsillectomy; xi robotic hysterectomy, with salpingo-oophorectomy (Bilateral, 6/5/2023); Lymph node biopsy (Left, 6/5/2023); mapping, lymph node, sentinel (Bilateral, 6/5/2023); lymphadenectomy, pelvis (Right, 6/5/2023); and Nerve repair (Right, 6/5/2023).    Family History: family history includes Heart disease in her father; No Known Problems in her mother, sister, and sister.. Otherwise no colon cancer, inflammatory bowel disease, or GI malignancies.    Social History:  reports that she has never smoked. She has never used smokeless tobacco. She reports that she does not currently use alcohol. She reports that she does not use drugs.    Review of patient's allergies indicates:  No Known Allergies    Medications:   Medications Prior to Admission   Medication Sig Dispense Refill Last Dose    acetaminophen (TYLENOL EXTRA STRENGTH) 500 MG tablet Take 2 tablets (1,000 mg total) by mouth every 6 (six) hours as needed for Pain. 100 tablet 2     acetaminophen (TYLENOL) 650 MG TbSR Take 1 tablet (650 mg total) by mouth every 6 (six) hours as needed (pain). 60 tablet  0     dexAMETHasone (DECADRON) 4 MG Tab Take daily beginning the day after chemotherapy for 3 days 30 tablet 2     diclofenac sodium (VOLTAREN) 1 % Gel Apply 2 g topically 2 (two) times daily. 100 g 0     diphenhydrAMINE (BENADRYL) 25 mg capsule Take 25 mg by mouth every 6 (six) hours as needed for Itching.       diphenoxylate-atropine 2.5-0.025 mg (LOMOTIL) 2.5-0.025 mg per tablet Take 1 tablet by mouth 4 (four) times daily as needed for Diarrhea. 30 tablet 0     famotidine (PEPCID) 40 MG tablet Take 1 tablet (40 mg total) by mouth once daily. 30 tablet 11     ibuprofen (ADVIL,MOTRIN) 800 MG tablet Take 1 tablet (800 mg total) by mouth every 8 (eight) hours as needed for Pain. 60 tablet 2     loratadine (CLARITIN) 10 mg tablet Take 1 tablet (10 mg total) by mouth once daily. 30 tablet 11     [] polyethylene glycol (COLYTE) 240-22.72-6.72 -5.84 gram SolR Take 4,000 mLs by mouth once. Please follow the instructions from the provider office. for 1 dose 4000 mL 0     predniSONE (DELTASONE) 10 MG tablet Take 7 tablets (70 mg total) by mouth once daily for 7 days, THEN 5 tablets (50 mg total) once daily for 7 days, THEN 3 tablets (30 mg total) once daily for 7 days, THEN 1 tablet (10 mg total) once daily for 7 days. 42 tablet 0     prochlorperazine (COMPAZINE) 5 MG tablet Take every 6 hours for 3 days beginning the day after chemotherapy 30 tablet 2     sodium,potassium,mag sulfates (SUPREP BOWEL PREP KIT) 17.5-3.13-1.6 gram SolR Take 177 mLs by mouth once daily. Please follow the instructions from the provider office. for 2 days 1 kit 0     sulfamethoxazole-trimethoprim 800-160mg (BACTRIM DS) 800-160 mg Tab Take 1 tablet by mouth once daily. 30 tablet 0     valACYclovir (VALTREX) 1000 MG tablet Two pills at onset of fever blister and then repeat 2 pills 12 hr later 30 tablet 11     zolpidem (AMBIEN) 5 MG Tab Take 1 tablet (5 mg total) by mouth nightly as needed (insomnia). 20 tablet 0          Physical  Exam:    Vital Signs: There were no vitals filed for this visit.    Gen: NAD, lying comfortably  HENT: NCAT, oropharynx clear  Eyes: anicteric sclerae, EOMI grossly  Neck: supple, no visible masses/goiter  Cardiac: RRR  Lungs: non-labored breathing  Abd: soft, NT/ND, normoactive BS  Ext: no LE edema, warm, well perfused  Skin: skin intact on exposed body parts, no visible rashes, lesions  Neuro: A&Ox4, neuro exam grossly intact, moves all extremities  Psych: appropriate mood, affect        Labs:  Lab Results   Component Value Date    WBC 4.67 09/27/2023    HGB 12.6 09/27/2023    HCT 36.3 (L) 09/27/2023     09/27/2023    CHOL 199 03/01/2008    TRIG 99 12/11/2020    HDL 42 12/11/2020    ALT 35 09/27/2023    AST 22 09/27/2023     09/27/2023    K 3.8 09/27/2023     09/27/2023    CREATININE 0.8 09/27/2023    BUN 10 09/27/2023    CO2 25 09/27/2023    TSH 6.471 (H) 09/27/2023       Plan:  Colonoscopy for diarrhea.    I have explained the risks and benefits of endoscopy procedures to the patient including but not limited to bleeding, perforation, infection, and death.  The patient was asked if they understand and allowed to ask any further questions to their satisfaction.    Marcia Weaver MD

## 2023-10-06 NOTE — TRANSFER OF CARE
Anesthesia Transfer of Care Note    Patient: Sugar Diaz    Procedure(s) Performed: Procedure(s) (LRB):  COLONOSCOPY (N/A)    Patient location: GI    Anesthesia Type: general    Transport from OR: Transported from OR on room air with adequate spontaneous ventilation    Post pain: adequate analgesia    Post assessment: no apparent anesthetic complications and tolerated procedure well    Post vital signs: stable    Level of consciousness: awake and alert    Nausea/Vomiting: no nausea/vomiting    Complications: none    Transfer of care protocol was followed      Last vitals:   Visit Vitals  BP (!) 97/52 (BP Location: Left arm, Patient Position: Lying)   Pulse 93   Temp 36.7 °C (98 °F) (Oral)   Resp 14   LMP 11/15/2012   SpO2 98%   Breastfeeding No

## 2023-10-06 NOTE — ANESTHESIA POSTPROCEDURE EVALUATION
Anesthesia Post Evaluation    Patient: Sugar Diaz    Procedure(s) Performed: Procedure(s) (LRB):  COLONOSCOPY (N/A)    Final Anesthesia Type: general      Patient location during evaluation: GI PACU  Patient participation: Yes- Able to Participate  Level of consciousness: awake and alert  Post-procedure vital signs: reviewed and stable  Airway patency: patent    PONV status at discharge: No PONV  Anesthetic complications: no      Cardiovascular status: blood pressure returned to baseline and hemodynamically stable  Respiratory status: unassisted, spontaneous ventilation and room air  Hydration status: euvolemic  Follow-up not needed.          Vitals Value Taken Time   /76 10/06/23 0909   Temp 36.7 °C (98 °F) 10/06/23 0854   Pulse 91 10/06/23 0909   Resp 14 10/06/23 0909   SpO2 100 % 10/06/23 0909         No case tracking events are documented in the log.      Pain/Cruz Score: Cruz Score: 10 (10/6/2023  9:09 AM)

## 2023-10-06 NOTE — PROVATION PATIENT INSTRUCTIONS
Discharge Summary/Instructions after an Endoscopic Procedure  Patient Name: Sugar Diaz  Patient MRN: [id number]  Patient YOB: 1965  Friday, October 6, 2023  Marcia Weaver MD  Dear patient,  As a result of recent federal legislation (The Federal Cures Act), you may   receive lab or pathology results from your procedure in your [facility]   account before your physician is able to contact you. Your physician or   their representative will relay the results to you with their   recommendations at their soonest availability.  Thank you,  RESTRICTIONS:  During your procedure today, you received medications for sedation.  These   medications may affect your judgment, balance and coordination.  Therefore,   for 24 hours, you have the following restrictions:   - DO NOT drive a car, operate machinery, make legal/financial decisions,   sign important papers or drink alcohol.    ACTIVITY:  Today: no heavy lifting, straining or running due to procedural   sedation/anesthesia.  The following day: return to full activity including work.  DIET:  Eat and drink normally unless instructed otherwise.     TREATMENT FOR COMMON SIDE EFFECTS:  - Mild abdominal pain, nausea, belching, bloating or excessive gas:  rest,   eat lightly and use a heating pad.  - Sore Throat: treat with throat lozenges and/or gargle with warm salt   water.  - Because air was used during the procedure, expelling large amounts of air   from your rectum or belching is normal.  - If a bowel prep was taken, you may not have a bowel movement for 1-3 days.    This is normal.  SYMPTOMS TO WATCH FOR AND REPORT TO YOUR PHYSICIAN:  1. Abdominal pain or bloating, other than gas cramps.  2. Chest pain.  3. Back pain.  4. Signs of infection such as: chills or fever occurring within 24 hours   after the procedure.  5. Rectal bleeding, which would show as bright red, maroon, or black stools.   (A tablespoon of blood from the rectum is not serious, especially if    hemorrhoids are present.)  6. Vomiting.  7. Weakness or dizziness.  GO DIRECTLY TO THE NEAREST EMERGENCY ROOM IF YOU HAVE ANY OF THE FOLLOWING:      Difficulty breathing              Chills and/or fever over 101 F   Persistent vomiting and/or vomiting blood   Severe abdominal pain   Severe chest pain   Black, tarry stools   Bleeding- more than one tablespoon   Any other symptom or condition that you feel may need urgent attention  Your doctor recommends these additional instructions:  If any biopsies were taken, your doctors clinic will contact you in 1 to 2   weeks with any results.  - Discharge patient to home.   - Resume previous diet.   - Continue present medications.   - Await pathology results.   - Repeat colonoscopy in 10 years for surveillance.  For questions, problems or results please call your physician - Marcia Weaver MD at Work:  [phone].  [facility] can be reached at [phone]     IF A COMPLICATION OR EMERGENCY SITUATION ARISES AND YOU ARE UNABLE TO REACH   YOUR PHYSICIAN - GO DIRECTLY TO THE EMERGENCY ROOM.  Marcia Weaver MD  10/6/2023 9:08:19 AM  This report has been verified and signed electronically.  Dear patient,  As a result of recent federal legislation (The Federal Cures Act), you may   receive lab or pathology results from your procedure in your [facility]   account before your physician is able to contact you. Your physician or   their representative will relay the results to you with their   recommendations at their soonest availability.  Thank you,  PROVATION

## 2023-10-06 NOTE — ANESTHESIA PREPROCEDURE EVALUATION
10/06/2023    Pre-operative evaluation for Procedure(s) (LRB):  HYSTEROSCOPY, WITH DILATION AND CURETTAGE OF UTERUS (N/A)    Sugar Diaz is a 58 y.o. female     Patient Active Problem List   Diagnosis    Status post hysteroscopy    Cervical stenosis (uterine cervix)    PMB (postmenopausal bleeding)    S/p RA-TLH/BSO/SLND    Malignant neoplasm of endometrium    Diarrhea       Review of patient's allergies indicates:  No Known Allergies    Current Outpatient Medications on File Prior to Visit   Medication Sig Dispense Refill    acetaminophen (TYLENOL EXTRA STRENGTH) 500 MG tablet Take 2 tablets (1,000 mg total) by mouth every 6 (six) hours as needed for Pain. 100 tablet 2    acetaminophen (TYLENOL) 650 MG TbSR Take 1 tablet (650 mg total) by mouth every 6 (six) hours as needed (pain). 60 tablet 0    dexAMETHasone (DECADRON) 4 MG Tab Take daily beginning the day after chemotherapy for 3 days 30 tablet 2    diclofenac sodium (VOLTAREN) 1 % Gel Apply 2 g topically 2 (two) times daily. 100 g 0    diphenhydrAMINE (BENADRYL) 25 mg capsule Take 25 mg by mouth every 6 (six) hours as needed for Itching.      diphenoxylate-atropine 2.5-0.025 mg (LOMOTIL) 2.5-0.025 mg per tablet Take 1 tablet by mouth 4 (four) times daily as needed for Diarrhea. 30 tablet 0    famotidine (PEPCID) 40 MG tablet Take 1 tablet (40 mg total) by mouth once daily. 30 tablet 11    ibuprofen (ADVIL,MOTRIN) 800 MG tablet Take 1 tablet (800 mg total) by mouth every 8 (eight) hours as needed for Pain. 60 tablet 2    loratadine (CLARITIN) 10 mg tablet Take 1 tablet (10 mg total) by mouth once daily. 30 tablet 11    predniSONE (DELTASONE) 10 MG tablet Take 7 tablets (70 mg total) by mouth once daily for 7 days, THEN 5 tablets (50 mg total) once daily for 7 days, THEN 3 tablets (30 mg total) once daily for 7 days, THEN 1 tablet (10 mg total) once daily for 7 days. 42 tablet 0    prochlorperazine (COMPAZINE) 5 MG tablet Take every 6  hours for 3 days beginning the day after chemotherapy 30 tablet 2    sodium,potassium,mag sulfates (SUPREP BOWEL PREP KIT) 17.5-3.13-1.6 gram SolR Take 177 mLs by mouth once daily. Please follow the instructions from the provider office. for 2 days 1 kit 0    sulfamethoxazole-trimethoprim 800-160mg (BACTRIM DS) 800-160 mg Tab Take 1 tablet by mouth once daily. 30 tablet 0    valACYclovir (VALTREX) 1000 MG tablet Two pills at onset of fever blister and then repeat 2 pills 12 hr later 30 tablet 11    zolpidem (AMBIEN) 5 MG Tab Take 1 tablet (5 mg total) by mouth nightly as needed (insomnia). 20 tablet 0     No current facility-administered medications on file prior to visit.       Past Surgical History:   Procedure Laterality Date    CYST REMOVAL Left 04/28/2022    Procedure: EXCISION, CYST-BACK;  Surgeon: Thomas Hurst MD;  Location: [facility] OR;  Service: General;  Laterality: Left;  left upper back  RN PREOP ON 4/21/22-    HYSTEROSCOPY WITH DILATION AND CURETTAGE OF UTERUS N/A 05/23/2023    Procedure: HYSTEROSCOPY, WITH DILATION AND CURETTAGE OF UTERUS;  Surgeon: Omaira Evans MD;  Location: [facility] OR;  Service: OB/GYN;  Laterality: N/A;  [employer] GroupZEENAT HERNANDEZ  [phone] TEXTED HAMMAD ON 5/2/2023 @ 3:51PM. HAMMAD RESPONDED ON 5/2/2023 @ 3:51PM-  RN PREOP 5/18/23  T & S; UPT ORDERED AND SCHEDULED 5/22/23    LYMPH NODE BIOPSY Left 6/5/2023    Procedure: BIOPSY, sentinel LYMPH NODE;  Surgeon: Lavell Rodríguez MD;  Location: [facility] OR;  Service: OB/GYN;  Laterality: Left;    LYMPHADENECTOMY, PELVIS Right 6/5/2023    Procedure: LYMPHADENECTOMY, PELVIS;  Surgeon: Lavell Rodríguez MD;  Location: [facility] OR;  Service: OB/GYN;  Laterality: Right;    MAPPING, LYMPH NODE, SENTINEL Bilateral 6/5/2023    Procedure: injection, LYMPH NODE, SENTINEL;  Surgeon: Lavell Rodríguez MD;  Location: [facility] OR;  Service: OB/GYN;  Laterality: Bilateral;    NERVE REPAIR Right 6/5/2023    Procedure: REPAIR, OBTURATOR NERVE;  Surgeon: Lavell  MD Marcos;  Location: [facility];  Service: OB/GYN;  Laterality: Right;    TONSILLECTOMY      TONSILLECTOMY      TUBAL LIGATION      XI ROBOTIC HYSTERECTOMY, WITH SALPINGO-OOPHORECTOMY Bilateral 6/5/2023    Procedure: XI ROBOTIC HYSTERECTOMY,WITH SALPINGO-OOPHORECTOMY;  Surgeon: Lavell Rodríguez MD;  Location: [facility];  Service: OB/GYN;  Laterality: Bilateral;  removed through vagina       Social History     Socioeconomic History    Marital status:    Tobacco Use    Smoking status: Never    Smokeless tobacco: Never   Substance and Sexual Activity    Alcohol use: Not Currently    Drug use: Never    Sexual activity: Yes     Partners: Male     Birth control/protection: Post-menopausal     Social Determinants of Health     Financial Resource Strain: Low Risk  (8/2/2023)    Overall Financial Resource Strain (CARDIA)     Difficulty of Paying Living Expenses: Not hard at all   Food Insecurity: No Food Insecurity (8/2/2023)    Hunger Vital Sign     Worried About Running Out of Food in the Last Year: Never true     Ran Out of Food in the Last Year: Never true   Transportation Needs: No Transportation Needs (8/2/2023)    PRAPARE - Transportation     Lack of Transportation (Medical): No     Lack of Transportation (Non-Medical): No   Physical Activity: Insufficiently Active (8/2/2023)    Exercise Vital Sign     Days of Exercise per Week: 5 days     Minutes of Exercise per Session: 10 min   Stress: No Stress Concern Present (8/2/2023)    [employer] [place] [employer] - Occupational Stress Questionnaire     Feeling of Stress : Only a little   Social Connections: Unknown (8/2/2023)    Social Connection and Isolation Panel [NHANES]     Frequency of Communication with Friends and Family: More than three times a week     Frequency of Social Gatherings with Friends and Family: More than three times a week     Active Member of Clubs or Organizations: Yes     Attends Club or Organization Meetings: More  than 4 times per year     Marital Status:    Housing Stability: Low Risk  (8/2/2023)    Housing Stability Vital Sign     Unable to Pay for Housing in the Last Year: No     Number of Places Lived in the Last Year: 1     Unstable Housing in the Last Year: No     LABS  CBC  Lab Results   Component Value Date    WBC 4.67 09/27/2023    HGB 12.6 09/27/2023    HCT 36.3 (L) 09/27/2023    MCV 98 09/27/2023     09/27/2023       BMP  Lab Results   Component Value Date     09/27/2023    K 3.8 09/27/2023     09/27/2023    CO2 25 09/27/2023    BUN 10 09/27/2023    CREATININE 0.8 09/27/2023    CALCIUM 9.3 09/27/2023    ANIONGAP 10 09/27/2023    EGFRNORACEVR >60 09/27/2023         Pre-op Assessment    I have reviewed the Patient Summary Reports.     I have reviewed the Nursing Notes. I have reviewed the NPO Status.   I have reviewed the Medications.     Review of Systems  Anesthesia Hx:  No problems with previous Anesthesia  Denies Family Hx of Anesthesia complications.   Denies Personal Hx of Anesthesia complications.   Social:  Non-Smoker, No Alcohol Use    Hematology/Oncology:  Hematology Normal   Oncology Normal     EENT/Dental:EENT/Dental Normal   Cardiovascular:   Exercise tolerance: good Denies Hypertension.     Pulmonary:  Pulmonary Normal    Renal/:  Renal/ Normal     Hepatic/GI:   Denies GERD.    Musculoskeletal:  Musculoskeletal Normal    Neurological:   Denies CVA. Headaches Denies Seizures.    Endocrine:  Endocrine Normal    Dermatological:  Skin Normal    Psych:  Psychiatric Normal           Physical Exam  General: Well nourished, Cooperative, Alert and Oriented    Airway:  Mallampati: III / II  Mouth Opening: Normal  TM Distance: Normal  Tongue: Normal  Neck ROM: Normal ROM    Dental:  Intact    Chest/Lungs:  Normal Respiratory Rate    Heart:  Rate: Normal  Rhythm: Regular Rhythm  Sounds: Normal        Anesthesia Plan  Type of Anesthesia, risks & benefits discussed:    Anesthesia  Type: Gen Natural Airway  Intra-op Monitoring Plan: Standard ASA Monitors  Induction:  IV  Informed Consent: Informed consent signed with the Patient and all parties understand the risks and agree with anesthesia plan.  All questions answered. Patient consented to blood products? No  ASA Score: 2    Ready For Surgery From Anesthesia Perspective.     .

## 2023-10-06 NOTE — PLAN OF CARE
Procedure and recovery complete. Pt awake and alert. MD and  at bedside, procedure findings and suggestions discussed. Discharge instructions given, pt verbalized understanding of instruction. Gait steady, able to ambulate without assistance. Pt walked out accompanied by .

## 2023-10-11 ENCOUNTER — HOSPITAL ENCOUNTER (OUTPATIENT)
Dept: RADIOLOGY | Facility: HOSPITAL | Age: 58
Discharge: HOME | End: 2023-10-11
Attending: PHYSICIAN ASSISTANT
Payer: COMMERCIAL

## 2023-10-11 ENCOUNTER — OFFICE VISIT (OUTPATIENT)
Dept: GYNECOLOGIC ONCOLOGY | Facility: CLINIC | Age: 58
End: 2023-10-11
Payer: COMMERCIAL

## 2023-10-11 DIAGNOSIS — C54.1 MALIGNANT NEOPLASM OF ENDOMETRIUM: ICD-10-CM

## 2023-10-11 DIAGNOSIS — Z12.31 ENCOUNTER FOR SCREENING MAMMOGRAM FOR BREAST CANCER: ICD-10-CM

## 2023-10-11 DIAGNOSIS — K52.9 COLITIS: ICD-10-CM

## 2023-10-11 DIAGNOSIS — Z51.11 ENCOUNTER FOR ANTINEOPLASTIC CHEMOTHERAPY: ICD-10-CM

## 2023-10-11 DIAGNOSIS — C77.2 SECONDARY MALIGNANCY OF RETROPERITONEAL LYMPH NODES: ICD-10-CM

## 2023-10-11 PROCEDURE — 77063 BREAST TOMOSYNTHESIS BI: CPT | Mod: 26,,, | Performed by: RADIOLOGY

## 2023-10-11 PROCEDURE — 77067 SCR MAMMO BI INCL CAD: CPT | Mod: 26,,, | Performed by: RADIOLOGY

## 2023-10-11 PROCEDURE — 99215 PR OFFICE/OUTPT VISIT, EST, LEVL V, 40-54 MIN: ICD-10-PCS | Mod: 95,,, | Performed by: OBSTETRICS & GYNECOLOGY

## 2023-10-11 PROCEDURE — 77063 MAMMO DIGITAL SCREENING BILAT WITH TOMO: ICD-10-PCS | Mod: 26,,, | Performed by: RADIOLOGY

## 2023-10-11 PROCEDURE — 99215 OFFICE O/P EST HI 40 MIN: CPT | Mod: 95,,, | Performed by: OBSTETRICS & GYNECOLOGY

## 2023-10-11 PROCEDURE — 77067 MAMMO DIGITAL SCREENING BILAT WITH TOMO: ICD-10-PCS | Mod: 26,,, | Performed by: RADIOLOGY

## 2023-10-11 PROCEDURE — 77067 SCR MAMMO BI INCL CAD: CPT | Mod: TC

## 2023-10-11 RX ORDER — PREDNISONE 10 MG/1
TABLET ORAL
Qty: 70 TABLET | Refills: 0 | Status: ON HOLD | OUTPATIENT
Start: 2023-10-11 | End: 2023-11-03 | Stop reason: HOSPADM

## 2023-10-11 NOTE — PROGRESS NOTES
The patient location is: home  The chief complaint leading to consultation is: discuss immunotherapy adverse reactions    Visit type: audiovisual    Face to Face time with patient: home  10 minutes of total time spent on the encounter, which includes face to face time and non-face to face time preparing to see the patient (eg, review of tests), Obtaining and/or reviewing separately obtained history, Documenting clinical information in the electronic or other health record, Independently interpreting results (not separately reported) and communicating results to the patient/family/caregiver, or Care coordination (not separately reported).         Each patient to whom he or she provides medical services by telemedicine is:  (1) informed of the relationship between the physician and patient and the respective role of any other health care provider with respect to management of the patient; and (2) notified that he or she may decline to receive medical services by telemedicine and may withdraw from such care at any time.    Notes:      REFERRING PROVIDER  Omaira Evans MD     REASON FOR CONSULT  Sugar Diaz  is a 58 y.o.  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      HISTORY OF PRESENT ILLNESS    Chemotherapy regimen: Carboplatin AUC 5/Paclitaxel 135 mg/m2/Dostarlimab 200mg q3-6 weeks  Cycle: 5  Previous dose limiting toxicities: Y  Cycle 4: Grade 2 colitis s/p steroid taper  Cycle 5: Grade 3 colitis  Previous dose reductions: N  Previous breaks: Y  Cycle 4: Dostarlimab omitted due to grade 2 colitis  Cycle 6: Dostarlimab 2/2 grade 3 colitis  Previous changes in pre-medications: N    Energy level: baseline  Appetite: baseline  Fevers/chills/nights: no  Nausea: no  Diarrhea: no  Emesis: no  Neuropathy: no  Discoloration of lower extremities: no  Mucositis: no  Maculopapular rashes: no  Dyspnea or coughing: yes      Oncology History   Malignant neoplasm of  endometrium   5/23/2023 Surgery    Hysteroscopy, D&C: grade 1 endometrioid EC     6/5/2023 Surgery    TRH/BSO/BSLN/RPLN/repair of obturator nerve    Final pathology c/w MMRd, p53 WT stage IIIC1 grade 1 endometrioid EC. 4.5 cm, grade 1, 96% MI (22/23 mm), +LUE, -LVSI, +MELF, -cervix, 1/2+ RPLN, 1/1+ LPLN, -ascites     6/19/2023 Imaging Significant Findings    CT C/A/P w/: High L para-aortic at the level of the renal vessels, 1.5 cm.  Low R para-aortic at the LEAH, 4.1 cm in greatest dimension.     6/25/2023 Genomic Testing    [facility] (NGS): ARD1A, CHEK1, CTCF, , HDAC2, HNF1A, JAK1, KRAS, MAX, MSH6, P1K3R1, PPM1D, PTEN, ZFHX3, ZSR2     6/28/2023 Tumor Conference    No rule for debulking of para-aortic lymph nodes.  Adjuvant VBT.  Represent after completion of adjuvant therapy to discuss EBRT.        8/9/2023 - 9/13/2023 Radiation Therapy    Treating physician: Dr. Adriane Babb  Total Dose: 21 Gy HDR  Fractions: 3 vaginal cuff brachytherapy     8/22/2023 Imaging Significant Findings    CT A/P w/: No evidence of colitis, OK in high L para-aortic and low R para-aortic.     10/4/2023 Procedure    Colonoscopy: WNL          The following portions of the patient's history were reviewed and updated as appropriate: allergies, current medications, family history, medical history, social history and surgical history.    REVIEW OF SYSTEMS  All systems reviewed and negative except as noted in HPI.    OBJECTIVE   There were no vitals filed for this visit.         There is no height or weight on file to calculate BMI.      1. General: Well appearing, no apparent distress, alert and oriented.  2. Lymph: Neck symmetric without cervical or supraclavicular adenopathy or mass.  3. Lungs: Normal respiratory rate, no accessory muscle use.  4. Psych: Normal affect.  5. Abdomen:  non-distended, soft, non-tender, are no masses, no ascites, no hepatosplenomegaly  6. Skin: Warm, dry, no rashes or lesions.   7. Extremities: Bilateral lower  extremities without edema or tenderness.  8. Genitourinary: Deferred          ECOG status: 1    LABORATORY DATA  Lab data reviewed.    RADIOLOGICAL DATA  Radiology data reviewed.    ASSESSMENT / PLAN     1. 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 Encounter for antineoplastic chemotherapy    4. Colitis      We reviewed the results of her colonoscopy.  This does not seem to be consistent with colitis but it has essentially resolved with a steroid taper (10/4-).  My recommendation at this time is to continue with the steroid taper.  She is experiencing grade 2-3 neuropathy, and we will dose reduce her paclitaxle to 110 mg/m2 at her next visit.      PATIENT EDUCATION  Ready to learn, no apparent learning barriers were identified; learning preferences include listening. Explained diagnosis and treatment plan; patient expressed understanding of the content.    ADMINISTRATIVE BILLING  Greater than 50% of was spent in counseling.   .[facility]

## 2023-10-13 DIAGNOSIS — G47.01 INSOMNIA DUE TO MEDICAL CONDITION: ICD-10-CM

## 2023-10-13 DIAGNOSIS — C54.1 MALIGNANT NEOPLASM OF ENDOMETRIUM: ICD-10-CM

## 2023-10-13 LAB
COMMENT: NORMAL
FINAL PATHOLOGIC DIAGNOSIS: NORMAL
Lab: NORMAL

## 2023-10-16 RX ORDER — ZOLPIDEM TARTRATE 5 MG/1
5 TABLET ORAL NIGHTLY PRN
Qty: 20 TABLET | Refills: 0 | Status: SHIPPED | OUTPATIENT
Start: 2023-10-16 | End: 2024-03-24

## 2023-10-17 NOTE — PROGRESS NOTES
REFERRING PROVIDER  Omaira Evans MD     REASON FOR CONSULT  Sugar Diaz  is a 58 y.o.  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    HISTORY OF PRESENT ILLNESS    Chemotherapy regimen: Carboplatin AUC 5/Paclitaxel 135 mg/m2/Dostarlimab 200mg q3-6 weeks  Cycle: 6  Previous dose limiting toxicities: Y  Cycle 4: Grade 2 colitis s/p steroid taper  Cycle 5: Grade 3 colitis  Previous dose reductions: N  Previous breaks: Y  Cycle 4: Dostarlimab omitted due to grade 2 colitis  Cycle 5: Dostarlimab 2/2 grade 3 colitis (negative colonoscopy with improvement in symptoms with steroid taper)  Previous changes in pre-medications: N    Energy level: baseline  Appetite: baseline  Fevers/chills/nights: no  Nausea: no  Diarrhea: no  Emesis: no  Neuropathy: no  Discoloration of lower extremities: no  Mucositis: no  Maculopapular rashes: no  Dyspnea or coughing: yes      Oncology History   Malignant neoplasm of endometrium   5/23/2023 Surgery    Hysteroscopy, D&C: grade 1 endometrioid EC     6/5/2023 Surgery    TRH/BSO/BSLN/RPLN/repair of obturator nerve    Final pathology c/w MMRd, p53 WT stage IIIC1 grade 1 endometrioid EC. 4.5 cm, grade 1, 96% MI (22/23 mm), +LUE, -LVSI, +MELF, -cervix, 1/2+ RPLN, 1/1+ LPLN, -ascites     6/19/2023 Imaging Significant Findings    CT C/A/P w/: High L para-aortic at the level of the renal vessels, 1.5 cm.  Low R para-aortic at the LEAH, 4.1 cm in greatest dimension.     6/25/2023 Genomic Testing    [facility] (NGS): ARD1A, CHEK1, CTCF, , HDAC2, HNF1A, JAK1, KRAS, MAX, MSH6, P1K3R1, PPM1D, PTEN, ZFHX3, ZSR2     6/28/2023 Tumor Conference    No rule for debulking of para-aortic lymph nodes.  Adjuvant VBT.  Represent after completion of adjuvant therapy to discuss EBRT.        8/9/2023 - 9/13/2023 Radiation Therapy    Treating physician: Dr. Adriane Babb  Total Dose: 21 Gy HDR  Fractions: 3 vaginal cuff brachytherapy     8/22/2023 Imaging  Significant Findings    CT A/P w/: No evidence of colitis, OK in high L para-aortic and low R para-aortic.     10/4/2023 Procedure    Colonoscopy: WNL          The following portions of the patient's history were reviewed and updated as appropriate: allergies, current medications, family history, medical history, social history and surgical history.    REVIEW OF SYSTEMS  All systems reviewed and negative except as noted in HPI.    OBJECTIVE   Vitals:    10/18/23 1036   BP: (!) 111/58   Pulse: 96            Body mass index is 26.33 kg/m².      1. General: Well appearing, no apparent distress, alert and oriented.  2. Lymph: Neck symmetric without cervical or supraclavicular adenopathy or mass.  3. Lungs: Normal respiratory rate, no accessory muscle use.  4. Psych: Normal affect.  5. Abdomen:  non-distended, soft, non-tender, are no masses, no ascites, no hepatosplenomegaly  6. Skin: Warm, dry, no rashes or lesions.   7. Extremities: Bilateral lower extremities without edema or tenderness.  8. Genitourinary: Deferred          ECOG status: 1    LABORATORY DATA  Lab data reviewed.    RADIOLOGICAL DATA  Radiology data reviewed.    ASSESSMENT / PLAN     1. 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 Encounter for antineoplastic chemotherapy    4. Diarrhea, unspecified type        We reviewed the results of her colonoscopy.  This does not seem to be consistent with colitis but it has essentially resolved with a steroid taper (10/4-).  My recommendation at this time is to continue with the steroid taper and combination chemotherapy and immunotherapy.  She is experiencing grade 2-3 neuropathy, and we will dose reduce her paclitaxle to 110 mg/m2 at her next visit.  CBC, CMP, TSH, T4, CT C/A/P w/ w/o, RONC to discuss maintenance immunotherapy (we will tentatively schedule cycle #1 in 3 weeks).    PATIENT EDUCATION  Ready to learn, no apparent learning barriers were  identified; learning preferences include listening. Explained diagnosis and treatment plan; patient expressed understanding of the content.    ADMINISTRATIVE BILLING  Greater than 50% of was spent in counseling.   .dtmonc

## 2023-10-18 ENCOUNTER — OFFICE VISIT (OUTPATIENT)
Dept: GYNECOLOGIC ONCOLOGY | Facility: CLINIC | Age: 58
End: 2023-10-18
Payer: COMMERCIAL

## 2023-10-18 ENCOUNTER — LAB VISIT (OUTPATIENT)
Dept: LAB | Facility: OTHER | Age: 58
End: 2023-10-18
Attending: OBSTETRICS & GYNECOLOGY
Payer: COMMERCIAL

## 2023-10-18 VITALS
HEIGHT: 67 IN | HEART RATE: 96 BPM | WEIGHT: 168.13 LBS | DIASTOLIC BLOOD PRESSURE: 58 MMHG | BODY MASS INDEX: 26.39 KG/M2 | SYSTOLIC BLOOD PRESSURE: 111 MMHG

## 2023-10-18 DIAGNOSIS — Z51.11 ENCOUNTER FOR ANTINEOPLASTIC CHEMOTHERAPY: ICD-10-CM

## 2023-10-18 DIAGNOSIS — R19.7 DIARRHEA, UNSPECIFIED TYPE: ICD-10-CM

## 2023-10-18 DIAGNOSIS — C77.2 SECONDARY MALIGNANCY OF RETROPERITONEAL LYMPH NODES: ICD-10-CM

## 2023-10-18 DIAGNOSIS — C54.1 MALIGNANT NEOPLASM OF ENDOMETRIUM: ICD-10-CM

## 2023-10-18 DIAGNOSIS — C54.1 MALIGNANT NEOPLASM OF ENDOMETRIUM: Primary | ICD-10-CM

## 2023-10-18 LAB
ALBUMIN SERPL BCP-MCNC: 3.6 G/DL (ref 3.5–5.2)
ALP SERPL-CCNC: 71 U/L (ref 55–135)
ALT SERPL W/O P-5'-P-CCNC: 31 U/L (ref 10–44)
ANION GAP SERPL CALC-SCNC: 12 MMOL/L (ref 8–16)
AST SERPL-CCNC: 16 U/L (ref 10–40)
BILIRUB SERPL-MCNC: 0.3 MG/DL (ref 0.1–1)
BUN SERPL-MCNC: 16 MG/DL (ref 6–20)
CALCIUM SERPL-MCNC: 9.6 MG/DL (ref 8.7–10.5)
CHLORIDE SERPL-SCNC: 103 MMOL/L (ref 95–110)
CO2 SERPL-SCNC: 22 MMOL/L (ref 23–29)
CREAT SERPL-MCNC: 0.8 MG/DL (ref 0.5–1.4)
ERYTHROCYTE [DISTWIDTH] IN BLOOD BY AUTOMATED COUNT: 17 % (ref 11.5–14.5)
EST. GFR  (NO RACE VARIABLE): >60 ML/MIN/1.73 M^2
GLUCOSE SERPL-MCNC: 106 MG/DL (ref 70–110)
HCT VFR BLD AUTO: 38.5 % (ref 37–48.5)
HGB BLD-MCNC: 12.9 G/DL (ref 12–16)
IMM GRANULOCYTES # BLD AUTO: 0.23 K/UL (ref 0–0.04)
MCH RBC QN AUTO: 34.5 PG (ref 27–31)
MCHC RBC AUTO-ENTMCNC: 33.5 G/DL (ref 32–36)
MCV RBC AUTO: 103 FL (ref 82–98)
NEUTROPHILS # BLD AUTO: 7.8 K/UL (ref 1.8–7.7)
PLATELET # BLD AUTO: 159 K/UL (ref 150–450)
PMV BLD AUTO: 8.9 FL (ref 9.2–12.9)
POTASSIUM SERPL-SCNC: 3.8 MMOL/L (ref 3.5–5.1)
PROT SERPL-MCNC: 7 G/DL (ref 6–8.4)
RBC # BLD AUTO: 3.74 M/UL (ref 4–5.4)
SODIUM SERPL-SCNC: 137 MMOL/L (ref 136–145)
T4 FREE SERPL-MCNC: 0.83 NG/DL (ref 0.71–1.51)
TSH SERPL DL<=0.005 MIU/L-ACNC: 8.82 UIU/ML (ref 0.4–4)
WBC # BLD AUTO: 11.46 K/UL (ref 3.9–12.7)

## 2023-10-18 PROCEDURE — 3074F PR MOST RECENT SYSTOLIC BLOOD PRESSURE < 130 MM HG: ICD-10-PCS | Mod: CPTII,S$GLB,, | Performed by: OBSTETRICS & GYNECOLOGY

## 2023-10-18 PROCEDURE — 85027 COMPLETE CBC AUTOMATED: CPT | Performed by: OBSTETRICS & GYNECOLOGY

## 2023-10-18 PROCEDURE — 84443 ASSAY THYROID STIM HORMONE: CPT | Performed by: OBSTETRICS & GYNECOLOGY

## 2023-10-18 PROCEDURE — 99215 OFFICE O/P EST HI 40 MIN: CPT | Mod: 24,S$GLB,, | Performed by: OBSTETRICS & GYNECOLOGY

## 2023-10-18 PROCEDURE — 1159F MED LIST DOCD IN RCRD: CPT | Mod: CPTII,S$GLB,, | Performed by: OBSTETRICS & GYNECOLOGY

## 2023-10-18 PROCEDURE — 84439 ASSAY OF FREE THYROXINE: CPT | Performed by: OBSTETRICS & GYNECOLOGY

## 2023-10-18 PROCEDURE — 3008F PR BODY MASS INDEX (BMI) DOCUMENTED: ICD-10-PCS | Mod: CPTII,S$GLB,, | Performed by: OBSTETRICS & GYNECOLOGY

## 2023-10-18 PROCEDURE — 1159F PR MEDICATION LIST DOCUMENTED IN MEDICAL RECORD: ICD-10-PCS | Mod: CPTII,S$GLB,, | Performed by: OBSTETRICS & GYNECOLOGY

## 2023-10-18 PROCEDURE — 80053 COMPREHEN METABOLIC PANEL: CPT | Performed by: OBSTETRICS & GYNECOLOGY

## 2023-10-18 PROCEDURE — 99999 PR PBB SHADOW E&M-EST. PATIENT-LVL IV: CPT | Mod: PBBFAC,,, | Performed by: OBSTETRICS & GYNECOLOGY

## 2023-10-18 PROCEDURE — 99215 PR OFFICE/OUTPT VISIT, EST, LEVL V, 40-54 MIN: ICD-10-PCS | Mod: 24,S$GLB,, | Performed by: OBSTETRICS & GYNECOLOGY

## 2023-10-18 PROCEDURE — 3074F SYST BP LT 130 MM HG: CPT | Mod: CPTII,S$GLB,, | Performed by: OBSTETRICS & GYNECOLOGY

## 2023-10-18 PROCEDURE — 3008F BODY MASS INDEX DOCD: CPT | Mod: CPTII,S$GLB,, | Performed by: OBSTETRICS & GYNECOLOGY

## 2023-10-18 PROCEDURE — 3078F PR MOST RECENT DIASTOLIC BLOOD PRESSURE < 80 MM HG: ICD-10-PCS | Mod: CPTII,S$GLB,, | Performed by: OBSTETRICS & GYNECOLOGY

## 2023-10-18 PROCEDURE — 36415 COLL VENOUS BLD VENIPUNCTURE: CPT | Performed by: OBSTETRICS & GYNECOLOGY

## 2023-10-18 PROCEDURE — 99999 PR PBB SHADOW E&M-EST. PATIENT-LVL IV: ICD-10-PCS | Mod: PBBFAC,,, | Performed by: OBSTETRICS & GYNECOLOGY

## 2023-10-18 PROCEDURE — 3078F DIAST BP <80 MM HG: CPT | Mod: CPTII,S$GLB,, | Performed by: OBSTETRICS & GYNECOLOGY

## 2023-10-18 RX ORDER — FAMOTIDINE 10 MG/ML
20 INJECTION INTRAVENOUS
Status: CANCELLED | OUTPATIENT
Start: 2023-10-18

## 2023-10-18 RX ORDER — EPINEPHRINE 0.3 MG/.3ML
0.3 INJECTION SUBCUTANEOUS ONCE AS NEEDED
Status: CANCELLED | OUTPATIENT
Start: 2023-10-18

## 2023-10-18 RX ORDER — SODIUM CHLORIDE 0.9 % (FLUSH) 0.9 %
10 SYRINGE (ML) INJECTION
Status: CANCELLED | OUTPATIENT
Start: 2023-10-18

## 2023-10-18 RX ORDER — HEPARIN 100 UNIT/ML
500 SYRINGE INTRAVENOUS
Status: CANCELLED | OUTPATIENT
Start: 2023-10-18

## 2023-10-18 RX ORDER — PROCHLORPERAZINE EDISYLATE 5 MG/ML
5-10 INJECTION INTRAMUSCULAR; INTRAVENOUS ONCE AS NEEDED
Status: CANCELLED
Start: 2023-10-18

## 2023-10-18 RX ORDER — DIPHENHYDRAMINE HYDROCHLORIDE 50 MG/ML
50 INJECTION INTRAMUSCULAR; INTRAVENOUS ONCE AS NEEDED
Status: CANCELLED | OUTPATIENT
Start: 2023-10-18

## 2023-10-19 ENCOUNTER — INFUSION (OUTPATIENT)
Dept: INFUSION THERAPY | Facility: OTHER | Age: 58
End: 2023-10-19
Attending: INTERNAL MEDICINE
Payer: COMMERCIAL

## 2023-10-19 VITALS
TEMPERATURE: 99 F | BODY MASS INDEX: 26.37 KG/M2 | OXYGEN SATURATION: 98 % | SYSTOLIC BLOOD PRESSURE: 109 MMHG | DIASTOLIC BLOOD PRESSURE: 58 MMHG | HEIGHT: 67 IN | HEART RATE: 77 BPM | RESPIRATION RATE: 16 BRPM | WEIGHT: 168 LBS

## 2023-10-19 DIAGNOSIS — C54.1 MALIGNANT NEOPLASM OF ENDOMETRIUM: Primary | ICD-10-CM

## 2023-10-19 PROCEDURE — 96415 CHEMO IV INFUSION ADDL HR: CPT

## 2023-10-19 PROCEDURE — 96417 CHEMO IV INFUS EACH ADDL SEQ: CPT

## 2023-10-19 PROCEDURE — 96361 HYDRATE IV INFUSION ADD-ON: CPT

## 2023-10-19 PROCEDURE — 25000003 PHARM REV CODE 250: Performed by: OBSTETRICS & GYNECOLOGY

## 2023-10-19 PROCEDURE — 96413 CHEMO IV INFUSION 1 HR: CPT

## 2023-10-19 PROCEDURE — 96367 TX/PROPH/DG ADDL SEQ IV INF: CPT

## 2023-10-19 PROCEDURE — 63600175 PHARM REV CODE 636 W HCPCS: Performed by: OBSTETRICS & GYNECOLOGY

## 2023-10-19 PROCEDURE — 96375 TX/PRO/DX INJ NEW DRUG ADDON: CPT

## 2023-10-19 RX ORDER — EPINEPHRINE 0.3 MG/.3ML
0.3 INJECTION SUBCUTANEOUS ONCE AS NEEDED
Status: DISCONTINUED | OUTPATIENT
Start: 2023-10-19 | End: 2023-10-19 | Stop reason: HOSPADM

## 2023-10-19 RX ORDER — FAMOTIDINE 10 MG/ML
20 INJECTION INTRAVENOUS
Status: COMPLETED | OUTPATIENT
Start: 2023-10-19 | End: 2023-10-19

## 2023-10-19 RX ORDER — HEPARIN 100 UNIT/ML
500 SYRINGE INTRAVENOUS
Status: DISCONTINUED | OUTPATIENT
Start: 2023-10-19 | End: 2023-10-19 | Stop reason: HOSPADM

## 2023-10-19 RX ORDER — DIPHENHYDRAMINE HYDROCHLORIDE 50 MG/ML
50 INJECTION INTRAMUSCULAR; INTRAVENOUS ONCE AS NEEDED
Status: DISCONTINUED | OUTPATIENT
Start: 2023-10-19 | End: 2023-10-19 | Stop reason: HOSPADM

## 2023-10-19 RX ORDER — SODIUM CHLORIDE 0.9 % (FLUSH) 0.9 %
10 SYRINGE (ML) INJECTION
Status: DISCONTINUED | OUTPATIENT
Start: 2023-10-19 | End: 2023-10-19 | Stop reason: HOSPADM

## 2023-10-19 RX ADMIN — DIPHENHYDRAMINE HYDROCHLORIDE 50 MG: 50 INJECTION, SOLUTION INTRAMUSCULAR; INTRAVENOUS at 10:10

## 2023-10-19 RX ADMIN — PACLITAXEL 330 MG: 6 INJECTION, SOLUTION INTRAVENOUS at 11:10

## 2023-10-19 RX ADMIN — SODIUM CHLORIDE 500 ML: 9 INJECTION, SOLUTION INTRAVENOUS at 02:10

## 2023-10-19 RX ADMIN — FAMOTIDINE 20 MG: 10 INJECTION INTRAVENOUS at 10:10

## 2023-10-19 RX ADMIN — SODIUM CHLORIDE 500 MG: 9 INJECTION, SOLUTION INTRAVENOUS at 09:10

## 2023-10-19 RX ADMIN — FOSAPREPITANT 150 MG: 150 INJECTION, POWDER, LYOPHILIZED, FOR SOLUTION INTRAVENOUS at 10:10

## 2023-10-19 RX ADMIN — DEXAMETHASONE SODIUM PHOSPHATE 0.25 MG: 4 INJECTION, SOLUTION INTRA-ARTICULAR; INTRALESIONAL; INTRAMUSCULAR; INTRAVENOUS; SOFT TISSUE at 10:10

## 2023-10-19 RX ADMIN — CARBOPLATIN 570 MG: 10 INJECTION, SOLUTION INTRAVENOUS at 02:10

## 2023-10-19 NOTE — PLAN OF CARE
Vital Signs Stable, No apparent distress noted; Pt tolerated Dostarlimab/Taxol/Carbo w/o difficulty.  24g piv removed;No bleeding,swelling or drainage noted to the site.  AVS/Discharge instructions given;all questions answered ;Pt verbalizes understanding. Follow up appointments per chidit; ambulated from clinic in NAD accompanied by

## 2023-10-25 ENCOUNTER — OFFICE VISIT (OUTPATIENT)
Dept: GASTROENTEROLOGY | Facility: CLINIC | Age: 58
End: 2023-10-25
Payer: COMMERCIAL

## 2023-10-25 VITALS
DIASTOLIC BLOOD PRESSURE: 86 MMHG | BODY MASS INDEX: 26.75 KG/M2 | HEIGHT: 67 IN | SYSTOLIC BLOOD PRESSURE: 149 MMHG | HEART RATE: 112 BPM | WEIGHT: 170.44 LBS

## 2023-10-25 DIAGNOSIS — R19.7 DIARRHEA, UNSPECIFIED TYPE: Primary | ICD-10-CM

## 2023-10-25 PROCEDURE — 99213 OFFICE O/P EST LOW 20 MIN: CPT | Mod: S$GLB,,, | Performed by: INTERNAL MEDICINE

## 2023-10-25 PROCEDURE — 1159F PR MEDICATION LIST DOCUMENTED IN MEDICAL RECORD: ICD-10-PCS | Mod: CPTII,S$GLB,, | Performed by: INTERNAL MEDICINE

## 2023-10-25 PROCEDURE — 1160F PR REVIEW ALL MEDS BY PRESCRIBER/CLIN PHARMACIST DOCUMENTED: ICD-10-PCS | Mod: CPTII,S$GLB,, | Performed by: INTERNAL MEDICINE

## 2023-10-25 PROCEDURE — 3008F PR BODY MASS INDEX (BMI) DOCUMENTED: ICD-10-PCS | Mod: CPTII,S$GLB,, | Performed by: INTERNAL MEDICINE

## 2023-10-25 PROCEDURE — 3077F PR MOST RECENT SYSTOLIC BLOOD PRESSURE >= 140 MM HG: ICD-10-PCS | Mod: CPTII,S$GLB,, | Performed by: INTERNAL MEDICINE

## 2023-10-25 PROCEDURE — 99213 PR OFFICE/OUTPT VISIT, EST, LEVL III, 20-29 MIN: ICD-10-PCS | Mod: S$GLB,,, | Performed by: INTERNAL MEDICINE

## 2023-10-25 PROCEDURE — 99999 PR PBB SHADOW E&M-EST. PATIENT-LVL IV: CPT | Mod: PBBFAC,,, | Performed by: INTERNAL MEDICINE

## 2023-10-25 PROCEDURE — 3077F SYST BP >= 140 MM HG: CPT | Mod: CPTII,S$GLB,, | Performed by: INTERNAL MEDICINE

## 2023-10-25 PROCEDURE — 1159F MED LIST DOCD IN RCRD: CPT | Mod: CPTII,S$GLB,, | Performed by: INTERNAL MEDICINE

## 2023-10-25 PROCEDURE — 1160F RVW MEDS BY RX/DR IN RCRD: CPT | Mod: CPTII,S$GLB,, | Performed by: INTERNAL MEDICINE

## 2023-10-25 PROCEDURE — 3008F BODY MASS INDEX DOCD: CPT | Mod: CPTII,S$GLB,, | Performed by: INTERNAL MEDICINE

## 2023-10-25 PROCEDURE — 3079F PR MOST RECENT DIASTOLIC BLOOD PRESSURE 80-89 MM HG: ICD-10-PCS | Mod: CPTII,S$GLB,, | Performed by: INTERNAL MEDICINE

## 2023-10-25 PROCEDURE — 3079F DIAST BP 80-89 MM HG: CPT | Mod: CPTII,S$GLB,, | Performed by: INTERNAL MEDICINE

## 2023-10-25 PROCEDURE — 99999 PR PBB SHADOW E&M-EST. PATIENT-LVL IV: ICD-10-PCS | Mod: PBBFAC,,, | Performed by: INTERNAL MEDICINE

## 2023-10-25 RX ORDER — DIPHENOXYLATE HYDROCHLORIDE AND ATROPINE SULFATE 2.5; .025 MG/1; MG/1
1 TABLET ORAL 4 TIMES DAILY PRN
Qty: 60 TABLET | Refills: 6 | Status: SHIPPED | OUTPATIENT
Start: 2023-10-25 | End: 2023-11-22 | Stop reason: SDUPTHER

## 2023-10-25 NOTE — PROGRESS NOTES
"[facility]    CC: diarrhea    HPI 58 y.o. 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  Lomotil does make her a little sleepy.  Her diarrhea had improved with a steroid taper and being off of her immunotherapy but she received a dose of immunotherapy last week and her symptoms returned.    Past Medical History  Past Medical History:   Diagnosis Date    Chest pain 2002    NEGATIVE STRESS ECHO 2002    Endometrial cancer     2023    GERD (gastroesophageal reflux disease)     Lower back pain     Migraines     Mild allergic rhinitis     Postmenopausal bleeding        Physical Examination  BP (!) 149/86   Pulse (!) 112   Ht 5' 7" (1.702 m)   Wt 77.3 kg (170 lb 6.7 oz)   LMP 11/15/2012   BMI 26.69 kg/m²   General appearance: alert, cooperative, no distress  HENT: Normocephalic, atraumatic, neck symmetrical, no nasal discharge   Abdomen: soft, non-tender;non distended, no rebound or guarding  Neurologic: Alert and oriented X 3, moving all four extremities, intact sensation to light touch    Labs:  Lab Results   Component Value Date    WBC 11.46 10/18/2023    HGB 12.9 10/18/2023    HCT 38.5 10/18/2023     (H) 10/18/2023     10/18/2023         CMP  Sodium   Date Value Ref Range Status   10/18/2023 137 136 - 145 mmol/L Final     Potassium   Date Value Ref Range Status   10/18/2023 3.8 3.5 - 5.1 mmol/L Final     Chloride   Date Value Ref Range Status   10/18/2023 103 95 - 110 mmol/L Final     CO2   Date Value Ref Range Status   10/18/2023 22 (L) 23 - 29 mmol/L Final     Glucose   Date Value Ref Range Status   10/18/2023 106 70 - 110 mg/dL Final     BUN   Date Value Ref Range Status   10/18/2023 16 6 - 20 mg/dL Final "     Creatinine   Date Value Ref Range Status   10/18/2023 0.8 0.5 - 1.4 mg/dL Final     Calcium   Date Value Ref Range Status   10/18/2023 9.6 8.7 - 10.5 mg/dL Final     Total Protein   Date Value Ref Range Status   10/18/2023 7.0 6.0 - 8.4 g/dL Final     Albumin   Date Value Ref Range Status   10/18/2023 3.6 3.5 - 5.2 g/dL Final     Total Bilirubin   Date Value Ref Range Status   10/18/2023 0.3 0.1 - 1.0 mg/dL Final     Comment:     For infants and newborns, interpretation of results should be based  on gestational age, weight and in agreement with clinical  observations.    Premature Infant recommended reference ranges:  Up to 24 hours.............<8.0 mg/dL  Up to 48 hours............<12.0 mg/dL  3-5 days..................<15.0 mg/dL  6-29 days.................<15.0 mg/dL       Alkaline Phosphatase   Date Value Ref Range Status   10/18/2023 71 55 - 135 U/L Final     AST   Date Value Ref Range Status   10/18/2023 16 10 - 40 U/L Final     ALT   Date Value Ref Range Status   10/18/2023 31 10 - 44 U/L Final     Anion Gap   Date Value Ref Range Status   10/18/2023 12 8 - 16 mmol/L Final     eGFR   Date Value Ref Range Status   10/18/2023 >60 >60 mL/min/1.73 m^2 Final         Assessment:   The patient is a 59 yo female with endometriod cancer on immunotherapy with Dostarlimab with 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    Plan:  -Continue Lomotil as needed, it was refilled today.  -Ok to take Imodium as needed during the day as .    Marcia Weaver MD

## 2023-10-31 ENCOUNTER — HOSPITAL ENCOUNTER (OUTPATIENT)
Dept: RADIOLOGY | Facility: OTHER | Age: 58
Discharge: HOME | DRG: 810 | End: 2023-10-31
Attending: STUDENT IN AN ORGANIZED HEALTH CARE EDUCATION/TRAINING PROGRAM
Payer: COMMERCIAL

## 2023-10-31 ENCOUNTER — TELEPHONE (OUTPATIENT)
Dept: INTERNAL MEDICINE | Facility: CLINIC | Age: 58
End: 2023-10-31
Payer: COMMERCIAL

## 2023-10-31 ENCOUNTER — OFFICE VISIT (OUTPATIENT)
Dept: INTERNAL MEDICINE | Facility: CLINIC | Age: 58
End: 2023-10-31
Payer: COMMERCIAL

## 2023-10-31 VITALS
HEIGHT: 67 IN | BODY MASS INDEX: 26.99 KG/M2 | OXYGEN SATURATION: 94 % | RESPIRATION RATE: 22 BRPM | HEART RATE: 131 BPM | WEIGHT: 171.94 LBS | SYSTOLIC BLOOD PRESSURE: 120 MMHG | DIASTOLIC BLOOD PRESSURE: 60 MMHG | TEMPERATURE: 101 F

## 2023-10-31 DIAGNOSIS — T45.1X5A CHEMOTHERAPY-INDUCED NEUTROPENIA: ICD-10-CM

## 2023-10-31 DIAGNOSIS — J06.9 UPPER RESPIRATORY TRACT INFECTION, UNSPECIFIED TYPE: Primary | ICD-10-CM

## 2023-10-31 DIAGNOSIS — D70.9 NEUTROPENIC FEVER: ICD-10-CM

## 2023-10-31 DIAGNOSIS — R50.81 NEUTROPENIC FEVER: ICD-10-CM

## 2023-10-31 DIAGNOSIS — J06.9 UPPER RESPIRATORY TRACT INFECTION, UNSPECIFIED TYPE: ICD-10-CM

## 2023-10-31 DIAGNOSIS — D70.1 CHEMOTHERAPY-INDUCED NEUTROPENIA: ICD-10-CM

## 2023-10-31 LAB
CTP QC/QA: YES
FLUAV AG NPH QL: NEGATIVE
FLUBV AG NPH QL: NEGATIVE

## 2023-10-31 PROCEDURE — 99214 PR OFFICE/OUTPT VISIT, EST, LEVL IV, 30-39 MIN: ICD-10-PCS | Mod: S$GLB,,, | Performed by: STUDENT IN AN ORGANIZED HEALTH CARE EDUCATION/TRAINING PROGRAM

## 2023-10-31 PROCEDURE — 71046 X-RAY EXAM CHEST 2 VIEWS: CPT | Mod: TC,FY

## 2023-10-31 PROCEDURE — 3078F DIAST BP <80 MM HG: CPT | Mod: CPTII,S$GLB,, | Performed by: STUDENT IN AN ORGANIZED HEALTH CARE EDUCATION/TRAINING PROGRAM

## 2023-10-31 PROCEDURE — 99999 PR PBB SHADOW E&M-EST. PATIENT-LVL IV: CPT | Mod: PBBFAC,,, | Performed by: STUDENT IN AN ORGANIZED HEALTH CARE EDUCATION/TRAINING PROGRAM

## 2023-10-31 PROCEDURE — 3074F SYST BP LT 130 MM HG: CPT | Mod: CPTII,S$GLB,, | Performed by: STUDENT IN AN ORGANIZED HEALTH CARE EDUCATION/TRAINING PROGRAM

## 2023-10-31 PROCEDURE — 3074F PR MOST RECENT SYSTOLIC BLOOD PRESSURE < 130 MM HG: ICD-10-PCS | Mod: CPTII,S$GLB,, | Performed by: STUDENT IN AN ORGANIZED HEALTH CARE EDUCATION/TRAINING PROGRAM

## 2023-10-31 PROCEDURE — 1159F PR MEDICATION LIST DOCUMENTED IN MEDICAL RECORD: ICD-10-PCS | Mod: CPTII,S$GLB,, | Performed by: STUDENT IN AN ORGANIZED HEALTH CARE EDUCATION/TRAINING PROGRAM

## 2023-10-31 PROCEDURE — 1159F MED LIST DOCD IN RCRD: CPT | Mod: CPTII,S$GLB,, | Performed by: STUDENT IN AN ORGANIZED HEALTH CARE EDUCATION/TRAINING PROGRAM

## 2023-10-31 PROCEDURE — 3008F BODY MASS INDEX DOCD: CPT | Mod: CPTII,S$GLB,, | Performed by: STUDENT IN AN ORGANIZED HEALTH CARE EDUCATION/TRAINING PROGRAM

## 2023-10-31 PROCEDURE — 99214 OFFICE O/P EST MOD 30 MIN: CPT | Mod: S$GLB,,, | Performed by: STUDENT IN AN ORGANIZED HEALTH CARE EDUCATION/TRAINING PROGRAM

## 2023-10-31 PROCEDURE — 3008F PR BODY MASS INDEX (BMI) DOCUMENTED: ICD-10-PCS | Mod: CPTII,S$GLB,, | Performed by: STUDENT IN AN ORGANIZED HEALTH CARE EDUCATION/TRAINING PROGRAM

## 2023-10-31 PROCEDURE — 87804 INFLUENZA ASSAY W/OPTIC: CPT | Mod: 59,QW,S$GLB, | Performed by: STUDENT IN AN ORGANIZED HEALTH CARE EDUCATION/TRAINING PROGRAM

## 2023-10-31 PROCEDURE — 71046 XR CHEST PA AND LATERAL: ICD-10-PCS | Mod: 26,,, | Performed by: RADIOLOGY

## 2023-10-31 PROCEDURE — 99999 PR PBB SHADOW E&M-EST. PATIENT-LVL IV: ICD-10-PCS | Mod: PBBFAC,,, | Performed by: STUDENT IN AN ORGANIZED HEALTH CARE EDUCATION/TRAINING PROGRAM

## 2023-10-31 PROCEDURE — 87804 POCT INFLUENZA A/B: ICD-10-PCS | Mod: 59,QW,S$GLB, | Performed by: STUDENT IN AN ORGANIZED HEALTH CARE EDUCATION/TRAINING PROGRAM

## 2023-10-31 PROCEDURE — 3078F PR MOST RECENT DIASTOLIC BLOOD PRESSURE < 80 MM HG: ICD-10-PCS | Mod: CPTII,S$GLB,, | Performed by: STUDENT IN AN ORGANIZED HEALTH CARE EDUCATION/TRAINING PROGRAM

## 2023-10-31 PROCEDURE — 71046 X-RAY EXAM CHEST 2 VIEWS: CPT | Mod: 26,,, | Performed by: RADIOLOGY

## 2023-10-31 RX ORDER — PROMETHAZINE HYDROCHLORIDE AND DEXTROMETHORPHAN HYDROBROMIDE 6.25; 15 MG/5ML; MG/5ML
5 SYRUP ORAL EVERY 4 HOURS PRN
Qty: 180 ML | Refills: 1 | Status: SHIPPED | OUTPATIENT
Start: 2023-10-31 | End: 2024-03-25

## 2023-10-31 NOTE — PROGRESS NOTES
[facility]    Subjective:     Patient ID: Sugar Diaz is a 58 y.o. female.  Chief Complaint: Fever    HPI:  Patient is a 58 y.o. female who   has a past medical history of Chest pain (2002), Endometrial cancer, GERD (gastroesophageal reflux disease), Lower back pain, Migraines, Mild allergic rhinitis, and Postmenopausal bleeding.  who presents for cough and headaches that started 2 nights ago. Yesterday started feeling worse. Didn't sleep much last night because coughing kept her up. Fatigue. Also has shortness of breath which has been present since her third cycle of chemo about 9 weeks ago.  Tested for COVID at home which was negative.     Current Outpatient Medications   Medication Instructions    acetaminophen (TYLENOL EXTRA STRENGTH) 1,000 mg, Oral, Every 6 hours PRN    acetaminophen (TYLENOL) 650 mg, Oral, Every 6 hours PRN    dexAMETHasone (DECADRON) 4 MG Tab Take daily beginning the day after chemotherapy for 3 days    diclofenac sodium (VOLTAREN) 2 g, Topical (Top), 2 times daily    diphenhydrAMINE (BENADRYL) 25 mg, Oral, Every 6 hours PRN    diphenoxylate-atropine 2.5-0.025 mg (LOMOTIL) 2.5-0.025 mg per tablet 1 tablet, Oral, 4 times daily PRN    famotidine (PEPCID) 40 mg, Oral, Daily    ibuprofen (ADVIL,MOTRIN) 800 mg, Oral, Every 8 hours PRN    loratadine (CLARITIN) 10 mg, Oral, Daily    predniSONE (DELTASONE) 10 MG tablet Take 5 tablets (50 mg total) by mouth once daily for 7 days, THEN 3 tablets (30 mg total) once daily for 7 days, THEN 1 tablet (10 mg total) once daily for 7 days.    prochlorperazine (COMPAZINE) 5 MG tablet Take every 6 hours for 3 days beginning the day after chemotherapy    promethazine-dextromethorphan (PROMETHAZINE-DM) 6.25-15 mg/5 mL Syrp 5 mLs, Oral, Every 4 hours PRN    sulfamethoxazole-trimethoprim 800-160mg (BACTRIM DS) 800-160 mg Tab 1 tablet, Oral, Daily    valACYclovir (VALTREX) 1000 MG tablet Two pills at onset of fever blister and then  "repeat 2 pills 12 hr later    zolpidem (AMBIEN) 5 mg, Oral, Nightly PRN       Objective:        Body mass index is 26.93 kg/m².  Vitals:    10/31/23 0939   BP: 120/60   Pulse: (!) 131   Resp: (!) 22   Temp: (!) 100.8 °F (38.2 °C)   SpO2: (!) 94%   Weight: 78 kg (171 lb 15.3 oz)   Height: 5' 7" (1.702 m)   PainSc: 0-No pain     Physical Exam  Constitutional:       General: She is not in acute distress.     Appearance: Normal appearance. She is not ill-appearing or toxic-appearing.   Cardiovascular:      Rate and Rhythm: Normal rate.      Pulses: Normal pulses.   Pulmonary:      Effort: Tachypnea present. No respiratory distress.      Breath sounds: Normal breath sounds and air entry. No decreased air movement. No decreased breath sounds, wheezing, rhonchi or rales.   Abdominal:      General: Abdomen is flat.      Palpations: Abdomen is soft.   Musculoskeletal:      Right lower leg: No edema.      Left lower leg: No edema.   Skin:     General: Skin is warm.   Neurological:      General: No focal deficit present.      Mental Status: She is alert.   Psychiatric:         Mood and Affect: Mood normal.           Lab Results   Component Value Date    WBC 1.00 (LL) 10/31/2023    HGB 11.5 (L) 10/31/2023    HCT 33.3 (L) 10/31/2023     (L) 10/31/2023    CHOL 199 03/01/2008    TRIG 99 12/11/2020    HDL 42 12/11/2020    ALT 45 (H) 10/31/2023    AST 32 10/31/2023     10/31/2023    K 4.4 10/31/2023     10/31/2023    CREATININE 0.7 10/31/2023    BUN 10 10/31/2023    CO2 23 10/31/2023    TSH 8.816 (H) 10/18/2023     Assessment:         1. Upper respiratory tract infection, unspecified type    2. Neutropenic fever    3. Chemotherapy-induced neutropenia          Plan:   1. Upper respiratory tract infection, unspecified type  Patient presents with signs and symptoms of upper respiratory tract infection.  She is neutropenic but is currently taking prophylactic Bactrim.  Basic labs do not show any hepatic or renal " dysfunction.  Patient is febrile, tachycardic and slightly tachypneic with borderline oxygen saturations.  Despite this she is clinically well-appearing.  She is nontoxic and in no acute distress.  Lung exam is unremarkable.  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  Tachycardia and tachypnea are likely related to fever.  PE is less likely but is on the differential considering malignancy.  Very slightly elevated D-dimer is somewhat reassuring that it is not higher, though this still does not fully rule out PE.  Advised patient that she may need to go to the ER, but she preferred not to.  Considering how well-appearing she is it his reasonable to proceed with very close outpatient monitoring.  Discussed extremely strict recurrent precautions.  Advised her to go to the ER should she experience any slight worsening of symptoms.  We will have patient return tomorrow for very close follow up. Will add blood cultures for workup of neutropenic fever though bacteremia is less likely given she is taking the bactrim and there is a clear suspected source.     - X-Ray Chest PA And Lateral; Future  - D-DIMER, QUANTITATIVE; Future  - CBC W/ AUTO DIFFERENTIAL; Future  - COMPREHENSIVE METABOLIC PANEL; Future  - POCT Influenza A/B      Wells PE is 2.5 points.     All of your core healthy metrics are met.    Follow up in about 1 day (around 11/1/2023). or sooner prn (as needed)        Dexter Khan  [facility]  [address] [address]  Phone [phone]  Fax [phone]      This note is dictated using the M*Modal Fluency Direct word recognition program. It may contain word recognition mistakes or wrong word substitutions that were missed on review.

## 2023-11-01 ENCOUNTER — HOSPITAL ENCOUNTER (INPATIENT)
Facility: OTHER | Age: 58
LOS: 2 days | Discharge: HOME | DRG: 810 | End: 2023-11-03
Attending: EMERGENCY MEDICINE | Admitting: OBSTETRICS & GYNECOLOGY
Payer: COMMERCIAL

## 2023-11-01 ENCOUNTER — OFFICE VISIT (OUTPATIENT)
Dept: INTERNAL MEDICINE | Facility: CLINIC | Age: 58
End: 2023-11-01
Payer: COMMERCIAL

## 2023-11-01 VITALS
BODY MASS INDEX: 27.06 KG/M2 | HEIGHT: 67 IN | TEMPERATURE: 103 F | HEART RATE: 135 BPM | WEIGHT: 172.38 LBS | OXYGEN SATURATION: 93 % | DIASTOLIC BLOOD PRESSURE: 67 MMHG | SYSTOLIC BLOOD PRESSURE: 127 MMHG

## 2023-11-01 DIAGNOSIS — R50.81 NEUTROPENIC FEVER: Primary | ICD-10-CM

## 2023-11-01 DIAGNOSIS — D70.9 NEUTROPENIC FEVER: Primary | ICD-10-CM

## 2023-11-01 PROBLEM — E87.8 ELECTROLYTE IMBALANCE: Status: ACTIVE | Noted: 2023-11-01

## 2023-11-01 PROBLEM — K21.9 GERD (GASTROESOPHAGEAL REFLUX DISEASE): Status: ACTIVE | Noted: 2023-11-01

## 2023-11-01 LAB
ADENOVIRUS: NOT DETECTED
ALBUMIN SERPL BCP-MCNC: 2.8 G/DL (ref 3.5–5.2)
ALP SERPL-CCNC: 59 U/L (ref 55–135)
ALT SERPL W/O P-5'-P-CCNC: 46 U/L (ref 10–44)
ANION GAP SERPL CALC-SCNC: 11 MMOL/L (ref 8–16)
AST SERPL-CCNC: 32 U/L (ref 10–40)
BASOPHILS # BLD AUTO: ABNORMAL K/UL (ref 0–0.2)
BASOPHILS NFR BLD: 1 % (ref 0–1.9)
BILIRUB SERPL-MCNC: 0.3 MG/DL (ref 0.1–1)
BILIRUB UR QL STRIP: NEGATIVE
BORDETELLA PARAPERTUSSIS (IS1001): NOT DETECTED
BORDETELLA PERTUSSIS (PTXP): NOT DETECTED
BUN SERPL-MCNC: 6 MG/DL (ref 6–20)
CALCIUM SERPL-MCNC: 9.1 MG/DL (ref 8.7–10.5)
CHLAMYDIA PNEUMONIAE: NOT DETECTED
CHLORIDE SERPL-SCNC: 100 MMOL/L (ref 95–110)
CLARITY UR: CLEAR
CO2 SERPL-SCNC: 24 MMOL/L (ref 23–29)
COLOR UR: COLORLESS
CORONAVIRUS 229E, COMMON COLD VIRUS: NOT DETECTED
CORONAVIRUS HKU1, COMMON COLD VIRUS: NOT DETECTED
CORONAVIRUS NL63, COMMON COLD VIRUS: NOT DETECTED
CORONAVIRUS OC43, COMMON COLD VIRUS: NOT DETECTED
CREAT SERPL-MCNC: 0.7 MG/DL (ref 0.5–1.4)
CTP QC/QA: YES
CTP QC/QA: YES
DIFFERENTIAL METHOD: ABNORMAL
EOSINOPHIL # BLD AUTO: ABNORMAL K/UL (ref 0–0.5)
EOSINOPHIL NFR BLD: 3 % (ref 0–8)
ERYTHROCYTE [DISTWIDTH] IN BLOOD BY AUTOMATED COUNT: 15.3 % (ref 11.5–14.5)
EST. GFR  (NO RACE VARIABLE): >60 ML/MIN/1.73 M^2
FLUBV RNA NPH QL NAA+NON-PROBE: NOT DETECTED
GLUCOSE SERPL-MCNC: 162 MG/DL (ref 70–110)
GLUCOSE UR QL STRIP: ABNORMAL
HCT VFR BLD AUTO: 28.8 % (ref 37–48.5)
HGB BLD-MCNC: 10.1 G/DL (ref 12–16)
HGB UR QL STRIP: NEGATIVE
HPIV1 RNA NPH QL NAA+NON-PROBE: NOT DETECTED
HPIV2 RNA NPH QL NAA+NON-PROBE: NOT DETECTED
HPIV3 RNA NPH QL NAA+NON-PROBE: NOT DETECTED
HPIV4 RNA NPH QL NAA+NON-PROBE: NOT DETECTED
HUMAN METAPNEUMOVIRUS: NOT DETECTED
IMM GRANULOCYTES # BLD AUTO: ABNORMAL K/UL (ref 0–0.04)
IMM GRANULOCYTES NFR BLD AUTO: ABNORMAL % (ref 0–0.5)
INFLUENZA A (SUBTYPES H1,H1-2009,H3): NOT DETECTED
KETONES UR QL STRIP: NEGATIVE
LACTATE SERPL-SCNC: 1.7 MMOL/L (ref 0.5–2.2)
LACTATE SERPL-SCNC: 2.1 MMOL/L (ref 0.5–2.2)
LEUKOCYTE ESTERASE UR QL STRIP: NEGATIVE
LYMPHOCYTES # BLD AUTO: ABNORMAL K/UL (ref 1–4.8)
LYMPHOCYTES NFR BLD: 26 % (ref 18–48)
MAGNESIUM SERPL-MCNC: 1.5 MG/DL (ref 1.6–2.6)
MCH RBC QN AUTO: 36.5 PG (ref 27–31)
MCHC RBC AUTO-ENTMCNC: 35.1 G/DL (ref 32–36)
MCV RBC AUTO: 104 FL (ref 82–98)
METAMYELOCYTES NFR BLD MANUAL: 1 %
MONOCYTES # BLD AUTO: ABNORMAL K/UL (ref 0.3–1)
MONOCYTES NFR BLD: 11 % (ref 4–15)
MYCOPLASMA PNEUMONIAE: NOT DETECTED
NEUTROPHILS # BLD AUTO: ABNORMAL K/UL (ref 1.8–7.7)
NEUTROPHILS NFR BLD: 50 % (ref 38–73)
NEUTS BAND NFR BLD MANUAL: 8 %
NITRITE UR QL STRIP: NEGATIVE
NRBC BLD-RTO: 0 /100 WBC
PH UR STRIP: 7 [PH] (ref 5–8)
PHOSPHATE SERPL-MCNC: 1.9 MG/DL (ref 2.7–4.5)
PLATELET # BLD AUTO: 125 K/UL (ref 150–450)
PLATELET BLD QL SMEAR: ABNORMAL
PMV BLD AUTO: 9.2 FL (ref 9.2–12.9)
POC MOLECULAR INFLUENZA A AGN: NEGATIVE
POC MOLECULAR INFLUENZA B AGN: NEGATIVE
POTASSIUM SERPL-SCNC: 3.7 MMOL/L (ref 3.5–5.1)
PROT SERPL-MCNC: 6.2 G/DL (ref 6–8.4)
PROT UR QL STRIP: ABNORMAL
RBC # BLD AUTO: 2.77 M/UL (ref 4–5.4)
RESPIRATORY INFECTION PANEL SOURCE: ABNORMAL
RSV RNA NPH QL NAA+NON-PROBE: DETECTED
RV+EV RNA NPH QL NAA+NON-PROBE: NOT DETECTED
SARS-COV-2 RDRP RESP QL NAA+PROBE: NEGATIVE
SARS-COV-2 RDRP RESP QL NAA+PROBE: NEGATIVE
SARS-COV-2 RNA RESP QL NAA+PROBE: NOT DETECTED
SODIUM SERPL-SCNC: 135 MMOL/L (ref 136–145)
SP GR UR STRIP: >1.03 (ref 1–1.03)
TOXIC GRANULES BLD QL SMEAR: PRESENT
URN SPEC COLLECT METH UR: ABNORMAL
UROBILINOGEN UR STRIP-ACNC: NEGATIVE EU/DL
WBC # BLD AUTO: 1.83 K/UL (ref 3.9–12.7)

## 2023-11-01 PROCEDURE — 99285 EMERGENCY DEPT VISIT HI MDM: CPT | Mod: 25

## 2023-11-01 PROCEDURE — 83605 ASSAY OF LACTIC ACID: CPT | Mod: 91 | Performed by: EMERGENCY MEDICINE

## 2023-11-01 PROCEDURE — 99222 PR INITIAL HOSPITAL CARE,LEVL II: ICD-10-PCS | Mod: ,,, | Performed by: OBSTETRICS & GYNECOLOGY

## 2023-11-01 PROCEDURE — 87633 RESP VIRUS 12-25 TARGETS: CPT

## 2023-11-01 PROCEDURE — U0002 COVID-19 LAB TEST NON-CDC: HCPCS

## 2023-11-01 PROCEDURE — 3008F BODY MASS INDEX DOCD: CPT | Mod: CPTII,S$GLB,, | Performed by: STUDENT IN AN ORGANIZED HEALTH CARE EDUCATION/TRAINING PROGRAM

## 2023-11-01 PROCEDURE — 99999 PR PBB SHADOW E&M-EST. PATIENT-LVL IV: CPT | Mod: PBBFAC,,, | Performed by: STUDENT IN AN ORGANIZED HEALTH CARE EDUCATION/TRAINING PROGRAM

## 2023-11-01 PROCEDURE — 96366 THER/PROPH/DIAG IV INF ADDON: CPT

## 2023-11-01 PROCEDURE — 83605 ASSAY OF LACTIC ACID: CPT | Performed by: EMERGENCY MEDICINE

## 2023-11-01 PROCEDURE — 87040 BLOOD CULTURE FOR BACTERIA: CPT | Mod: 59 | Performed by: EMERGENCY MEDICINE

## 2023-11-01 PROCEDURE — 63600175 PHARM REV CODE 636 W HCPCS: Performed by: EMERGENCY MEDICINE

## 2023-11-01 PROCEDURE — 3078F PR MOST RECENT DIASTOLIC BLOOD PRESSURE < 80 MM HG: ICD-10-PCS | Mod: CPTII,S$GLB,, | Performed by: STUDENT IN AN ORGANIZED HEALTH CARE EDUCATION/TRAINING PROGRAM

## 2023-11-01 PROCEDURE — 87635 SARS-COV-2 COVID-19 AMP PRB: CPT | Performed by: EMERGENCY MEDICINE

## 2023-11-01 PROCEDURE — 3074F SYST BP LT 130 MM HG: CPT | Mod: CPTII,S$GLB,, | Performed by: STUDENT IN AN ORGANIZED HEALTH CARE EDUCATION/TRAINING PROGRAM

## 2023-11-01 PROCEDURE — 96367 TX/PROPH/DG ADDL SEQ IV INF: CPT

## 2023-11-01 PROCEDURE — 81003 URINALYSIS AUTO W/O SCOPE: CPT | Performed by: EMERGENCY MEDICINE

## 2023-11-01 PROCEDURE — 25000003 PHARM REV CODE 250: Performed by: STUDENT IN AN ORGANIZED HEALTH CARE EDUCATION/TRAINING PROGRAM

## 2023-11-01 PROCEDURE — 99213 PR OFFICE/OUTPT VISIT, EST, LEVL III, 20-29 MIN: ICD-10-PCS | Mod: S$GLB,,, | Performed by: STUDENT IN AN ORGANIZED HEALTH CARE EDUCATION/TRAINING PROGRAM

## 2023-11-01 PROCEDURE — 99213 OFFICE O/P EST LOW 20 MIN: CPT | Mod: S$GLB,,, | Performed by: STUDENT IN AN ORGANIZED HEALTH CARE EDUCATION/TRAINING PROGRAM

## 2023-11-01 PROCEDURE — 1159F MED LIST DOCD IN RCRD: CPT | Mod: CPTII,S$GLB,, | Performed by: STUDENT IN AN ORGANIZED HEALTH CARE EDUCATION/TRAINING PROGRAM

## 2023-11-01 PROCEDURE — 85007 BL SMEAR W/DIFF WBC COUNT: CPT | Performed by: EMERGENCY MEDICINE

## 2023-11-01 PROCEDURE — 25000003 PHARM REV CODE 250

## 2023-11-01 PROCEDURE — 83735 ASSAY OF MAGNESIUM: CPT | Performed by: EMERGENCY MEDICINE

## 2023-11-01 PROCEDURE — 27000207 HC ISOLATION

## 2023-11-01 PROCEDURE — 96365 THER/PROPH/DIAG IV INF INIT: CPT

## 2023-11-01 PROCEDURE — 99999 PR PBB SHADOW E&M-EST. PATIENT-LVL IV: ICD-10-PCS | Mod: PBBFAC,,, | Performed by: STUDENT IN AN ORGANIZED HEALTH CARE EDUCATION/TRAINING PROGRAM

## 2023-11-01 PROCEDURE — 84100 ASSAY OF PHOSPHORUS: CPT | Performed by: EMERGENCY MEDICINE

## 2023-11-01 PROCEDURE — 63600175 PHARM REV CODE 636 W HCPCS: Performed by: STUDENT IN AN ORGANIZED HEALTH CARE EDUCATION/TRAINING PROGRAM

## 2023-11-01 PROCEDURE — 36415 COLL VENOUS BLD VENIPUNCTURE: CPT | Performed by: EMERGENCY MEDICINE

## 2023-11-01 PROCEDURE — 80053 COMPREHEN METABOLIC PANEL: CPT | Performed by: EMERGENCY MEDICINE

## 2023-11-01 PROCEDURE — 99222 1ST HOSP IP/OBS MODERATE 55: CPT | Mod: ,,, | Performed by: OBSTETRICS & GYNECOLOGY

## 2023-11-01 PROCEDURE — 25500020 PHARM REV CODE 255: Performed by: EMERGENCY MEDICINE

## 2023-11-01 PROCEDURE — 3078F DIAST BP <80 MM HG: CPT | Mod: CPTII,S$GLB,, | Performed by: STUDENT IN AN ORGANIZED HEALTH CARE EDUCATION/TRAINING PROGRAM

## 2023-11-01 PROCEDURE — 3008F PR BODY MASS INDEX (BMI) DOCUMENTED: ICD-10-PCS | Mod: CPTII,S$GLB,, | Performed by: STUDENT IN AN ORGANIZED HEALTH CARE EDUCATION/TRAINING PROGRAM

## 2023-11-01 PROCEDURE — 25000003 PHARM REV CODE 250: Performed by: EMERGENCY MEDICINE

## 2023-11-01 PROCEDURE — 3074F PR MOST RECENT SYSTOLIC BLOOD PRESSURE < 130 MM HG: ICD-10-PCS | Mod: CPTII,S$GLB,, | Performed by: STUDENT IN AN ORGANIZED HEALTH CARE EDUCATION/TRAINING PROGRAM

## 2023-11-01 PROCEDURE — 85027 COMPLETE CBC AUTOMATED: CPT | Performed by: EMERGENCY MEDICINE

## 2023-11-01 PROCEDURE — 1159F PR MEDICATION LIST DOCUMENTED IN MEDICAL RECORD: ICD-10-PCS | Mod: CPTII,S$GLB,, | Performed by: STUDENT IN AN ORGANIZED HEALTH CARE EDUCATION/TRAINING PROGRAM

## 2023-11-01 PROCEDURE — 11000001 HC ACUTE MED/SURG PRIVATE ROOM

## 2023-11-01 PROCEDURE — 87086 URINE CULTURE/COLONY COUNT: CPT | Performed by: EMERGENCY MEDICINE

## 2023-11-01 PROCEDURE — 63600175 PHARM REV CODE 636 W HCPCS

## 2023-11-01 RX ORDER — ACETAMINOPHEN 325 MG/1
650 TABLET ORAL EVERY 6 HOURS PRN
Status: DISCONTINUED | OUTPATIENT
Start: 2023-11-01 | End: 2023-11-03 | Stop reason: HOSPADM

## 2023-11-01 RX ORDER — IPRATROPIUM BROMIDE AND ALBUTEROL SULFATE 2.5; .5 MG/3ML; MG/3ML
3 SOLUTION RESPIRATORY (INHALATION) EVERY 4 HOURS PRN
Status: DISCONTINUED | OUTPATIENT
Start: 2023-11-01 | End: 2023-11-03 | Stop reason: HOSPADM

## 2023-11-01 RX ORDER — ZOLPIDEM TARTRATE 5 MG/1
5 TABLET ORAL NIGHTLY PRN
Status: DISCONTINUED | OUTPATIENT
Start: 2023-11-01 | End: 2023-11-03 | Stop reason: HOSPADM

## 2023-11-01 RX ORDER — IBUPROFEN 600 MG/1
600 TABLET ORAL EVERY 6 HOURS PRN
Status: DISCONTINUED | OUTPATIENT
Start: 2023-11-01 | End: 2023-11-03 | Stop reason: HOSPADM

## 2023-11-01 RX ORDER — SODIUM CHLORIDE 0.9 % (FLUSH) 0.9 %
10 SYRINGE (ML) INJECTION
Status: DISCONTINUED | OUTPATIENT
Start: 2023-11-01 | End: 2023-11-03 | Stop reason: HOSPADM

## 2023-11-01 RX ORDER — CETIRIZINE HYDROCHLORIDE 10 MG/1
10 TABLET ORAL DAILY
Status: DISCONTINUED | OUTPATIENT
Start: 2023-11-01 | End: 2023-11-03 | Stop reason: HOSPADM

## 2023-11-01 RX ORDER — MAGNESIUM SULFATE HEPTAHYDRATE 40 MG/ML
2 INJECTION, SOLUTION INTRAVENOUS ONCE
Status: COMPLETED | OUTPATIENT
Start: 2023-11-01 | End: 2023-11-01

## 2023-11-01 RX ORDER — DIPHENOXYLATE HYDROCHLORIDE AND ATROPINE SULFATE 2.5; .025 MG/1; MG/1
1 TABLET ORAL 4 TIMES DAILY PRN
Status: DISCONTINUED | OUTPATIENT
Start: 2023-11-01 | End: 2023-11-03 | Stop reason: HOSPADM

## 2023-11-01 RX ORDER — ONDANSETRON 8 MG/1
8 TABLET, ORALLY DISINTEGRATING ORAL EVERY 8 HOURS PRN
Status: DISCONTINUED | OUTPATIENT
Start: 2023-11-01 | End: 2023-11-03 | Stop reason: HOSPADM

## 2023-11-01 RX ORDER — PROCHLORPERAZINE EDISYLATE 5 MG/ML
5 INJECTION INTRAMUSCULAR; INTRAVENOUS EVERY 6 HOURS PRN
Status: DISCONTINUED | OUTPATIENT
Start: 2023-11-01 | End: 2023-11-03 | Stop reason: HOSPADM

## 2023-11-01 RX ORDER — IBUPROFEN 600 MG/1
600 TABLET ORAL
Status: COMPLETED | OUTPATIENT
Start: 2023-11-01 | End: 2023-11-01

## 2023-11-01 RX ORDER — FAMOTIDINE 20 MG/1
40 TABLET, FILM COATED ORAL DAILY
Status: DISCONTINUED | OUTPATIENT
Start: 2023-11-01 | End: 2023-11-03 | Stop reason: HOSPADM

## 2023-11-01 RX ORDER — OXYCODONE HYDROCHLORIDE 10 MG/1
10 TABLET ORAL EVERY 4 HOURS PRN
Status: DISCONTINUED | OUTPATIENT
Start: 2023-11-01 | End: 2023-11-03 | Stop reason: HOSPADM

## 2023-11-01 RX ORDER — CALCIUM CARBONATE 200(500)MG
500 TABLET,CHEWABLE ORAL 3 TIMES DAILY PRN
Status: DISCONTINUED | OUTPATIENT
Start: 2023-11-01 | End: 2023-11-03 | Stop reason: HOSPADM

## 2023-11-01 RX ORDER — SIMETHICONE 80 MG
1 TABLET,CHEWABLE ORAL 3 TIMES DAILY PRN
Status: DISCONTINUED | OUTPATIENT
Start: 2023-11-01 | End: 2023-11-03 | Stop reason: HOSPADM

## 2023-11-01 RX ORDER — SODIUM CHLORIDE, SODIUM LACTATE, POTASSIUM CHLORIDE, CALCIUM CHLORIDE 600; 310; 30; 20 MG/100ML; MG/100ML; MG/100ML; MG/100ML
INJECTION, SOLUTION INTRAVENOUS CONTINUOUS
Status: DISCONTINUED | OUTPATIENT
Start: 2023-11-01 | End: 2023-11-02

## 2023-11-01 RX ORDER — HYDRALAZINE HYDROCHLORIDE 20 MG/ML
10 INJECTION INTRAMUSCULAR; INTRAVENOUS EVERY 6 HOURS PRN
Status: DISCONTINUED | OUTPATIENT
Start: 2023-11-01 | End: 2023-11-03 | Stop reason: HOSPADM

## 2023-11-01 RX ORDER — OXYCODONE HYDROCHLORIDE 5 MG/1
5 TABLET ORAL EVERY 4 HOURS PRN
Status: DISCONTINUED | OUTPATIENT
Start: 2023-11-01 | End: 2023-11-03 | Stop reason: HOSPADM

## 2023-11-01 RX ORDER — GUAIFENESIN/DEXTROMETHORPHAN 100-10MG/5
5 SYRUP ORAL EVERY 4 HOURS PRN
Status: DISCONTINUED | OUTPATIENT
Start: 2023-11-01 | End: 2023-11-03 | Stop reason: HOSPADM

## 2023-11-01 RX ADMIN — IOHEXOL 75 ML: 350 INJECTION, SOLUTION INTRAVENOUS at 12:11

## 2023-11-01 RX ADMIN — GUAIFENESIN AND DEXTROMETHORPHAN 5 ML: 100; 10 SYRUP ORAL at 10:11

## 2023-11-01 RX ADMIN — CEFEPIME 2 G: 2 INJECTION, POWDER, FOR SOLUTION INTRAVENOUS at 10:11

## 2023-11-01 RX ADMIN — SODIUM CHLORIDE, POTASSIUM CHLORIDE, SODIUM LACTATE AND CALCIUM CHLORIDE: 600; 310; 30; 20 INJECTION, SOLUTION INTRAVENOUS at 10:11

## 2023-11-01 RX ADMIN — PIPERACILLIN AND TAZOBACTAM 4.5 G: 4; .5 INJECTION, POWDER, LYOPHILIZED, FOR SOLUTION INTRAVENOUS; PARENTERAL at 05:11

## 2023-11-01 RX ADMIN — POTASSIUM PHOSPHATE, MONOBASIC POTASSIUM PHOSPHATE, DIBASIC 15 MMOL: 224; 236 INJECTION, SOLUTION, CONCENTRATE INTRAVENOUS at 09:11

## 2023-11-01 RX ADMIN — CETIRIZINE HYDROCHLORIDE 10 MG: 10 TABLET, FILM COATED ORAL at 03:11

## 2023-11-01 RX ADMIN — MAGNESIUM SULFATE HEPTAHYDRATE 2 G: 40 INJECTION, SOLUTION INTRAVENOUS at 03:11

## 2023-11-01 RX ADMIN — IBUPROFEN 600 MG: 600 TABLET, FILM COATED ORAL at 10:11

## 2023-11-01 RX ADMIN — FAMOTIDINE 40 MG: 20 TABLET ORAL at 03:11

## 2023-11-01 NOTE — ED PROVIDER NOTES
Encounter Date: 11/1/2023    SCRIBE #1 NOTE: I, Ham Stahl, am scribing for, and in the presence of,  Katie Pina MD. I have scribed the entire note.       History     Chief Complaint   Patient presents with    Cough     Cough, headache, SOB and fever x 2-3 days.      Time seen by provider: 10:10 AM    This is a 58 y.o. female with endometrial cancer presents with a non-productive cough starting three days ago. She has since developed congestion, headaches, body aches, and fevers. Her last chemotherapy was two weeks ago, and she is a patient of Dr. Rodríguez. While she reports shortness of breath and dyspnea on exertion, she denies any chest pain. She also denies any nausea, vomiting, or diarrhea but does take Lomotil for chronic diarrhea related to her chemotherapy. Patient has had not photophobia, sampson pain, rash, or bleeding. She is on prophylactic bactrim due to neutropenia resulting from her chemotherapy. Patient was seen by the internal medicine clinic yesterday and today, and she was advised to be seen in the ED today. This is the extent of the patient's complaints at this time.       The history is provided by the patient.     Review of patient's allergies indicates:  No Known Allergies  Past Medical History:   Diagnosis Date    Chest pain 2002    NEGATIVE STRESS ECHO 2002    Endometrial cancer     2023    GERD (gastroesophageal reflux disease)     Lower back pain     Migraines     Mild allergic rhinitis     Postmenopausal bleeding      Past Surgical History:   Procedure Laterality Date    COLONOSCOPY N/A 10/06/2023    Procedure: COLONOSCOPY;  Surgeon: Marcia Weaver MD;  Location: [facility];  Service: Endoscopy;  Laterality: N/A;  10/4- referred by Dr. Weaver/ Subject: Needs colonoscopy ASAP within 1 week /Procedure Timing: < 1 week/Diagnosis: Diarrhea, evaluate for immunotherapy induced colitis/ Prep instructions to portal. AS    CYST REMOVAL Left 04/28/2022    Procedure: EXCISION, CYST-BACK;   Surgeon: Thomas Hurst MD;  Location: [facility] OR;  Service: General;  Laterality: Left;  left upper back  RN PREOP ON 4/21/22-NF    HYSTERECTOMY      after endometrial cancer diagnosis    HYSTEROSCOPY WITH DILATION AND CURETTAGE OF UTERUS N/A 05/23/2023    Procedure: HYSTEROSCOPY, WITH DILATION AND CURETTAGE OF UTERUS;  Surgeon: Omaira Evans MD;  Location: [facility] OR;  Service: OB/GYN;  Laterality: N/A;  [facility]  [phone] TEXTED [facility] ON 5/2/2023 @ 3:51PM. [facility] RESPONDED ON 5/2/2023 @ 3:51PM-LO  RN PREOP 5/18/23  T & S; UPT ORDERED AND SCHEDULED 5/22/23    LYMPH NODE BIOPSY Left 06/05/2023    Procedure: BIOPSY, sentinel LYMPH NODE;  Surgeon: Lavell Rodríguez MD;  Location: [facility];  Service: OB/GYN;  Laterality: Left;    LYMPHADENECTOMY, PELVIS Right 06/05/2023    Procedure: LYMPHADENECTOMY, PELVIS;  Surgeon: Lavell Rodríguez MD;  Location: [facility];  Service: OB/GYN;  Laterality: Right;    MAPPING, LYMPH NODE, SENTINEL Bilateral 06/05/2023    Procedure: injection, LYMPH NODE, SENTINEL;  Surgeon: Lavell Rodríguez MD;  Location: [facility];  Service: OB/GYN;  Laterality: Bilateral;    NERVE REPAIR Right 06/05/2023    Procedure: REPAIR, OBTURATOR NERVE;  Surgeon: Lavell Rodríguez MD;  Location: [facility];  Service: OB/GYN;  Laterality: Right;    TONSILLECTOMY      TONSILLECTOMY      TUBAL LIGATION      XI ROBOTIC HYSTERECTOMY, WITH SALPINGO-OOPHORECTOMY Bilateral 06/05/2023    Procedure: XI ROBOTIC HYSTERECTOMY,WITH SALPINGO-OOPHORECTOMY;  Surgeon: Lavell Rodríguez MD;  Location: [facility];  Service: OB/GYN;  Laterality: Bilateral;  removed through vagina     Family History   Problem Relation Age of Onset    No Known Problems Mother     Heart disease Father     Breast cancer Sister 55    No Known Problems Sister     Colon cancer Paternal Uncle     Esophageal cancer Neg Hx      Social History     Tobacco Use    Smoking status: Never    Smokeless tobacco: Never   Substance Use Topics    Alcohol use: Never    Drug use:  Never     Review of Systems   Constitutional:  Positive for fever.   HENT:  Positive for congestion. Negative for sore throat.    Respiratory:  Positive for cough and shortness of breath.    Cardiovascular:  Negative for chest pain.   Gastrointestinal:  Negative for diarrhea, nausea and vomiting.   Genitourinary:  Negative for dysuria.   Musculoskeletal:  Positive for myalgias. Negative for back pain.   Skin:  Negative for rash.   Neurological:  Positive for headaches. Negative for weakness.   Hematological:  Does not bruise/bleed easily.       Physical Exam     Initial Vitals [11/01/23 0950]   BP Pulse Resp Temp SpO2   (!) 129/59 (!) 130 (!) 24 (!) 101.4 °F (38.6 °C) 95 %      MAP       --         Physical Exam    Nursing note and vitals reviewed.  Constitutional: She appears well-developed and well-nourished. She is not diaphoretic. No distress.   Wearing a head scarf. Pale and ill appearing.   HENT:   Head: Normocephalic and atraumatic.   Eyes: Conjunctivae are normal. No scleral icterus.   Neck: No JVD present.   No meningismus.   Normal range of motion.  Cardiovascular:  Regular rhythm.   Tachycardia present.   Exam reveals no gallop and no friction rub.       No murmur heard.  Pulmonary/Chest: Tachypnea noted. She has no wheezes.   Coughing on exam with coarse breath sounds bilaterally.    Abdominal: Abdomen is soft. She exhibits no distension. There is no abdominal tenderness. There is no rebound and no guarding.   Musculoskeletal:         General: Normal range of motion.      Cervical back: Normal range of motion.     Neurological: She is alert and oriented to person, place, and time.   Skin: Skin is warm and dry.   Psychiatric: She has a normal mood and affect. Her behavior is normal.         ED Course   Critical Care    Date/Time: 11/1/2023 10:19 AM    Performed by: Katie Pina MD  Authorized by: Katie Pina MD  Direct patient critical care time: 10 minutes  Additional history critical care time: 5  minutes  Ordering / reviewing critical care time: 5 minutes  Documentation critical care time: 5 minutes  Consulting other physicians critical care time: 6 minutes  Total critical care time (exclusive of procedural time) : 31 minutes  Critical care was necessary to treat or prevent imminent or life-threatening deterioration of the following conditions: sepsis.  Critical care was time spent personally by me on the following activities: development of treatment plan with patient or surrogate, evaluation of patient's response to treatment, examination of patient, obtaining history from patient or surrogate, ordering and performing treatments and interventions, ordering and review of laboratory studies, ordering and review of radiographic studies and re-evaluation of patient's condition.        Labs Reviewed   CBC W/ AUTO DIFFERENTIAL - Abnormal; Notable for the following components:       Result Value    WBC 1.83 (*)     RBC 2.77 (*)     Hemoglobin 10.1 (*)     Hematocrit 28.8 (*)      (*)     MCH 36.5 (*)     RDW 15.3 (*)     Platelets 125 (*)     Platelet Estimate Decreased (*)     All other components within normal limits    Narrative:     [facility] critical result(s) called and verbal readback obtained from   Aaron Franklin RN by AMINA 11/01/2023 11:06   COMPREHENSIVE METABOLIC PANEL - Abnormal; Notable for the following components:    Sodium 135 (*)     Glucose 162 (*)     Albumin 2.8 (*)     ALT 46 (*)     All other components within normal limits   MAGNESIUM - Abnormal; Notable for the following components:    Magnesium 1.5 (*)     All other components within normal limits   PHOSPHORUS - Abnormal; Notable for the following components:    Phosphorus 1.9 (*)     All other components within normal limits   URINALYSIS, REFLEX TO URINE CULTURE - Abnormal; Notable for the following components:    Color, UA Colorless (*)     Specific Gravity, UA >1.030 (*)     Protein, UA Trace (*)     Glucose, UA 2+ (*)     All other  components within normal limits    Narrative:     Specimen Source->Urine   CULTURE, URINE   LACTIC ACID, PLASMA   LACTIC ACID, PLASMA   SARS-COV-2 RNA AMPLIFICATION, QUAL    Narrative:     Is this needed for pre-procedure or pre-op testing?->No   POCT INFLUENZA A/B MOLECULAR   SARS-COV-2 RDRP GENE     EKG Readings: (Independently Interpreted)   Initial Reading: No STEMI. Rhythm: Sinus Tachycardia. Heart Rate: 112. Ectopy: No Ectopy. Conduction: Normal.       Imaging Results              CTA Chest Non-Coronary (PE Studies) (Final result)  Result time 11/01/23 12:54:22      Final result by Darcy Coburn MD (11/01/23 12:54:22)                   Impression:      No evidence of pulmonary embolus.    Patchy ground-glass opacity at the dependent portion of the bilateral lung basis suggestive of mild atelectasis.    Few subcentimeter pulmonary nodules, For multiple solid nodules all <6 mm, Fleischner Society 2017 guidelines recommend no routine follow up for a low risk patient, or follow up with non-contrast chest CT at 12 months after discovery in a high risk patient.      Electronically signed by: Darcy Coburn MD  Date:    11/01/2023  Time:    12:54               Narrative:    EXAMINATION:  CTA CHEST NON CORONARY (PE STUDIES)    CLINICAL HISTORY:  Pulmonary embolism (PE) suspected, positive D-dimer;    TECHNIQUE:  Low dose axial images, sagittal and coronal reformations were obtained from the thoracic inlet to the lung bases following the IV administration of 75 mL of Omnipaque 350.  Contrast timing was optimized to evaluate the pulmonary arteries.  MIP images were performed.    COMPARISON:  CT chest 06/16/2023    FINDINGS:  Vasculature: There is good opacification of the pulmonary arterial system.  No acute pulmonary embolus identified. Thoracic aorta normal caliber. No evidence of dissection.    Lungs: The tracheobronchial tree is clear. Mild increased ground-glass opacity at the bilateral dependent  portion of the lung basis possibly atelectasis, new from the prior study.  0.4 cm nodule right upper lobe (3:179).  Tiny nodule left lower lobe, unchanged from 06/19/2023 (3: 327-324).  No emphysematous lung changes.No pleural effusion.    Mediastinum: No lymphadenopathy.  The cardiac silhouette is normal in size..  No pericardial effusion.The esophagus course normally.    Upper abdomen (visualized portion):No abnormality seen.    Bones/chest wall: No marrow replacement process.                                       X-Ray Chest 1 View (Final result)  Result time 11/01/23 10:31:19      Final result by Viet Lincoln MD (11/01/23 10:31:19)                   Impression:      No convincing evidence of acute cardiopulmonary disease.      Electronically signed by: Viet Lincoln  Date:    11/01/2023  Time:    10:31               Narrative:    EXAMINATION:  XR CHEST 1 VIEW    CLINICAL HISTORY:  Sepsis;    TECHNIQUE:  Single frontal view of the chest was performed.    COMPARISON:  Chest radiograph performed 10/31/2023    FINDINGS:  Cardiomediastinal contours grossly unchanged.  Lungs essentially clear.  No definite pneumothorax or large volume pleural effusion.    No acute findings in the visualized abdomen.    Osseous and soft tissue structures appear without definite acute change.                                    X-Rays:   Independently Interpreted Readings:   Chest X-Ray: Normal heart size. No infiltrate. No bony deformity. No acute disease.     Medications   ceFEPIme (MAXIPIME) 2 g in dextrose 5 % in water (D5W) 100 mL IVPB (MB+) (0 g Intravenous Stopped 11/1/23 1104)   ibuprofen tablet 600 mg (600 mg Oral Given 11/1/23 1036)   iohexoL (OMNIPAQUE 350) injection 75 mL (75 mLs Intravenous Given 11/1/23 1221)   potassium phosphate 15 mmol in dextrose 5 % (D5W) 250 mL infusion (0 mmol Intravenous Stopped 11/2/23 0105)   magnesium sulfate 2g in water 50mL IVPB (premix) (0 g Intravenous Stopped 11/1/23 1655)      Medical Decision Making  Initial impression: 58 year old female with neutropenic fever. Also with tachypnea, tachycardia, and hypoxia which are concerning for pneumonia or pulmonary embolism. Plan for sepsis workup, broad spectrum antibiotics, consult with Gyn/Onc team, and CT angiogram chest.     Differential diagnosis: neutropenic fever, sepsis, pneumonia, other pulmonary infectious process, COPD, asthma, pulmonary embolus and congestive heart failure.        Amount and/or Complexity of Data Reviewed  External Data Reviewed: labs and notes.     Details: Per chart review, patient was evaluated at the internal medicine clinic for a UTI yesterday and later determined to have neutropenic fever with a white blood cell count of 1.00, bands of 6,  PMN 16.82 giving an ANC of 228. D-dimer also positive at 0.61.  Labs: ordered. Decision-making details documented in ED Course.  Radiology: ordered and independent interpretation performed.     Details: See interpretation above.    Risk  Prescription drug management.  Decision regarding hospitalization.            Scribe Attestation:   Scribe #1: I performed the above scribed service and the documentation accurately describes the services I performed. I attest to the accuracy of the note.    Physician Attestation for Scribe: I, Katie Pina MD, reviewed documentation as scribed in my presence, which is both accurate and complete.                        Clinical Impression:   Final diagnoses:  [D70.9, R50.81] Neutropenic fever (Primary)        ED Disposition Condition    Admit                 Katie Pina MD  11/06/23 0936

## 2023-11-01 NOTE — PROGRESS NOTES
"[facility]    Subjective:     Patient ID: Sugar Diaz is a 58 y.o. female.  Chief Complaint: Follow-up    HPI:  Patient is a 58 y.o. female who presents today for close follow up.  Since yesterday she reports continued symptoms.  She reports she is feeling much more tired.    Objective:        Body mass index is 27 kg/m².  Vitals:    11/01/23 0927   BP: 127/67   Pulse: (!) 135   Temp: (!) 102.5 °F (39.2 °C)   SpO2: (!) 93%   Weight: 78.2 kg (172 lb 6.4 oz)   Height: 5' 7" (1.702 m)   PainSc: 0-No pain     Physical Exam  Constitutional:       General: She is not in acute distress.     Appearance: She is ill-appearing.   Cardiovascular:      Rate and Rhythm: Tachycardia present.   Pulmonary:      Comments: Slightly increased respiratory effort    Neurological:      Mental Status: She is alert.   Psychiatric:         Mood and Affect: Mood normal.           Lab Results   Component Value Date    WBC 1.00 (LL) 10/31/2023    HGB 11.5 (L) 10/31/2023    HCT 33.3 (L) 10/31/2023     (L) 10/31/2023    CHOL 199 03/01/2008    TRIG 99 12/11/2020    HDL 42 12/11/2020    ALT 45 (H) 10/31/2023    AST 32 10/31/2023     10/31/2023    K 4.4 10/31/2023     10/31/2023    CREATININE 0.7 10/31/2023    BUN 10 10/31/2023    CO2 23 10/31/2023    TSH 8.816 (H) 10/18/2023     Assessment:         1. Neutropenic fever          Plan:   1. Neutropenic fever  - seen yesterday for symptoms of upper respiratory infection.  Appears clinically worse today with worsening tachycardia and high fever.  States she took Tylenol earlier this morning.  Sending patient to ER for neutropenic fever, possible PE rule out.             Dexter Khan  [facility]  [address]  [address] [place] [id number]  Phone [phone]  Fax [phone]      This note is dictated using the M*Modal Fluency Direct word recognition program. It may contain word recognition mistakes or wrong " word substitutions that were missed on review.

## 2023-11-01 NOTE — ED TRIAGE NOTES
Pt presents to the ED w/ c/o non-productive cough, HA, body aches, and fever x 3 days. Pt has hx of endometrial cancer with her last chemo session x 2 weeks ago. Pt denies chest pain and N/V/D, but reports SOB on exertion and increased lethargy.

## 2023-11-01 NOTE — SUBJECTIVE & OBJECTIVE
Scheduled Meds:   cetirizine  10 mg Oral Daily    famotidine  40 mg Oral Daily    magnesium sulfate IVPB  2 g Intravenous Once    piperacillin-tazobactam (Zosyn) IV (PEDS and ADULTS) (extended infusion is not appropriate)  4.5 g Intravenous Q8H    potassium phosphate IVPB  15 mmol Intravenous Once     Continuous Infusions:  PRN Meds:calcium carbonate, diphenoxylate-atropine 2.5-0.025 mg, hydrALAZINE, ibuprofen, ondansetron, oxyCODONE, oxyCODONE, prochlorperazine, simethicone, sodium chloride 0.9%, zolpidem    Review of patient's allergies indicates:  No Known Allergies    Objective:     Vital Signs (Most Recent):  Temp: 98.4 °F (36.9 °C) (11/01/23 1414)  Pulse: 109 (11/01/23 1412)  Resp: (!) 24 (11/01/23 0950)  BP: 119/69 (11/01/23 1402)  SpO2: 98 % (11/01/23 1412) Vital Signs (24h Range):  Temp:  [98.4 °F (36.9 °C)-102.5 °F (39.2 °C)] 98.4 °F (36.9 °C)  Pulse:  [106-135] 109  Resp:  [24] 24  SpO2:  [91 %-98 %] 98 %  BP: (119-129)/(57-69) 119/69     Weight: 78.5 kg (173 lb 1 oz)  Body mass index is 27.11 kg/m².    Intake/Output - Last 3 Shifts         10/30 0700  10/31 0659 10/31 0700  11/01 0659 11/01 0700  11/02 0659    IV Piggyback   98.2    Total Intake(mL/kg)   98.2 (1.3)    Net   +98.2                         Physical Exam:   Constitutional: She is oriented to person, place, and time. She appears well-developed and well-nourished.   Appears fatigued and ill    HENT:   Head: Normocephalic and atraumatic.    Eyes: EOM are normal.     Cardiovascular:  Regular rhythm.    Tachycardia present.          Pulmonary/Chest: Effort normal.   Coarse breath sounds with poor inspiratory effort        Abdominal: Soft. There is no abdominal tenderness.     Genitourinary:    Genitourinary Comments: Deferred             Musculoskeletal: Normal range of motion.       Neurological: She is alert and oriented to person, place, and time.    Skin: Skin is warm and dry.    Psychiatric: She has a normal mood and affect. Her behavior is  normal.          Lines/Drains/Airways       Peripheral Intravenous Line  Duration                  Peripheral IV - Single Lumen 11/01/23 1033 20 G Right Wrist <1 day                    Laboratory:  Recent Results (from the past 24 hour(s))   CBC auto differential    Collection Time: 11/01/23 10:08 AM   Result Value Ref Range    WBC 1.83 (LL) 3.90 - 12.70 K/uL    RBC 2.77 (L) 4.00 - 5.40 M/uL    Hemoglobin 10.1 (L) 12.0 - 16.0 g/dL    Hematocrit 28.8 (L) 37.0 - 48.5 %     (H) 82 - 98 fL    MCH 36.5 (H) 27.0 - 31.0 pg    MCHC 35.1 32.0 - 36.0 g/dL    RDW 15.3 (H) 11.5 - 14.5 %    Platelets 125 (L) 150 - 450 K/uL    MPV 9.2 9.2 - 12.9 fL    Immature Granulocytes Test Not Performed 0.0 - 0.5 %    Gran # (ANC) Test Not Performed 1.8 - 7.7 K/uL    Immature Grans (Abs) Test Not Performed 0.00 - 0.04 K/uL    Lymph # Test Not Performed 1.0 - 4.8 K/uL    Mono # Test Not Performed 0.3 - 1.0 K/uL    Eos # Test Not Performed 0.0 - 0.5 K/uL    Baso # Test Not Performed 0.00 - 0.20 K/uL    nRBC 0 0 /100 WBC    Gran % 50.0 38.0 - 73.0 %    Lymph % 26.0 18.0 - 48.0 %    Mono % 11.0 4.0 - 15.0 %    Eosinophil % 3.0 0.0 - 8.0 %    Basophil % 1.0 0.0 - 1.9 %    Bands 8.0 %    Metamyelocytes 1.0 %    Platelet Estimate Decreased (A)     Toxic Granulation Present     Differential Method Manual    Comprehensive metabolic panel    Collection Time: 11/01/23 10:08 AM   Result Value Ref Range    Sodium 135 (L) 136 - 145 mmol/L    Potassium 3.7 3.5 - 5.1 mmol/L    Chloride 100 95 - 110 mmol/L    CO2 24 23 - 29 mmol/L    Glucose 162 (H) 70 - 110 mg/dL    BUN 6 6 - 20 mg/dL    Creatinine 0.7 0.5 - 1.4 mg/dL    Calcium 9.1 8.7 - 10.5 mg/dL    Total Protein 6.2 6.0 - 8.4 g/dL    Albumin 2.8 (L) 3.5 - 5.2 g/dL    Total Bilirubin 0.3 0.1 - 1.0 mg/dL    Alkaline Phosphatase 59 55 - 135 U/L    AST 32 10 - 40 U/L    ALT 46 (H) 10 - 44 U/L    eGFR >60 >60 mL/min/1.73 m^2    Anion Gap 11 8 - 16 mmol/L   Lactic Acid, Plasma #1    Collection Time:  11/01/23 10:08 AM   Result Value Ref Range    Lactate (Lactic Acid) 1.7 0.5 - 2.2 mmol/L   Magnesium    Collection Time: 11/01/23 10:08 AM   Result Value Ref Range    Magnesium 1.5 (L) 1.6 - 2.6 mg/dL   Phosphorus    Collection Time: 11/01/23 10:08 AM   Result Value Ref Range    Phosphorus 1.9 (L) 2.7 - 4.5 mg/dL   POCT COVID-19 Rapid Screening    Collection Time: 11/01/23 10:24 AM   Result Value Ref Range    POC Rapid COVID Negative Negative     Acceptable Yes    POCT Influenza A/B Molecular    Collection Time: 11/01/23 10:24 AM   Result Value Ref Range    POC Molecular Influenza A Ag Negative Negative, Not Reported    POC Molecular Influenza B Ag Negative Negative, Not Reported     Acceptable Yes    Urinalysis, Reflex to Urine Culture Urine, Clean Catch    Collection Time: 11/01/23  1:43 PM    Specimen: Urine   Result Value Ref Range    Specimen UA Urine, Clean Catch     Color, UA Colorless (A) Yellow, Straw, Radha    Appearance, UA Clear Clear    pH, UA 7.0 5.0 - 8.0    Specific Gravity, UA >1.030 (A) 1.005 - 1.030    Protein, UA Trace (A) Negative    Glucose, UA 2+ (A) Negative    Ketones, UA Negative Negative    Bilirubin (UA) Negative Negative    Occult Blood UA Negative Negative    Nitrite, UA Negative Negative    Urobilinogen, UA Negative <2.0 EU/dL    Leukocytes, UA Negative Negative   Lactic acid, plasma    Collection Time: 11/01/23  3:04 PM   Result Value Ref Range    Lactate (Lactic Acid) 2.1 0.5 - 2.2 mmol/L   Respiratory Infection Panel (PCR), Nasopharyngeal    Collection Time: 11/01/23  3:24 PM    Specimen: Nasopharyngeal Swab   Result Value Ref Range    Respiratory Infection Panel Source NP Swab        Diagnostic Results:  Imaging Results              CTA Chest Non-Coronary (PE Studies) (Final result)  Result time 11/01/23 12:54:22      Final result by Darcy Coburn MD (11/01/23 12:54:22)                   Impression:      No evidence of pulmonary  embolus.    Patchy ground-glass opacity at the dependent portion of the bilateral lung basis suggestive of mild atelectasis.    Few subcentimeter pulmonary nodules, For multiple solid nodules all <6 mm, Fleischner Society 2017 guidelines recommend no routine follow up for a low risk patient, or follow up with non-contrast chest CT at 12 months after discovery in a high risk patient.      Electronically signed by: Darcy Coburn MD  Date:    11/01/2023  Time:    12:54               Narrative:    EXAMINATION:  CTA CHEST NON CORONARY (PE STUDIES)    CLINICAL HISTORY:  Pulmonary embolism (PE) suspected, positive D-dimer;    TECHNIQUE:  Low dose axial images, sagittal and coronal reformations were obtained from the thoracic inlet to the lung bases following the IV administration of 75 mL of Omnipaque 350.  Contrast timing was optimized to evaluate the pulmonary arteries.  MIP images were performed.    COMPARISON:  CT chest 06/16/2023    FINDINGS:  Vasculature: There is good opacification of the pulmonary arterial system.  No acute pulmonary embolus identified. Thoracic aorta normal caliber. No evidence of dissection.    Lungs: The tracheobronchial tree is clear. Mild increased ground-glass opacity at the bilateral dependent portion of the lung basis possibly atelectasis, new from the prior study.  0.4 cm nodule right upper lobe (3:179).  Tiny nodule left lower lobe, unchanged from 06/19/2023 (3: 327-324).  No emphysematous lung changes.No pleural effusion.    Mediastinum: No lymphadenopathy.  The cardiac silhouette is normal in size..  No pericardial effusion.The esophagus course normally.    Upper abdomen (visualized portion):No abnormality seen.    Bones/chest wall: No marrow replacement process.                                       X-Ray Chest 1 View (Final result)  Result time 11/01/23 10:31:19      Final result by Viet Lincoln MD (11/01/23 10:31:19)                   Impression:      No convincing  evidence of acute cardiopulmonary disease.      Electronically signed by: Viet Lincoln  Date:    11/01/2023  Time:    10:31               Narrative:    EXAMINATION:  XR CHEST 1 VIEW    CLINICAL HISTORY:  Sepsis;    TECHNIQUE:  Single frontal view of the chest was performed.    COMPARISON:  Chest radiograph performed 10/31/2023    FINDINGS:  Cardiomediastinal contours grossly unchanged.  Lungs essentially clear.  No definite pneumothorax or large volume pleural effusion.    No acute findings in the visualized abdomen.    Osseous and soft tissue structures appear without definite acute change.

## 2023-11-01 NOTE — ED NOTES
Pt ambulatory to the restroom independently; steady gait noted. Pt returned to room 08 without incident. Pt replaced on continuous pulse ox monitoring with non-invasion blood pressure to cycle every 60 minutes. Bed locked in lowest position; side rails up and locked x 2; call light, bedside table, and personal belongings within reach. Pt instructed to alert nurse for assistance and before attempting to get out of bed; verbalizes understanding.

## 2023-11-01 NOTE — CONSULTS
In short, 58 y.o.  with stage IIIC1 grade 1 endometrioid endometrial cancer who presented to the ED for cough, shortness of breath, headache, fatigue. Findings consistent with neutropenic fever. Will initiate broad spectrum antibiotics and obtain COVID PCR, respiratory panel. Follow cultures.    See H&P to follow for full details.    Elsa Owen MD  OBGYN, PGY-3

## 2023-11-01 NOTE — Clinical Note
Diagnosis: Neutropenic fever [219696]   Future Attending Provider: RAYMOND TORRES [[id number]]   Admitting Provider:: RAYMOND TORRES [[id number]]   Special Needs:: No Special Needs [1]

## 2023-11-01 NOTE — HOSPITAL COURSE
11/01/2023: Admit for neutropenic fever, suspect URI. WBC 1.8, ANC 0.9. CXR, CTA negative. COVID, flu neg. COVID PCR, resp panel, urine/blood cultures pending. Initiate zosyn.   11/02/2023: NAEO. Reports coughing spells. Denies chest pain, SOB, or difficulty breathing. AM CBC and Cr pending. Respiratory panel positive for RSV. Continue zosyn.  11/03/2023: NAEO. Afebrile, VSS. Reports feeling much improved today. Fatigue improved, still coughing. Blood cultures NGTD, urine culture no growth. Continue zosyn until 48 hours. ANC 1.4 this AM. Stable for discharge.

## 2023-11-01 NOTE — HPI
Sugar Diaz is a 58 y.o.  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 Symptoms started 3 days ago and have progressively worsened. Cough is not productive. Shortness of breath is worsened with exertion. She denies SOB at rest or when lying down. She describes her headache was frontal throbbing, worsened with coughing. She has taken tylenol and ibuprofen with improvement in pain. She denies dizziness/lightheadedness, syncope, vision changes, sore throat, abdominal pain, N/V, constipation, diarrhea. Patient has a history of colitis with chemo, however, has been having daily soft bowel movements.      Patient presented to her PCP yesterday and was noted to be febrile to 100.8F, tachycardic in the 130, hypoxic (94% on RA). Labs were positive for WBC 1 (ANC 0.9). She was advised to present to the ED, however, elected to return to clinic today. Symptoms worsened and vital sign abnormalities persisted so she was instructed to present to the ED for further workup and management.       Oncology History   Malignant neoplasm of endometrium   2023 Surgery     Hysteroscopy, D&C: grade 1 endometrioid EC      2023 Surgery     TRH/BSO/BSLN/RPLN/repair of obturator nerve     Final pathology c/w MMRd, p53 WT stage IIIC1 grade 1 endometrioid EC. 4.5 cm, grade 1, 96% MI (22/23 mm), +LUE, -LVSI, +MELF, -cervix, 1/2+ RPLN, 1/1+ LPLN, -ascites      2023 Imaging Significant Findings     CT C/A/P w/: High L para-aortic at the level of the renal vessels, 1.5 cm.  Low R para-aortic at the LEAH, 4.1 cm in greatest dimension.      2023 Genomic Testing     [facility] (NGS): ARD1A, CHEK1, CTCF, , HDAC2, HNF1A, JAK1, KRAS, MAX, MSH6, P1K3R1, PPM1D, PTEN, ZFHX3, ZSR2      2023 Tumor Conference     No rule for debulking of para-aortic lymph nodes.  Adjuvant VBT.  Represent after completion of adjuvant therapy  to discuss EBRT.          8/9/2023 - 9/13/2023 Radiation Therapy     Treating physician: Dr. Adriane Babb  Total Dose: 21 Gy HDR  Fractions: 3 vaginal cuff brachytherapy      8/22/2023 Imaging Significant Findings     CT A/P w/: No evidence of colitis, WI in high L para-aortic and low R para-aortic.      10/4/2023 Procedure     Colonoscopy: WNL   07/07/2023 - 10/19/2023: C1-6 carbo/taxol/dsotarlimab

## 2023-11-01 NOTE — ASSESSMENT & PLAN NOTE
Neutropenic fever:  58 y.o.  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  - Febrile to 101.4, tachycardic to 100-130s, O2 sats 91% on RA (97%+ on 2L NC)  - CBC 1.8 (ANC 0.9)/10/29/125  - Cr 0.7, K 3.7, mag 1.5, phos 1.9  - Lactate 1.5  - Rapid COVID, flu negative   - UA: trace protein, glucose. No nitrites or leukocytes   - Blood cultures x2 and urine culture collected, pending  - CXR: no acute processes   - CTA: no evidence of PE    Plan:  - Broad spectrum antibiotics, zosyn 4.5g Q8H  - Obtain COVID PCR and respiratory panel   - Follow blood and urine cultures closely   - If patient is still febrile after 48 hours of antibiotic therapy will add vancomycin (gram pos coverage), fluconazole (fungal coverage) and consult ID for assistance   - Will not administer nupogen   - Neutropenic precautions

## 2023-11-01 NOTE — ED NOTES
Pt ambulatory to the restroom with daughter's assistance; steady gait noted. Pt returned to room 08 without incident. Pt replaced on continuous pulse ox monitoring with non-invasion blood pressure to cycle every 60 minutes. Bed locked in lowest position; side rails up and locked x 2; call light, bedside table, and personal belongings within reach. Pt instructed to alert nurse for assistance and before attempting to get out of bed; verbalizes understanding.

## 2023-11-02 LAB
ANISOCYTOSIS BLD QL SMEAR: SLIGHT
BACTERIA UR CULT: NO GROWTH
BASOPHILS NFR BLD: 1 % (ref 0–1.9)
CREAT SERPL-MCNC: 0.6 MG/DL (ref 0.5–1.4)
DACRYOCYTES BLD QL SMEAR: ABNORMAL
DIFFERENTIAL METHOD: ABNORMAL
EOSINOPHIL NFR BLD: 1 % (ref 0–8)
ERYTHROCYTE [DISTWIDTH] IN BLOOD BY AUTOMATED COUNT: 15.4 % (ref 11.5–14.5)
EST. GFR  (NO RACE VARIABLE): >60 ML/MIN/1.73 M^2
HCT VFR BLD AUTO: 27.5 % (ref 37–48.5)
HGB BLD-MCNC: 9.4 G/DL (ref 12–16)
HYPOCHROMIA BLD QL SMEAR: ABNORMAL
IMM GRANULOCYTES # BLD AUTO: ABNORMAL K/UL (ref 0–0.04)
IMM GRANULOCYTES NFR BLD AUTO: ABNORMAL % (ref 0–0.5)
LYMPHOCYTES NFR BLD: 63 % (ref 18–48)
MCH RBC QN AUTO: 36.6 PG (ref 27–31)
MCHC RBC AUTO-ENTMCNC: 34.2 G/DL (ref 32–36)
MCV RBC AUTO: 107 FL (ref 82–98)
METAMYELOCYTES NFR BLD MANUAL: 2 %
MONOCYTES NFR BLD: 2 % (ref 4–15)
NEUTROPHILS NFR BLD: 26 % (ref 38–73)
NEUTS BAND NFR BLD MANUAL: 5 %
NRBC BLD-RTO: 0 /100 WBC
PLATELET # BLD AUTO: 126 K/UL (ref 150–450)
PLATELET BLD QL SMEAR: ABNORMAL
PMV BLD AUTO: 9.2 FL (ref 9.2–12.9)
POIKILOCYTOSIS BLD QL SMEAR: SLIGHT
POLYCHROMASIA BLD QL SMEAR: ABNORMAL
RBC # BLD AUTO: 2.57 M/UL (ref 4–5.4)
TARGETS BLD QL SMEAR: ABNORMAL
TOXIC GRANULES BLD QL SMEAR: PRESENT
WBC # BLD AUTO: 3.18 K/UL (ref 3.9–12.7)

## 2023-11-02 PROCEDURE — 25000003 PHARM REV CODE 250: Performed by: STUDENT IN AN ORGANIZED HEALTH CARE EDUCATION/TRAINING PROGRAM

## 2023-11-02 PROCEDURE — 27000207 HC ISOLATION

## 2023-11-02 PROCEDURE — 63600175 PHARM REV CODE 636 W HCPCS

## 2023-11-02 PROCEDURE — 82565 ASSAY OF CREATININE: CPT

## 2023-11-02 PROCEDURE — 25000003 PHARM REV CODE 250

## 2023-11-02 PROCEDURE — 11000001 HC ACUTE MED/SURG PRIVATE ROOM

## 2023-11-02 PROCEDURE — 85027 COMPLETE CBC AUTOMATED: CPT

## 2023-11-02 PROCEDURE — 99233 PR SUBSEQUENT HOSPITAL CARE,LEVL III: ICD-10-PCS | Mod: ,,, | Performed by: OBSTETRICS & GYNECOLOGY

## 2023-11-02 PROCEDURE — 36415 COLL VENOUS BLD VENIPUNCTURE: CPT

## 2023-11-02 PROCEDURE — 94761 N-INVAS EAR/PLS OXIMETRY MLT: CPT

## 2023-11-02 PROCEDURE — 85007 BL SMEAR W/DIFF WBC COUNT: CPT

## 2023-11-02 PROCEDURE — 99233 SBSQ HOSP IP/OBS HIGH 50: CPT | Mod: ,,, | Performed by: OBSTETRICS & GYNECOLOGY

## 2023-11-02 RX ORDER — ENOXAPARIN SODIUM 100 MG/ML
40 INJECTION SUBCUTANEOUS EVERY 24 HOURS
Status: DISCONTINUED | OUTPATIENT
Start: 2023-11-02 | End: 2023-11-03 | Stop reason: HOSPADM

## 2023-11-02 RX ADMIN — GUAIFENESIN AND DEXTROMETHORPHAN 5 ML: 100; 10 SYRUP ORAL at 03:11

## 2023-11-02 RX ADMIN — OXYCODONE HYDROCHLORIDE 5 MG: 5 TABLET ORAL at 07:11

## 2023-11-02 RX ADMIN — PIPERACILLIN AND TAZOBACTAM 4.5 G: 4; .5 INJECTION, POWDER, LYOPHILIZED, FOR SOLUTION INTRAVENOUS; PARENTERAL at 02:11

## 2023-11-02 RX ADMIN — PIPERACILLIN AND TAZOBACTAM 4.5 G: 4; .5 INJECTION, POWDER, LYOPHILIZED, FOR SOLUTION INTRAVENOUS; PARENTERAL at 11:11

## 2023-11-02 RX ADMIN — CETIRIZINE HYDROCHLORIDE 10 MG: 10 TABLET, FILM COATED ORAL at 08:11

## 2023-11-02 RX ADMIN — FAMOTIDINE 40 MG: 20 TABLET ORAL at 08:11

## 2023-11-02 RX ADMIN — GUAIFENESIN AND DEXTROMETHORPHAN 5 ML: 100; 10 SYRUP ORAL at 07:11

## 2023-11-02 RX ADMIN — GUAIFENESIN AND DEXTROMETHORPHAN 5 ML: 100; 10 SYRUP ORAL at 08:11

## 2023-11-02 RX ADMIN — ACETAMINOPHEN 650 MG: 325 TABLET, FILM COATED ORAL at 06:11

## 2023-11-02 RX ADMIN — GUAIFENESIN AND DEXTROMETHORPHAN 5 ML: 100; 10 SYRUP ORAL at 04:11

## 2023-11-02 RX ADMIN — PIPERACILLIN AND TAZOBACTAM 4.5 G: 4; .5 INJECTION, POWDER, LYOPHILIZED, FOR SOLUTION INTRAVENOUS; PARENTERAL at 05:11

## 2023-11-02 RX ADMIN — ENOXAPARIN SODIUM 40 MG: 40 INJECTION SUBCUTANEOUS at 05:11

## 2023-11-02 NOTE — ASSESSMENT & PLAN NOTE
Neutropenic fever:  58 y.o.  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  - Febrile to 101.4, tachycardic to 100-130s, O2 sats 91% on RA (97%+ on 2L NC)  - CBC 1.8 (ANC 0.9)/10/29/125  - Cr 0.7, K 3.7, mag 1.5, phos 1.9  - Lactate 1.7>2.1  - Rapid COVID, flu negative   - Respiratory panel +RSV, COVID PCR pending  - UA: trace protein, glucose. No nitrites or leukocytes   - Blood cultures x2 and urine culture collected, pending  - CXR: no acute processes   - CTA: no evidence of PE    Plan:  - Broad spectrum antibiotics, zosyn 4.5g Q8H  - Follow blood and urine cultures closely   - If patient is still febrile after 48 hours of antibiotic therapy will add vancomycin (gram pos coverage), fluconazole (fungal coverage) and consult ID for assistance   - Will not administer neupogen   - Neutropenic precautions

## 2023-11-02 NOTE — NURSING
Report received from off going nurse, CARLY Pritchard. Patient AAO. No signs of distress noted. Precautions remains in place. Call light in reach. Bed low and locked. Will continue plan of care.

## 2023-11-02 NOTE — PROGRESS NOTES
[facility] ([address])  Gynecologic Oncology  Progress Note      Patient Name: Sugar Diaz  MRN: [id number]  Admission Date: 2023  Hospital Length of Stay: 1 days  Attending Provider: Lavell Rodríguez MD  Primary Care Provider: Dexter Khan MD  Principal Problem: Neutropenic fever    Subjective:      History of Present Illness:  Sugar Diaz is a 58 y.o.  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 Symptoms started 3 days ago and have progressively worsened. Cough is not productive. Shortness of breath is worsened with exertion. She denies SOB at rest or when lying down. She describes her headache was frontal throbbing, worsened with coughing. She has taken tylenol and ibuprofen with improvement in pain. She denies dizziness/lightheadedness, syncope, vision changes, sore throat, abdominal pain, N/V, constipation, diarrhea. Patient has a history of colitis with chemo, however, has been having daily soft bowel movements.      Patient presented to her PCP yesterday and was noted to be febrile to 100.8F, tachycardic in the 130, hypoxic (94% on RA). Labs were positive for WBC 1 (ANC 0.9). She was advised to present to the ED, however, elected to return to clinic today. Symptoms worsened and vital sign abnormalities persisted so she was instructed to present to the ED for further workup and management.       Oncology History   Malignant neoplasm of endometrium   2023 Surgery     Hysteroscopy, D&C: grade 1 endometrioid EC      2023 Surgery     TRH/BSO/BSLN/RPLN/repair of obturator nerve     Final pathology c/w MMRd, p53 WT stage IIIC1 grade 1 endometrioid EC. 4.5 cm, grade 1, 96% MI (22/23 mm), +LUE, -LVSI, +MELF, -cervix, 1/2+ RPLN, 1/+ LPLN, -ascites      2023 Imaging Significant Findings     CT C/A/P w/: High L para-aortic at the level of the renal vessels, 1.5  cm.  Low R para-aortic at the LEAH, 4.1 cm in greatest dimension.      6/25/2023 Genomic Testing     [facility] (NGS): ARD1A, CHEK1, CTCF, , HDAC2, HNF1A, JAK1, KRAS, MAX, MSH6, P1K3R1, PPM1D, PTEN, ZFHX3, ZSR2      6/28/2023 Tumor Conference     No rule for debulking of para-aortic lymph nodes.  Adjuvant VBT.  Represent after completion of adjuvant therapy to discuss EBRT.          8/9/2023 - 9/13/2023 Radiation Therapy     Treating physician: Dr. Adriane Babb  Total Dose: 21 Gy HDR  Fractions: 3 vaginal cuff brachytherapy      8/22/2023 Imaging Significant Findings     CT A/P w/: No evidence of colitis, OR in high L para-aortic and low R para-aortic.      10/4/2023 Procedure     Colonoscopy: WNL   07/07/2023 - 10/19/2023: C1-6 carbo/taxol/dsotarlimab       Hospital Course:  11/01/2023: Admit for neutropenic fever, suspect URI. WBC 1.8, ANC 0.9. CXR, CTA negative. COVID, flu neg. COVID PCR, resp panel, urine/blood cultures pending. Initiate zosyn.   11/02/2023: NAEO. Reports coughing spells. Denies chest pain, SOB, or difficulty breathing. AM CBC and Cr pending. Respiratory panel positive for RSV. Continue zosyn.      Interval History: NAEO. Reports having a headache. Reports coughing spells. Denies chest pain, SOB, difficulty breathing. Denies fevers or chills. Tolerating PO. Ambulating, +bowel function, - urinary changes.    Scheduled Meds:   cetirizine  10 mg Oral Daily    enoxparin  40 mg Subcutaneous Q24H (prophylaxis, 1700)    famotidine  40 mg Oral Daily    piperacillin-tazobactam (Zosyn) IV (PEDS and ADULTS) (extended infusion is not appropriate)  4.5 g Intravenous Q8H     Continuous Infusions:   lactated ringers 50 mL/hr at 11/01/23 0009     PRN Meds:acetaminophen, albuterol-ipratropium, calcium carbonate, dextromethorphan-guaiFENesin  mg/5 ml, diphenoxylate-atropine 2.5-0.025 mg, hydrALAZINE, ibuprofen, ondansetron, oxyCODONE, oxyCODONE, prochlorperazine, simethicone, sodium chloride 0.9%,  zolpidem    Review of patient's allergies indicates:  No Known Allergies    Objective:     Vital Signs (Most Recent):  Temp: 98.2 °F (36.8 °C) (11/02/23 0603)  Pulse: 103 (11/02/23 0603)  Resp: 18 (11/02/23 0603)  BP: 128/60 (11/02/23 0603)  SpO2: (!) 94 % (11/02/23 0603) Vital Signs (24h Range):  Temp:  [98.2 °F (36.8 °C)-102.5 °F (39.2 °C)] 98.2 °F (36.8 °C)  Pulse:  [] 103  Resp:  [18-24] 18  SpO2:  [91 %-99 %] 94 %  BP: (119-138)/(57-80) 128/60     Weight: 78.5 kg (173 lb 1 oz)  Body mass index is 27.11 kg/m².    Intake/Output - Last 3 Shifts         10/31 0700  11/01 0659 11/01 0700 11/02 0659 11/02 0700 11/03 0659    P.O.  300     IV Piggyback  98.2     Total Intake(mL/kg)  398.2 (5.1)     Net  +398.2            Urine Occurrence  2 x                   Physical Exam:   Constitutional: She is oriented to person, place, and time. She appears well-developed and well-nourished. No distress.    HENT:   Head: Normocephalic and atraumatic.    Eyes: EOM are normal.     Cardiovascular:  Regular rhythm and normal heart sounds.             Pulmonary/Chest: Effort normal. No respiratory distress. She has wheezes.        Abdominal: Soft.             Musculoskeletal: Normal range of motion.       Neurological: She is alert and oriented to person, place, and time.    Skin: Skin is warm and dry. She is not diaphoretic.    Psychiatric: She has a normal mood and affect. Her behavior is normal.          Lines/Drains/Airways       Peripheral Intravenous Line  Duration                  Peripheral IV - Single Lumen 11/01/23 1033 20 G Right Wrist <1 day                    Laboratory:  All pertinent labs from the last 24 hours have been reviewed.    Diagnostic Results:  none    Imaging Results              CTA Chest Non-Coronary (PE Studies) (Final result)  Result time 11/01/23 12:54:22      Final result by Darcy Coburn MD (11/01/23 12:54:22)                   Impression:      No evidence of pulmonary  embolus.    Patchy ground-glass opacity at the dependent portion of the bilateral lung basis suggestive of mild atelectasis.    Few subcentimeter pulmonary nodules, For multiple solid nodules all <6 mm, Fleischner Society 2017 guidelines recommend no routine follow up for a low risk patient, or follow up with non-contrast chest CT at 12 months after discovery in a high risk patient.      Electronically signed by: Darcy Coburn MD  Date:    11/01/2023  Time:    12:54               Narrative:    EXAMINATION:  CTA CHEST NON CORONARY (PE STUDIES)    CLINICAL HISTORY:  Pulmonary embolism (PE) suspected, positive D-dimer;    TECHNIQUE:  Low dose axial images, sagittal and coronal reformations were obtained from the thoracic inlet to the lung bases following the IV administration of 75 mL of Omnipaque 350.  Contrast timing was optimized to evaluate the pulmonary arteries.  MIP images were performed.    COMPARISON:  CT chest 06/16/2023    FINDINGS:  Vasculature: There is good opacification of the pulmonary arterial system.  No acute pulmonary embolus identified. Thoracic aorta normal caliber. No evidence of dissection.    Lungs: The tracheobronchial tree is clear. Mild increased ground-glass opacity at the bilateral dependent portion of the lung basis possibly atelectasis, new from the prior study.  0.4 cm nodule right upper lobe (3:179).  Tiny nodule left lower lobe, unchanged from 06/19/2023 (3: 327-324).  No emphysematous lung changes.No pleural effusion.    Mediastinum: No lymphadenopathy.  The cardiac silhouette is normal in size..  No pericardial effusion.The esophagus course normally.    Upper abdomen (visualized portion):No abnormality seen.    Bones/chest wall: No marrow replacement process.                                       X-Ray Chest 1 View (Final result)  Result time 11/01/23 10:31:19      Final result by Viet Lincoln MD (11/01/23 10:31:19)                   Impression:      No convincing  evidence of acute cardiopulmonary disease.      Electronically signed by: Viet Lincoln  Date:    2023  Time:    10:31               Narrative:    EXAMINATION:  XR CHEST 1 VIEW    CLINICAL HISTORY:  Sepsis;    TECHNIQUE:  Single frontal view of the chest was performed.    COMPARISON:  Chest radiograph performed 10/31/2023    FINDINGS:  Cardiomediastinal contours grossly unchanged.  Lungs essentially clear.  No definite pneumothorax or large volume pleural effusion.    No acute findings in the visualized abdomen.    Osseous and soft tissue structures appear without definite acute change.                                        Assessment/Plan:     * Neutropenic fever  Neutropenic fever:  58 y.o.  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  - Febrile to 101.4, tachycardic to 100-130s, O2 sats 91% on RA (97%+ on 2L NC)  - CBC 1.8 (ANC 0.9)/10/29/125  - Cr 0.7, K 3.7, mag 1.5, phos 1.9  - Lactate 1.7>2.1  - Rapid COVID, flu negative   - Respiratory panel +RSV, COVID PCR pending  - UA: trace protein, glucose. No nitrites or leukocytes   - Blood cultures x2 and urine culture collected, pending  - CXR: no acute processes   - CTA: no evidence of PE    Plan:  - Broad spectrum antibiotics, zosyn 4.5g Q8H  - Follow blood and urine cultures closely   - If patient is still febrile after 48 hours of antibiotic therapy will add vancomycin (gram pos coverage), fluconazole (fungal coverage) and consult ID for assistance   - Will not administer neupogen   - Neutropenic precautions     GERD (gastroesophageal reflux disease)  - Continue home pepcid     Electrolyte imbalance  - Mag 1.5, phos 1.9, K 3.7 on admit   - s/p repletion      VTE Risk Mitigation (From admission, onward)         Ordered     enoxaparin injection 40 mg  Every 24 hours         23 0628     IP VTE HIGH RISK PATIENT  Once         23 1427     Place sequential compression  device  Until discontinued         11/01/23 4093                Han Clancy MD  Gynecologic Oncology  [employer] - [facility] ([address])

## 2023-11-02 NOTE — CARE UPDATE
Afternoon Assessment:    Patient reports feeling ok. No pain at this time. Still having coughing spells. Denies chest pain, SOB, or difficulty breathing. Denies fevers or chills. Tolerating PO.    Temp:  [98.1 °F (36.7 °C)-98.6 °F (37 °C)] 98.1 °F (36.7 °C)  Pulse:  [] 110  Resp:  [18-20] 20  SpO2:  [93 %-99 %] 95 %  BP: (112-138)/(60-80) 117/63    PE:  Laying comfortably in no acute distress.  Nasal cannula in place.    Labs:  Recent Labs   Lab 11/02/23  0524   WBC 3.18*   RBC 2.57*   HGB 9.4*   HCT 27.5*   *   *   MCH 36.6*   MCHC 34.2       A/P:  Afebrile today. Continue IV zosyn.  Continue supportive care.  AM labs ordered.    Han Clancy MD PGY-2  Obstetrics and Gynecology

## 2023-11-02 NOTE — NURSING
Nurses Note -- 4 Eyes      11/2/2023   3:56 PM      Skin assessed during: Admit      [x] No Altered Skin Integrity Present    []Prevention Measures Documented      [] Yes- Altered Skin Integrity Present or Discovered   [] LDA Added if Not in Epic (Describe Wound)   [] New Altered Skin Integrity was Present on Admit and Documented in LDA   [] Wound Image Taken    Wound Care Consulted? No    Attending Nurse:  Beth CARROLL    Second RN/Staff Member:   CARLY Pritchard

## 2023-11-02 NOTE — ED NOTES
Patient care resumed, IV antibiotics infusing, resting comfortably, visible chest rise and fall, respirations even and unlabored, lighting adjusted, family at bedside, safety measures in place, will continue to monitor.

## 2023-11-02 NOTE — PLAN OF CARE
Patient AAOx3, independent at baseline.PCP correct on facesheet. Patient denies owning any DME. Family will provide transportation home.   11/02/23 1016   Discharge Assessment   Assessment Type Discharge Planning Assessment   Confirmed/corrected address, phone number and insurance Yes   Confirmed Demographics Correct on Facesheet   Source of Information patient   Communicated AVELINA with patient/caregiver Date not available/Unable to determine   People in Home spouse   Do you expect to return to your current living situation? Yes   Do you have help at home or someone to help you manage your care at home? Yes   Who are your caregiver(s) and their phone number(s)? Jimmy Diaz (Spouse)   [phone] (Mobile)   Prior to hospitilization cognitive status: Alert/Oriented   Current cognitive status: Alert/Oriented   Equipment Currently Used at Home none   Readmission within 30 days? No   Do you currently have service(s) that help you manage your care at home? No   Do you take prescription medications? Yes   Do you have prescription coverage? Yes   Do you have any problems affording any of your prescribed medications? No   Is the patient taking medications as prescribed? yes   Who is going to help you get home at discharge? Jimmy Diaz (Spouse)   [phone] (Mobile)   How do you get to doctors appointments? family or friend will provide   Are you on dialysis? No   Do you take coumadin? No   DME Needed Upon Discharge  none   Discharge Plan discussed with: Patient   Transition of Care Barriers None   Discharge Plan A Home with family   Discharge Plan B Home   Physical Activity   On average, how many days per week do you engage in moderate to strenuous exercise (like a brisk walk)? 0 days   On average, how many minutes do you engage in exercise at this level? 0 min   Financial Resource Strain   How hard is it for you to pay for the very basics like food, housing, medical care, and heating? Not hard   Housing Stability   In  the last 12 months, was there a time when you were not able to pay the mortgage or rent on time? N   In the last 12 months, was there a time when you did not have a steady place to sleep or slept in a shelter (including now)? N   Transportation Needs   In the past 12 months, has lack of transportation kept you from medical appointments or from getting medications? no   In the past 12 months, has lack of transportation kept you from meetings, work, or from getting things needed for daily living? No   Food Insecurity   Within the past 12 months, you worried that your food would run out before you got the money to buy more. Never true   Within the past 12 months, the food you bought just didn't last and you didn't have money to get more. Never true   Stress   Do you feel stress - tense, restless, nervous, or anxious, or unable to sleep at night because your mind is troubled all the time - these days? Not at all   Social Connections   In a typical week, how many times do you talk on the phone with family, friends, or neighbors? More than 3   How often do you get together with friends or relatives? More than 3   How often do you attend Amish or Yazidi services? More than 4   Do you belong to any clubs or organizations such as Amish groups, unions, fraternal or athletic groups, or school groups? No   How often do you attend meetings of the clubs or organizations you belong to? Never   Are you , , , , never , or living with a partner?    Alcohol Use   Q2: How many drinks containing alcohol do you have on a typical day when you are drinking? None   Q3: How often do you have six or more drinks on one occasion? Never     [facility] ([address])  Initial Discharge Assessment       Primary Care Provider: Dexter Khan MD    Admission Diagnosis: Neutropenic fever [D70.9, R50.81]    Admission Date: 11/1/2023  Expected Discharge Date:     Transition of Care  Barriers: (P) None    Payor: [employer] / Plan: [facility] [employer] / Product Type: Commercial /     Extended Emergency Contact Information  Primary Emergency Contact: Jimmy Diaz  Address: [address]           [place] [address] [place]  Mobile Phone: [phone]  Relation: Spouse    Discharge Plan A: (P) Home with family  Discharge Plan B: (P) Home      [id number] - [place] - [address] AT [facility] [address]  [address]  [place] [address]  Phone: [phone] Fax: [phone]      Initial Assessment (most recent)       Adult Discharge Assessment - 11/02/23 1016          Discharge Assessment    Assessment Type Discharge Planning Assessment (P)      Confirmed/corrected address, phone number and insurance Yes (P)      Confirmed Demographics Correct on Facesheet (P)      Source of Information patient (P)      Communicated AVELINA with patient/caregiver Date not available/Unable to determine (P)      People in Home spouse (P)      Do you expect to return to your current living situation? Yes (P)      Do you have help at home or someone to help you manage your care at home? Yes (P)      Who are your caregiver(s) and their phone number(s)? Jimmy Diaz (Spouse)   [phone] (Mobile) (P)      Prior to hospitilization cognitive status: Alert/Oriented (P)      Current cognitive status: Alert/Oriented (P)      Equipment Currently Used at Home none (P)      Readmission within 30 days? No (P)      Do you currently have service(s) that help you manage your care at home? No (P)      Do you take prescription medications? Yes (P)      Do you have prescription coverage? Yes (P)      Do you have any problems affording any of your prescribed medications? No (P)      Is the patient taking medications as prescribed? yes (P)      Who is going to help you get home at discharge? Jimmy Diaz (Spouse)   [phone] (Mobile) (P)       How do you get to doctors appointments? family or friend will provide (P)      Are you on dialysis? No (P)      Do you take coumadin? No (P)      DME Needed Upon Discharge  none (P)      Discharge Plan discussed with: Patient (P)      Transition of Care Barriers None (P)      Discharge Plan A Home with family (P)      Discharge Plan B Home (P)         Physical Activity    On average, how many days per week do you engage in moderate to strenuous exercise (like a brisk walk)? 0 days (P)      On average, how many minutes do you engage in exercise at this level? 0 min (P)         Financial Resource Strain    How hard is it for you to pay for the very basics like food, housing, medical care, and heating? Not hard at all (P)         Housing Stability    In the last 12 months, was there a time when you were not able to pay the mortgage or rent on time? No (P)      In the last 12 months, was there a time when you did not have a steady place to sleep or slept in a shelter (including now)? No (P)         Transportation Needs    In the past 12 months, has lack of transportation kept you from medical appointments or from getting medications? No (P)      In the past 12 months, has lack of transportation kept you from meetings, work, or from getting things needed for daily living? No (P)         Food Insecurity    Within the past 12 months, you worried that your food would run out before you got the money to buy more. Never true (P)      Within the past 12 months, the food you bought just didn't last and you didn't have money to get more. Never true (P)         Stress    Do you feel stress - tense, restless, nervous, or anxious, or unable to sleep at night because your mind is troubled all the time - these days? Not at all (P)         Social Connections    In a typical week, how many times do you talk on the phone with family, friends, or neighbors? More than three times a week (P)      How often do you get together with  friends or relatives? More than three times a week (P)      How often do you attend Taoism or Islam services? More than 4 times per year (P)      Do you belong to any clubs or organizations such as Taoism groups, unions, fraternal or athletic groups, or school groups? No (P)      How often do you attend meetings of the clubs or organizations you belong to? Never (P)      Are you , , , , never , or living with a partner?  (P)         Alcohol Use    Q2: How many drinks containing alcohol do you have on a typical day when you are drinking? Patient does not drink (P)      Q3: How often do you have six or more drinks on one occasion? Never (P)

## 2023-11-02 NOTE — SUBJECTIVE & OBJECTIVE
Interval History: NAEO. Reports having a headache. Reports coughing spells. Denies chest pain, SOB, difficulty breathing. Denies fevers or chills. Tolerating PO. Ambulating, +bowel function, - urinary changes.    Scheduled Meds:   cetirizine  10 mg Oral Daily    enoxparin  40 mg Subcutaneous Q24H (prophylaxis, 1700)    famotidine  40 mg Oral Daily    piperacillin-tazobactam (Zosyn) IV (PEDS and ADULTS) (extended infusion is not appropriate)  4.5 g Intravenous Q8H     Continuous Infusions:   lactated ringers 50 mL/hr at 11/01/23 2249     PRN Meds:acetaminophen, albuterol-ipratropium, calcium carbonate, dextromethorphan-guaiFENesin  mg/5 ml, diphenoxylate-atropine 2.5-0.025 mg, hydrALAZINE, ibuprofen, ondansetron, oxyCODONE, oxyCODONE, prochlorperazine, simethicone, sodium chloride 0.9%, zolpidem    Review of patient's allergies indicates:  No Known Allergies    Objective:     Vital Signs (Most Recent):  Temp: 98.2 °F (36.8 °C) (11/02/23 0603)  Pulse: 103 (11/02/23 0603)  Resp: 18 (11/02/23 0603)  BP: 128/60 (11/02/23 0603)  SpO2: (!) 94 % (11/02/23 0603) Vital Signs (24h Range):  Temp:  [98.2 °F (36.8 °C)-102.5 °F (39.2 °C)] 98.2 °F (36.8 °C)  Pulse:  [] 103  Resp:  [18-24] 18  SpO2:  [91 %-99 %] 94 %  BP: (119-138)/(57-80) 128/60     Weight: 78.5 kg (173 lb 1 oz)  Body mass index is 27.11 kg/m².    Intake/Output - Last 3 Shifts         10/31 0700  11/01 0659 11/01 0700 11/02 0659 11/02 0700 11/03 0659    P.O.  300     IV Piggyback  98.2     Total Intake(mL/kg)  398.2 (5.1)     Net  +398.2            Urine Occurrence  2 x                   Physical Exam:   Constitutional: She is oriented to person, place, and time. She appears well-developed and well-nourished. No distress.    HENT:   Head: Normocephalic and atraumatic.    Eyes: EOM are normal.     Cardiovascular:  Regular rhythm and normal heart sounds.             Pulmonary/Chest: Effort normal. No respiratory distress. She has wheezes.         Abdominal: Soft.             Musculoskeletal: Normal range of motion.       Neurological: She is alert and oriented to person, place, and time.    Skin: Skin is warm and dry. She is not diaphoretic.    Psychiatric: She has a normal mood and affect. Her behavior is normal.          Lines/Drains/Airways       Peripheral Intravenous Line  Duration                  Peripheral IV - Single Lumen 11/01/23 1033 20 G Right Wrist <1 day                    Laboratory:  All pertinent labs from the last 24 hours have been reviewed.    Diagnostic Results:  none    Imaging Results              CTA Chest Non-Coronary (PE Studies) (Final result)  Result time 11/01/23 12:54:22      Final result by Darcy Coburn MD (11/01/23 12:54:22)                   Impression:      No evidence of pulmonary embolus.    Patchy ground-glass opacity at the dependent portion of the bilateral lung basis suggestive of mild atelectasis.    Few subcentimeter pulmonary nodules, For multiple solid nodules all <6 mm, Fleischner Society 2017 guidelines recommend no routine follow up for a low risk patient, or follow up with non-contrast chest CT at 12 months after discovery in a high risk patient.      Electronically signed by: Darcy Coburn MD  Date:    11/01/2023  Time:    12:54               Narrative:    EXAMINATION:  CTA CHEST NON CORONARY (PE STUDIES)    CLINICAL HISTORY:  Pulmonary embolism (PE) suspected, positive D-dimer;    TECHNIQUE:  Low dose axial images, sagittal and coronal reformations were obtained from the thoracic inlet to the lung bases following the IV administration of 75 mL of Omnipaque 350.  Contrast timing was optimized to evaluate the pulmonary arteries.  MIP images were performed.    COMPARISON:  CT chest 06/16/2023    FINDINGS:  Vasculature: There is good opacification of the pulmonary arterial system.  No acute pulmonary embolus identified. Thoracic aorta normal caliber. No evidence of dissection.    Lungs: The  tracheobronchial tree is clear. Mild increased ground-glass opacity at the bilateral dependent portion of the lung basis possibly atelectasis, new from the prior study.  0.4 cm nodule right upper lobe (3:179).  Tiny nodule left lower lobe, unchanged from 06/19/2023 (3: 327-324).  No emphysematous lung changes.No pleural effusion.    Mediastinum: No lymphadenopathy.  The cardiac silhouette is normal in size..  No pericardial effusion.The esophagus course normally.    Upper abdomen (visualized portion):No abnormality seen.    Bones/chest wall: No marrow replacement process.                                       X-Ray Chest 1 View (Final result)  Result time 11/01/23 10:31:19      Final result by Viet Lincoln MD (11/01/23 10:31:19)                   Impression:      No convincing evidence of acute cardiopulmonary disease.      Electronically signed by: Viet Lincoln  Date:    11/01/2023  Time:    10:31               Narrative:    EXAMINATION:  XR CHEST 1 VIEW    CLINICAL HISTORY:  Sepsis;    TECHNIQUE:  Single frontal view of the chest was performed.    COMPARISON:  Chest radiograph performed 10/31/2023    FINDINGS:  Cardiomediastinal contours grossly unchanged.  Lungs essentially clear.  No definite pneumothorax or large volume pleural effusion.    No acute findings in the visualized abdomen.    Osseous and soft tissue structures appear without definite acute change.

## 2023-11-03 VITALS
RESPIRATION RATE: 20 BRPM | WEIGHT: 173.06 LBS | DIASTOLIC BLOOD PRESSURE: 71 MMHG | SYSTOLIC BLOOD PRESSURE: 134 MMHG | BODY MASS INDEX: 27.16 KG/M2 | OXYGEN SATURATION: 95 % | HEIGHT: 67 IN | TEMPERATURE: 98 F | HEART RATE: 103 BPM

## 2023-11-03 LAB
ERYTHROCYTE [DISTWIDTH] IN BLOOD BY AUTOMATED COUNT: 14.8 % (ref 11.5–14.5)
HCT VFR BLD AUTO: 27.5 % (ref 37–48.5)
HGB BLD-MCNC: 9.4 G/DL (ref 12–16)
IMM GRANULOCYTES # BLD AUTO: 0.04 K/UL (ref 0–0.04)
MCH RBC QN AUTO: 35.6 PG (ref 27–31)
MCHC RBC AUTO-ENTMCNC: 34.2 G/DL (ref 32–36)
MCV RBC AUTO: 104 FL (ref 82–98)
NEUTROPHILS # BLD AUTO: 1.4 K/UL (ref 1.8–7.7)
PLATELET # BLD AUTO: 146 K/UL (ref 150–450)
PMV BLD AUTO: 9.2 FL (ref 9.2–12.9)
RBC # BLD AUTO: 2.64 M/UL (ref 4–5.4)
WBC # BLD AUTO: 3.85 K/UL (ref 3.9–12.7)

## 2023-11-03 PROCEDURE — 85027 COMPLETE CBC AUTOMATED: CPT | Performed by: STUDENT IN AN ORGANIZED HEALTH CARE EDUCATION/TRAINING PROGRAM

## 2023-11-03 PROCEDURE — 94761 N-INVAS EAR/PLS OXIMETRY MLT: CPT

## 2023-11-03 PROCEDURE — 99238 PR HOSPITAL DISCHARGE DAY,<30 MIN: ICD-10-PCS | Mod: ,,, | Performed by: STUDENT IN AN ORGANIZED HEALTH CARE EDUCATION/TRAINING PROGRAM

## 2023-11-03 PROCEDURE — 99238 HOSP IP/OBS DSCHRG MGMT 30/<: CPT | Mod: ,,, | Performed by: STUDENT IN AN ORGANIZED HEALTH CARE EDUCATION/TRAINING PROGRAM

## 2023-11-03 PROCEDURE — 25000003 PHARM REV CODE 250: Performed by: STUDENT IN AN ORGANIZED HEALTH CARE EDUCATION/TRAINING PROGRAM

## 2023-11-03 PROCEDURE — 36415 COLL VENOUS BLD VENIPUNCTURE: CPT | Performed by: STUDENT IN AN ORGANIZED HEALTH CARE EDUCATION/TRAINING PROGRAM

## 2023-11-03 PROCEDURE — 25000003 PHARM REV CODE 250

## 2023-11-03 RX ADMIN — GUAIFENESIN AND DEXTROMETHORPHAN 5 ML: 100; 10 SYRUP ORAL at 08:11

## 2023-11-03 RX ADMIN — ACETAMINOPHEN 650 MG: 325 TABLET, FILM COATED ORAL at 09:11

## 2023-11-03 RX ADMIN — CETIRIZINE HYDROCHLORIDE 10 MG: 10 TABLET, FILM COATED ORAL at 08:11

## 2023-11-03 RX ADMIN — FAMOTIDINE 40 MG: 20 TABLET ORAL at 08:11

## 2023-11-03 NOTE — ASSESSMENT & PLAN NOTE
Neutropenic fever:  58 y.o.  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  - Febrile to 101.4, tachycardic to 100-130s, O2 sats 91% on RA (97%+ on 2L NC)  - CBC 1.8 (ANC 0.9)/10/29/125  - Cr 0.7, K 3.7, mag 1.5, phos 1.9  - Lactate 1.7>2.1  - Rapid COVID, flu negative   - Respiratory panel +RSV, COVID PCR pending  - UA: trace protein, glucose. No nitrites or leukocytes   - Blood cultures x2 and urine culture collected, pending  - CXR: no acute processes   - CTA: no evidence of PE    Plan:  - Broad spectrum antibiotics, zosyn 4.5g Q8H until at least 48 hours afebrile  - Follow blood and urine cultures closely    - BC: NGTD   - UC: no growth   - AM CBC: wbc 3.85, ANC 1.4  - If patient is still febrile after 48 hours of antibiotic therapy will add vancomycin (gram pos coverage), fluconazole (fungal coverage) and consult ID for assistance   - Will not administer neupogen   - Discontinue neutropenic precautions   - Wean O2 today

## 2023-11-03 NOTE — PHYSICIAN QUERY
PT Name: Sugar Diaz  MR #: [id number]     DOCUMENTATION CLARIFICATION     CDS/: ABEBE Nobles,RNC-MNN        Contact information:[email or web address]  This form is a permanent document in the medical record.    Query Date: November 3, 2023    By submitting this query, we are merely seeking further clarification of documentation to reflect the severity of illness of your patient. Please utilize your independent clinical judgment when addressing the question(s) below.    The Medical Record reflects the following:     Indicators   Supporting Clinical Findings Location in Medical Record   X Lab Value(s) Electrolyte imbalance  - Mag 1.5, phos 1.9, K 3.7 on admit  H&P 11/1   X Treatment/Medication Will replete    magnesium sulfate 2g in water 50mL IVPB     potassium phosphate 15 mmol in dextrose 5 % (D5W) 250 mL infusion H&P 11/1    MAR 11/1    Other       Provider, please specify the diagnosis or diagnoses that correspond(s) to the above indicators. Andrade all that apply:    [  x ] Hypokalemia   [   x] Hypomagnesemia   [   x] Hypophosphatemia   [   ] Other electrolyte disturbance (please specify): __________   [   ]  Clinically Undetermined       Please document in your progress notes daily for the duration of treatment until resolved, and include in your discharge summary.

## 2023-11-03 NOTE — SUBJECTIVE & OBJECTIVE
Interval History: NAEO. Fatigue improved. Still coughing.  Denies chest pain, SOB, difficulty breathing. Denies fevers or chills. Tolerating PO. Ambulating, +bowel function, - urinary changes.    Scheduled Meds:   cetirizine  10 mg Oral Daily    enoxparin  40 mg Subcutaneous Q24H (prophylaxis, 1700)    famotidine  40 mg Oral Daily    piperacillin-tazobactam (Zosyn) IV (PEDS and ADULTS) (extended infusion is not appropriate)  4.5 g Intravenous Q8H     Continuous Infusions:      PRN Meds:acetaminophen, albuterol-ipratropium, calcium carbonate, dextromethorphan-guaiFENesin  mg/5 ml, diphenoxylate-atropine 2.5-0.025 mg, hydrALAZINE, ibuprofen, ondansetron, oxyCODONE, oxyCODONE, prochlorperazine, simethicone, sodium chloride 0.9%, zolpidem    Review of patient's allergies indicates:  No Known Allergies    Objective:     Vital Signs (Most Recent):  Temp: 98.3 °F (36.8 °C) (11/03/23 0434)  Pulse: 97 (11/03/23 0434)  Resp: 16 (11/03/23 0434)  BP: (!) 121/59 (11/03/23 0434)  SpO2: 98 % (11/03/23 0434) Vital Signs (24h Range):  Temp:  [98.1 °F (36.7 °C)-98.9 °F (37.2 °C)] 98.3 °F (36.8 °C)  Pulse:  [] 97  Resp:  [16-20] 16  SpO2:  [95 %-99 %] 98 %  BP: (110-121)/(57-63) 121/59     Weight: 78.5 kg (173 lb 1 oz)  Body mass index is 27.11 kg/m².    Intake/Output - Last 3 Shifts         11/01 0700  11/02 0659 11/02 0700 11/03 0659 11/03 0700 11/04 0659    P.O. 300      IV Piggyback 98.2      Total Intake(mL/kg) 398.2 (5.1)      Net +398.2             Urine Occurrence 2 x                    Physical Exam:   Constitutional: She is oriented to person, place, and time. She appears well-developed and well-nourished. No distress.   2L NC    HENT:   Head: Normocephalic and atraumatic.    Eyes: EOM are normal.     Cardiovascular:  Regular rhythm and normal heart sounds.             Pulmonary/Chest: Effort normal. No respiratory distress. She has no wheezes.        Abdominal: Soft.             Musculoskeletal: Normal range  of motion.       Neurological: She is alert and oriented to person, place, and time.    Skin: Skin is warm and dry. She is not diaphoretic.    Psychiatric: She has a normal mood and affect. Her behavior is normal.          Lines/Drains/Airways       Peripheral Intravenous Line  Duration                  Peripheral IV - Single Lumen 11/01/23 1033 20 G Right Wrist 1 day                    Laboratory:  All pertinent labs from the last 24 hours have been reviewed.    Diagnostic Results:  none    Imaging Results              CTA Chest Non-Coronary (PE Studies) (Final result)  Result time 11/01/23 12:54:22      Final result by Darcy Coburn MD (11/01/23 12:54:22)                   Impression:      No evidence of pulmonary embolus.    Patchy ground-glass opacity at the dependent portion of the bilateral lung basis suggestive of mild atelectasis.    Few subcentimeter pulmonary nodules, For multiple solid nodules all <6 mm, Fleischner Society 2017 guidelines recommend no routine follow up for a low risk patient, or follow up with non-contrast chest CT at 12 months after discovery in a high risk patient.      Electronically signed by: Darcy Coburn MD  Date:    11/01/2023  Time:    12:54               Narrative:    EXAMINATION:  CTA CHEST NON CORONARY (PE STUDIES)    CLINICAL HISTORY:  Pulmonary embolism (PE) suspected, positive D-dimer;    TECHNIQUE:  Low dose axial images, sagittal and coronal reformations were obtained from the thoracic inlet to the lung bases following the IV administration of 75 mL of Omnipaque 350.  Contrast timing was optimized to evaluate the pulmonary arteries.  MIP images were performed.    COMPARISON:  CT chest 06/16/2023    FINDINGS:  Vasculature: There is good opacification of the pulmonary arterial system.  No acute pulmonary embolus identified. Thoracic aorta normal caliber. No evidence of dissection.    Lungs: The tracheobronchial tree is clear. Mild increased ground-glass  opacity at the bilateral dependent portion of the lung basis possibly atelectasis, new from the prior study.  0.4 cm nodule right upper lobe (3:179).  Tiny nodule left lower lobe, unchanged from 06/19/2023 (3: 327-324).  No emphysematous lung changes.No pleural effusion.    Mediastinum: No lymphadenopathy.  The cardiac silhouette is normal in size..  No pericardial effusion.The esophagus course normally.    Upper abdomen (visualized portion):No abnormality seen.    Bones/chest wall: No marrow replacement process.                                       X-Ray Chest 1 View (Final result)  Result time 11/01/23 10:31:19      Final result by Viet Lincoln MD (11/01/23 10:31:19)                   Impression:      No convincing evidence of acute cardiopulmonary disease.      Electronically signed by: Viet Lincoln  Date:    11/01/2023  Time:    10:31               Narrative:    EXAMINATION:  XR CHEST 1 VIEW    CLINICAL HISTORY:  Sepsis;    TECHNIQUE:  Single frontal view of the chest was performed.    COMPARISON:  Chest radiograph performed 10/31/2023    FINDINGS:  Cardiomediastinal contours grossly unchanged.  Lungs essentially clear.  No definite pneumothorax or large volume pleural effusion.    No acute findings in the visualized abdomen.    Osseous and soft tissue structures appear without definite acute change.

## 2023-11-03 NOTE — PHYSICIAN QUERY
PT Name: Sugar Diaz  MR #: [id number]    DOCUMENTATION CLARIFICATION      CDS/: ABEBE Nobles,RNC-MNN          Contact information:[email or web address]    This form is a permanent document in the medical record.      Query Date: November 3, 2023    By submitting this query, we are merely seeking further clarification of documentation. Please utilize your independent clinical judgment when addressing the question(s) below.       Indicators Supporting Clinical Findings Location in Medical Record   X Anemia, Thrombocytopenia, Neutropenia, Pancytopenia documented Neutropenic fever H&P 11/1   X H&H Hgb=10.1-->9.4-->9.4  Hct=28.8-->27.5-->27.5 LAB 11/1-11/3   X WBC WBC=1.83-->3.18-->3.85 LAB 11/1-11/3   X Neutrophils/ Granulocytes/ ANC ANC=1.4 LAB 11/3   X Platelets Platelets= 125-->126-->146 LAB 11/1-11/3    Transfusion(s)     X Treatments: - Broad spectrum antibiotics, zosyn 4.5g Q8H until at least 48 hours afebrile  - Follow blood and urine cultures closely   - BC: NGTD  - UC: no growth   - AM CBC: wbc 3.85, ANC 1.4  - If patient is still febrile after 48 hours of antibiotic therapy will add vancomycin (gram pos coverage), fluconazole (fungal coverage) and consult ID for assistance   - Will not administer neupogen  - Discontinue neutropenic precautions   - Wean O2 today Gyn Onc Progress note 11/3@708am   X Acute/ Chronic illness stage IIIC1 grade 1 endometrioid endometrial cancer who presented to the ED for URI symptoms and neutropenia.    Pt has hx of endometrial cancer with her last chemo session x 2 weeks ago H&P 11/1            ED Triage note 11/1    Other:     Pancytopenia is defined by:  Hb < 12g/dL (non-pregnant women) or <13g/dL (men) + ANC < 1800/microL + Platelets < 150,000/microL    The clinical guidelines noted are only a system guideline. It does not replace the providers clinical judgment.      Provider, please specify diagnosis or diagnoses associated with above clinical  findings.    [   x] Pancytopenia due to chemotherapy   [   ] Pancytopenia, unspecified   [   ] Other Hematological Diagnosis (please specify): ________   [  ] Clinically Undetermined         Please document in your progress notes daily for the duration of treatment, until resolved, and include in your discharge summary.    Reference:    LIZANDRO West (n.santi.). Approach to the adult with pancytopenia. [employer]. Retrieved September 7, 2022, from https://www.[employer]/contents/approach-to-the-adult-with-pancytopenia?search=pancytopenia&source=search_result&selectedTitle=1~150&usage_type=default&display_rank=1    Form No. 22154

## 2023-11-03 NOTE — PLAN OF CARE
In agreement and eager for DC.  VU of DC instructions, paperwork passed. IV removed with cath tip intact, WNL.  To be DC home with family.  Will be escorted downstairs via  transport team once dressed and ready.   Free from falls or skin breakdown this hospital admission.   Masks provided for patient use at DC  - Neutropenic Precautions

## 2023-11-03 NOTE — DISCHARGE SUMMARY
[facility] ([address])  Gynecologic Oncology  Discharge Summary    Patient Name: Sugar Diaz  MRN: [id number]  Admission Date: 2023  Hospital Length of Stay: 2 days  Discharge Date and Time:  2023 9:36 AM  Attending Physician: Lavell Rodríguez MD   Discharging Provider: Elsa Owen MD  Primary Care Provider: Dexter Khan MD    HPI:   Sugar Diaz is a 58 y.o.  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 Symptoms started 3 days ago and have progressively worsened. Cough is not productive. Shortness of breath is worsened with exertion. She denies SOB at rest or when lying down. She describes her headache was frontal throbbing, worsened with coughing. She has taken tylenol and ibuprofen with improvement in pain. She denies dizziness/lightheadedness, syncope, vision changes, sore throat, abdominal pain, N/V, constipation, diarrhea. Patient has a history of colitis with chemo, however, has been having daily soft bowel movements.      Patient presented to her PCP yesterday and was noted to be febrile to 100.8F, tachycardic in the 130, hypoxic (94% on RA). Labs were positive for WBC 1 (ANC 0.9). She was advised to present to the ED, however, elected to return to clinic today. Symptoms worsened and vital sign abnormalities persisted so she was instructed to present to the ED for further workup and management.       Oncology History   Malignant neoplasm of endometrium   2023 Surgery     Hysteroscopy, D&C: grade 1 endometrioid EC      2023 Surgery     TRH/BSO/BSLN/RPLN/repair of obturator nerve     Final pathology c/w MMRd, p53 WT stage IIIC1 grade 1 endometrioid EC. 4.5 cm, grade 1, 96% MI (22/23 mm), +LUE, -LVSI, +MELF, -cervix, 1/2+ RPLN, /+ LPLN, -ascites      2023 Imaging Significant Findings     CT C/A/P w/: High L para-aortic at the level of the renal  vessels, 1.5 cm.  Low R para-aortic at the LEAH, 4.1 cm in greatest dimension.      6/25/2023 Genomic Testing     [facility] (NGS): ARD1A, CHEK1, CTCF, , HDAC2, HNF1A, JAK1, KRAS, MAX, MSH6, P1K3R1, PPM1D, PTEN, ZFHX3, ZSR2      6/28/2023 Tumor Conference     No rule for debulking of para-aortic lymph nodes.  Adjuvant VBT.  Represent after completion of adjuvant therapy to discuss EBRT.          8/9/2023 - 9/13/2023 Radiation Therapy     Treating physician: Dr. Adriane Babb  Total Dose: 21 Gy HDR  Fractions: 3 vaginal cuff brachytherapy      8/22/2023 Imaging Significant Findings     CT A/P w/: No evidence of colitis, OR in high L para-aortic and low R para-aortic.      10/4/2023 Procedure     Colonoscopy: WNL   07/07/2023 - 10/19/2023: C1-6 carbo/taxol/dsotarlimab       Hospital Course:  11/01/2023: Admit for neutropenic fever, suspect URI. WBC 1.8, ANC 0.9. CXR, CTA negative. COVID, flu neg. COVID PCR, resp panel, urine/blood cultures pending. Initiate zosyn.   11/02/2023: NAEO. Reports coughing spells. Denies chest pain, SOB, or difficulty breathing. AM CBC and Cr pending. Respiratory panel positive for RSV. Continue zosyn.  11/03/2023: NAEO. Afebrile, VSS. Reports feeling much improved today. Fatigue improved, still coughing. Blood cultures NGTD, urine culture no growth. Continue zosyn until 48 hours. ANC 1.4 this AM. Stable for discharge.       Goals of Care Treatment Preferences:  Code Status: Full Code      * No surgery found *     Consults (From admission, onward)        Status Ordering Provider     Inpatient consult to Gynecologic Oncology  Once        Provider:  (Not yet assigned)    Completed JUAN CARPENTER          Significant Diagnostic Studies: Labs:   CBC   Recent Labs   Lab 11/01/23  1008 11/02/23  0524 11/03/23  0545   WBC 1.83* 3.18* 3.85*   HGB 10.1* 9.4* 9.4*   HCT 28.8* 27.5* 27.5*   * 126* 146*       Pending Diagnostic Studies:     None        Final Active Diagnoses:    Diagnosis Date  Noted POA    PRINCIPAL PROBLEM:  Neutropenic fever [D70.9, R50.81] 11/01/2023 Yes    Electrolyte imbalance [E87.8] 11/01/2023 Yes    GERD (gastroesophageal reflux disease) [K21.9] 11/01/2023 Yes      Problems Resolved During this Admission:        Does this patient meet criteria for extended DVT prophylaxis? No    Discharged Condition: stable    Disposition: Home or Self Care    Follow Up:   Follow-up Information     Lavell Rodríguez MD Follow up on 11/8/2023.    Specialty: Gynecologic Oncology  Why: Appointment as scheduled  Contact information:  [address]  [address]  [facility] [address]  [phone]                       Patient Instructions:      Diet Adult Regular     Notify your health care provider if you experience any of the following:  increased confusion or weakness     Notify your health care provider if you experience any of the following:  persistent dizziness, light-headedness, or visual disturbances     Notify your health care provider if you experience any of the following:  severe persistent headache     Notify your health care provider if you experience any of the following:  difficulty breathing or increased cough     Notify your health care provider if you experience any of the following:  severe uncontrolled pain     Notify your health care provider if you experience any of the following:  persistent nausea and vomiting or diarrhea     Notify your health care provider if you experience any of the following:  temperature >100.4     Activity as tolerated     Medications:  Reconciled Home Medications:      Medication List      CONTINUE taking these medications    * acetaminophen 500 MG tablet  Commonly known as: TYLENOL EXTRA STRENGTH  Take 2 tablets (1,000 mg total) by mouth every 6 (six) hours as needed for Pain.     * acetaminophen 650 MG Tbsr  Commonly known as: TYLENOL  Take 1 tablet (650 mg total) by mouth every 6 (six) hours as needed (pain).     dexAMETHasone 4 MG Tab  Commonly  known as: DECADRON  Take daily beginning the day after chemotherapy for 3 days     diclofenac sodium 1 % Gel  Commonly known as: VOLTAREN  Apply 2 g topically 2 (two) times daily.     diphenhydrAMINE 25 mg capsule  Commonly known as: BENADRYL  Take 25 mg by mouth every 6 (six) hours as needed for Itching.     diphenoxylate-atropine 2.5-0.025 mg 2.5-0.025 mg per tablet  Commonly known as: LOMOTIL  Take 1 tablet by mouth 4 (four) times daily as needed for Diarrhea.     famotidine 40 MG tablet  Commonly known as: PEPCID  Take 1 tablet (40 mg total) by mouth once daily.     ibuprofen 800 MG tablet  Commonly known as: ADVIL,MOTRIN  Take 1 tablet (800 mg total) by mouth every 8 (eight) hours as needed for Pain.     loratadine 10 mg tablet  Commonly known as: CLARITIN  Take 1 tablet (10 mg total) by mouth once daily.     prochlorperazine 5 MG tablet  Commonly known as: COMPAZINE  Take every 6 hours for 3 days beginning the day after chemotherapy     promethazine-dextromethorphan 6.25-15 mg/5 mL Syrp  Commonly known as: PROMETHAZINE-DM  Take 5 mLs by mouth every 4 (four) hours as needed.     sulfamethoxazole-trimethoprim 800-160mg 800-160 mg Tab  Commonly known as: BACTRIM DS  Take 1 tablet by mouth once daily.     valACYclovir 1000 MG tablet  Commonly known as: VALTREX  Two pills at onset of fever blister and then repeat 2 pills 12 hr later     zolpidem 5 MG Tab  Commonly known as: AMBIEN  Take 1 tablet (5 mg total) by mouth nightly as needed (insomnia).         * This list has 2 medication(s) that are the same as other medications prescribed for you. Read the directions carefully, and ask your doctor or other care provider to review them with you.            STOP taking these medications    predniSONE 10 MG tablet  Commonly known as: KEVIN Owen MD  Gynecologic Oncology  [facility] ([address])

## 2023-11-03 NOTE — PLAN OF CARE
Follow up appointment noted on AVS. Family to provide transportation home.   11/03/23 0940   Final Note   Assessment Type Final Discharge Note   Anticipated Discharge Disposition Home   Hospital Resources/Appts/Education Provided Provided patient/caregiver with written discharge plan information;Appointments scheduled and added to AVS   Post-Acute Status   Discharge Delays None known at this time     [facility] ([address])  Discharge Final Note    Primary Care Provider: Dexter Khan MD    Expected Discharge Date: 11/3/2023    Final Discharge Note (most recent)       Final Note - 11/03/23 0940          Final Note    Assessment Type Final Discharge Note (P)      Anticipated Discharge Disposition Home or Self Care (P)      Hospital Resources/Appts/Education Provided Provided patient/caregiver with written discharge plan information;Appointments scheduled and added to AVS (P)         Post-Acute Status    Discharge Delays None known at this time (P)                      Important Message from Medicare             Contact Info       Lavell Rodríguez MD   Specialty: Gynecologic Oncology    [address] [facility] [address]  [place] [id number]   Phone: [phone]       Next Steps: Follow up on 11/8/2023    Instructions: Appointment as scheduled

## 2023-11-03 NOTE — PROGRESS NOTES
[facility] ([address])  Gynecologic Oncology  Progress Note      Patient Name: Sugar Diaz  MRN: [id number]  Admission Date: 2023  Hospital Length of Stay: 2 days  Attending Provider: Lavell Rodríguez MD  Primary Care Provider: Dexter Khan MD  Principal Problem: Neutropenic fever    Follow-up For: * No surgery found *  Post-Operative Day:    Subjective:      History of Present Illness:  Sugar Diaz is a 58 y.o.  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 Symptoms started 3 days ago and have progressively worsened. Cough is not productive. Shortness of breath is worsened with exertion. She denies SOB at rest or when lying down. She describes her headache was frontal throbbing, worsened with coughing. She has taken tylenol and ibuprofen with improvement in pain. She denies dizziness/lightheadedness, syncope, vision changes, sore throat, abdominal pain, N/V, constipation, diarrhea. Patient has a history of colitis with chemo, however, has been having daily soft bowel movements.      Patient presented to her PCP yesterday and was noted to be febrile to 100.8F, tachycardic in the 130, hypoxic (94% on RA). Labs were positive for WBC 1 (ANC 0.9). She was advised to present to the ED, however, elected to return to clinic today. Symptoms worsened and vital sign abnormalities persisted so she was instructed to present to the ED for further workup and management.       Oncology History   Malignant neoplasm of endometrium   2023 Surgery     Hysteroscopy, D&C: grade 1 endometrioid EC      2023 Surgery     TRH/BSO/BSLN/RPLN/repair of obturator nerve     Final pathology c/w MMRd, p53 WT stage IIIC1 grade 1 endometrioid EC. 4.5 cm, grade 1, 96% MI (22/23 mm), +LUE, -LVSI, +MELF, -cervix, 1/2+ RPLN, + LPLN, -ascites      2023 Imaging Significant Findings     CT C/A/P w/:  High L para-aortic at the level of the renal vessels, 1.5 cm.  Low R para-aortic at the LEAH, 4.1 cm in greatest dimension.      6/25/2023 Genomic Testing     [facility] (NGS): ARD1A, CHEK1, CTCF, , HDAC2, HNF1A, JAK1, KRAS, MAX, MSH6, P1K3R1, PPM1D, PTEN, ZFHX3, ZSR2      6/28/2023 Tumor Conference     No rule for debulking of para-aortic lymph nodes.  Adjuvant VBT.  Represent after completion of adjuvant therapy to discuss EBRT.          8/9/2023 - 9/13/2023 Radiation Therapy     Treating physician: Dr. Adriane Babb  Total Dose: 21 Gy HDR  Fractions: 3 vaginal cuff brachytherapy      8/22/2023 Imaging Significant Findings     CT A/P w/: No evidence of colitis, KS in high L para-aortic and low R para-aortic.      10/4/2023 Procedure     Colonoscopy: WNL   07/07/2023 - 10/19/2023: C1-6 carbo/taxol/dsotarlimab       Hospital Course:  11/01/2023: Admit for neutropenic fever, suspect URI. WBC 1.8, ANC 0.9. CXR, CTA negative. COVID, flu neg. COVID PCR, resp panel, urine/blood cultures pending. Initiate zosyn.   11/02/2023: NAEO. Reports coughing spells. Denies chest pain, SOB, or difficulty breathing. AM CBC and Cr pending. Respiratory panel positive for RSV. Continue zosyn.  11/03/2023: NAEO. Afebrile, VSS. Reports feeling much improved today. Fatigue improved, still coughing. Blood cultures NGTD, urine culture no growth. Continue zosyn until 48 hours.       Interval History: NAEO. Fatigue improved. Still coughing.  Denies chest pain, SOB, difficulty breathing. Denies fevers or chills. Tolerating PO. Ambulating, +bowel function, - urinary changes.    Scheduled Meds:   cetirizine  10 mg Oral Daily    enoxparin  40 mg Subcutaneous Q24H (prophylaxis, 1700)    famotidine  40 mg Oral Daily    piperacillin-tazobactam (Zosyn) IV (PEDS and ADULTS) (extended infusion is not appropriate)  4.5 g Intravenous Q8H     Continuous Infusions:      PRN Meds:acetaminophen, albuterol-ipratropium, calcium carbonate,  dextromethorphan-guaiFENesin  mg/5 ml, diphenoxylate-atropine 2.5-0.025 mg, hydrALAZINE, ibuprofen, ondansetron, oxyCODONE, oxyCODONE, prochlorperazine, simethicone, sodium chloride 0.9%, zolpidem    Review of patient's allergies indicates:  No Known Allergies    Objective:     Vital Signs (Most Recent):  Temp: 98.3 °F (36.8 °C) (11/03/23 0434)  Pulse: 97 (11/03/23 0434)  Resp: 16 (11/03/23 0434)  BP: (!) 121/59 (11/03/23 0434)  SpO2: 98 % (11/03/23 0434) Vital Signs (24h Range):  Temp:  [98.1 °F (36.7 °C)-98.9 °F (37.2 °C)] 98.3 °F (36.8 °C)  Pulse:  [] 97  Resp:  [16-20] 16  SpO2:  [95 %-99 %] 98 %  BP: (110-121)/(57-63) 121/59     Weight: 78.5 kg (173 lb 1 oz)  Body mass index is 27.11 kg/m².    Intake/Output - Last 3 Shifts         11/01 0700  11/02 0659 11/02 0700  11/03 0659 11/03 0700  11/04 0659    P.O. 300      IV Piggyback 98.2      Total Intake(mL/kg) 398.2 (5.1)      Net +398.2             Urine Occurrence 2 x                   Physical Exam:   Constitutional: She is oriented to person, place, and time. She appears well-developed and well-nourished. No distress.   2L NC    HENT:   Head: Normocephalic and atraumatic.    Eyes: EOM are normal.     Cardiovascular:  Regular rhythm and normal heart sounds.             Pulmonary/Chest: Effort normal. No respiratory distress. She has no wheezes.        Abdominal: Soft.             Musculoskeletal: Normal range of motion.       Neurological: She is alert and oriented to person, place, and time.    Skin: Skin is warm and dry. She is not diaphoretic.    Psychiatric: She has a normal mood and affect. Her behavior is normal.          Lines/Drains/Airways       Peripheral Intravenous Line  Duration                  Peripheral IV - Single Lumen 11/01/23 1033 20 G Right Wrist 1 day                    Laboratory:  All pertinent labs from the last 24 hours have been reviewed.    Diagnostic Results:  none    Imaging Results              CTA Chest Non-Coronary  (PE Studies) (Final result)  Result time 11/01/23 12:54:22      Final result by Darcy Coburn MD (11/01/23 12:54:22)                   Impression:      No evidence of pulmonary embolus.    Patchy ground-glass opacity at the dependent portion of the bilateral lung basis suggestive of mild atelectasis.    Few subcentimeter pulmonary nodules, For multiple solid nodules all <6 mm, Fleischner Society 2017 guidelines recommend no routine follow up for a low risk patient, or follow up with non-contrast chest CT at 12 months after discovery in a high risk patient.      Electronically signed by: Darcy Coburn MD  Date:    11/01/2023  Time:    12:54               Narrative:    EXAMINATION:  CTA CHEST NON CORONARY (PE STUDIES)    CLINICAL HISTORY:  Pulmonary embolism (PE) suspected, positive D-dimer;    TECHNIQUE:  Low dose axial images, sagittal and coronal reformations were obtained from the thoracic inlet to the lung bases following the IV administration of 75 mL of Omnipaque 350.  Contrast timing was optimized to evaluate the pulmonary arteries.  MIP images were performed.    COMPARISON:  CT chest 06/16/2023    FINDINGS:  Vasculature: There is good opacification of the pulmonary arterial system.  No acute pulmonary embolus identified. Thoracic aorta normal caliber. No evidence of dissection.    Lungs: The tracheobronchial tree is clear. Mild increased ground-glass opacity at the bilateral dependent portion of the lung basis possibly atelectasis, new from the prior study.  0.4 cm nodule right upper lobe (3:179).  Tiny nodule left lower lobe, unchanged from 06/19/2023 (3: 327-324).  No emphysematous lung changes.No pleural effusion.    Mediastinum: No lymphadenopathy.  The cardiac silhouette is normal in size..  No pericardial effusion.The esophagus course normally.    Upper abdomen (visualized portion):No abnormality seen.    Bones/chest wall: No marrow replacement process.                                        X-Ray Chest 1 View (Final result)  Result time 23 10:31:19      Final result by Viet Lincoln MD (23 10:31:19)                   Impression:      No convincing evidence of acute cardiopulmonary disease.      Electronically signed by: Viet Lincoln  Date:    2023  Time:    10:               Narrative:    EXAMINATION:  XR CHEST 1 VIEW    CLINICAL HISTORY:  Sepsis;    TECHNIQUE:  Single frontal view of the chest was performed.    COMPARISON:  Chest radiograph performed 10/31/2023    FINDINGS:  Cardiomediastinal contours grossly unchanged.  Lungs essentially clear.  No definite pneumothorax or large volume pleural effusion.    No acute findings in the visualized abdomen.    Osseous and soft tissue structures appear without definite acute change.                                        Assessment/Plan:     * Neutropenic fever  Neutropenic fever:  58 y.o.  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  - Febrile to 101.4, tachycardic to 100-130s, O2 sats 91% on RA (97%+ on 2L NC)  - CBC 1.8 (ANC 0.9)/10/29/125  - Cr 0.7, K 3.7, mag 1.5, phos 1.9  - Lactate 1.7>2.1  - Rapid COVID, flu negative   - Respiratory panel +RSV, COVID PCR pending  - UA: trace protein, glucose. No nitrites or leukocytes   - Blood cultures x2 and urine culture collected, pending  - CXR: no acute processes   - CTA: no evidence of PE    Plan:  - Broad spectrum antibiotics, zosyn 4.5g Q8H until at least 48 hours afebrile  - Follow blood and urine cultures closely    - BC: NGTD   - UC: no growth   - AM CBC: wbc 3.85, ANC 1.4  - If patient is still febrile after 48 hours of antibiotic therapy will add vancomycin (gram pos coverage), fluconazole (fungal coverage) and consult ID for assistance   - Will not administer neupogen   - Discontinue neutropenic precautions   - Wean O2 today      GERD (gastroesophageal reflux disease)  - Continue home pepcid      Electrolyte imbalance  - Mag 1.5, phos 1.9, K 3.7 on admit   - s/p repletion      VTE Risk Mitigation (From admission, onward)         Ordered     enoxaparin injection 40 mg  Every 24 hours         11/02/23 0628     IP VTE HIGH RISK PATIENT  Once         11/01/23 1427     Place sequential compression device  Until discontinued         11/01/23 1427                  Elsa Owen MD  Gynecologic Oncology  [place] [facility] ([address])

## 2023-11-06 ENCOUNTER — OFFICE VISIT (OUTPATIENT)
Dept: INTERNAL MEDICINE | Facility: CLINIC | Age: 58
End: 2023-11-06
Payer: COMMERCIAL

## 2023-11-06 ENCOUNTER — HOSPITAL ENCOUNTER (OUTPATIENT)
Dept: RADIOLOGY | Facility: OTHER | Age: 58
Discharge: HOME | DRG: 179 | End: 2023-11-06
Attending: OBSTETRICS & GYNECOLOGY
Payer: COMMERCIAL

## 2023-11-06 ENCOUNTER — HOSPITAL ENCOUNTER (INPATIENT)
Facility: OTHER | Age: 58
LOS: 1 days | Discharge: HOME | DRG: 179 | End: 2023-11-09
Attending: EMERGENCY MEDICINE | Admitting: OBSTETRICS & GYNECOLOGY
Payer: COMMERCIAL

## 2023-11-06 VITALS
SYSTOLIC BLOOD PRESSURE: 137 MMHG | HEART RATE: 135 BPM | OXYGEN SATURATION: 93 % | DIASTOLIC BLOOD PRESSURE: 60 MMHG | BODY MASS INDEX: 27.11 KG/M2 | TEMPERATURE: 100 F | HEIGHT: 67 IN

## 2023-11-06 DIAGNOSIS — R00.0 TACHYCARDIA: ICD-10-CM

## 2023-11-06 DIAGNOSIS — A41.9 SEPSIS: ICD-10-CM

## 2023-11-06 DIAGNOSIS — J18.9 COMMUNITY ACQUIRED PNEUMONIA: ICD-10-CM

## 2023-11-06 DIAGNOSIS — B33.8 RSV INFECTION: Primary | ICD-10-CM

## 2023-11-06 DIAGNOSIS — R91.8 ABNORMAL FINDINGS ON DIAGNOSTIC IMAGING OF LUNG: ICD-10-CM

## 2023-11-06 DIAGNOSIS — R65.10 SIRS (SYSTEMIC INFLAMMATORY RESPONSE SYNDROME): ICD-10-CM

## 2023-11-06 DIAGNOSIS — J18.9 COMMUNITY ACQUIRED PNEUMONIA OF RIGHT LOWER LOBE OF LUNG: Primary | ICD-10-CM

## 2023-11-06 DIAGNOSIS — C54.1 MALIGNANT NEOPLASM OF ENDOMETRIUM: ICD-10-CM

## 2023-11-06 DIAGNOSIS — C77.2 SECONDARY MALIGNANCY OF RETROPERITONEAL LYMPH NODES: ICD-10-CM

## 2023-11-06 LAB
ALBUMIN SERPL BCP-MCNC: 3 G/DL (ref 3.5–5.2)
ALP SERPL-CCNC: 66 U/L (ref 55–135)
ALT SERPL W/O P-5'-P-CCNC: 29 U/L (ref 10–44)
ANION GAP SERPL CALC-SCNC: 12 MMOL/L (ref 8–16)
AST SERPL-CCNC: 21 U/L (ref 10–40)
BACTERIA BLD CULT: NORMAL
BACTERIA BLD CULT: NORMAL
BASOPHILS # BLD AUTO: 0.05 K/UL (ref 0–0.2)
BASOPHILS NFR BLD: 0.5 % (ref 0–1.9)
BILIRUB SERPL-MCNC: 0.2 MG/DL (ref 0.1–1)
BILIRUB UR QL STRIP: NEGATIVE
BUN SERPL-MCNC: 6 MG/DL (ref 6–20)
CALCIUM SERPL-MCNC: 9.4 MG/DL (ref 8.7–10.5)
CHLORIDE SERPL-SCNC: 101 MMOL/L (ref 95–110)
CLARITY UR: CLEAR
CO2 SERPL-SCNC: 22 MMOL/L (ref 23–29)
COLOR UR: YELLOW
CREAT SERPL-MCNC: 0.7 MG/DL (ref 0.5–1.4)
CTP QC/QA: YES
CTP QC/QA: YES
DIFFERENTIAL METHOD: ABNORMAL
EOSINOPHIL # BLD AUTO: 0 K/UL (ref 0–0.5)
EOSINOPHIL NFR BLD: 0.1 % (ref 0–8)
ERYTHROCYTE [DISTWIDTH] IN BLOOD BY AUTOMATED COUNT: 14.8 % (ref 11.5–14.5)
EST. GFR  (NO RACE VARIABLE): >60 ML/MIN/1.73 M^2
FIO2: 21
FIO2: 24
GLUCOSE SERPL-MCNC: 118 MG/DL (ref 70–110)
GLUCOSE UR QL STRIP: NEGATIVE
HCT VFR BLD AUTO: 32 % (ref 37–48.5)
HGB BLD-MCNC: 10.7 G/DL (ref 12–16)
HGB UR QL STRIP: NEGATIVE
IMM GRANULOCYTES # BLD AUTO: 0.21 K/UL (ref 0–0.04)
IMM GRANULOCYTES NFR BLD AUTO: 2.2 % (ref 0–0.5)
KETONES UR QL STRIP: NEGATIVE
LDH SERPL L TO P-CCNC: 1.75 MMOL/L (ref 0.5–2.2)
LDH SERPL L TO P-CCNC: 2.04 MMOL/L (ref 0.5–2.2)
LEUKOCYTE ESTERASE UR QL STRIP: NEGATIVE
LYMPHOCYTES # BLD AUTO: 1.5 K/UL (ref 1–4.8)
LYMPHOCYTES NFR BLD: 15.6 % (ref 18–48)
MCH RBC QN AUTO: 34.6 PG (ref 27–31)
MCHC RBC AUTO-ENTMCNC: 33.4 G/DL (ref 32–36)
MCV RBC AUTO: 104 FL (ref 82–98)
MONOCYTES # BLD AUTO: 1.1 K/UL (ref 0.3–1)
MONOCYTES NFR BLD: 11.9 % (ref 4–15)
NEUTROPHILS # BLD AUTO: 6.6 K/UL (ref 1.8–7.7)
NEUTROPHILS NFR BLD: 69.7 % (ref 38–73)
NITRITE UR QL STRIP: NEGATIVE
NRBC BLD-RTO: 0 /100 WBC
PH UR STRIP: 6 [PH] (ref 5–8)
PLATELET # BLD AUTO: 325 K/UL (ref 150–450)
PLATELET BLD QL SMEAR: ABNORMAL
PMV BLD AUTO: 9.1 FL (ref 9.2–12.9)
POC MOLECULAR INFLUENZA A AGN: NEGATIVE
POC MOLECULAR INFLUENZA B AGN: NEGATIVE
POC TEMPERATURE: NORMAL
POTASSIUM SERPL-SCNC: 3.7 MMOL/L (ref 3.5–5.1)
PROCALCITONIN SERPL IA-MCNC: 0.09 NG/ML
PROT SERPL-MCNC: 7.2 G/DL (ref 6–8.4)
PROT UR QL STRIP: NEGATIVE
RBC # BLD AUTO: 3.09 M/UL (ref 4–5.4)
SAMPLE: NORMAL
SAMPLE: NORMAL
SARS-COV-2 RDRP RESP QL NAA+PROBE: NEGATIVE
SODIUM SERPL-SCNC: 135 MMOL/L (ref 136–145)
SP GR UR STRIP: <=1.005 (ref 1–1.03)
TROPONIN I SERPL DL<=0.01 NG/ML-MCNC: <0.006 NG/ML (ref 0–0.03)
URN SPEC COLLECT METH UR: ABNORMAL
UROBILINOGEN UR STRIP-ACNC: NEGATIVE EU/DL
WBC # BLD AUTO: 9.46 K/UL (ref 3.9–12.7)

## 2023-11-06 PROCEDURE — 96365 THER/PROPH/DIAG IV INF INIT: CPT

## 2023-11-06 PROCEDURE — G0378 HOSPITAL OBSERVATION PER HR: HCPCS

## 2023-11-06 PROCEDURE — 74177 CT ABD & PELVIS W/CONTRAST: CPT | Mod: 26,,, | Performed by: RADIOLOGY

## 2023-11-06 PROCEDURE — 99999 PR PBB SHADOW E&M-EST. PATIENT-LVL IV: ICD-10-PCS | Mod: PBBFAC,,, | Performed by: STUDENT IN AN ORGANIZED HEALTH CARE EDUCATION/TRAINING PROGRAM

## 2023-11-06 PROCEDURE — 25500020 PHARM REV CODE 255: Performed by: OBSTETRICS & GYNECOLOGY

## 2023-11-06 PROCEDURE — 3008F PR BODY MASS INDEX (BMI) DOCUMENTED: ICD-10-PCS | Mod: CPTII,S$GLB,, | Performed by: STUDENT IN AN ORGANIZED HEALTH CARE EDUCATION/TRAINING PROGRAM

## 2023-11-06 PROCEDURE — 3075F PR MOST RECENT SYSTOLIC BLOOD PRESS GE 130-139MM HG: ICD-10-PCS | Mod: CPTII,S$GLB,, | Performed by: STUDENT IN AN ORGANIZED HEALTH CARE EDUCATION/TRAINING PROGRAM

## 2023-11-06 PROCEDURE — 80053 COMPREHEN METABOLIC PANEL: CPT | Performed by: EMERGENCY MEDICINE

## 2023-11-06 PROCEDURE — 83605 ASSAY OF LACTIC ACID: CPT

## 2023-11-06 PROCEDURE — 94640 AIRWAY INHALATION TREATMENT: CPT

## 2023-11-06 PROCEDURE — 84145 PROCALCITONIN (PCT): CPT | Performed by: EMERGENCY MEDICINE

## 2023-11-06 PROCEDURE — 1159F PR MEDICATION LIST DOCUMENTED IN MEDICAL RECORD: ICD-10-PCS | Mod: CPTII,S$GLB,, | Performed by: STUDENT IN AN ORGANIZED HEALTH CARE EDUCATION/TRAINING PROGRAM

## 2023-11-06 PROCEDURE — 1159F MED LIST DOCD IN RCRD: CPT | Mod: CPTII,S$GLB,, | Performed by: STUDENT IN AN ORGANIZED HEALTH CARE EDUCATION/TRAINING PROGRAM

## 2023-11-06 PROCEDURE — 71260 CT THORAX DX C+: CPT | Mod: TC

## 2023-11-06 PROCEDURE — 87502 INFLUENZA DNA AMP PROBE: CPT

## 2023-11-06 PROCEDURE — 71260 CT THORAX DX C+: CPT | Mod: 26,,, | Performed by: RADIOLOGY

## 2023-11-06 PROCEDURE — 3078F DIAST BP <80 MM HG: CPT | Mod: CPTII,S$GLB,, | Performed by: STUDENT IN AN ORGANIZED HEALTH CARE EDUCATION/TRAINING PROGRAM

## 2023-11-06 PROCEDURE — 99900035 HC TECH TIME PER 15 MIN (STAT)

## 2023-11-06 PROCEDURE — 99214 PR OFFICE/OUTPT VISIT, EST, LEVL IV, 30-39 MIN: ICD-10-PCS | Mod: S$GLB,,, | Performed by: STUDENT IN AN ORGANIZED HEALTH CARE EDUCATION/TRAINING PROGRAM

## 2023-11-06 PROCEDURE — 99999 PR PBB SHADOW E&M-EST. PATIENT-LVL IV: CPT | Mod: PBBFAC,,, | Performed by: STUDENT IN AN ORGANIZED HEALTH CARE EDUCATION/TRAINING PROGRAM

## 2023-11-06 PROCEDURE — 71260 CT CHEST ABDOMEN PELVIS WITH CONTRAST (XPD): ICD-10-PCS | Mod: 26,,, | Performed by: RADIOLOGY

## 2023-11-06 PROCEDURE — 93010 ELECTROCARDIOGRAM REPORT: CPT | Mod: ,,, | Performed by: INTERNAL MEDICINE

## 2023-11-06 PROCEDURE — 85025 COMPLETE CBC W/AUTO DIFF WBC: CPT | Performed by: EMERGENCY MEDICINE

## 2023-11-06 PROCEDURE — 93010 EKG 12-LEAD: ICD-10-PCS | Mod: ,,, | Performed by: INTERNAL MEDICINE

## 2023-11-06 PROCEDURE — 93005 ELECTROCARDIOGRAM TRACING: CPT

## 2023-11-06 PROCEDURE — 87635 SARS-COV-2 COVID-19 AMP PRB: CPT | Performed by: OBSTETRICS & GYNECOLOGY

## 2023-11-06 PROCEDURE — 25000242 PHARM REV CODE 250 ALT 637 W/ HCPCS: Performed by: EMERGENCY MEDICINE

## 2023-11-06 PROCEDURE — 99214 OFFICE O/P EST MOD 30 MIN: CPT | Mod: S$GLB,,, | Performed by: STUDENT IN AN ORGANIZED HEALTH CARE EDUCATION/TRAINING PROGRAM

## 2023-11-06 PROCEDURE — A9698 NON-RAD CONTRAST MATERIALNOC: HCPCS | Performed by: OBSTETRICS & GYNECOLOGY

## 2023-11-06 PROCEDURE — 3078F PR MOST RECENT DIASTOLIC BLOOD PRESSURE < 80 MM HG: ICD-10-PCS | Mod: CPTII,S$GLB,, | Performed by: STUDENT IN AN ORGANIZED HEALTH CARE EDUCATION/TRAINING PROGRAM

## 2023-11-06 PROCEDURE — 99285 EMERGENCY DEPT VISIT HI MDM: CPT | Mod: 25

## 2023-11-06 PROCEDURE — 36415 COLL VENOUS BLD VENIPUNCTURE: CPT | Performed by: EMERGENCY MEDICINE

## 2023-11-06 PROCEDURE — 96367 TX/PROPH/DG ADDL SEQ IV INF: CPT

## 2023-11-06 PROCEDURE — 74177 CT CHEST ABDOMEN PELVIS WITH CONTRAST (XPD): ICD-10-PCS | Mod: 26,,, | Performed by: RADIOLOGY

## 2023-11-06 PROCEDURE — 3075F SYST BP GE 130 - 139MM HG: CPT | Mod: CPTII,S$GLB,, | Performed by: STUDENT IN AN ORGANIZED HEALTH CARE EDUCATION/TRAINING PROGRAM

## 2023-11-06 PROCEDURE — 94761 N-INVAS EAR/PLS OXIMETRY MLT: CPT

## 2023-11-06 PROCEDURE — 84484 ASSAY OF TROPONIN QUANT: CPT | Performed by: EMERGENCY MEDICINE

## 2023-11-06 PROCEDURE — 3008F BODY MASS INDEX DOCD: CPT | Mod: CPTII,S$GLB,, | Performed by: STUDENT IN AN ORGANIZED HEALTH CARE EDUCATION/TRAINING PROGRAM

## 2023-11-06 PROCEDURE — 25000003 PHARM REV CODE 250: Performed by: EMERGENCY MEDICINE

## 2023-11-06 PROCEDURE — 63600175 PHARM REV CODE 636 W HCPCS

## 2023-11-06 PROCEDURE — 63600175 PHARM REV CODE 636 W HCPCS: Performed by: EMERGENCY MEDICINE

## 2023-11-06 PROCEDURE — 96375 TX/PRO/DX INJ NEW DRUG ADDON: CPT

## 2023-11-06 PROCEDURE — 81003 URINALYSIS AUTO W/O SCOPE: CPT | Performed by: EMERGENCY MEDICINE

## 2023-11-06 PROCEDURE — 96361 HYDRATE IV INFUSION ADD-ON: CPT

## 2023-11-06 PROCEDURE — 87040 BLOOD CULTURE FOR BACTERIA: CPT | Mod: 59 | Performed by: EMERGENCY MEDICINE

## 2023-11-06 RX ORDER — CETIRIZINE HYDROCHLORIDE 10 MG/1
10 TABLET ORAL DAILY
Status: DISCONTINUED | OUTPATIENT
Start: 2023-11-07 | End: 2023-11-09 | Stop reason: HOSPADM

## 2023-11-06 RX ORDER — ALBUTEROL SULFATE 90 UG/1
2 AEROSOL, METERED RESPIRATORY (INHALATION) EVERY 5 MIN PRN
Status: COMPLETED | OUTPATIENT
Start: 2023-11-06 | End: 2023-11-07

## 2023-11-06 RX ORDER — BENZONATATE 100 MG/1
100 CAPSULE ORAL 3 TIMES DAILY PRN
Status: DISCONTINUED | OUTPATIENT
Start: 2023-11-06 | End: 2023-11-07

## 2023-11-06 RX ORDER — ONDANSETRON 2 MG/ML
4 INJECTION INTRAMUSCULAR; INTRAVENOUS EVERY 6 HOURS PRN
Status: DISCONTINUED | OUTPATIENT
Start: 2023-11-06 | End: 2023-11-09 | Stop reason: HOSPADM

## 2023-11-06 RX ORDER — SODIUM CHLORIDE 0.9 % (FLUSH) 0.9 %
10 SYRINGE (ML) INJECTION
Status: DISCONTINUED | OUTPATIENT
Start: 2023-11-06 | End: 2023-11-09 | Stop reason: HOSPADM

## 2023-11-06 RX ORDER — BENZONATATE 100 MG/1
100 CAPSULE ORAL
Status: COMPLETED | OUTPATIENT
Start: 2023-11-06 | End: 2023-11-06

## 2023-11-06 RX ORDER — LEVOFLOXACIN 5 MG/ML
750 INJECTION, SOLUTION INTRAVENOUS
Status: DISCONTINUED | OUTPATIENT
Start: 2023-11-06 | End: 2023-11-06 | Stop reason: ALTCHOICE

## 2023-11-06 RX ORDER — PROCHLORPERAZINE EDISYLATE 5 MG/ML
5 INJECTION INTRAMUSCULAR; INTRAVENOUS EVERY 6 HOURS PRN
Status: DISCONTINUED | OUTPATIENT
Start: 2023-11-06 | End: 2023-11-09 | Stop reason: HOSPADM

## 2023-11-06 RX ORDER — SODIUM CHLORIDE, SODIUM LACTATE, POTASSIUM CHLORIDE, CALCIUM CHLORIDE 600; 310; 30; 20 MG/100ML; MG/100ML; MG/100ML; MG/100ML
INJECTION, SOLUTION INTRAVENOUS CONTINUOUS
Status: DISCONTINUED | OUTPATIENT
Start: 2023-11-06 | End: 2023-11-07

## 2023-11-06 RX ORDER — ZOLPIDEM TARTRATE 5 MG/1
5 TABLET ORAL NIGHTLY PRN
Status: DISCONTINUED | OUTPATIENT
Start: 2023-11-06 | End: 2023-11-09 | Stop reason: HOSPADM

## 2023-11-06 RX ORDER — KETOROLAC TROMETHAMINE 30 MG/ML
10 INJECTION, SOLUTION INTRAMUSCULAR; INTRAVENOUS
Status: COMPLETED | OUTPATIENT
Start: 2023-11-06 | End: 2023-11-06

## 2023-11-06 RX ORDER — METOCLOPRAMIDE HYDROCHLORIDE 5 MG/ML
10 INJECTION INTRAMUSCULAR; INTRAVENOUS
Status: COMPLETED | OUTPATIENT
Start: 2023-11-06 | End: 2023-11-06

## 2023-11-06 RX ADMIN — KETOROLAC TROMETHAMINE 10 MG: 30 INJECTION INTRAMUSCULAR; INTRAVENOUS at 06:11

## 2023-11-06 RX ADMIN — SODIUM CHLORIDE 1000 ML: 9 INJECTION, SOLUTION INTRAVENOUS at 05:11

## 2023-11-06 RX ADMIN — IOHEXOL 1000 ML: 9 SOLUTION ORAL at 01:11

## 2023-11-06 RX ADMIN — LEVOFLOXACIN 750 MG: 750 INJECTION, SOLUTION INTRAVENOUS at 08:11

## 2023-11-06 RX ADMIN — METOCLOPRAMIDE 10 MG: 5 INJECTION, SOLUTION INTRAMUSCULAR; INTRAVENOUS at 06:11

## 2023-11-06 RX ADMIN — BENZONATATE 100 MG: 100 CAPSULE ORAL at 08:11

## 2023-11-06 RX ADMIN — SODIUM CHLORIDE, POTASSIUM CHLORIDE, SODIUM LACTATE AND CALCIUM CHLORIDE: 600; 310; 30; 20 INJECTION, SOLUTION INTRAVENOUS at 09:11

## 2023-11-06 RX ADMIN — VANCOMYCIN HYDROCHLORIDE 1500 MG: 1.5 INJECTION, POWDER, LYOPHILIZED, FOR SOLUTION INTRAVENOUS at 06:11

## 2023-11-06 RX ADMIN — IOHEXOL 75 ML: 350 INJECTION, SOLUTION INTRAVENOUS at 02:11

## 2023-11-06 RX ADMIN — SODIUM CHLORIDE 1000 ML: 9 INJECTION, SOLUTION INTRAVENOUS at 07:11

## 2023-11-06 RX ADMIN — CEFEPIME 2 G: 2 INJECTION, POWDER, FOR SOLUTION INTRAVENOUS at 05:11

## 2023-11-06 RX ADMIN — ALBUTEROL SULFATE 2 PUFF: 90 AEROSOL, METERED RESPIRATORY (INHALATION) at 08:11

## 2023-11-06 NOTE — PROGRESS NOTES
[facility]    Subjective:     Patient ID: Sugar Diaz is a 58 y.o. female.  Chief Complaint: Fever, Shortness of Breath, and Cough    HPI:  Patient is a 58 y.o. female who   has a past medical history of Chest pain (2002), Endometrial cancer, GERD (gastroesophageal reflux disease), Lower back pain, Migraines, Mild allergic rhinitis, and Postmenopausal bleeding.  who presents for hospital follow up .Since leaving the hosptial she was feeling ok but very tired. Saturday woke up and had a headahce. Was completely whiped out over the weekend. Also coughing. A little hsortness of breath not as bad as prior to hospital. Not coughing up anything.     Had fever to 100.5. took two extra strength tylenol.     Current Outpatient Medications   Medication Instructions    acetaminophen (TYLENOL EXTRA STRENGTH) 1,000 mg, Oral, Every 6 hours PRN    acetaminophen (TYLENOL) 650 mg, Oral, Every 6 hours PRN    dexAMETHasone (DECADRON) 4 MG Tab Take daily beginning the day after chemotherapy for 3 days    diclofenac sodium (VOLTAREN) 2 g, Topical (Top), 2 times daily    diphenhydrAMINE (BENADRYL) 25 mg, Oral, Every 6 hours PRN    diphenoxylate-atropine 2.5-0.025 mg (LOMOTIL) 2.5-0.025 mg per tablet 1 tablet, Oral, 4 times daily PRN    famotidine (PEPCID) 40 mg, Oral, Daily    ibuprofen (ADVIL,MOTRIN) 800 mg, Oral, Every 8 hours PRN    loratadine (CLARITIN) 10 mg, Oral, Daily    prochlorperazine (COMPAZINE) 5 MG tablet Take every 6 hours for 3 days beginning the day after chemotherapy    promethazine-dextromethorphan (PROMETHAZINE-DM) 6.25-15 mg/5 mL Syrp 5 mLs, Oral, Every 4 hours PRN    sulfamethoxazole-trimethoprim 800-160mg (BACTRIM DS) 800-160 mg Tab 1 tablet, Oral, Daily    valACYclovir (VALTREX) 1000 MG tablet Two pills at onset of fever blister and then repeat 2 pills 12 hr later    zolpidem (AMBIEN) 5 mg, Oral, Nightly PRN       Objective:        Body mass index is 27.11 kg/m².  Vitals:     "11/06/23 1500   BP: 137/60   Pulse: (!) 135   Temp: 99.6 °F (37.6 °C)   SpO2: (!) 93%   Height: 5' 7" (1.702 m)   PainSc:   9     Physical Exam  Constitutional:       General: She is not in acute distress.     Appearance: She is ill-appearing. She is not toxic-appearing.   Cardiovascular:      Rate and Rhythm: Regular rhythm. Tachycardia present.   Pulmonary:      Effort: No respiratory distress.      Breath sounds: No wheezing or rales.   Neurological:      Mental Status: She is alert.           Lab Results   Component Value Date    WBC 3.85 (L) 11/03/2023    HGB 9.4 (L) 11/03/2023    HCT 27.5 (L) 11/03/2023     (L) 11/03/2023    CHOL 199 03/01/2008    TRIG 99 12/11/2020    HDL 42 12/11/2020    ALT 46 (H) 11/01/2023    AST 32 11/01/2023     (L) 11/01/2023    K 3.7 11/01/2023     11/01/2023    CREATININE 0.6 11/02/2023    BUN 6 11/01/2023    CO2 24 11/01/2023    TSH 8.816 (H) 10/18/2023     Assessment:         1. RSV infection    2. SIRS (systemic inflammatory response syndrome)    3. Abnormal findings on diagnostic imaging of lung          Plan:     Since discharge from the hospital patient has been feeling worse.  Reported she had fever at home and took 2 Tylenol.  Today vitals remarkable for tachycardia and borderline O2 sat.  Patient had CT chest abdomen pelvis with contrast today which has been previously ordered by her oncologist.  This was reviewed with patient.  Is remarkable for patchy consolidation at the lung bases concerning for infectious versus inflammatory process. During recent hospitalization patient was diagnosed with RSV and did receive approximately 2 days of Zosyn for neutropenic fever, however did not receive a full course of antibiotics for treatment of pneumonia.  Advised patient return to ED for stabilization.     All of your core healthy metrics are met.    No follow-ups on file. or sooner prn (as needed)        Dexter Khan  [facility] " [place] [address] [facility]  [address]  [place] [address]  Phone [phone]  Fax [phone]      This note is dictated using the M*Modal Fluency Direct word recognition program. It may contain word recognition mistakes or wrong word substitutions that were missed on review.

## 2023-11-06 NOTE — ED NOTES
Pt presents with  for sob, fever, and cough.  Reports recent admission for RSV and was discharged Thursday.  Pt currently being treated for endometrial cancer and had an appt today and was sent here from dr. Office.      Patient identifiers verified by spelling and stated name on armband along with .     Review of patient's allergies indicates:  Review of patient's allergies indicates:  No Known Allergies    APPEARANCE: generalized weakness noted.  NEURO: A&Ox4, GCS-15.   No neuro deficits noted.   CARDIAC: sinus tach.  Denies CP.   PERIPHERAL VASCULAR: Peripheral pulses present. Cap refill < 3 seconds x4. Warm to touch.    RESPIRATORY:Respirations are even and unlabored with tachypnea noted, No use of accessory muscles.  Placed on 2L o2 via nc   SKIN: Skin is warm and dry    Patient updated on plan of care and changed into hospital gown.  Placed on continuous CM/SpO2/NIBP.  Bed locked and in low position with side rails up x2, call light within reach, family at bedside.     Will continue to monitor.

## 2023-11-06 NOTE — ED PROVIDER NOTES
"  Source of History:  Medical record, patient, spouse      Chief complaint:  Per triage note: "Shortness of Breath (Sent down from PCP's office for SOB, cough and headache. Diagnosed with RSV last week. She had a CT scan today for chemo purposes. Currently being treated for endometrial Cancer. )  "    HPI:    Patient presents with dyspnea for the past three days. She notes accompanying dry cough, headaches, rhinorrhea, and generalized weakness without diarrhea, wheezing, sore throat, or baseline changes in neuropathy or bruising. She denies any recent sick contact. Patient is currently on chemotherapy for endometrial cancer. She was recently admitted on 11/1 for RSV and discharged with overall symptom improvement, however she notes return of her symptoms the following day. She was seen earlier today at [facility] and advised to return to the ED. This is the extent of the patient's complaints at this time.      ROS:   See HPI for pertinent Review of Systems      Review of patient's allergies indicates:  No Known Allergies    PMH:  As per HPI and below:  Past Medical History:   Diagnosis Date    Chest pain 2002    NEGATIVE STRESS ECHO 2002    Endometrial cancer     2023    GERD (gastroesophageal reflux disease)     Lower back pain     Migraines     Mild allergic rhinitis     Postmenopausal bleeding        Past Surgical History:   Procedure Laterality Date    COLONOSCOPY N/A 10/06/2023    Procedure: COLONOSCOPY;  Surgeon: Marcia Weaver MD;  Location: [facility] ENDO;  Service: Endoscopy;  Laterality: N/A;  10/4- referred by Dr. Weaver/ Subject: Needs colonoscopy ASAP within 1 week /Procedure Timing: < 1 week/Diagnosis: Diarrhea, evaluate for immunotherapy induced colitis/ Prep instructions to portal. AS    CYST REMOVAL Left 04/28/2022    Procedure: EXCISION, CYST-BACK;  Surgeon: Thomas Hurst MD;  Location: [facility] OR;  Service: General;  Laterality: Left;  left upper back  RN PREOP ON 4/21/22-NF    " "HYSTERECTOMY      after endometrial cancer diagnosis    HYSTEROSCOPY WITH DILATION AND CURETTAGE OF UTERUS N/A 05/23/2023    Procedure: HYSTEROSCOPY, WITH DILATION AND CURETTAGE OF UTERUS;  Surgeon: Omaira Evans MD;  Location: [facility];  Service: OB/GYN;  Laterality: N/A;  DESTINY HERNANDEZ  [phone] TEXTED HAMMAD ON 5/2/2023 @ 3:51PM. HAMMAD RESPONDED ON 5/2/2023 @ 3:51PM-LO  RN PREOP 5/18/23  T & S; UPT ORDERED AND SCHEDULED 5/22/23    LYMPH NODE BIOPSY Left 06/05/2023    Procedure: BIOPSY, sentinel LYMPH NODE;  Surgeon: Lavell Rodríguez MD;  Location: [facility];  Service: OB/GYN;  Laterality: Left;    LYMPHADENECTOMY, PELVIS Right 06/05/2023    Procedure: LYMPHADENECTOMY, PELVIS;  Surgeon: Lavell Rodríguez MD;  Location: [facility];  Service: OB/GYN;  Laterality: Right;    MAPPING, LYMPH NODE, SENTINEL Bilateral 06/05/2023    Procedure: injection, LYMPH NODE, SENTINEL;  Surgeon: Lavell Rodríguez MD;  Location: [facility];  Service: OB/GYN;  Laterality: Bilateral;    NERVE REPAIR Right 06/05/2023    Procedure: REPAIR, OBTURATOR NERVE;  Surgeon: Lavell Rodríguez MD;  Location: [facility];  Service: OB/GYN;  Laterality: Right;    TONSILLECTOMY      TONSILLECTOMY      TUBAL LIGATION      XI ROBOTIC HYSTERECTOMY, WITH SALPINGO-OOPHORECTOMY Bilateral 06/05/2023    Procedure: XI ROBOTIC HYSTERECTOMY,WITH SALPINGO-OOPHORECTOMY;  Surgeon: Lavell Rodríguez MD;  Location: [facility];  Service: OB/GYN;  Laterality: Bilateral;  removed through vagina       Social History     Tobacco Use    Smoking status: Never    Smokeless tobacco: Never   Substance Use Topics    Alcohol use: Never    Drug use: Never       Physical Exam:      Nursing note and vitals reviewed.  /78 (BP Location: Left arm, Patient Position: Sitting)   Pulse (!) 129   Temp 100.2 °F (37.9 °C) (Oral)   Resp (!) 28   Ht 5' 7" (1.702 m)   Wt 77.1 kg (170 lb)   LMP 11/15/2012   SpO2 96%   BMI 26.63 kg/m²     Constitutional: No distress.  Eyes: EOMI. No discharge. " Anicteric.  HENT:   Neck: Normal range of motion. Neck supple.  Cardiovascular: Normal rate. No murmur, no gallop and no friction rub heard.   Pulmonary/Chest: No respiratory distress. Effort normal. No wheezes, no rales, no rhonchi.   Abdominal: Bowel sounds normal. Soft. No distension and no mass. There is no tenderness. There is no rebound, no guarding, no tenderness at McBurney's point.  Neurological: GCS 15. Alert and oriented to person, place, and time. No gross cranial nerve, light touch or strength deficit. Coordination normal.   Skin: Skin is warm and dry.   EXT: 2+ radial pulses.   Psychiatric: Behavior is normal. Judgment normal.    Medical Decision Making / Independent Interpretations / External Records Reviewed:      ED Course as of 11/07/23 0227 Mon Nov 06, 2023   1637 --  EKG Interpretation: I independently reviewed and interpreted EKG which shows sinus tachycardia at 119 beats per minute, no STEMI, no ischemic changes, normal intervals.   No acute change compared to prior tracing.  --   [RC]   1683 Patient is a 58-year-old female with endometrial cancer on chemo, history of GERD, recent admission for neutropenic fever and RSV infection who presents for rebound rhinorrhea, cough, malaise.  Patient had an already scheduled CT chest/abdomen/pelvis today, which was notable for bilateral basilar consolidations consistent with acute pneumonia.   Here patient tachycardic, tachypneic, with clear lung sounds, afebrile.   I doubt acute pulmonary embolus.  Differential also included viral pneumonia, post viral bacterial, multidrug resistant pneumonia given her recent admission, sepsis, gross metabolic abnormality.  I independently reviewed and interpreted CXR which shows right basilar opacity which is new, correlates to CT chest earlier today which showed bibasilar consolidations.  Will admit.  [RC]   1901 Patient history, findings, results, patient management discussed with GynOnc resident  Dr. Clancy who  will see the patient. FInal disposition pending their recommendations.    [RC]   [facility]. [RC]   2017 GynOn will admit the patient for observation and antibiotics.     =========  Critical Care:  30 minutes total critical care time was personally spent by me, exclusive of procedures and separately billable time.   Critical care was necessary to treat or prevent imminent or life-threatening deterioration of the following conditions:  Sepsis, multifocal pneumonia   =========  Sepsis Perfusion Assessment: I attest a sepsis perfusion exam was performed within 6 hours of potential sepsis presentation, following fluid resuscitation.  Antibiotics ordered once sepsis due to bacterial infection appeared clinically most likely.     This patient does have evidence of infective focus  My overall impression is sepsis. Vital signs were reviewed and noted.  Antibiotics given: Antibiotics (From admission, onward)    Start     Stop Route Frequency Ordered    11/06/23 2017  levoFLOXacin 750 mg/150 mL IVPB 750 mg         11/11/23 2029 IV Every 24 hours (non-standard times) 11/06/23 2017      Cultures were taken: Microbiology Results (last 7 days)     Procedure Component Value Units Date/Time    Blood culture x two cultures. Draw prior to antibiotics. [[id number]]   Collected: 11/06/23 1728    Order Status: Sent Specimen: Blood from Peripheral, Antecubital, Left   Updated: 11/06/23 1851    Blood culture x two cultures. Draw prior to antibiotics. [[id number]]   Collected: 11/06/23 1715    Order Status: Sent Specimen: Blood from Peripheral, Hand, Right Updated:   11/06/23 1851      Latest lactate reviewed, they are: 2.04 initial.     Organ dysfunction indicated by Acute respiratory failure  Source: pneumonia    Source control achieved by antibiotic administration.        [RC]      ED Course User Index  [RC] Nick De Luna MD       I decided to obtain the patient's medical records. I reviewed patient's  prior external notes / results: inpatient admission documentation and specialist note   .         Medications   sodium chloride 0.9% bolus 1,000 mL 1,000 mL (has no administration in time range)   vancomycin 1,500 mg in dextrose 5 % (D5W) 250 mL IVPB (Vial-Mate) (has no administration in time range)   ceFEPIme (MAXIPIME) 2 g in dextrose 5 % in water (D5W) 100 mL IVPB (MB+) (has no administration in time range)              Diagnostic Impression:    1. Sepsis          Future Appointments   Date Time Provider Department Center   11/8/2023  9:15 AM LAB, SAME DAY [facility] LABDRAW [facility]   11/8/2023 10:00 AM Lavell Rodríguez MD [facility] GYN ONC [facility]   11/9/2023  8:30 AM CHEMO [address] [place] [address] CHEMO [facility]   12/13/2023  1:00 PM Adriane Babb MD [facility] RAD ONC [facility]           ---  I, Zheng Quinn, scribed for, and in the presence of, Dr. De Luna. I performed the scribed service and the documentation accurately describes the services I performed. I attest to the accuracy of the note.     Physician Attestation for Scribe:   I, Nick De Luna MD, reviewed documentation as scribed in my presence, which is both accurate and complete.      Nick De Luna MD  11/07/23 0225

## 2023-11-07 LAB
ANISOCYTOSIS BLD QL SMEAR: SLIGHT
BASOPHILS NFR BLD: 0 % (ref 0–1.9)
DIFFERENTIAL METHOD: ABNORMAL
EOSINOPHIL NFR BLD: 0 % (ref 0–8)
ERYTHROCYTE [DISTWIDTH] IN BLOOD BY AUTOMATED COUNT: 14.7 % (ref 11.5–14.5)
HCT VFR BLD AUTO: 28.5 % (ref 37–48.5)
HGB BLD-MCNC: 9.6 G/DL (ref 12–16)
IMM GRANULOCYTES # BLD AUTO: ABNORMAL K/UL (ref 0–0.04)
IMM GRANULOCYTES NFR BLD AUTO: ABNORMAL % (ref 0–0.5)
LYMPHOCYTES NFR BLD: 27 % (ref 18–48)
MCH RBC QN AUTO: 35.7 PG (ref 27–31)
MCHC RBC AUTO-ENTMCNC: 33.7 G/DL (ref 32–36)
MCV RBC AUTO: 106 FL (ref 82–98)
METAMYELOCYTES NFR BLD MANUAL: 2 %
MONOCYTES NFR BLD: 10 % (ref 4–15)
NEUTROPHILS NFR BLD: 49 % (ref 38–73)
NEUTS BAND NFR BLD MANUAL: 12 %
NRBC BLD-RTO: 0 /100 WBC
PLATELET # BLD AUTO: 326 K/UL (ref 150–450)
PLATELET BLD QL SMEAR: ABNORMAL
PMV BLD AUTO: 9.3 FL (ref 9.2–12.9)
POLYCHROMASIA BLD QL SMEAR: ABNORMAL
RBC # BLD AUTO: 2.69 M/UL (ref 4–5.4)
TOXIC GRANULES BLD QL SMEAR: PRESENT
WBC # BLD AUTO: 8.15 K/UL (ref 3.9–12.7)

## 2023-11-07 PROCEDURE — 87070 CULTURE OTHR SPECIMN AEROBIC: CPT

## 2023-11-07 PROCEDURE — 93010 ELECTROCARDIOGRAM REPORT: CPT | Mod: ,,, | Performed by: INTERNAL MEDICINE

## 2023-11-07 PROCEDURE — 94664 DEMO&/EVAL PT USE INHALER: CPT | Mod: XB

## 2023-11-07 PROCEDURE — G0378 HOSPITAL OBSERVATION PER HR: HCPCS

## 2023-11-07 PROCEDURE — 93010 EKG 12-LEAD: ICD-10-PCS | Mod: ,,, | Performed by: INTERNAL MEDICINE

## 2023-11-07 PROCEDURE — 93005 ELECTROCARDIOGRAM TRACING: CPT

## 2023-11-07 PROCEDURE — 94640 AIRWAY INHALATION TREATMENT: CPT | Mod: XB

## 2023-11-07 PROCEDURE — 25000003 PHARM REV CODE 250

## 2023-11-07 PROCEDURE — 27000646 HC AEROBIKA DEVICE

## 2023-11-07 PROCEDURE — 87205 SMEAR GRAM STAIN: CPT

## 2023-11-07 PROCEDURE — 63600175 PHARM REV CODE 636 W HCPCS: Performed by: OBSTETRICS & GYNECOLOGY

## 2023-11-07 PROCEDURE — 94761 N-INVAS EAR/PLS OXIMETRY MLT: CPT

## 2023-11-07 PROCEDURE — 96372 THER/PROPH/DIAG INJ SC/IM: CPT

## 2023-11-07 PROCEDURE — 36415 COLL VENOUS BLD VENIPUNCTURE: CPT

## 2023-11-07 PROCEDURE — 63600175 PHARM REV CODE 636 W HCPCS

## 2023-11-07 PROCEDURE — 99223 PR INITIAL HOSPITAL CARE,LEVL III: ICD-10-PCS | Mod: ,,, | Performed by: OBSTETRICS & GYNECOLOGY

## 2023-11-07 PROCEDURE — 25000003 PHARM REV CODE 250: Performed by: STUDENT IN AN ORGANIZED HEALTH CARE EDUCATION/TRAINING PROGRAM

## 2023-11-07 PROCEDURE — 96366 THER/PROPH/DIAG IV INF ADDON: CPT

## 2023-11-07 PROCEDURE — 99223 1ST HOSP IP/OBS HIGH 75: CPT | Mod: ,,, | Performed by: OBSTETRICS & GYNECOLOGY

## 2023-11-07 PROCEDURE — 96367 TX/PROPH/DG ADDL SEQ IV INF: CPT

## 2023-11-07 PROCEDURE — 25000242 PHARM REV CODE 250 ALT 637 W/ HCPCS

## 2023-11-07 PROCEDURE — 25000003 PHARM REV CODE 250: Performed by: OBSTETRICS & GYNECOLOGY

## 2023-11-07 PROCEDURE — 99900035 HC TECH TIME PER 15 MIN (STAT)

## 2023-11-07 PROCEDURE — 96361 HYDRATE IV INFUSION ADD-ON: CPT

## 2023-11-07 PROCEDURE — 85007 BL SMEAR W/DIFF WBC COUNT: CPT | Mod: NCS

## 2023-11-07 PROCEDURE — 85027 COMPLETE CBC AUTOMATED: CPT

## 2023-11-07 RX ORDER — ACETAMINOPHEN 325 MG/1
650 TABLET ORAL EVERY 6 HOURS PRN
Status: DISCONTINUED | OUTPATIENT
Start: 2023-11-07 | End: 2023-11-07

## 2023-11-07 RX ORDER — IBUPROFEN 600 MG/1
600 TABLET ORAL EVERY 6 HOURS PRN
Status: DISCONTINUED | OUTPATIENT
Start: 2023-11-07 | End: 2023-11-08

## 2023-11-07 RX ORDER — FAMOTIDINE 20 MG/1
20 TABLET, FILM COATED ORAL 2 TIMES DAILY
Status: DISCONTINUED | OUTPATIENT
Start: 2023-11-07 | End: 2023-11-09 | Stop reason: HOSPADM

## 2023-11-07 RX ORDER — ACETAMINOPHEN 325 MG/1
650 TABLET ORAL EVERY 6 HOURS PRN
Status: DISCONTINUED | OUTPATIENT
Start: 2023-11-07 | End: 2023-11-09 | Stop reason: HOSPADM

## 2023-11-07 RX ORDER — HYDRALAZINE HYDROCHLORIDE 20 MG/ML
10 INJECTION INTRAMUSCULAR; INTRAVENOUS EVERY 6 HOURS PRN
Status: DISCONTINUED | OUTPATIENT
Start: 2023-11-07 | End: 2023-11-09 | Stop reason: HOSPADM

## 2023-11-07 RX ORDER — DIPHENHYDRAMINE HYDROCHLORIDE 50 MG/ML
25 INJECTION INTRAMUSCULAR; INTRAVENOUS EVERY 6 HOURS PRN
Status: DISCONTINUED | OUTPATIENT
Start: 2023-11-07 | End: 2023-11-09 | Stop reason: HOSPADM

## 2023-11-07 RX ORDER — ENOXAPARIN SODIUM 100 MG/ML
40 INJECTION SUBCUTANEOUS EVERY 24 HOURS
Status: DISCONTINUED | OUTPATIENT
Start: 2023-11-07 | End: 2023-11-09 | Stop reason: HOSPADM

## 2023-11-07 RX ORDER — SODIUM CHLORIDE FOR INHALATION 3 %
4 VIAL, NEBULIZER (ML) INHALATION 2 TIMES DAILY
Status: DISCONTINUED | OUTPATIENT
Start: 2023-11-07 | End: 2023-11-09 | Stop reason: HOSPADM

## 2023-11-07 RX ORDER — GUAIFENESIN 600 MG/1
600 TABLET, EXTENDED RELEASE ORAL 2 TIMES DAILY
Status: DISCONTINUED | OUTPATIENT
Start: 2023-11-07 | End: 2023-11-09 | Stop reason: HOSPADM

## 2023-11-07 RX ORDER — CALCIUM CARBONATE 200(500)MG
500 TABLET,CHEWABLE ORAL 2 TIMES DAILY PRN
Status: DISCONTINUED | OUTPATIENT
Start: 2023-11-07 | End: 2023-11-09 | Stop reason: HOSPADM

## 2023-11-07 RX ORDER — OXYCODONE HYDROCHLORIDE 5 MG/1
5 TABLET ORAL EVERY 4 HOURS PRN
Status: DISCONTINUED | OUTPATIENT
Start: 2023-11-07 | End: 2023-11-09 | Stop reason: HOSPADM

## 2023-11-07 RX ORDER — SIMETHICONE 80 MG
1 TABLET,CHEWABLE ORAL 3 TIMES DAILY PRN
Status: DISCONTINUED | OUTPATIENT
Start: 2023-11-07 | End: 2023-11-09 | Stop reason: HOSPADM

## 2023-11-07 RX ADMIN — GUAIFENESIN 600 MG: 600 TABLET, EXTENDED RELEASE ORAL at 09:11

## 2023-11-07 RX ADMIN — SODIUM CHLORIDE SOLN NEBU 3% 4 ML: 3 NEBU SOLN at 08:11

## 2023-11-07 RX ADMIN — ACETAMINOPHEN 650 MG: 325 TABLET, FILM COATED ORAL at 06:11

## 2023-11-07 RX ADMIN — FAMOTIDINE 20 MG: 20 TABLET ORAL at 09:11

## 2023-11-07 RX ADMIN — OXYCODONE HYDROCHLORIDE 5 MG: 5 TABLET ORAL at 09:11

## 2023-11-07 RX ADMIN — CEFEPIME 2 G: 2 INJECTION, POWDER, FOR SOLUTION INTRAVENOUS at 02:11

## 2023-11-07 RX ADMIN — BENZONATATE 100 MG: 100 CAPSULE ORAL at 06:11

## 2023-11-07 RX ADMIN — ALBUTEROL SULFATE 2 PUFF: 90 AEROSOL, METERED RESPIRATORY (INHALATION) at 12:11

## 2023-11-07 RX ADMIN — IBUPROFEN 600 MG: 600 TABLET, FILM COATED ORAL at 03:11

## 2023-11-07 RX ADMIN — ALBUTEROL SULFATE 2 PUFF: 90 AEROSOL, METERED RESPIRATORY (INHALATION) at 08:11

## 2023-11-07 RX ADMIN — GUAIFENESIN 600 MG: 600 TABLET, EXTENDED RELEASE ORAL at 01:11

## 2023-11-07 RX ADMIN — CEFEPIME 2 G: 2 INJECTION, POWDER, FOR SOLUTION INTRAVENOUS at 09:11

## 2023-11-07 RX ADMIN — ZOLPIDEM TARTRATE 5 MG: 5 TABLET ORAL at 09:11

## 2023-11-07 RX ADMIN — VANCOMYCIN HYDROCHLORIDE 1000 MG: 1 INJECTION, POWDER, LYOPHILIZED, FOR SOLUTION INTRAVENOUS at 03:11

## 2023-11-07 RX ADMIN — CEFEPIME 2 G: 2 INJECTION, POWDER, FOR SOLUTION INTRAVENOUS at 05:11

## 2023-11-07 RX ADMIN — CETIRIZINE HYDROCHLORIDE 10 MG: 10 TABLET, FILM COATED ORAL at 09:11

## 2023-11-07 RX ADMIN — AZITHROMYCIN MONOHYDRATE 500 MG: 500 INJECTION, POWDER, LYOPHILIZED, FOR SOLUTION INTRAVENOUS at 02:11

## 2023-11-07 RX ADMIN — ENOXAPARIN SODIUM 40 MG: 40 INJECTION SUBCUTANEOUS at 09:11

## 2023-11-07 RX ADMIN — SODIUM CHLORIDE SOLN NEBU 3% 4 ML: 3 NEBU SOLN at 12:11

## 2023-11-07 NOTE — ASSESSMENT & PLAN NOTE
-see once hx above  -s/p TRH/BSO/BSLN/RPLN/repair of obturator nerve in 6/2023  -Carboplatin/paclitaxel s/p 7 cycles (last 10/25)  -Currently on immunotherapy: Dostarlimab

## 2023-11-07 NOTE — CONSULTS
[facility] Emergency Dept  Obstetrics & Gynecology  Consult Note    Patient Name: Sugar Diaz  MRN: [id number]  Admission Date: 2023  Hospital Length of Stay: 0 days  Code Status: Full Code  Primary Care Provider: Dexter Khan MD  Principal Problem: Community acquired pneumonia of right lower lobe of lung    Inpatient consult to Gynecologic Oncology  Consult performed by: Han Clancy MD  Consult ordered by: Nick De Luna MD        Subjective:     Chief Complaint: Fever, Cough, SOB    In short, Sugar Diaz is a 59 yo  with stage IIIC1 grade 1 endometrioid endometrial cancer who presents to the ED for concerns of worsening URI symptoms, in the context of recent hospitalization for RSV and new CXR findings consistent with pneumonia, will admit for IV abx and supportive mgmt.    See H&P for full note.    Han Clancy MD PGY-2  Obstetrics and Gynecology

## 2023-11-07 NOTE — PLAN OF CARE
Problem: Adult Inpatient Plan of Care  Goal: Plan of Care Review  Outcome: Ongoing, Progressing  Flowsheets (Taken 11/7/2023 5696)  Plan of Care Reviewed With: patient  Goal: Patient-Specific Goal (Individualized)  Outcome: Ongoing, Progressing  Goal: Absence of Hospital-Acquired Illness or Injury  Outcome: Ongoing, Progressing  Goal: Optimal Comfort and Wellbeing  Outcome: Ongoing, Progressing  Goal: Readiness for Transition of Care  Outcome: Ongoing, Progressing     Problem: Fluid Imbalance (Pneumonia)  Goal: Fluid Balance  Outcome: Ongoing, Progressing     Problem: Infection (Pneumonia)  Goal: Resolution of Infection Signs and Symptoms  Outcome: Ongoing, Progressing     Problem: Respiratory Compromise (Pneumonia)  Goal: Effective Oxygenation and Ventilation  Outcome: Ongoing, Progressing

## 2023-11-07 NOTE — SUBJECTIVE & OBJECTIVE
Oncology Treatment Plan:   OP GYN DOSTARLIMAB PACLITAXEL CARBOPLATIN (AUC 5) Q3W    Oncology History:   Malignant neoplasm of endometrium   5/23/2023 Surgery     Hysteroscopy, D&C: grade 1 endometrioid EC      6/5/2023 Surgery     TRH/BSO/BSLN/RPLN/repair of obturator nerve     Final pathology c/w MMRd, p53 WT stage IIIC1 grade 1 endometrioid EC. 4.5 cm, grade 1, 96% MI (22/23 mm), +LUE, -LVSI, +MELF, -cervix, 1/2+ RPLN, 1/1+ LPLN, -ascites      6/19/2023 Imaging Significant Findings     CT C/A/P w/: High L para-aortic at the level of the renal vessels, 1.5 cm.  Low R para-aortic at the LEAH, 4.1 cm in greatest dimension.      6/25/2023 Genomic Testing     [facility] (NGS): ARD1A, CHEK1, CTCF, , HDAC2, HNF1A, JAK1, KRAS, MAX, MSH6, P1K3R1, PPM1D, PTEN, ZFHX3, ZSR2      6/28/2023 Tumor Conference     No rule for debulking of para-aortic lymph nodes.  Adjuvant VBT.  Represent after completion of adjuvant therapy to discuss EBRT.          8/9/2023 - 9/13/2023 Radiation Therapy     Treating physician: Dr. Adriane Babb  Total Dose: 21 Gy HDR  Fractions: 3 vaginal cuff brachytherapy      8/22/2023 Imaging Significant Findings     CT A/P w/: No evidence of colitis, OR in high L para-aortic and low R para-aortic.      10/4/2023 Procedure     Colonoscopy: WNL   07/07/2023 - 10/19/2023: C1-6 carbo/taxol/dsotarlimab   Past Medical History:   Diagnosis Date    Chest pain 2002    NEGATIVE STRESS ECHO 2002    Endometrial cancer     2023    GERD (gastroesophageal reflux disease)     Lower back pain     Migraines     Mild allergic rhinitis     Postmenopausal bleeding      Past Surgical History:   Procedure Laterality Date    COLONOSCOPY N/A 10/06/2023    Procedure: COLONOSCOPY;  Surgeon: Marcia Weaver MD;  Location: [facility];  Service: Endoscopy;  Laterality: N/A;  10/4- referred by Dr. Weaver/ Subject: Needs colonoscopy ASAP within 1 week /Procedure Timing: < 1 week/Diagnosis: Diarrhea, evaluate for immunotherapy induced  colitis/ Prep instructions to portal. AS    CYST REMOVAL Left 04/28/2022    Procedure: EXCISION, CYST-BACK;  Surgeon: Thomas Hurst MD;  Location: [facility] OR;  Service: General;  Laterality: Left;  left upper back  RN PREOP ON 4/21/22-NF    HYSTERECTOMY      after endometrial cancer diagnosis    HYSTEROSCOPY WITH DILATION AND CURETTAGE OF UTERUS N/A 05/23/2023    Procedure: HYSTEROSCOPY, WITH DILATION AND CURETTAGE OF UTERUS;  Surgeon: Omaira Evans MD;  Location: [facility] OR;  Service: OB/GYN;  Laterality: N/A;  [employer] HAMMAD HERNANDEZ  [phone] TEXTED HAMMAD ON 5/2/2023 @ 3:51PM. HAMM[facility] RESPONDED ON 5/2/2023 @ 3:51PM-LO  RN PREOP 5/18/23  T & S; UPT ORDERED AND SCHEDULED 5/22/23    LYMPH NODE BIOPSY Left 06/05/2023    Procedure: BIOPSY, sentinel LYMPH NODE;  Surgeon: Lavell Rodríguez MD;  Location: [facility];  Service: OB/GYN;  Laterality: Left;    LYMPHADENECTOMY, PELVIS Right 06/05/2023    Procedure: LYMPHADENECTOMY, PELVIS;  Surgeon: Lavell Rodríguez MD;  Location: [facility] OR;  Service: OB/GYN;  Laterality: Right;    MAPPING, LYMPH NODE, SENTINEL Bilateral 06/05/2023    Procedure: injection, LYMPH NODE, SENTINEL;  Surgeon: Lavell Rodríguez MD;  Location: [facility];  Service: OB/GYN;  Laterality: Bilateral;    NERVE REPAIR Right 06/05/2023    Procedure: REPAIR, OBTURATOR NERVE;  Surgeon: Lavell Rodríguez MD;  Location: [facility];  Service: OB/GYN;  Laterality: Right;    TONSILLECTOMY      TONSILLECTOMY      TUBAL LIGATION      XI ROBOTIC HYSTERECTOMY, WITH SALPINGO-OOPHORECTOMY Bilateral 06/05/2023    Procedure: XI ROBOTIC HYSTERECTOMY,WITH SALPINGO-OOPHORECTOMY;  Surgeon: Lavell Rodríguez MD;  Location: [facility];  Service: OB/GYN;  Laterality: Bilateral;  removed through vagina     Family History       Problem Relation (Age of Onset)    Breast cancer Sister (55)    Colon cancer Paternal Uncle    Heart disease Father    No Known Problems Mother, Sister          Tobacco Use    Smoking status: Never    Smokeless tobacco: Never    Substance and Sexual Activity    Alcohol use: Never    Drug use: Never    Sexual activity: Not Currently     Partners: Male     Birth control/protection: Post-menopausal       PTA Medications   Medication Sig    acetaminophen (TYLENOL EXTRA STRENGTH) 500 MG tablet Take 2 tablets (1,000 mg total) by mouth every 6 (six) hours as needed for Pain.    acetaminophen (TYLENOL) 650 MG TbSR Take 1 tablet (650 mg total) by mouth every 6 (six) hours as needed (pain).    dexAMETHasone (DECADRON) 4 MG Tab Take daily beginning the day after chemotherapy for 3 days    diclofenac sodium (VOLTAREN) 1 % Gel Apply 2 g topically 2 (two) times daily.    diphenhydrAMINE (BENADRYL) 25 mg capsule Take 25 mg by mouth every 6 (six) hours as needed for Itching.    diphenoxylate-atropine 2.5-0.025 mg (LOMOTIL) 2.5-0.025 mg per tablet Take 1 tablet by mouth 4 (four) times daily as needed for Diarrhea.    famotidine (PEPCID) 40 MG tablet Take 1 tablet (40 mg total) by mouth once daily.    ibuprofen (ADVIL,MOTRIN) 800 MG tablet Take 1 tablet (800 mg total) by mouth every 8 (eight) hours as needed for Pain.    loratadine (CLARITIN) 10 mg tablet Take 1 tablet (10 mg total) by mouth once daily.    prochlorperazine (COMPAZINE) 5 MG tablet Take every 6 hours for 3 days beginning the day after chemotherapy    promethazine-dextromethorphan (PROMETHAZINE-DM) 6.25-15 mg/5 mL Syrp Take 5 mLs by mouth every 4 (four) hours as needed.    sulfamethoxazole-trimethoprim 800-160mg (BACTRIM DS) 800-160 mg Tab Take 1 tablet by mouth once daily.    valACYclovir (VALTREX) 1000 MG tablet Two pills at onset of fever blister and then repeat 2 pills 12 hr later    zolpidem (AMBIEN) 5 MG Tab Take 1 tablet (5 mg total) by mouth nightly as needed (insomnia).       Review of patient's allergies indicates:  No Known Allergies    Review of Systems   Constitutional:  Positive for chills, fatigue and fever.   Respiratory:  Positive for cough and shortness of breath.     Cardiovascular:  Negative for chest pain and leg swelling.   Gastrointestinal:  Negative for abdominal pain, constipation, nausea and vomiting.   Endocrine: Negative for hot flashes.   Genitourinary:  Negative for menstrual problem and pelvic pain.   Integumentary:  Negative for breast mass, nipple discharge and breast skin changes.   Neurological:  Positive for headaches. Negative for syncope.   Hematological:  Does not bruise/bleed easily.   Psychiatric/Behavioral:  Negative for depression.    Breast: Negative for mass, mastodynia, nipple discharge and skin changes     Objective:     Vital Signs (Most Recent):  Temp: 98.5 °F (36.9 °C) (11/06/23 2314)  Pulse: 102 (11/06/23 2345)  Resp: 17 (11/06/23 2314)  BP: 116/72 (11/06/23 2314)  SpO2: 96 % (11/06/23 2314) Vital Signs (24h Range):  Temp:  [98.1 °F (36.7 °C)-100.2 °F (37.9 °C)] 98.5 °F (36.9 °C)  Pulse:  [102-135] 102  Resp:  [17-33] 17  SpO2:  [93 %-98 %] 96 %  BP: (104-142)/(52-78) 116/72     Weight: 77.1 kg (170 lb)  Body mass index is 26.63 kg/m².       Physical Exam:   Constitutional: She is oriented to person, place, and time. She appears well-developed and well-nourished. No distress.    HENT:   Head: Normocephalic and atraumatic.    Eyes: EOM are normal.     Cardiovascular:  Normal heart sounds.             Pulmonary/Chest: Effort normal. No respiratory distress. She has no wheezes.   Crackles bilaterally        Abdominal: Soft. There is no abdominal tenderness. There is no rebound and no guarding.             Musculoskeletal: Normal range of motion.       Neurological: She is alert and oriented to person, place, and time.    Skin: Skin is warm and dry. She is not diaphoretic.    Psychiatric: She has a normal mood and affect. Thought content normal.          Laboratory:  Recent Lab Results  (Last 5 results in the past 24 hours)        11/06/23 2114 11/06/23 2114 11/06/23 2037 11/06/23  1754   11/06/23  1744        POC Molecular Influenza A Ag    Negative             POC Molecular Influenza B Ag   Negative             Procalcitonin               Albumin               ALP               ALT               Anion Gap               Appearance, UA       Clear         AST               Baso #               Basophil %               Bilirubin (UA)       Negative         BILIRUBIN TOTAL               BUN               Calcium               Chloride               CO2               Color, UA       Yellow         Creatinine               Differential Method               eGFR               Eos #               Eosinophil %               FiO2     24     21       Glucose               Glucose, UA       Negative         Gran # (ANC)               Gran %               Hematocrit               Hemoglobin               Immature Grans (Abs)               Immature Granulocytes               Ketones, UA       Negative         Leukocytes, UA       Negative         Lymph #               Lymph %               MCH               MCHC               MCV               Mono #               Mono %               MPV               NITRITE UA       Negative         nRBC               Occult Blood UA       Negative         pH, UA       6.0         Platelet Estimate               Platelet Count               POC Lactate     1.75     2.04       POC Temp     37.0 C           Potassium               PROTEIN TOTAL               Protein, UA       Negative  Comment: Recommend a 24 hour urine protein or a urine   protein/creatinine ratio if globulin induced proteinuria is  clinically suspected.            Acceptable Yes   Yes             RBC               RDW               Sample     VENOUS     VENOUS       SARS-CoV-2 RNA, Amplification, Qual Negative               Sodium               Specific Gravity, UA       <=1.005         Specimen UA       Urine, Clean Catch         Troponin I               UROBILINOGEN UA       Negative         WBC                                      Diagnostic  Results:  X-Ray: Reviewed    Imaging Results              X-Ray Chest AP Portable (Final result)  Result time 11/06/23 16:34:38      Final result by Glenda Palma MD (11/06/23 16:34:38)                   Impression:      New vague opacity right lung base, with patchy consolidation at both lung bases demonstrated by CT earlier today.  Findings most concerning for pneumonia.      Electronically signed by: Glenda Palma  Date:    11/06/2023  Time:    16:34               Narrative:    EXAMINATION:  XR CHEST AP PORTABLE    CLINICAL HISTORY:  Sepsis;    TECHNIQUE:  Single frontal view of the chest was performed.    COMPARISON:  11/01/2023    FINDINGS:  Lungs are well expanded.  New vague opacity at the right lung base.  Left lung remains fairly clear.  No pleural effusion or pneumothorax.    Cardiac silhouette is normal in size.

## 2023-11-07 NOTE — HPI
Sugar Diaz is a 58 y.o.  with stage IIIC1 grade 1 endometrioid endometrial cancer who presented to the ED for concerns of fever, cough, shortness of breath, headache, and fatigue. The patient was recently hospitalized -11/3 after presenting with the same symptoms and neutropenic fever. She was started on broad spectrum antibiotics and tested positive for RSV.  After being afebrile for 48 hours and significant clinical improvement, the patient was discharged. The patient states she felt better the day of discharge however, the following day, her symptoms progressed and she started to have subjective fevers. Cough is not productive. Shortness of breath is worsened with exertion. Headache is diffused. She denies chest pain or difficulty breathing (at rest). She denies dizziness/lightheadedness, syncope, vision changes, sore throat, abdominal pain, N/V, urinary symptoms, or bowel changes.    Prior to presenting, the patient was evaluated by her PCP where she was febrile and ill. Her PCP recommended the patient to come to the ED. In the ED, the patient was afebrile, tachycardic (110s-120), tachypneic (20s), saturating 93% on RA and 98% on 2L O2. WBC wnL, lactate wnL, procalcintonin wnL, CXR significant for new consolidations bilaterally and opacities in R lung concerning for pneumonia.The patient was started on broad spectrum antibiotics and IVF..

## 2023-11-07 NOTE — ASSESSMENT & PLAN NOTE
- Afebrile, mild tachycardia, 95% O2 sat on RA, improving cough.  - CBC: 9/10/32/325 > 8.1/9.6/28/326  - Lactate wnL, Procalcintonin wnL,Troponin wnL, UA wnL  - COVID neg, FLU neg  - Blood cultures NGTD  - CXR: New vague opacity right lung base, with patchy consolidation at both lung bases.   - PE: Bilateral crackles, no wheezing, breath sounds throughout.  - Continue IV Cefepime 2g q8h , azithromycin 500 mg q24 for 5 days (pulmonology recommendation)  - Added Tylenol 650 Q6h PRN for headaches  - Continue IVF 75cc/h, oxycodone IR q4h PRN, zofran PRN for nausea  - Impatient Pulmonology team to see patient today, appreciate recs.   - EKG ordered today given persistent tachycardia.

## 2023-11-07 NOTE — NURSING
Nurses Note -- 4 Eyes      11/7/2023   7:17 AM      Skin assessed during: Admit      [] No Altered Skin Integrity Present    []Prevention Measures Documented      [] Yes- Altered Skin Integrity Present or Discovered   [] LDA Added if Not in Epic (Describe Wound)   [] New Altered Skin Integrity was Present on Admit and Documented in LDA   [] Wound Image Taken    Wound Care Consulted? No    Attending Nurse:  Mary Love RN/Staff Member:   Mounika Lozano Note -- 4 Eyes      11/7/2023   7:19 AM      Skin assessed during: Admit      [x] No Altered Skin Integrity Present    []Prevention Measures Documented      [] Yes- Altered Skin Integrity Present or Discovered   [] LDA Added if Not in Epic (Describe Wound)   [] New Altered Skin Integrity was Present on Admit and Documented in LDA   [] Wound Image Taken    Wound Care Consulted? No    Attending Nurse: Mounika Love RN/Staff Member:  Mary

## 2023-11-07 NOTE — H&P
[facility] ([address])  Gynecologic Oncology  H&P    Patient Name: Sugar Diaz  MRN: [id number]  Admission Date: 2023  Primary Care Provider: Dexter Khan MD   Principal Problem: Community acquired pneumonia of right lower lobe of lung    Subjective:     Chief Complaint/Reason for Admission: Fever, Cough, SOB, HA    History of Present Illness:  Sugar Diaz is a 58 y.o.  with stage IIIC1 grade 1 endometrioid endometrial cancer who presented to the ED for concerns of fever, cough, shortness of breath, headache, and fatigue. The patient was recently hospitalized -11/3 after presenting with the same symptoms and neutropenic fever. She was started on broad spectrum antibiotics and tested positive for RSV.  After being afebrile for 48 hours and significant clinical improvement, the patient was discharged. The patient states she felt better the day of discharge however, the following day, her symptoms progressed and she started to have subjective fevers. Cough is not productive. Shortness of breath is worsened with exertion. Headache is diffused. She denies chest pain or difficulty breathing (at rest). She denies dizziness/lightheadedness, syncope, vision changes, sore throat, abdominal pain, N/V, urinary symptoms, or bowel changes.    Prior to presenting, the patient was evaluated by her PCP where she was febrile and ill. Her PCP recommended the patient to come to the ED. In the ED, the patient was afebrile, tachycardic (110s-120), tachypneic (20s), saturating 93% on RA and 98% on 2L O2. WBC wnL, lactate wnL, procalcintonin wnL, CXR significant for new consolidations bilaterally and opacities in R lung concerning for pneumonia.The patient was started on broad spectrum antibiotics and IVF..        Hospital Course:  No notes on file    Oncology Treatment Plan:   OP GYN DOSTARLIMAB PACLITAXEL CARBOPLATIN (AUC 5) Q3W    Oncology History:   Malignant neoplasm of endometrium    5/23/2023 Surgery     Hysteroscopy, D&C: grade 1 endometrioid EC      6/5/2023 Surgery     TRH/BSO/BSLN/RPLN/repair of obturator nerve     Final pathology c/w MMRd, p53 WT stage IIIC1 grade 1 endometrioid EC. 4.5 cm, grade 1, 96% MI (22/23 mm), +LUE, -LVSI, +MELF, -cervix, 1/2+ RPLN, 1/1+ LPLN, -ascites      6/19/2023 Imaging Significant Findings     CT C/A/P w/: High L para-aortic at the level of the renal vessels, 1.5 cm.  Low R para-aortic at the LEAH, 4.1 cm in greatest dimension.      6/25/2023 Genomic Testing     [facility] (NGS): ARD1A, CHEK1, CTCF, , HDAC2, HNF1A, JAK1, KRAS, MAX, MSH6, P1K3R1, PPM1D, PTEN, ZFHX3, ZSR2      6/28/2023 Tumor Conference     No rule for debulking of para-aortic lymph nodes.  Adjuvant VBT.  Represent after completion of adjuvant therapy to discuss EBRT.          8/9/2023 - 9/13/2023 Radiation Therapy     Treating physician: Dr. Adriane Babb  Total Dose: 21 Gy HDR  Fractions: 3 vaginal cuff brachytherapy      8/22/2023 Imaging Significant Findings     CT A/P w/: No evidence of colitis, [place] in high L para-aortic and low R para-aortic.      10/4/2023 Procedure     Colonoscopy: WNL   07/07/2023 - 10/19/2023: C1-6 carbo/taxol/dsotarlimab   Past Medical History:   Diagnosis Date    Chest pain 2002    NEGATIVE STRESS ECHO 2002    Endometrial cancer     2023    GERD (gastroesophageal reflux disease)     Lower back pain     Migraines     Mild allergic rhinitis     Postmenopausal bleeding      Past Surgical History:   Procedure Laterality Date    COLONOSCOPY N/A 10/06/2023    Procedure: COLONOSCOPY;  Surgeon: Marcia Weaver MD;  Location: [facility];  Service: Endoscopy;  Laterality: N/A;  10/4- referred by Dr. Weaver/ Subject: Needs colonoscopy ASAP within 1 week /Procedure Timing: < 1 week/Diagnosis: Diarrhea, evaluate for immunotherapy induced colitis/ Prep instructions to portal. AS    CYST REMOVAL Left 04/28/2022    Procedure: EXCISION, CYST-BACK;  Surgeon: Thomas Hurst MD;   Location: [facility] OR;  Service: General;  Laterality: Left;  left upper back  RN PREOP ON 4/21/22-NF    HYSTERECTOMY      after endometrial cancer diagnosis    HYSTEROSCOPY WITH DILATION AND CURETTAGE OF UTERUS N/A 05/23/2023    Procedure: HYSTEROSCOPY, WITH DILATION AND CURETTAGE OF UTERUS;  Surgeon: Omaira Evans MD;  Location: [facility] OR;  Service: OB/GYN;  Laterality: N/A;  WerdsmithZEENAT HERNANDEZ  [phone] TEXTED HAMMAD ON 5/2/2023 @ 3:51PM. H[facility]MAD RESPONDED ON 5/2/2023 @ 3:51PM-LO  RN PREOP 5/18/23  T & S; UPT ORDERED AND SCHEDULED 5/22/23    LYMPH NODE BIOPSY Left 06/05/2023    Procedure: BIOPSY, sentinel LYMPH NODE;  Surgeon: Lavell Rodríguez MD;  Location: [facility];  Service: OB/GYN;  Laterality: Left;    LYMPHADENECTOMY, PELVIS Right 06/05/2023    Procedure: LYMPHADENECTOMY, PELVIS;  Surgeon: Lavell Rodríguez MD;  Location: [facility];  Service: OB/GYN;  Laterality: Right;    MAPPING, LYMPH NODE, SENTINEL Bilateral 06/05/2023    Procedure: injection, LYMPH NODE, SENTINEL;  Surgeon: Lavell Rodríguez MD;  Location: [facility] [place] OR;  Service: OB/GYN;  Laterality: Bilateral;    NERVE REPAIR Right 06/05/2023    Procedure: REPAIR, OBTURATOR NERVE;  Surgeon: Lavell Rodríguez MD;  Location: [facility];  Service: OB/GYN;  Laterality: Right;    TONSILLECTOMY      TONSILLECTOMY      TUBAL LIGATION      XI ROBOTIC HYSTERECTOMY, WITH SALPINGO-OOPHORECTOMY Bilateral 06/05/2023    Procedure: XI ROBOTIC HYSTERECTOMY,WITH SALPINGO-OOPHORECTOMY;  Surgeon: Lavell Rodríguez MD;  Location: [facility];  Service: OB/GYN;  Laterality: Bilateral;  removed through vagina     Family History       Problem Relation (Age of Onset)    Breast cancer Sister (55)    Colon cancer Paternal Uncle    Heart disease Father    No Known Problems Mother, Sister          Tobacco Use    Smoking status: Never    Smokeless tobacco: Never   Substance and Sexual Activity    Alcohol use: Never    Drug use: Never    Sexual activity: Not Currently     Partners: Male     Birth  control/protection: Post-menopausal       PTA Medications   Medication Sig    acetaminophen (TYLENOL EXTRA STRENGTH) 500 MG tablet Take 2 tablets (1,000 mg total) by mouth every 6 (six) hours as needed for Pain.    acetaminophen (TYLENOL) 650 MG TbSR Take 1 tablet (650 mg total) by mouth every 6 (six) hours as needed (pain).    dexAMETHasone (DECADRON) 4 MG Tab Take daily beginning the day after chemotherapy for 3 days    diclofenac sodium (VOLTAREN) 1 % Gel Apply 2 g topically 2 (two) times daily.    diphenhydrAMINE (BENADRYL) 25 mg capsule Take 25 mg by mouth every 6 (six) hours as needed for Itching.    diphenoxylate-atropine 2.5-0.025 mg (LOMOTIL) 2.5-0.025 mg per tablet Take 1 tablet by mouth 4 (four) times daily as needed for Diarrhea.    famotidine (PEPCID) 40 MG tablet Take 1 tablet (40 mg total) by mouth once daily.    ibuprofen (ADVIL,MOTRIN) 800 MG tablet Take 1 tablet (800 mg total) by mouth every 8 (eight) hours as needed for Pain.    loratadine (CLARITIN) 10 mg tablet Take 1 tablet (10 mg total) by mouth once daily.    prochlorperazine (COMPAZINE) 5 MG tablet Take every 6 hours for 3 days beginning the day after chemotherapy    promethazine-dextromethorphan (PROMETHAZINE-DM) 6.25-15 mg/5 mL Syrp Take 5 mLs by mouth every 4 (four) hours as needed.    sulfamethoxazole-trimethoprim 800-160mg (BACTRIM DS) 800-160 mg Tab Take 1 tablet by mouth once daily.    valACYclovir (VALTREX) 1000 MG tablet Two pills at onset of fever blister and then repeat 2 pills 12 hr later    zolpidem (AMBIEN) 5 MG Tab Take 1 tablet (5 mg total) by mouth nightly as needed (insomnia).       Review of patient's allergies indicates:  No Known Allergies    Review of Systems   Constitutional:  Positive for chills, fatigue and fever.   Respiratory:  Positive for cough and shortness of breath.    Cardiovascular:  Negative for chest pain and leg swelling.   Gastrointestinal:  Negative for abdominal pain, constipation, nausea and vomiting.    Endocrine: Negative for hot flashes.   Genitourinary:  Negative for menstrual problem and pelvic pain.   Integumentary:  Negative for breast mass, nipple discharge and breast skin changes.   Neurological:  Positive for headaches. Negative for syncope.   Hematological:  Does not bruise/bleed easily.   Psychiatric/Behavioral:  Negative for depression.    Breast: Negative for mass, mastodynia, nipple discharge and skin changes     Objective:     Vital Signs (Most Recent):  Temp: 98.5 °F (36.9 °C) (11/06/23 2314)  Pulse: 102 (11/06/23 2345)  Resp: 17 (11/06/23 2314)  BP: 116/72 (11/06/23 2314)  SpO2: 96 % (11/06/23 2314) Vital Signs (24h Range):  Temp:  [98.1 °F (36.7 °C)-100.2 °F (37.9 °C)] 98.5 °F (36.9 °C)  Pulse:  [102-135] 102  Resp:  [17-33] 17  SpO2:  [93 %-98 %] 96 %  BP: (104-142)/(52-78) 116/72     Weight: 77.1 kg (170 lb)  Body mass index is 26.63 kg/m².       Physical Exam:   Constitutional: She is oriented to person, place, and time. She appears well-developed and well-nourished. No distress.    HENT:   Head: Normocephalic and atraumatic.    Eyes: EOM are normal.     Cardiovascular:  Normal heart sounds.             Pulmonary/Chest: Effort normal. No respiratory distress. She has no wheezes.   Crackles bilaterally        Abdominal: Soft. There is no abdominal tenderness. There is no rebound and no guarding.             Musculoskeletal: Normal range of motion.       Neurological: She is alert and oriented to person, place, and time.    Skin: Skin is warm and dry. She is not diaphoretic.    Psychiatric: She has a normal mood and affect. Thought content normal.          Laboratory:  Recent Lab Results  (Last 5 results in the past 24 hours)        11/06/23 2114 11/06/23 2114 11/06/23 2037   11/06/23  1754   11/06/23  1744        POC Molecular Influenza A Ag   Negative             POC Molecular Influenza B Ag   Negative             Procalcitonin               Albumin               ALP               ALT                Anion Gap               Appearance, UA       Clear         AST               Baso #               Basophil %               Bilirubin (UA)       Negative         BILIRUBIN TOTAL               BUN               Calcium               Chloride               CO2               Color, UA       Yellow         Creatinine               Differential Method               eGFR               Eos #               Eosinophil %               FiO2     24     21       Glucose               Glucose, UA       Negative         Gran # (ANC)               Gran %               Hematocrit               Hemoglobin               Immature Grans (Abs)               Immature Granulocytes               Ketones, UA       Negative         Leukocytes, UA       Negative         Lymph #               Lymph %               MCH               MCHC               MCV               Mono #               Mono %               MPV               NITRITE UA       Negative         nRBC               Occult Blood UA       Negative         pH, UA       6.0         Platelet Estimate               Platelet Count               POC Lactate     1.75     2.04       POC Temp     37.0 C           Potassium               PROTEIN TOTAL               Protein, UA       Negative  Comment: Recommend a 24 hour urine protein or a urine   protein/creatinine ratio if globulin induced proteinuria is  clinically suspected.            Acceptable Yes   Yes             RBC               RDW               Sample     VENOUS     VENOUS       SARS-CoV-2 RNA, Amplification, Qual Negative               Sodium               Specific Gravity, UA       <=1.005         Specimen UA       Urine, Clean Catch         Troponin I               UROBILINOGEN UA       Negative         WBC                                      Diagnostic Results:  X-Ray: Reviewed    Imaging Results              X-Ray Chest AP Portable (Final result)  Result time 11/06/23 16:34:38      Final result by  Glenda Palma MD (11/06/23 16:34:38)                   Impression:      New vague opacity right lung base, with patchy consolidation at both lung bases demonstrated by CT earlier today.  Findings most concerning for pneumonia.      Electronically signed by: Glenda Palma  Date:    11/06/2023  Time:    16:34               Narrative:    EXAMINATION:  XR CHEST AP PORTABLE    CLINICAL HISTORY:  Sepsis;    TECHNIQUE:  Single frontal view of the chest was performed.    COMPARISON:  11/01/2023    FINDINGS:  Lungs are well expanded.  New vague opacity at the right lung base.  Left lung remains fairly clear.  No pleural effusion or pneumothorax.    Cardiac silhouette is normal in size.                                        Assessment/Plan:     * Community acquired pneumonia of right lower lobe of lung  -Afebrile, tachycardia, tachypnea, 93% O2 sat on RA, 98% on 2L NC   -CBC: 9/10/32/325  -Lactate wnL, Procalcintonin wnL,Troponin wnL, UA wnL  -COVID neg, FLU neg  -Blood cultures X 2 pending  -CXR: New vague opacity right lung base, with patchy consolidation at both lung bases.   -PE: Bilateral crackles, no wheezing, breath sounds throughout. Improved from PE last week.  -Given recent admission for neutropenic fever 2/2 RSV infection, s/p broad spectrum antibiotics with progression of disease after discontinuing abx in the setting of continued fevers with abnormal vital signs and new consolidation on CXR, clinical picture consistent with superimposed community acquired pneumonia.  -Plan is to admit for IV antibiotics and supportive mgmt.   -IV Cefepime 2g q8h , azithromycin 500 mg q24 for 5 days.  -IVF, oxycodone IR q4h PRN, zofran PRN  -Pulmonary consult ordered for rec's regarding mgmt.  -AM CBC ordered      GERD (gastroesophageal reflux disease)  -Continue home med pepcid    Malignant neoplasm of endometrium  -see once hx above  -s/p TRH/BSO/BSLN/RPLN/repair of obturator nerve in 6/2023  -Carboplatin/paclitaxel s/p  7 cycles  -Currently on immunotherapy: Dostarlimab      Han Clancy MD PGY-2  Gynecologic Oncology  [facility] ([address])

## 2023-11-07 NOTE — PLAN OF CARE
MSW met with the patient at the bedside. The patient's  Jimmy is also at the bedside.     Patient is alert and oriented with no communication barriers.     Patient denies having DME at home.        Patients PCP is correct on the face sheet. Patient choice pharmacy is bedside delivery.     Patients' family will transport the patient home at discharge.     CM team will continue to follow.        11/07/23 0204   Discharge Assessment   Assessment Type Discharge Planning Assessment   Confirmed/corrected address, phone number and insurance Yes   Confirmed Demographics Correct on Facesheet   Source of Information patient;family;health record   People in Home significant other   Do you expect to return to your current living situation? Yes   Do you have help at home or someone to help you manage your care at home? Yes   Prior to hospitilization cognitive status: Alert/Oriented   Current cognitive status: Alert/Oriented   Equipment Currently Used at Home none   Readmission within 30 days? Yes  (Last week at [facility])   Patient currently being followed by outpatient case management? No   Do you currently have service(s) that help you manage your care at home? No   Do you take prescription medications? Yes   Do you have prescription coverage? Yes   Do you have any problems affording any of your prescribed medications? No   Is the patient taking medications as prescribed? yes   How do you get to doctors appointments? family or friend will provide   Are you on dialysis? No   Do you take coumadin? No   DME Needed Upon Discharge  none   Discharge Plan discussed with: Patient;Spouse/sig other   Name(s) and Number(s) Jimmy    Transition of Care Barriers None   Discharge Plan A Home with family   Discharge Plan B Home Health

## 2023-11-07 NOTE — PROGRESS NOTES
Pulmonary & Critical Care Medicine Consult Note      HPI:     58F with IIIC1 endometrial cancer (carboplatin/paclitaxel/dostarlimab) who is being treated for fever, malaise, SOB, headache and cough.   Symptoms started in late October and she was admitted for the same from 1/1-11/3; found to be RSV positive as well as neutropenic iso recent chemo treatment 10/19. She was discharged feeling improved, but said she worsened over the weekend back to her initial presentation. She asserts dry cough, was able to produce a small sputum sample. WBC has increased from around 3 on prior hospitalization to 8-9 now; fever low-grade around 100. Flu and covid re-tested and negative. Treated with broad-spectrum antibiotics and adjuncts for comfort (guaifenesin, tessalon). On 1L O2 NC initially for O2 sat of 94% at rest; off O2 this morning and eating breakfast with good sats. Overall feeling poorly with malaise and fatigue.Sinus tachycardia on EKG. CTA notable for new bibasilar infiltrates.      Past Medical History:   Diagnosis Date    Chest pain 2002    NEGATIVE STRESS ECHO 2002    Endometrial cancer     2023    GERD (gastroesophageal reflux disease)     Lower back pain     Migraines     Mild allergic rhinitis     Postmenopausal bleeding      Past Surgical History:   Procedure Laterality Date    COLONOSCOPY N/A 10/06/2023    Procedure: COLONOSCOPY;  Surgeon: Marcia Weaver MD;  Location: [facility] ENDO;  Service: Endoscopy;  Laterality: N/A;  10/4- referred by Dr. Weaver/ Subject: Needs colonoscopy ASAP within 1 week /Procedure Timing: < 1 week/Diagnosis: Diarrhea, evaluate for immunotherapy induced colitis/ Prep instructions to portal. AS    CYST REMOVAL Left 04/28/2022    Procedure: EXCISION, CYST-BACK;  Surgeon: Thomas Hurst MD;  Location: [facility] OR;  Service: General;  Laterality: Left;  left upper back  RN PREOP ON 4/21/22-NF    HYSTERECTOMY      after endometrial cancer diagnosis    HYSTEROSCOPY WITH DILATION AND CURETTAGE OF  UTERUS N/A 05/23/2023    Procedure: HYSTEROSCOPY, WITH DILATION AND CURETTAGE OF UTERUS;  Surgeon: Omaira Evans MD;  Location: [facility];  Service: OB/GYN;  Laterality: N/A;  DESTINY HERNANDEZ  [phone] TEXTED [facility] ON 5/2/2023 @ 3:51PM. [facility] RESPONDED ON 5/2/2023 @ 3:51PM-LO  RN PREOP 5/18/23  T & S; UPT ORDERED AND SCHEDULED 5/22/23    LYMPH NODE BIOPSY Left 06/05/2023    Procedure: BIOPSY, sentinel LYMPH NODE;  Surgeon: Lavell Rodríguez MD;  Location: [facility];  Service: OB/GYN;  Laterality: Left;    LYMPHADENECTOMY, PELVIS Right 06/05/2023    Procedure: LYMPHADENECTOMY, PELVIS;  Surgeon: Lavell Rodríguez MD;  Location: [facility];  Service: OB/GYN;  Laterality: Right;    MAPPING, LYMPH NODE, SENTINEL Bilateral 06/05/2023    Procedure: injection, LYMPH NODE, SENTINEL;  Surgeon: Lavell Rodríguez MD;  Location: [facility];  Service: OB/GYN;  Laterality: Bilateral;    NERVE REPAIR Right 06/05/2023    Procedure: REPAIR, OBTURATOR NERVE;  Surgeon: Lavell Rodríguez MD;  Location: [facility];  Service: OB/GYN;  Laterality: Right;    TONSILLECTOMY      TONSILLECTOMY      TUBAL LIGATION      XI ROBOTIC HYSTERECTOMY, WITH SALPINGO-OOPHORECTOMY Bilateral 06/05/2023    Procedure: XI ROBOTIC HYSTERECTOMY,WITH SALPINGO-OOPHORECTOMY;  Surgeon: Lavell Rodríguez MD;  Location: [facility];  Service: OB/GYN;  Laterality: Bilateral;  removed through vagina     Social History:   Social History     Socioeconomic History    Marital status:    Tobacco Use    Smoking status: Never    Smokeless tobacco: Never   Substance and Sexual Activity    Alcohol use: Never    Drug use: Never    Sexual activity: Not Currently     Partners: Male     Birth control/protection: Post-menopausal     Social Determinants of Health     Financial Resource Strain: Unknown (11/6/2023)    Overall Financial Resource Strain (CARDIA)     Difficulty of Paying Living Expenses: Patient refused   Food Insecurity: Unknown (11/6/2023)    Hunger Vital Sign     Worried About  Running Out of Food in the Last Year: Patient refused     Ran Out of Food in the Last Year: Patient refused   Transportation Needs: No Transportation Needs (11/6/2023)    PRAPARE - Transportation     Lack of Transportation (Medical): No     Lack of Transportation (Non-Medical): No   Physical Activity: Unknown (11/6/2023)    Exercise Vital Sign     Days of Exercise per Week: Patient refused     Minutes of Exercise per Session: Patient refused   Recent Concern: Physical Activity - Inactive (11/2/2023)    Exercise Vital Sign     Days of Exercise per Week: 0 days     Minutes of Exercise per Session: 0 min   Stress: Unknown (11/6/2023)    [employer] [place] [employer] - Occupational Stress Questionnaire     Feeling of Stress : Patient refused   Social Connections: Unknown (11/6/2023)    Social Connection and Isolation Panel [NHANES]     Frequency of Communication with Friends and Family: Patient refused     Frequency of Social Gatherings with Friends and Family: Patient refused     Attends [employer]ism Services: More than 4 times per year     Active Member of Clubs or Organizations: Patient refused     Attends Club or Organization Meetings: Patient refused     Marital Status:    Housing Stability: Unknown (11/6/2023)    Housing Stability Vital Sign     Unable to Pay for Housing in the Last Year: Patient refused     Number of Places Lived in the Last Year: 1     Unstable Housing in the Last Year: Patient refused     Family History   Problem Relation Age of Onset    No Known Problems Mother     Heart disease Father     Breast cancer Sister 55    No Known Problems Sister     Colon cancer Paternal Uncle     Esophageal cancer Neg Hx      Drug Allergies:   Review of patient's allergies indicates:  No Known Allergies  Current Infusions:    Scheduled Medications:     azithromycin  500 mg Intravenous Q24H    ceFEPime (MAXIPIME) IVPB  2 g Intravenous Q8H    cetirizine  10 mg Oral Daily    enoxparin  40 mg  "Subcutaneous Q24H (prophylaxis, 1700)    famotidine  20 mg Oral BID    guaiFENesin  600 mg Oral BID     PRN Medications:   acetaminophen, albuterol **AND** [COMPLETED] MDI Q5 Min, benzonatate, ondansetron, oxyCODONE, prochlorperazine, sodium chloride 0.9%, zolpidem    Review of Systems:   A comprehensive 12-point review of systems was performed, and is negative except for those items mentioned above in the HPI section of this note.     Vital Signs:    Vitals:    11/07/23 0958   BP:    Pulse: 104   Resp:    Temp:        Fluid Balance:     Intake/Output Summary (Last 24 hours) at 11/7/2023 1038  Last data filed at 11/7/2023 0500  Gross per 24 hour   Intake 1240 ml   Output 1200 ml   Net 40 ml       Physical Exam:   General: uncomfortable-appearing, non-toxic  HEENT: NC/AT, PERRL, EOMI  Neck: Supple without JVD, trachea midline, no thyromegaly  Cardiac: S1S2, tachycardic, RR, no murmurs/rubs/gallops  Resp: Good air movement to bases, crackles at bases R>L, no wheezes/rhonchi  Abd: Soft, NT/ND  Ext: No edema, no cyanosis, no clubbing, 2+ pulses present, brisk capillary refill present.  Skin: Warm and dry, no rashes, no lesions, no jaundice  Neuro: AAOx3, CN II-XII grossly intact, no focal deficits  Psych: Appropriate mood and affect, cooperative & interactive    Personal Review and Summary of Prior Diagnostics    Laboratory Studies:   No results for input(s): "PH", "PCO2", "PO2", "HCO3", "POCSATURATED", "BE" in the last 24 hours.  Recent Labs   Lab 11/07/23  0404   WBC 8.15   RBC 2.69*   HGB 9.6*   HCT 28.5*      *   MCH 35.7*   MCHC 33.7     Recent Labs   Lab 11/06/23  1728   *   K 3.7      CO2 22*   BUN 6   CREATININE 0.7   CALCIUM 9.4       Microbiology Data:   Microbiology Results (last 7 days)       Procedure Component Value Units Date/Time    Culture, Respiratory with Gram Stain [[id number]] Collected: 11/07/23 0535    Order Status: Sent Specimen: Respiratory from Sputum Updated: 11/07/23 " 1007    Blood culture x two cultures. Draw prior to antibiotics. [[id number]] Collected: 11/06/23 1715    Order Status: Completed Specimen: Blood from Peripheral, Hand, Right Updated: 11/07/23 0315     Blood Culture, Routine No Growth to date    Narrative:      Aerobic and anaerobic    Blood culture x two cultures. Draw prior to antibiotics. [[id number]] Collected: 11/06/23 1728    Order Status: Completed Specimen: Blood from Peripheral, Antecubital, Left Updated: 11/07/23 0315     Blood Culture, Routine No Growth to date    Narrative:      Aerobic and anaerobic              Impression & Recommendations      RSV infection/CAP  Endometrial ca    RSV infection with malaise, low-grade fever, dry cough, improving during admission but worsening again when discharged. New focal bibasilar consolidations on CTA, though with some signs that these might have been developing on prior CT. Timeline and imaging most c/w bacterial superinfection of viral PNA developing while immune-suppressed; now she is no longer neutropenic. Ddx pneumonitis from chemo or immunotherapy vs atypical infection, but imaging and clinical presentation are not as consistent with these.     Would recommend:  - CAP coverage for 5 days beginning 11/6, while inpatient would continue gram positive coverage with vancomycin, ok to continue cefipime, c/w azithromycin  - if she defervesces, stays off oxygen, and feels well, ok to oralize antibiotics to complete course outpatient if stable; can cover based on respiratory culture which is pending, or can choose empiric coverage if cultures return no useful data  - would add hypertonic saline nebulizer treatments to her regimen to help her clear mucus  - would hold cough suppressants (benzonatate) while inpatient and actively treating infection  - ok to continue guaifenesin and pain control as well as adjuncts for comfort  - albuterol is ok if it helps her, but no need for her to continue if she doesn't feel  improvement (NB that it can contribute to her tachycardia)  - can eat and drink to thirst, would stop standing IV fluids    Pt seen and examined with Dr. Lin attending. We will sign off but remain available if questions arise or the clinical situation changes.    Florentin Woo MD  [employer] PCCM Fellow, PGY5

## 2023-11-07 NOTE — SUBJECTIVE & OBJECTIVE
Interval History  Patient reports persistent 8/10 headache, for which she did not receive any medication overnight. She denies SOB, fevers, chills, chest pain, palpitations, N/V. Cough is better with mucinex. She is tolerating regular diet.     Review of patient's allergies indicates:  No Known Allergies    Review of Systems   Constitutional:  Negative for chills, fatigue and fever.   Respiratory:  Positive for cough. Negative for shortness of breath.    Cardiovascular:  Negative for chest pain and leg swelling.   Gastrointestinal:  Negative for abdominal pain, constipation, nausea and vomiting.   Endocrine: Negative for hot flashes.   Genitourinary:  Negative for menstrual problem and pelvic pain.   Integumentary:  Negative for breast mass, nipple discharge and breast skin changes.   Neurological:  Positive for headaches. Negative for syncope.   Hematological:  Does not bruise/bleed easily.   Psychiatric/Behavioral:  Negative for depression.    Breast: Negative for mass, mastodynia, nipple discharge and skin changes     Objective:     Vital Signs (Most Recent):  Temp: 99.4 °F (37.4 °C) (11/07/23 0443)  Pulse: 107 (11/07/23 0553)  Resp: 16 (11/07/23 0443)  BP: 133/61 (11/07/23 0443)  SpO2: 95 % (11/07/23 0443) Vital Signs (24h Range):  Temp:  [98.1 °F (36.7 °C)-100.2 °F (37.9 °C)] 99.4 °F (37.4 °C)  Pulse:  [] 107  Resp:  [6-33] 16  SpO2:  [93 %-98 %] 95 %  BP: (104-142)/(52-78) 133/61     Weight: 77.1 kg (170 lb)  Body mass index is 26.63 kg/m².       Physical Exam:   Constitutional: She is oriented to person, place, and time. She appears well-developed and well-nourished. No distress.    HENT:   Head: Normocephalic and atraumatic.    Eyes: EOM are normal.     Cardiovascular:  Normal heart sounds.             Pulmonary/Chest: Effort normal. No respiratory distress. She has no wheezes.   Crackles at lung bases bilaterally        Abdominal: Soft. There is no abdominal tenderness. There is no rebound and no  guarding.             Musculoskeletal: Normal range of motion.       Neurological: She is alert and oriented to person, place, and time.    Skin: Skin is warm and dry. She is not diaphoretic.    Psychiatric: She has a normal mood and affect. Thought content normal.          Laboratory:  Recent Lab Results  (Last 5 results in the past 24 hours)        11/07/23  0404   11/06/23  2114   11/06/23  2114   11/06/23  2037   11/06/23  1754        POC Molecular Influenza A Ag     Negative           POC Molecular Influenza B Ag     Negative           Appearance, UA         Clear       Bilirubin (UA)         Negative       Color, UA         Yellow       FiO2       24         Glucose, UA         Negative       Hematocrit 28.5               Hemoglobin 9.6               Ketones, UA         Negative       Leukocytes, UA         Negative       MCH 35.7               MCHC 33.7                              MPV 9.3               NITRITE UA         Negative       Occult Blood UA         Negative       pH, UA         6.0       Platelet Count 326               POC Lactate       1.75         POC Temp       37.0 C         Protein, UA         Negative  Comment: Recommend a 24 hour urine protein or a urine   protein/creatinine ratio if globulin induced proteinuria is  clinically suspected.          Acceptable   Yes   Yes           RBC 2.69               RDW 14.7               Sample       VENOUS         SARS-CoV-2 RNA, Amplification, Qual   Negative             Specific Gravity, UA         <=1.005       Specimen UA         Urine, Clean Catch       UROBILINOGEN UA         Negative       WBC 8.15                                      Diagnostic Results:  X-Ray: Reviewed    Imaging Results              X-Ray Chest AP Portable (Final result)  Result time 11/06/23 16:34:38      Final result by Glenda Palma MD (11/06/23 16:34:38)                   Impression:      New vague opacity right lung base, with patchy  consolidation at both lung bases demonstrated by CT earlier today.  Findings most concerning for pneumonia.      Electronically signed by: Glenda Palma  Date:    11/06/2023  Time:    16:34               Narrative:    EXAMINATION:  XR CHEST AP PORTABLE    CLINICAL HISTORY:  Sepsis;    TECHNIQUE:  Single frontal view of the chest was performed.    COMPARISON:  11/01/2023    FINDINGS:  Lungs are well expanded.  New vague opacity at the right lung base.  Left lung remains fairly clear.  No pleural effusion or pneumothorax.    Cardiac silhouette is normal in size.

## 2023-11-07 NOTE — CARE UPDATE
Afternoon Assessment:    Patient reports having a headache at the frontal area that radiates to her occiput. She is also having pain at her frontal sinus and feels increased pressure.States the tylenol or oxy IR  did not improve her symptoms.    The patient reports she is overall feeling a little better. Still tolerating PO and denies f/c/n/v.    Temp:  [97.9 °F (36.6 °C)-100.2 °F (37.9 °C)] 97.9 °F (36.6 °C)  Pulse:  [] 96  Resp:  [6-33] 16  SpO2:  [94 %-98 %] 94 %  BP: (104-142)/(52-78) 140/65    Patient laying comfortably in no acute distress.    Labs:  Recent Labs   Lab 11/07/23  0404   WBC 8.15   RBC 2.69*   HGB 9.6*   HCT 28.5*      *   MCH 35.7*   MCHC 33.7       A/P:  Continue IV antibiotics and supportive care.  Will receive ibuprofen for headache and generalized aches.    Han Clancy MD PGY-2  Obstetrics and Gynecology

## 2023-11-07 NOTE — PROGRESS NOTES
Patient with non productive cough at this time, unable to produce sputum.  Sputum specimen cup left at bedside, instructed patient to call if able to produce sputum.

## 2023-11-07 NOTE — ASSESSMENT & PLAN NOTE
-Afebrile, tachycardia, tachypnea, 93% O2 sat on RA, 98% on 2L NC   -CBC: 9/10/32/325  -Lactate wnL, Procalcintonin wnL,Troponin wnL, UA wnL  -COVID neg, FLU neg  -Blood cultures X 2 pending  -CXR: New vague opacity right lung base, with patchy consolidation at both lung bases.   -PE: Bilateral crackles, no wheezing, breath sounds throughout. Improved from PE last week.  -Given recent admission for neutropenic fever 2/2 RSV infection, s/p broad spectrum antibiotics with progression of disease after discontinuing abx in the setting of continued fevers with abnormal vital signs and new consolidation on CXR, clinical picture consistent with superimposed community acquired pneumonia.  -Plan is to admit for IV antibiotics and supportive mgmt.   -IV Cefepime 2g q8h , azithromycin 500 mg q24 for 5 days.  -IVF, oxycodone IR q4h PRN, zofran PRN  -Pulmonary consult ordered for rec's regarding mgmt.  -AM CBC ordered

## 2023-11-07 NOTE — PROGRESS NOTES
"Pharmacokinetic Initial Assessment: IV Vancomycin    Assessment/Plan:    Initiate intravenous vancomycin with loading dose of 1500 mg once followed by a maintenance dose of vancomycin 1000 mg IV every 12 hours  Desired empiric serum trough concentration is 10 to 20 mcg/mL  Draw vancomycin trough level 60 min prior to fourth dose on 11/8/2023 at approximately 13:00  Pharmacy will continue to follow and monitor vancomycin.      Please contact pharmacy at extension [phone] with any questions regarding this assessment.     Thank you for the consult,   Esther Thakkar       Patient brief summary:  Sugar Diaz is a 58 y.o. female initiated on antimicrobial therapy with IV Vancomycin for treatment of suspected  pneumonia    Drug Allergies:   Review of patient's allergies indicates:  No Known Allergies    Actual Body Weight:   77.1 kg    Renal Function:   Estimated Creatinine Clearance: 91.5 mL/min (based on SCr of 0.7 mg/dL).,     Dialysis Method (if applicable):  N/A    CBC (last 72 hours):  Recent Labs   Lab Result Units 11/06/23  1728 11/07/23  0404   WBC K/uL 9.46 8.15   Hemoglobin g/dL 10.7* 9.6*   Hematocrit % 32.0* 28.5*   Platelets K/uL 325 326   Gran % % 69.7 49.0   Lymph % % 15.6* 27.0   Mono % % 11.9 10.0   Eosinophil % % 0.1 0.0   Basophil % % 0.5 0.0   Differential Method  Automated Manual       Metabolic Panel (last 72 hours):  Recent Labs   Lab Result Units 11/06/23  1728 11/06/23  1754   Sodium mmol/L 135*  --    Potassium mmol/L 3.7  --    Chloride mmol/L 101  --    CO2 mmol/L 22*  --    Glucose mg/dL 118*  --    Glucose, UA   --  Negative   BUN mg/dL 6  --    Creatinine mg/dL 0.7  --    Albumin g/dL 3.0*  --    Total Bilirubin mg/dL 0.2  --    Alkaline Phosphatase U/L 66  --    AST U/L 21  --    ALT U/L 29  --        Drug levels (last 3 results):  No results for input(s): "VANCOMYCINRA", "VANCORANDOM", "VANCOMYCINPE", "VANCOPEAK", "VANCOMYCINTR", "VANCOTROUGH" in the last 72 hours.    Microbiologic " Results:  Microbiology Results (last 7 days)       Procedure Component Value Units Date/Time    Culture, Respiratory with Gram Stain [[id number]] Collected: 11/07/23 0535    Order Status: Completed Specimen: Respiratory from Sputum Updated: 11/07/23 1113     Gram Stain (Respiratory) <10 epithelial cells per low power field.     Gram Stain (Respiratory) Few WBC's     Gram Stain (Respiratory) Moderate Gram positive cocci     Gram Stain (Respiratory) Moderate Gram negative rods    Blood culture x two cultures. Draw prior to antibiotics. [[id number]] Collected: 11/06/23 1715    Order Status: Completed Specimen: Blood from Peripheral, Hand, Right Updated: 11/07/23 0315     Blood Culture, Routine No Growth to date    Narrative:      Aerobic and anaerobic    Blood culture x two cultures. Draw prior to antibiotics. [[id number]] Collected: 11/06/23 1728    Order Status: Completed Specimen: Blood from Peripheral, Antecubital, Left Updated: 11/07/23 0315     Blood Culture, Routine No Growth to date    Narrative:      Aerobic and anaerobic

## 2023-11-07 NOTE — HOSPITAL COURSE
11/07/2023 HD 1: Improving URI symptoms, complaints of headache overnight, afebrile, midly tachy overnight, Satting 95% RA. CBC stable from admission. EKG sinus tachycardia. Continue Cefepime and Azithromycin, added Vancomycin this afternoon per Pulmonology recs.  11/08/2023 HD 2: Continue to improve clinically ambulating, afebrile, no SOB. Satting 94% RA. Sputum culture grew Moderate Gram + cocci and gram - julia. Blood cx NGTD. Plan to continue current regimen for 48h.  11/09/2023 HD 3: Continue to improve clinically ambulating, afebrile, no SOB. Satting 96% RA. Plan to DC home today on PO antibiotics.

## 2023-11-07 NOTE — ED NOTES
Received pt laying in bed, pt is connected to monitor. Pt is vitally stable. Pt heart rate is elevated. Pt has oxygen on via NC at 2/L. Pt does have a cough.. MD aware and will order medication. Pt aware of admit status. Pt connected to fluids and ABX.

## 2023-11-07 NOTE — ASSESSMENT & PLAN NOTE
-see once hx above  -s/p TRH/BSO/BSLN/RPLN/repair of obturator nerve in 6/2023  -Carboplatin/paclitaxel s/p 7 cycles  -Currently on immunotherapy: Dostarlimab

## 2023-11-07 NOTE — PROGRESS NOTES
[facility] ([address])  Gynecologic Oncology  Progress Note      Patient Name: Sugar Diaz  MRN: [id number]  Admission Date: 2023  Hospital Length of Stay: 0 days  Attending Provider: Lavell Rodríguez MD  Primary Care Provider: Dexter Khan MD  Principal Problem: Community acquired pneumonia of right lower lobe of lung    Follow-up For: * No surgery found *  Post-Operative Day:    Subjective:      History of Present Illness:  Sugar Diaz is a 58 y.o.  with stage IIIC1 grade 1 endometrioid endometrial cancer who presented to the ED for concerns of fever, cough, shortness of breath, headache, and fatigue. The patient was recently hospitalized -11/3 after presenting with the same symptoms and neutropenic fever. She was started on broad spectrum antibiotics and tested positive for RSV.  After being afebrile for 48 hours and significant clinical improvement, the patient was discharged. The patient states she felt better the day of discharge however, the following day, her symptoms progressed and she started to have subjective fevers. Cough is not productive. Shortness of breath is worsened with exertion. Headache is diffused. She denies chest pain or difficulty breathing (at rest). She denies dizziness/lightheadedness, syncope, vision changes, sore throat, abdominal pain, N/V, urinary symptoms, or bowel changes.    Prior to presenting, the patient was evaluated by her PCP where she was febrile and ill. Her PCP recommended the patient to come to the ED. In the ED, the patient was afebrile, tachycardic (110s-120), tachypneic (20s), saturating 93% on RA and 98% on 2L O2. WBC wnL, lactate wnL, procalcintonin wnL, CXR significant for new consolidations bilaterally and opacities in R lung concerning for pneumonia.The patient was started on broad spectrum antibiotics and IVF..        Hospital Course:  HD 1: Improving URI symptoms, complaints of headache overnight, afebrile, midly  tachy overnight, Satting 95% RA. CBC stable from admission. Continue current antibiotics. EKG ordered. Patient to be seen by pulm team today.       Interval History  Patient reports persistent 8/10 headache, for which she did not receive any medication overnight. She denies SOB, fevers, chills, chest pain, palpitations, N/V. Cough is better with mucinex. She is tolerating regular diet.     Review of patient's allergies indicates:  No Known Allergies    Review of Systems   Constitutional:  Negative for chills, fatigue and fever.   Respiratory:  Positive for cough. Negative for shortness of breath.    Cardiovascular:  Negative for chest pain and leg swelling.   Gastrointestinal:  Negative for abdominal pain, constipation, nausea and vomiting.   Endocrine: Negative for hot flashes.   Genitourinary:  Negative for menstrual problem and pelvic pain.   Integumentary:  Negative for breast mass, nipple discharge and breast skin changes.   Neurological:  Positive for headaches. Negative for syncope.   Hematological:  Does not bruise/bleed easily.   Psychiatric/Behavioral:  Negative for depression.    Breast: Negative for mass, mastodynia, nipple discharge and skin changes     Objective:     Vital Signs (Most Recent):  Temp: 99.4 °F (37.4 °C) (11/07/23 0443)  Pulse: 107 (11/07/23 0553)  Resp: 16 (11/07/23 0443)  BP: 133/61 (11/07/23 0443)  SpO2: 95 % (11/07/23 0443) Vital Signs (24h Range):  Temp:  [98.1 °F (36.7 °C)-100.2 °F (37.9 °C)] 99.4 °F (37.4 °C)  Pulse:  [] 107  Resp:  [6-33] 16  SpO2:  [93 %-98 %] 95 %  BP: (104-142)/(52-78) 133/61     Weight: 77.1 kg (170 lb)  Body mass index is 26.63 kg/m².       Physical Exam:   Constitutional: She is oriented to person, place, and time. She appears well-developed and well-nourished. No distress.    HENT:   Head: Normocephalic and atraumatic.    Eyes: EOM are normal.     Cardiovascular:  Normal heart sounds.             Pulmonary/Chest: Effort normal. No respiratory  distress. She has no wheezes.   Crackles at lung bases bilaterally        Abdominal: Soft. There is no abdominal tenderness. There is no rebound and no guarding.             Musculoskeletal: Normal range of motion.       Neurological: She is alert and oriented to person, place, and time.    Skin: Skin is warm and dry. She is not diaphoretic.    Psychiatric: She has a normal mood and affect. Thought content normal.          Laboratory:  Recent Lab Results  (Last 5 results in the past 24 hours)        11/07/23  0404   11/06/23  2114   11/06/23  2114   11/06/23 2037   11/06/23  1754        POC Molecular Influenza A Ag     Negative           POC Molecular Influenza B Ag     Negative           Appearance, UA         Clear       Bilirubin (UA)         Negative       Color, UA         Yellow       FiO2       24         Glucose, UA         Negative       Hematocrit 28.5               Hemoglobin 9.6               Ketones, UA         Negative       Leukocytes, UA         Negative       MCH 35.7               MCHC 33.7                              MPV 9.3               NITRITE UA         Negative       Occult Blood UA         Negative       pH, UA         6.0       Platelet Count 326               POC Lactate       1.75         POC Temp       37.0 C         Protein, UA         Negative  Comment: Recommend a 24 hour urine protein or a urine   protein/creatinine ratio if globulin induced proteinuria is  clinically suspected.          Acceptable   Yes   Yes           RBC 2.69               RDW 14.7               Sample       VENOUS         SARS-CoV-2 RNA, Amplification, Qual   Negative             Specific Gravity, UA         <=1.005       Specimen UA         Urine, Clean Catch       UROBILINOGEN UA         Negative       WBC 8.15                                      Diagnostic Results:  X-Ray: Reviewed    Imaging Results              X-Ray Chest AP Portable (Final result)  Result time 11/06/23 16:34:38       Final result by Glenda Palma MD (11/06/23 16:34:38)                   Impression:      New vague opacity right lung base, with patchy consolidation at both lung bases demonstrated by CT earlier today.  Findings most concerning for pneumonia.      Electronically signed by: Glenda Palma  Date:    11/06/2023  Time:    16:34               Narrative:    EXAMINATION:  XR CHEST AP PORTABLE    CLINICAL HISTORY:  Sepsis;    TECHNIQUE:  Single frontal view of the chest was performed.    COMPARISON:  11/01/2023    FINDINGS:  Lungs are well expanded.  New vague opacity at the right lung base.  Left lung remains fairly clear.  No pleural effusion or pneumothorax.    Cardiac silhouette is normal in size.                                        Assessment/Plan:     * Community acquired pneumonia of right lower lobe of lung  - Afebrile, mild tachycardia, 95% O2 sat on RA, improving cough.  - CBC: 9/10/32/325 > 8.1/9.6/28/326  - Lactate wnL, Procalcintonin wnL,Troponin wnL, UA wnL  - COVID neg, FLU neg  - Blood cultures NGTD  - CXR: New vague opacity right lung base, with patchy consolidation at both lung bases.   - PE: Bilateral crackles, no wheezing, breath sounds throughout.  - Continue IV Cefepime 2g q8h , azithromycin 500 mg q24 for 5 days (pulmonology recommendation)  - Added Tylenol 650 Q6h PRN for headaches  - Continue IVF 75cc/h, oxycodone IR q4h PRN, zofran PRN for nausea  - Impatient Pulmonology team to see patient today, appreciate recs.   - EKG ordered today given persistent tachycardia.   - DVT proph: SCDs and lovenox 40       GERD (gastroesophageal reflux disease)  -Continue home med pepcid    Malignant neoplasm of endometrium  -see once hx above  -s/p TRH/BSO/BSLN/RPLN/repair of obturator nerve in 6/2023  -Carboplatin/paclitaxel s/p 6 cycles  -Currently on immunotherapy: Dostarlimab (Cycle 7, D14)        VTE Risk Mitigation (From admission, onward)           Ordered     IP VTE HIGH RISK PATIENT  Once          11/06/23 2014                    Dominic Coretz MD  Gynecologic Oncology  [facility] ([address])

## 2023-11-08 LAB
CREAT SERPL-MCNC: 0.6 MG/DL (ref 0.5–1.4)
EST. GFR  (NO RACE VARIABLE): >60 ML/MIN/1.73 M^2
VANCOMYCIN TROUGH SERPL-MCNC: 8.4 UG/ML (ref 10–22)

## 2023-11-08 PROCEDURE — 94761 N-INVAS EAR/PLS OXIMETRY MLT: CPT

## 2023-11-08 PROCEDURE — 99232 PR SUBSEQUENT HOSPITAL CARE,LEVL II: ICD-10-PCS | Mod: ,,, | Performed by: STUDENT IN AN ORGANIZED HEALTH CARE EDUCATION/TRAINING PROGRAM

## 2023-11-08 PROCEDURE — 25000003 PHARM REV CODE 250: Performed by: OBSTETRICS & GYNECOLOGY

## 2023-11-08 PROCEDURE — 25000242 PHARM REV CODE 250 ALT 637 W/ HCPCS

## 2023-11-08 PROCEDURE — 25000003 PHARM REV CODE 250

## 2023-11-08 PROCEDURE — 96372 THER/PROPH/DIAG INJ SC/IM: CPT

## 2023-11-08 PROCEDURE — 63600175 PHARM REV CODE 636 W HCPCS: Performed by: OBSTETRICS & GYNECOLOGY

## 2023-11-08 PROCEDURE — 63600175 PHARM REV CODE 636 W HCPCS

## 2023-11-08 PROCEDURE — 99232 SBSQ HOSP IP/OBS MODERATE 35: CPT | Mod: ,,, | Performed by: STUDENT IN AN ORGANIZED HEALTH CARE EDUCATION/TRAINING PROGRAM

## 2023-11-08 PROCEDURE — 36415 COLL VENOUS BLD VENIPUNCTURE: CPT | Performed by: OBSTETRICS & GYNECOLOGY

## 2023-11-08 PROCEDURE — 27000207 HC ISOLATION

## 2023-11-08 PROCEDURE — 82565 ASSAY OF CREATININE: CPT | Performed by: OBSTETRICS & GYNECOLOGY

## 2023-11-08 PROCEDURE — 99900035 HC TECH TIME PER 15 MIN (STAT)

## 2023-11-08 PROCEDURE — 80202 ASSAY OF VANCOMYCIN: CPT | Performed by: OBSTETRICS & GYNECOLOGY

## 2023-11-08 PROCEDURE — 21400001 HC TELEMETRY ROOM

## 2023-11-08 PROCEDURE — 96376 TX/PRO/DX INJ SAME DRUG ADON: CPT

## 2023-11-08 PROCEDURE — 25000003 PHARM REV CODE 250: Performed by: STUDENT IN AN ORGANIZED HEALTH CARE EDUCATION/TRAINING PROGRAM

## 2023-11-08 PROCEDURE — 96366 THER/PROPH/DIAG IV INF ADDON: CPT

## 2023-11-08 PROCEDURE — 94640 AIRWAY INHALATION TREATMENT: CPT

## 2023-11-08 RX ORDER — IBUPROFEN 600 MG/1
600 TABLET ORAL EVERY 6 HOURS
Status: DISCONTINUED | OUTPATIENT
Start: 2023-11-08 | End: 2023-11-09 | Stop reason: HOSPADM

## 2023-11-08 RX ADMIN — OXYCODONE HYDROCHLORIDE 5 MG: 5 TABLET ORAL at 05:11

## 2023-11-08 RX ADMIN — VANCOMYCIN HYDROCHLORIDE 1000 MG: 1 INJECTION, POWDER, LYOPHILIZED, FOR SOLUTION INTRAVENOUS at 01:11

## 2023-11-08 RX ADMIN — ZOLPIDEM TARTRATE 5 MG: 5 TABLET ORAL at 11:11

## 2023-11-08 RX ADMIN — OXYCODONE HYDROCHLORIDE 5 MG: 5 TABLET ORAL at 09:11

## 2023-11-08 RX ADMIN — FAMOTIDINE 20 MG: 20 TABLET ORAL at 09:11

## 2023-11-08 RX ADMIN — ENOXAPARIN SODIUM 40 MG: 40 INJECTION SUBCUTANEOUS at 04:11

## 2023-11-08 RX ADMIN — IBUPROFEN 600 MG: 600 TABLET, FILM COATED ORAL at 05:11

## 2023-11-08 RX ADMIN — SODIUM CHLORIDE SOLN NEBU 3% 4 ML: 3 NEBU SOLN at 08:11

## 2023-11-08 RX ADMIN — IBUPROFEN 600 MG: 600 TABLET, FILM COATED ORAL at 11:11

## 2023-11-08 RX ADMIN — FAMOTIDINE 20 MG: 20 TABLET ORAL at 08:11

## 2023-11-08 RX ADMIN — OXYCODONE HYDROCHLORIDE 5 MG: 5 TABLET ORAL at 01:11

## 2023-11-08 RX ADMIN — CETIRIZINE HYDROCHLORIDE 10 MG: 10 TABLET, FILM COATED ORAL at 09:11

## 2023-11-08 RX ADMIN — VANCOMYCIN HYDROCHLORIDE 1250 MG: 1.25 INJECTION, POWDER, LYOPHILIZED, FOR SOLUTION INTRAVENOUS at 05:11

## 2023-11-08 RX ADMIN — ACETAMINOPHEN 650 MG: 325 TABLET, FILM COATED ORAL at 12:11

## 2023-11-08 RX ADMIN — GUAIFENESIN 600 MG: 600 TABLET, EXTENDED RELEASE ORAL at 08:11

## 2023-11-08 RX ADMIN — ACETAMINOPHEN 650 MG: 325 TABLET, FILM COATED ORAL at 05:11

## 2023-11-08 RX ADMIN — GUAIFENESIN 600 MG: 600 TABLET, EXTENDED RELEASE ORAL at 09:11

## 2023-11-08 RX ADMIN — CEFEPIME 2 G: 2 INJECTION, POWDER, FOR SOLUTION INTRAVENOUS at 01:11

## 2023-11-08 RX ADMIN — IBUPROFEN 600 MG: 600 TABLET, FILM COATED ORAL at 09:11

## 2023-11-08 RX ADMIN — AZITHROMYCIN MONOHYDRATE 500 MG: 500 INJECTION, POWDER, LYOPHILIZED, FOR SOLUTION INTRAVENOUS at 01:11

## 2023-11-08 RX ADMIN — CEFEPIME 2 G: 2 INJECTION, POWDER, FOR SOLUTION INTRAVENOUS at 11:11

## 2023-11-08 NOTE — PROGRESS NOTES
[facility] ([address])  Gynecologic Oncology  Progress Note      Patient Name: Sugar Diaz  MRN: [id number]  Admission Date: 2023  Hospital Length of Stay: 0 days  Attending Provider: Lavell Rodríguez MD  Primary Care Provider: Dexter Khan MD  Principal Problem: Community acquired pneumonia of right lower lobe of lung    Follow-up For: * No surgery found *  Post-Operative Day:    Subjective:      History of Present Illness:  Sugar Diaz is a 58 y.o.  with stage IIIC1 grade 1 endometrioid endometrial cancer who presented to the ED for concerns of fever, cough, shortness of breath, headache, and fatigue. The patient was recently hospitalized -11/3 after presenting with the same symptoms and neutropenic fever. She was started on broad spectrum antibiotics and tested positive for RSV.  After being afebrile for 48 hours and significant clinical improvement, the patient was discharged. The patient states she felt better the day of discharge however, the following day, her symptoms progressed and she started to have subjective fevers. Cough is not productive. Shortness of breath is worsened with exertion. Headache is diffused. She denies chest pain or difficulty breathing (at rest). She denies dizziness/lightheadedness, syncope, vision changes, sore throat, abdominal pain, N/V, urinary symptoms, or bowel changes.    Prior to presenting, the patient was evaluated by her PCP where she was febrile and ill. Her PCP recommended the patient to come to the ED. In the ED, the patient was afebrile, tachycardic (110s-120), tachypneic (20s), saturating 93% on RA and 98% on 2L O2. WBC wnL, lactate wnL, procalcintonin wnL, CXR significant for new consolidations bilaterally and opacities in R lung concerning for pneumonia.The patient was started on broad spectrum antibiotics and IVF..        Hospital Course:  2023 HD 1: Improving URI symptoms, complaints of headache overnight,  afebrile, midly tachy overnight, Satting 95% RA. CBC stable from admission. EKG sinus tachycardia. Continue Cefepime and Azithromycin, added Vancomycin this afternoon per Pulmonology recs.  11/08/2023 HD 2: Continue to improve clinically ambulating, afebrile, no SOB. Satting 94% RA. Sputum culture grew Moderate Gram + cocci and gram - julia. Blood cx NGTD. Plan to continue current regimen for 48h.      Interval History:  Patient reports improvement of her cough and her headache this morning. She slept all night after taking ambien. She also reports ambulating in the room without difficulty. She denies SOB, fevers, chills, chest pain, palpitations, N/V, denies itching or rash. She is tolerating regular diet and having bowel movements.     Review of Systems   Constitutional:  Negative for chills, fatigue and fever.   Respiratory:  Positive for cough (improved). Negative for shortness of breath.    Cardiovascular:  Negative for chest pain and leg swelling.   Gastrointestinal:  Negative for abdominal pain, constipation, nausea and vomiting.   Endocrine: Negative for hot flashes.   Genitourinary:  Negative for menstrual problem and pelvic pain.   Integumentary:  Negative for breast mass, nipple discharge and breast skin changes.   Neurological:  Negative for syncope and headaches.   Hematological:  Does not bruise/bleed easily.   Psychiatric/Behavioral:  Negative for depression.    Breast: Negative for mass, mastodynia, nipple discharge and skin changes     Objective:     Vital Signs (Most Recent):  Temp: 98.2 °F (36.8 °C) (11/08/23 0426)  Pulse: 92 (11/08/23 0426)  Resp: 17 (11/08/23 0426)  BP: 130/66 (11/08/23 0426)  SpO2: (!) 94 % (11/08/23 0426) Vital Signs (24h Range):  Temp:  [97.9 °F (36.6 °C)-98.4 °F (36.9 °C)] 98.2 °F (36.8 °C)  Pulse:  [] 92  Resp:  [16-20] 17  SpO2:  [93 %-96 %] 94 %  BP: (117-140)/(57-73) 130/66     Weight: 77.1 kg (170 lb)  Body mass index is 26.63 kg/m².       Physical Exam:    Constitutional: She is oriented to person, place, and time. She appears well-developed and well-nourished. No distress.    HENT:   Head: Normocephalic and atraumatic.    Eyes: EOM are normal.     Cardiovascular:  Normal heart sounds.             Pulmonary/Chest: Effort normal and breath sounds normal. No respiratory distress. She has no wheezes.        Abdominal: Soft. There is no abdominal tenderness. There is no rebound and no guarding.             Musculoskeletal: Normal range of motion.       Neurological: She is alert and oriented to person, place, and time.    Skin: Skin is warm and dry. She is not diaphoretic.    Psychiatric: She has a normal mood and affect. Thought content normal.          Laboratory:  Recent Labs   Lab 11/03/23  0545 11/06/23 1728 11/07/23  0404   WBC 3.85* 9.46 8.15   HGB 9.4* 10.7* 9.6*   HCT 27.5* 32.0* 28.5*   * 104* 106*   * 325 326       Microbiology Results (last 7 days)       Procedure Component Value Units Date/Time    Blood culture x two cultures. Draw prior to antibiotics. [[id number]] Collected: 11/06/23 1715    Order Status: Completed Specimen: Blood from Peripheral, Hand, Right Updated: 11/07/23 2012     Blood Culture, Routine No Growth to date      No Growth to date    Narrative:      Aerobic and anaerobic    Blood culture x two cultures. Draw prior to antibiotics. [[id number]] Collected: 11/06/23 1728    Order Status: Completed Specimen: Blood from Peripheral, Antecubital, Left Updated: 11/07/23 2012     Blood Culture, Routine No Growth to date      No Growth to date    Narrative:      Aerobic and anaerobic    Culture, Respiratory with Gram Stain [[id number]] Collected: 11/07/23 0535    Order Status: Completed Specimen: Respiratory from Sputum Updated: 11/07/23 1113     Gram Stain (Respiratory) <10 epithelial cells per low power field.     Gram Stain (Respiratory) Few WBC's     Gram Stain (Respiratory) Moderate Gram positive cocci     Gram Stain  (Respiratory) Moderate Gram negative rods               Diagnostic Results:    Imaging Results              X-Ray Chest AP Portable (Final result)  Result time 11/06/23 16:34:38      Final result by Glenda Palma MD (11/06/23 16:34:38)                   Impression:      New vague opacity right lung base, with patchy consolidation at both lung bases demonstrated by CT earlier today.  Findings most concerning for pneumonia.      Electronically signed by: Glenda Palma  Date:    11/06/2023  Time:    16:34               Narrative:    EXAMINATION:  XR CHEST AP PORTABLE    CLINICAL HISTORY:  Sepsis;    TECHNIQUE:  Single frontal view of the chest was performed.    COMPARISON:  11/01/2023    FINDINGS:  Lungs are well expanded.  New vague opacity at the right lung base.  Left lung remains fairly clear.  No pleural effusion or pneumothorax.    Cardiac silhouette is normal in size.                                        Assessment/Plan:     * Community acquired pneumonia of right lower lobe of lung  - Afebrile, mild tachycardia, 94% O2 sat on RA, continue to improve symptoms  - CBC: 9/10/32/325 > 8.1/9.6/28/326  - Lactate wnL, Procalcintonin wnL,Troponin wnL, UA wnL  - COVID neg, FLU neg  - Blood cultures NGTD  - Sputum culture moderate gram + cocci and gram - rods.   - CXR (11/6): New vague opacity right lung base, with patchy consolidation at both lung bases.   - EKG sinus tachycardia  - Seen by Pulmonology team who recommended to continue IV Cefepime 2g q8h , azithromycin 500 mg q24 for 5 days and adding Vancomycin.   - Plan to continue current regiment for 48h and transition to PO Augmentin and doxycycline at discharge.  - Continue symptomatic treatment with hypertonic saline nebulizer and  ibuprofen 600 Q6h PRN for headaches.       GERD (gastroesophageal reflux disease)  -Continue home med pepcid    Malignant neoplasm of endometrium  -see once hx above  -s/p TRH/BSO/BSLN/RPLN/repair of obturator nerve in  6/2023  -Carboplatin/paclitaxel s/p 6 cycles   -Currently on immunotherapy: Dostarlimab (cycle 7, day 15)        VTE Risk Mitigation (From admission, onward)           Ordered     enoxaparin injection 40 mg  Every 24 hours         11/07/23 0654     Place sequential compression device  Until discontinued         11/07/23 0651     IP VTE HIGH RISK PATIENT  Once         11/06/23 2014                    Dominic Cortez MD  Gynecologic Oncology  [facility] ([address])

## 2023-11-08 NOTE — CARE UPDATE
Afternoon Assessment:    Patient reports that her headache is improved with ibuprofen. She reports great improvement of cough and congestion as well. Has ambulated several times today without SOB or issues. Patient lost IV access a few Hours ago but got a new IV now. Still tolerating PO and denies f/c/n/v.    Temp:  [97.4 °F (36.3 °C)-98.4 °F (36.9 °C)] 98.1 °F (36.7 °C)  Pulse:  [] 102  Resp:  [16-20] 18  SpO2:  [93 %-99 %] 96 %  BP: (117-140)/(57-73) 134/72    Patient laying comfortably in no acute distress.    Labs:  Microbiology Results (last 7 days)       Procedure Component Value Units Date/Time    Culture, Respiratory with Gram Stain [[id number]] Collected: 11/07/23 0535    Order Status: Completed Specimen: Respiratory from Sputum Updated: 11/08/23 1132     Respiratory Culture Normal respiratory uog     Gram Stain (Respiratory) <10 epithelial cells per low power field.     Gram Stain (Respiratory) Few WBC's     Gram Stain (Respiratory) Moderate Gram positive cocci     Gram Stain (Respiratory) Moderate Gram negative rods    Blood culture x two cultures. Draw prior to antibiotics. [[id number]] Collected: 11/06/23 1715    Order Status: Completed Specimen: Blood from Peripheral, Hand, Right Updated: 11/07/23 2012     Blood Culture, Routine No Growth to date      No Growth to date    Narrative:      Aerobic and anaerobic    Blood culture x two cultures. Draw prior to antibiotics. [[id number]] Collected: 11/06/23 1728    Order Status: Completed Specimen: Blood from Peripheral, Antecubital, Left Updated: 11/07/23 2012     Blood Culture, Routine No Growth to date      No Growth to date    Narrative:      Aerobic and anaerobic           A/P:  Continue IV antibiotics and supportive care.  Will schedule ibuprofen every 6 hours for headaches.   Plan to transition to PO antibiotics tomorrow, plan for morning DC.      Dominic Cortez MD  Obstetrics and Gynecology- PGY1

## 2023-11-08 NOTE — PROGRESS NOTES
Pharmacokinetic Assessment Follow Up: IV Vancomycin    Vancomycin serum concentration assessment(s):    The trough level was drawn correctly and can be used to guide therapy at this time. The measurement is below the desired definitive target range of 10 to 20 mcg/mL.    Vancomycin Regimen Plan:    Change regimen to Vancomycin 1250 mg IV every 12 hours with next serum trough concentration measured at 0130 prior to fourth dose on 11/10/23.    Drug levels (last 3 results):  Recent Labs   Lab Result Units 11/08/23  1124   Vancomycin-Trough ug/mL 8.4*       Pharmacy will continue to follow and monitor vancomycin.    Please contact pharmacy at extension [phone] for questions regarding this assessment.    Thank you for the consult,   Rosy Stewart       Patient brief summary:  Sugar Diaz is a 58 y.o. female initiated on antimicrobial therapy with IV Vancomycin for treatment of suspected pneumonia  The patient's current regimen is vancomycin 1250 mg every 12 hours    Drug Allergies:   Review of patient's allergies indicates:  No Known Allergies    Actual Body Weight:   77.1 kg    Renal Function:   Estimated Creatinine Clearance: 106.7 mL/min (based on SCr of 0.6 mg/dL).,     Dialysis Method (if applicable):  N/A    CBC (last 72 hours):  Recent Labs   Lab Result Units 11/06/23  1728 11/07/23  0404   WBC K/uL 9.46 8.15   Hemoglobin g/dL 10.7* 9.6*   Hematocrit % 32.0* 28.5*   Platelets K/uL 325 326   Gran % % 69.7 49.0   Lymph % % 15.6* 27.0   Mono % % 11.9 10.0   Eosinophil % % 0.1 0.0   Basophil % % 0.5 0.0   Differential Method  Automated Manual       Metabolic Panel (last 72 hours):  Recent Labs   Lab Result Units 11/06/23  1728 11/06/23  1754 11/08/23  1324   Sodium mmol/L 135*  --   --    Potassium mmol/L 3.7  --   --    Chloride mmol/L 101  --   --    CO2 mmol/L 22*  --   --    Glucose mg/dL 118*  --   --    Glucose, UA   --  Negative  --    BUN mg/dL 6  --   --    Creatinine mg/dL 0.7  --  0.6   Albumin  g/dL 3.0*  --   --    Total Bilirubin mg/dL 0.2  --   --    Alkaline Phosphatase U/L 66  --   --    AST U/L 21  --   --    ALT U/L 29  --   --        Vancomycin Administrations:  vancomycin given in the last 96 hours                     vancomycin (VANCOCIN) 1,000 mg in dextrose 5 % (D5W) 250 mL IVPB (Vial-Mate) (mg) 1,000 mg New Bag 11/08/23 0158     1,000 mg New Bag 11/07/23 1521    vancomycin 1,500 mg in dextrose 5 % (D5W) 250 mL IVPB (Vial-Mate) (mg) 1,500 mg New Bag 11/06/23 1830                    Microbiologic Results:  Microbiology Results (last 7 days)       Procedure Component Value Units Date/Time    Culture, Respiratory with Gram Stain [[id number]] Collected: 11/07/23 0535    Order Status: Completed Specimen: Respiratory from Sputum Updated: 11/08/23 1132     Respiratory Culture Normal respiratory ugo     Gram Stain (Respiratory) <10 epithelial cells per low power field.     Gram Stain (Respiratory) Few WBC's     Gram Stain (Respiratory) Moderate Gram positive cocci     Gram Stain (Respiratory) Moderate Gram negative rods    Blood culture x two cultures. Draw prior to antibiotics. [[id number]] Collected: 11/06/23 1715    Order Status: Completed Specimen: Blood from Peripheral, Hand, Right Updated: 11/07/23 2012     Blood Culture, Routine No Growth to date      No Growth to date    Narrative:      Aerobic and anaerobic    Blood culture x two cultures. Draw prior to antibiotics. [[id number]] Collected: 11/06/23 1728    Order Status: Completed Specimen: Blood from Peripheral, Antecubital, Left Updated: 11/07/23 2012     Blood Culture, Routine No Growth to date      No Growth to date    Narrative:      Aerobic and anaerobic

## 2023-11-08 NOTE — SUBJECTIVE & OBJECTIVE
Interval History:  Patient reports improvement of her cough and her headache this morning. She slept all night after taking ambient. She also reports ambulating in the room without difficulty. She denies SOB, fevers, chills, chest pain, palpitations, N/V, denies itching or rash. She is tolerating regular diet and having bowel movements.     Review of Systems   Constitutional:  Negative for chills, fatigue and fever.   Respiratory:  Positive for cough (improved). Negative for shortness of breath.    Cardiovascular:  Negative for chest pain and leg swelling.   Gastrointestinal:  Negative for abdominal pain, constipation, nausea and vomiting.   Endocrine: Negative for hot flashes.   Genitourinary:  Negative for menstrual problem and pelvic pain.   Integumentary:  Negative for breast mass, nipple discharge and breast skin changes.   Neurological:  Negative for syncope and headaches.   Hematological:  Does not bruise/bleed easily.   Psychiatric/Behavioral:  Negative for depression.    Breast: Negative for mass, mastodynia, nipple discharge and skin changes     Objective:     Vital Signs (Most Recent):  Temp: 98.2 °F (36.8 °C) (11/08/23 0426)  Pulse: 92 (11/08/23 0426)  Resp: 17 (11/08/23 0426)  BP: 130/66 (11/08/23 0426)  SpO2: (!) 94 % (11/08/23 0426) Vital Signs (24h Range):  Temp:  [97.9 °F (36.6 °C)-98.4 °F (36.9 °C)] 98.2 °F (36.8 °C)  Pulse:  [] 92  Resp:  [16-20] 17  SpO2:  [93 %-96 %] 94 %  BP: (117-140)/(57-73) 130/66     Weight: 77.1 kg (170 lb)  Body mass index is 26.63 kg/m².       Physical Exam:   Constitutional: She is oriented to person, place, and time. She appears well-developed and well-nourished. No distress.    HENT:   Head: Normocephalic and atraumatic.    Eyes: EOM are normal.     Cardiovascular:  Normal heart sounds.             Pulmonary/Chest: Effort normal and breath sounds normal. No respiratory distress. She has no wheezes.        Abdominal: Soft. There is no abdominal tenderness. There  is no rebound and no guarding.             Musculoskeletal: Normal range of motion.       Neurological: She is alert and oriented to person, place, and time.    Skin: Skin is warm and dry. She is not diaphoretic.    Psychiatric: She has a normal mood and affect. Thought content normal.          Laboratory:  Recent Labs   Lab 11/03/23  0545 11/06/23 1728 11/07/23  0404   WBC 3.85* 9.46 8.15   HGB 9.4* 10.7* 9.6*   HCT 27.5* 32.0* 28.5*   * 104* 106*   * 325 326       Microbiology Results (last 7 days)       Procedure Component Value Units Date/Time    Blood culture x two cultures. Draw prior to antibiotics. [[id number]] Collected: 11/06/23 1715    Order Status: Completed Specimen: Blood from Peripheral, Hand, Right Updated: 11/07/23 2012     Blood Culture, Routine No Growth to date      No Growth to date    Narrative:      Aerobic and anaerobic    Blood culture x two cultures. Draw prior to antibiotics. [[id number]] Collected: 11/06/23 1728    Order Status: Completed Specimen: Blood from Peripheral, Antecubital, Left Updated: 11/07/23 2012     Blood Culture, Routine No Growth to date      No Growth to date    Narrative:      Aerobic and anaerobic    Culture, Respiratory with Gram Stain [[id number]] Collected: 11/07/23 0535    Order Status: Completed Specimen: Respiratory from Sputum Updated: 11/07/23 1113     Gram Stain (Respiratory) <10 epithelial cells per low power field.     Gram Stain (Respiratory) Few WBC's     Gram Stain (Respiratory) Moderate Gram positive cocci     Gram Stain (Respiratory) Moderate Gram negative rods               Diagnostic Results:    Imaging Results              X-Ray Chest AP Portable (Final result)  Result time 11/06/23 16:34:38      Final result by Glenda Palma MD (11/06/23 16:34:38)                   Impression:      New vague opacity right lung base, with patchy consolidation at both lung bases demonstrated by CT earlier today.  Findings most concerning for  pneumonia.      Electronically signed by: Glenda Palma  Date:    11/06/2023  Time:    16:34               Narrative:    EXAMINATION:  XR CHEST AP PORTABLE    CLINICAL HISTORY:  Sepsis;    TECHNIQUE:  Single frontal view of the chest was performed.    COMPARISON:  11/01/2023    FINDINGS:  Lungs are well expanded.  New vague opacity at the right lung base.  Left lung remains fairly clear.  No pleural effusion or pneumothorax.    Cardiac silhouette is normal in size.

## 2023-11-08 NOTE — PROGRESS NOTES
Pharmacokinetic Assessment Follow Up: IV Vancomycin    Vancomycin serum concentration assessment(s):    The trough level was drawn correctly and can be used to guide therapy at this time. The measurement is below the desired definitive target range of 10 to 20 mcg/mL.    Vancomycin Regimen Plan:    Change regimen to Vancomycin 1250 mg IV every 12 hours with next serum trough concentration measured at 1330 prior to fifth dose on 11/10/23.    Drug levels (last 3 results):  Recent Labs   Lab Result Units 11/08/23  1124   Vancomycin-Trough ug/mL 8.4*         Pharmacy will continue to follow and monitor vancomycin.    Please contact pharmacy at extension [phone] for questions regarding this assessment.    Thank you for the consult,   Rosy Stewart       Patient brief summary:  Sugar Diaz is a 58 y.o. female initiated on antimicrobial therapy with IV Vancomycin for treatment of suspected pneumonia  The patient's current regimen is vancomycin 1250 mg every 12 hours    Drug Allergies:   Review of patient's allergies indicates:  No Known Allergies    Actual Body Weight:   77.1 kg    Renal Function:   Estimated Creatinine Clearance: 106.7 mL/min (based on SCr of 0.6 mg/dL).,     Dialysis Method (if applicable):  N/A    CBC (last 72 hours):  Recent Labs   Lab Result Units 11/06/23  1728 11/07/23  0404   WBC K/uL 9.46 8.15   Hemoglobin g/dL 10.7* 9.6*   Hematocrit % 32.0* 28.5*   Platelets K/uL 325 326   Gran % % 69.7 49.0   Lymph % % 15.6* 27.0   Mono % % 11.9 10.0   Eosinophil % % 0.1 0.0   Basophil % % 0.5 0.0   Differential Method  Automated Manual         Metabolic Panel (last 72 hours):  Recent Labs   Lab Result Units 11/06/23  1728 11/06/23  1754 11/08/23  1324   Sodium mmol/L 135*  --   --    Potassium mmol/L 3.7  --   --    Chloride mmol/L 101  --   --    CO2 mmol/L 22*  --   --    Glucose mg/dL 118*  --   --    Glucose, UA   --  Negative  --    BUN mg/dL 6  --   --    Creatinine mg/dL 0.7  --  0.6   Albumin  g/dL 3.0*  --   --    Total Bilirubin mg/dL 0.2  --   --    Alkaline Phosphatase U/L 66  --   --    AST U/L 21  --   --    ALT U/L 29  --   --          Vancomycin Administrations:  vancomycin given in the last 96 hours                     vancomycin (VANCOCIN) 1,000 mg in dextrose 5 % (D5W) 250 mL IVPB (Vial-Mate) (mg) 1,000 mg New Bag 11/08/23 0158     1,000 mg New Bag 11/07/23 1521    vancomycin 1,500 mg in dextrose 5 % (D5W) 250 mL IVPB (Vial-Mate) (mg) 1,500 mg New Bag 11/06/23 1830                    Microbiologic Results:  Microbiology Results (last 7 days)       Procedure Component Value Units Date/Time    Culture, Respiratory with Gram Stain [[id number]] Collected: 11/07/23 0535    Order Status: Completed Specimen: Respiratory from Sputum Updated: 11/08/23 1132     Respiratory Culture Normal respiratory ugo     Gram Stain (Respiratory) <10 epithelial cells per low power field.     Gram Stain (Respiratory) Few WBC's     Gram Stain (Respiratory) Moderate Gram positive cocci     Gram Stain (Respiratory) Moderate Gram negative rods    Blood culture x two cultures. Draw prior to antibiotics. [[id number]] Collected: 11/06/23 1715    Order Status: Completed Specimen: Blood from Peripheral, Hand, Right Updated: 11/07/23 2012     Blood Culture, Routine No Growth to date      No Growth to date    Narrative:      Aerobic and anaerobic    Blood culture x two cultures. Draw prior to antibiotics. [[id number]] Collected: 11/06/23 1728    Order Status: Completed Specimen: Blood from Peripheral, Antecubital, Left Updated: 11/07/23 2012     Blood Culture, Routine No Growth to date      No Growth to date    Narrative:      Aerobic and anaerobic

## 2023-11-08 NOTE — ASSESSMENT & PLAN NOTE
-see once hx above  -s/p TRH/BSO/BSLN/RPLN/repair of obturator nerve in 6/2023  -Carboplatin/paclitaxel s/p 6 cycles   -Currently on immunotherapy: Dostarlimab (cycle 7, day 15)

## 2023-11-08 NOTE — ASSESSMENT & PLAN NOTE
- Afebrile, mild tachycardia, 94% O2 sat on RA, continue to improve symptoms  - CBC: 9/10/32/325 > 8.1/9.6/28/326  - Lactate wnL, Procalcintonin wnL,Troponin wnL, UA wnL  - COVID neg, FLU neg  - Blood cultures NGTD  - Sputum culture moderate gram + cocci and gram - rods.   - CXR (11/6): New vague opacity right lung base, with patchy consolidation at both lung bases.   - EKG sinus tachycardia  - Seen by Pulmonology team who recommended to continue IV Cefepime 2g q8h , azithromycin 500 mg q24 for 5 days and adding Vancomycin.   - Plan to continue current regiment for 48h and transition to PO Augmentin and doxycycline at discharge.  - Continue symptomatic treatment with hypertonic saline nebulizer and  ibuprofen 600 Q6h PRN for headaches.

## 2023-11-09 ENCOUNTER — TELEPHONE (OUTPATIENT)
Dept: GYNECOLOGIC ONCOLOGY | Facility: CLINIC | Age: 58
End: 2023-11-09
Payer: COMMERCIAL

## 2023-11-09 VITALS
TEMPERATURE: 98 F | OXYGEN SATURATION: 92 % | DIASTOLIC BLOOD PRESSURE: 57 MMHG | BODY MASS INDEX: 26.68 KG/M2 | RESPIRATION RATE: 20 BRPM | HEIGHT: 67 IN | SYSTOLIC BLOOD PRESSURE: 121 MMHG | WEIGHT: 170 LBS | HEART RATE: 98 BPM

## 2023-11-09 LAB
BACTERIA SPEC AEROBE CULT: NORMAL
BACTERIA SPEC AEROBE CULT: NORMAL
GRAM STN SPEC: NORMAL

## 2023-11-09 PROCEDURE — 99233 PR SUBSEQUENT HOSPITAL CARE,LEVL III: ICD-10-PCS | Mod: ,,, | Performed by: OBSTETRICS & GYNECOLOGY

## 2023-11-09 PROCEDURE — 25000242 PHARM REV CODE 250 ALT 637 W/ HCPCS

## 2023-11-09 PROCEDURE — 25000003 PHARM REV CODE 250

## 2023-11-09 PROCEDURE — 94761 N-INVAS EAR/PLS OXIMETRY MLT: CPT

## 2023-11-09 PROCEDURE — 99233 SBSQ HOSP IP/OBS HIGH 50: CPT | Mod: ,,, | Performed by: OBSTETRICS & GYNECOLOGY

## 2023-11-09 PROCEDURE — 63600175 PHARM REV CODE 636 W HCPCS

## 2023-11-09 PROCEDURE — 25000003 PHARM REV CODE 250: Performed by: STUDENT IN AN ORGANIZED HEALTH CARE EDUCATION/TRAINING PROGRAM

## 2023-11-09 PROCEDURE — 94640 AIRWAY INHALATION TREATMENT: CPT

## 2023-11-09 PROCEDURE — 63600175 PHARM REV CODE 636 W HCPCS: Performed by: OBSTETRICS & GYNECOLOGY

## 2023-11-09 PROCEDURE — 25000003 PHARM REV CODE 250: Performed by: OBSTETRICS & GYNECOLOGY

## 2023-11-09 RX ORDER — DOXYCYCLINE 100 MG/1
100 CAPSULE ORAL 2 TIMES DAILY
Qty: 10 CAPSULE | Refills: 0 | Status: SHIPPED | OUTPATIENT
Start: 2023-11-09 | End: 2023-11-14

## 2023-11-09 RX ORDER — AMOXICILLIN AND CLAVULANATE POTASSIUM 875; 125 MG/1; MG/1
1 TABLET, FILM COATED ORAL EVERY 12 HOURS
Qty: 10 TABLET | Refills: 0 | Status: SHIPPED | OUTPATIENT
Start: 2023-11-09 | End: 2023-11-14

## 2023-11-09 RX ORDER — IBUPROFEN 600 MG/1
600 TABLET ORAL EVERY 6 HOURS PRN
Qty: 30 TABLET | Refills: 3 | Status: SHIPPED | OUTPATIENT
Start: 2023-11-09

## 2023-11-09 RX ADMIN — IBUPROFEN 600 MG: 600 TABLET, FILM COATED ORAL at 01:11

## 2023-11-09 RX ADMIN — CEFEPIME 2 G: 2 INJECTION, POWDER, FOR SOLUTION INTRAVENOUS at 10:11

## 2023-11-09 RX ADMIN — IBUPROFEN 600 MG: 600 TABLET, FILM COATED ORAL at 05:11

## 2023-11-09 RX ADMIN — SODIUM CHLORIDE SOLN NEBU 3% 4 ML: 3 NEBU SOLN at 08:11

## 2023-11-09 RX ADMIN — CETIRIZINE HYDROCHLORIDE 10 MG: 10 TABLET, FILM COATED ORAL at 10:11

## 2023-11-09 RX ADMIN — AZITHROMYCIN MONOHYDRATE 500 MG: 500 INJECTION, POWDER, LYOPHILIZED, FOR SOLUTION INTRAVENOUS at 02:11

## 2023-11-09 RX ADMIN — FAMOTIDINE 20 MG: 20 TABLET ORAL at 10:11

## 2023-11-09 RX ADMIN — VANCOMYCIN HYDROCHLORIDE 1250 MG: 1.25 INJECTION, POWDER, LYOPHILIZED, FOR SOLUTION INTRAVENOUS at 03:11

## 2023-11-09 RX ADMIN — GUAIFENESIN 600 MG: 600 TABLET, EXTENDED RELEASE ORAL at 10:11

## 2023-11-09 NOTE — NURSING
Patient is discharged. IV removed, tip intact. Pharmacy delivered meds. No questions asked. Awaiting transport

## 2023-11-09 NOTE — PROGRESS NOTES
[facility] ([address])  Gynecologic Oncology  Progress Note      Patient Name: Sugar Diaz  MRN: [id number]  Admission Date: 2023  Hospital Length of Stay: 1 days  Attending Provider: Lavell Rodríguez MD  Primary Care Provider: Dexter Khan MD  Principal Problem: Community acquired pneumonia of right lower lobe of lung    Follow-up For: * No surgery found *  Post-Operative Day:    Subjective:      History of Present Illness:  Sugar Diaz is a 58 y.o.  with stage IIIC1 grade 1 endometrioid endometrial cancer who presented to the ED for concerns of fever, cough, shortness of breath, headache, and fatigue. The patient was recently hospitalized -11/3 after presenting with the same symptoms and neutropenic fever. She was started on broad spectrum antibiotics and tested positive for RSV.  After being afebrile for 48 hours and significant clinical improvement, the patient was discharged. The patient states she felt better the day of discharge however, the following day, her symptoms progressed and she started to have subjective fevers. Cough is not productive. Shortness of breath is worsened with exertion. Headache is diffused. She denies chest pain or difficulty breathing (at rest). She denies dizziness/lightheadedness, syncope, vision changes, sore throat, abdominal pain, N/V, urinary symptoms, or bowel changes.    Prior to presenting, the patient was evaluated by her PCP where she was febrile and ill. Her PCP recommended the patient to come to the ED. In the ED, the patient was afebrile, tachycardic (110s-120), tachypneic (20s), saturating 93% on RA and 98% on 2L O2. WBC wnL, lactate wnL, procalcintonin wnL, CXR significant for new consolidations bilaterally and opacities in R lung concerning for pneumonia.The patient was started on broad spectrum antibiotics and IVF..        Hospital Course:  2023 HD 1: Improving URI symptoms, complaints of headache overnight,  afebrile, midly tachy overnight, Satting 95% RA. CBC stable from admission. EKG sinus tachycardia. Continue Cefepime and Azithromycin, added Vancomycin this afternoon per Pulmonology recs.  11/08/2023 HD 2: Continue to improve clinically ambulating, afebrile, no SOB. Satting 94% RA. Sputum culture grew Moderate Gram + cocci and gram - julia. Blood cx NGTD. Plan to continue current regimen for 48h.  11/09/2023 HD 3: Continue to improve clinically ambulating, afebrile, no SOB. Satting 96% RA. Plan to DC home today on PO antibiotics.       Interval History:  Patient reports improvement of her cough and her headache this morning. She slept all night after taking ambien. She also reports ambulating in the room without difficulty. She denies SOB, fevers, chills, chest pain, palpitations, N/V, denies itching or rash. She is tolerating regular diet, voiding without difficulty and having bowel movements.     Review of Systems   Constitutional:  Negative for chills, fatigue and fever.   Respiratory:  Positive for cough (improved). Negative for shortness of breath.    Cardiovascular:  Negative for chest pain and leg swelling.   Gastrointestinal:  Negative for abdominal pain, constipation, nausea and vomiting.   Endocrine: Negative for hot flashes.   Genitourinary:  Negative for menstrual problem and pelvic pain.   Integumentary:  Negative for breast mass, nipple discharge and breast skin changes.   Neurological:  Negative for syncope and headaches.   Hematological:  Does not bruise/bleed easily.   Psychiatric/Behavioral:  Negative for depression.    Breast: Negative for mass, mastodynia, nipple discharge and skin changes     Objective:     Vital Signs (Most Recent):  Temp: 98.1 °F (36.7 °C) (11/09/23 0500)  Pulse: 100 (11/09/23 0500)  Resp: 18 (11/09/23 0500)  BP: 120/66 (11/09/23 0500)  SpO2: 96 % (11/09/23 0500) Vital Signs (24h Range):  Temp:  [97.4 °F (36.3 °C)-98.7 °F (37.1 °C)] 98.1 °F (36.7 °C)  Pulse:  []  100  Resp:  [16-20] 18  SpO2:  [94 %-99 %] 96 %  BP: (117-138)/(63-72) 120/66     Weight: 77.1 kg (170 lb)  Body mass index is 26.63 kg/m².      Physical Exam:   Constitutional: She is oriented to person, place, and time. She appears well-developed and well-nourished. No distress.    HENT:   Head: Normocephalic and atraumatic.    Eyes: EOM are normal.     Cardiovascular:  Normal heart sounds.             Pulmonary/Chest: Effort normal and breath sounds normal. No respiratory distress. She has no wheezes.        Abdominal: Soft. There is no abdominal tenderness. There is no rebound and no guarding.             Musculoskeletal: Normal range of motion.       Neurological: She is alert and oriented to person, place, and time.    Skin: Skin is warm and dry. She is not diaphoretic.    Psychiatric: She has a normal mood and affect. Thought content normal.          Laboratory:  Recent Labs   Lab 11/03/23  0545 11/06/23 1728 11/07/23  0404   WBC 3.85* 9.46 8.15   HGB 9.4* 10.7* 9.6*   HCT 27.5* 32.0* 28.5*   * 104* 106*   * 325 326         Microbiology Results (last 7 days)       Procedure Component Value Units Date/Time    Blood culture x two cultures. Draw prior to antibiotics. [[id number]] Collected: 11/06/23 1715    Order Status: Completed Specimen: Blood from Peripheral, Hand, Right Updated: 11/08/23 2012     Blood Culture, Routine No Growth to date      No Growth to date      No Growth to date    Narrative:      Aerobic and anaerobic    Blood culture x two cultures. Draw prior to antibiotics. [[id number]] Collected: 11/06/23 1728    Order Status: Completed Specimen: Blood from Peripheral, Antecubital, Left Updated: 11/08/23 2012     Blood Culture, Routine No Growth to date      No Growth to date      No Growth to date    Narrative:      Aerobic and anaerobic    Culture, Respiratory with Gram Stain [[id number]] Collected: 11/07/23 0535    Order Status: Completed Specimen: Respiratory from Sputum Updated:  11/08/23 1132     Respiratory Culture Normal respiratory ugo     Gram Stain (Respiratory) <10 epithelial cells per low power field.     Gram Stain (Respiratory) Few WBC's     Gram Stain (Respiratory) Moderate Gram positive cocci     Gram Stain (Respiratory) Moderate Gram negative rods               Diagnostic Results:    Imaging Results              X-Ray Chest AP Portable (Final result)  Result time 11/06/23 16:34:38      Final result by Glenda Palma MD (11/06/23 16:34:38)                   Impression:      New vague opacity right lung base, with patchy consolidation at both lung bases demonstrated by CT earlier today.  Findings most concerning for pneumonia.      Electronically signed by: Glenda Palma  Date:    11/06/2023  Time:    16:34               Narrative:    EXAMINATION:  XR CHEST AP PORTABLE    CLINICAL HISTORY:  Sepsis;    TECHNIQUE:  Single frontal view of the chest was performed.    COMPARISON:  11/01/2023    FINDINGS:  Lungs are well expanded.  New vague opacity at the right lung base.  Left lung remains fairly clear.  No pleural effusion or pneumothorax.    Cardiac silhouette is normal in size.                                        Assessment/Plan:     * Community acquired pneumonia of right lower lobe of lung  - Afebrile, mild tachycardia, 96% O2 sat on RA, continue to improve symptoms  - CBC: 9/10/32/325 > 8.1/9.6/28/326  - Lactate wnL, Procalcintonin wnL,Troponin wnL, UA wnL  - COVID neg, FLU neg  - Blood cultures NGTD  - Sputum culture moderate gram + cocci and gram - rods.   - CXR (11/6): New vague opacity right lung base, with patchy consolidation at both lung bases.   - EKG sinus tachycardia  - Seen by Pulmonology team who recommended to continue IV Cefepime 2g q8h , azithromycin 500 mg q24 for 5 days and adding Vancomycin.   - Plan to continue current regiment for 48h and transition to PO Augmentin and doxycycline at discharge.  - Continue symptomatic treatment with hypertonic  saline nebulizer and  ibuprofen 600 Q6h PRN for headaches.       GERD (gastroesophageal reflux disease)  -Continue home med pepcid    Malignant neoplasm of endometrium  -see once hx above  -s/p TRH/BSO/BSLN/RPLN/repair of obturator nerve in 6/2023  -Carboplatin/paclitaxel s/p 6 cycles   -Currently on immunotherapy: Dostarlimab (cycle 7, day 16)        VTE Risk Mitigation (From admission, onward)         Ordered     enoxaparin injection 40 mg  Every 24 hours         11/07/23 0654     Place sequential compression device  Until discontinued         11/07/23 0651     IP VTE HIGH RISK PATIENT  Once         11/06/23 2014                Dominic Cortez MD  Gynecologic Oncology  [facility] ([address])

## 2023-11-09 NOTE — DISCHARGE SUMMARY
[facility] ([address])  Gynecologic Oncology  Discharge Summary    Patient Name: Sugar Diaz  MRN: [id number]  Admission Date: 2023  Hospital Length of Stay: 1 days  Discharge Date and Time: 2023 , 10:30am  Attending Physician: Lavell Rodríguez MD   Discharging Provider: Dominic Cortez MD  Primary Care Provider: Dexter Khan MD    HPI:   Sugar Diaz is a 58 y.o.  with stage IIIC1 grade 1 endometrioid endometrial cancer who presented to the ED for concerns of fever, cough, shortness of breath, headache, and fatigue. The patient was recently hospitalized -11/3 after presenting with the same symptoms and neutropenic fever. She was started on broad spectrum antibiotics and tested positive for RSV.  After being afebrile for 48 hours and significant clinical improvement, the patient was discharged. The patient states she felt better the day of discharge however, the following day, her symptoms progressed and she started to have subjective fevers. Cough is not productive. Shortness of breath is worsened with exertion. Headache is diffused. She denies chest pain or difficulty breathing (at rest). She denies dizziness/lightheadedness, syncope, vision changes, sore throat, abdominal pain, N/V, urinary symptoms, or bowel changes.    Prior to presenting, the patient was evaluated by her PCP where she was febrile and ill. Her PCP recommended the patient to come to the ED. In the ED, the patient was afebrile, tachycardic (110s-120), tachypneic (20s), saturating 93% on RA and 98% on 2L O2. WBC wnL, lactate wnL, procalcintonin wnL, CXR significant for new consolidations bilaterally and opacities in R lung concerning for pneumonia.The patient was started on broad spectrum antibiotics and IVF..      Hospital Course:  2023 HD 1: Improving URI symptoms, complaints of headache overnight, afebrile, midly tachy overnight, Satting 95% RA. CBC stable from admission. EKG sinus  tachycardia. Continue Cefepime and Azithromycin, added Vancomycin this afternoon per Pulmonology recs.  11/08/2023 HD 2: Continue to improve clinically ambulating, afebrile, no SOB. Satting 94% RA. Sputum culture grew Moderate Gram + cocci and gram - julia. Blood cx NGTD. Plan to continue current regimen for 48h.  11/09/2023 HD 3: Continue to improve clinically ambulating, afebrile, no SOB. Satting 96% RA. Plan to DC home today on PO antibiotics.     Goals of Care Treatment Preferences:  Code Status: Full Code      * No surgery found *     Consults (From admission, onward)        Status Ordering Provider     Inpatient consult to Gynecologic Oncology  Once        Provider:  (Not yet assigned)    Completed JOSH HALEY     Inpatient consult to Pulmonology  Once        Provider:  Florentin Woo MD    Completed BALTA GASCA          Significant Diagnostic Studies: Microbiology:   Sputum Culture   Lab Results   Component Value Date    GSRESP <10 epithelial cells per low power field. 11/07/2023    GSRESP Few WBC's 11/07/2023    GSRESP Moderate Gram positive cocci 11/07/2023    GSRESP Moderate Gram negative rods 11/07/2023    RESPIRATORYC Normal respiratory ugo 11/07/2023       Pending Diagnostic Studies:     None        Final Active Diagnoses:    Diagnosis Date Noted POA    PRINCIPAL PROBLEM:  Community acquired pneumonia of right lower lobe of lung [J18.9] 11/06/2023 Unknown    GERD (gastroesophageal reflux disease) [K21.9] 11/01/2023 Yes    Malignant neoplasm of endometrium [C54.1] 06/07/2023 Yes     Chronic      Problems Resolved During this Admission:        Discharged Condition: good    Disposition: Home with self care    Follow Up:    Patient Instructions:      Diet Adult Regular     Notify your health care provider if you experience any of the following:  temperature >100.4     Notify your health care provider if you experience any of the following:  persistent nausea and vomiting or diarrhea      Notify your health care provider if you experience any of the following:  severe uncontrolled pain     Notify your health care provider if you experience any of the following:  difficulty breathing or increased cough     Notify your health care provider if you experience any of the following:  severe persistent headache     Notify your health care provider if you experience any of the following:  worsening rash     Notify your health care provider if you experience any of the following:  persistent dizziness, light-headedness, or visual disturbances     Notify your health care provider if you experience any of the following:  increased confusion or weakness              Activity as tolerated     Medications:  Reconciled Home Medications:      Medication List      START taking these medications    amoxicillin-clavulanate 875-125mg 875-125 mg per tablet  Commonly known as: AUGMENTIN  Take 1 tablet by mouth every 12 (twelve) hours. for 5 days     doxycycline 100 MG Cap  Commonly known as: VIBRAMYCIN  Take 1 capsule (100 mg total) by mouth 2 (two) times daily. for 5 days        CHANGE how you take these medications    acetaminophen 650 MG Tbsr  Commonly known as: TYLENOL  Take 1 tablet (650 mg total) by mouth every 6 (six) hours as needed (pain).  What changed: Another medication with the same name was removed. Continue taking this medication, and follow the directions you see here.     ibuprofen 600 MG tablet  Commonly known as: ADVIL,MOTRIN  Take 1 tablet (600 mg total) by mouth every 6 (six) hours as needed for Pain.  What changed:   · medication strength  · how much to take  · when to take this        CONTINUE taking these medications    dexAMETHasone 4 MG Tab  Commonly known as: DECADRON  Take daily beginning the day after chemotherapy for 3 days     diclofenac sodium 1 % Gel  Commonly known as: VOLTAREN  Apply 2 g topically 2 (two) times daily.     diphenhydrAMINE 25 mg capsule  Commonly known as:  BENADRYL  Take 25 mg by mouth every 6 (six) hours as needed for Itching.     diphenoxylate-atropine 2.5-0.025 mg 2.5-0.025 mg per tablet  Commonly known as: LOMOTIL  Take 1 tablet by mouth 4 (four) times daily as needed for Diarrhea.     famotidine 40 MG tablet  Commonly known as: PEPCID  Take 1 tablet (40 mg total) by mouth once daily.     loratadine 10 mg tablet  Commonly known as: CLARITIN  Take 1 tablet (10 mg total) by mouth once daily.     prochlorperazine 5 MG tablet  Commonly known as: COMPAZINE  Take every 6 hours for 3 days beginning the day after chemotherapy     promethazine-dextromethorphan 6.25-15 mg/5 mL Syrp  Commonly known as: PROMETHAZINE-DM  Take 5 mLs by mouth every 4 (four) hours as needed.     sulfamethoxazole-trimethoprim 800-160mg 800-160 mg Tab  Commonly known as: BACTRIM DS  Take 1 tablet by mouth once daily.     valACYclovir 1000 MG tablet  Commonly known as: VALTREX  Two pills at onset of fever blister and then repeat 2 pills 12 hr later     zolpidem 5 MG Tab  Commonly known as: AMBIEN  Take 1 tablet (5 mg total) by mouth nightly as needed (insomnia).            Dominic Cortez MD  Gynecologic Oncology  [facility] ([address])

## 2023-11-09 NOTE — TELEPHONE ENCOUNTER
Spoke with our patient about her  schedule appointment she voiced understanding of the date, time and location. All questions answered appointment mail. Provider Scheduling Coord.  Gynecologic Oncology MA/JASMIN /Preceptor Ahmet Dawson----- Message from Lavell Rodríguez MD sent at 11/8/2023  2:57 PM CST -----  Please schedule a VV next Tuesday.  Thanks.

## 2023-11-09 NOTE — ASSESSMENT & PLAN NOTE
- Afebrile, mild tachycardia, 96% O2 sat on RA, continue to improve symptoms  - CBC: 9/10/32/325 > 8.1/9.6/28/326  - Lactate wnL, Procalcintonin wnL,Troponin wnL, UA wnL  - COVID neg, FLU neg  - Blood cultures NGTD  - Sputum culture moderate gram + cocci and gram - rods.   - CXR (11/6): New vague opacity right lung base, with patchy consolidation at both lung bases.   - EKG sinus tachycardia  - Seen by Pulmonology team who recommended to continue IV Cefepime 2g q8h , azithromycin 500 mg q24 for 5 days and adding Vancomycin.   - Plan to continue current regiment for 48h and transition to PO Augmentin and doxycycline at discharge.  - Continue symptomatic treatment with hypertonic saline nebulizer and  ibuprofen 600 Q6h PRN for headaches.

## 2023-11-09 NOTE — PROGRESS NOTES
Therapy with vancomycin complete and/or consult discontinued by provider.  Pharmacy will sign off, please re-consult as needed.      Thank you for the consult,  Esther Thakkar  11/09/2023

## 2023-11-09 NOTE — ASSESSMENT & PLAN NOTE
-see once hx above  -s/p TRH/BSO/BSLN/RPLN/repair of obturator nerve in 6/2023  -Carboplatin/paclitaxel s/p 6 cycles   -Currently on immunotherapy: Dostarlimab (cycle 7, day 16)

## 2023-11-09 NOTE — NURSING
Patient requesting cough syrup, on call care team notified. Care team states pulmonology recommended d/c other cough syrups and tessalon pearls. Patient updated and informed of this.

## 2023-11-09 NOTE — PLAN OF CARE
CM spoke to pt for final discharge planning assessment. Pt is ready for discharge home today.    Family to provide transportation home.    Meds sent to [facility] for bedside delivery.    Follow-up apts are scheduled and in AVS.    Pt states she has everything she needs for home, and has no additional CM needs for discharge.    Pt is ready for discharge from CM perspective.   11/09/23 1145   Final Note   Assessment Type Final Discharge Note   Anticipated Discharge Disposition Home   What phone number can be called within the next 1-3 days to see how you are doing after discharge? [phone]   Hospital Resources/Appts/Education Provided Provided patient/caregiver with written discharge plan information;Provided education on problems/symptoms using teachback   Post-Acute Status   Discharge Delays None known at this time     [facility] ([address])  Discharge Final Note    Primary Care Provider: Dexter Khan MD    Expected Discharge Date: 11/9/2023    Final Discharge Note (most recent)       Final Note - 11/09/23 1145          Final Note    Assessment Type Final Discharge Note (P)      Anticipated Discharge Disposition Home or Self Care (P)      What phone number can be called within the next 1-3 days to see how you are doing after discharge? [phone] (P)      Hospital Resources/Appts/Education Provided Provided patient/caregiver with written discharge plan information;Provided education on problems/symptoms using teachback (P)         Post-Acute Status    Discharge Delays None known at this time (P)                      Important Message from Medicare

## 2023-11-09 NOTE — PROGRESS NOTES
Patient reviewed, renal function stable, cultures reviewed, no new levels, continue current therapy; Next levels due: 11/10/2023 at 13:30    Please contact inpatient pharmacy at [phone] with any questions.     Thank you,  Esther Thakkar  11/09/2023

## 2023-11-09 NOTE — SUBJECTIVE & OBJECTIVE
Interval History:  Patient reports improvement of her cough and her headache this morning. She slept all night after taking ambien. She also reports ambulating in the room without difficulty. She denies SOB, fevers, chills, chest pain, palpitations, N/V, denies itching or rash. She is tolerating regular diet, voiding without difficulty and having bowel movements.     Review of Systems   Constitutional:  Negative for chills, fatigue and fever.   Respiratory:  Positive for cough (improved). Negative for shortness of breath.    Cardiovascular:  Negative for chest pain and leg swelling.   Gastrointestinal:  Negative for abdominal pain, constipation, nausea and vomiting.   Endocrine: Negative for hot flashes.   Genitourinary:  Negative for menstrual problem and pelvic pain.   Integumentary:  Negative for breast mass, nipple discharge and breast skin changes.   Neurological:  Negative for syncope and headaches.   Hematological:  Does not bruise/bleed easily.   Psychiatric/Behavioral:  Negative for depression.    Breast: Negative for mass, mastodynia, nipple discharge and skin changes     Objective:     Vital Signs (Most Recent):  Temp: 98.1 °F (36.7 °C) (11/09/23 0500)  Pulse: 100 (11/09/23 0500)  Resp: 18 (11/09/23 0500)  BP: 120/66 (11/09/23 0500)  SpO2: 96 % (11/09/23 0500) Vital Signs (24h Range):  Temp:  [97.4 °F (36.3 °C)-98.7 °F (37.1 °C)] 98.1 °F (36.7 °C)  Pulse:  [] 100  Resp:  [16-20] 18  SpO2:  [94 %-99 %] 96 %  BP: (117-138)/(63-72) 120/66     Weight: 77.1 kg (170 lb)  Body mass index is 26.63 kg/m².       Physical Exam:   Constitutional: She is oriented to person, place, and time. She appears well-developed and well-nourished. No distress.    HENT:   Head: Normocephalic and atraumatic.    Eyes: EOM are normal.     Cardiovascular:  Normal heart sounds.             Pulmonary/Chest: Effort normal and breath sounds normal. No respiratory distress. She has no wheezes.        Abdominal: Soft. There is no  abdominal tenderness. There is no rebound and no guarding.             Musculoskeletal: Normal range of motion.       Neurological: She is alert and oriented to person, place, and time.    Skin: Skin is warm and dry. She is not diaphoretic.    Psychiatric: She has a normal mood and affect. Thought content normal.          Laboratory:  Recent Labs   Lab 11/03/23  0545 11/06/23 1728 11/07/23  0404   WBC 3.85* 9.46 8.15   HGB 9.4* 10.7* 9.6*   HCT 27.5* 32.0* 28.5*   * 104* 106*   * 325 326         Microbiology Results (last 7 days)       Procedure Component Value Units Date/Time    Blood culture x two cultures. Draw prior to antibiotics. [[id number]] Collected: 11/06/23 1715    Order Status: Completed Specimen: Blood from Peripheral, Hand, Right Updated: 11/08/23 2012     Blood Culture, Routine No Growth to date      No Growth to date      No Growth to date    Narrative:      Aerobic and anaerobic    Blood culture x two cultures. Draw prior to antibiotics. [[id number]] Collected: 11/06/23 1728    Order Status: Completed Specimen: Blood from Peripheral, Antecubital, Left Updated: 11/08/23 2012     Blood Culture, Routine No Growth to date      No Growth to date      No Growth to date    Narrative:      Aerobic and anaerobic    Culture, Respiratory with Gram Stain [[id number]] Collected: 11/07/23 0535    Order Status: Completed Specimen: Respiratory from Sputum Updated: 11/08/23 1132     Respiratory Culture Normal respiratory ugo     Gram Stain (Respiratory) <10 epithelial cells per low power field.     Gram Stain (Respiratory) Few WBC's     Gram Stain (Respiratory) Moderate Gram positive cocci     Gram Stain (Respiratory) Moderate Gram negative rods               Diagnostic Results:    Imaging Results              X-Ray Chest AP Portable (Final result)  Result time 11/06/23 16:34:38      Final result by Glenda Palma MD (11/06/23 16:34:38)                   Impression:      New vague opacity right  lung base, with patchy consolidation at both lung bases demonstrated by CT earlier today.  Findings most concerning for pneumonia.      Electronically signed by: Glenda Palma  Date:    11/06/2023  Time:    16:34               Narrative:    EXAMINATION:  XR CHEST AP PORTABLE    CLINICAL HISTORY:  Sepsis;    TECHNIQUE:  Single frontal view of the chest was performed.    COMPARISON:  11/01/2023    FINDINGS:  Lungs are well expanded.  New vague opacity at the right lung base.  Left lung remains fairly clear.  No pleural effusion or pneumothorax.    Cardiac silhouette is normal in size.

## 2023-11-11 LAB
BACTERIA BLD CULT: NORMAL
BACTERIA BLD CULT: NORMAL

## 2023-11-14 ENCOUNTER — OFFICE VISIT (OUTPATIENT)
Dept: GYNECOLOGIC ONCOLOGY | Facility: CLINIC | Age: 58
End: 2023-11-14
Payer: COMMERCIAL

## 2023-11-14 DIAGNOSIS — Z51.11 ENCOUNTER FOR ANTINEOPLASTIC CHEMOTHERAPY: ICD-10-CM

## 2023-11-14 DIAGNOSIS — C77.2 SECONDARY MALIGNANCY OF RETROPERITONEAL LYMPH NODES: ICD-10-CM

## 2023-11-14 DIAGNOSIS — C54.1 MALIGNANT NEOPLASM OF ENDOMETRIUM: Primary | ICD-10-CM

## 2023-11-14 DIAGNOSIS — J18.9 COMMUNITY ACQUIRED PNEUMONIA OF RIGHT LOWER LOBE OF LUNG: ICD-10-CM

## 2023-11-14 PROCEDURE — 1111F DSCHRG MED/CURRENT MED MERGE: CPT | Mod: CPTII,95,, | Performed by: OBSTETRICS & GYNECOLOGY

## 2023-11-14 PROCEDURE — 1159F PR MEDICATION LIST DOCUMENTED IN MEDICAL RECORD: ICD-10-PCS | Mod: CPTII,95,, | Performed by: OBSTETRICS & GYNECOLOGY

## 2023-11-14 PROCEDURE — 1159F MED LIST DOCD IN RCRD: CPT | Mod: CPTII,95,, | Performed by: OBSTETRICS & GYNECOLOGY

## 2023-11-14 PROCEDURE — 1160F RVW MEDS BY RX/DR IN RCRD: CPT | Mod: CPTII,95,, | Performed by: OBSTETRICS & GYNECOLOGY

## 2023-11-14 PROCEDURE — 1160F PR REVIEW ALL MEDS BY PRESCRIBER/CLIN PHARMACIST DOCUMENTED: ICD-10-PCS | Mod: CPTII,95,, | Performed by: OBSTETRICS & GYNECOLOGY

## 2023-11-14 PROCEDURE — 99215 PR OFFICE/OUTPT VISIT, EST, LEVL V, 40-54 MIN: ICD-10-PCS | Mod: 95,,, | Performed by: OBSTETRICS & GYNECOLOGY

## 2023-11-14 PROCEDURE — 1111F PR DISCHARGE MEDS RECONCILED W/ CURRENT OUTPATIENT MED LIST: ICD-10-PCS | Mod: CPTII,95,, | Performed by: OBSTETRICS & GYNECOLOGY

## 2023-11-14 PROCEDURE — 99215 OFFICE O/P EST HI 40 MIN: CPT | Mod: 95,,, | Performed by: OBSTETRICS & GYNECOLOGY

## 2023-11-14 NOTE — PHYSICIAN QUERY
PT Name: Sugar Diaz  MR #: [id number]     DOCUMENTATION CLARIFICATION     CDS/: ABEBE Nobles,RNC-MNN         Contact information:[email or web address]  This form is a permanent document in the medical record.    Query Date: November 14, 2023    By submitting this query, we are merely seeking further clarification of documentation.  Please utilize your independent clinical judgment when addressing the question(s) below.  The Medical Record contains the following:   Indicators   Supporting Clinical Findings Location in Medical Record   X Pneumonia documented Community acquired pneumonia of right lower lobe of lung  Discharge Summary 11/9   X Chest X-Ray/CT Scan CXR (11/6): New vague opacity right lung base, with patchy consolidation at both lung bases.  Gyn Onc Progress note 11/9@1002am   X PaO2    PaCO2     O2 sat In the ED, the patient was afebrile, tachycardic (110s-120), tachypneic (20s), saturating 93% on RA and 98% on 2L O2     96% O2 sat on RA, continue to improve symptoms  Gyn Onc Progress note 11/9@1002am   X WBC WBC=9.46-->8.15 LAB 11/6-11/7    Vital Signs     X Cultures  Sputum culture grew Moderate Gram + cocci and gram - julia.     The patient was recently hospitalized 11/1-11/3 after presenting with the same symptoms and neutropenic fever. She was started on broad spectrum antibiotics and tested positive for RSV.  Discharge Summary 11/9   X Treatment  - Seen by Pulmonology team who recommended to continue IV Cefepime 2g q8h , azithromycin 500 mg q24 for 5 days and adding Vancomycin.   - Plan to continue current regiment for 48h and transition to PO Augmentin and doxycycline at discharge.  - Continue symptomatic treatment with hypertonic saline nebulizer and  ibuprofen 600 Q6h PRN for headaches. Gyn Onc Progress note 11/9@1002am    Supplemental O2      Dysphagia/Swallow study      Other       Provider, please provide the diagnosis related to the above clinical indicators:    [  X ]  Bacterial, gram negative organism Pneumonia (please further specify organism being treated):______   [   ] Bacterial, gram positive organism Pneumonia (please further specify organism being treated):______   [   ] Viral Pneumonia (please specify organism): _________   [   ] Unspecified Pneumonia   [   ] Other type of pneumonia (please specify): ________   [  ] Clinically undetermined       Please document in your progress notes daily for the duration of treatment, until resolved, and include in your discharge summary.     Form No. [id number]

## 2023-11-14 NOTE — PHYSICIAN QUERY
PT Name: Sugar Diaz  MR #: [id number]     DOCUMENTATION CLARIFICATION     CDS/: ABEBE Nobles,RNC-MNN       Contact information:[email or web address]  This form is a permanent document in the medical record.     Query Date: November 14, 2023    By submitting this query, we are merely seeking further clarification of documentation.  Please utilize your independent clinical judgment when addressing the question(s) below.  The Medical Record contains the following   Indicators   Supporting Clinical Findings Location in Medical Record   X Documentation of Respiratory Failure, ARDS Acute hypoxemic respiratory failure  Pulmonology progress note 11/7@1206pm   X Subjective Respiratory Signs/Symptoms presented to the ED for concerns of fever, cough, shortness of breath, headache, and fatigue.     Cough is not productive. Shortness of breath is worsened with exertion. Headache is diffused. She denies chest pain or difficulty breathing (at rest).  H&P 11/7   X Objective Respiratory Signs/Symptoms She asserts dry cough, was able to produce a small sputum sample.     On 1L O2 NC initially for O2 sat of 94% at rest; off O2 this morning and eating breakfast with good sats     PE: Bilateral crackles, no wheezing, breath sounds throughout. Improved from PE last week.  Pulmonology progress note 11/7@1206pm            H&P 11/7   X RR     O2 sat     O2 use In the ED, the patient was afebrile, tachycardic (110s-120), tachypneic (20s), saturating 93% on RA and 98% on 2L O2.  H&P 11/7    Blood Gas (ABG or VBG)      Hypoxia/Hypercapnia      BiPAP/Intubation/Mechanical Ventilation     X Home O2, Oxygen Dependence Has now weaned off of O2 and maintaining saturations in the mid 90's off of oxygen  Pulmonology progress note 11/7@1206pm   X Acute/Chronic Illness PMHx of endometrial CA on chemo/immunotherapy and recent admission for RSV infection 11/1-11/3 who presented to [facility] on 11/6 with worsening symptoms,  fevers, dyspnea, and acute hypoxemic respiratory failure. CXR and CT chest imaging demonstrate bibasilar opacities (that have significantly worsened since CT chest 11/1) concerning for a superimposed bacterial infection.  Pulmonology progress note 11/7@1206pm   X Treatment -Plan is to admit for IV antibiotics and supportive mgmt.   -IV Cefepime 2g q8h , azithromycin 500 mg q24 for 5 days.  -IVF, oxycodone IR q4h PRN, zofran PRN  -Pulmonary consult ordered for rec's regarding mgmt.  -AM CBC ordered H&P 11/7    Other         The clinical guidelines noted are only a system guideline. It does not replace the providers clinical judgment.    [facility] Approved Diagnostic Criteria      Acute Respiratory Failure    Hypoxic: ABG pO2<60 mmHg or O2 sat of <91% on RA   AND/OR   Hypercapnic: ABG pCO2>50 mmHg with pH <7.35   AND   Respiratory symptoms documented (Subjective: SOB; Objective: Tachypnea, respiratory distress, increased work of breathing, unable to speak in complete sentences, labored breathing, use of accessory muscles, RR>26, cyanosis, dyspnea, wheezing, stridor, lethargy)      Chronic Respiratory Failure   Hypoxic: Continuous home oxygen    AND/OR   Hypercapnic: Normal pH with high CO2 (ex. COPD)      Acute on Chronic Respiratory Failure   Hypoxic: ABG pO2>10mmHg below baseline OR ABG pO2<60 mmHg OR SpO2<91% on usual home O2  OR O2>2L/min over baseline home O2   AND/OR   Hypercapnic: ABG pCO2>50 mmHg OR pCO2>10mmHg over baseline and pH <7.35   AND   Respiratory symptoms documented      Acute Respiratory Distress Syndrome (ARDS) - an acute, diffuse, inflammatory form of lung injury. Suspect with progressive dyspnea, hypoxemic respiratory failure, and bilateral alveolar infiltrates on chest imaging within 6 to 72 hours of an inciting event.   Acute Respiratory Distress - Generally describes less severe respiratory symptoms (tachypnea, in respiratory distress, increased work of breathing, unable to speak  in complete sentences, labored breathing, use of accessory muscles, RR> 24, cyanosis, dyspnea, wheezing, stridor, lethargy) without sufficient measurements (pO2, SpO2, pH, and pCO2) to meet criteria for respiratory failure)   Acute Respiratory Insufficiency - Generally describes less severe respiratory symptoms and measurements (pO2, SpO2, pH, and pCO2) not meeting criteria for respiratory failure         Due to the conflicting clinical picture, please clinically validate the diagnosis of Acute hypoxemic respiratory failure .    If validated, please provide additional clinical support for the diagnosis.     [  X  ] Above stated diagnosis is not confirmed and/or it has been ruled out   [    ] Above stated diagnosis is not confirmed and/or it has been ruled out, other diagnosis ruled in (please specify):_______________   [    ] Acute Respiratory Failure with Hypoxia diagnosis is confirmed and additional clinical support/decision-making indicators for the diagnosis include (please specify):_______________________________________________   [    ] Other clarification (please specify): ___________________       Please document in your progress notes daily for the duration of treatment until resolved and include in your discharge summary.     Reference:    SOULEYMANE Saini MD. (2020, March 13). Acute respiratory distress syndrome: Clinical features, diagnosis, and complications in adults ([id number] ESMER Haro MD & [id number] CELESTINE Ness MD, Eds.). Retrieved November 13, 2020, from [email or web address]/[email or web address]-respiratory-distress-syndrome-clinical-features-diagnosis-and-complications-[email or web address]-adults?search=ards&source=search_result&selectedTitle=1~150&usage_type=default&display_rank=1  Form No. [id number]

## 2023-11-14 NOTE — PROGRESS NOTES
The patient location is: home  The chief complaint leading to consultation is: discuss maintenance immunotherapy    Visit type: audiovisual    Face to Face time with patient: 10  20 minutes of total time spent on the encounter, which includes face to face time and non-face to face time preparing to see the patient (eg, review of tests), Obtaining and/or reviewing separately obtained history, Documenting clinical information in the electronic or other health record, Independently interpreting results (not separately reported) and communicating results to the patient/family/caregiver, or Care coordination (not separately reported).         Each patient to whom he or she provides medical services by telemedicine is:  (1) informed of the relationship between the physician and patient and the respective role of any other health care provider with respect to management of the patient; and (2) notified that he or she may decline to receive medical services by telemedicine and may withdraw from such care at any time.    Notes:      REFERRING PROVIDER  Omaira Evans MD     REASON FOR CONSULT  Sugar Diaz  is a 58 y.o.  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    HISTORY OF PRESENT ILLNESS    Chemotherapy regimen: Dostarlimab 1000mg 6 weeks  Cycle: 1  Previous dose limiting toxicities: Y  Previous dose reductions: N  Previous breaks: Y  Previous changes in pre-medications: N    Energy level: baseline  Appetite: baseline  Fevers/chills/nights: no  Nausea: no  Diarrhea: no  Emesis: no  Neuropathy: no  Discoloration of lower extremities: no  Mucositis: no  Maculopapular rashes: no  Dyspnea or coughing: yes      Oncology History   Malignant neoplasm of endometrium   5/23/2023 Surgery    Hysteroscopy, D&C: grade 1 endometrioid EC     6/5/2023 Surgery    TRH/BSO/BSLN/RPLN/repair of obturator nerve    Final pathology c/w 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 4.5 cm, grade 1, 96% MI (22/23 mm), +LUE, -LVSI, +MELF, -cervix, 1/2+ RPLN, 1/1+ LPLN, -ascites.     6/19/2023 Imaging Significant Findings    CT C/A/P w/: High L para-aortic at the level of the renal vessels, 1.5 cm.  Low R para-aortic at the LEAH, 4.1 cm in greatest dimension.     6/25/2023 Genomic Testing    [employer] (NGS): ARD1A, CHEK1, CTCF, , HDAC2, HNF1A, JAK1, KRAS, MAX, MSH6, P1K3R1, PPM1D, PTEN, ZFHX3, ZSR2     6/28/2023 Tumor Conference    No rule for debulking of para-aortic lymph nodes.  Adjuvant VBT.  Represent after completion of adjuvant therapy to discuss EBRT.        7/7/2023 - 10/19/2023 Chemotherapy    Carboplatin AUC 5/Paclitaxel 135 mg/m2/Dostarlimab 200mg q3 weeks x6    Previous dose limiting toxicities: Y  Cycle 4: Grade 2 colitis s/p steroid taper  Cycle 5: Grade 3 colitis  Previous dose reductions: N  Previous breaks: Y  Cycle 4: Dostarlimab omitted due to grade 2 colitis  Cycle 5: Dostarlimab 2/2 grade 3 colitis (negative colonoscopy with improvement in symptoms with steroid taper)     8/9/2023 - 9/13/2023 Radiation Therapy    Treating physician: Dr. Adriane Babb  Total Dose: 21 Gy HDR  Fractions: 3 vaginal cuff brachytherapy     8/22/2023 Imaging Significant Findings    CT A/P w/: No evidence of colitis, [place] in high L para-aortic and low R para-aortic.     10/4/2023 Procedure    Colonoscopy: WNL     11/6/2023 Imaging Significant Findings    CT C/A/P w/: PATTI     11/6/2023 - 11/9/2023 Hospital Admission    Most Recent Admission Details  Admit Date: 11/6/23  Admitted for: RVS PNA superimposed by community acquitted PNA  Discharge date: 11/9/23  Discharged from: [address] at [facility] ([facility])  Discharge disposition: Home or Self Care            The following portions of the patient's history were reviewed and updated as appropriate: allergies, current medications, family history, medical history,  social history and surgical history.    REVIEW OF SYSTEMS  All systems reviewed and negative except as noted in HPI.    OBJECTIVE   There were no vitals filed for this visit.           There is no height or weight on file to calculate BMI.      1. General: Well appearing, no apparent distress, alert and oriented.  2. Lymph: Neck symmetric without cervical or supraclavicular adenopathy or mass.  3. Lungs: Normal respiratory rate, no accessory muscle use.  4. Psych: Normal affect.  5. Abdomen:  non-distended  6. Skin: Warm, dry, no rashes or lesions.   7. Extremities: Bilateral lower extremities without edema or tenderness.  8. Genitourinary: Deferred          ECOG status: 1    LABORATORY DATA  Lab data reviewed.    RADIOLOGICAL DATA  Radiology data reviewed.    ASSESSMENT / PLAN     1. 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 Encounter for antineoplastic chemotherapy    4. Community acquired pneumonia of right lower lobe of lung          We reviewed her imaging results.  This is most consistent with a complete response.  There is no high level evidence to guide treatment, but I do not recommend adjuvant radiation or an interval surgery without obvious residual disease.  My recommendation at this time is to transition her to maintenance immunotherapy.  CMP, TSH, T4, RONC 6 weeks.    PATIENT EDUCATION  Ready to learn, no apparent learning barriers were identified; learning preferences include listening. Explained diagnosis and treatment plan; patient expressed understanding of the content.    ADMINISTRATIVE BILLING  Greater than 50% of was spent in counseling.

## 2023-11-14 NOTE — Clinical Note
Paul Forrest, I am not sure you remember this patient or not.  We presented her at tumor board and the consensus was adjuvant chemotherapy, brachytherapy +/- EBRT.  She's had essentially a complete response and has limped through adjuvant chemotherapy with multiple admissions and problems.  I am thinking we save EBRT in the future if she recurs?  What are your thoughts?  Thank you.

## 2023-11-16 ENCOUNTER — PATIENT MESSAGE (OUTPATIENT)
Dept: GYNECOLOGIC ONCOLOGY | Facility: CLINIC | Age: 58
End: 2023-11-16
Payer: COMMERCIAL

## 2023-11-21 NOTE — PROGRESS NOTES
REFERRING PROVIDER  Omaira Evans MD     REASON FOR CONSULT  Sugar Diaz  is a 58 y.o.  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    HISTORY OF PRESENT ILLNESS    Chemotherapy regimen: Dostarlimab 1000mg 6 weeks  Cycle: 1  Previous dose limiting toxicities: Y  Previous dose reductions: N  Previous breaks: Y  Previous changes in pre-medications: N    Energy level: baseline  Appetite: baseline  Fevers/chills/nights: no  Nausea: no  Diarrhea: no  Emesis: no  Neuropathy: yes, grade 3  Discoloration of lower extremities: no  Mucositis: no  Maculopapular rashes: no  Dyspnea or coughing: yes      Oncology History   Malignant neoplasm of endometrium   5/23/2023 Surgery    Hysteroscopy, D&C: grade 1 endometrioid EC     6/5/2023 Surgery    TRH/BSO/BSLN/RPLN/repair of obturator nerve    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 4.5 cm, grade 1, 96% MI (22/23 mm), +LUE, -LVSI, +MELF, -cervix, 1/2+ RPLN, 1/1+ LPLN, -ascites.     6/19/2023 Imaging Significant Findings    CT C/A/P w/: High L para-aortic at the level of the renal vessels, 1.5 cm.  Low R para-aortic at the LEAH, 4.1 cm in greatest dimension.     6/25/2023 Genomic Testing    [employer] (NGS): ARD1A, CHEK1, CTCF, , HDAC2, HNF1A, JAK1, KRAS, MAX, MSH6, P1K3R1, PPM1D, PTEN, ZFHX3, ZSR2     6/28/2023 Tumor Conference    No rule for debulking of para-aortic lymph nodes.  Adjuvant VBT.  Represent after completion of adjuvant therapy to discuss EBRT.        7/7/2023 - 10/19/2023 Chemotherapy    Carboplatin AUC 5/Paclitaxel 135 mg/m2/Dostarlimab 200mg q3 weeks x6    Previous dose limiting toxicities: Y  Cycle 4: Grade 2 colitis s/p steroid taper  Cycle 5: Grade 3 colitis  Previous dose reductions: N  Previous breaks: Y  Cycle 4: Dostarlimab omitted due to grade 2 colitis  Cycle 5: Dostarlimab 2/2 grade 3 colitis (negative colonoscopy  with improvement in symptoms with steroid taper)     8/9/2023 - 9/13/2023 Radiation Therapy    Treating physician: Dr. Adriane Babb  Total Dose: 21 Gy HDR  Fractions: 3 vaginal cuff brachytherapy     8/22/2023 Imaging Significant Findings    CT A/P w/: No evidence of colitis, [place] in high L para-aortic and low R para-aortic.     10/4/2023 Procedure    Colonoscopy: WNL     11/6/2023 Imaging Significant Findings    CT C/A/P w/: PATTI     11/6/2023 - 11/9/2023 Hospital Admission    Most Recent Admission Details  Admit Date: 11/6/23  Admitted for: RVS PNA superimposed by community acquitted PNA  Discharge date: 11/9/23  Discharged from: [address] at [facility] ([facility])  Discharge disposition: Home or Self Care            The following portions of the patient's history were reviewed and updated as appropriate: allergies, current medications, family history, medical history, social history and surgical history.    REVIEW OF SYSTEMS  All systems reviewed and negative except as noted in HPI.    OBJECTIVE   Vitals:    11/22/23 1036   BP: 129/60   Pulse: (!) 111              Body mass index is 26.47 kg/m².      1. General: Well appearing, no apparent distress, alert and oriented.  2. Lymph: Neck symmetric without cervical or supraclavicular adenopathy or mass.  3. Lungs: Normal respiratory rate, no accessory muscle use.  4. Psych: Normal affect.  5. Abdomen:  non-distended, soft, non-tender, are no masses, no ascites, no hepatosplenomegaly.  6. Skin: Warm, dry, no rashes or lesions.   7. Extremities: Bilateral lower extremities without edema or tenderness.  8. Genitourinary: Deferred                 ECOG status: 1    LABORATORY DATA  Lab data reviewed.    RADIOLOGICAL DATA  Radiology data reviewed.    ASSESSMENT / PLAN     1. 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 Encounter for antineoplastic chemotherapy    4. Diarrhea, unspecified type         No dose limiting toxicities or signs of progression.  CMP, TSH, T4, cycle #2, RONC 6 weeks.    PATIENT EDUCATION  Ready to learn, no apparent learning barriers were identified; learning preferences include listening. Explained diagnosis and treatment plan; patient expressed understanding of the content.    ADMINISTRATIVE BILLING  Greater than 50% of was spent in counseling.

## 2023-11-22 ENCOUNTER — OFFICE VISIT (OUTPATIENT)
Dept: GYNECOLOGIC ONCOLOGY | Facility: CLINIC | Age: 58
End: 2023-11-22
Payer: COMMERCIAL

## 2023-11-22 ENCOUNTER — INFUSION (OUTPATIENT)
Dept: INFUSION THERAPY | Facility: OTHER | Age: 58
End: 2023-11-22
Attending: INTERNAL MEDICINE
Payer: COMMERCIAL

## 2023-11-22 ENCOUNTER — LAB VISIT (OUTPATIENT)
Dept: LAB | Facility: OTHER | Age: 58
End: 2023-11-22
Attending: OBSTETRICS & GYNECOLOGY
Payer: COMMERCIAL

## 2023-11-22 VITALS — OXYGEN SATURATION: 96 % | HEART RATE: 102 BPM | TEMPERATURE: 98 F

## 2023-11-22 VITALS
BODY MASS INDEX: 26.47 KG/M2 | SYSTOLIC BLOOD PRESSURE: 129 MMHG | HEART RATE: 111 BPM | WEIGHT: 169 LBS | DIASTOLIC BLOOD PRESSURE: 60 MMHG

## 2023-11-22 DIAGNOSIS — C77.2 SECONDARY MALIGNANCY OF RETROPERITONEAL LYMPH NODES: ICD-10-CM

## 2023-11-22 DIAGNOSIS — C54.1 MALIGNANT NEOPLASM OF ENDOMETRIUM: Primary | ICD-10-CM

## 2023-11-22 DIAGNOSIS — R19.7 DIARRHEA, UNSPECIFIED TYPE: ICD-10-CM

## 2023-11-22 DIAGNOSIS — T45.1X5A CHEMOTHERAPY-INDUCED NEUROPATHY: ICD-10-CM

## 2023-11-22 DIAGNOSIS — E03.8 SUBCLINICAL HYPOTHYROIDISM: ICD-10-CM

## 2023-11-22 DIAGNOSIS — Z51.11 ENCOUNTER FOR ANTINEOPLASTIC CHEMOTHERAPY: ICD-10-CM

## 2023-11-22 DIAGNOSIS — C54.1 MALIGNANT NEOPLASM OF ENDOMETRIUM: ICD-10-CM

## 2023-11-22 DIAGNOSIS — G62.0 CHEMOTHERAPY-INDUCED NEUROPATHY: ICD-10-CM

## 2023-11-22 LAB
ALBUMIN SERPL BCP-MCNC: 3.5 G/DL (ref 3.5–5.2)
ALP SERPL-CCNC: 81 U/L (ref 55–135)
ALT SERPL W/O P-5'-P-CCNC: 17 U/L (ref 10–44)
ANION GAP SERPL CALC-SCNC: 13 MMOL/L (ref 8–16)
AST SERPL-CCNC: 18 U/L (ref 10–40)
BILIRUB SERPL-MCNC: 0.3 MG/DL (ref 0.1–1)
BUN SERPL-MCNC: 9 MG/DL (ref 6–20)
CALCIUM SERPL-MCNC: 9.5 MG/DL (ref 8.7–10.5)
CHLORIDE SERPL-SCNC: 105 MMOL/L (ref 95–110)
CO2 SERPL-SCNC: 22 MMOL/L (ref 23–29)
CREAT SERPL-MCNC: 0.8 MG/DL (ref 0.5–1.4)
ERYTHROCYTE [DISTWIDTH] IN BLOOD BY AUTOMATED COUNT: 13.7 % (ref 11.5–14.5)
EST. GFR  (NO RACE VARIABLE): >60 ML/MIN/1.73 M^2
GLUCOSE SERPL-MCNC: 155 MG/DL (ref 70–110)
HCT VFR BLD AUTO: 39.3 % (ref 37–48.5)
HGB BLD-MCNC: 13.2 G/DL (ref 12–16)
IMM GRANULOCYTES # BLD AUTO: 0.04 K/UL (ref 0–0.04)
MCH RBC QN AUTO: 34.6 PG (ref 27–31)
MCHC RBC AUTO-ENTMCNC: 33.6 G/DL (ref 32–36)
MCV RBC AUTO: 103 FL (ref 82–98)
NEUTROPHILS # BLD AUTO: 5 K/UL (ref 1.8–7.7)
PLATELET # BLD AUTO: 404 K/UL (ref 150–450)
PMV BLD AUTO: 8.7 FL (ref 9.2–12.9)
POTASSIUM SERPL-SCNC: 3.5 MMOL/L (ref 3.5–5.1)
PROT SERPL-MCNC: 7.3 G/DL (ref 6–8.4)
RBC # BLD AUTO: 3.82 M/UL (ref 4–5.4)
SODIUM SERPL-SCNC: 140 MMOL/L (ref 136–145)
T4 FREE SERPL-MCNC: 0.74 NG/DL (ref 0.71–1.51)
TSH SERPL DL<=0.005 MIU/L-ACNC: 19.29 UIU/ML (ref 0.4–4)
WBC # BLD AUTO: 8.07 K/UL (ref 3.9–12.7)

## 2023-11-22 PROCEDURE — 99999 PR PBB SHADOW E&M-EST. PATIENT-LVL III: ICD-10-PCS | Mod: PBBFAC,,, | Performed by: OBSTETRICS & GYNECOLOGY

## 2023-11-22 PROCEDURE — 96413 CHEMO IV INFUSION 1 HR: CPT

## 2023-11-22 PROCEDURE — 3078F DIAST BP <80 MM HG: CPT | Mod: CPTII,S$GLB,, | Performed by: OBSTETRICS & GYNECOLOGY

## 2023-11-22 PROCEDURE — 85027 COMPLETE CBC AUTOMATED: CPT | Performed by: OBSTETRICS & GYNECOLOGY

## 2023-11-22 PROCEDURE — 3074F SYST BP LT 130 MM HG: CPT | Mod: CPTII,S$GLB,, | Performed by: OBSTETRICS & GYNECOLOGY

## 2023-11-22 PROCEDURE — 1159F MED LIST DOCD IN RCRD: CPT | Mod: CPTII,S$GLB,, | Performed by: OBSTETRICS & GYNECOLOGY

## 2023-11-22 PROCEDURE — 3008F PR BODY MASS INDEX (BMI) DOCUMENTED: ICD-10-PCS | Mod: CPTII,S$GLB,, | Performed by: OBSTETRICS & GYNECOLOGY

## 2023-11-22 PROCEDURE — 1159F PR MEDICATION LIST DOCUMENTED IN MEDICAL RECORD: ICD-10-PCS | Mod: CPTII,S$GLB,, | Performed by: OBSTETRICS & GYNECOLOGY

## 2023-11-22 PROCEDURE — 1111F PR DISCHARGE MEDS RECONCILED W/ CURRENT OUTPATIENT MED LIST: ICD-10-PCS | Mod: CPTII,S$GLB,, | Performed by: OBSTETRICS & GYNECOLOGY

## 2023-11-22 PROCEDURE — 80053 COMPREHEN METABOLIC PANEL: CPT | Performed by: OBSTETRICS & GYNECOLOGY

## 2023-11-22 PROCEDURE — 99215 OFFICE O/P EST HI 40 MIN: CPT | Mod: S$GLB,,, | Performed by: OBSTETRICS & GYNECOLOGY

## 2023-11-22 PROCEDURE — 3078F PR MOST RECENT DIASTOLIC BLOOD PRESSURE < 80 MM HG: ICD-10-PCS | Mod: CPTII,S$GLB,, | Performed by: OBSTETRICS & GYNECOLOGY

## 2023-11-22 PROCEDURE — 36415 COLL VENOUS BLD VENIPUNCTURE: CPT | Performed by: OBSTETRICS & GYNECOLOGY

## 2023-11-22 PROCEDURE — 84443 ASSAY THYROID STIM HORMONE: CPT | Performed by: OBSTETRICS & GYNECOLOGY

## 2023-11-22 PROCEDURE — 3008F BODY MASS INDEX DOCD: CPT | Mod: CPTII,S$GLB,, | Performed by: OBSTETRICS & GYNECOLOGY

## 2023-11-22 PROCEDURE — 84439 ASSAY OF FREE THYROXINE: CPT | Performed by: OBSTETRICS & GYNECOLOGY

## 2023-11-22 PROCEDURE — 1111F DSCHRG MED/CURRENT MED MERGE: CPT | Mod: CPTII,S$GLB,, | Performed by: OBSTETRICS & GYNECOLOGY

## 2023-11-22 PROCEDURE — 25000003 PHARM REV CODE 250: Performed by: OBSTETRICS & GYNECOLOGY

## 2023-11-22 PROCEDURE — 3074F PR MOST RECENT SYSTOLIC BLOOD PRESSURE < 130 MM HG: ICD-10-PCS | Mod: CPTII,S$GLB,, | Performed by: OBSTETRICS & GYNECOLOGY

## 2023-11-22 PROCEDURE — 99999 PR PBB SHADOW E&M-EST. PATIENT-LVL III: CPT | Mod: PBBFAC,,, | Performed by: OBSTETRICS & GYNECOLOGY

## 2023-11-22 PROCEDURE — 99215 PR OFFICE/OUTPT VISIT, EST, LEVL V, 40-54 MIN: ICD-10-PCS | Mod: S$GLB,,, | Performed by: OBSTETRICS & GYNECOLOGY

## 2023-11-22 PROCEDURE — 96367 TX/PROPH/DG ADDL SEQ IV INF: CPT

## 2023-11-22 PROCEDURE — 63600175 PHARM REV CODE 636 W HCPCS: Mod: JZ,TB | Performed by: OBSTETRICS & GYNECOLOGY

## 2023-11-22 RX ORDER — EPINEPHRINE 0.3 MG/.3ML
0.3 INJECTION SUBCUTANEOUS ONCE AS NEEDED
Status: CANCELLED | OUTPATIENT
Start: 2023-12-06

## 2023-11-22 RX ORDER — SODIUM CHLORIDE 0.9 % (FLUSH) 0.9 %
10 SYRINGE (ML) INJECTION
Status: CANCELLED | OUTPATIENT
Start: 2023-12-06

## 2023-11-22 RX ORDER — SODIUM CHLORIDE 0.9 % (FLUSH) 0.9 %
10 SYRINGE (ML) INJECTION
Status: CANCELLED | OUTPATIENT
Start: 2023-11-22

## 2023-11-22 RX ORDER — EPINEPHRINE 0.3 MG/.3ML
0.3 INJECTION SUBCUTANEOUS ONCE AS NEEDED
Status: DISCONTINUED | OUTPATIENT
Start: 2023-11-22 | End: 2023-11-22 | Stop reason: HOSPADM

## 2023-11-22 RX ORDER — DIPHENHYDRAMINE HYDROCHLORIDE 50 MG/ML
50 INJECTION INTRAMUSCULAR; INTRAVENOUS ONCE AS NEEDED
Status: DISCONTINUED | OUTPATIENT
Start: 2023-11-22 | End: 2023-11-22 | Stop reason: HOSPADM

## 2023-11-22 RX ORDER — LEVOTHYROXINE SODIUM 25 UG/1
25 TABLET ORAL
Qty: 30 TABLET | Refills: 11 | Status: SHIPPED | OUTPATIENT
Start: 2023-11-22 | End: 2024-01-03

## 2023-11-22 RX ORDER — HEPARIN 100 UNIT/ML
500 SYRINGE INTRAVENOUS
Status: DISCONTINUED | OUTPATIENT
Start: 2023-11-22 | End: 2023-11-22 | Stop reason: HOSPADM

## 2023-11-22 RX ORDER — DIPHENOXYLATE HYDROCHLORIDE AND ATROPINE SULFATE 2.5; .025 MG/1; MG/1
1 TABLET ORAL 4 TIMES DAILY PRN
Qty: 60 TABLET | Refills: 6 | Status: SHIPPED | OUTPATIENT
Start: 2023-11-22 | End: 2024-03-24

## 2023-11-22 RX ORDER — SODIUM CHLORIDE 0.9 % (FLUSH) 0.9 %
10 SYRINGE (ML) INJECTION
Status: DISCONTINUED | OUTPATIENT
Start: 2023-11-22 | End: 2023-11-22 | Stop reason: HOSPADM

## 2023-11-22 RX ORDER — HEPARIN 100 UNIT/ML
500 SYRINGE INTRAVENOUS
Status: CANCELLED | OUTPATIENT
Start: 2023-12-06

## 2023-11-22 RX ORDER — DIPHENHYDRAMINE HYDROCHLORIDE 50 MG/ML
50 INJECTION INTRAMUSCULAR; INTRAVENOUS ONCE AS NEEDED
Status: CANCELLED | OUTPATIENT
Start: 2023-11-22

## 2023-11-22 RX ORDER — DULOXETIN HYDROCHLORIDE 30 MG/1
CAPSULE, DELAYED RELEASE ORAL
Qty: 84 CAPSULE | Refills: 2 | Status: SHIPPED | OUTPATIENT
Start: 2023-11-22 | End: 2024-01-05 | Stop reason: SDUPTHER

## 2023-11-22 RX ORDER — HEPARIN 100 UNIT/ML
500 SYRINGE INTRAVENOUS
Status: CANCELLED | OUTPATIENT
Start: 2023-11-22

## 2023-11-22 RX ORDER — PROCHLORPERAZINE EDISYLATE 5 MG/ML
5-10 INJECTION INTRAMUSCULAR; INTRAVENOUS ONCE AS NEEDED
Status: CANCELLED
Start: 2023-12-06

## 2023-11-22 RX ORDER — EPINEPHRINE 0.3 MG/.3ML
0.3 INJECTION SUBCUTANEOUS ONCE AS NEEDED
Status: CANCELLED | OUTPATIENT
Start: 2023-11-22

## 2023-11-22 RX ORDER — PROCHLORPERAZINE EDISYLATE 5 MG/ML
5-10 INJECTION INTRAMUSCULAR; INTRAVENOUS ONCE AS NEEDED
Status: CANCELLED
Start: 2023-11-22

## 2023-11-22 RX ORDER — DIPHENHYDRAMINE HYDROCHLORIDE 50 MG/ML
50 INJECTION INTRAMUSCULAR; INTRAVENOUS ONCE AS NEEDED
Status: CANCELLED | OUTPATIENT
Start: 2023-12-06

## 2023-11-22 RX ADMIN — FOSAPREPITANT 150 MG: 150 INJECTION, POWDER, LYOPHILIZED, FOR SOLUTION INTRAVENOUS at 11:11

## 2023-11-22 RX ADMIN — SODIUM CHLORIDE 1000 MG: 9 INJECTION, SOLUTION INTRAVENOUS at 12:11

## 2023-11-22 NOTE — PLAN OF CARE
Dostarlumab infusion complete. Pt tolerated well. VSS. NAD. IV DC'd.  Pt verbalized understanding of discharge instructions before leaving.

## 2023-12-11 ENCOUNTER — PATIENT MESSAGE (OUTPATIENT)
Dept: INTERNAL MEDICINE | Facility: CLINIC | Age: 58
End: 2023-12-11
Payer: COMMERCIAL

## 2023-12-27 NOTE — PROGRESS NOTES
REFERRING PROVIDER  Omaira Evans MD     REASON FOR CONSULT  Sugar Diaz  is a 58 y.o.  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    HISTORY OF PRESENT ILLNESS    Chemotherapy regimen: Dostarlimab 1000mg 6 weeks  Cycle: 2  Previous dose limiting toxicities: Y  Previous dose reductions: N  Previous breaks: Y  Previous changes in pre-medications: N    Energy level: baseline  Appetite: baseline  Fevers/chills/nights: no  Nausea: no  Diarrhea: yes, grade 2  Emesis: no  Neuropathy: yes, grade 3  Discoloration of lower extremities: no  Mucositis: no  Maculopapular rashes: no  Dyspnea or coughing: yes      Oncology History   Malignant neoplasm of endometrium   5/23/2023 Surgery    Hysteroscopy, D&C: grade 1 endometrioid EC     6/5/2023 Surgery    TRH/BSO/BSLN/RPLN/repair of obturator nerve    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 4.5 cm, grade 1, 96% MI (22/23 mm), +LUE, -LVSI, +MELF, -cervix, 1/2+ RPLN, 1/1+ LPLN, -ascites.     6/19/2023 Imaging Significant Findings    CT C/A/P w/: High L para-aortic at the level of the renal vessels, 1.5 cm.  Low R para-aortic at the LEAH, 4.1 cm in greatest dimension.     6/25/2023 Genomic Testing    [employer] (NGS): ARD1A, CHEK1, CTCF, , HDAC2, HNF1A, JAK1, KRAS, MAX, MSH6, P1K3R1, PPM1D, PTEN, ZFHX3, ZSR2     6/28/2023 Tumor Conference    No rule for debulking of para-aortic lymph nodes.  Adjuvant VBT.  Represent after completion of adjuvant therapy to discuss EBRT.        7/7/2023 - 10/19/2023 Chemotherapy    Carboplatin AUC 5/Paclitaxel 135 mg/m2/Dostarlimab 200mg q3 weeks x6    Previous dose limiting toxicities: Y  Cycle 4: Grade 2 colitis s/p steroid taper  Cycle 5: Grade 3 colitis  Previous dose reductions: N  Previous breaks: Y  Cycle 4: Dostarlimab omitted due to grade 2 colitis  Cycle 5: Dostarlimab 2/2 grade 3 colitis (negative  colonoscopy with improvement in symptoms with steroid taper)     8/9/2023 - 9/13/2023 Radiation Therapy    Treating physician: Dr. Adriane Babb  Total Dose: 21 Gy HDR  Fractions: 3 vaginal cuff brachytherapy     8/22/2023 Imaging Significant Findings    CT A/P w/: No evidence of colitis, [place] in high L para-aortic and low R para-aortic.     10/4/2023 Procedure    Colonoscopy: WNL     11/6/2023 Imaging Significant Findings    CT C/A/P w/: PATTI     11/6/2023 - 11/9/2023 Hospital Admission    Most Recent Admission Details  Admit Date: 11/6/23  Admitted for: RVS PNA superimposed by community acquitted PNA  Discharge date: 11/9/23  Discharged from: [address] at [facility] ([facility])  Discharge disposition: Home or Self Care       11/22/2023 -  Maintenance Therapy    Dostarlimab 1000mg x          The following portions of the patient's history were reviewed and updated as appropriate: allergies, current medications, family history, medical history, social history and surgical history.    REVIEW OF SYSTEMS  All systems reviewed and negative except as noted in HPI.    OBJECTIVE   Vitals:    01/03/24 0841   BP: (!) 101/58   Pulse: 94      Body mass index is 26.23 kg/m².      1. General: Well appearing, no apparent distress, alert and oriented.  2. Lymph: Neck symmetric without cervical or supraclavicular adenopathy or mass.  3. Lungs: Normal respiratory rate, no accessory muscle use.  4. Psych: Normal affect.  5. Abdomen:  non-distended  6. Skin: Warm, dry, no rashes or lesions.   7. Extremities: Bilateral lower extremities without edema or tenderness.  8. Genitourinary: Deferred                 ECOG status: 1    LABORATORY DATA  Lab data reviewed.    RADIOLOGICAL DATA  Radiology data reviewed.    ASSESSMENT / PLAN     1. 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 Encounter for antineoplastic chemotherapy    4. Chemotherapy-induced neuropathy     5. Subclinical hypothyroidism    6. Injury of obturator nerve during surgery        No signs of progression.  She continues to have diarrhea and work-up including colonoscopy with GI was negative.  By definition it is grade 2 but the clinical picture isn't obvious for an immunotherapy related toxicity.  She is taking Lomotil QID and my recommendation is to add Imodium 4 mg before meals.  We will have her re-visit with GI if her symptoms persist.   In regards to her neuropathy, we will increase the dose of Duloxetine to 60 mg qD and refer her to accupuncture.  Lastly, for her immunotherapy related hypothyroidism we will increase her dose of Synthroid to 37.5 mcg and consult Endocrinology.  CMP, TSH, T4, cycle #3, VV 6 weeks.    PATIENT EDUCATION  Ready to learn, no apparent learning barriers were identified; learning preferences include listening. Explained diagnosis and treatment plan; patient expressed understanding of the content.    ADMINISTRATIVE BILLING  Greater than 50% of was spent in counseling.

## 2024-01-03 ENCOUNTER — PATIENT MESSAGE (OUTPATIENT)
Dept: GYNECOLOGIC ONCOLOGY | Facility: CLINIC | Age: 59
End: 2024-01-03

## 2024-01-03 ENCOUNTER — OFFICE VISIT (OUTPATIENT)
Dept: GYNECOLOGIC ONCOLOGY | Facility: CLINIC | Age: 59
End: 2024-01-03
Payer: COMMERCIAL

## 2024-01-03 ENCOUNTER — INFUSION (OUTPATIENT)
Dept: INFUSION THERAPY | Facility: OTHER | Age: 59
End: 2024-01-03
Attending: INTERNAL MEDICINE
Payer: COMMERCIAL

## 2024-01-03 ENCOUNTER — TELEPHONE (OUTPATIENT)
Dept: GYNECOLOGIC ONCOLOGY | Facility: CLINIC | Age: 59
End: 2024-01-03

## 2024-01-03 ENCOUNTER — LAB VISIT (OUTPATIENT)
Dept: LAB | Facility: OTHER | Age: 59
End: 2024-01-03
Attending: OBSTETRICS & GYNECOLOGY
Payer: COMMERCIAL

## 2024-01-03 ENCOUNTER — E-CONSULT (OUTPATIENT)
Dept: ENDOCRINOLOGY | Facility: CLINIC | Age: 59
End: 2024-01-03
Payer: COMMERCIAL

## 2024-01-03 VITALS
SYSTOLIC BLOOD PRESSURE: 101 MMHG | HEIGHT: 67 IN | WEIGHT: 167.5 LBS | HEART RATE: 94 BPM | BODY MASS INDEX: 26.29 KG/M2 | DIASTOLIC BLOOD PRESSURE: 58 MMHG

## 2024-01-03 DIAGNOSIS — C54.1 MALIGNANT NEOPLASM OF ENDOMETRIUM: Primary | ICD-10-CM

## 2024-01-03 DIAGNOSIS — C54.1 MALIGNANT NEOPLASM OF ENDOMETRIUM: ICD-10-CM

## 2024-01-03 DIAGNOSIS — E03.8 SUBCLINICAL HYPOTHYROIDISM: ICD-10-CM

## 2024-01-03 DIAGNOSIS — Z51.11 ENCOUNTER FOR ANTINEOPLASTIC CHEMOTHERAPY: ICD-10-CM

## 2024-01-03 DIAGNOSIS — C77.2 SECONDARY MALIGNANCY OF RETROPERITONEAL LYMPH NODES: ICD-10-CM

## 2024-01-03 DIAGNOSIS — S74.8X9A: ICD-10-CM

## 2024-01-03 DIAGNOSIS — E03.2 HYPOTHYROIDISM DUE TO MEDICATION: Primary | ICD-10-CM

## 2024-01-03 DIAGNOSIS — G62.0 CHEMOTHERAPY-INDUCED NEUROPATHY: ICD-10-CM

## 2024-01-03 DIAGNOSIS — E03.2 HYPOTHYROIDISM DUE TO MEDICATION: ICD-10-CM

## 2024-01-03 DIAGNOSIS — T45.1X5A CHEMOTHERAPY-INDUCED NEUROPATHY: ICD-10-CM

## 2024-01-03 LAB
ALBUMIN SERPL BCP-MCNC: 3.5 G/DL (ref 3.5–5.2)
ALP SERPL-CCNC: 76 U/L (ref 55–135)
ALT SERPL W/O P-5'-P-CCNC: 18 U/L (ref 10–44)
ANION GAP SERPL CALC-SCNC: 8 MMOL/L (ref 8–16)
AST SERPL-CCNC: 20 U/L (ref 10–40)
BILIRUB SERPL-MCNC: 0.4 MG/DL (ref 0.1–1)
BUN SERPL-MCNC: 7 MG/DL (ref 6–20)
CALCIUM SERPL-MCNC: 8.8 MG/DL (ref 8.7–10.5)
CHLORIDE SERPL-SCNC: 106 MMOL/L (ref 95–110)
CO2 SERPL-SCNC: 25 MMOL/L (ref 23–29)
CREAT SERPL-MCNC: 0.8 MG/DL (ref 0.5–1.4)
EST. GFR  (NO RACE VARIABLE): >60 ML/MIN/1.73 M^2
GLUCOSE SERPL-MCNC: 132 MG/DL (ref 70–110)
POTASSIUM SERPL-SCNC: 3.3 MMOL/L (ref 3.5–5.1)
PROT SERPL-MCNC: 6.4 G/DL (ref 6–8.4)
SODIUM SERPL-SCNC: 139 MMOL/L (ref 136–145)
T4 FREE SERPL-MCNC: 0.5 NG/DL (ref 0.71–1.51)
TSH SERPL DL<=0.005 MIU/L-ACNC: 54.09 UIU/ML (ref 0.4–4)

## 2024-01-03 PROCEDURE — 96367 TX/PROPH/DG ADDL SEQ IV INF: CPT

## 2024-01-03 PROCEDURE — 1160F RVW MEDS BY RX/DR IN RCRD: CPT | Mod: CPTII,S$GLB,, | Performed by: OBSTETRICS & GYNECOLOGY

## 2024-01-03 PROCEDURE — 80053 COMPREHEN METABOLIC PANEL: CPT | Performed by: OBSTETRICS & GYNECOLOGY

## 2024-01-03 PROCEDURE — 84439 ASSAY OF FREE THYROXINE: CPT | Performed by: OBSTETRICS & GYNECOLOGY

## 2024-01-03 PROCEDURE — 84443 ASSAY THYROID STIM HORMONE: CPT | Performed by: OBSTETRICS & GYNECOLOGY

## 2024-01-03 PROCEDURE — 1159F MED LIST DOCD IN RCRD: CPT | Mod: CPTII,S$GLB,, | Performed by: OBSTETRICS & GYNECOLOGY

## 2024-01-03 PROCEDURE — 3078F DIAST BP <80 MM HG: CPT | Mod: CPTII,S$GLB,, | Performed by: OBSTETRICS & GYNECOLOGY

## 2024-01-03 PROCEDURE — 36415 COLL VENOUS BLD VENIPUNCTURE: CPT | Performed by: OBSTETRICS & GYNECOLOGY

## 2024-01-03 PROCEDURE — 3008F BODY MASS INDEX DOCD: CPT | Mod: CPTII,S$GLB,, | Performed by: OBSTETRICS & GYNECOLOGY

## 2024-01-03 PROCEDURE — 63600175 PHARM REV CODE 636 W HCPCS: Performed by: OBSTETRICS & GYNECOLOGY

## 2024-01-03 PROCEDURE — 99999 PR PBB SHADOW E&M-EST. PATIENT-LVL IV: CPT | Mod: PBBFAC,,, | Performed by: OBSTETRICS & GYNECOLOGY

## 2024-01-03 PROCEDURE — 99215 OFFICE O/P EST HI 40 MIN: CPT | Mod: S$GLB,,, | Performed by: OBSTETRICS & GYNECOLOGY

## 2024-01-03 PROCEDURE — 3074F SYST BP LT 130 MM HG: CPT | Mod: CPTII,S$GLB,, | Performed by: OBSTETRICS & GYNECOLOGY

## 2024-01-03 PROCEDURE — 96413 CHEMO IV INFUSION 1 HR: CPT

## 2024-01-03 PROCEDURE — 99451 NTRPROF PH1/NTRNET/EHR 5/>: CPT | Mod: S$GLB,,, | Performed by: INTERNAL MEDICINE

## 2024-01-03 PROCEDURE — 25000003 PHARM REV CODE 250: Performed by: OBSTETRICS & GYNECOLOGY

## 2024-01-03 RX ORDER — SODIUM CHLORIDE 0.9 % (FLUSH) 0.9 %
10 SYRINGE (ML) INJECTION
Status: DISCONTINUED | OUTPATIENT
Start: 2024-01-03 | End: 2024-01-03 | Stop reason: HOSPADM

## 2024-01-03 RX ORDER — EPINEPHRINE 0.3 MG/.3ML
0.3 INJECTION SUBCUTANEOUS ONCE AS NEEDED
Status: DISCONTINUED | OUTPATIENT
Start: 2024-01-03 | End: 2024-01-03 | Stop reason: HOSPADM

## 2024-01-03 RX ORDER — DIPHENHYDRAMINE HYDROCHLORIDE 50 MG/ML
50 INJECTION, SOLUTION INTRAMUSCULAR; INTRAVENOUS ONCE AS NEEDED
OUTPATIENT
Start: 2024-01-17

## 2024-01-03 RX ORDER — HEPARIN 100 UNIT/ML
500 SYRINGE INTRAVENOUS
OUTPATIENT
Start: 2024-01-17

## 2024-01-03 RX ORDER — HEPARIN 100 UNIT/ML
500 SYRINGE INTRAVENOUS
Status: DISCONTINUED | OUTPATIENT
Start: 2024-01-03 | End: 2024-01-03 | Stop reason: HOSPADM

## 2024-01-03 RX ORDER — PROCHLORPERAZINE EDISYLATE 5 MG/ML
5-10 INJECTION INTRAMUSCULAR; INTRAVENOUS ONCE AS NEEDED
Start: 2024-01-17

## 2024-01-03 RX ORDER — DIPHENHYDRAMINE HYDROCHLORIDE 50 MG/ML
50 INJECTION, SOLUTION INTRAMUSCULAR; INTRAVENOUS ONCE AS NEEDED
Status: DISCONTINUED | OUTPATIENT
Start: 2024-01-03 | End: 2024-01-03 | Stop reason: HOSPADM

## 2024-01-03 RX ORDER — LEVOTHYROXINE SODIUM 100 UG/1
100 TABLET ORAL
Qty: 30 TABLET | Refills: 11 | Status: SHIPPED | OUTPATIENT
Start: 2024-01-03 | End: 2025-01-02

## 2024-01-03 RX ORDER — SODIUM CHLORIDE 0.9 % (FLUSH) 0.9 %
10 SYRINGE (ML) INJECTION
OUTPATIENT
Start: 2024-01-17

## 2024-01-03 RX ORDER — EPINEPHRINE 0.3 MG/.3ML
0.3 INJECTION SUBCUTANEOUS ONCE AS NEEDED
OUTPATIENT
Start: 2024-01-17

## 2024-01-03 RX ADMIN — SODIUM CHLORIDE 1000 MG: 9 INJECTION, SOLUTION INTRAVENOUS at 10:01

## 2024-01-03 RX ADMIN — SODIUM CHLORIDE 150 MG: 9 INJECTION, SOLUTION INTRAVENOUS at 09:01

## 2024-01-03 NOTE — PLAN OF CARE
Problem: Nausea and Vomiting (Chemotherapy Effects)  Goal: Fluid and Electrolyte Balance  Outcome: Ongoing, Progressing     Problem: Neutropenia (Chemotherapy Effects)  Goal: Absence of Infection  Outcome: Ongoing, Progressing     Problem: Adult Inpatient Plan of Care  Goal: Plan of Care Review  Outcome: Ongoing, Progressing  Goal: Patient-Specific Goal (Individualized)  Outcome: Ongoing, Progressing  Goal: Optimal Comfort and Wellbeing  Outcome: Ongoing, Progressing       Patient presented today for iv dostarlimab infusion. PIV started to R hand and infusion given with no issues. VS remained stable throughout visit and assessment provided no issues. PIV removed w/o complications and all questions answered. Patient scheduled for next appt on 2-14-24, sent via [employer], and left clinic ambulatory in no acute distress.

## 2024-01-03 NOTE — CONSULTS
[facility]  Response for E-Consult     Patient Name: Sugar Diaz  MRN: [id number]  Primary Care Provider: Dexter Khan MD   Requesting Provider: Lavell Rodríguez MD  E-Consult to Endocrinology  Consult performed by: Lani Donald MD  Consult ordered by: Lavell Rodríguez MD          58-year-old on an check point inhibitor, was found to have hypothyroidism.  Was started on levothyroxine.  25 mcg in November and increase to 37.5 mcg in January of 2024.  Last labs showed TSH of 54 and a flow free T4.      At this point would recommend increasing levothyroxine closer to weight based dose.  Increase to 100 mcg daily, follow every 6-8 weeks and titrate to a normal TSH.  Ensure she is taking it properly see below            Note: Levothyroxine is generally taken daily in the morning on an empty stomach, with a sip of water. Advised to wait about 30 minutes at least before eating or drinking anything. If taking calcium, fiber, or iron supplements,  wait at least 4 hours after the levothyroxine to take them.      Alternatively, if night administration is preferred, okay to take 4 hours after the last meal              tation: 10 minute    I did not speak to the requesting provider verbally about this.     *This eConsult is based on the clinical data available to me and is furnished without benefit of a physical examination. The eConsult will need to be interpreted in light of any clinical issues or changes in patient status not available to me at the time of filing this eConsults. Significant changes in patient condition or level of acuity should result in immediate formal consultation and reevaluation. Please alert me if you have further questions.    Thank you for this eConsult referral.     MD [facility]

## 2024-01-05 RX ORDER — DULOXETIN HYDROCHLORIDE 30 MG/1
CAPSULE, DELAYED RELEASE ORAL
Qty: 84 CAPSULE | Refills: 2 | Status: SHIPPED | OUTPATIENT
Start: 2024-01-05 | End: 2024-02-02

## 2024-01-10 ENCOUNTER — PATIENT MESSAGE (OUTPATIENT)
Dept: GYNECOLOGIC ONCOLOGY | Facility: CLINIC | Age: 59
End: 2024-01-10
Payer: COMMERCIAL

## 2024-01-25 ENCOUNTER — CLINICAL SUPPORT (OUTPATIENT)
Dept: REHABILITATION | Facility: HOSPITAL | Age: 59
End: 2024-01-25
Attending: OBSTETRICS & GYNECOLOGY
Payer: COMMERCIAL

## 2024-01-25 DIAGNOSIS — C77.2 SECONDARY MALIGNANCY OF RETROPERITONEAL LYMPH NODES: ICD-10-CM

## 2024-01-25 DIAGNOSIS — C54.1 MALIGNANT NEOPLASM OF ENDOMETRIUM: ICD-10-CM

## 2024-01-25 DIAGNOSIS — Z51.11 ENCOUNTER FOR ANTINEOPLASTIC CHEMOTHERAPY: ICD-10-CM

## 2024-01-25 PROCEDURE — 97813 ACUP 1/> W/ESTIM 1ST 15 MIN: CPT | Performed by: ACUPUNCTURIST

## 2024-01-25 PROCEDURE — 97814 ACUP 1/> W/ESTIM EA ADDL 15: CPT | Performed by: ACUPUNCTURIST

## 2024-01-25 NOTE — PROGRESS NOTES
"  Acupuncture Evaluation Note     Name: Sugar Diaz  Clinic Number: [id number]    Traditional Chinese Medicine (TCM) Diagnosis: Qi Stagnation, Blood Stasis, and Qi Deficiency  Medical Diagnosis:   Encounter Diagnoses   Name Primary?    Malignant neoplasm of endometrium     Secondary malignancy of retroperitoneal lymph nodes     Encounter for antineoplastic chemotherapy         Evaluation Date: 1/25/2024    Visit #/Visits authorized: 1     Precautions: Standard and cancer    Subjective     Chief Concern: CIPN, bilateral feet and hands - mostly left hand.      Medical necessity is demonstrated by the following IMPAIRMENTS: Medical Necessity: Decreased mobility limits day to day activities, social, and emergent situations and Decreased quality of life              Aggravating Factors:  movement, lying down, standing, prolonged sitting, and pressure   Relieving Factors:  nothing    Symptom Description:     Quality:  Aching, Burning, Tight, Tingling, and Numb  Severity:  10  Frequency:  continuously    Previous Treatments Tried:  Medication    HEENT:  throat and mouth constantly dry    Chest:  no complaints    Digestion:     Diet: in general, a "healthy" diet     Fluids: denies use, is drinking plenty of fluids, none  Taste/Appetite: taste changes, food tastes bad, appetite ok but no desire to eat because of taste change    Symptoms: diarrhea and slight nausea     Sleep: no complaints     Energy Levels:  fatigue     Psychological Symptoms:  no complaints     Other Symptoms:     GYN Symptoms: full hysterectomy, removal of lymph nodes, 3 are still left, shrunk after chemo     Objective     Observation:     Tongue:  dark red/purple, red tip, sl raised on left    Body:     Color:     Coating:      Pulse:        thready       New Findings:      Treatment     Treatment Principles:  Move qi and blood, nourish qi, stop pain     Acupuncture points used:  4 DELMI, RADHA BAGLEY, RADHA REBOLLEDO , Gb34, Ki3, Li11, REN12, REN6, Sp6, Sp9, and " St36    Bilateral points:  Unilateral points:  Auricular Treatment:      Needles In: 30  Needles Out: 30  Likely W/ STIM placed: 11:20  Needles W/ STIM removed: 11:50      Other Traditional Chinese Medicine Modalities - [facility]    Assessment     After treatment, patient felt relaxed     Patient prognosis is Good.     Patient will continue to benefit from acupuncture treatment to address the deficits listed in the problem list box on initial evaluation, provide patient family education and to maximize pt's level of independence in the home and community environment.     Patient's spiritual, cultural and educational needs considered and pt agreeable to plan of care and goals.     Anticipated barriers to treatment: none    Plan     Recommend 1 /week for 5 sessions then re-assess.      Education:  Patient is aware of cumulative benefit of acupuncture

## 2024-02-01 ENCOUNTER — CLINICAL SUPPORT (OUTPATIENT)
Dept: REHABILITATION | Facility: HOSPITAL | Age: 59
End: 2024-02-01
Payer: COMMERCIAL

## 2024-02-01 DIAGNOSIS — Z51.11 ENCOUNTER FOR ANTINEOPLASTIC CHEMOTHERAPY: ICD-10-CM

## 2024-02-01 DIAGNOSIS — C77.2 SECONDARY MALIGNANCY OF RETROPERITONEAL LYMPH NODES: ICD-10-CM

## 2024-02-01 DIAGNOSIS — C54.1 MALIGNANT NEOPLASM OF ENDOMETRIUM: Primary | ICD-10-CM

## 2024-02-01 PROCEDURE — 97814 ACUP 1/> W/ESTIM EA ADDL 15: CPT | Performed by: ACUPUNCTURIST

## 2024-02-01 PROCEDURE — 97813 ACUP 1/> W/ESTIM 1ST 15 MIN: CPT | Performed by: ACUPUNCTURIST

## 2024-02-01 NOTE — PROGRESS NOTES
Acupuncture Evaluation Note     [id number]    Traditional Chinese Medicine (TCM) Diagnosis: Qi Stagnation, Blood Stasis, and Qi Deficiency  Medical Diagnosis:   Encounter Diagnoses   Name Primary?    Malignant neoplasm of endometrium Yes    Secondary malignancy of retroperitoneal lymph nodes     Encounter for antineoplastic chemotherapy         Evaluation Date: 2/1/2024    Visit #/Visits authorized: 2    Precautions: Standard and cancer    Subjective     Chief Concern: CIPN, bilateral feet and hands - mostly left hand.      Medical necessity is demonstrated by the following IMPAIRMENTS: Medical Necessity: Decreased mobility limits day to day activities, social, and emergent situations and Decreased quality of life              Aggravating Factors:  movement, lying down, standing, prolonged sitting, and pressure   Relieving Factors:  nothing    Symptom Description:     Quality:  Aching, Burning, Tight, Tingling, and Numb  Severity:  10  Frequency:  continuously      Objective       New Findings:      Treatment     Treatment Principles:  Move qi and blood, nourish qi, stop pain     Acupuncture points used:  4 AWAD, BA YUDI, BA KESHA , Gb34, Ki3, Li11, Sp6, Sp9, and St36    Bilateral points:  Unilateral points: LI6  Auricular Treatment:      Needles In: 30  Needles Out: 30  Needles W/ STIM placed: 4:20  Needles W/ STIM removed: 4:50      Assessment     After treatment, patient felt improvement in neuropathy - 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      Patient prognosis is Good.     Patient will continue to benefit from acupuncture treatment to address the deficits listed in the problem list box on initial evaluation, provide patient family education and to maximize pt's level of independence in the home and community environment.     Patient's spiritual, cultural and educational needs considered and pt agreeable to plan of care and goals.      Anticipated barriers to treatment: none    Plan     Recommend 1 /week for 4 sessions then re-assess.      Education:  Patient is aware of cumulative benefit of acupuncture

## 2024-02-05 ENCOUNTER — TELEPHONE (OUTPATIENT)
Dept: GYNECOLOGIC ONCOLOGY | Facility: CLINIC | Age: 59
End: 2024-02-05
Payer: COMMERCIAL

## 2024-02-05 ENCOUNTER — PATIENT MESSAGE (OUTPATIENT)
Dept: GYNECOLOGIC ONCOLOGY | Facility: CLINIC | Age: 59
End: 2024-02-05
Payer: COMMERCIAL

## 2024-02-05 NOTE — TELEPHONE ENCOUNTER
Left voicemail about the virtual appointment tomorrow also ask that the patient call or reply via my chart if the appointment do not work with her schedule.

## 2024-02-05 NOTE — TELEPHONE ENCOUNTER
Called to reschedule chemo office visit to virtual tomorrow no answer. Sent my chart message with new appointment.

## 2024-02-06 ENCOUNTER — TELEPHONE (OUTPATIENT)
Dept: GASTROENTEROLOGY | Facility: CLINIC | Age: 59
End: 2024-02-06
Payer: COMMERCIAL

## 2024-02-06 ENCOUNTER — OFFICE VISIT (OUTPATIENT)
Dept: GYNECOLOGIC ONCOLOGY | Facility: CLINIC | Age: 59
End: 2024-02-06
Payer: COMMERCIAL

## 2024-02-06 ENCOUNTER — TELEPHONE (OUTPATIENT)
Dept: GYNECOLOGIC ONCOLOGY | Facility: CLINIC | Age: 59
End: 2024-02-06

## 2024-02-06 DIAGNOSIS — R19.7 DIARRHEA, UNSPECIFIED TYPE: ICD-10-CM

## 2024-02-06 DIAGNOSIS — C77.2 SECONDARY MALIGNANCY OF RETROPERITONEAL LYMPH NODES: ICD-10-CM

## 2024-02-06 DIAGNOSIS — Z51.11 ENCOUNTER FOR ANTINEOPLASTIC CHEMOTHERAPY: ICD-10-CM

## 2024-02-06 DIAGNOSIS — G62.0 CHEMOTHERAPY-INDUCED NEUROPATHY: ICD-10-CM

## 2024-02-06 DIAGNOSIS — C54.1 MALIGNANT NEOPLASM OF ENDOMETRIUM: Primary | ICD-10-CM

## 2024-02-06 DIAGNOSIS — R19.7 DIARRHEA, UNSPECIFIED TYPE: Primary | ICD-10-CM

## 2024-02-06 DIAGNOSIS — E03.8 SUBCLINICAL HYPOTHYROIDISM: ICD-10-CM

## 2024-02-06 DIAGNOSIS — T45.1X5A CHEMOTHERAPY-INDUCED NEUROPATHY: ICD-10-CM

## 2024-02-06 PROCEDURE — 1159F MED LIST DOCD IN RCRD: CPT | Mod: CPTII,95,, | Performed by: OBSTETRICS & GYNECOLOGY

## 2024-02-06 PROCEDURE — G2211 COMPLEX E/M VISIT ADD ON: HCPCS | Mod: 95,,, | Performed by: OBSTETRICS & GYNECOLOGY

## 2024-02-06 PROCEDURE — 99215 OFFICE O/P EST HI 40 MIN: CPT | Mod: 95,,, | Performed by: OBSTETRICS & GYNECOLOGY

## 2024-02-06 PROCEDURE — 1160F RVW MEDS BY RX/DR IN RCRD: CPT | Mod: CPTII,95,, | Performed by: OBSTETRICS & GYNECOLOGY

## 2024-02-06 NOTE — PROGRESS NOTES
The patient location is: home  The chief complaint leading to consultation is: cycle #3 of maintenance immunotherapy    Visit type: audiovisual    Face to Face time with patient: 10  30 minutes of total time spent on the encounter, which includes face to face time and non-face to face time preparing to see the patient (eg, review of tests), Obtaining and/or reviewing separately obtained history, Documenting clinical information in the electronic or other health record, Independently interpreting results (not separately reported) and communicating results to the patient/family/caregiver, or Care coordination (not separately reported).         Each patient to whom he or she provides medical services by telemedicine is:  (1) informed of the relationship between the physician and patient and the respective role of any other health care provider with respect to management of the patient; and (2) notified that he or she may decline to receive medical services by telemedicine and may withdraw from such care at any time.    Notes:      REFERRING PROVIDER  Omaira Evans MD     REASON FOR CONSULT  Sugar Diaz  is a 58 y.o.  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    HISTORY OF PRESENT ILLNESS    Chemotherapy regimen: Dostarlimab 1000mg 6 weeks  Cycle: 3  Previous dose limiting toxicities: Y  Previous dose reductions: N  Previous breaks: Y  Previous changes in pre-medications: N    Energy level: baseline  Appetite: baseline  Fevers/chills/nights: no  Nausea: no  Diarrhea: yes, grade 3  Emesis: no  Neuropathy: yes, grade 3  Discoloration of lower extremities: no  Mucositis: no  Maculopapular rashes: yes, grade 2  Dyspnea or coughing: yes      Oncology History   Malignant neoplasm of endometrium   5/23/2023 Surgery    Hysteroscopy, D&C: grade 1 endometrioid EC     6/5/2023 Surgery    TRH/BSO/BSLN/RPLN/repair of obturator nerve    Final pathology c/w 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 4.5 cm, grade 1, 96% MI (22/23 mm), +LUE, -LVSI, +MELF, -cervix, 1/2+ RPLN, 1/1+ LPLN, -ascites.     6/19/2023 Imaging Significant Findings    CT C/A/P w/: High L para-aortic at the level of the renal vessels, 1.5 cm.  Low R para-aortic at the LEAH, 4.1 cm in greatest dimension.     6/25/2023 Genomic Testing    [facility] (NGS): ARD1A, CHEK1, CTCF, , HDAC2, HNF1A, JAK1, KRAS, MAX, MSH6, P1K3R1, PPM1D, PTEN, ZFHX3, ZSR2     6/28/2023 Tumor Conference    No rule for debulking of para-aortic lymph nodes.  Adjuvant VBT.  Represent after completion of adjuvant therapy to discuss EBRT.        7/7/2023 - 10/19/2023 Chemotherapy    Carboplatin AUC 5/Paclitaxel 135 mg/m2/Dostarlimab 200mg q3 weeks x6    Previous dose limiting toxicities: Y  Cycle 4: Grade 2 colitis s/p steroid taper  Cycle 5: Grade 3 colitis  Previous dose reductions: N  Previous breaks: Y  Cycle 4: Dostarlimab omitted due to grade 2 colitis  Cycle 5: Dostarlimab 2/2 grade 3 colitis (negative colonoscopy with improvement in symptoms with steroid taper)     8/9/2023 - 9/13/2023 Radiation Therapy    Treating physician: Dr. Adriane Babb  Total Dose: 21 Gy HDR  Fractions: 3 vaginal cuff brachytherapy     8/22/2023 Imaging Significant Findings    CT A/P w/: No evidence of colitis, SD in high L para-aortic and low R para-aortic.     10/4/2023 Procedure    Colonoscopy: WNL     11/6/2023 Imaging Significant Findings    CT C/A/P w/: PATTI     11/6/2023 - 11/9/2023 Hospital Admission    Most Recent Admission Details  Admit Date: 11/6/23  Admitted for: RVS PNA superimposed by community acquitted PNA  Discharge date: 11/9/23  Discharged from: [address] at [facility] ([facility])  Discharge disposition: Home or Self Care       11/22/2023 -  Maintenance Therapy    Dostarlimab 1000mg x          The following portions of the patient's history were reviewed and  updated as appropriate: allergies, current medications, family history, medical history, social history and surgical history.    REVIEW OF SYSTEMS  All systems reviewed and negative except as noted in HPI.    OBJECTIVE   There were no vitals filed for this visit.     There is no height or weight on file to calculate BMI.      1. General: Well appearing, no apparent distress, alert and oriented.  2. Lymph: Neck symmetric without cervical or supraclavicular adenopathy or mass.  3. Lungs: Normal respiratory rate, no accessory muscle use.  4. Psych: Normal affect.  5. Abdomen:  non-distended  6. Skin: Warm, dry, no rashes or lesions.   7. Extremities: Bilateral lower extremities without edema or tenderness.  8. Genitourinary: Deferred                 ECOG status: 1    LABORATORY DATA  Lab data reviewed.    RADIOLOGICAL DATA  Radiology data reviewed.    ASSESSMENT / PLAN     1. 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 Encounter for antineoplastic chemotherapy    4. Chemotherapy-induced neuropathy    5. Diarrhea, unspecified type    6. Subclinical hypothyroidism        The clinical picture is very worrisome for a grade 3-4 colitis.  She has exhausted Lomotil QID and Imodium 4 mg TID.  She also has a rash which is difficult to categorize.  At this point per ASCO guidelines she warrants a colonoscopy.  This was discussed with GI.  We will delay the initiation of steroids until she has a colonoscopy because last time steroids were initiated prior and potentially confounded the results.  We will keep her labs for next week but delay her maintenance infusion by 1 week.  Once she has a colonoscopy, she will message us, and I would like to revisit with her in person.  This visit will need to be performed prior to her next cycle. Continue with Duloxetine to 60 mg qD, accupuncture, and Synthroid 100 mcg.     PATIENT EDUCATION  Ready to learn, no apparent learning barriers  were identified; learning preferences include listening. Explained diagnosis and treatment plan; patient expressed understanding of the content.    ADMINISTRATIVE BILLING  Greater than 50% of was spent in counseling.     ONGOING COMPLEXITY BILLING  Visit today is associated with current or anticipated ongoing medical care related to this patients single serious condition/complex condition: endometrial cancer

## 2024-02-06 NOTE — TELEPHONE ENCOUNTER
[facility] Note    Urgent EGD and colonoscopy ordered due to the patient's continued diarrhea and possible ICI colitis.  Stool studies ordered as well.    Marcia Weaver MD

## 2024-02-06 NOTE — TELEPHONE ENCOUNTER
----- Message from Marcia Weaver MD sent at 2/6/2024  9:29 AM CST -----  Regarding: Stool studies and appt with Dr. Lopez  Can you send this patient a stool kit and  her to see Dr. Lopez for possible ICI colitis?    Thanks!    Marcia

## 2024-02-08 ENCOUNTER — TELEPHONE (OUTPATIENT)
Dept: ENDOSCOPY | Facility: HOSPITAL | Age: 59
End: 2024-02-08
Payer: COMMERCIAL

## 2024-02-08 ENCOUNTER — CLINICAL SUPPORT (OUTPATIENT)
Dept: REHABILITATION | Facility: HOSPITAL | Age: 59
End: 2024-02-08
Payer: COMMERCIAL

## 2024-02-08 VITALS — WEIGHT: 167 LBS | BODY MASS INDEX: 26.21 KG/M2 | HEIGHT: 67 IN

## 2024-02-08 DIAGNOSIS — C54.1 MALIGNANT NEOPLASM OF ENDOMETRIUM: Primary | ICD-10-CM

## 2024-02-08 DIAGNOSIS — R19.7 DIARRHEA, UNSPECIFIED TYPE: Primary | ICD-10-CM

## 2024-02-08 DIAGNOSIS — Z51.11 ENCOUNTER FOR ANTINEOPLASTIC CHEMOTHERAPY: ICD-10-CM

## 2024-02-08 DIAGNOSIS — C77.2 SECONDARY MALIGNANCY OF RETROPERITONEAL LYMPH NODES: ICD-10-CM

## 2024-02-08 PROCEDURE — 97813 ACUP 1/> W/ESTIM 1ST 15 MIN: CPT | Performed by: ACUPUNCTURIST

## 2024-02-08 PROCEDURE — 97814 ACUP 1/> W/ESTIM EA ADDL 15: CPT | Performed by: ACUPUNCTURIST

## 2024-02-08 NOTE — TELEPHONE ENCOUNTER
"----- Message from Marcia Weaver MD sent at 2024 10:05 AM CST -----  Regarding: RE: EGD and colonoscopy  Hi Yanick,    This was at the request of her Gyn Onc Dr Rodríguez.  I thought he spoke with her about it in clinic yesterday.  He has requested the colonoscopy to re evaluate since her symptoms persist and the EGD is to evaluate for other causes for her diarrhea.    Sincerely,    Marcia  ----- Message -----  From: Yanick Marcano MA  Sent: 2024  10:02 AM CST  To: Marcia Weaver MD  Subject: FW: EGD and colonoscopy                          Contact and spoke with patient to schedule EGD/Colonoscopy procedure. Patient stated that she was not aware that she needed another colonoscopy stating that she just had one done in 10/2023 she thought she was to repeat in 10 years. Please advise.     ----- Message -----  From: Essence Martin  Sent: 2024  12:01 PM CST  To: Yanick Marcano MA  Subject: FW: EGD and colonoscopy                            ----- Message -----  From: Marcia Weaver MD  Sent: 2024   9:26 AM CST  To: [facility]  Subject: EGD and colonoscopy                              Procedure: EGD/Colonoscopy    Diagnosis: Diarrhea    Procedure Timin-4 weeks    #If within 4 weeks selected, please jaxson as high priority#    #If greater than 12 weeks, please select "5-12 weeks" and delay sending until 3 months prior to requested date#     Provider: Any GI provider    Location: No Preference    Additional Scheduling Information: No scheduling concerns    Prep Specifications:Standard prep    Is the patient taking a GLP-1 Agonist:no    Have you attached a patient to this message: yes                "

## 2024-02-08 NOTE — PROGRESS NOTES
Acupuncture Evaluation Note     Name: Sugar Ball [facility] Number: [id number]    Traditional Chinese Medicine (TCM) Diagnosis: Qi Stagnation, Blood Stasis, and Qi Deficiency  Medical Diagnosis:   Encounter Diagnoses   Name Primary?    Malignant neoplasm of endometrium Yes    Secondary malignancy of retroperitoneal lymph nodes     Encounter for antineoplastic chemotherapy         Evaluation Date: 2/8/2024    Visit #/Visits authorized: 3    Precautions: Standard and cancer    Subjective     Chief Concern: CIPN, bilateral feet and hands - mostly right hand and feet.      Medical necessity is demonstrated by the following IMPAIRMENTS: Medical Necessity: Decreased mobility limits day to day activities, social, and emergent situations and Decreased quality of life              Aggravating Factors:  movement, lying down, standing, prolonged sitting, and pressure   Relieving Factors:  nothing    Symptom Description:     Quality:  Aching, Burning, Tight, Tingling, and Numb  Severity:  10  Frequency:  continuously      Objective       New Findings:      Treatment     Treatment Principles:  Move qi and blood, nourish qi, stop pain     Acupuncture points used:  BA YUDI, Gb34, Pc6, and St36    Bilateral points: SP4, KD1  Unilateral points: CV6, CV12  Auricular Treatment:      Needles In: 22  Needles Out: 22  Jasper W/ STIM placed: 4:20  Needles W/ STIM removed: 4:50      Assessment     After treatment, patient felt improvement in neuropathy - 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      Patient prognosis is Good.     Patient will continue to benefit from acupuncture treatment to address the deficits listed in the problem list box on initial evaluation, provide patient family education and to maximize pt's level of independence in the home and community environment.     Patient's spiritual, cultural and educational needs considered and pt agreeable to plan of care and goals.     Anticipated  barriers to treatment: none    Plan     Recommend 1 /week for 3 sessions then re-assess.      Education:  Patient is aware of cumulative benefit of acupuncture

## 2024-02-12 PROBLEM — J18.9 COMMUNITY ACQUIRED PNEUMONIA OF RIGHT LOWER LOBE OF LUNG: Status: RESOLVED | Noted: 2023-11-06 | Resolved: 2024-02-12

## 2024-02-14 DIAGNOSIS — Z12.11 ENCOUNTER FOR SCREENING COLONOSCOPY: Primary | ICD-10-CM

## 2024-02-14 NOTE — TELEPHONE ENCOUNTER
Spoke to patient to schedule procedure(s) Colonoscopy/EGD       Physician to perform procedure(s) Dr. SOULEYMANE Weaver   Date of Procedure (s) 04/30/2024  Arrival Time 7:15 Am   Time of Procedure(s) 8:15 AM    Location of Procedure(s) [facility] 2nd Floor--  Enter at the rear of the building through the emergency department screening station or the Outpatient Registration door, then continue to endoscopy department on the 2nd floor.    Type of Rx Prep sent to patient: PEG  Instructions provided to patient via [facility]    Patient was informed on the following information and verbalized understanding. Screening questionnaire reviewed with patient and complete. If procedure requires anesthesia, a responsible adult needs to be present to accompany the patient home, patient cannot drive after receiving anesthesia. Appointment details are tentative, especially check-in time. Patient will receive a prep-op call 7 days prior to confirm check-in time for procedure. If applicable the patient should contact their pharmacy to verify Rx for procedure prep is ready for pick-up. Patient was advised to call the scheduling department at [phone] if pharmacy states no Rx is available. Patient was advised to call the endoscopy scheduling department if any questions or concerns arise.      [facility] Scheduling Department

## 2024-02-14 NOTE — TELEPHONE ENCOUNTER
"----- Message from Essence Martin sent at 2024 12:00 PM CST -----  Regarding: FW: EGD and colonoscopy    ----- Message -----  From: Marcia Weaver MD  Sent: 2024   9:26 AM CST  To: [facility]  Subject: EGD and colonoscopy                              Procedure: EGD/Colonoscopy    Diagnosis: Diarrhea    Procedure Timin-4 weeks    #If within 4 weeks selected, please jaxson as high priority#    #If greater than 12 weeks, please select "5-12 weeks" and delay sending until 3 months prior to requested date#     Provider: Any GI provider    Location: No Preference    Additional Scheduling Information: No scheduling concerns    Prep Specifications:Standard prep    Is the patient taking a GLP-1 Agonist:no    Have you attached a patient to this message: yes            "

## 2024-02-15 ENCOUNTER — TELEPHONE (OUTPATIENT)
Dept: GYNECOLOGIC ONCOLOGY | Facility: CLINIC | Age: 59
End: 2024-02-15
Payer: COMMERCIAL

## 2024-02-15 ENCOUNTER — DOCUMENTATION ONLY (OUTPATIENT)
Dept: GYNECOLOGIC ONCOLOGY | Facility: CLINIC | Age: 59
End: 2024-02-15
Payer: COMMERCIAL

## 2024-02-15 ENCOUNTER — HOSPITAL ENCOUNTER (INPATIENT)
Facility: HOSPITAL | Age: 59
LOS: 8 days | Discharge: HOME | DRG: 373 | End: 2024-02-23
Attending: STUDENT IN AN ORGANIZED HEALTH CARE EDUCATION/TRAINING PROGRAM | Admitting: OBSTETRICS & GYNECOLOGY
Payer: COMMERCIAL

## 2024-02-15 ENCOUNTER — PATIENT MESSAGE (OUTPATIENT)
Dept: GYNECOLOGIC ONCOLOGY | Facility: CLINIC | Age: 59
End: 2024-02-15
Payer: COMMERCIAL

## 2024-02-15 ENCOUNTER — CLINICAL SUPPORT (OUTPATIENT)
Dept: REHABILITATION | Facility: HOSPITAL | Age: 59
DRG: 373 | End: 2024-02-15
Payer: COMMERCIAL

## 2024-02-15 DIAGNOSIS — C54.1 ENDOMETRIAL CANCER: ICD-10-CM

## 2024-02-15 DIAGNOSIS — K52.9 COLITIS: ICD-10-CM

## 2024-02-15 DIAGNOSIS — R89.9 ABNORMAL LABORATORY TEST: ICD-10-CM

## 2024-02-15 DIAGNOSIS — K52.9 CHRONIC DIARRHEA: Primary | ICD-10-CM

## 2024-02-15 DIAGNOSIS — C77.2 SECONDARY MALIGNANCY OF RETROPERITONEAL LYMPH NODES: ICD-10-CM

## 2024-02-15 DIAGNOSIS — A02.9 SALMONELLA: ICD-10-CM

## 2024-02-15 DIAGNOSIS — C54.1 MALIGNANT NEOPLASM OF ENDOMETRIUM: Primary | ICD-10-CM

## 2024-02-15 DIAGNOSIS — Z51.11 ENCOUNTER FOR ANTINEOPLASTIC CHEMOTHERAPY: ICD-10-CM

## 2024-02-15 DIAGNOSIS — E87.6 HYPOKALEMIA: ICD-10-CM

## 2024-02-15 DIAGNOSIS — R19.7 DIARRHEA, UNSPECIFIED TYPE: ICD-10-CM

## 2024-02-15 LAB
ALBUMIN SERPL BCP-MCNC: 3.8 G/DL (ref 3.5–5.2)
ALP SERPL-CCNC: 85 U/L (ref 55–135)
ALT SERPL W/O P-5'-P-CCNC: 24 U/L (ref 10–44)
ANION GAP SERPL CALC-SCNC: 13 MMOL/L (ref 8–16)
AST SERPL-CCNC: 28 U/L (ref 10–40)
BASOPHILS # BLD AUTO: 0.06 K/UL (ref 0–0.2)
BASOPHILS NFR BLD: 0.8 % (ref 0–1.9)
BILIRUB SERPL-MCNC: 0.4 MG/DL (ref 0.1–1)
BUN SERPL-MCNC: 7 MG/DL (ref 6–20)
CALCIUM SERPL-MCNC: 9.7 MG/DL (ref 8.7–10.5)
CHLORIDE SERPL-SCNC: 97 MMOL/L (ref 95–110)
CO2 SERPL-SCNC: 27 MMOL/L (ref 23–29)
CREAT SERPL-MCNC: 0.7 MG/DL (ref 0.5–1.4)
DIFFERENTIAL METHOD BLD: ABNORMAL
EOSINOPHIL # BLD AUTO: 0.4 K/UL (ref 0–0.5)
EOSINOPHIL NFR BLD: 5.4 % (ref 0–8)
ERYTHROCYTE [DISTWIDTH] IN BLOOD BY AUTOMATED COUNT: 12.6 % (ref 11.5–14.5)
EST. GFR  (NO RACE VARIABLE): >60 ML/MIN/1.73 M^2
GLUCOSE SERPL-MCNC: 160 MG/DL (ref 70–110)
HCT VFR BLD AUTO: 42.9 % (ref 37–48.5)
HCV AB SERPL QL IA: NORMAL
HGB BLD-MCNC: 15.5 G/DL (ref 12–16)
HIV 1+2 AB+HIV1 P24 AG SERPL QL IA: NORMAL
IMM GRANULOCYTES # BLD AUTO: 0.02 K/UL (ref 0–0.04)
IMM GRANULOCYTES NFR BLD AUTO: 0.3 % (ref 0–0.5)
LYMPHOCYTES # BLD AUTO: 1.9 K/UL (ref 1–4.8)
LYMPHOCYTES NFR BLD: 26.1 % (ref 18–48)
MAGNESIUM SERPL-MCNC: 1.7 MG/DL (ref 1.6–2.6)
MCH RBC QN AUTO: 31 PG (ref 27–31)
MCHC RBC AUTO-ENTMCNC: 36.1 G/DL (ref 32–36)
MCV RBC AUTO: 86 FL (ref 82–98)
MONOCYTES # BLD AUTO: 1 K/UL (ref 0.3–1)
MONOCYTES NFR BLD: 14.1 % (ref 4–15)
NEUTROPHILS # BLD AUTO: 3.9 K/UL (ref 1.8–7.7)
NEUTROPHILS NFR BLD: 53.3 % (ref 38–73)
NRBC BLD-RTO: 0 /100 WBC
PHOSPHATE SERPL-MCNC: 3.2 MG/DL (ref 2.7–4.5)
PLATELET # BLD AUTO: 426 K/UL (ref 150–450)
PMV BLD AUTO: 8.9 FL (ref 9.2–12.9)
POTASSIUM SERPL-SCNC: 2.5 MMOL/L (ref 3.5–5.1)
PROT SERPL-MCNC: 7.3 G/DL (ref 6–8.4)
RBC # BLD AUTO: 5 M/UL (ref 4–5.4)
SARS-COV-2 RDRP RESP QL NAA+PROBE: NEGATIVE
SODIUM SERPL-SCNC: 137 MMOL/L (ref 136–145)
TROPONIN I SERPL DL<=0.01 NG/ML-MCNC: <0.006 NG/ML (ref 0–0.03)
WBC # BLD AUTO: 7.37 K/UL (ref 3.9–12.7)

## 2024-02-15 PROCEDURE — 87389 HIV-1 AG W/HIV-1&-2 AB AG IA: CPT | Performed by: PHYSICIAN ASSISTANT

## 2024-02-15 PROCEDURE — 86803 HEPATITIS C AB TEST: CPT | Performed by: PHYSICIAN ASSISTANT

## 2024-02-15 PROCEDURE — 83735 ASSAY OF MAGNESIUM: CPT | Performed by: NURSE PRACTITIONER

## 2024-02-15 PROCEDURE — 93010 ELECTROCARDIOGRAM REPORT: CPT | Mod: ,,, | Performed by: INTERNAL MEDICINE

## 2024-02-15 PROCEDURE — 27000207 HC ISOLATION

## 2024-02-15 PROCEDURE — 84100 ASSAY OF PHOSPHORUS: CPT | Performed by: NURSE PRACTITIONER

## 2024-02-15 PROCEDURE — 99285 EMERGENCY DEPT VISIT HI MDM: CPT | Mod: 25

## 2024-02-15 PROCEDURE — 85025 COMPLETE CBC W/AUTO DIFF WBC: CPT | Performed by: NURSE PRACTITIONER

## 2024-02-15 PROCEDURE — 93005 ELECTROCARDIOGRAM TRACING: CPT

## 2024-02-15 PROCEDURE — 97814 ACUP 1/> W/ESTIM EA ADDL 15: CPT | Performed by: ACUPUNCTURIST

## 2024-02-15 PROCEDURE — 93010 ELECTROCARDIOGRAM REPORT: CPT | Mod: 76,,, | Performed by: INTERNAL MEDICINE

## 2024-02-15 PROCEDURE — 63600175 PHARM REV CODE 636 W HCPCS

## 2024-02-15 PROCEDURE — U0002 COVID-19 LAB TEST NON-CDC: HCPCS

## 2024-02-15 PROCEDURE — 25000003 PHARM REV CODE 250

## 2024-02-15 PROCEDURE — 97813 ACUP 1/> W/ESTIM 1ST 15 MIN: CPT | Performed by: ACUPUNCTURIST

## 2024-02-15 PROCEDURE — 80053 COMPREHEN METABOLIC PANEL: CPT | Mod: 91 | Performed by: NURSE PRACTITIONER

## 2024-02-15 PROCEDURE — 12000002 HC ACUTE/MED SURGE SEMI-PRIVATE ROOM

## 2024-02-15 PROCEDURE — 96365 THER/PROPH/DIAG IV INF INIT: CPT

## 2024-02-15 PROCEDURE — 84484 ASSAY OF TROPONIN QUANT: CPT | Performed by: NURSE PRACTITIONER

## 2024-02-15 RX ORDER — OXYCODONE HYDROCHLORIDE 5 MG/1
5 TABLET ORAL EVERY 4 HOURS PRN
Status: DISCONTINUED | OUTPATIENT
Start: 2024-02-15 | End: 2024-02-23 | Stop reason: HOSPADM

## 2024-02-15 RX ORDER — POTASSIUM CHLORIDE 7.45 MG/ML
10 INJECTION INTRAVENOUS
Status: COMPLETED | OUTPATIENT
Start: 2024-02-15 | End: 2024-02-15

## 2024-02-15 RX ORDER — LEVOTHYROXINE SODIUM 100 UG/1
100 TABLET ORAL
Status: DISCONTINUED | OUTPATIENT
Start: 2024-02-16 | End: 2024-02-23 | Stop reason: HOSPADM

## 2024-02-15 RX ORDER — ACETAMINOPHEN 325 MG/1
650 TABLET ORAL EVERY 4 HOURS PRN
Status: DISCONTINUED | OUTPATIENT
Start: 2024-02-15 | End: 2024-02-23 | Stop reason: HOSPADM

## 2024-02-15 RX ORDER — ZOLPIDEM TARTRATE 5 MG/1
5 TABLET ORAL NIGHTLY PRN
Status: DISCONTINUED | OUTPATIENT
Start: 2024-02-15 | End: 2024-02-23 | Stop reason: HOSPADM

## 2024-02-15 RX ORDER — DULOXETIN HYDROCHLORIDE 30 MG/1
30 CAPSULE, DELAYED RELEASE ORAL DAILY
Status: DISCONTINUED | OUTPATIENT
Start: 2024-02-16 | End: 2024-02-23 | Stop reason: HOSPADM

## 2024-02-15 RX ORDER — OXYCODONE HYDROCHLORIDE 10 MG/1
10 TABLET ORAL EVERY 4 HOURS PRN
Status: DISCONTINUED | OUTPATIENT
Start: 2024-02-15 | End: 2024-02-23 | Stop reason: HOSPADM

## 2024-02-15 RX ORDER — FAMOTIDINE 20 MG/1
20 TABLET, FILM COATED ORAL 2 TIMES DAILY
Status: DISCONTINUED | OUTPATIENT
Start: 2024-02-16 | End: 2024-02-23 | Stop reason: HOSPADM

## 2024-02-15 RX ORDER — ONDANSETRON 8 MG/1
8 TABLET, ORALLY DISINTEGRATING ORAL EVERY 8 HOURS PRN
Status: DISCONTINUED | OUTPATIENT
Start: 2024-02-16 | End: 2024-02-23 | Stop reason: HOSPADM

## 2024-02-15 RX ORDER — POTASSIUM CHLORIDE 7.45 MG/ML
10 INJECTION INTRAVENOUS
Status: DISPENSED | OUTPATIENT
Start: 2024-02-15 | End: 2024-02-16

## 2024-02-15 RX ORDER — POTASSIUM CHLORIDE 7.45 MG/ML
10 INJECTION INTRAVENOUS
Status: DISCONTINUED | OUTPATIENT
Start: 2024-02-15 | End: 2024-02-15

## 2024-02-15 RX ORDER — SODIUM CHLORIDE 0.9 % (FLUSH) 0.9 %
10 SYRINGE (ML) INJECTION
Status: DISCONTINUED | OUTPATIENT
Start: 2024-02-15 | End: 2024-02-23 | Stop reason: HOSPADM

## 2024-02-15 RX ADMIN — POTASSIUM BICARBONATE 40 MEQ: 391 TABLET, EFFERVESCENT ORAL at 08:02

## 2024-02-15 RX ADMIN — ZOLPIDEM TARTRATE 5 MG: 5 TABLET ORAL at 11:02

## 2024-02-15 RX ADMIN — POTASSIUM CHLORIDE 10 MEQ: 7.46 INJECTION, SOLUTION INTRAVENOUS at 11:02

## 2024-02-15 RX ADMIN — POTASSIUM CHLORIDE 10 MEQ: 7.46 INJECTION, SOLUTION INTRAVENOUS at 08:02

## 2024-02-16 LAB
ANION GAP SERPL CALC-SCNC: 10 MMOL/L (ref 8–16)
ANION GAP SERPL CALC-SCNC: 11 MMOL/L (ref 8–16)
BUN SERPL-MCNC: 6 MG/DL (ref 6–20)
BUN SERPL-MCNC: 8 MG/DL (ref 6–20)
CALCIUM SERPL-MCNC: 8.6 MG/DL (ref 8.7–10.5)
CALCIUM SERPL-MCNC: 9 MG/DL (ref 8.7–10.5)
CHLORIDE SERPL-SCNC: 100 MMOL/L (ref 95–110)
CHLORIDE SERPL-SCNC: 104 MMOL/L (ref 95–110)
CO2 SERPL-SCNC: 24 MMOL/L (ref 23–29)
CO2 SERPL-SCNC: 26 MMOL/L (ref 23–29)
CREAT SERPL-MCNC: 0.6 MG/DL (ref 0.5–1.4)
CREAT SERPL-MCNC: 0.6 MG/DL (ref 0.5–1.4)
CRP SERPL-MCNC: 3 MG/L (ref 0–8.2)
EST. GFR  (NO RACE VARIABLE): >60 ML/MIN/1.73 M^2
EST. GFR  (NO RACE VARIABLE): >60 ML/MIN/1.73 M^2
GLUCOSE SERPL-MCNC: 119 MG/DL (ref 70–110)
GLUCOSE SERPL-MCNC: 134 MG/DL (ref 70–110)
HBV CORE AB SERPL QL IA: NORMAL
HBV SURFACE AG SERPL QL IA: NORMAL
MAGNESIUM SERPL-MCNC: 1.9 MG/DL (ref 1.6–2.6)
MAGNESIUM SERPL-MCNC: 2.5 MG/DL (ref 1.6–2.6)
OHS QRS DURATION: 86 MS
OHS QRS DURATION: 90 MS
OHS QTC CALCULATION: 481 MS
OHS QTC CALCULATION: 505 MS
PHOSPHATE SERPL-MCNC: 2.2 MG/DL (ref 2.7–4.5)
PHOSPHATE SERPL-MCNC: 3.3 MG/DL (ref 2.7–4.5)
POTASSIUM SERPL-SCNC: 2.3 MMOL/L (ref 3.5–5.1)
POTASSIUM SERPL-SCNC: 3.1 MMOL/L (ref 3.5–5.1)
SODIUM SERPL-SCNC: 137 MMOL/L (ref 136–145)
SODIUM SERPL-SCNC: 138 MMOL/L (ref 136–145)

## 2024-02-16 PROCEDURE — 63600175 PHARM REV CODE 636 W HCPCS

## 2024-02-16 PROCEDURE — 84100 ASSAY OF PHOSPHORUS: CPT | Mod: 91

## 2024-02-16 PROCEDURE — 86364 TISS TRNSGLTMNASE EA IG CLAS: CPT | Performed by: STUDENT IN AN ORGANIZED HEALTH CARE EDUCATION/TRAINING PROGRAM

## 2024-02-16 PROCEDURE — 25000003 PHARM REV CODE 250

## 2024-02-16 PROCEDURE — 83735 ASSAY OF MAGNESIUM: CPT | Mod: 91

## 2024-02-16 PROCEDURE — 20600001 HC STEP DOWN PRIVATE ROOM

## 2024-02-16 PROCEDURE — 86140 C-REACTIVE PROTEIN: CPT | Performed by: STUDENT IN AN ORGANIZED HEALTH CARE EDUCATION/TRAINING PROGRAM

## 2024-02-16 PROCEDURE — 80048 BASIC METABOLIC PNL TOTAL CA: CPT

## 2024-02-16 PROCEDURE — 99233 SBSQ HOSP IP/OBS HIGH 50: CPT | Mod: ,,, | Performed by: OBSTETRICS & GYNECOLOGY

## 2024-02-16 PROCEDURE — 80048 BASIC METABOLIC PNL TOTAL CA: CPT | Mod: 91

## 2024-02-16 PROCEDURE — 84100 ASSAY OF PHOSPHORUS: CPT

## 2024-02-16 PROCEDURE — 96376 TX/PRO/DX INJ SAME DRUG ADON: CPT

## 2024-02-16 PROCEDURE — 86704 HEP B CORE ANTIBODY TOTAL: CPT | Performed by: STUDENT IN AN ORGANIZED HEALTH CARE EDUCATION/TRAINING PROGRAM

## 2024-02-16 PROCEDURE — 86480 TB TEST CELL IMMUN MEASURE: CPT | Performed by: STUDENT IN AN ORGANIZED HEALTH CARE EDUCATION/TRAINING PROGRAM

## 2024-02-16 PROCEDURE — 86258 DGP ANTIBODY EACH IG CLASS: CPT | Mod: 59 | Performed by: STUDENT IN AN ORGANIZED HEALTH CARE EDUCATION/TRAINING PROGRAM

## 2024-02-16 PROCEDURE — 96361 HYDRATE IV INFUSION ADD-ON: CPT

## 2024-02-16 PROCEDURE — 87340 HEPATITIS B SURFACE AG IA: CPT | Performed by: STUDENT IN AN ORGANIZED HEALTH CARE EDUCATION/TRAINING PROGRAM

## 2024-02-16 PROCEDURE — 99223 1ST HOSP IP/OBS HIGH 75: CPT | Mod: ,,, | Performed by: INTERNAL MEDICINE

## 2024-02-16 PROCEDURE — 83735 ASSAY OF MAGNESIUM: CPT

## 2024-02-16 RX ORDER — SODIUM CHLORIDE, SODIUM LACTATE, POTASSIUM CHLORIDE, CALCIUM CHLORIDE 600; 310; 30; 20 MG/100ML; MG/100ML; MG/100ML; MG/100ML
INJECTION, SOLUTION INTRAVENOUS CONTINUOUS
Status: DISCONTINUED | OUTPATIENT
Start: 2024-02-16 | End: 2024-02-16

## 2024-02-16 RX ORDER — POTASSIUM CHLORIDE 20 MEQ/1
40 TABLET, EXTENDED RELEASE ORAL ONCE
Status: COMPLETED | OUTPATIENT
Start: 2024-02-16 | End: 2024-02-16

## 2024-02-16 RX ORDER — SODIUM CHLORIDE AND POTASSIUM CHLORIDE 150; 900 MG/100ML; MG/100ML
INJECTION, SOLUTION INTRAVENOUS CONTINUOUS
Status: DISCONTINUED | OUTPATIENT
Start: 2024-02-16 | End: 2024-02-17

## 2024-02-16 RX ORDER — MAGNESIUM SULFATE HEPTAHYDRATE 40 MG/ML
4 INJECTION, SOLUTION INTRAVENOUS ONCE
Status: COMPLETED | OUTPATIENT
Start: 2024-02-16 | End: 2024-02-16

## 2024-02-16 RX ORDER — METHYLPREDNISOLONE SOD SUCC 125 MG
40 VIAL (EA) INJECTION DAILY
Status: DISCONTINUED | OUTPATIENT
Start: 2024-02-16 | End: 2024-02-18

## 2024-02-16 RX ORDER — SODIUM,POTASSIUM PHOSPHATES 280-250MG
1 POWDER IN PACKET (EA) ORAL 2 TIMES DAILY
Status: DISCONTINUED | OUTPATIENT
Start: 2024-02-16 | End: 2024-02-17

## 2024-02-16 RX ORDER — POTASSIUM CHLORIDE 7.45 MG/ML
INJECTION INTRAVENOUS
Status: COMPLETED
Start: 2024-02-16 | End: 2024-02-16

## 2024-02-16 RX ORDER — DIPHENOXYLATE HYDROCHLORIDE AND ATROPINE SULFATE 2.5; .025 MG/1; MG/1
1 TABLET ORAL 3 TIMES DAILY
Status: DISCONTINUED | OUTPATIENT
Start: 2024-02-16 | End: 2024-02-22

## 2024-02-16 RX ORDER — LOPERAMIDE HYDROCHLORIDE 2 MG/1
4 CAPSULE ORAL 3 TIMES DAILY
Status: DISCONTINUED | OUTPATIENT
Start: 2024-02-16 | End: 2024-02-22

## 2024-02-16 RX ORDER — POTASSIUM CHLORIDE 7.45 MG/ML
10 INJECTION INTRAVENOUS
Status: COMPLETED | OUTPATIENT
Start: 2024-02-16 | End: 2024-02-16

## 2024-02-16 RX ORDER — POTASSIUM CHLORIDE 20 MEQ/1
20 TABLET, EXTENDED RELEASE ORAL EVERY 4 HOURS
Status: COMPLETED | OUTPATIENT
Start: 2024-02-16 | End: 2024-02-17

## 2024-02-16 RX ORDER — LANOLIN ALCOHOL/MO/W.PET/CERES
400 CREAM (GRAM) TOPICAL ONCE
Status: DISCONTINUED | OUTPATIENT
Start: 2024-02-16 | End: 2024-02-16

## 2024-02-16 RX ORDER — ENOXAPARIN SODIUM 100 MG/ML
40 INJECTION SUBCUTANEOUS EVERY 24 HOURS
Status: DISCONTINUED | OUTPATIENT
Start: 2024-02-16 | End: 2024-02-23 | Stop reason: HOSPADM

## 2024-02-16 RX ORDER — SODIUM,POTASSIUM PHOSPHATES 280-250MG
1 POWDER IN PACKET (EA) ORAL 2 TIMES DAILY
Status: DISCONTINUED | OUTPATIENT
Start: 2024-02-16 | End: 2024-02-16

## 2024-02-16 RX ADMIN — POTASSIUM CHLORIDE 10 MEQ: 7.46 INJECTION, SOLUTION INTRAVENOUS at 06:02

## 2024-02-16 RX ADMIN — POTASSIUM & SODIUM PHOSPHATES POWDER PACK 280-160-250 MG 1 PACKET: 280-160-250 PACK at 06:02

## 2024-02-16 RX ADMIN — DIPHENOXYLATE HYDROCHLORIDE AND ATROPINE SULFATE 1 TABLET: 2.5; .025 TABLET ORAL at 08:02

## 2024-02-16 RX ADMIN — DIPHENOXYLATE HYDROCHLORIDE AND ATROPINE SULFATE 1 TABLET: 2.5; .025 TABLET ORAL at 04:02

## 2024-02-16 RX ADMIN — DULOXETINE HYDROCHLORIDE 30 MG: 30 CAPSULE, DELAYED RELEASE ORAL at 09:02

## 2024-02-16 RX ADMIN — METHYLPREDNISOLONE SODIUM SUCCINATE 40 MG: 125 INJECTION, POWDER, FOR SOLUTION INTRAMUSCULAR; INTRAVENOUS at 06:02

## 2024-02-16 RX ADMIN — POTASSIUM CHLORIDE 20 MEQ: 1500 TABLET, EXTENDED RELEASE ORAL at 11:02

## 2024-02-16 RX ADMIN — SODIUM CHLORIDE AND POTASSIUM CHLORIDE: 9; 1.49 INJECTION, SOLUTION INTRAVENOUS at 06:02

## 2024-02-16 RX ADMIN — LOPERAMIDE HYDROCHLORIDE 4 MG: 2 CAPSULE ORAL at 09:02

## 2024-02-16 RX ADMIN — POTASSIUM CHLORIDE 10 MEQ: 7.46 INJECTION, SOLUTION INTRAVENOUS at 05:02

## 2024-02-16 RX ADMIN — LOPERAMIDE HYDROCHLORIDE 4 MG: 2 CAPSULE ORAL at 04:02

## 2024-02-16 RX ADMIN — DIPHENOXYLATE HYDROCHLORIDE AND ATROPINE SULFATE 1 TABLET: 2.5; .025 TABLET ORAL at 12:02

## 2024-02-16 RX ADMIN — POTASSIUM CHLORIDE 10 MEQ: 7.46 INJECTION, SOLUTION INTRAVENOUS at 03:02

## 2024-02-16 RX ADMIN — POTASSIUM CHLORIDE 40 MEQ: 1500 TABLET, EXTENDED RELEASE ORAL at 02:02

## 2024-02-16 RX ADMIN — SODIUM CHLORIDE, POTASSIUM CHLORIDE, SODIUM LACTATE AND CALCIUM CHLORIDE: 600; 310; 30; 20 INJECTION, SOLUTION INTRAVENOUS at 01:02

## 2024-02-16 RX ADMIN — LOPERAMIDE HYDROCHLORIDE 4 MG: 2 CAPSULE ORAL at 08:02

## 2024-02-16 RX ADMIN — FAMOTIDINE 20 MG: 20 TABLET, FILM COATED ORAL at 09:02

## 2024-02-16 RX ADMIN — POTASSIUM CHLORIDE 10 MEQ: 7.46 INJECTION, SOLUTION INTRAVENOUS at 02:02

## 2024-02-16 RX ADMIN — ENOXAPARIN SODIUM 40 MG: 40 INJECTION SUBCUTANEOUS at 04:02

## 2024-02-16 RX ADMIN — LEVOTHYROXINE SODIUM 100 MCG: 100 TABLET ORAL at 06:02

## 2024-02-16 RX ADMIN — POTASSIUM CHLORIDE 40 MEQ: 1500 TABLET, EXTENDED RELEASE ORAL at 09:02

## 2024-02-16 RX ADMIN — POTASSIUM CHLORIDE 20 MEQ: 1500 TABLET, EXTENDED RELEASE ORAL at 08:02

## 2024-02-16 RX ADMIN — MAGNESIUM SULFATE HEPTAHYDRATE 4 G: 40 INJECTION, SOLUTION INTRAVENOUS at 09:02

## 2024-02-16 RX ADMIN — FAMOTIDINE 20 MG: 20 TABLET, FILM COATED ORAL at 08:02

## 2024-02-16 NOTE — NURSING
Report received from off going nurse, CARLY Vargas. Patient AAO. No signs of distress noted. Call light in reach. Bed low and locked. Will continue plan of care.

## 2024-02-16 NOTE — ASSESSMENT & PLAN NOTE
- Denies abdominal pain or cramping   - Tolerating PO   - Recent Giardia/Cryptosporidium/C diff all negative

## 2024-02-16 NOTE — NURSING
Nurses Note -- 4 Eyes      02/15/2024   11:30 PM      Skin assessed during: Admit      [x] No Altered Skin Integrity Present    [x]Prevention Measures Documented      [] Yes- Altered Skin Integrity Present or Discovered   [] LDA Added if Not in Epic (Describe Wound)   [] New Altered Skin Integrity was Present on Admit and Documented in LDA   [] Wound Image Taken    Wound Care Consulted? No    Attending Nurse:  Alicia CARROLL    Second RN/Staff Member:   Constanza CARROLL

## 2024-02-16 NOTE — CONSULTS
[facility] - Emergency Dept  Gynecology Oncology   Consult Note    Patient Name: Sugar Diaz  MRN: [id number]  Admission Date: 2/15/2024  Hospital Length of Stay: 0 days  Code Status: Full Code  Primary Care Provider: Dexter Khan MD  Principal Problem: Hypokalemia    Inpatient consult to Gynecologic Oncology  Consult performed by: Han Clancy MD  Consult ordered by: Han Clancy MD      In short, Sugar Diaz is a 58 y.o.  with stage IIIC1 grade 1 endometrioid endometrial cancer on Cycle 3 of Dostarlimab maintenance immunotherapy who is admitted for severe hypokalemia and electrolyte repletion.     See H&P for details.    Han Clancy MD  Obstetrics & Gynecology  [facility] - Emergency Dept

## 2024-02-16 NOTE — ED TRIAGE NOTES
Patient is a 58-year-old female presents to the ED via personal vehicle with c/o hypokalemia. Patient had some blood work done today where it shows low potassium levels. Patient was recommended to come to the ED immediately. Patient denies SOB or chest pain. Patient endorses nausea and diarrhea.

## 2024-02-16 NOTE — HPI
Sugar Diaz is a 58 y.o.  with stage IIIC1 grade 1 endometrioid endometrial cancer who completed Cycle 3 Dostarlimab 1000mg  on  who was instructed to present to the ED after a critical lab value of her potassium resulted. Her K+ outpatient was 2.7. The patient endorses having loose stools and nausea, but denies fevers, chills, vomiting, chest pain, SOB, or muscle weakness. Denies recent illness.    [facility], the patient's CBC is wnL, K+ 2.5,  Glucose 160, Cr wnL. VSS and wnL. EKG reveal sinus tachycardia without ischemic changes.

## 2024-02-16 NOTE — HOSPITAL COURSE
02/15/2024: Admit to Gyn Onc for electrolyte repletion and close monitoring.  02/16/2024: NAEO. K+ low at 2.3. Pt poorly tolerating IV replacement, IV at slow rate. Will replace PO and IV. Will replace Mg, Phos. Reports more loose stools this morning. Will restart home regimen of loperamide and lomotil. Will consult GI.  02/17/2024: K improving, most recent 4.0. GI consulted for concerns of colitis, recommend IV solumedrol 40 mg daily which was started yesterday. Patient reports multiple episodes of diarrhea overnight. Denies abdominal pain. Overall feeling better.   02/18/2024: Reports one episode of diarrhea overnight. Hypokalemia noted on morning labs, phosphorous improved. Denies abdominal pain. Intermittent nausea but tolerating po without vomiting.   02/19/2024: Reports 3 episodes of diarrhea overnight. Denies nausea, vomiting, and abdominal pain. Is tolerating PO intake. Hypokalemia resolved, phosphorus is mildly low. Will replete.   02/20/2024: Reports 13 episodes of diarrhea yesterday & overnight. Denies nausea, vomiting, and abdominal pain. Is tolerating PO intake. K 3.1, will replace. Plan for EGD and colonoscopy tomorrow with GI. CLD today, NPO at midnight.   02/21/2024: Reports some improvement in diarrhea which then worsened due to bowel prep. Denies nausea, vomiting, and abdominal pain. Is tolerating PO intake. K 3.1, will replace. EGD & colonoscopy today with GI.   02/22/2024: One episode of diarrhea, no other concern. Electrolytes repleted as needed.  02/23/2024: Contnued diarrhea. + Salmonella on GI pathogen panel, consulted ID. Transition to PO steroids. Patient stable for DC home. RX sent for cipro 750 BID x 14 days.

## 2024-02-16 NOTE — SUBJECTIVE & OBJECTIVE
Interval History: NAEO. Pt poorly tolerating IV replacement, infusion at slow rate. Reports more loose stools this morning. Reports fatigue and weakness. Denies chest pain, SOB or difficulty breathing. Denies abdominal cramping, vomiting, or fevers/chills.    Scheduled Meds:   diphenoxylate-atropine 2.5-0.025 mg  1 tablet Oral TID    DULoxetine  30 mg Oral Daily    famotidine  20 mg Oral BID    levothyroxine  100 mcg Oral Before breakfast    loperamide  4 mg Oral TID    magnesium sulfate IVPB  4 g Intravenous Once    potassium chloride  40 mEq Oral Once     Continuous Infusions:   0/9% NACL & POTASSIUM CHLORIDE 20 MEQ/L 75 mL/hr at 02/16/24 0644     PRN Meds:acetaminophen, ondansetron, oxyCODONE, oxyCODONE, sodium chloride 0.9%, zolpidem    Review of patient's allergies indicates:  No Known Allergies    Objective:     Vital Signs (Most Recent):  Temp: 98.4 °F (36.9 °C) (02/16/24 0731)  Pulse: 84 (02/16/24 0731)  Resp: 18 (02/16/24 0512)  BP: 114/73 (02/16/24 0731)  SpO2: 97 % (02/16/24 0731) Vital Signs (24h Range):  Temp:  [97.6 °F (36.4 °C)-98.4 °F (36.9 °C)] 98.4 °F (36.9 °C)  Pulse:  [] 84  Resp:  [16-20] 18  SpO2:  [94 %-97 %] 97 %  BP: (105-150)/(58-75) 114/73     Weight: 76.2 kg (168 lb)  Body mass index is 26.31 kg/m².    Intake/Output - Last 3 Shifts         02/14 0700  02/15 0659 02/15 0700  02/16 0659 02/16 0700  02/17 0659    P.O.  220     IV Piggyback  100     Total Intake(mL/kg)  320 (4.2)     Net  +320            Urine Occurrence  2 x     Stool Occurrence  3 x                   Physical Exam:   Constitutional: She is oriented to person, place, and time. She appears well-developed and well-nourished. No distress.    HENT:   Head: Normocephalic and atraumatic.    Eyes: EOM are normal.     Cardiovascular:  Regular rhythm.             Pulmonary/Chest: Effort normal. No respiratory distress.        Abdominal: Soft. She exhibits no distension. There is no abdominal tenderness. There is no rebound and  no guarding.             Musculoskeletal: Normal range of motion and moves all extremeties.       Neurological: She is alert and oriented to person, place, and time.    Skin: Skin is warm and dry. She is not diaphoretic.    Psychiatric: She has a normal mood and affect. Her behavior is normal.          Lines/Drains/Airways       Peripheral Intravenous Line  Duration                  Peripheral IV - Single Lumen 02/15/24 1937 20 G Left Antecubital <1 day                    Laboratory:  All pertinent labs from the last 24 hours have been reviewed.  Recent Labs   Lab 02/15/24  1937   WBC 7.37   HGB 15.5   HCT 42.9   MCV 86         Recent Labs   Lab 02/15/24  1459 02/15/24  1937 02/16/24  0406    137 137   K 2.7* 2.5* 2.3*   CL 97 97 100   CO2 29 27 26   BUN 8 7 6   CREATININE 0.7 0.7 0.6   * 160* 119*   PROT 6.7 7.3  --    BILITOT 0.3 0.4  --    ALKPHOS 81 85  --    ALT 22 24  --    AST 25 28  --    MG  --  1.7 1.9   PHOS  --  3.2 3.3        Diagnostic Results:  None

## 2024-02-16 NOTE — ASSESSMENT & PLAN NOTE
- Denies abdominal pain or cramping   - Tolerating PO   - S/p colonoscopy. Pt has an appointment scheduled in April for repeat GI evaluation and colonoscopy.  - Recent Giardia/Cryptosporidium/C diff all negative  - Continue IVF  - Continue lomotil and loperamide TID daily  - Consult GI while inpatient to evaluate for worsening chronic diarrhea despite medical mgmt and severe hypokalemia

## 2024-02-16 NOTE — ED PROVIDER NOTES
Encounter Date: 2/15/2024       History     Chief Complaint   Patient presents with    Abnormal Lab     Low potassium, on immunotherapy now     Sugar Diaz is a 58 y.o.  with stage IIIC1 grade 1 endometrioid endometrial cancer, on immunotherapy, is presenting to the emergency department for hypokalemia that was noted on outpatient blood work today.  Patient reports generalized weakness that has been ongoing since her immunotherapy treatment about 6 weeks ago.  Also reports chronic persistent diarrhea for months.  States she has had decreased appetite.  Denies any vomiting.  Denies chest pain, shortness of breath, focal weakness, numbness, tingling.  Denies any blood in the stool.    The history is provided by the patient. No  was used.     Review of patient's allergies indicates:  No Known Allergies  Past Medical History:   Diagnosis Date    Chest pain     NEGATIVE STRESS ECHO     Endometrial cancer         GERD (gastroesophageal reflux disease)     Lower back pain     Migraines     Mild allergic rhinitis     Postmenopausal bleeding      Past Surgical History:   Procedure Laterality Date    COLONOSCOPY N/A 10/06/2023    Procedure: COLONOSCOPY;  Surgeon: Marcia Weaver MD;  Location: [facility];  Service: Endoscopy;  Laterality: N/A;  10/4- referred by Dr. Weaver/ Subject: Needs colonoscopy ASAP within 1 week /Procedure Timing: < 1 week/Diagnosis: Diarrhea, evaluate for immunotherapy induced colitis/ Prep instructions to portal. AS    CYST REMOVAL Left 2022    Procedure: EXCISION, CYST-BACK;  Surgeon: Thomas Hurst MD;  Location: [facility] OR;  Service: General;  Laterality: Left;  left upper back  RN PREOP ON 22-NF    HYSTERECTOMY      after endometrial cancer diagnosis    HYSTEROSCOPY WITH DILATION AND CURETTAGE OF UTERUS N/A 2023    Procedure: HYSTEROSCOPY, WITH DILATION AND CURETTAGE OF UTERUS;  Surgeon: Omaira Evans MD;  Location: [facility] OR;   Service: OB/GYN;  Laterality: N/A;  DESTINY HERNANDEZ  [phone] TEXTED [facility] ON 5/2/2023 @ 3:51PM. [facility] RESPONDED ON 5/2/2023 @ 3:51PM-LO  RN PREOP 5/18/23  T & S; UPT ORDERED AND SCHEDULED 5/22/23    LYMPH NODE BIOPSY Left 06/05/2023    Procedure: BIOPSY, sentinel LYMPH NODE;  Surgeon: Lavell Rodríguez MD;  Location: [facility] OR;  Service: OB/GYN;  Laterality: Left;    LYMPHADENECTOMY, PELVIS Right 06/05/2023    Procedure: LYMPHADENECTOMY, PELVIS;  Surgeon: Lavell Rodríguez MD;  Location: [facility] OR;  Service: OB/GYN;  Laterality: Right;    MAPPING, LYMPH NODE, SENTINEL Bilateral 06/05/2023    Procedure: injection, LYMPH NODE, SENTINEL;  Surgeon: Lavell Rodríguez MD;  Location: [facility] OR;  Service: OB/GYN;  Laterality: Bilateral;    NERVE REPAIR Right 06/05/2023    Procedure: REPAIR, OBTURATOR NERVE;  Surgeon: Lavell Rodríguez MD;  Location: [facility];  Service: OB/GYN;  Laterality: Right;    TONSILLECTOMY      TONSILLECTOMY      TUBAL LIGATION      XI ROBOTIC HYSTERECTOMY, WITH SALPINGO-OOPHORECTOMY Bilateral 06/05/2023    Procedure: XI ROBOTIC HYSTERECTOMY,WITH SALPINGO-OOPHORECTOMY;  Surgeon: Lavell Rodríguez MD;  Location: [facility];  Service: OB/GYN;  Laterality: Bilateral;  removed through vagina     Family History   Problem Relation Age of Onset    No Known Problems Mother     Heart disease Father     Breast cancer Sister 55    No Known Problems Sister     Colon cancer Paternal Uncle     Esophageal cancer Neg Hx      Social History     Tobacco Use    Smoking status: Never    Smokeless tobacco: Never   Substance Use Topics    Alcohol use: Never    Drug use: Never       Physical Exam     Initial Vitals [02/15/24 1654]   BP Pulse Resp Temp SpO2   (!) 150/69 104 20 98.4 °F (36.9 °C) 96 %      MAP       --         Physical Exam    Nursing note and vitals reviewed.  Constitutional: She appears well-developed and well-nourished.   Generally weak   HENT:   Head: Normocephalic and atraumatic.   Eyes: Conjunctivae and EOM are  normal.   Neck: Neck supple.   Normal range of motion.  Cardiovascular:  Normal rate, regular rhythm, normal heart sounds and intact distal pulses.           Pulmonary/Chest: No respiratory distress. She has no wheezes. She has no rhonchi. She has no rales.   Abdominal: Abdomen is soft. Bowel sounds are normal. She exhibits no distension. There is no abdominal tenderness. There is no rebound and no guarding.   Musculoskeletal:         General: No tenderness or edema. Normal range of motion.      Cervical back: Normal range of motion and neck supple.     Neurological: She is alert and oriented to person, place, and time. No sensory deficit.   No focal weakness.   Skin: Skin is warm and dry.   Psychiatric: She has a normal mood and affect. Thought content normal.         ED Course   Procedures  Labs Reviewed   CBC W/ AUTO DIFFERENTIAL - Abnormal; Notable for the following components:       Result Value    MCHC 36.1 (*)     MPV 8.9 (*)     All other components within normal limits   COMPREHENSIVE METABOLIC PANEL - Abnormal; Notable for the following components:    Potassium 2.5 (*)     Glucose 160 (*)     All other components within normal limits   HIV 1 / 2 ANTIBODY    Narrative:     Release to patient->Immediate   HEPATITIS C ANTIBODY    Narrative:     Release to patient->Immediate   MAGNESIUM   TROPONIN I   SARS-COV-2 RNA AMPLIFICATION, QUAL     EKG Readings: (Independently Interpreted)   Initial Reading: No STEMI. Previous EKG: Compared with most recent EKG Rhythm: Sinus Tachycardia. Heart Rate: 101. ST Segments: Normal ST Segments. T Waves: Normal.       Imaging Results    None          Medications   potassium bicarbonate disintegrating tablet 40 mEq (40 mEq Oral Given 2/15/24 2042)   potassium chloride 10 mEq in 100 mL IVPB (10 mEq Intravenous New Bag 2/15/24 2048)     Medical Decision Making  Sugar Diaz is a 58 y.o.  with stage IIIC1 grade 1 endometrioid endometrial cancer, on immunotherapy, is  presenting to the emergency department for hypokalemia that was noted on outpatient blood work today.  The patient is complaining of generalized weakness and diarrhea.  Potassium 2.5.  Patient has had persistent diarrhea, also has decreased appetite.  Hypokalemia probably related to this.  Remaining labs are reassuring.  No anemia.  No leukocytosis or infectious symptoms.  She is afebrile and nontoxic appearing.  Nothing focal on exam.  Will discuss with gyn Onc for admission.  EKG is sinus tachycardia without ischemic changes.  Repletion ordered for hypokalemia.  Patient will need admission to the hospital, will be admitted to gynecology oncology service.    Amount and/or Complexity of Data Reviewed  Labs: ordered. Decision-making details documented in ED Course.  ECG/medicine tests: ordered and independent interpretation performed. Decision-making details documented in ED Course.    Risk  Prescription drug management.               ED Course as of 02/15/24 2205   u Feb 15, 2024   2119 D/w gyn onc regarding admission.  Potassium has been orally repleted and given IV potassium as well. [KL]   2201 Gynecology Oncology is evaluating the patient at bedside. [KL]   2201 SARS-CoV-2 RNA, Amplification, Qual: Negative  COVID negative [KL]   2201 Potassium(!!): 2.5  Patient with significant hypokalemia.  Repletion ordered. [KL]      ED Course User Index  [KL] Renetta Soto MD                             Clinical Impression:  Final diagnoses:  [R89.9] Abnormal laboratory test  [E87.6] Hypokalemia                 Renetta Soto MD  Resident  02/15/24 2205

## 2024-02-16 NOTE — ASSESSMENT & PLAN NOTE
- Asymptomatic  - EKG: Sinus tachycardia without ischemic changes  - Telemetry ordered   -If any concerning abnormality will order cardiology consult  - Will replete electrolytes with 10 meq Kcl q1h  - AM BMP, Mg, Phosp

## 2024-02-16 NOTE — PLAN OF CARE
Problem: Electrolyte Imbalance  Goal: Electrolyte Balance  Outcome: Ongoing, Progressing    Replacement throughout the day. K riders currently infusing

## 2024-02-16 NOTE — SUBJECTIVE & OBJECTIVE
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 4.5 cm, grade 1, 96% MI (22/23 mm), +LUE, -LVSI, +MELF, -cervix, 1/2+ RPLN, 1/1+ LPLN, -ascites.      6/19/2023 Imaging Significant Findings     CT C/A/P w/: High L para-aortic at the level of the renal vessels, 1.5 cm.  Low R para-aortic at the LEAH, 4.1 cm in greatest dimension.      6/25/2023 Genomic Testing     [facility] (NGS): ARD1A, CHEK1, CTCF, , HDAC2, HNF1A, JAK1, KRAS, MAX, MSH6, P1K3R1, PPM1D, PTEN, ZFHX3, ZSR2      6/28/2023 Tumor Conference     No rule for debulking of para-aortic lymph nodes.  Adjuvant VBT.  Represent after completion of adjuvant therapy to discuss EBRT.          7/7/2023 - 10/19/2023 Chemotherapy     Carboplatin AUC 5/Paclitaxel 135 mg/m2/Dostarlimab 200mg q3 weeks x6     Previous dose limiting toxicities: Y  Cycle 4: Grade 2 colitis s/p steroid taper  Cycle 5: Grade 3 colitis  Previous dose reductions: N  Previous breaks: Y  Cycle 4: Dostarlimab omitted due to grade 2 colitis  Cycle 5: Dostarlimab 2/2 grade 3 colitis (negative colonoscopy with improvement in symptoms with steroid taper)      8/9/2023 - 9/13/2023 Radiation Therapy     Treating physician: Dr. Adriane Babb  Total Dose: 21 Gy HDR  Fractions: 3 vaginal cuff brachytherapy      8/22/2023 Imaging Significant Findings     CT A/P w/: No evidence of colitis, VT in high L para-aortic and low R para-aortic.      10/4/2023 Procedure     Colonoscopy: WNL      11/6/2023 Imaging Significant Findings     CT C/A/P w/: PATTI      11/6/2023 - 11/9/2023 Hospital Admission     Most Recent Admission Details  Admit Date: 11/6/23  Admitted for: RVS PNA superimposed by community acquitted PNA  Discharge date: 11/9/23  Discharged  from: [facility]  Discharge disposition: Home or Self Care         11/22/2023 -  Maintenance Therapy     Dostarlimab 1000mg x            Scheduled Meds:   [START ON 2/16/2024] DULoxetine  30 mg Oral Daily    [START ON 2/16/2024] levothyroxine  100 mcg Oral Before breakfast    potassium chloride  10 mEq Intravenous Q1H     Continuous Infusions:  PRN Meds:acetaminophen, [START ON 2/16/2024] ondansetron, oxyCODONE, oxyCODONE, sodium chloride 0.9%, zolpidem    Review of patient's allergies indicates:  No Known Allergies    Objective:     Vital Signs (Most Recent):  Temp: 97.6 °F (36.4 °C) (02/15/24 2218)  Pulse: 87 (02/15/24 2218)  Resp: 18 (02/15/24 2218)  BP: (!) 119/58 (02/15/24 2218)  SpO2: 96 % (02/15/24 2218) Vital Signs (24h Range):  Temp:  [97.6 °F (36.4 °C)-98.4 °F (36.9 °C)] 97.6 °F (36.4 °C)  Pulse:  [] 87  Resp:  [18-20] 18  SpO2:  [95 %-96 %] 96 %  BP: (119-150)/(58-69) 119/58     Weight: 76.2 kg (168 lb)  Body mass index is 26.31 kg/m².    Intake/Output - Last 3 Shifts         02/14 0700  02/15 0659 02/15 0700  02/16 0659    IV Piggyback  100    Total Intake(mL/kg)  100 (1.3)    Net  +100                        Physical Exam:   Constitutional: She is oriented to person, place, and time. She appears well-developed and well-nourished. No distress.    HENT:   Head: Normocephalic and atraumatic.    Eyes: EOM are normal.     Cardiovascular:  Regular rhythm.             Pulmonary/Chest: Effort normal. No respiratory distress.        Abdominal: Soft. She exhibits no distension. There is no abdominal tenderness. There is no rebound and no guarding.             Musculoskeletal: Normal range of motion and moves all extremeties.       Neurological: She is alert and oriented to person, place, and time.    Skin: Skin is warm and dry. She is not diaphoretic.    Psychiatric: She has a normal mood and affect. Her behavior is normal.          Lines/Drains/Airways        Peripheral Intravenous Line  Duration                  Peripheral IV - Single Lumen 02/15/24 1937 20 G Left Antecubital <1 day                    Laboratory:  All pertinent labs from the last 24 hours have been reviewed.    Recent Labs   Lab 02/15/24  1937   WBC 7.37   HGB 15.5   HCT 42.9   MCV 86         Recent Labs   Lab 02/15/24  1459 02/15/24  1937    137   K 2.7* 2.5*   CL 97 97   CO2 29 27   BUN 8 7   CREATININE 0.7 0.7   * 160*   PROT 6.7 7.3   BILITOT 0.3 0.4   ALKPHOS 81 85   ALT 22 24   AST 25 28   MG  --  1.7   PHOS  --  3.2          Diagnostic Results:  ECG: Reviewed

## 2024-02-16 NOTE — FIRST PROVIDER EVALUATION
Emergency Department TeleTriage Encounter Note      CHIEF COMPLAINT    Chief Complaint   Patient presents with    Abnormal Lab     Low potassium, on immunotherapy now       VITAL SIGNS   Initial Vitals [02/15/24 1654]   BP Pulse Resp Temp SpO2   (!) 150/69 104 20 98.4 °F (36.9 °C) 96 %      MAP       --            ALLERGIES    Review of patient's allergies indicates:  No Known Allergies    PROVIDER TRIAGE NOTE  This is a teletriage evaluation of a 58 y.o. female presenting to the ED complaining of generalized fatigue. Referred to ED for hypokalemia on labs today. Denies CP and SOB. On immunotherapy.     Initial orders will be placed and care will be transferred to an alternate provider when patient is roomed for a full evaluation. Any additional orders and the final disposition will be determined by that provider.         ORDERS  Labs Reviewed   HIV 1 / 2 ANTIBODY   HEPATITIS C ANTIBODY       ED Orders (720h ago, onward)      Start Ordered     Status Ordering Provider    02/15/24 1930 02/15/24 1929  Contact and Neutropenic isolation status  Continuous         Ordered IKER SHAIKH N.    02/15/24 1929 02/15/24 1929  CBC auto differential  STAT         Ordered BORA SHAIKHIKA N.    02/15/24 1929 02/15/24 1929  Comprehensive metabolic panel  STAT         Ordered IKER SHAIKH N.    02/15/24 1929 02/15/24 1929  Insert Saline lock IV  Once         Ordered IKER SHAIKH N.    02/15/24 1929 02/15/24 1929  Magnesium  STAT         Ordered IKER SHAIKH N.    02/15/24 1929 02/15/24 1929  Troponin I  STAT         Ordered BORA SHAIKHIKA N.    02/15/24 1929 02/15/24 1929  EKG 12-lead  Once         Ordered IKER SHAIKH N.    02/15/24 1734 02/15/24 1733  EKG 12-lead  Once         Completed by LAKE SPRINGER on 2/15/2024 at  5:34 PM YVONNE CHAN    02/15/24 1659 02/15/24 1658  Hepatitis C Antibody  STAT         Acknowledged MARIBELL OLIVIER    02/15/24 1658  02/15/24 1658  HIV 1/2 Ag/Ab (4th Gen)  STAT         Acknowledged MARIBELL OLIVIER              Virtual Visit Note: The provider triage portion of this emergency department evaluation and documentation was performed via [employer], a HIPAA-compliant telemedicine application, in concert with a tele-presenter in the room. A face to face patient evaluation with one of my colleagues will occur once the patient is placed in an emergency department room.      DISCLAIMER: This note was prepared with [employer] voice recognition transcription software. Garbled syntax, mangled pronouns, and other bizarre constructions may be attributed to that software system.

## 2024-02-16 NOTE — ASSESSMENT & PLAN NOTE
- Asymptomatic  - EKG: Sinus tachycardia without ischemic changes  - Telemetry ordered   -If any concerning abnormality will order cardiology consult  - K +2.7>2.5>2.3>2.3  - Will replete electrolytes with Na Cl Kcl 20 meq. Will replace Mg with IV MgSO4, Phosp with PO4 orally  - BMP, Mg, Phosp q6h

## 2024-02-16 NOTE — CARE UPDATE
Afternoon Assessment:    Resident to bedside for PM assessment. Patient resting comfortably in bed, reports she is feeling much better than yesterday. Reports continued diarrhea, denies any nausea, vomiting or abdominal pain    Temp:  [97.6 °F (36.4 °C)-98.4 °F (36.9 °C)] 98.1 °F (36.7 °C)  Pulse:  [] 93  Resp:  [16-18] 18  SpO2:  [94 %-98 %] 97 %  BP: (105-130)/(58-83) 113/69    PE:  General: in no acute distress  Lungs: normal work of breathing  MSK: moves all extremities    Labs:  Recent Labs   Lab 02/15/24  1937   WBC 7.37   RBC 5.00   HGB 15.5   HCT 42.9      MCV 86   MCH 31.0   MCHC 36.1*       A/P:  Await evening labs to decide about electrolyte replacement  Phosphate replacement ordered  Case discussed with GI who are recommend IV Solumedrol 40 mg daily, which was ordered  Will repeat labs in AM   Plan of care discussed with patient    Dee Carey MD  PGY-3 OB/GYN

## 2024-02-16 NOTE — ASSESSMENT & PLAN NOTE
-see once hx above  -s/p TRH/BSO/BSLN/RPLN/repair of obturator nerve in 6/2023  -Carboplatin/paclitaxel s/p 7 cycles  -Currently on immunotherapy: Dostarlimab (last cycle 6 weeks ago)

## 2024-02-16 NOTE — PROGRESS NOTES
[facility] - Emergency Dept  Gynecologic Oncology  Progress Note      Patient Name: Sugar Diaz  MRN: [id number]  Admission Date: 2/15/2024  Hospital Length of Stay: 1 days  Attending Provider: Lavell Rodríguez MD  Primary Care Provider: Dexter Khan MD  Principal Problem: Hypokalemia    Subjective:      History of Present Illness:  Sugar Diaz is a 58 y.o.  with stage IIIC1 grade 1 endometrioid endometrial cancer on Cycle 3 Day 10 of Dostarlimab 1000mg who was instructed to present to the ED after a critical lab value of her potassium resulted. Her K+ outpatient was 2.7. The patient endorses having loose stools and nausea, but denies fevers, chills, vomiting, chest pain, SOB, or muscle weakness. Denies recent illness.    [place] [facility] [place] the patient's CBC is wnL, K+ 2.5,  Glucose 160, Cr wnL. VSS and wnL. EKG reveal sinus tachycardia without ischemic changes.     Hospital Course:  02/15/2024: Admit to Gyn Onc for electrolyte repletion and close monitoring.  2024: NAEO. K+ low at 2.3. Pt poorly tolerating IV replacement, IV at slow rate. Will replace PO and IV. Will replace Mg, Phos. Reports more loose stools this morning. Will restart home regimen of loperamide and lomotil. Will consult GI.    Interval History: NAEO. Pt poorly tolerating IV replacement, infusion at slow rate. Reports more loose stools this morning. Reports fatigue and weakness. Denies chest pain, SOB or difficulty breathing. Denies abdominal cramping, vomiting, or fevers/chills.    Scheduled Meds:   diphenoxylate-atropine 2.5-0.025 mg  1 tablet Oral TID    DULoxetine  30 mg Oral Daily    famotidine  20 mg Oral BID    levothyroxine  100 mcg Oral Before breakfast    loperamide  4 mg Oral TID    magnesium sulfate IVPB  4 g Intravenous Once    potassium chloride  40 mEq Oral Once     Continuous Infusions:   0/9% NACL & POTASSIUM CHLORIDE 20 MEQ/L 75 mL/hr at 24 0644     PRN Meds:acetaminophen, ondansetron, oxyCODONE,  oxyCODONE, sodium chloride 0.9%, zolpidem    Review of patient's allergies indicates:  No Known Allergies    Objective:     Vital Signs (Most Recent):  Temp: 98.4 °F (36.9 °C) (02/16/24 0731)  Pulse: 84 (02/16/24 0731)  Resp: 18 (02/16/24 0512)  BP: 114/73 (02/16/24 0731)  SpO2: 97 % (02/16/24 0731) Vital Signs (24h Range):  Temp:  [97.6 °F (36.4 °C)-98.4 °F (36.9 °C)] 98.4 °F (36.9 °C)  Pulse:  [] 84  Resp:  [16-20] 18  SpO2:  [94 %-97 %] 97 %  BP: (105-150)/(58-75) 114/73     Weight: 76.2 kg (168 lb)  Body mass index is 26.31 kg/m².    Intake/Output - Last 3 Shifts         02/14 0700  02/15 0659 02/15 0700  02/16 0659 02/16 0700 02/17 0659    P.O.  220     IV Piggyback  100     Total Intake(mL/kg)  320 (4.2)     Net  +320            Urine Occurrence  2 x     Stool Occurrence  3 x                   Physical Exam:   Constitutional: She is oriented to person, place, and time. She appears well-developed and well-nourished. No distress.    HENT:   Head: Normocephalic and atraumatic.    Eyes: EOM are normal.     Cardiovascular:  Regular rhythm.             Pulmonary/Chest: Effort normal. No respiratory distress.        Abdominal: Soft. She exhibits no distension. There is no abdominal tenderness. There is no rebound and no guarding.             Musculoskeletal: Normal range of motion and moves all extremeties.       Neurological: She is alert and oriented to person, place, and time.    Skin: Skin is warm and dry. She is not diaphoretic.    Psychiatric: She has a normal mood and affect. Her behavior is normal.          Lines/Drains/Airways       Peripheral Intravenous Line  Duration                  Peripheral IV - Single Lumen 02/15/24 1937 20 G Left Antecubital <1 day                    Laboratory:  All pertinent labs from the last 24 hours have been reviewed.  Recent Labs   Lab 02/15/24  1937   WBC 7.37   HGB 15.5   HCT 42.9   MCV 86         Recent Labs   Lab 02/15/24  1459 02/15/24  1937 02/16/24  0401     137 137   K 2.7* 2.5* 2.3*   CL 97 97 100   CO2 29 27 26   BUN 8 7 6   CREATININE 0.7 0.7 0.6   * 160* 119*   PROT 6.7 7.3  --    BILITOT 0.3 0.4  --    ALKPHOS 81 85  --    ALT 22 24  --    AST 25 28  --    MG  --  1.7 1.9   PHOS  --  3.2 3.3        Diagnostic Results:  None    Assessment/Plan:     * Hypokalemia  - Asymptomatic  - EKG: Sinus tachycardia without ischemic changes  - Telemetry ordered   -If any concerning abnormality will order cardiology consult  - K +2.7>2.5>2.3>2.3  - Will replete electrolytes with Na Cl Kcl 20 meq. Will replace Mg with IV MgSO4, Phosp with PO4 orally  - BMP, Mg, Phosp q6h    Diarrhea  - Denies abdominal pain or cramping   - Tolerating PO   - S/p colonoscopy. Pt has an appointment scheduled in April for repeat GI evaluation and colonoscopy.  - Recent Giardia/Cryptosporidium/C diff all negative  - Continue IVF  - Continue lomotil and loperamide TID daily  - Consult GI while inpatient to evaluate for worsening chronic diarrhea despite medical mgmt and severe hypokalemia      Malignant neoplasm of endometrium  -see once hx above  -s/p TRH/BSO/BSLN/RPLN/repair of obturator nerve in 6/2023  -Carboplatin/paclitaxel s/p 7 cycles  -Currently on immunotherapy: Dostarlimab (last cycle 6 weeks ago)    Hypothyroidism due to medication  - Stable  - TSH low, T4 free normal on admit  - Continue home synthroid    GERD (gastroesophageal reflux disease)  - Continue home famotidine      VTE Risk Mitigation (From admission, onward)           Ordered     IP VTE HIGH RISK PATIENT  Once         02/15/24 [id number]                    Han Clancy MD  Gynecologic Oncology  [facility] - Emergency Dept

## 2024-02-17 PROBLEM — E87.8 ELECTROLYTE ABNORMALITY: Status: ACTIVE | Noted: 2024-02-17

## 2024-02-17 LAB
ANION GAP SERPL CALC-SCNC: 10 MMOL/L (ref 8–16)
BUN SERPL-MCNC: 6 MG/DL (ref 6–20)
CALCIUM SERPL-MCNC: 8.3 MG/DL (ref 8.7–10.5)
CHLORIDE SERPL-SCNC: 110 MMOL/L (ref 95–110)
CO2 SERPL-SCNC: 21 MMOL/L (ref 23–29)
CREAT SERPL-MCNC: 0.6 MG/DL (ref 0.5–1.4)
EST. GFR  (NO RACE VARIABLE): >60 ML/MIN/1.73 M^2
GLUCOSE SERPL-MCNC: 117 MG/DL (ref 70–110)
MAGNESIUM SERPL-MCNC: 2 MG/DL (ref 1.6–2.6)
PHOSPHATE SERPL-MCNC: 1.7 MG/DL (ref 2.7–4.5)
PHOSPHATE SERPL-MCNC: 2.4 MG/DL (ref 2.7–4.5)
POTASSIUM SERPL-SCNC: 4 MMOL/L (ref 3.5–5.1)
SODIUM SERPL-SCNC: 141 MMOL/L (ref 136–145)

## 2024-02-17 PROCEDURE — 83735 ASSAY OF MAGNESIUM: CPT

## 2024-02-17 PROCEDURE — 80048 BASIC METABOLIC PNL TOTAL CA: CPT

## 2024-02-17 PROCEDURE — 84100 ASSAY OF PHOSPHORUS: CPT

## 2024-02-17 PROCEDURE — 25000003 PHARM REV CODE 250

## 2024-02-17 PROCEDURE — 20600001 HC STEP DOWN PRIVATE ROOM

## 2024-02-17 PROCEDURE — 99233 SBSQ HOSP IP/OBS HIGH 50: CPT | Mod: ,,, | Performed by: OBSTETRICS & GYNECOLOGY

## 2024-02-17 PROCEDURE — 36415 COLL VENOUS BLD VENIPUNCTURE: CPT | Mod: XB

## 2024-02-17 PROCEDURE — 63600175 PHARM REV CODE 636 W HCPCS

## 2024-02-17 RX ORDER — SODIUM,POTASSIUM PHOSPHATES 280-250MG
1 POWDER IN PACKET (EA) ORAL 2 TIMES DAILY
Status: DISCONTINUED | OUTPATIENT
Start: 2024-02-17 | End: 2024-02-17

## 2024-02-17 RX ADMIN — POTASSIUM CHLORIDE 20 MEQ: 1500 TABLET, EXTENDED RELEASE ORAL at 02:02

## 2024-02-17 RX ADMIN — LOPERAMIDE HYDROCHLORIDE 4 MG: 2 CAPSULE ORAL at 09:02

## 2024-02-17 RX ADMIN — LOPERAMIDE HYDROCHLORIDE 4 MG: 2 CAPSULE ORAL at 04:02

## 2024-02-17 RX ADMIN — DULOXETINE HYDROCHLORIDE 30 MG: 30 CAPSULE, DELAYED RELEASE ORAL at 09:02

## 2024-02-17 RX ADMIN — ZOLPIDEM TARTRATE 5 MG: 5 TABLET ORAL at 09:02

## 2024-02-17 RX ADMIN — ENOXAPARIN SODIUM 40 MG: 40 INJECTION SUBCUTANEOUS at 04:02

## 2024-02-17 RX ADMIN — DIPHENOXYLATE HYDROCHLORIDE AND ATROPINE SULFATE 1 TABLET: 2.5; .025 TABLET ORAL at 09:02

## 2024-02-17 RX ADMIN — DIPHENOXYLATE HYDROCHLORIDE AND ATROPINE SULFATE 1 TABLET: 2.5; .025 TABLET ORAL at 04:02

## 2024-02-17 RX ADMIN — LEVOTHYROXINE SODIUM 100 MCG: 100 TABLET ORAL at 06:02

## 2024-02-17 RX ADMIN — METHYLPREDNISOLONE SODIUM SUCCINATE 40 MG: 125 INJECTION, POWDER, FOR SOLUTION INTRAMUSCULAR; INTRAVENOUS at 09:02

## 2024-02-17 RX ADMIN — FAMOTIDINE 20 MG: 20 TABLET, FILM COATED ORAL at 09:02

## 2024-02-17 RX ADMIN — SODIUM CHLORIDE AND POTASSIUM CHLORIDE: 9; 1.49 INJECTION, SOLUTION INTRAVENOUS at 07:02

## 2024-02-17 RX ADMIN — POTASSIUM & SODIUM PHOSPHATES POWDER PACK 280-160-250 MG 1 PACKET: 280-160-250 PACK at 09:02

## 2024-02-17 RX ADMIN — POTASSIUM PHOSPHATE, MONOBASIC AND POTASSIUM PHOSPHATE, DIBASIC 15 MMOL: 224; 236 INJECTION, SOLUTION, CONCENTRATE INTRAVENOUS at 07:02

## 2024-02-17 NOTE — PLAN OF CARE
Orientedx4. VSS. Afebrile. Telemetry monitoring in place, NSR. Continuous NS with 20 mEq KCl infusing 75 mL/hr. Up ad nadiya, steady gait. Denies pain. Fall precautions maintained. Call light within reach. Pt verbalized understanding to call nurse as needed. No concerns voiced at this time.  at bedside.     Received IV and PO K replacement. Per GI, plan for IV Solumedrol daily.

## 2024-02-17 NOTE — SUBJECTIVE & OBJECTIVE
Past Medical History:   Diagnosis Date    Chest pain 2002    NEGATIVE STRESS ECHO 2002    Endometrial cancer     2023    GERD (gastroesophageal reflux disease)     Lower back pain     Migraines     Mild allergic rhinitis     Postmenopausal bleeding        Past Surgical History:   Procedure Laterality Date    COLONOSCOPY N/A 10/06/2023    Procedure: COLONOSCOPY;  Surgeon: Marcia Weaver MD;  Location: [facility] ENDO;  Service: Endoscopy;  Laterality: N/A;  10/4- referred by Dr. Weaver/ Subject: Needs colonoscopy ASAP within 1 week /Procedure Timing: < 1 week/Diagnosis: Diarrhea, evaluate for immunotherapy induced colitis/ Prep instructions to portal. AS    CYST REMOVAL Left 04/28/2022    Procedure: EXCISION, CYST-BACK;  Surgeon: Thomas Hurst MD;  Location: [facility] OR;  Service: General;  Laterality: Left;  left upper back  RN PREOP ON 4/21/22-NF    HYSTERECTOMY      after endometrial cancer diagnosis    HYSTEROSCOPY WITH DILATION AND CURETTAGE OF UTERUS N/A 05/23/2023    Procedure: HYSTEROSCOPY, WITH DILATION AND CURETTAGE OF UTERUS;  Surgeon: Omaira Evans MD;  Location: [facility] OR;  Service: OB/GYN;  Laterality: N/A;  [facility] HAMMAD HERNANDEZ  [phone] TEXTED HAMMAD ON 5/2/2023 @ 3:51PM. HAMM[facility] RESPONDED ON 5/2/2023 @ 3:51PM-LO  RN PREOP 5/18/23  T & S; UPT ORDERED AND SCHEDULED 5/22/23    LYMPH NODE BIOPSY Left 06/05/2023    Procedure: BIOPSY, sentinel LYMPH NODE;  Surgeon: Lavell Rodríguez MD;  Location: [facility];  Service: OB/GYN;  Laterality: Left;    LYMPHADENECTOMY, PELVIS Right 06/05/2023    Procedure: LYMPHADENECTOMY, PELVIS;  Surgeon: Lavell Rodríguez MD;  Location: [facility] OR;  Service: OB/GYN;  Laterality: Right;    MAPPING, LYMPH NODE, SENTINEL Bilateral 06/05/2023    Procedure: injection, LYMPH NODE, SENTINEL;  Surgeon: Lavell Rodríguez MD;  Location: [facility];  Service: OB/GYN;  Laterality: Bilateral;    NERVE REPAIR Right 06/05/2023    Procedure: REPAIR, OBTURATOR NERVE;  Surgeon: Lavell Rodríguez MD;  Location:  [facility], [place] OR;  Service: OB/GYN;  Laterality: Right;    TONSILLECTOMY      TONSILLECTOMY      TUBAL LIGATION      XI ROBOTIC HYSTERECTOMY, WITH SALPINGO-OOPHORECTOMY Bilateral 06/05/2023    Procedure: XI ROBOTIC HYSTERECTOMY,WITH SALPINGO-OOPHORECTOMY;  Surgeon: Lavell Rodríguez MD;  Location: [facility] [place], operated [facility] OR;  Service: OB/GYN;  Laterality: Bilateral;  removed through vagina       Review of patient's allergies indicates:  No Known Allergies  Family History       Problem Relation (Age of Onset)    Breast cancer Sister (55)    Colon cancer Paternal Uncle    Heart disease Father    No Known Problems Mother, Sister          Tobacco Use    Smoking status: Never    Smokeless tobacco: Never   Substance and Sexual Activity    Alcohol use: Never    Drug use: Never    Sexual activity: Not Currently     Partners: Male     Birth control/protection: Post-menopausal     Review of Systems   Constitutional:  Positive for fatigue. Negative for appetite change, chills and fever.   HENT:  Negative for congestion and trouble swallowing.    Eyes:  Negative for pain and redness.   Respiratory:  Negative for cough and shortness of breath.    Cardiovascular:  Negative for chest pain and palpitations.   Gastrointestinal:  Positive for diarrhea. Negative for abdominal pain, constipation, nausea and vomiting.   Genitourinary:  Negative for difficulty urinating and dysuria.   Musculoskeletal:  Negative for back pain and neck pain.   Skin:  Negative for rash.   Neurological:  Positive for weakness. Negative for dizziness, light-headedness and headaches.     Objective:     Vital Signs (Most Recent):  Temp: 98.1 °F (36.7 °C) (02/16/24 1649)  Pulse: 93 (02/16/24 1649)  Resp: 18 (02/16/24 1144)  BP: 113/69 (02/16/24 1649)  SpO2: 97 % (02/16/24 1649) Vital Signs (24h Range):  Temp:  [97.6 °F (36.4 °C)-98.4 °F (36.9 °C)] 98.1 °F (36.7 °C)  Pulse:  [] 93  Resp:  [16-18] 18  SpO2:  [94 %-98 %] 97 %  BP: (105-130)/(58-83) 113/69     Weight: 76.2 kg (168 lb)  (02/15/24 1654)  Body mass index is 26.31 kg/m².      Intake/Output Summary (Last 24 hours) at 2/16/2024 1805  Last data filed at 2/16/2024 0644  Gross per 24 hour   Intake 320 ml   Output --   Net 320 ml       Lines/Drains/Airways       Peripheral Intravenous Line  Duration                  Peripheral IV - Single Lumen 02/15/24 1937 20 G Left Antecubital <1 day                     Physical Exam  Constitutional:       Appearance: Normal appearance. She is not ill-appearing.   Eyes:      General: No scleral icterus.  Cardiovascular:      Rate and Rhythm: Normal rate and regular rhythm.      Pulses: Normal pulses.      Heart sounds: Normal heart sounds.   Pulmonary:      Effort: Pulmonary effort is normal. No respiratory distress.      Breath sounds: Normal breath sounds.   Abdominal:      General: Bowel sounds are normal. There is no distension.      Palpations: Abdomen is soft.      Tenderness: There is no abdominal tenderness.   Skin:     General: Skin is warm and dry.      Coloration: Skin is not jaundiced.   Neurological:      Mental Status: She is alert and oriented to person, place, and time.          Significant Labs:  All pertinent lab results from the last 24 hours have been reviewed.    Significant Imaging:  Imaging results within the past 24 hours have been reviewed.

## 2024-02-17 NOTE — CONSULTS
[facility] - Emergency Dept  Gastroenterology  Consult Note    Patient Name: Sugar Diaz  MRN: [id number]  Admission Date: 2/15/2024  Hospital Length of Stay: 1 days  Code Status: Full Code   Attending Provider: Lavell Rodríguez MD   Consulting Provider: Jc Tripp MD  Primary Care Physician: Dexter Khan MD  Principal Problem:Hypokalemia    Inpatient consult to Gastroenterology  Consult performed by: Jc Tripp MD  Consult ordered by: Dee Carey MD        Subjective:     HPI:  Ms. 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 She has a PMH significant for endometrial cancer (diagnosed in 5/2023; status post TLH/BSO in 6/2023 and initiation of chemotherapy/radiation in 7/2023 including the use of Dostarlimab).     Per chart review, patient noted to develop evidence of Grade 2 and 3 colitis following fourth and fifth cycle of chemotherapy, respectively. In 10/2023, she underwent outpatient colonoscopy showing diverticulosis of sigmoid colon and normal mucosal lining; random colon biopsies showed mild active colitis with prominent crypt epithelial cell apoptosis and irregular subepithelial collegan. She was treated with Prednisone taper from 10/4 to 11/3 (starting dose 70 mg/day and tapering down by 20 mg per week). Dosartlimab was resumed in 11/2023.     Following completion of Prednisone taper, patient reports improvement in frequency of non-bloody diarrhea, however this since re-occured following resumption of immunotherapy in 11/2023. She reports bowel movements alternating between 3 to 12 daily without associated abdominal pain, nausea, vomiting, fevers, chills, or use of NSAID products. Due to recurrent diarrhea, GI department was contacted in early 2/2024 to attempt to arrange repeat endoscopic evaluation and outpatient follow-up. However, patient was noted to have  hypokalemia (2.7) on outpatient labs  from 2/15 and sent to ED here for further evaluation.     Hospital Course:   On arrival, vital signs normal on room air. Additional labs notable for normal WBC, normal Hgb, normal Cr, normal CRP, and normal albumin. Stool studies negative for C.diff, giardia/cryptosporidium, and ova/parasites. She was admitted to gynecology and GI consulted    Past Medical History:   Diagnosis Date    Chest pain 2002    NEGATIVE STRESS ECHO 2002    Endometrial cancer     2023    GERD (gastroesophageal reflux disease)     Lower back pain     Migraines     Mild allergic rhinitis     Postmenopausal bleeding        Past Surgical History:   Procedure Laterality Date    COLONOSCOPY N/A 10/06/2023    Procedure: COLONOSCOPY;  Surgeon: Marcia Weaver MD;  Location: [facility] ENDO;  Service: Endoscopy;  Laterality: N/A;  10/4- referred by Dr. Weaver/ Subject: Needs colonoscopy ASAP within 1 week /Procedure Timing: < 1 week/Diagnosis: Diarrhea, evaluate for immunotherapy induced colitis/ Prep instructions to portal. AS    CYST REMOVAL Left 04/28/2022    Procedure: EXCISION, CYST-BACK;  Surgeon: Thomas Hurst MD;  Location: [facility] OR;  Service: General;  Laterality: Left;  left upper back  RN PREOP ON 4/21/22-NF    HYSTERECTOMY      after endometrial cancer diagnosis    HYSTEROSCOPY WITH DILATION AND CURETTAGE OF UTERUS N/A 05/23/2023    Procedure: HYSTEROSCOPY, WITH DILATION AND CURETTAGE OF UTERUS;  Surgeon: Omaira Evans MD;  Location: [facility] OR;  Service: OB/GYN;  Laterality: N/A;  [employer] HAMMAD HERNANDEZ  [phone] TEXTED HAMMAD ON 5/2/2023 @ 3:51PM. [facility] RESPONDED ON 5/2/2023 @ 3:51PM-LO  RN PREOP 5/18/23  T & S; UPT ORDERED AND SCHEDULED 5/22/23    LYMPH NODE BIOPSY Left 06/05/2023    Procedure: BIOPSY, sentinel LYMPH NODE;  Surgeon: Lavell Rodríguez MD;  Location: [facility] OR;  Service: OB/GYN;  Laterality: Left;    LYMPHADENECTOMY, PELVIS Right 06/05/2023    Procedure: LYMPHADENECTOMY, PELVIS;  Surgeon: Lavell Rodríguez MD;  Location: [facility]  OR;  Service: OB/GYN;  Laterality: Right;    MAPPING, LYMPH NODE, SENTINEL Bilateral 06/05/2023    Procedure: injection, LYMPH NODE, SENTINEL;  Surgeon: Lavell Rodríguez MD;  Location: [facility] OR;  Service: OB/GYN;  Laterality: Bilateral;    NERVE REPAIR Right 06/05/2023    Procedure: REPAIR, OBTURATOR NERVE;  Surgeon: Lavell Rodríguez MD;  Location: [facility] OR;  Service: OB/GYN;  Laterality: Right;    TONSILLECTOMY      TONSILLECTOMY      TUBAL LIGATION      XI ROBOTIC HYSTERECTOMY, WITH SALPINGO-OOPHORECTOMY Bilateral 06/05/2023    Procedure: XI ROBOTIC HYSTERECTOMY,WITH SALPINGO-OOPHORECTOMY;  Surgeon: Lavell Rodríguez MD;  Location: [facility] OR;  Service: OB/GYN;  Laterality: Bilateral;  removed through vagina       Review of patient's allergies indicates:  No Known Allergies  Family History       Problem Relation (Age of Onset)    Breast cancer Sister (55)    Colon cancer Paternal Uncle    Heart disease Father    No Known Problems Mother, Sister          Tobacco Use    Smoking status: Never    Smokeless tobacco: Never   Substance and Sexual Activity    Alcohol use: Never    Drug use: Never    Sexual activity: Not Currently     Partners: Male     Birth control/protection: Post-menopausal     Review of Systems   Constitutional:  Positive for fatigue. Negative for appetite change, chills and fever.   HENT:  Negative for congestion and trouble swallowing.    Eyes:  Negative for pain and redness.   Respiratory:  Negative for cough and shortness of breath.    Cardiovascular:  Negative for chest pain and palpitations.   Gastrointestinal:  Positive for diarrhea. Negative for abdominal pain, constipation, nausea and vomiting.   Genitourinary:  Negative for difficulty urinating and dysuria.   Musculoskeletal:  Negative for back pain and neck pain.   Skin:  Negative for rash.   Neurological:  Positive for weakness. Negative for dizziness, light-headedness and headaches.     Objective:     Vital Signs (Most Recent):  Temp: 98.1 °F (36.7 °C)  (02/16/24 1649)  Pulse: 93 (02/16/24 1649)  Resp: 18 (02/16/24 1144)  BP: 113/69 (02/16/24 1649)  SpO2: 97 % (02/16/24 1649) Vital Signs (24h Range):  Temp:  [97.6 °F (36.4 °C)-98.4 °F (36.9 °C)] 98.1 °F (36.7 °C)  Pulse:  [] 93  Resp:  [16-18] 18  SpO2:  [94 %-98 %] 97 %  BP: (105-130)/(58-83) 113/69     Weight: 76.2 kg (168 lb) (02/15/24 1654)  Body mass index is 26.31 kg/m².      Intake/Output Summary (Last 24 hours) at 2/16/2024 1805  Last data filed at 2/16/2024 0644  Gross per 24 hour   Intake 320 ml   Output --   Net 320 ml       Lines/Drains/Airways       Peripheral Intravenous Line  Duration                  Peripheral IV - Single Lumen 02/15/24 1937 20 G Left Antecubital <1 day                     Physical Exam  Constitutional:       Appearance: Normal appearance. She is not ill-appearing.   Eyes:      General: No scleral icterus.  Cardiovascular:      Rate and Rhythm: Normal rate and regular rhythm.      Pulses: Normal pulses.      Heart sounds: Normal heart sounds.   Pulmonary:      Effort: Pulmonary effort is normal. No respiratory distress.      Breath sounds: Normal breath sounds.   Abdominal:      General: Bowel sounds are normal. There is no distension.      Palpations: Abdomen is soft.      Tenderness: There is no abdominal tenderness.   Skin:     General: Skin is warm and dry.      Coloration: Skin is not jaundiced.   Neurological:      Mental Status: She is alert and oriented to person, place, and time.          Significant Labs:  All pertinent lab results from the last 24 hours have been reviewed.    Significant Imaging:  Imaging results within the past 24 hours have been reviewed.  Assessment/Plan:     GI  Diarrhea  This is a 58 year old female with a PMH significant for endometrial cancer (diagnosed in 5/2023; status post TLH/BSO in 6/2023 and initiation of chemotherapy/radiation in 7/2023 including the use of Dostarlimab with suspected ICI colitis as of 10/2023 that was treated with  Prednisone) who was admitted to [facility] on 2/15 for evaluation/management of hypokalemia noted on outpatient labs in the setting of recurrent diarrhea following continuation of immunotherapy in 11/2023. 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 Stool studies negative for infection.      Recommendations:     -Suspect current diarrhea secondary to more microscopic/collagenous colitis rather than ICI colitis based on review of colonoscopy and pathology findings from 10/2023.   -Recommend initiating SOLUMEDROL 40 mg IV daily and monitoring for improvement.   -No plans for repeat endoscopic evaluation currently.   -Continue electrolyte repletion.   -Avoid use of NSAID products.   -In terms of long term management, could possibly transition to Budesonide versus Entyvio with continuation of immunotherapy.         Thank you for your consult. I will follow-up with patient. Please contact us if you have any additional questions.    Jc Tripp MD  Gastroenterology  [facility] - Emergency Dept

## 2024-02-17 NOTE — ASSESSMENT & PLAN NOTE
- Denies abdominal pain or cramping   - Tolerating PO   - S/p colonoscopy 10/2023 - biopsies consistent with mild active colitis   - Recent Giardia/Cryptosporidium/C diff all negative  - Continue IVF  - Continue lomotil and loperamide TID daily  - GI consulted, recommend solumedrol 40 mg IV    Additional lab evaluation ordered per GI recommendations: CRP (normal), Hep B (normal), TB pending, CMV pending, Celiac panel pending

## 2024-02-17 NOTE — NURSING
@Nurses Note -- 4 Eyes      Skin assessed during: Admit      [x] No Altered Skin Integrity Present    []Prevention Measures Documented      [] Yes- Altered Skin Integrity Present or Discovered   [] LDA Added if Not in Epic (Describe Wound)   [] New Altered Skin Integrity was Present on Admit and Documented in LDA   [] Wound Image Taken    Wound Care Consulted? No    Attending Nurse:  Neida Love RN/Staff Member:   Terell

## 2024-02-17 NOTE — HPI
Ms. 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 She has a PMH significant for endometrial cancer (diagnosed in 5/2023; status post TLH/BSO in 6/2023 and initiation of chemotherapy/radiation in 7/2023 including the use of Dostarlimab).     Per chart review, patient noted to develop evidence of Grade 2 and 3 colitis following fourth and fifth cycle of chemotherapy, respectively. In 10/2023, she underwent outpatient colonoscopy showing diverticulosis of sigmoid colon and normal mucosal lining; random colon biopsies showed mild active colitis with prominent crypt epithelial cell apoptosis and irregular subepithelial collegan. She was treated with Prednisone taper from 10/4 to 11/3 (starting dose 70 mg/day and tapering down by 20 mg per week). Dosartlimab was resumed in 11/2023.     Following completion of Prednisone taper, patient reports improvement in frequency of non-bloody diarrhea, however this since re-occured following resumption of immunotherapy in 11/2023. She reports bowel movements alternating between 3 to 12 daily without associated abdominal pain, nausea, vomiting, fevers, chills, or use of NSAID products. Due to recurrent diarrhea, GI department was contacted in early 2/2024 to attempt to arrange repeat endoscopic evaluation and outpatient follow-up. However, patient was noted to have  hypokalemia (2.7) on outpatient labs from 2/15 and sent to ED here for further evaluation.     Hospital Course:   On arrival, vital signs normal on room air. Additional labs notable for normal WBC, normal Hgb, normal Cr, normal CRP, and normal albumin. Stool studies negative for C.diff, giardia/cryptosporidium, and ova/parasites. She was admitted to gynecology and GI consulted

## 2024-02-17 NOTE — ASSESSMENT & PLAN NOTE
-see once hx above  -s/p TRH/BSO/BSLN/RPLN/repair of obturator nerve in 6/2023  -Carboplatin/paclitaxel s/p 7 cycles  -Currently on immunotherapy: Dostarlimab (last cycle 6 weeks ago)  - Lovenox for DVT prophylaxis

## 2024-02-17 NOTE — TREATMENT PLAN
Gastroenterology Treatment Plan    Sugar Diaz is a 58 y.o. female admitted to hospital 2/15/2024 (Hospital Day: 3) due to Hypokalemia.     Interval History  Solumedrol 40 mg IV initiated on evening 2/16. Patient reports approximately 10-12 liquid non-bloody stools since initiation of steroid with frequency overall unchanged. She denies association with abdominal pain, nausea, and vomiting. Labs notable for improving hypokalemia (4 today).     Objective  Temp:  [97.7 °F (36.5 °C)-98.2 °F (36.8 °C)] 97.7 °F (36.5 °C) (02/17 1118)  Pulse:  [83-97] 97 (02/17 1118)  BP: (103-125)/(55-69) 125/63 (02/17 1118)  Resp:  [18-19] 19 (02/17 1118)  SpO2:  [96 %-97 %] 96 % (02/17 1118)    General: Alert, Oriented x3, no distress  Abdomen: Normoactive bowel sounds. Non-distended. Normal tympany. Soft. Non-tender. No peritoneal signs.    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 Stool studies negative for infection. Based on colonoscopy results and pathology findings from 10/2023, suspect current diarrhea secondary to more microscopic/collagenous colitis rather than ICI colitis. She is status post initiation of Solumedrol 40 mg daily on 2/16.     Recommendations    -Continue SOLUMEDROL 40 mg IV daily and monitoring for improvement; may take up to 72 hours to gauge full effectiveness.   -No plans for repeat endoscopic evaluation currently.   -Continue electrolyte  repletion.   -Avoid use of NSAID products.   -In terms of long term management, could possibly transition to Budesonide versus Entyvio with continuation of immunotherapy.     Thank you for involving us in the care of Sugar Diaz. Please call with any additional questions, concerns or changes in the patient's clinical status.        Jc Tripp MD, PGY-VI  Gastroenterology Fellow  [facility]

## 2024-02-17 NOTE — ASSESSMENT & PLAN NOTE
- Asymptomatic  - EKG: Sinus tachycardia without ischemic changes  - K 2.7 > 2.3 > 3.2 > 4.0  - Continue to replace as needed  - Continue telemetry  - BMP, Mag, P Q12

## 2024-02-17 NOTE — ASSESSMENT & PLAN NOTE
This is a 58 year old female with a PMH significant for endometrial cancer (diagnosed in 5/2023; status post TLH/BSO in 6/2023 and initiation of chemotherapy/radiation in 7/2023 including the use of Dostarlimab with suspected ICI colitis as of 10/2023 that was treated with Prednisone) who was admitted to [facility] on 2/15 for evaluation/management of hypokalemia noted on outpatient labs in the setting of recurrent diarrhea following continuation of immunotherapy in 11/2023. 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 Stool studies negative for infection.      Recommendations:     -Suspect current diarrhea secondary to more microscopic/collagenous colitis rather than ICI colitis based on review of colonoscopy and pathology findings from 10/2023.   -Recommend initiating SOLUMEDROL 40 mg IV daily and monitoring for improvement.   -No plans for repeat endoscopic evaluation currently.   -Continue electrolyte repletion.   -Avoid use of NSAID products.   -In terms of long term management, could possibly transition to Budesonide versus Entyvio with continuation of immunotherapy.

## 2024-02-17 NOTE — NURSING
Pt arrived to [facility] [id number] via wheelchair. VSS.  at bedside. Oriented to room, call light within reach.

## 2024-02-17 NOTE — SUBJECTIVE & OBJECTIVE
Interval History: NAEO. Reports subjective improvement since admission, however still reporting multiple episodes of loose stools overnight. Tolerating po without nausea or vomiting. Denies any abdominal pain    Scheduled Meds:   diphenoxylate-atropine 2.5-0.025 mg  1 tablet Oral TID    DULoxetine  30 mg Oral Daily    enoxparin  40 mg Subcutaneous Q24H (prophylaxis, 1700)    famotidine  20 mg Oral BID    levothyroxine  100 mcg Oral Before breakfast    loperamide  4 mg Oral TID    methylPREDNISolone sodium succinate injection  40 mg Intravenous Daily    potassium, sodium phosphates  1 packet Oral BID     Continuous Infusions:   0/9% NACL & POTASSIUM CHLORIDE 20 MEQ/L 75 mL/hr at 02/17/24 0755     PRN Meds:acetaminophen, ondansetron, oxyCODONE, oxyCODONE, sodium chloride 0.9%, zolpidem    Review of patient's allergies indicates:  No Known Allergies    Objective:     Vital Signs (Most Recent):  Temp: 97.8 °F (36.6 °C) (02/17/24 0808)  Pulse: 88 (02/17/24 0808)  Resp: 18 (02/17/24 0808)  BP: (!) 117/55 (02/17/24 0808)  SpO2: 97 % (02/17/24 0808) Vital Signs (24h Range):  Temp:  [97.7 °F (36.5 °C)-98.2 °F (36.8 °C)] 97.8 °F (36.6 °C)  Pulse:  [83-96] 88  Resp:  [18] 18  SpO2:  [96 %-98 %] 97 %  BP: (103-130)/(55-83) 117/55     Weight: 74.3 kg (163 lb 12.8 oz)  Body mass index is 25.66 kg/m².    Intake/Output - Last 3 Shifts         02/15 0700  02/16 0659 02/16 0700  02/17 0659 02/17 0700  02/18 0659    P.O. 220 435     IV Piggyback 100      Total Intake(mL/kg) 320 (4.2) 435 (5.9)     Net +320 +435            Urine Occurrence 2 x 4 x     Stool Occurrence 3 x 3 x     Emesis Occurrence  0 x                   Physical Exam:   Constitutional: She is oriented to person, place, and time. She appears well-developed and well-nourished. No distress.    HENT:   Head: Normocephalic and atraumatic.    Eyes: EOM are normal.     Cardiovascular:  Normal rate.             Pulmonary/Chest: Effort normal. No respiratory distress.         Abdominal: Soft. She exhibits no distension. There is no abdominal tenderness. There is no rebound and no guarding.             Musculoskeletal: Normal range of motion and moves all extremeties.       Neurological: She is alert and oriented to person, place, and time.    Skin: Skin is warm and dry. She is not diaphoretic.    Psychiatric: She has a normal mood and affect. Her behavior is normal.          Lines/Drains/Airways       Peripheral Intravenous Line  Duration                  Peripheral IV - Single Lumen 02/15/24 1937 20 G Left Antecubital 1 day                    Laboratory:  All pertinent labs from the last 24 hours have been reviewed.  Recent Labs   Lab 02/15/24  1937   WBC 7.37   HGB 15.5   HCT 42.9   MCV 86           Recent Labs   Lab 02/15/24  1459 02/15/24  1459 02/15/24  1937 02/16/24  0406 02/16/24  1505 02/16/24  1735 02/17/24  0643     --  137 137  --  138 141   K 2.7*  --  2.5* 2.3*  --  3.1* 4.0   CL 97  --  97 100  --  104 110   CO2 29  --  27 26  --  24 21*   BUN 8  --  7 6  --  8 6   CREATININE 0.7  --  0.7 0.6  --  0.6 0.6   *  --  160* 119*  --  134* 117*   PROT 6.7  --  7.3  --   --   --   --    BILITOT 0.3  --  0.4  --   --   --   --    ALKPHOS 81  --  85  --   --   --   --    ALT 22  --  24  --   --   --   --    AST 25  --  28  --   --   --   --    MG  --    < > 1.7 1.9  --  2.5 2.0   PHOS  --    < > 3.2 3.3 2.2*  --  2.4*    < > = values in this interval not displayed.

## 2024-02-17 NOTE — PROGRESS NOTES
[facility]  Gynecologic Oncology  Progress Note      Patient Name: Sugar Diaz  MRN: [id number]  Admission Date: 2/15/2024  Hospital Length of Stay: 2 days  Attending Provider: Lavell Rodríguez MD  Primary Care Provider: Dexter Khan MD  Principal Problem: Hypokalemia    Follow-up For: * No surgery found *  Post-Operative Day:    Subjective:      History of Present Illness:  Sugar Diaz is a 58 y.o.  with stage IIIC1 grade 1 endometrioid endometrial cancer on Cycle 3 Day 10 of Dostarlimab 1000mg who was instructed to present to the ED after a critical lab value of her potassium resulted. Her K+ outpatient was 2.7. The patient endorses having loose stools and nausea, but denies fevers, chills, vomiting, chest pain, SOB, or muscle weakness. Denies recent illness.    At [facility] [place], [place] ED, the patient's CBC is wnL, K+ 2.5,  Glucose 160, Cr wnL. VSS and wnL. EKG reveal sinus tachycardia without ischemic changes.     Hospital Course:  02/15/2024: Admit to Gyn Onc for electrolyte repletion and close monitoring.  2024: NAEO. K+ low at 2.3. Pt poorly tolerating IV replacement, IV at slow rate. Will replace PO and IV. Will replace Mg, Phos. Reports more loose stools this morning. Will restart home regimen of loperamide and lomotil. Will consult GI.  2024: K improving, most recent 4.0. GI consulted for concerns of colitis, recommend IV solumedrol 40 mg daily which was started yesterday. Patient reports multiple episodes of diarrhea overnight. Denies abdominal pain. Overall feeling better.       Interval History: NAEO. Reports subjective improvement since admission, however still reporting multiple episodes of loose stools overnight. Tolerating po without nausea or vomiting. Denies any abdominal pain    Scheduled Meds:   diphenoxylate-atropine 2.5-0.025 mg  1 tablet Oral TID    DULoxetine  30 mg Oral Daily    enoxparin  40 mg Subcutaneous Q24H (prophylaxis, 1700)    famotidine  20  mg Oral BID    levothyroxine  100 mcg Oral Before breakfast    loperamide  4 mg Oral TID    methylPREDNISolone sodium succinate injection  40 mg Intravenous Daily    potassium, sodium phosphates  1 packet Oral BID     Continuous Infusions:   0/9% NACL & POTASSIUM CHLORIDE 20 MEQ/L 75 mL/hr at 02/17/24 0755     PRN Meds:acetaminophen, ondansetron, oxyCODONE, oxyCODONE, sodium chloride 0.9%, zolpidem    Review of patient's allergies indicates:  No Known Allergies    Objective:     Vital Signs (Most Recent):  Temp: 97.8 °F (36.6 °C) (02/17/24 0808)  Pulse: 88 (02/17/24 0808)  Resp: 18 (02/17/24 0808)  BP: (!) 117/55 (02/17/24 0808)  SpO2: 97 % (02/17/24 0808) Vital Signs (24h Range):  Temp:  [97.7 °F (36.5 °C)-98.2 °F (36.8 °C)] 97.8 °F (36.6 °C)  Pulse:  [83-96] 88  Resp:  [18] 18  SpO2:  [96 %-98 %] 97 %  BP: (103-130)/(55-83) 117/55     Weight: 74.3 kg (163 lb 12.8 oz)  Body mass index is 25.66 kg/m².    Intake/Output - Last 3 Shifts         02/15 0700  02/16 0659 02/16 0700  02/17 0659 02/17 0700  02/18 0659    P.O. 220 435     IV Piggyback 100      Total Intake(mL/kg) 320 (4.2) 435 (5.9)     Net +320 +435            Urine Occurrence 2 x 4 x     Stool Occurrence 3 x 3 x     Emesis Occurrence  0 x                  Physical Exam:   Constitutional: She is oriented to person, place, and time. She appears well-developed and well-nourished. No distress.    HENT:   Head: Normocephalic and atraumatic.    Eyes: EOM are normal.     Cardiovascular:  Normal rate.             Pulmonary/Chest: Effort normal. No respiratory distress.        Abdominal: Soft. She exhibits no distension. There is no abdominal tenderness. There is no rebound and no guarding.             Musculoskeletal: Normal range of motion and moves all extremeties.       Neurological: She is alert and oriented to person, place, and time.    Skin: Skin is warm and dry. She is not diaphoretic.    Psychiatric: She has a normal mood and affect. Her behavior is  normal.          Lines/Drains/Airways       Peripheral Intravenous Line  Duration                  Peripheral IV - Single Lumen 02/15/24 1937 20 G Left Antecubital 1 day                    Laboratory:  All pertinent labs from the last 24 hours have been reviewed.  Recent Labs   Lab 02/15/24  1937   WBC 7.37   HGB 15.5   HCT 42.9   MCV 86           Recent Labs   Lab 02/15/24  1459 02/15/24  1459 02/15/24  1937 02/16/24  0406 02/16/24  1505 02/16/24  1735 02/17/24  0643     --  137 137  --  138 141   K 2.7*  --  2.5* 2.3*  --  3.1* 4.0   CL 97  --  97 100  --  104 110   CO2 29  --  27 26  --  24 21*   BUN 8  --  7 6  --  8 6   CREATININE 0.7  --  0.7 0.6  --  0.6 0.6   *  --  160* 119*  --  134* 117*   PROT 6.7  --  7.3  --   --   --   --    BILITOT 0.3  --  0.4  --   --   --   --    ALKPHOS 81  --  85  --   --   --   --    ALT 22  --  24  --   --   --   --    AST 25  --  28  --   --   --   --    MG  --    < > 1.7 1.9  --  2.5 2.0   PHOS  --    < > 3.2 3.3 2.2*  --  2.4*    < > = values in this interval not displayed.              Assessment/Plan:     * Hypokalemia  - Asymptomatic  - EKG: Sinus tachycardia without ischemic changes  - K 2.7 > 2.3 > 3.2 > 4.0  - Continue to replace as needed  - Continue telemetry  - BMP, Mag, P Q12      Electrolyte abnormality  - K now WNL  - Mag 2.0 this AM  - P 2.4, will replace  - Continue to trend BMP, Mag, and P Q12    Hypothyroidism due to medication  - Stable  - TSH low, T4 free normal on admit  - Continue home synthroid    GERD (gastroesophageal reflux disease)  - Continue home famotidine    Diarrhea  - Denies abdominal pain or cramping   - Tolerating PO   - S/p colonoscopy 10/2023 - biopsies consistent with mild active colitis   - Recent Giardia/Cryptosporidium/C diff all negative  - Continue IVF  - Continue lomotil and loperamide TID daily  - GI consulted, recommend solumedrol 40 mg IV    Additional lab evaluation ordered per GI recommendations: CRP  (normal), Hep B (normal), TB pending, CMV pending, Celiac panel pending    Malignant neoplasm of endometrium  -see once hx above  -s/p TRH/BSO/BSLN/RPLN/repair of obturator nerve in 6/2023  -Carboplatin/paclitaxel s/p 7 cycles  -Currently on immunotherapy: Dostarlimab (last cycle 6 weeks ago)      VTE Risk Mitigation (From admission, onward)           Ordered     enoxaparin injection 40 mg  Every 24 hours         02/16/24 1454     IP VTE HIGH RISK PATIENT  Once         02/15/24 2778                      Dee Carey MD  Gynecologic Oncology  [facility] - Transplant Stepdown

## 2024-02-17 NOTE — CARE UPDATE
Afternoon Assessment:    Resident to bedside for PM assessment. Patient reports feeling better and better since admission. She did have 8 episodes of diarrhea earlier this morning, however has not had any diarrhea for the last 2 hours which is new for her and is an improvement. She is tolerating po without nausea or vomiting.    Temp:  [97.7 °F (36.5 °C)-98.2 °F (36.8 °C)] 98 °F (36.7 °C)  Pulse:  [83-97] 96  Resp:  [18-19] 19  SpO2:  [96 %-97 %] 97 %  BP: (103-125)/(55-63) 121/59        Labs:  Recent Labs   Lab 02/15/24  1459 02/15/24  1459 02/15/24  1937 02/16/24  0406 02/16/24  1505 02/16/24  1735 02/17/24  0643 02/17/24  1457   WBC  --   --  7.37  --   --   --   --   --    HGB  --   --  15.5  --   --   --   --   --    HCT  --   --  42.9  --   --   --   --   --    PLT  --   --  426  --   --   --   --   --    BUN 8  --  7 6  --  8 6  --    CREATININE 0.7  --  0.7 0.6  --  0.6 0.6  --    ALT 22  --  24  --   --   --   --   --    AST 25  --  28  --   --   --   --   --    MG  --    < > 1.7 1.9  --  2.5 2.0  --    PHOS  --    < > 3.2 3.3 2.2*  --  2.4* 1.7*    < > = values in this interval not displayed.          A/P:  Discussed that steroids may take up to 72 hours to determine full effectiveness, although patient has started to feel improvement since receiving second dose of steroids today  Phosphorous not responsive to po replacement, will replace IV  Will repeat BMP and P in AM    Dee Carey MD  PGY-3 OB/GYN

## 2024-02-17 NOTE — PLAN OF CARE
Pt AAOx4. VSS, afebrile, on room air. Pt w/o complaints of pain. Pt on regular diet, 6 BM/shift. GI following, continuing IVP SM. IVF d/c. K on AM labs 4.0, Phos on AM labs 2.4, repeat 1.7. PO & IV replacements ordered. Pt ambulating in room independently, non-skid socks on pt when OOB. Bed in lowest position, wheels locked, call light within pt reach. Pt updated on plan of care. Spouse @ bedside, attentive to pt & participating in care.

## 2024-02-18 LAB
ANION GAP SERPL CALC-SCNC: 7 MMOL/L (ref 8–16)
ANION GAP SERPL CALC-SCNC: 9 MMOL/L (ref 8–16)
BUN SERPL-MCNC: 11 MG/DL (ref 6–20)
BUN SERPL-MCNC: 7 MG/DL (ref 6–20)
CALCIUM SERPL-MCNC: 8.8 MG/DL (ref 8.7–10.5)
CALCIUM SERPL-MCNC: 9 MG/DL (ref 8.7–10.5)
CHLORIDE SERPL-SCNC: 110 MMOL/L (ref 95–110)
CHLORIDE SERPL-SCNC: 110 MMOL/L (ref 95–110)
CO2 SERPL-SCNC: 19 MMOL/L (ref 23–29)
CO2 SERPL-SCNC: 23 MMOL/L (ref 23–29)
CREAT SERPL-MCNC: 0.6 MG/DL (ref 0.5–1.4)
CREAT SERPL-MCNC: 0.7 MG/DL (ref 0.5–1.4)
EST. GFR  (NO RACE VARIABLE): >60 ML/MIN/1.73 M^2
EST. GFR  (NO RACE VARIABLE): >60 ML/MIN/1.73 M^2
GLUCOSE SERPL-MCNC: 184 MG/DL (ref 70–110)
GLUCOSE SERPL-MCNC: 96 MG/DL (ref 70–110)
PHOSPHATE SERPL-MCNC: 3.6 MG/DL (ref 2.7–4.5)
POTASSIUM SERPL-SCNC: 2.9 MMOL/L (ref 3.5–5.1)
POTASSIUM SERPL-SCNC: 3.9 MMOL/L (ref 3.5–5.1)
SODIUM SERPL-SCNC: 138 MMOL/L (ref 136–145)
SODIUM SERPL-SCNC: 140 MMOL/L (ref 136–145)

## 2024-02-18 PROCEDURE — 99233 SBSQ HOSP IP/OBS HIGH 50: CPT | Mod: ,,, | Performed by: OBSTETRICS & GYNECOLOGY

## 2024-02-18 PROCEDURE — 25000003 PHARM REV CODE 250

## 2024-02-18 PROCEDURE — 20600001 HC STEP DOWN PRIVATE ROOM

## 2024-02-18 PROCEDURE — 63600175 PHARM REV CODE 636 W HCPCS

## 2024-02-18 PROCEDURE — 36415 COLL VENOUS BLD VENIPUNCTURE: CPT | Mod: XB

## 2024-02-18 PROCEDURE — 97165 OT EVAL LOW COMPLEX 30 MIN: CPT

## 2024-02-18 PROCEDURE — 80048 BASIC METABOLIC PNL TOTAL CA: CPT | Mod: 91

## 2024-02-18 PROCEDURE — 84100 ASSAY OF PHOSPHORUS: CPT

## 2024-02-18 PROCEDURE — 63600175 PHARM REV CODE 636 W HCPCS: Performed by: STUDENT IN AN ORGANIZED HEALTH CARE EDUCATION/TRAINING PROGRAM

## 2024-02-18 RX ORDER — POTASSIUM CHLORIDE 7.45 MG/ML
10 INJECTION INTRAVENOUS
Status: COMPLETED | OUTPATIENT
Start: 2024-02-18 | End: 2024-02-18

## 2024-02-18 RX ORDER — POTASSIUM CHLORIDE 20 MEQ/1
40 TABLET, EXTENDED RELEASE ORAL
Status: COMPLETED | OUTPATIENT
Start: 2024-02-18 | End: 2024-02-18

## 2024-02-18 RX ORDER — VALACYCLOVIR HYDROCHLORIDE 500 MG/1
500 TABLET, FILM COATED ORAL 2 TIMES DAILY
Status: DISCONTINUED | OUTPATIENT
Start: 2024-02-18 | End: 2024-02-23 | Stop reason: HOSPADM

## 2024-02-18 RX ADMIN — POTASSIUM CHLORIDE 10 MEQ: 7.46 INJECTION, SOLUTION INTRAVENOUS at 09:02

## 2024-02-18 RX ADMIN — POTASSIUM CHLORIDE 40 MEQ: 1500 TABLET, EXTENDED RELEASE ORAL at 09:02

## 2024-02-18 RX ADMIN — POTASSIUM PHOSPHATE, MONOBASIC AND POTASSIUM PHOSPHATE, DIBASIC 15 MMOL: 224; 236 INJECTION, SOLUTION, CONCENTRATE INTRAVENOUS at 01:02

## 2024-02-18 RX ADMIN — VALACYCLOVIR HYDROCHLORIDE 500 MG: 500 TABLET, FILM COATED ORAL at 01:02

## 2024-02-18 RX ADMIN — ZOLPIDEM TARTRATE 5 MG: 5 TABLET ORAL at 08:02

## 2024-02-18 RX ADMIN — FAMOTIDINE 20 MG: 20 TABLET, FILM COATED ORAL at 09:02

## 2024-02-18 RX ADMIN — FAMOTIDINE 20 MG: 20 TABLET, FILM COATED ORAL at 08:02

## 2024-02-18 RX ADMIN — DIPHENOXYLATE HYDROCHLORIDE AND ATROPINE SULFATE 1 TABLET: 2.5; .025 TABLET ORAL at 02:02

## 2024-02-18 RX ADMIN — DULOXETINE HYDROCHLORIDE 30 MG: 30 CAPSULE, DELAYED RELEASE ORAL at 09:02

## 2024-02-18 RX ADMIN — DIPHENOXYLATE HYDROCHLORIDE AND ATROPINE SULFATE 1 TABLET: 2.5; .025 TABLET ORAL at 08:02

## 2024-02-18 RX ADMIN — LOPERAMIDE HYDROCHLORIDE 4 MG: 2 CAPSULE ORAL at 09:02

## 2024-02-18 RX ADMIN — DIPHENOXYLATE HYDROCHLORIDE AND ATROPINE SULFATE 1 TABLET: 2.5; .025 TABLET ORAL at 09:02

## 2024-02-18 RX ADMIN — SODIUM CHLORIDE, POTASSIUM CHLORIDE, SODIUM LACTATE AND CALCIUM CHLORIDE 500 ML: 600; 310; 30; 20 INJECTION, SOLUTION INTRAVENOUS at 12:02

## 2024-02-18 RX ADMIN — LOPERAMIDE HYDROCHLORIDE 4 MG: 2 CAPSULE ORAL at 02:02

## 2024-02-18 RX ADMIN — METHYLPREDNISOLONE SODIUM SUCCINATE 35 MG: 40 INJECTION, POWDER, FOR SOLUTION INTRAMUSCULAR; INTRAVENOUS at 02:02

## 2024-02-18 RX ADMIN — VALACYCLOVIR HYDROCHLORIDE 500 MG: 500 TABLET, FILM COATED ORAL at 08:02

## 2024-02-18 RX ADMIN — ENOXAPARIN SODIUM 40 MG: 40 INJECTION SUBCUTANEOUS at 05:02

## 2024-02-18 RX ADMIN — METHYLPREDNISOLONE SODIUM SUCCINATE 40 MG: 125 INJECTION, POWDER, FOR SOLUTION INTRAMUSCULAR; INTRAVENOUS at 09:02

## 2024-02-18 RX ADMIN — LEVOTHYROXINE SODIUM 100 MCG: 100 TABLET ORAL at 05:02

## 2024-02-18 RX ADMIN — POTASSIUM CHLORIDE 40 MEQ: 1500 TABLET, EXTENDED RELEASE ORAL at 01:02

## 2024-02-18 RX ADMIN — LOPERAMIDE HYDROCHLORIDE 4 MG: 2 CAPSULE ORAL at 08:02

## 2024-02-18 RX ADMIN — POTASSIUM CHLORIDE 10 MEQ: 7.46 INJECTION, SOLUTION INTRAVENOUS at 10:02

## 2024-02-18 NOTE — NURSING
Patient transferred to 826.  No signs of evident distress or complaint of pain.  Chart given to receiving nurse.

## 2024-02-18 NOTE — CARE UPDATE
Afternoon Assessment:    Resident to bedside for afternoon assessment. Patient resting in bed. Feels better after receiving fluid bolus earlier today. Still having watery diarrhea. 5 episodes today so far    Temp:  [97.9 °F (36.6 °C)-98.1 °F (36.7 °C)] 98 °F (36.7 °C)  Pulse:  [83-96] 83  Resp:  [18-19] 18  SpO2:  [93 %-98 %] 97 %  BP: (100-124)/(51-60) 112/56    Recent Labs   Lab 02/15/24  1459 02/15/24  1459 02/15/24  1937 02/16/24  0406 02/16/24  1505 02/16/24  1735 02/17/24  0643 02/17/24  1457 02/18/24  0610 02/18/24  1451   WBC  --   --  7.37  --   --   --   --   --   --   --    HGB  --   --  15.5  --   --   --   --   --   --   --    HCT  --   --  42.9  --   --   --   --   --   --   --    PLT  --   --  426  --   --   --   --   --   --   --    BUN 8  --  7 6  --  8 6  --  7 11   CREATININE 0.7  --  0.7 0.6  --  0.6 0.6  --  0.6 0.7   ALT 22  --  24  --   --   --   --   --   --   --    AST 25  --  28  --   --   --   --   --   --   --    MG  --    < > 1.7 1.9  --  2.5 2.0  --   --   --    PHOS  --    < > 3.2 3.3   < >  --  2.4* 1.7* 3.6  --     < > = values in this interval not displayed.          A/P:  Hypokalemia resolved on PM labs  Will repeat BMP, P in AM  Continue solumedrol, peak effect likely within 72 hours of initiation (tomorrow night)    Dee Carey MD  PGY-3 OB/GYN

## 2024-02-18 NOTE — PROGRESS NOTES
[facility]  Gynecologic Oncology  Progress Note      Patient Name: Sugar Diaz  MRN: [id number]  Admission Date: 2/15/2024  Hospital Length of Stay: 3 days  Attending Provider: Lavell Rodríguez MD  Primary Care Provider: Dexter Khan MD  Principal Problem: Hypokalemia    Follow-up For: * No surgery found *  Post-Operative Day:    Subjective:      History of Present Illness:  Sugar Diaz is a 58 y.o.  with stage IIIC1 grade 1 endometrioid endometrial cancer who completed Cycle 3 Dostarlimab 1000mg  on  who was instructed to present to the ED after a critical lab value of her potassium resulted. Her K+ outpatient was 2.7. The patient endorses having loose stools and nausea, but denies fevers, chills, vomiting, chest pain, SOB, or muscle weakness. Denies recent illness.    At [facility] [place] ED, the patient's CBC is wnL, K+ 2.5,  Glucose 160, Cr wnL. VSS and wnL. EKG reveal sinus tachycardia without ischemic changes.     Hospital Course:  02/15/2024: Admit to Gyn Onc for electrolyte repletion and close monitoring.  2024: NAEO. K+ low at 2.3. Pt poorly tolerating IV replacement, IV at slow rate. Will replace PO and IV. Will replace Mg, Phos. Reports more loose stools this morning. Will restart home regimen of loperamide and lomotil. Will consult GI.  2024: K improving, most recent 4.0. GI consulted for concerns of colitis, recommend IV solumedrol 40 mg daily which was started yesterday. Patient reports multiple episodes of diarrhea overnight. Denies abdominal pain. Overall feeling better.   2024: Reports one episode of diarrhea overnight. Hypokalemia noted on morning labs, phosphorous improved. Denies abdominal pain. Intermittent nausea but tolerating po without vomiting.     Interval History: NAEO. Reports subjective improvement since admission, only had one episode of diarrhea overnight. Tolerating po some nausea but no vomiting. Denies any abdominal  pain.    Scheduled Meds:   diphenoxylate-atropine 2.5-0.025 mg  1 tablet Oral TID    DULoxetine  30 mg Oral Daily    enoxparin  40 mg Subcutaneous Q24H (prophylaxis, 1700)    famotidine  20 mg Oral BID    levothyroxine  100 mcg Oral Before breakfast    loperamide  4 mg Oral TID    methylPREDNISolone sodium succinate injection  40 mg Intravenous Daily    potassium chloride  10 mEq Intravenous Q1H    potassium chloride  40 mEq Oral Q2H     Continuous Infusions:      PRN Meds:acetaminophen, ondansetron, oxyCODONE, oxyCODONE, sodium chloride 0.9%, zolpidem    Review of patient's allergies indicates:  No Known Allergies    Objective:     Vital Signs (Most Recent):  Temp: 97.9 °F (36.6 °C) (02/18/24 0543)  Pulse: 83 (02/18/24 0543)  Resp: 18 (02/18/24 0543)  BP: (!) 113/54 (02/18/24 0543)  SpO2: (!) 93 % (02/18/24 0543) Vital Signs (24h Range):  Temp:  [97.7 °F (36.5 °C)-98.1 °F (36.7 °C)] 97.9 °F (36.6 °C)  Pulse:  [83-97] 83  Resp:  [18-19] 18  SpO2:  [93 %-97 %] 93 %  BP: (103-125)/(51-63) 113/54     Weight: 74.3 kg (163 lb 12.8 oz)  Body mass index is 25.66 kg/m².    Intake/Output - Last 3 Shifts         02/16 0700  02/17 0659 02/17 0700 02/18 0659 02/18 0700  02/19 0659    P.O. 435 1390     IV Piggyback       Total Intake(mL/kg) 435 (5.9) 1390 (18.7)     Net +435 +1390            Urine Occurrence 4 x 4 x     Stool Occurrence 3 x 7 x     Emesis Occurrence 0 x 0 x                  Physical Exam:   Constitutional: She is oriented to person, place, and time. She appears well-developed and well-nourished. No distress.    HENT:   Head: Normocephalic and atraumatic.    Eyes: EOM are normal.     Cardiovascular:  Normal rate.             Pulmonary/Chest: Effort normal. No respiratory distress.        Abdominal: Soft. She exhibits no distension. There is no abdominal tenderness. There is no rebound and no guarding.             Musculoskeletal: Normal range of motion and moves all extremeties.       Neurological: She is alert  and oriented to person, place, and time.    Skin: Skin is warm and dry. She is not diaphoretic.    Psychiatric: She has a normal mood and affect. Her behavior is normal.          Lines/Drains/Airways       Peripheral Intravenous Line  Duration                  Peripheral IV - Single Lumen 02/17/24 1854 20 G;1 3/4 in Anterior;Right Upper Arm <1 day                    Laboratory:  All pertinent labs from the last 24 hours have been reviewed.  Recent Labs   Lab 02/15/24  1937   WBC 7.37   HGB 15.5   HCT 42.9   MCV 86           Recent Labs   Lab 02/15/24  1459 02/15/24  1459 02/15/24  1937 02/16/24  0406 02/16/24  1505 02/16/24  1735 02/17/24  0643 02/17/24  1457 02/18/24  0610     --  137 137  --  138 141  --  140   K 2.7*  --  2.5* 2.3*  --  3.1* 4.0  --  2.9*   CL 97  --  97 100  --  104 110  --  110   CO2 29  --  27 26  --  24 21*  --  23   BUN 8  --  7 6  --  8 6  --  7   CREATININE 0.7  --  0.7 0.6  --  0.6 0.6  --  0.6   *  --  160* 119*  --  134* 117*  --  96   PROT 6.7  --  7.3  --   --   --   --   --   --    BILITOT 0.3  --  0.4  --   --   --   --   --   --    ALKPHOS 81  --  85  --   --   --   --   --   --    ALT 22  --  24  --   --   --   --   --   --    AST 25  --  28  --   --   --   --   --   --    MG  --    < > 1.7 1.9  --  2.5 2.0  --   --    PHOS  --    < > 3.2 3.3   < >  --  2.4* 1.7* 3.6    < > = values in this interval not displayed.              Assessment/Plan:     * Hypokalemia  - Most recent K 2.9  - Will replace this AM  - PM BMP  - Continue to replace as needed        Electrolyte abnormality  - Mag now WNL  - P 3.6 this AM    Hypothyroidism due to medication  - Stable  - TSH low, T4 free normal on admit  - Continue home synthroid    GERD (gastroesophageal reflux disease)  - Continue home famotidine    Diarrhea  - Denies abdominal pain or cramping   - Tolerating PO   - S/p colonoscopy 10/2023 - biopsies consistent with mild active colitis   - Recent Giardia/Cryptosporidium/C  diff all negative  - s/p IVF as patient is tolerating po  - Continue lomotil and loperamide TID daily  - Quantity of diarrhea improving  - GI consulted, likely etiology microscopic/collagenous colitis rather than ICI colitis   - Continue solumedrol 40 mg IV   - Additional lab evaluation ordered per GI recommendations: CRP (normal), Hep B (normal), TB pending, CMV pending, Celiac panel pending    Malignant neoplasm of endometrium  -see once hx above  -s/p TRH/BSO/BSLN/RPLN/repair of obturator nerve in 6/2023  -Carboplatin/paclitaxel s/p 7 cycles  -Currently on immunotherapy: Dostarlimab (last cycle 6 weeks ago)  - Lovenox for DVT prophylaxis      VTE Risk Mitigation (From admission, onward)           Ordered     enoxaparin injection 40 mg  Every 24 hours         02/16/24 1454     IP VTE HIGH RISK PATIENT  Once         02/15/24 2216                  Dee Carey MD  Gynecologic Oncology  [facility] - Trans[facility]

## 2024-02-18 NOTE — PLAN OF CARE
Continuing Solumedrol daily per GI for colitis. Replaced phosphorous with 2 doses of IV K Phosphate.    Orientedx4. VSS. Afebrile. Up ad nadiya, steady gait. Denies pain. Fall precautions maintained. Call light within reach. Pt verbalized understanding to call nurse as needed. No concerns voiced at this time.  at bedside.

## 2024-02-18 NOTE — PLAN OF CARE
Pt transferred from TSU to room 824. No signs of acute distress. Pt endorsing some fatigue but denies any pain or N/V. On scheduled lomotil and imodium for diarrhea, 6 loose stools this shift. Next dose of solumedrol given. K 2.9 this AM - Pt received 80 mEq PO KCl and 20 mEq IV KCl. K 3.9 on repeat BMP. 500cc LR bolus given. Pt remains afebrile and VSS. WCTM.

## 2024-02-18 NOTE — PT/OT/SLP EVAL
"Occupational Therapy   Evaluation and Discharge Note    Name: Sugar Diaz  MRN: [id number]  Admitting Diagnosis: Hypokalemia  Recent Surgery: * No surgery found *      Recommendations:     Discharge Recommendations: No Therapy Indicated  Discharge Equipment Recommendations: none  Barriers to discharge:  None    Assessment:     Sugar Diaz is a 58 y.o. female with a medical diagnosis of Hypokalemia. At this time, patient is functioning at their prior level of function and does not require further acute OT services.     Plan:     During this hospitalization, patient does not require further acute OT services.  Please re-consult if situation changes.    Plan of Care Reviewed with: patient, spouse    Subjective     Chief Complaint: none stated  Patient/Family Comments/goals: "I do everything for myself."    Occupational Profile:  Living Environment: SSH, threshold entrance, lives c/ ; tub/shower, standard toilet  Previous level of function: indep; drives, works  Roles and Routines: wife  Equipment Used at home: none  Assistance upon Discharge:  per pt report    Pain/Comfort:  Pain Rating 1: 0/10  Pain Rating Post-Intervention 1: 0/10    Patients cultural, spiritual, Mormon conflicts given the current situation:      Objective:     Communicated with: RN prior to session.  Patient found up in chair with peripheral IV upon OT entry to room.    General Precautions: Standard,    Orthopedic Precautions: N/A  Braces: N/A  Respiratory Status: Room air     Occupational Performance:    Bed Mobility:    Pt started and finished session in chair    Functional Mobility/Transfers:  Patient completed Sit <> Stand Transfer with independence  with  no assistive device   Patient completed Tub Transfer Step Transfer technique with independence with no AD  Functional Mobility: from chair to couch to retrieve clothes, to toilet and back to chair; indep; no LOB; no fatigue, SOB  Pt managed IV pole    Activities " of Daily Living:  Grooming: pt declined to perform    Upper Body Dressing: independence pt retrieved and donned gown as robe; no cueing  Lower Body Dressing: independence pt retrieved and donned shoes; no cueing  Toileting: pt not needing to void at this time      Cognitive/Visual Perceptual:  Cognitive/Psychosocial Skills:     -       Oriented to: Person, Place, Time, and Situation   -       Follows Commands/attention:Follows multistep  commands  -       Safety awareness/insight to disability: intact     Physical Exam:  Balance:    -       sitting balance: indep; dynamic standing balance: indep  Upper Extremity Range of Motion:     -       Right Upper Extremity: WFL  -       Left Upper Extremity: WFL  Upper Extremity Strength:    -       Right Upper Extremity: WFL  -       Left Upper Extremity: WFL   Strength:    -       Right Upper Extremity: WFL  -       Left Upper Extremity: WFL  Fine Motor Coordination:    -       Intact  Left hand thumb/finger opposition skills and Right hand thumb/finger opposition skills    AMPA 6 Click ADL:  AMPAC Total Score: 24    Treatment & Education:  Pt edu on role of OT, POC, safety when performing self care tasks , benefit of performing OOB activity, and safety when performing functional transfers and mobility.  - White board updated  - Self care tasks completed-- as noted above      Patient left up in chair with all lines intact, call button in reach, RN notified, and  present    GOALS:   Multidisciplinary Problems       Occupational Therapy Goals       Not on file                    History:     Past Medical History:   Diagnosis Date    Chest pain 2002    NEGATIVE STRESS ECHO 2002    Endometrial cancer     2023    GERD (gastroesophageal reflux disease)     Lower back pain     Migraines     Mild allergic rhinitis     Postmenopausal bleeding          Past Surgical History:   Procedure Laterality Date    COLONOSCOPY N/A 10/06/2023    Procedure: COLONOSCOPY;  Surgeon:  Marcia Weaver MD;  Location: [facility] ENDO;  Service: Endoscopy;  Laterality: N/A;  10/4- referred by Dr. Weaver/ Subject: Needs colonoscopy ASAP within 1 week /Procedure Timing: < 1 week/Diagnosis: Diarrhea, evaluate for immunotherapy induced colitis/ Prep instructions to portal. AS    CYST REMOVAL Left 04/28/2022    Procedure: EXCISION, CYST-BACK;  Surgeon: Thomas Hurst MD;  Location: [facility] OR;  Service: General;  Laterality: Left;  left upper back  RN PREOP ON 4/21/22-NF    HYSTERECTOMY      after endometrial cancer diagnosis    HYSTEROSCOPY WITH DILATION AND CURETTAGE OF UTERUS N/A 05/23/2023    Procedure: HYSTEROSCOPY, WITH DILATION AND CURETTAGE OF UTERUS;  Surgeon: Omaira Evans MD;  Location: [facility] OR;  Service: OB/GYN;  Laterality: N/A;  [employer] HAMMAD AGUILADANYELL  [phone] TEXTED [facility] ON 5/2/2023 @ 3:51PM. [facility] RESPONDED ON 5/2/2023 @ 3:51PM-LO  RN PREOP 5/18/23  T & S; UPT ORDERED AND SCHEDULED 5/22/23    LYMPH NODE BIOPSY Left 06/05/2023    Procedure: BIOPSY, sentinel LYMPH NODE;  Surgeon: Lavell Rodríguez MD;  Location: [facility];  Service: OB/GYN;  Laterality: Left;    LYMPHADENECTOMY, PELVIS Right 06/05/2023    Procedure: LYMPHADENECTOMY, PELVIS;  Surgeon: Lavell Rodríguez MD;  Location: [facility];  Service: OB/GYN;  Laterality: Right;    MAPPING, LYMPH NODE, SENTINEL Bilateral 06/05/2023    Procedure: injection, LYMPH NODE, SENTINEL;  Surgeon: Lavell Rodríguez MD;  Location: [facility] [place] OR;  Service: OB/GYN;  Laterality: Bilateral;    NERVE REPAIR Right 06/05/2023    Procedure: REPAIR, OBTURATOR NERVE;  Surgeon: Lavell Rodríguez MD;  Location: [facility];  Service: OB/GYN;  Laterality: Right;    TONSILLECTOMY      TONSILLECTOMY      TUBAL LIGATION      XI ROBOTIC HYSTERECTOMY, WITH SALPINGO-OOPHORECTOMY Bilateral 06/05/2023    Procedure: XI ROBOTIC HYSTERECTOMY,WITH SALPINGO-OOPHORECTOMY;  Surgeon: Lavell Rodríguez MD;  Location: [facility];  Service: OB/GYN;  Laterality: Bilateral;  removed through vagina       Time  Tracking:     OT Date of Treatment: 02/18/24  OT Start Time: 1345  OT Stop Time: 1355  OT Total Time (min): 10 min    Billable Minutes:Evaluation 10    2/18/2024

## 2024-02-18 NOTE — ASSESSMENT & PLAN NOTE
- Denies abdominal pain or cramping   - Tolerating PO   - S/p colonoscopy 10/2023 - biopsies consistent with mild active colitis   - Recent Giardia/Cryptosporidium/C diff all negative  - s/p IVF as patient is tolerating po  - Continue lomotil and loperamide TID daily  - Quantity of diarrhea improving  - GI consulted, likely etiology microscopic/collagenous colitis rather than ICI colitis   - Continue solumedrol 40 mg IV   - Additional lab evaluation ordered per GI recommendations: CRP (normal), Hep B (normal), TB pending, CMV pending, Celiac panel pending

## 2024-02-18 NOTE — TREATMENT PLAN
Gastroenterology Treatment Plan    Sugar Diaz is a 58 y.o. female admitted to hospital 2/15/2024 (Hospital Day: 4) due to Hypokalemia.     Interval History  Solumedrol 40 mg IV initiated on evening 2/16. Patient reports approximately 9-10 liquid non-bloody stools since initiation of steroid with frequency overall unchanged. She denies association with abdominal pain, nausea, and vomiting. Labs notable for improving hypokalemia (2.9>3.9 today).     Objective  Temp:  [97.9 °F (36.6 °C)-98.1 °F (36.7 °C)] 97.9 °F (36.6 °C) (02/18 1613)  Pulse:  [83-95] 95 (02/18 1613)  BP: (100-124)/(51-60) 112/56 (02/18 1613)  Resp:  [18-20] 20 (02/18 1613)  SpO2:  [93 %-98 %] 96 % (02/18 1613)    General: Alert, Oriented x3, no distress  Abdomen: Normoactive bowel sounds. Non-distended. Normal tympany. Soft. Non-tender. No peritoneal signs.    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 Stool studies negative for infection. Based on colonoscopy results and pathology findings from 10/2023, suspect current diarrhea secondary to more microscopic/collagenous colitis rather than ICI colitis. She is status post initiation of Solumedrol 40 mg daily on 2/16. On this dose, no real change in frequency of BM.     Recommendations    -After an additional 35 mg IV SOLUMEDROL today,  increase dosing to 1 mg/kg of BWdaily and monitoring for improvement; may take up to 72 hours to gauge  full effectiveness. If no response, may consider repeat scope on Tuesday or Wednesday.   -No plans for repeat endoscopic evaluation currently.   -Continue electrolyte repletion.   -Avoid use of NSAID products.   -In terms of long term management, could possibly transition to Budesonide versus Entyvio with continuation of immunotherapy.     Thank you for involving us in the care of Sugar Diaz. Please call with any additional questions, concerns or changes in the patient's clinical status.    Genaro Vargas MD, PGY-V  Gastroenterology Fellow  [facility]

## 2024-02-18 NOTE — SUBJECTIVE & OBJECTIVE
Interval History: NAEO. Reports subjective improvement since admission, only had one episode of diarrhea overnight. Tolerating po some nausea but no vomiting. Denies any abdominal pain.    Scheduled Meds:   diphenoxylate-atropine 2.5-0.025 mg  1 tablet Oral TID    DULoxetine  30 mg Oral Daily    enoxparin  40 mg Subcutaneous Q24H (prophylaxis, 1700)    famotidine  20 mg Oral BID    levothyroxine  100 mcg Oral Before breakfast    loperamide  4 mg Oral TID    methylPREDNISolone sodium succinate injection  40 mg Intravenous Daily    potassium chloride  10 mEq Intravenous Q1H    potassium chloride  40 mEq Oral Q2H     Continuous Infusions:      PRN Meds:acetaminophen, ondansetron, oxyCODONE, oxyCODONE, sodium chloride 0.9%, zolpidem    Review of patient's allergies indicates:  No Known Allergies    Objective:     Vital Signs (Most Recent):  Temp: 97.9 °F (36.6 °C) (02/18/24 0543)  Pulse: 83 (02/18/24 0543)  Resp: 18 (02/18/24 0543)  BP: (!) 113/54 (02/18/24 0543)  SpO2: (!) 93 % (02/18/24 0543) Vital Signs (24h Range):  Temp:  [97.7 °F (36.5 °C)-98.1 °F (36.7 °C)] 97.9 °F (36.6 °C)  Pulse:  [83-97] 83  Resp:  [18-19] 18  SpO2:  [93 %-97 %] 93 %  BP: (103-125)/(51-63) 113/54     Weight: 74.3 kg (163 lb 12.8 oz)  Body mass index is 25.66 kg/m².    Intake/Output - Last 3 Shifts         02/16 0700  02/17 0659 02/17 0700  02/18 0659 02/18 0700  02/19 0659    P.O. 435 1390     IV Piggyback       Total Intake(mL/kg) 435 (5.9) 1390 (18.7)     Net +435 +1390            Urine Occurrence 4 x 4 x     Stool Occurrence 3 x 7 x     Emesis Occurrence 0 x 0 x                   Physical Exam:   Constitutional: She is oriented to person, place, and time. She appears well-developed and well-nourished. No distress.    HENT:   Head: Normocephalic and atraumatic.    Eyes: EOM are normal.     Cardiovascular:  Normal rate.             Pulmonary/Chest: Effort normal. No respiratory distress.        Abdominal: Soft. She exhibits no distension.  There is no abdominal tenderness. There is no rebound and no guarding.             Musculoskeletal: Normal range of motion and moves all extremeties.       Neurological: She is alert and oriented to person, place, and time.    Skin: Skin is warm and dry. She is not diaphoretic.    Psychiatric: She has a normal mood and affect. Her behavior is normal.          Lines/Drains/Airways       Peripheral Intravenous Line  Duration                  Peripheral IV - Single Lumen 02/17/24 1854 20 G;1 3/4 in Anterior;Right Upper Arm <1 day                    Laboratory:  All pertinent labs from the last 24 hours have been reviewed.  Recent Labs   Lab 02/15/24  1937   WBC 7.37   HGB 15.5   HCT 42.9   MCV 86           Recent Labs   Lab 02/15/24  1459 02/15/24  1459 02/15/24  1937 02/16/24  0406 02/16/24  1505 02/16/24  1735 02/17/24  0643 02/17/24  1457 02/18/24  0610     --  137 137  --  138 141  --  140   K 2.7*  --  2.5* 2.3*  --  3.1* 4.0  --  2.9*   CL 97  --  97 100  --  104 110  --  110   CO2 29  --  27 26  --  24 21*  --  23   BUN 8  --  7 6  --  8 6  --  7   CREATININE 0.7  --  0.7 0.6  --  0.6 0.6  --  0.6   *  --  160* 119*  --  134* 117*  --  96   PROT 6.7  --  7.3  --   --   --   --   --   --    BILITOT 0.3  --  0.4  --   --   --   --   --   --    ALKPHOS 81  --  85  --   --   --   --   --   --    ALT 22  --  24  --   --   --   --   --   --    AST 25  --  28  --   --   --   --   --   --    MG  --    < > 1.7 1.9  --  2.5 2.0  --   --    PHOS  --    < > 3.2 3.3   < >  --  2.4* 1.7* 3.6    < > = values in this interval not displayed.

## 2024-02-18 NOTE — NURSING
Report called to receiving RN Mary.    VSS  NO signs of evident distress or complaint of pain  Pt. Independent and ambulatory  Right upper arm 20g PIV dressing CDI.  Serum potassium 2.9 on morning labs.  Potassium 20mEq ordered IV and 80mEq ordered PO.  Currently infusing 10mEq IV and have admin 40mEq PO.  See MAR for admin.  Potassium ysited to NS at 75ml/hr to decrease burning and discomfort from IV potassium.   Pt. With 3 liquid BM this morning.  Loperamide and lomotil admin as per MAR  Skin intact  Regular diet  500mL LR bolus ordered

## 2024-02-19 LAB
ABO + RH BLD: NORMAL
ANION GAP SERPL CALC-SCNC: 8 MMOL/L (ref 8–16)
BLD GP AB SCN CELLS X3 SERPL QL: NORMAL
BUN SERPL-MCNC: 10 MG/DL (ref 6–20)
CALCIUM SERPL-MCNC: 8.8 MG/DL (ref 8.7–10.5)
CHLORIDE SERPL-SCNC: 109 MMOL/L (ref 95–110)
CMV DNA SPEC QL NAA+PROBE: NORMAL
CO2 SERPL-SCNC: 20 MMOL/L (ref 23–29)
CREAT SERPL-MCNC: 0.6 MG/DL (ref 0.5–1.4)
CYTOMEGALOVIRUS PCR, QUANT: NOT DETECTED IU/ML
EST. GFR  (NO RACE VARIABLE): >60 ML/MIN/1.73 M^2
GAMMA INTERFERON BACKGROUND BLD IA-ACNC: 0.16 IU/ML
GLIADIN PEPTIDE IGA SER-ACNC: 0.9 U/ML
GLIADIN PEPTIDE IGG SER-ACNC: <0.6 U/ML
GLUCOSE SERPL-MCNC: 112 MG/DL (ref 70–110)
IGA SERPL-MCNC: 216 MG/DL (ref 70–400)
M TB IFN-G CD4+ BCKGRND COR BLD-ACNC: 0.01 IU/ML
M TB IFN-G CD4+ BCKGRND COR BLD-ACNC: 0.02 IU/ML
MITOGEN IGNF BCKGRD COR BLD-ACNC: 9.84 IU/ML
PHOSPHATE SERPL-MCNC: 2.6 MG/DL (ref 2.7–4.5)
POTASSIUM SERPL-SCNC: 3.8 MMOL/L (ref 3.5–5.1)
SODIUM SERPL-SCNC: 137 MMOL/L (ref 136–145)
SPECIMEN OUTDATE: NORMAL
TB GOLD PLUS: NEGATIVE
TTG IGA SER-ACNC: 0.3 U/ML
TTG IGG SER-ACNC: <0.6 U/ML

## 2024-02-19 PROCEDURE — 97161 PT EVAL LOW COMPLEX 20 MIN: CPT

## 2024-02-19 PROCEDURE — 20600001 HC STEP DOWN PRIVATE ROOM

## 2024-02-19 PROCEDURE — 80048 BASIC METABOLIC PNL TOTAL CA: CPT

## 2024-02-19 PROCEDURE — 25000003 PHARM REV CODE 250

## 2024-02-19 PROCEDURE — 63600175 PHARM REV CODE 636 W HCPCS: Performed by: STUDENT IN AN ORGANIZED HEALTH CARE EDUCATION/TRAINING PROGRAM

## 2024-02-19 PROCEDURE — 63600175 PHARM REV CODE 636 W HCPCS

## 2024-02-19 PROCEDURE — 86850 RBC ANTIBODY SCREEN: CPT | Performed by: OBSTETRICS & GYNECOLOGY

## 2024-02-19 PROCEDURE — 99232 SBSQ HOSP IP/OBS MODERATE 35: CPT | Mod: ,,, | Performed by: OBSTETRICS & GYNECOLOGY

## 2024-02-19 PROCEDURE — 84100 ASSAY OF PHOSPHORUS: CPT

## 2024-02-19 RX ORDER — POLYETHYLENE GLYCOL 3350, SODIUM SULFATE ANHYDROUS, SODIUM BICARBONATE, SODIUM CHLORIDE, POTASSIUM CHLORIDE 236; 22.74; 6.74; 5.86; 2.97 G/4L; G/4L; G/4L; G/4L; G/4L
4000 POWDER, FOR SOLUTION ORAL ONCE
Status: COMPLETED | OUTPATIENT
Start: 2024-02-20 | End: 2024-02-20

## 2024-02-19 RX ADMIN — DIPHENOXYLATE HYDROCHLORIDE AND ATROPINE SULFATE 1 TABLET: 2.5; .025 TABLET ORAL at 08:02

## 2024-02-19 RX ADMIN — VALACYCLOVIR HYDROCHLORIDE 500 MG: 500 TABLET, FILM COATED ORAL at 08:02

## 2024-02-19 RX ADMIN — DIBASIC SODIUM PHOSPHATE, MONOBASIC POTASSIUM PHOSPHATE AND MONOBASIC SODIUM PHOSPHATE 2 TABLET: 852; 155; 130 TABLET ORAL at 08:02

## 2024-02-19 RX ADMIN — FAMOTIDINE 20 MG: 20 TABLET, FILM COATED ORAL at 08:02

## 2024-02-19 RX ADMIN — ENOXAPARIN SODIUM 40 MG: 40 INJECTION SUBCUTANEOUS at 04:02

## 2024-02-19 RX ADMIN — LOPERAMIDE HYDROCHLORIDE 4 MG: 2 CAPSULE ORAL at 08:02

## 2024-02-19 RX ADMIN — DULOXETINE HYDROCHLORIDE 30 MG: 30 CAPSULE, DELAYED RELEASE ORAL at 08:02

## 2024-02-19 RX ADMIN — METHYLPREDNISOLONE SODIUM SUCCINATE 74.3 MG: 40 INJECTION, POWDER, FOR SOLUTION INTRAMUSCULAR; INTRAVENOUS at 08:02

## 2024-02-19 RX ADMIN — DIPHENOXYLATE HYDROCHLORIDE AND ATROPINE SULFATE 1 TABLET: 2.5; .025 TABLET ORAL at 02:02

## 2024-02-19 RX ADMIN — ZOLPIDEM TARTRATE 5 MG: 5 TABLET ORAL at 08:02

## 2024-02-19 RX ADMIN — LEVOTHYROXINE SODIUM 100 MCG: 100 TABLET ORAL at 05:02

## 2024-02-19 RX ADMIN — LOPERAMIDE HYDROCHLORIDE 4 MG: 2 CAPSULE ORAL at 02:02

## 2024-02-19 NOTE — PLAN OF CARE
No acute events this shift. Patient AAOx4. Afebrile and VSS. Ambulates independently and voids without difficulty. 2 episodes of diarrhea this shift. No complaints of pain or nausea. Patient complaint of difficulty sleeping. PRN Ambien administered x1. Full relief obtained. Patient resting peacefully throughout shift.  remains at bedside. Bed in lowest position and locked. Side rails up x2. All possessions and call light within reach. Non-skid socks worn. Instructed to call for assistance and voiced understanding. All needs of patient currently met. Will continue to monitor with frequent rounding.

## 2024-02-19 NOTE — ASSESSMENT & PLAN NOTE
- Denies abdominal pain or cramping   - Tolerating PO   - S/p colonoscopy 10/2023 - biopsies consistent with mild active colitis   - Recent Giardia/Cryptosporidium/C diff all negative  - s/p IVF as patient is tolerating po  - Continue lomotil and loperamide TID daily  - Quantity of diarrhea improving  - GI consulted  - Increase solumedrol to 1 mg/kg daily    - If no response, may consider repeat scope on Tuesday or Wednesday  - Additional lab evaluation ordered per GI recommendations: CRP (normal), Hep B (normal), TB pending, CMV pending, Celiac panel pending

## 2024-02-19 NOTE — PT/OT/SLP EVAL
"Physical Therapy Evaluation and Discharge Note    Patient Name:  Sugar Diaz   MRN:  [id number]    Recommendations:     Discharge Recommendations: No Therapy Indicated  Discharge Equipment Recommendations: none   Barriers to discharge: None    Assessment:     Sugar Diaz is a 58 y.o. female admitted with a medical diagnosis of Hypokalemia. Patient agreeable to therapy this date. Demonstrates ability to ambulate, complete transfers, negotiate stairs, and complete therapeutic exercise with safety. Daughter present and educated as well.  At this time, patient is functioning at their prior level of function and does not require further acute PT services.     Recent Surgery: * No surgery found *      Plan:     During this hospitalization, patient does not require further acute PT services.  Please re-consult if situation changes.      Subjective     Chief Complaint: "I can do everything myself"  Patient/Family Comments/goals: agreeable to therapy  Pain/Comfort:  Pain Rating 1: 0/10  Pain Rating Post-Intervention 1: 0/10    Patients cultural, spiritual, Amish conflicts given the current situation: no    Living Environment:  Patient lives with her  in a [facility] with one threshold CHARLES. Tub shower combo located in bathroom.   Prior to admission, patients level of function was independent, no AD.  Equipment used at home: none.  DME owned (not currently used): none.  Upon discharge, patient will have assistance from .    Objective:     Communicated with RN prior to session.  Patient found HOB elevated with  (no active lines) upon PT entry to room.    General Precautions: Standard,      Orthopedic Precautions:N/A   Braces: N/A  Respiratory Status: Room air    Exams:  Cognitive Exam:  Patient is oriented to Person, Place, Time, and Situation  Gross Motor Coordination:  WFL  RLE ROM: WFL  RLE Strength: WFL  LLE ROM: WFL  LLE Strength: WFL    Functional Mobility:  Bed Mobility:     Supine to Sit: " independence  Transfers:     Sit to Stand:  independence with no AD  Gait: approximately 310 ft with independence in hallway, no AD. Appropriate walker, no rest breaks, no LOB  Balance: seated: good. Standing: good  Stairs:  Pt ascended/descended 9 stair(s) with No Assistive Device with no handrails with Stand-by Assistance.     AM-PAC 6 CLICK MOBILITY  Total Score:24       Treatment and Education:  Patient educated on Role of PT in acute care  Reports walking frequently in hallways with family, educated importance of maintaining strength and endurance  Donned/doffed shoes with independence    AM-PAC 6 CLICK MOBILITY  Total Score:24     Patient left sitting edge of bed with all lines intact, call button in reach, and daughter present.    GOALS:   Multidisciplinary Problems       Physical Therapy Goals       Not on file                    History:     Past Medical History:   Diagnosis Date    Chest pain 2002    NEGATIVE STRESS ECHO 2002    Endometrial cancer     2023    GERD (gastroesophageal reflux disease)     Lower back pain     Migraines     Mild allergic rhinitis     Postmenopausal bleeding        Past Surgical History:   Procedure Laterality Date    COLONOSCOPY N/A 10/06/2023    Procedure: COLONOSCOPY;  Surgeon: Marcia Weaver MD;  Location: [facility] ENDO;  Service: Endoscopy;  Laterality: N/A;  10/4- referred by Dr. Weaver/ Subject: Needs colonoscopy ASAP within 1 week /Procedure Timing: < 1 week/Diagnosis: Diarrhea, evaluate for immunotherapy induced colitis/ Prep instructions to portal. AS    CYST REMOVAL Left 04/28/2022    Procedure: EXCISION, CYST-BACK;  Surgeon: Thomas Hurst MD;  Location: [facility] OR;  Service: General;  Laterality: Left;  left upper back  RN PREOP ON 4/21/22-NF    HYSTERECTOMY      after endometrial cancer diagnosis    HYSTEROSCOPY WITH DILATION AND CURETTAGE OF UTERUS N/A 05/23/2023    Procedure: HYSTEROSCOPY, WITH DILATION AND CURETTAGE OF UTERUS;  Surgeon: Omaira Evans MD;   Location: [facility] OR;  Service: OB/GYN;  Laterality: N/A;  [employer]  [phone] TEXTED [facility] ON 5/2/2023 @ 3:51PM. [facility] RESPONDED ON 5/2/2023 @ 3:51PM-LO  RN PREOP 5/18/23  T & S; UPT ORDERED AND SCHEDULED 5/22/23    LYMPH NODE BIOPSY Left 06/05/2023    Procedure: BIOPSY, sentinel LYMPH NODE;  Surgeon: Lavell Rodríguez MD;  Location: [facility] OR;  Service: OB/GYN;  Laterality: Left;    LYMPHADENECTOMY, PELVIS Right 06/05/2023    Procedure: LYMPHADENECTOMY, PELVIS;  Surgeon: Lavell Rodríguez MD;  Location: [facility] OR;  Service: OB/GYN;  Laterality: Right;    MAPPING, LYMPH NODE, SENTINEL Bilateral 06/05/2023    Procedure: injection, LYMPH NODE, SENTINEL;  Surgeon: Lavell Rodríguez MD;  Location: [facility] OR;  Service: OB/GYN;  Laterality: Bilateral;    NERVE REPAIR Right 06/05/2023    Procedure: REPAIR, OBTURATOR NERVE;  Surgeon: Lavell Rodríguez MD;  Location: [facility];  Service: OB/GYN;  Laterality: Right;    TONSILLECTOMY      TONSILLECTOMY      TUBAL LIGATION      XI ROBOTIC HYSTERECTOMY, WITH SALPINGO-OOPHORECTOMY Bilateral 06/05/2023    Procedure: XI ROBOTIC HYSTERECTOMY,WITH SALPINGO-OOPHORECTOMY;  Surgeon: Lavell Rodríguez MD;  Location: [facility];  Service: OB/GYN;  Laterality: Bilateral;  removed through vagina       Time Tracking:     PT Received On: 02/19/24  PT Start Time: 0905     PT Stop Time: 0915  PT Total Time (min): 10 min     Billable Minutes: Evaluation 10      02/19/2024

## 2024-02-19 NOTE — PROGRESS NOTES
Gastroenterology Progress Note    Sugar Diaz is a 58 y.o. female admitted to hospital 2/15/2024 (Hospital Day: 5) due to Hypokalemia.     Interval History  In spite of going up on steroid dose on 2/18 (to 1 mg/Kg/BW) the patient had 12-15 loose, non-bloody BM. Reports decreased appetite. Denies fever, chills, blood in stool, or abdominal pain.      Objective  Temp:  [97.6 °F (36.4 °C)-98.4 °F (36.9 °C)] 98.1 °F (36.7 °C) (02/19 1544)  Pulse:  [83-94] 94 (02/19 1544)  BP: (104-147)/(51-58) 110/53 (02/19 1544)  Resp:  [16-18] 18 (02/19 1544)  SpO2:  [93 %-97 %] 93 % (02/19 1544)    General: Alert, Oriented x3, no distress  Abdomen: Normoactive bowel sounds. Non-distended. Normal tympany. Soft. Non-tender. No peritoneal signs.    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 Stool studies negative for infection. Based on colonoscopy results and pathology findings from 10/2023, suspect current diarrhea secondary to more microscopic/collagenous colitis rather than ICI colitis. She is status post initiation of Solumedrol 40 mg daily on 2/16. On this dose, no real change in frequency of BM. On 2/18, steroid dose increased to 1 mg/Kg/BW but no improvement.     Recommendations    -Continue IV SOLUMEDROL 1 mg/kg of BW daily (D#4 of IV steroids).  -Will plan on repeat EGD & colonoscopy on Wednesday (with gastric, duodenal, and colonic biopsies). Clears tomorrow, NPO MN  before procedure. Golytely prep to start 6 PM Tuesday.   -Ordered GI pathogen panel  -Continue electrolyte repletion.   -Avoid use of NSAID products.   -In terms of long term management, could possibly transition to Budesonide versus Entyvio with continuation of immunotherapy.     Thank you for involving us in the care of Sugar Diaz. Please call with any additional questions, concerns or changes in the patient's clinical status.    Genaro Vargas MD, PGY-V  Gastroenterology Fellow  [facility]

## 2024-02-19 NOTE — PROGRESS NOTES
[facility] - Oncology ([facility])  Gynecologic Oncology  Progress Note      Patient Name: Sugar Diaz  MRN: [id number]  Admission Date: 2/15/2024  Hospital Length of Stay: 4 days  Attending Provider: Lavell Rodríguez MD  Primary Care Provider: Dexter Khan MD  Principal Problem: Hypokalemia    Follow-up For: * No surgery found *  Post-Operative Day:    Subjective:      History of Present Illness:  Sugar Diaz is a 58 y.o.  with stage IIIC1 grade 1 endometrioid endometrial cancer who completed Cycle 3 Dostarlimab 1000mg  on  who was instructed to present to the ED after a critical lab value of her potassium resulted. Her K+ outpatient was 2.7. The patient endorses having loose stools and nausea, but denies fevers, chills, vomiting, chest pain, SOB, or muscle weakness. Denies recent illness.    [place] [facility] [place]'s CBC is wnL, K+ 2.5,  Glucose 160, Cr wnL. VSS and wnL. EKG reveal sinus tachycardia without ischemic changes.     Hospital Course:  02/15/2024: Admit to Gyn Onc for electrolyte repletion and close monitoring.  2024: NAEO. K+ low at 2.3. Pt poorly tolerating IV replacement, IV at slow rate. Will replace PO and IV. Will replace Mg, Phos. Reports more loose stools this morning. Will restart home regimen of loperamide and lomotil. Will consult GI.  2024: K improving, most recent 4.0. GI consulted for concerns of colitis, recommend IV solumedrol 40 mg daily which was started yesterday. Patient reports multiple episodes of diarrhea overnight. Denies abdominal pain. Overall feeling better.   2024: Reports one episode of diarrhea overnight. Hypokalemia noted on morning labs, phosphorous improved. Denies abdominal pain. Intermittent nausea but tolerating po without vomiting.   2024: Reports 3 episodes of diarrhea overnight. Denies nausea, vomiting, and abdominal pain. Is tolerating PO intake. Hypokalemia resolved, phosphorus is mildly low. Will replete.      Interval History: NAEON. Patient reports 3 episodes of diarrhea overnight. Denies nausea, vomiting, and abdominal pain. Is tolerating PO intake.     Scheduled Meds:   diphenoxylate-atropine 2.5-0.025 mg  1 tablet Oral TID    DULoxetine  30 mg Oral Daily    enoxparin  40 mg Subcutaneous Q24H (prophylaxis, 1700)    famotidine  20 mg Oral BID    levothyroxine  100 mcg Oral Before breakfast    loperamide  4 mg Oral TID    methylPREDNISolone sodium succinate injection  1 mg/kg/day Intravenous Daily    valACYclovir  500 mg Oral BID     Continuous Infusions:  PRN Meds:acetaminophen, ondansetron, oxyCODONE, oxyCODONE, sodium chloride 0.9%, zolpidem    Review of patient's allergies indicates:  No Known Allergies    Objective:     Vital Signs (Most Recent):  Temp: 98.1 °F (36.7 °C) (02/19/24 0712)  Pulse: 89 (02/19/24 0712)  Resp: 16 (02/19/24 0712)  BP: (!) 116/56 (02/19/24 0712)  SpO2: 95 % (02/19/24 0712) Vital Signs (24h Range):  Temp:  [97.6 °F (36.4 °C)-98.4 °F (36.9 °C)] 98.1 °F (36.7 °C)  Pulse:  [83-95] 89  Resp:  [16-20] 16  SpO2:  [95 %-98 %] 95 %  BP: (100-119)/(51-56) 116/56     Weight: 74.3 kg (163 lb 12.8 oz)  Body mass index is 25.66 kg/m².    Intake/Output - Last 3 Shifts         02/17 0700  02/18 0659 02/18 0700 02/19 0659 02/19 0700 02/20 0659    P.O. 1390      IV Piggyback  700     Total Intake(mL/kg) 1390 (18.7) 700 (9.4)     Net +1390 +700            Urine Occurrence 4 x 5 x     Stool Occurrence 7 x 8 x     Emesis Occurrence 0 x                    Physical Exam:   Constitutional: She is oriented to person, place, and time. She appears well-developed and well-nourished. No distress.    HENT:   Head: Normocephalic and atraumatic.    Eyes: EOM are normal.     Cardiovascular:  Normal rate.             Pulmonary/Chest: Effort normal. No respiratory distress.        Abdominal: Soft. She exhibits no distension. There is no abdominal tenderness. There is no rebound and no guarding.              Musculoskeletal: Moves all extremeties.       Neurological: She is alert and oriented to person, place, and time.    Skin: Skin is warm and dry.    Psychiatric: She has a normal mood and affect. Her behavior is normal. Thought content normal.          Lines/Drains/Airways       Peripheral Intravenous Line  Duration                  Peripheral IV - Single Lumen 02/17/24 1854 20 G;1 3/4 in Anterior;Right Upper Arm 1 day                    Laboratory:  Recent Labs   Lab 02/15/24  1459 02/15/24  1459 02/15/24  1937 02/16/24  0406 02/16/24  1505 02/16/24  1735 02/17/24  0643 02/17/24  1457 02/18/24  0610 02/18/24  1451 02/19/24  0343     --  137 137  --  138 141  --  140 138 137   K 2.7*  --  2.5* 2.3*  --  3.1* 4.0  --  2.9* 3.9 3.8   CL 97  --  97 100  --  104 110  --  110 110 109   CO2 29  --  27 26  --  24 21*  --  23 19* 20*   BUN 8  --  7 6  --  8 6  --  7 11 10   CREATININE 0.7  --  0.7 0.6  --  0.6 0.6  --  0.6 0.7 0.6   *  --  160* 119*  --  134* 117*  --  96 184* 112*   PROT 6.7  --  7.3  --   --   --   --   --   --   --   --    BILITOT 0.3  --  0.4  --   --   --   --   --   --   --   --    ALKPHOS 81  --  85  --   --   --   --   --   --   --   --    ALT 22  --  24  --   --   --   --   --   --   --   --    AST 25  --  28  --   --   --   --   --   --   --   --    MG  --    < > 1.7 1.9  --  2.5 2.0  --   --   --   --    PHOS  --    < > 3.2 3.3   < >  --  2.4* 1.7* 3.6  --  2.6*    < > = values in this interval not displayed.       Assessment/Plan:     * Hypokalemia  - Most recent K 3.8  - Continue to replace as needed    Electrolyte abnormality  - Mag now WNL  - P 2.6 this AM  - Replete PRN    Hypothyroidism due to medication  - Stable  - TSH low, T4 free normal on admit  - Continue home synthroid    GERD (gastroesophageal reflux disease)  - Continue home famotidine    Diarrhea  - Denies abdominal pain or cramping   - Tolerating PO   - S/p colonoscopy 10/2023 - biopsies consistent with mild active  colitis   - Recent Giardia/Cryptosporidium/C diff all negative  - s/p IVF as patient is tolerating po  - Continue lomotil and loperamide TID daily  - Quantity of diarrhea improving  - GI consulted  - Increase solumedrol to 1 mg/kg daily    - If no response, may consider repeat scope on Tuesday or Wednesday  - Additional lab evaluation ordered per GI recommendations: CRP (normal), Hep B (normal), TB pending, CMV pending, Celiac panel pending    Malignant neoplasm of endometrium  -see once hx above  -s/p TRH/BSO/BSLN/RPLN/repair of obturator nerve in 6/2023  -Carboplatin/paclitaxel s/p 7 cycles  -Currently on immunotherapy: Dostarlimab (last cycle 6 weeks ago)  - Lovenox for DVT prophylaxis      VTE Risk Mitigation (From admission, onward)           Ordered     enoxaparin injection 40 mg  Every 24 hours         02/16/24 1454     IP VTE HIGH RISK PATIENT  Once         02/15/24 2211                    Glenda Nair MD  Gynecologic Oncology  [facility] - Oncology ([facility]

## 2024-02-19 NOTE — PLAN OF CARE
Plan of care reviewed with patient. Only complaint today was 7 episodes of diarrhea. Pt given scheduled Lomotil and Imodium with little relief. VSS, q1h patient rounds, bed in low position and wheels locked, rails up x2, call light and personal belongings within reach. Pt voids independently. Pt walked with PT during shift. Family remained at bedside.    Jimmy TERRY Case   2/19/2024   1:44 PM

## 2024-02-19 NOTE — SUBJECTIVE & OBJECTIVE
Interval History: NAEON. Patient reports 3 episodes of diarrhea overnight. Denies nausea, vomiting, and abdominal pain. Is tolerating PO intake.     Scheduled Meds:   diphenoxylate-atropine 2.5-0.025 mg  1 tablet Oral TID    DULoxetine  30 mg Oral Daily    enoxparin  40 mg Subcutaneous Q24H (prophylaxis, 1700)    famotidine  20 mg Oral BID    levothyroxine  100 mcg Oral Before breakfast    loperamide  4 mg Oral TID    methylPREDNISolone sodium succinate injection  1 mg/kg/day Intravenous Daily    valACYclovir  500 mg Oral BID     Continuous Infusions:  PRN Meds:acetaminophen, ondansetron, oxyCODONE, oxyCODONE, sodium chloride 0.9%, zolpidem    Review of patient's allergies indicates:  No Known Allergies    Objective:     Vital Signs (Most Recent):  Temp: 98.1 °F (36.7 °C) (02/19/24 0712)  Pulse: 89 (02/19/24 0712)  Resp: 16 (02/19/24 0712)  BP: (!) 116/56 (02/19/24 0712)  SpO2: 95 % (02/19/24 0712) Vital Signs (24h Range):  Temp:  [97.6 °F (36.4 °C)-98.4 °F (36.9 °C)] 98.1 °F (36.7 °C)  Pulse:  [83-95] 89  Resp:  [16-20] 16  SpO2:  [95 %-98 %] 95 %  BP: (100-119)/(51-56) 116/56     Weight: 74.3 kg (163 lb 12.8 oz)  Body mass index is 25.66 kg/m².    Intake/Output - Last 3 Shifts         02/17 0700  02/18 0659 02/18 0700 02/19 0659 02/19 0700 02/20 0659    P.O. 1390      IV Piggyback  700     Total Intake(mL/kg) 1390 (18.7) 700 (9.4)     Net +1390 +700            Urine Occurrence 4 x 5 x     Stool Occurrence 7 x 8 x     Emesis Occurrence 0 x                    Physical Exam:   Constitutional: She is oriented to person, place, and time. She appears well-developed and well-nourished. No distress.    HENT:   Head: Normocephalic and atraumatic.    Eyes: EOM are normal.     Cardiovascular:  Normal rate.             Pulmonary/Chest: Effort normal. No respiratory distress.        Abdominal: Soft. She exhibits no distension. There is no abdominal tenderness. There is no rebound and no guarding.             Musculoskeletal:  Moves all extremeties.       Neurological: She is alert and oriented to person, place, and time.    Skin: Skin is warm and dry.    Psychiatric: She has a normal mood and affect. Her behavior is normal. Thought content normal.          Lines/Drains/Airways       Peripheral Intravenous Line  Duration                  Peripheral IV - Single Lumen 02/17/24 1854 20 G;1 3/4 in Anterior;Right Upper Arm 1 day                    Laboratory:  Recent Labs   Lab 02/15/24  1459 02/15/24  1459 02/15/24  1937 02/16/24  0406 02/16/24  1505 02/16/24  1735 02/17/24  0643 02/17/24  1457 02/18/24  0610 02/18/24  1451 02/19/24  0343     --  137 137  --  138 141  --  140 138 137   K 2.7*  --  2.5* 2.3*  --  3.1* 4.0  --  2.9* 3.9 3.8   CL 97  --  97 100  --  104 110  --  110 110 109   CO2 29  --  27 26  --  24 21*  --  23 19* 20*   BUN 8  --  7 6  --  8 6  --  7 11 10   CREATININE 0.7  --  0.7 0.6  --  0.6 0.6  --  0.6 0.7 0.6   *  --  160* 119*  --  134* 117*  --  96 184* 112*   PROT 6.7  --  7.3  --   --   --   --   --   --   --   --    BILITOT 0.3  --  0.4  --   --   --   --   --   --   --   --    ALKPHOS 81  --  85  --   --   --   --   --   --   --   --    ALT 22  --  24  --   --   --   --   --   --   --   --    AST 25  --  28  --   --   --   --   --   --   --   --    MG  --    < > 1.7 1.9  --  2.5 2.0  --   --   --   --    PHOS  --    < > 3.2 3.3   < >  --  2.4* 1.7* 3.6  --  2.6*    < > = values in this interval not displayed.

## 2024-02-20 ENCOUNTER — TELEPHONE (OUTPATIENT)
Dept: ENDOSCOPY | Facility: HOSPITAL | Age: 59
End: 2024-02-20
Payer: COMMERCIAL

## 2024-02-20 ENCOUNTER — ANESTHESIA EVENT (OUTPATIENT)
Dept: ENDOSCOPY | Facility: HOSPITAL | Age: 59
DRG: 373 | End: 2024-02-20
Payer: COMMERCIAL

## 2024-02-20 PROBLEM — K52.9 CHRONIC DIARRHEA: Status: ACTIVE | Noted: 2024-02-20

## 2024-02-20 PROBLEM — K52.9 COLITIS: Status: ACTIVE | Noted: 2024-02-20

## 2024-02-20 LAB
ANION GAP SERPL CALC-SCNC: 9 MMOL/L (ref 8–16)
BUN SERPL-MCNC: 11 MG/DL (ref 6–20)
CALCIUM SERPL-MCNC: 8.8 MG/DL (ref 8.7–10.5)
CHLORIDE SERPL-SCNC: 109 MMOL/L (ref 95–110)
CO2 SERPL-SCNC: 23 MMOL/L (ref 23–29)
CREAT SERPL-MCNC: 0.6 MG/DL (ref 0.5–1.4)
ERYTHROCYTE [SEDIMENTATION RATE] IN BLOOD BY PHOTOMETRIC METHOD: 18 MM/HR (ref 0–36)
EST. GFR  (NO RACE VARIABLE): >60 ML/MIN/1.73 M^2
GLUCOSE SERPL-MCNC: 87 MG/DL (ref 70–110)
HAV IGM SERPL QL IA: NORMAL
HBV CORE IGM SERPL QL IA: NORMAL
HBV SURFACE AG SERPL QL IA: NORMAL
HCV AB SERPL QL IA: NORMAL
MAGNESIUM SERPL-MCNC: 1.9 MG/DL (ref 1.6–2.6)
PHOSPHATE SERPL-MCNC: 3.5 MG/DL (ref 2.7–4.5)
POCT GLUCOSE: 119 MG/DL (ref 70–110)
POCT GLUCOSE: 129 MG/DL (ref 70–110)
POCT GLUCOSE: 141 MG/DL (ref 70–110)
POTASSIUM SERPL-SCNC: 3.1 MMOL/L (ref 3.5–5.1)
SODIUM SERPL-SCNC: 141 MMOL/L (ref 136–145)

## 2024-02-20 PROCEDURE — 83735 ASSAY OF MAGNESIUM: CPT

## 2024-02-20 PROCEDURE — 99233 SBSQ HOSP IP/OBS HIGH 50: CPT | Mod: ,,, | Performed by: OBSTETRICS & GYNECOLOGY

## 2024-02-20 PROCEDURE — 63600175 PHARM REV CODE 636 W HCPCS: Performed by: STUDENT IN AN ORGANIZED HEALTH CARE EDUCATION/TRAINING PROGRAM

## 2024-02-20 PROCEDURE — 80074 ACUTE HEPATITIS PANEL: CPT

## 2024-02-20 PROCEDURE — 20600001 HC STEP DOWN PRIVATE ROOM

## 2024-02-20 PROCEDURE — 99233 SBSQ HOSP IP/OBS HIGH 50: CPT | Mod: ,,, | Performed by: INTERNAL MEDICINE

## 2024-02-20 PROCEDURE — 25000003 PHARM REV CODE 250

## 2024-02-20 PROCEDURE — 25000003 PHARM REV CODE 250: Performed by: STUDENT IN AN ORGANIZED HEALTH CARE EDUCATION/TRAINING PROGRAM

## 2024-02-20 PROCEDURE — 85652 RBC SED RATE AUTOMATED: CPT

## 2024-02-20 PROCEDURE — 63600175 PHARM REV CODE 636 W HCPCS

## 2024-02-20 PROCEDURE — 25500020 PHARM REV CODE 255: Performed by: OBSTETRICS & GYNECOLOGY

## 2024-02-20 PROCEDURE — 36415 COLL VENOUS BLD VENIPUNCTURE: CPT | Mod: XB

## 2024-02-20 PROCEDURE — 84100 ASSAY OF PHOSPHORUS: CPT

## 2024-02-20 PROCEDURE — 80048 BASIC METABOLIC PNL TOTAL CA: CPT

## 2024-02-20 PROCEDURE — 87507 IADNA-DNA/RNA PROBE TQ 12-25: CPT | Performed by: STUDENT IN AN ORGANIZED HEALTH CARE EDUCATION/TRAINING PROGRAM

## 2024-02-20 RX ORDER — IBUPROFEN 200 MG
24 TABLET ORAL
Status: DISCONTINUED | OUTPATIENT
Start: 2024-02-20 | End: 2024-02-22

## 2024-02-20 RX ORDER — INSULIN ASPART 100 [IU]/ML
0-5 INJECTION, SOLUTION INTRAVENOUS; SUBCUTANEOUS
Status: DISCONTINUED | OUTPATIENT
Start: 2024-02-20 | End: 2024-02-22

## 2024-02-20 RX ORDER — POTASSIUM CHLORIDE 7.45 MG/ML
40 INJECTION INTRAVENOUS ONCE
Status: COMPLETED | OUTPATIENT
Start: 2024-02-20 | End: 2024-02-20

## 2024-02-20 RX ORDER — GLUCAGON 1 MG
1 KIT INJECTION
Status: DISCONTINUED | OUTPATIENT
Start: 2024-02-20 | End: 2024-02-22

## 2024-02-20 RX ORDER — IBUPROFEN 200 MG
16 TABLET ORAL
Status: DISCONTINUED | OUTPATIENT
Start: 2024-02-20 | End: 2024-02-22

## 2024-02-20 RX ADMIN — DIPHENOXYLATE HYDROCHLORIDE AND ATROPINE SULFATE 1 TABLET: 2.5; .025 TABLET ORAL at 09:02

## 2024-02-20 RX ADMIN — VALACYCLOVIR HYDROCHLORIDE 500 MG: 500 TABLET, FILM COATED ORAL at 09:02

## 2024-02-20 RX ADMIN — ZOLPIDEM TARTRATE 5 MG: 5 TABLET ORAL at 08:02

## 2024-02-20 RX ADMIN — FAMOTIDINE 20 MG: 20 TABLET, FILM COATED ORAL at 09:02

## 2024-02-20 RX ADMIN — VALACYCLOVIR HYDROCHLORIDE 500 MG: 500 TABLET, FILM COATED ORAL at 08:02

## 2024-02-20 RX ADMIN — ENOXAPARIN SODIUM 40 MG: 40 INJECTION SUBCUTANEOUS at 04:02

## 2024-02-20 RX ADMIN — FAMOTIDINE 20 MG: 20 TABLET, FILM COATED ORAL at 08:02

## 2024-02-20 RX ADMIN — POTASSIUM CHLORIDE 10 MEQ: 7.46 INJECTION, SOLUTION INTRAVENOUS at 12:02

## 2024-02-20 RX ADMIN — LEVOTHYROXINE SODIUM 100 MCG: 100 TABLET ORAL at 05:02

## 2024-02-20 RX ADMIN — DULOXETINE HYDROCHLORIDE 30 MG: 30 CAPSULE, DELAYED RELEASE ORAL at 09:02

## 2024-02-20 RX ADMIN — IOHEXOL 100 ML: 350 INJECTION, SOLUTION INTRAVENOUS at 11:02

## 2024-02-20 RX ADMIN — LOPERAMIDE HYDROCHLORIDE 4 MG: 2 CAPSULE ORAL at 02:02

## 2024-02-20 RX ADMIN — POTASSIUM CHLORIDE 10 MEQ: 7.46 INJECTION, SOLUTION INTRAVENOUS at 02:02

## 2024-02-20 RX ADMIN — LOPERAMIDE HYDROCHLORIDE 4 MG: 2 CAPSULE ORAL at 08:02

## 2024-02-20 RX ADMIN — POTASSIUM CHLORIDE 10 MEQ: 7.46 INJECTION, SOLUTION INTRAVENOUS at 09:02

## 2024-02-20 RX ADMIN — DIPHENOXYLATE HYDROCHLORIDE AND ATROPINE SULFATE 1 TABLET: 2.5; .025 TABLET ORAL at 02:02

## 2024-02-20 RX ADMIN — METHYLPREDNISOLONE SODIUM SUCCINATE 74.3 MG: 40 INJECTION, POWDER, FOR SOLUTION INTRAMUSCULAR; INTRAVENOUS at 09:02

## 2024-02-20 RX ADMIN — POTASSIUM CHLORIDE 10 MEQ: 7.46 INJECTION, SOLUTION INTRAVENOUS at 04:02

## 2024-02-20 RX ADMIN — POLYETHYLENE GLYCOL 3350, SODIUM SULFATE ANHYDROUS, SODIUM BICARBONATE, SODIUM CHLORIDE, POTASSIUM CHLORIDE 4000 ML: 236; 22.74; 6.74; 5.86; 2.97 POWDER, FOR SOLUTION ORAL at 06:02

## 2024-02-20 RX ADMIN — DIPHENOXYLATE HYDROCHLORIDE AND ATROPINE SULFATE 1 TABLET: 2.5; .025 TABLET ORAL at 08:02

## 2024-02-20 RX ADMIN — LOPERAMIDE HYDROCHLORIDE 4 MG: 2 CAPSULE ORAL at 09:02

## 2024-02-20 NOTE — SUBJECTIVE & OBJECTIVE
Interval History: Reports 13 episodes of diarrhea yesterday & overnight. Denies nausea, vomiting, and abdominal pain. Is tolerating PO intake. Voiding spontaneously. K 3.1, will replace. Plan for EGD and colonoscopy tomorrow with GI. CLD today, NPO at midnight.     Scheduled Meds:   diphenoxylate-atropine 2.5-0.025 mg  1 tablet Oral TID    DULoxetine  30 mg Oral Daily    enoxparin  40 mg Subcutaneous Q24H (prophylaxis, 1700)    famotidine  20 mg Oral BID    levothyroxine  100 mcg Oral Before breakfast    loperamide  4 mg Oral TID    methylPREDNISolone sodium succinate injection  1 mg/kg/day Intravenous Daily    polyethylene glycol  4,000 mL Oral Once    valACYclovir  500 mg Oral BID     Continuous Infusions:  PRN Meds:acetaminophen, ondansetron, oxyCODONE, oxyCODONE, sodium chloride 0.9%, zolpidem    Review of patient's allergies indicates:  No Known Allergies    Objective:     Vital Signs (Most Recent):  Temp: 97.9 °F (36.6 °C) (02/20/24 0424)  Pulse: 84 (02/20/24 0424)  Resp: 18 (02/20/24 0424)  BP: (!) 123/59 (02/20/24 0424)  SpO2: 97 % (02/20/24 0424) Vital Signs (24h Range):  Temp:  [97.4 °F (36.3 °C)-98.2 °F (36.8 °C)] 97.9 °F (36.6 °C)  Pulse:  [84-94] 84  Resp:  [18] 18  SpO2:  [93 %-98 %] 97 %  BP: (107-147)/(53-65) 123/59     Weight: 74.3 kg (163 lb 12.8 oz)  Body mass index is 25.66 kg/m².    Intake/Output - Last 3 Shifts         02/18 0700  02/19 0659 02/19 0700 02/20 0659 02/20 0700  02/21 0659    P.O.  450     IV Piggyback 700      Total Intake(mL/kg) 700 (9.4) 450 (6.1)     Net +700 +450            Urine Occurrence 5 x 1 x     Stool Occurrence 8 x 9 x                   Physical Exam:   Constitutional: She is oriented to person, place, and time. She appears well-developed and well-nourished. No distress.    HENT:   Head: Normocephalic and atraumatic.    Eyes: EOM are normal.     Cardiovascular:  Normal rate.             Pulmonary/Chest: Effort normal. No respiratory distress.        Abdominal: Soft.  She exhibits no distension. There is no abdominal tenderness. There is no rebound and no guarding.             Musculoskeletal: Moves all extremeties.       Neurological: She is alert and oriented to person, place, and time.    Skin: Skin is warm and dry.    Psychiatric: She has a normal mood and affect. Her behavior is normal. Thought content normal.          Lines/Drains/Airways       Peripheral Intravenous Line  Duration                  Peripheral IV - Single Lumen 02/17/24 1854 20 G;1 3/4 in Anterior;Right Upper Arm 2 days                    Laboratory:  Recent Labs   Lab 02/15/24  1459 02/15/24  1937 02/16/24  0406 02/16/24  1735 02/17/24  0643 02/17/24  1457 02/18/24  0610 02/18/24  1451 02/19/24  0343 02/20/24  0512    137   < > 138 141  --  140 138 137 141   K 2.7* 2.5*   < > 3.1* 4.0  --  2.9* 3.9 3.8 3.1*   CL 97 97   < > 104 110  --  110 110 109 109   CO2 29 27   < > 24 21*  --  23 19* 20* 23   BUN 8 7   < > 8 6  --  7 11 10 11   CREATININE 0.7 0.7   < > 0.6 0.6  --  0.6 0.7 0.6 0.6   * 160*   < > 134* 117*  --  96 184* 112* 87   PROT 6.7 7.3  --   --   --   --   --   --   --   --    BILITOT 0.3 0.4  --   --   --   --   --   --   --   --    ALKPHOS 81 85  --   --   --   --   --   --   --   --    ALT 22 24  --   --   --   --   --   --   --   --    AST 25 28  --   --   --   --   --   --   --   --    MG  --  1.7   < > 2.5 2.0  --   --   --   --  1.9   PHOS  --  3.2   < >  --  2.4*   < > 3.6  --  2.6* 3.5    < > = values in this interval not displayed.

## 2024-02-20 NOTE — PLAN OF CARE
Pt A&O x 4. Plan of care reviewed with patient. VSS, q1h patient rounds, bed in low position and wheels locked, rails up x2, call light and personal belongings within reach. Pt put on clear liquid diet in preparation for procedure on Wednesday, 2/21/24. NPO @ midnight tonight. Pt  transported to and from CT without incident. CT of abd/plevis w/ IV contrast completed. Pt has Potassium replaced via IV. Now placed on glucose checks q6hr. Pt continues with multiple BM's. Pt continues scheduled Imodium and Lamictal with mild relief. Pt has no other complaints at this time. Pt given GoLYTELY for prep of procedures tomorrow.     Jimmy LAZARO. Case   2/20/2024   1800

## 2024-02-20 NOTE — TELEPHONE ENCOUNTER
----- Message from Marcia Weaver MD sent at 2/20/2024 10:09 AM CST -----  Hello,    This patient is scheduled with me for an EGD and colonoscopy on 4/30 but she is going to have these tests tomorrow as an inpatient instead.  Can you cancel her procedures on 4/30?    Thanks,    Marcia

## 2024-02-20 NOTE — ASSESSMENT & PLAN NOTE
- Denies abdominal pain or cramping   - Tolerating PO   - S/p colonoscopy 10/2023 - biopsies consistent with mild active colitis   - Recent Giardia/Cryptosporidium/C diff all negative  - s/p IVF as patient is tolerating po  - Continue lomotil and loperamide TID daily  - Quantity of diarrhea improving  - GI consulted  - Continue solumedrol 1 mg/kg daily    - Plan for EGD and colonoscopy tomorrow. CLD today, NPO @ [place]. Golytely prep to begin at 6PM.   - Additional lab evaluation ordered per GI recommendations: CRP (normal), Hep B (normal), TB (neg), CMV (neg), Celiac panel (neg), GI pathogen panel pending

## 2024-02-20 NOTE — CARE UPDATE
Afternoon Assessment:    MD to bedside for PM rounds. Upon entry, patient resting comfortably in chair. Patient states she is doing well after returning from CT scan.     Temp:  [97.4 °F (36.3 °C)-98.2 °F (36.8 °C)] 97.7 °F (36.5 °C)  Pulse:  [] 103  Resp:  [18] 18  SpO2:  [93 %-98 %] 96 %  BP: (107-160)/(53-70) 160/70    PE:  Abdomen: soft, non distended, non tender    Labs:  Hep panel negative  CT AP: Low attenuating stool suggestive of watery stool/diarrhea. No pelvic adenopathy.    A/P:  Discussed findings of CT scan  Discussed negative hepatitis panel  Continue CLD, NPO at midnight  Begin bowel prep at 1800  EGD & colonoscopy tomorrow with GI   All questions answered     Glenda Nair MD  OB/GYN PGY1

## 2024-02-20 NOTE — PROGRESS NOTES
Acupuncture Evaluation Note     [id number]    Traditional Chinese Medicine (TCM) Diagnosis: Qi Stagnation, Blood Stasis, and Qi Deficiency  Medical Diagnosis:   Encounter Diagnoses   Name Primary?    Malignant neoplasm of endometrium Yes    Secondary malignancy of retroperitoneal lymph nodes     Encounter for antineoplastic chemotherapy         Evaluation Date: 2/20/2024    Visit #/Visits authorized: 4    Precautions: Standard and cancer    Subjective     Chief Concern: CIPN, bilateral feet and hands - mostly right hand and feet.      Medical necessity is demonstrated by the following IMPAIRMENTS: Medical Necessity: Decreased mobility limits day to day activities, social, and emergent situations and Decreased quality of life              Aggravating Factors:  movement, lying down, standing, prolonged sitting, and pressure   Relieving Factors:  nothing    Symptom Description:     Quality:  Aching, Burning, Tight, Tingling, and Numb  Severity:  10  Frequency:  continuously      Objective       New Findings:      Treatment     Treatment Principles:  Move qi and blood, nourish qi, stop pain     Acupuncture points used:  4 AWAD, BA YUDI, BA KESHA , Gb34, Pc6, Sp6, Sp9, and St36    Bilateral points: SP4, KD1  Unilateral points:   Auricular Treatment:      Needles In: 30  Needles Out: 30  [place] W/ STIM placed: 3:50  Needles W/ STIM removed: 4:20      Assessment     After treatment, patient felt improvement in neuropathy - 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      Patient prognosis is Good.     Patient will continue to benefit from acupuncture treatment to address the deficits listed in the problem list box on initial evaluation, provide patient family education and to maximize pt's level of independence in the home and community environment.     Patient's spiritual, cultural and educational needs considered and pt agreeable to plan of care and goals.      Anticipated barriers to treatment: none    Plan     Recommend 1 /week for 2 sessions then re-assess.      Education:  Patient is aware of cumulative benefit of acupuncture

## 2024-02-20 NOTE — PROGRESS NOTES
[facility] - Oncology ([facility])  Gynecologic Oncology  Progress Note      Patient Name: Sugar Diaz  MRN: [id number]  Admission Date: 2/15/2024  Hospital Length of Stay: 5 days  Attending Provider: Lavell Rodríguez MD  Primary Care Provider: Dexter Khan MD  Principal Problem: Hypokalemia    Follow-up For: Procedure(s) (LRB):  EGD (ESOPHAGOGASTRODUODENOSCOPY) (N/A)  COLONOSCOPY (N/A)  Post-Operative Day: Day of Surgery  Subjective:      History of Present Illness:  uSgar Diaz is a 58 y.o.  with stage IIIC1 grade 1 endometrioid endometrial cancer who completed Cycle 3 Dostarlimab 1000mg  on  who was instructed to present to the ED after a critical lab value of her potassium resulted. Her K+ outpatient was 2.7. The patient endorses having loose stools and nausea, but denies fevers, chills, vomiting, chest pain, SOB, or muscle weakness. Denies recent illness.    At [facility], the patient's CBC is wnL, K+ 2.5,  Glucose 160, Cr wnL. VSS and wnL. EKG reveal sinus tachycardia without ischemic changes.     Hospital Course:  02/15/2024: Admit to Gyn Onc for electrolyte repletion and close monitoring.  2024: NAEO. K+ low at 2.3. Pt poorly tolerating IV replacement, IV at slow rate. Will replace PO and IV. Will replace Mg, Phos. Reports more loose stools this morning. Will restart home regimen of loperamide and lomotil. Will consult GI.  2024: K improving, most recent 4.0. GI consulted for concerns of colitis, recommend IV solumedrol 40 mg daily which was started yesterday. Patient reports multiple episodes of diarrhea overnight. Denies abdominal pain. Overall feeling better.   2024: Reports one episode of diarrhea overnight. Hypokalemia noted on morning labs, phosphorous improved. Denies abdominal pain. Intermittent nausea but tolerating po without vomiting.   2024: Reports 3 episodes of diarrhea overnight. Denies nausea, vomiting, and abdominal pain. Is tolerating PO intake.  Hypokalemia resolved, phosphorus is mildly low. Will replete.   02/20/2024: Reports 13 episodes of diarrhea yesterday & overnight. Denies nausea, vomiting, and abdominal pain. Is tolerating PO intake. K 3.1, will replace. Plan for EGD and colonoscopy tomorrow with GI. CLD today, NPO at midnight.     Interval History: Reports 13 episodes of diarrhea yesterday & overnight. Denies nausea, vomiting, and abdominal pain. Is tolerating PO intake. Voiding spontaneously. K 3.1, will replace. Plan for EGD and colonoscopy tomorrow with GI. CLD today, NPO at midnight.     Scheduled Meds:   diphenoxylate-atropine 2.5-0.025 mg  1 tablet Oral TID    DULoxetine  30 mg Oral Daily    enoxparin  40 mg Subcutaneous Q24H (prophylaxis, 1700)    famotidine  20 mg Oral BID    levothyroxine  100 mcg Oral Before breakfast    loperamide  4 mg Oral TID    methylPREDNISolone sodium succinate injection  1 mg/kg/day Intravenous Daily    polyethylene glycol  4,000 mL Oral Once    valACYclovir  500 mg Oral BID     Continuous Infusions:  PRN Meds:acetaminophen, ondansetron, oxyCODONE, oxyCODONE, sodium chloride 0.9%, zolpidem    Review of patient's allergies indicates:  No Known Allergies    Objective:     Vital Signs (Most Recent):  Temp: 97.9 °F (36.6 °C) (02/20/24 0424)  Pulse: 84 (02/20/24 0424)  Resp: 18 (02/20/24 0424)  BP: (!) 123/59 (02/20/24 0424)  SpO2: 97 % (02/20/24 0424) Vital Signs (24h Range):  Temp:  [97.4 °F (36.3 °C)-98.2 °F (36.8 °C)] 97.9 °F (36.6 °C)  Pulse:  [84-94] 84  Resp:  [18] 18  SpO2:  [93 %-98 %] 97 %  BP: (107-147)/(53-65) 123/59     Weight: 74.3 kg (163 lb 12.8 oz)  Body mass index is 25.66 kg/m².    Intake/Output - Last 3 Shifts         02/18 0700  02/19 0659 02/19 0700 02/20 0659 02/20 0700 02/21 0659    P.O.  450     IV Piggyback 700      Total Intake(mL/kg) 700 (9.4) 450 (6.1)     Net +700 +450            Urine Occurrence 5 x 1 x     Stool Occurrence 8 x 9 x                   Physical Exam:   Constitutional: She  is oriented to person, place, and time. She appears well-developed and well-nourished. No distress.    HENT:   Head: Normocephalic and atraumatic.    Eyes: EOM are normal.     Cardiovascular:  Normal rate.             Pulmonary/Chest: Effort normal. No respiratory distress.        Abdominal: Soft. She exhibits no distension. There is no abdominal tenderness. There is no rebound and no guarding.             Musculoskeletal: Moves all extremeties.       Neurological: She is alert and oriented to person, place, and time.    Skin: Skin is warm and dry.    Psychiatric: She has a normal mood and affect. Her behavior is normal. Thought content normal.          Lines/Drains/Airways       Peripheral Intravenous Line  Duration                  Peripheral IV - Single Lumen 02/17/24 1854 20 G;1 3/4 in Anterior;Right Upper Arm 2 days                    Laboratory:  Recent Labs   Lab 02/15/24  1459 02/15/24  1937 02/16/24  0406 02/16/24  1735 02/17/24  0643 02/17/24  1457 02/18/24  0610 02/18/24  1451 02/19/24  0343 02/20/24  0512    137   < > 138 141  --  140 138 137 141   K 2.7* 2.5*   < > 3.1* 4.0  --  2.9* 3.9 3.8 3.1*   CL 97 97   < > 104 110  --  110 110 109 109   CO2 29 27   < > 24 21*  --  23 19* 20* 23   BUN 8 7   < > 8 6  --  7 11 10 11   CREATININE 0.7 0.7   < > 0.6 0.6  --  0.6 0.7 0.6 0.6   * 160*   < > 134* 117*  --  96 184* 112* 87   PROT 6.7 7.3  --   --   --   --   --   --   --   --    BILITOT 0.3 0.4  --   --   --   --   --   --   --   --    ALKPHOS 81 85  --   --   --   --   --   --   --   --    ALT 22 24  --   --   --   --   --   --   --   --    AST 25 28  --   --   --   --   --   --   --   --    MG  --  1.7   < > 2.5 2.0  --   --   --   --  1.9   PHOS  --  3.2   < >  --  2.4*   < > 3.6  --  2.6* 3.5    < > = values in this interval not displayed.         Assessment/Plan:     * Hypokalemia  - Most recent K 3.1  - Continue to replace as needed    Electrolyte abnormality  - Mag 1.9 this AM  - Phos  3.5 this AM  - Replete PRN    Hypothyroidism due to medication  - Stable  - TSH low, T4 free normal on admit  - Continue home synthroid    GERD (gastroesophageal reflux disease)  - Continue home famotidine    Diarrhea  - Denies abdominal pain or cramping   - Tolerating PO   - S/p colonoscopy 10/2023 - biopsies consistent with mild active colitis   - Recent Giardia/Cryptosporidium/C diff all negative  - s/p IVF as patient is tolerating po  - Continue lomotil and loperamide TID daily  - Quantity of diarrhea improving  - GI consulted  - Continue solumedrol 1 mg/kg daily    - Plan for EGD and colonoscopy tomorrow. CLD today, NPO @ [facility]. Golytely prep to begin at 6PM.   - Additional lab evaluation ordered per GI recommendations: CRP (normal), Hep B (normal), TB (neg), CMV (neg), Celiac panel (neg), GI pathogen panel pending    Malignant neoplasm of endometrium  -see once hx above  -s/p TRH/BSO/BSLN/RPLN/repair of obturator nerve in 6/2023  -Carboplatin/paclitaxel s/p 7 cycles  -Currently on immunotherapy: Dostarlimab (last cycle 6 weeks ago)  - Lovenox for DVT prophylaxis      VTE Risk Mitigation (From admission, onward)           Ordered     enoxaparin injection 40 mg  Every 24 hours         02/16/24 1454     IP VTE HIGH RISK PATIENT  Once         02/15/24 2804                  Glenda Nair MD  Gynecologic Oncology  [facility] - Oncology ([facility])

## 2024-02-20 NOTE — PLAN OF CARE
Problem: Electrolyte Imbalance  Goal: Electrolyte Balance  Outcome: Ongoing, Not Progressing     Problem: Diarrhea  Goal: Fluid and Electrolyte Balance  Outcome: Ongoing, Not Progressing   Fecal panel complete, patient has had 2 BMs through the night, watery stools continued , patient is continued on imodium and lomotil

## 2024-02-20 NOTE — ANESTHESIA PREPROCEDURE EVALUATION
[facility]  Anesthesia Pre-Operative Evaluation         Patient Name: Sugar Diaz  YOB: 1965  MRN: [id number]    SUBJECTIVE:     Pre-operative evaluation for Procedure(s) (LRB):  EGD (ESOPHAGOGASTRODUODENOSCOPY) (N/A)  COLONOSCOPY (N/A)     02/20/2024    Sugar Diaz is a 58 y.o. female w/ a significant PMHx of mild allergic rhinitis, migraines, GERD, endometrial cancer on immunotherapy. Now with worsening diarrhea.     Patient now presents for the above procedure(s).    No prior echo    LDA:       Peripheral IV - Single Lumen 02/17/24 1854 20 G;1 3/4 in Anterior;Right Upper Arm (Active)   Site Assessment Dry;Intact;Clean 02/20/24 0352   Extremity Assessment Distal to IV No abnormal discoloration 02/19/24 0852   Line Status Saline locked 02/20/24 0352   Dressing Status Clean;Dry;Intact 02/20/24 0352   Dressing Intervention Integrity maintained 02/20/24 0352   Dressing Change Due 02/19/24 02/19/24 0852   Site Change Due 02/21/24 02/19/24 0852   Reason Not Rotated Not due 02/18/24 1931   Number of days: 2       Prev airway: Intubation:     Induction:  Inhalational - mask    Intubated:  Postinduction    Mask Ventilation:  Easy with oral airway    Attempted By:  CRNA    Method of Intubation:  Video laryngoscopy    Blade:  Aldo 3    Laryngeal View Grade: Grade I - full view of cords      Difficult Airway Encountered?: No      Complications:  Dental trauma    Airway Device:  Oral endotracheal tube    Airway Device Size:  7.5    Style/Cuff Inflation:  Cuffed    Inflation Amount (mL):  5    Tube secured:  22    Secured at:  The lips    Placement Verified By:  Capnometry    Complicating Factors:  None    Drips: None documented.      Patient Active Problem List   Diagnosis    Status post hysteroscopy    Cervical stenosis (uterine cervix)    PMB (postmenopausal bleeding)    S/p RA-TLH/BSO/SLND    Malignant neoplasm of endometrium    Diarrhea    Neutropenic fever    Electrolyte  imbalance    GERD (gastroesophageal reflux disease)    Hypothyroidism due to medication    Hypokalemia    Electrolyte abnormality       Review of patient's allergies indicates:  No Known Allergies    Current Inpatient Medications:   diphenoxylate-atropine 2.5-0.025 mg  1 tablet Oral TID    DULoxetine  30 mg Oral Daily    enoxparin  40 mg Subcutaneous Q24H (prophylaxis, 1700)    famotidine  20 mg Oral BID    levothyroxine  100 mcg Oral Before breakfast    loperamide  4 mg Oral TID    methylPREDNISolone sodium succinate injection  1 mg/kg/day Intravenous Daily    polyethylene glycol  4,000 mL Oral Once    potassium chloride  40 mEq Intravenous Once    valACYclovir  500 mg Oral BID       No current facility-administered medications on file prior to encounter.     Current Outpatient Medications on File Prior to Encounter   Medication Sig Dispense Refill    acetaminophen (TYLENOL) 650 MG TbSR Take 1 tablet (650 mg total) by mouth every 6 (six) hours as needed (pain). 60 tablet 0    dexAMETHasone (DECADRON) 4 MG Tab Take daily beginning the day after chemotherapy for 3 days (Patient not taking: Reported on 1/3/2024) 30 tablet 2    diclofenac sodium (VOLTAREN) 1 % Gel Apply 2 g topically 2 (two) times daily. (Patient not taking: Reported on 1/3/2024) 100 g 0    diphenhydrAMINE (BENADRYL) 25 mg capsule Take 25 mg by mouth every 6 (six) hours as needed for Itching.      diphenoxylate-atropine 2.5-0.025 mg (LOMOTIL) 2.5-0.025 mg per tablet Take 1 tablet by mouth 4 (four) times daily as needed for Diarrhea. 60 tablet 6    DULoxetine (CYMBALTA) 30 MG capsule Take 1 capsule (30 mg total) by mouth once daily AND 2 capsules (60 mg total) once daily. 84 capsule 2    famotidine (PEPCID) 40 MG tablet Take 1 tablet (40 mg total) by mouth once daily. 30 tablet 11    ibuprofen (ADVIL,MOTRIN) 600 MG tablet Take 1 tablet (600 mg total) by mouth every 6 (six) hours as needed for Pain. 30 tablet 3    levothyroxine (SYNTHROID) 100 MCG tablet  Take 1 tablet (100 mcg total) by mouth before breakfast. 30 tablet 11    loratadine (CLARITIN) 10 mg tablet Take 1 tablet (10 mg total) by mouth once daily. 30 tablet 11    prochlorperazine (COMPAZINE) 5 MG tablet Take every 6 hours for 3 days beginning the day after chemotherapy (Patient not taking: Reported on 1/3/2024) 30 tablet 2    sulfamethoxazole-trimethoprim 800-160mg (BACTRIM DS) 800-160 mg Tab Take 1 tablet by mouth once daily. (Patient not taking: Reported on 1/3/2024) 30 tablet 0    valACYclovir (VALTREX) 1000 MG tablet Two pills at onset of fever blister and then repeat 2 pills 12 hr later 30 tablet 11    zolpidem (AMBIEN) 5 MG Tab Take 1 tablet (5 mg total) by mouth nightly as needed (insomnia). 20 tablet 0       Past Surgical History:   Procedure Laterality Date    COLONOSCOPY N/A 10/06/2023    Procedure: COLONOSCOPY;  Surgeon: Marcia Weaver MD;  Location: [facility] ENDO;  Service: Endoscopy;  Laterality: N/A;  10/4- referred by Dr. Weaver/ Subject: Needs colonoscopy ASAP within 1 week /Procedure Timing: < 1 week/Diagnosis: Diarrhea, evaluate for immunotherapy induced colitis/ Prep instructions to portal. AS    CYST REMOVAL Left 04/28/2022    Procedure: EXCISION, CYST-BACK;  Surgeon: Thomas Hurst MD;  Location: [facility] OR;  Service: General;  Laterality: Left;  left upper back  RN PREOP ON 4/21/22-NF    HYSTERECTOMY      after endometrial cancer diagnosis    HYSTEROSCOPY WITH DILATION AND CURETTAGE OF UTERUS N/A 05/23/2023    Procedure: HYSTEROSCOPY, WITH DILATION AND CURETTAGE OF UTERUS;  Surgeon: Omaira Evans MD;  Location: [facility] OR;  Service: OB/GYN;  Laterality: N/A;  DESTINY HERNANDEZ  [phone] TEXTED [facility] ON 5/2/2023 @ 3:51PM. [facility] RESPONDED ON 5/2/2023 @ 3:51PM-LO  RN PREOP 5/18/23  T & S; UPT ORDERED AND SCHEDULED 5/22/23    LYMPH NODE BIOPSY Left 06/05/2023    Procedure: BIOPSY, sentinel LYMPH NODE;  Surgeon: Lavell Rodríguez MD;  Location: [facility] OR;  Service: OB/GYN;   Laterality: Left;    LYMPHADENECTOMY, PELVIS Right 06/05/2023    Procedure: LYMPHADENECTOMY, PELVIS;  Surgeon: Lavell Rodríguez MD;  Location: [facility] OR;  Service: OB/GYN;  Laterality: Right;    MAPPING, LYMPH NODE, SENTINEL Bilateral 06/05/2023    Procedure: injection, LYMPH NODE, SENTINEL;  Surgeon: Lavell Rodríguez MD;  Location: [facility];  Service: OB/GYN;  Laterality: Bilateral;    NERVE REPAIR Right 06/05/2023    Procedure: REPAIR, OBTURATOR NERVE;  Surgeon: Lavell Rodríguez MD;  Location: [facility] OR;  Service: OB/GYN;  Laterality: Right;    TONSILLECTOMY      TONSILLECTOMY      TUBAL LIGATION      XI ROBOTIC HYSTERECTOMY, WITH SALPINGO-OOPHORECTOMY Bilateral 06/05/2023    Procedure: XI ROBOTIC HYSTERECTOMY,WITH SALPINGO-OOPHORECTOMY;  Surgeon: Lavell Rodríguez MD;  Location: [facility];  Service: OB/GYN;  Laterality: Bilateral;  removed through vagina       Social History     Socioeconomic History    Marital status:    Tobacco Use    Smoking status: Never    Smokeless tobacco: Never   Substance and Sexual Activity    Alcohol use: Never    Drug use: Never    Sexual activity: Not Currently     Partners: Male     Birth control/protection: Post-menopausal     Social Determinants of Health     Financial Resource Strain: Patient Declined (11/6/2023)    Overall Financial Resource Strain (CARDIA)     Difficulty of Paying Living Expenses: Patient declined   Food Insecurity: Patient Declined (11/6/2023)    Hunger Vital Sign     Worried About Running Out of Food in the Last Year: Patient declined     Ran Out of Food in the Last Year: Patient declined   Transportation Needs: No Transportation Needs (11/6/2023)    PRAPARE - Transportation     Lack of Transportation (Medical): No     Lack of Transportation (Non-Medical): No   Physical Activity: Patient Declined (11/6/2023)    Exercise Vital Sign     Days of Exercise per Week: Patient declined     Minutes of Exercise per Session: Patient declined   Recent Concern: Physical Activity -  Inactive (11/2/2023)    Exercise Vital Sign     Days of Exercise per Week: 0 days     Minutes of Exercise per Session: 0 min   Stress: Patient Declined (11/6/2023)    [place] [employer] - Occupational Stress Questionnaire     Feeling of Stress : Patient declined   Social Connections: Unknown (11/6/2023)    Social Connection and Isolation Panel [NHANES]     Frequency of Communication with Friends and Family: Patient declined     Frequency of Social Gatherings with Friends and Family: Patient declined     Attends Sabianist Services: More than 4 times per year     Active Member of Clubs or Organizations: Patient declined     Attends Club or Organization Meetings: Patient declined     Marital Status:    Housing Stability: Unknown (11/6/2023)    Housing Stability Vital Sign     Unable to Pay for Housing in the Last Year: Patient refused     Number of Places Lived in the Last Year: 1     Unstable Housing in the Last Year: Patient refused       OBJECTIVE:     Vital Signs Range (Last 24H):  Temp:  [36.3 °C (97.4 °F)-36.8 °C (98.2 °F)]   Pulse:  [84-94]   Resp:  [18]   BP: (107-147)/(53-65)   SpO2:  [93 %-98 %]       Significant Labs:  Lab Results   Component Value Date    WBC 7.37 02/15/2024    HGB 15.5 02/15/2024    HCT 42.9 02/15/2024     02/15/2024    CHOL 199 03/01/2008    TRIG 99 12/11/2020    HDL 42 12/11/2020    ALT 24 02/15/2024    AST 28 02/15/2024     02/20/2024    K 3.1 (L) 02/20/2024     02/20/2024    CREATININE 0.6 02/20/2024    BUN 11 02/20/2024    CO2 23 02/20/2024    TSH 0.346 (L) 02/15/2024       Diagnostic Studies: No relevant studies.    EKG:   Results for orders placed or performed during the hospital encounter of 02/15/24   EKG 12-lead    Collection Time: 02/15/24 10:31 PM   Result Value Ref Range    QRS Duration 90 ms    OHS QTC Calculation 481 ms    Narrative    Test Reason : R89.9,    Vent. Rate : 083 BPM     Atrial Rate : 083 BPM     P-R Int : 158 ms           QRS Dur : 090 ms      QT Int : 410 ms       P-R-T Axes : 099 094 107 degrees     QTc Int : 481 ms     Suspect arm lead reversal, interpretation assumes no reversal  Normal sinus rhythm  Possible Lateral infarct ,age undetermined  Borderline Abnormal ECG  When compared with ECG of 15-FEB-2024 17:02,  Limb lead reversal Now present  Confirmed by Skip Herrera MD ([id number]) on 2/16/2024 3:45:22 PM    Referred By: [email or web address]   SELF           Confirmed By:Skip Herrera MD       2D ECHO:  TTE:  No results found for this or any previous visit.    MADY:  No results found for this or any previous visit.    ASSESSMENT/PLAN:         Pre-op Assessment    I have reviewed the Patient Summary Reports.     I have reviewed the Nursing Notes. I have reviewed the NPO Status.   I have reviewed the Medications.     Review of Systems  Anesthesia Hx:  No problems with previous Anesthesia   History of prior surgery of interest to airway management or planning:          Denies Family Hx of Anesthesia complications.    Denies Personal Hx of Anesthesia complications.                    Hematology/Oncology:  Hematology Normal                     Current/Recent Cancer.                EENT/Dental:  EENT/Dental Normal           Cardiovascular:  Cardiovascular Normal                                            Pulmonary:  Pulmonary Normal                       Hepatic/GI:     GERD      Gerd          Musculoskeletal:  Musculoskeletal Normal                Neurological:      Headaches      Dx of Headaches                           Endocrine:   Hypothyroidism       Hypothyroidism          Dermatological:  Skin Normal    Psych:  Psychiatric Normal                    Physical Exam  General: Well nourished, Cooperative, Alert and Oriented    Airway:  Mallampati: II   Mouth Opening: Normal  TM Distance: Normal  Tongue: Normal  Neck ROM: Normal ROM    Dental:  Intact        Anesthesia Plan  Type of Anesthesia, risks & benefits discussed:    Anesthesia  Type: Gen Natural Airway  Intra-op Monitoring Plan: Standard ASA Monitors  Post Op Pain Control Plan: multimodal analgesia and IV/PO Opioids PRN  Induction:  IV  Airway Plan: Direct, Post-Induction  Informed Consent: Informed consent signed with the Patient and all parties understand the risks and agree with anesthesia plan.  All questions answered.   ASA Score: 3  Day of Surgery Review of History & Physical: H&P Update referred to the surgeon/provider.    Ready For Surgery From Anesthesia Perspective.     .

## 2024-02-21 ENCOUNTER — ANESTHESIA (OUTPATIENT)
Dept: ENDOSCOPY | Facility: HOSPITAL | Age: 59
DRG: 373 | End: 2024-02-21
Payer: COMMERCIAL

## 2024-02-21 LAB
ANION GAP SERPL CALC-SCNC: 8 MMOL/L (ref 8–16)
BUN SERPL-MCNC: 9 MG/DL (ref 6–20)
CALCIUM SERPL-MCNC: 8.4 MG/DL (ref 8.7–10.5)
CHLORIDE SERPL-SCNC: 108 MMOL/L (ref 95–110)
CO2 SERPL-SCNC: 23 MMOL/L (ref 23–29)
CREAT SERPL-MCNC: 0.6 MG/DL (ref 0.5–1.4)
EST. GFR  (NO RACE VARIABLE): >60 ML/MIN/1.73 M^2
GLUCOSE SERPL-MCNC: 94 MG/DL (ref 70–110)
MAGNESIUM SERPL-MCNC: 1.8 MG/DL (ref 1.6–2.6)
PHOSPHATE SERPL-MCNC: 2.8 MG/DL (ref 2.7–4.5)
POCT GLUCOSE: 136 MG/DL (ref 70–110)
POCT GLUCOSE: 210 MG/DL (ref 70–110)
POCT GLUCOSE: 80 MG/DL (ref 70–110)
POTASSIUM SERPL-SCNC: 2.8 MMOL/L (ref 3.5–5.1)
SODIUM SERPL-SCNC: 139 MMOL/L (ref 136–145)

## 2024-02-21 PROCEDURE — 43239 EGD BIOPSY SINGLE/MULTIPLE: CPT | Performed by: INTERNAL MEDICINE

## 2024-02-21 PROCEDURE — 63600175 PHARM REV CODE 636 W HCPCS: Performed by: NURSE ANESTHETIST, CERTIFIED REGISTERED

## 2024-02-21 PROCEDURE — 83735 ASSAY OF MAGNESIUM: CPT

## 2024-02-21 PROCEDURE — 37000008 HC ANESTHESIA 1ST 15 MINUTES: Performed by: INTERNAL MEDICINE

## 2024-02-21 PROCEDURE — 45380 COLONOSCOPY AND BIOPSY: CPT | Performed by: INTERNAL MEDICINE

## 2024-02-21 PROCEDURE — 84100 ASSAY OF PHOSPHORUS: CPT

## 2024-02-21 PROCEDURE — 63600175 PHARM REV CODE 636 W HCPCS

## 2024-02-21 PROCEDURE — 27201012 HC FORCEPS, HOT/COLD, DISP: Performed by: INTERNAL MEDICINE

## 2024-02-21 PROCEDURE — 37000009 HC ANESTHESIA EA ADD 15 MINS: Performed by: INTERNAL MEDICINE

## 2024-02-21 PROCEDURE — 25000003 PHARM REV CODE 250

## 2024-02-21 PROCEDURE — 80048 BASIC METABOLIC PNL TOTAL CA: CPT

## 2024-02-21 PROCEDURE — 36415 COLL VENOUS BLD VENIPUNCTURE: CPT

## 2024-02-21 PROCEDURE — D9220A PRA ANESTHESIA: Mod: ANES,,, | Performed by: ANESTHESIOLOGY

## 2024-02-21 PROCEDURE — 0DB68ZX EXCISION OF STOMACH, VIA NATURAL OR ARTIFICIAL OPENING ENDOSCOPIC, DIAGNOSTIC: ICD-10-PCS | Performed by: INTERNAL MEDICINE

## 2024-02-21 PROCEDURE — 88305 TISSUE EXAM BY PATHOLOGIST: CPT | Performed by: PATHOLOGY

## 2024-02-21 PROCEDURE — 0DBP8ZX EXCISION OF RECTUM, VIA NATURAL OR ARTIFICIAL OPENING ENDOSCOPIC, DIAGNOSTIC: ICD-10-PCS | Performed by: INTERNAL MEDICINE

## 2024-02-21 PROCEDURE — 25000003 PHARM REV CODE 250: Performed by: NURSE ANESTHETIST, CERTIFIED REGISTERED

## 2024-02-21 PROCEDURE — 0DBN8ZX EXCISION OF SIGMOID COLON, VIA NATURAL OR ARTIFICIAL OPENING ENDOSCOPIC, DIAGNOSTIC: ICD-10-PCS | Performed by: INTERNAL MEDICINE

## 2024-02-21 PROCEDURE — 20600001 HC STEP DOWN PRIVATE ROOM

## 2024-02-21 PROCEDURE — 45380 COLONOSCOPY AND BIOPSY: CPT | Mod: ,,, | Performed by: INTERNAL MEDICINE

## 2024-02-21 PROCEDURE — 0DBL8ZX EXCISION OF TRANSVERSE COLON, VIA NATURAL OR ARTIFICIAL OPENING ENDOSCOPIC, DIAGNOSTIC: ICD-10-PCS | Performed by: INTERNAL MEDICINE

## 2024-02-21 PROCEDURE — 88305 TISSUE EXAM BY PATHOLOGIST: CPT | Mod: 26,,, | Performed by: PATHOLOGY

## 2024-02-21 PROCEDURE — 0DBK8ZX EXCISION OF ASCENDING COLON, VIA NATURAL OR ARTIFICIAL OPENING ENDOSCOPIC, DIAGNOSTIC: ICD-10-PCS | Performed by: INTERNAL MEDICINE

## 2024-02-21 PROCEDURE — 99232 SBSQ HOSP IP/OBS MODERATE 35: CPT | Mod: ,,, | Performed by: STUDENT IN AN ORGANIZED HEALTH CARE EDUCATION/TRAINING PROGRAM

## 2024-02-21 PROCEDURE — 0DBB8ZX EXCISION OF ILEUM, VIA NATURAL OR ARTIFICIAL OPENING ENDOSCOPIC, DIAGNOSTIC: ICD-10-PCS | Performed by: INTERNAL MEDICINE

## 2024-02-21 PROCEDURE — 63600175 PHARM REV CODE 636 W HCPCS: Performed by: STUDENT IN AN ORGANIZED HEALTH CARE EDUCATION/TRAINING PROGRAM

## 2024-02-21 PROCEDURE — 0DBH8ZX EXCISION OF CECUM, VIA NATURAL OR ARTIFICIAL OPENING ENDOSCOPIC, DIAGNOSTIC: ICD-10-PCS | Performed by: INTERNAL MEDICINE

## 2024-02-21 PROCEDURE — 43239 EGD BIOPSY SINGLE/MULTIPLE: CPT | Mod: 51,,, | Performed by: INTERNAL MEDICINE

## 2024-02-21 PROCEDURE — D9220A PRA ANESTHESIA: Mod: CRNA,,, | Performed by: NURSE ANESTHETIST, CERTIFIED REGISTERED

## 2024-02-21 PROCEDURE — 0DBM8ZX EXCISION OF DESCENDING COLON, VIA NATURAL OR ARTIFICIAL OPENING ENDOSCOPIC, DIAGNOSTIC: ICD-10-PCS | Performed by: INTERNAL MEDICINE

## 2024-02-21 PROCEDURE — 0DB98ZX EXCISION OF DUODENUM, VIA NATURAL OR ARTIFICIAL OPENING ENDOSCOPIC, DIAGNOSTIC: ICD-10-PCS | Performed by: INTERNAL MEDICINE

## 2024-02-21 RX ORDER — POTASSIUM CHLORIDE 7.45 MG/ML
80 INJECTION INTRAVENOUS ONCE
Status: COMPLETED | OUTPATIENT
Start: 2024-02-21 | End: 2024-02-21

## 2024-02-21 RX ORDER — CALCIUM CARBONATE 200(500)MG
500 TABLET,CHEWABLE ORAL 3 TIMES DAILY PRN
Status: DISCONTINUED | OUTPATIENT
Start: 2024-02-21 | End: 2024-02-23 | Stop reason: HOSPADM

## 2024-02-21 RX ORDER — SIMETHICONE 80 MG
1 TABLET,CHEWABLE ORAL 3 TIMES DAILY PRN
Status: DISCONTINUED | OUTPATIENT
Start: 2024-02-21 | End: 2024-02-23 | Stop reason: HOSPADM

## 2024-02-21 RX ORDER — PROPOFOL 10 MG/ML
VIAL (ML) INTRAVENOUS
Status: DISCONTINUED | OUTPATIENT
Start: 2024-02-21 | End: 2024-02-21

## 2024-02-21 RX ORDER — LIDOCAINE HYDROCHLORIDE 20 MG/ML
INJECTION INTRAVENOUS
Status: DISCONTINUED | OUTPATIENT
Start: 2024-02-21 | End: 2024-02-21

## 2024-02-21 RX ORDER — PROPOFOL 10 MG/ML
VIAL (ML) INTRAVENOUS CONTINUOUS PRN
Status: DISCONTINUED | OUTPATIENT
Start: 2024-02-21 | End: 2024-02-21

## 2024-02-21 RX ORDER — HYDRALAZINE HYDROCHLORIDE 20 MG/ML
10 INJECTION INTRAMUSCULAR; INTRAVENOUS EVERY 6 HOURS PRN
Status: DISCONTINUED | OUTPATIENT
Start: 2024-02-21 | End: 2024-02-23 | Stop reason: HOSPADM

## 2024-02-21 RX ORDER — DIPHENHYDRAMINE HCL 25 MG
25 CAPSULE ORAL EVERY 6 HOURS PRN
Status: DISCONTINUED | OUTPATIENT
Start: 2024-02-21 | End: 2024-02-23 | Stop reason: HOSPADM

## 2024-02-21 RX ADMIN — LOPERAMIDE HYDROCHLORIDE 4 MG: 2 CAPSULE ORAL at 08:02

## 2024-02-21 RX ADMIN — DIPHENOXYLATE HYDROCHLORIDE AND ATROPINE SULFATE 1 TABLET: 2.5; .025 TABLET ORAL at 08:02

## 2024-02-21 RX ADMIN — POTASSIUM CHLORIDE 80 MEQ: 7.46 INJECTION, SOLUTION INTRAVENOUS at 07:02

## 2024-02-21 RX ADMIN — LIDOCAINE HYDROCHLORIDE 100 MG: 20 INJECTION INTRAVENOUS at 02:02

## 2024-02-21 RX ADMIN — VALACYCLOVIR HYDROCHLORIDE 500 MG: 500 TABLET, FILM COATED ORAL at 08:02

## 2024-02-21 RX ADMIN — PROPOFOL 150 MG: 10 INJECTION, EMULSION INTRAVENOUS at 02:02

## 2024-02-21 RX ADMIN — DIPHENOXYLATE HYDROCHLORIDE AND ATROPINE SULFATE 1 TABLET: 2.5; .025 TABLET ORAL at 03:02

## 2024-02-21 RX ADMIN — LEVOTHYROXINE SODIUM 100 MCG: 100 TABLET ORAL at 05:02

## 2024-02-21 RX ADMIN — PROPOFOL 200 MCG/KG/MIN: 10 INJECTION, EMULSION INTRAVENOUS at 02:02

## 2024-02-21 RX ADMIN — DIPHENOXYLATE HYDROCHLORIDE AND ATROPINE SULFATE 1 TABLET: 2.5; .025 TABLET ORAL at 09:02

## 2024-02-21 RX ADMIN — INSULIN ASPART 3 UNITS: 100 INJECTION, SOLUTION INTRAVENOUS; SUBCUTANEOUS at 06:02

## 2024-02-21 RX ADMIN — VALACYCLOVIR HYDROCHLORIDE 500 MG: 500 TABLET, FILM COATED ORAL at 09:02

## 2024-02-21 RX ADMIN — ZOLPIDEM TARTRATE 5 MG: 5 TABLET ORAL at 08:02

## 2024-02-21 RX ADMIN — INSULIN ASPART 1 UNITS: 100 INJECTION, SOLUTION INTRAVENOUS; SUBCUTANEOUS at 09:02

## 2024-02-21 RX ADMIN — FAMOTIDINE 20 MG: 20 TABLET, FILM COATED ORAL at 09:02

## 2024-02-21 RX ADMIN — LOPERAMIDE HYDROCHLORIDE 4 MG: 2 CAPSULE ORAL at 09:02

## 2024-02-21 RX ADMIN — DULOXETINE HYDROCHLORIDE 30 MG: 30 CAPSULE, DELAYED RELEASE ORAL at 09:02

## 2024-02-21 RX ADMIN — FAMOTIDINE 20 MG: 20 TABLET, FILM COATED ORAL at 08:02

## 2024-02-21 RX ADMIN — LOPERAMIDE HYDROCHLORIDE 4 MG: 2 CAPSULE ORAL at 03:02

## 2024-02-21 RX ADMIN — ENOXAPARIN SODIUM 40 MG: 40 INJECTION SUBCUTANEOUS at 05:02

## 2024-02-21 RX ADMIN — METHYLPREDNISOLONE SODIUM SUCCINATE 74.3 MG: 40 INJECTION, POWDER, FOR SOLUTION INTRAMUSCULAR; INTRAVENOUS at 09:02

## 2024-02-21 RX ADMIN — SODIUM CHLORIDE: 0.9 INJECTION, SOLUTION INTRAVENOUS at 01:02

## 2024-02-21 NOTE — PLAN OF CARE
Plan of care reviewed with patient throughout shift. Pt VSS overnight, afebrile, a&o x4, on RA. Pt given Ambien for sleep. Family at bedside, bed alarm refused (education given), ambulating independently, fall precautions maintained. Bed locked in lowest position, side rails up x2, call light within reach, environment clear. Encouraged pt to call nurse with any concerns.

## 2024-02-21 NOTE — NURSING TRANSFER
Nursing Transfer Note      Reason patient is being transferred: PACU    Transfer To:     Transfer via stretcher    Transfer with IV Pole and patient clothing, patient wearing glasses    Transported by Patient Transport     Medicines sent: IV Potassium Chloride     Any special needs or follow-up needed: Routine Care    Chart send with patient: Yes    Notified: Sister    Patient reassessed at: 2/21/2024 6451 (date, time)

## 2024-02-21 NOTE — PROGRESS NOTES
Plan of care reviewed with patient. Verbalized understanding. Patient is oriented x 4 and able to voice needs.  Ambulates independently. Performs all ADLs independently. 20g to right arm. Initiated K riders as ordered. Remained NPO until after EGD and colonoscopy performed. Denies pain throughout shift. Diet advanced to regular diet after procedure.  Call light in reach as well as bedside table throughout this shift. Denies any pain or weakness.Safety precautions remain in place.

## 2024-02-21 NOTE — ASSESSMENT & PLAN NOTE
-see once hx above  -s/p TRH/BSO/BSLN/RPLN/repair of obturator nerve in 6/2023  -Carboplatin/paclitaxel s/p 7 cycles  -Currently on immunotherapy: Dostarlimab (last cycle 7 weeks ago)  - Lovenox for DVT prophylaxis

## 2024-02-21 NOTE — TREATMENT PLAN
GI Treatment Plan:    EGD  Impression:            - Normal esophagus.                          - Esophagogastric landmarks identified.                          - Congested and erythematous mucosa in the                          stomach. Biopsied.                          - Normal examined duodenum. Biopsied.   Recommendation:        - Await pathology results.                          - Perform a colonoscopy now.                          - See colonoscopy report for further                          recommendations.     Colonoscopy  Impression:            - Diverticulosis in the sigmoid colon and in the                          descending colon.                          - Congested mucosa in the proximal transverse                          colon, in the ascending colon and in the cecum.                          - The examination was otherwise normal on direct                          and retroflexion views.                          - The examined portion of the ileum was normal.                          Biopsied.                          - Biopsies were taken with a cold forceps for                          histology in the rectum, in the sigmoid colon, in                          the descending colon, in the transverse colon, in                          the ascending colon and in the cecum.   Recommendation:        - Return patient to hospital le[facility] for ongoing care.                          - Advance diet as tolerated.                          - Continue present medications.                          - Await pathology results.                          - No recommendation at this time regarding repeat                          colonoscopy.

## 2024-02-21 NOTE — SUBJECTIVE & OBJECTIVE
Interval History: NAEON. Reports some improvement in diarrhea yesterday but then began bowel prep which worsened diarrhea. Denies nausea, vomiting, and abdominal pain. Currently NPO. Plan for EGD and colonoscopy today with GI.     Scheduled Meds:   diphenoxylate-atropine 2.5-0.025 mg  1 tablet Oral TID    DULoxetine  30 mg Oral Daily    enoxparin  40 mg Subcutaneous Q24H (prophylaxis, 1700)    famotidine  20 mg Oral BID    levothyroxine  100 mcg Oral Before breakfast    loperamide  4 mg Oral TID    methylPREDNISolone sodium succinate injection  1 mg/kg/day Intravenous Daily    potassium chloride  80 mEq Intravenous Once    valACYclovir  500 mg Oral BID     Continuous Infusions:  PRN Meds:acetaminophen, dextrose 10%, dextrose 10%, glucagon (human recombinant), glucose, glucose, insulin aspart U-100, ondansetron, oxyCODONE, oxyCODONE, sodium chloride 0.9%, zolpidem    Review of patient's allergies indicates:  No Known Allergies    Objective:     Vital Signs (Most Recent):  Temp: 97.4 °F (36.3 °C) (02/21/24 0353)  Pulse: 81 (02/21/24 0353)  Resp: 18 (02/21/24 0353)  BP: (!) 113/59 (02/21/24 0353)  SpO2: 96 % (02/21/24 0353) Vital Signs (24h Range):  Temp:  [97.4 °F (36.3 °C)-98 °F (36.7 °C)] 97.4 °F (36.3 °C)  Pulse:  [] 81  Resp:  [16-18] 18  SpO2:  [96 %-98 %] 96 %  BP: (113-160)/(59-74) 113/59     Weight: 74.3 kg (163 lb 12.8 oz)  Body mass index is 25.66 kg/m².    Intake/Output - Last 3 Shifts         02/19 0700  02/20 0659 02/20 0700  02/21 0659    P.O. 450 300    IV Piggyback  426.3    Total Intake(mL/kg) 450 (6.1) 726.3 (9.8)    Urine (mL/kg/hr)  0 (0)    Stool  3    Total Output  3    Net +450 +723.3          Urine Occurrence 1 x 8 x    Stool Occurrence 9 x 7 x                  Physical Exam:   Constitutional: She is oriented to person, place, and time. She appears well-developed and well-nourished. No distress.    HENT:   Head: Normocephalic and atraumatic.    Eyes: EOM are normal.     Cardiovascular:   Normal rate.             Pulmonary/Chest: Effort normal. No respiratory distress.        Abdominal: Soft. She exhibits no distension. There is no abdominal tenderness. There is no rebound and no guarding.             Musculoskeletal: Moves all extremeties.       Neurological: She is alert and oriented to person, place, and time.    Skin: Skin is warm and dry.    Psychiatric: She has a normal mood and affect. Her behavior is normal. Thought content normal.          Lines/Drains/Airways       Peripheral Intravenous Line  Duration                  Peripheral IV - Single Lumen 02/17/24 1854 20 G;1 3/4 in Anterior;Right Upper Arm 3 days                    Laboratory:  Recent Labs   Lab 02/15/24  1459 02/15/24  1937 02/16/24  0406 02/17/24  0643 02/17/24  1457 02/19/24  0343 02/20/24  0512 02/21/24  0228    137   < > 141   < > 137 141 139   K 2.7* 2.5*   < > 4.0   < > 3.8 3.1* 2.8*   CL 97 97   < > 110   < > 109 109 108   CO2 29 27   < > 21*   < > 20* 23 23   BUN 8 7   < > 6   < > 10 11 9   CREATININE 0.7 0.7   < > 0.6   < > 0.6 0.6 0.6   * 160*   < > 117*   < > 112* 87 94   PROT 6.7 7.3  --   --   --   --   --   --    BILITOT 0.3 0.4  --   --   --   --   --   --    ALKPHOS 81 85  --   --   --   --   --   --    ALT 22 24  --   --   --   --   --   --    AST 25 28  --   --   --   --   --   --    MG  --  1.7   < > 2.0  --   --  1.9 1.8   PHOS  --  3.2   < > 2.4*   < > 2.6* 3.5 2.8    < > = values in this interval not displayed.

## 2024-02-21 NOTE — H&P
Short Stay Endoscopy History and Physical    PCP - Dexter Khan MD  Referring Physician - Self, Aaareferral  No address on file    Procedure - Endoscopy  ASA - per anesthesia  Mallampati - per anesthesia  History of Anesthesia problems - no  Family history Anesthesia problems -  no   Plan of anesthesia - General    HPI  58 y.o. female  Reason for procedure:   Diarrhea, concern for ICI colitis    ROS:  Constitutional: No fevers, chills, No weight loss  CV: No chest pain  Pulm: No cough, No shortness of breath  GI: see HPI    Medical History:  has a past medical history of Chest pain (2002), Endometrial cancer, GERD (gastroesophageal reflux disease), Lower back pain, Migraines, Mild allergic rhinitis, and Postmenopausal bleeding.    Surgical History:  has a past surgical history that includes TONSILLECTOMY; Tubal ligation; Cyst Removal (Left, 04/28/2022); Hysteroscopy with dilation and curettage of uterus (N/A, 05/23/2023); Tonsillectomy; xi robotic hysterectomy, with salpingo-oophorectomy (Bilateral, 06/05/2023); Lymph node biopsy (Left, 06/05/2023); mapping, lymph node, sentinel (Bilateral, 06/05/2023); lymphadenectomy, pelvis (Right, 06/05/2023); Nerve repair (Right, 06/05/2023); Colonoscopy (N/A, 10/06/2023); and Hysterectomy.    Family History: family history includes Breast cancer (age of onset: 55) in her sister; Colon cancer in her paternal uncle; Heart disease in her father; No Known Problems in her mother and sister..    Social History:  reports that she has never smoked. She has never used smokeless tobacco. She reports that she does not drink alcohol and does not use drugs.    Review of patient's allergies indicates:  No Known Allergies    Medications:   Medications Prior to Admission   Medication Sig Dispense Refill Last Dose    acetaminophen (TYLENOL) 650 MG TbSR Take 1 tablet (650 mg total) by mouth every 6 (six) hours as needed (pain). 60 tablet 0     dexAMETHasone (DECADRON) 4 MG Tab Take  daily beginning the day after chemotherapy for 3 days (Patient not taking: Reported on 1/3/2024) 30 tablet 2     diclofenac sodium (VOLTAREN) 1 % Gel Apply 2 g topically 2 (two) times daily. (Patient not taking: Reported on 1/3/2024) 100 g 0     diphenhydrAMINE (BENADRYL) 25 mg capsule Take 25 mg by mouth every 6 (six) hours as needed for Itching.       diphenoxylate-atropine 2.5-0.025 mg (LOMOTIL) 2.5-0.025 mg per tablet Take 1 tablet by mouth 4 (four) times daily as needed for Diarrhea. 60 tablet 6     DULoxetine (CYMBALTA) 30 MG capsule Take 1 capsule (30 mg total) by mouth once daily AND 2 capsules (60 mg total) once daily. 84 capsule 2     famotidine (PEPCID) 40 MG tablet Take 1 tablet (40 mg total) by mouth once daily. 30 tablet 11     ibuprofen (ADVIL,MOTRIN) 600 MG tablet Take 1 tablet (600 mg total) by mouth every 6 (six) hours as needed for Pain. 30 tablet 3     levothyroxine (SYNTHROID) 100 MCG tablet Take 1 tablet (100 mcg total) by mouth before breakfast. 30 tablet 11     loratadine (CLARITIN) 10 mg tablet Take 1 tablet (10 mg total) by mouth once daily. 30 tablet 11     prochlorperazine (COMPAZINE) 5 MG tablet Take every 6 hours for 3 days beginning the day after chemotherapy (Patient not taking: Reported on 1/3/2024) 30 tablet 2     sulfamethoxazole-trimethoprim 800-160mg (BACTRIM DS) 800-160 mg Tab Take 1 tablet by mouth once daily. (Patient not taking: Reported on 1/3/2024) 30 tablet 0     valACYclovir (VALTREX) 1000 MG tablet Two pills at onset of fever blister and then repeat 2 pills 12 hr later 30 tablet 11     zolpidem (AMBIEN) 5 MG Tab Take 1 tablet (5 mg total) by mouth nightly as needed (insomnia). 20 tablet 0        Physical Exam:    Vital Signs:   Vitals:    02/21/24 1101   BP: (!) 151/70   Pulse: 84   Resp: 18   Temp: 98 °F (36.7 °C)       General Appearance: Well appearing in no acute distress  Abdomen: Soft, non tender, non distended with normal bowel sounds, no masses    Labs:  Lab  Results   Component Value Date    WBC 7.37 02/15/2024    HGB 15.5 02/15/2024    HCT 42.9 02/15/2024     02/15/2024    CHOL 199 03/01/2008    TRIG 99 12/11/2020    HDL 42 12/11/2020    ALT 24 02/15/2024    AST 28 02/15/2024     02/21/2024    K 2.8 (L) 02/21/2024     02/21/2024    CREATININE 0.6 02/21/2024    BUN 9 02/21/2024    CO2 23 02/21/2024    TSH 0.346 (L) 02/15/2024       I have explained the risks and benefits of this endoscopic procedure to the patient including but not limited to bleeding, inflammation, infection, perforation, and death.      Melchor Call MD

## 2024-02-21 NOTE — ASSESSMENT & PLAN NOTE
- Denies abdominal pain or cramping   - Tolerating PO previously, currently NPO  - S/p colonoscopy 10/2023 - biopsies consistent with mild active colitis   - Recent Giardia/Cryptosporidium/C diff all negative  - s/p IVF as patient is tolerating po  - Continue lomotil and loperamide TID daily  - Quantity of diarrhea improving  - GI consulted  - Continue solumedrol 1 mg/kg daily    - Plan for EGD and colonoscopy today.   - Additional lab evaluation ordered per GI recommendations: CRP (normal), Hep B (normal), TB (neg), CMV (neg), Celiac panel (neg), GI pathogen panel pending

## 2024-02-21 NOTE — PROVATION PATIENT INSTRUCTIONS
Discharge Summary/Instructions after an Endoscopic Procedure  Patient Name: Sugar Diaz  Patient MRN: [id number]  Patient YOB: 1965  Wednesday, February 21, 2024  Fercho Ramirez MD  Dear patient,  As a result of recent federal legislation (The Federal Cures Act), you may   receive lab or pathology results from your procedure in your [facility]   account before your physician is able to contact you. Your physician or   their representative will relay the results to you with their   recommendations at their soonest availability.  Thank you,  RESTRICTIONS:  During your procedure today, you received medications for sedation.  These   medications may affect your judgment, balance and coordination.  Therefore,   for 24 hours, you have the following restrictions:   - DO NOT drive a car, operate machinery, make legal/financial decisions,   sign important papers or drink alcohol.    ACTIVITY:  Today: no heavy lifting, straining or running due to procedural   sedation/anesthesia.  The following day: return to full activity including work.  DIET:  Eat and drink normally unless instructed otherwise.     TREATMENT FOR COMMON SIDE EFFECTS:  - Mild abdominal pain, nausea, belching, bloating or excessive gas:  rest,   eat lightly and use a heating pad.  - Sore Throat: treat with throat lozenges and/or gargle with warm salt   water.  - Because air was used during the procedure, expelling large amounts of air   from your rectum or belching is normal.  - If a bowel prep was taken, you may not have a bowel movement for 1-3 days.    This is normal.  SYMPTOMS TO WATCH FOR AND REPORT TO YOUR PHYSICIAN:  1. Abdominal pain or bloating, other than gas cramps.  2. Chest pain.  3. Back pain.  4. Signs of infection such as: chills or fever occurring within 24 hours   after the procedure.  5. Rectal bleeding, which would show as bright red, maroon, or black stools.   (A tablespoon of blood from the rectum is not serious, especially  if   hemorrhoids are present.)  6. Vomiting.  7. Weakness or dizziness.  GO DIRECTLY TO THE NEAREST EMERGENCY ROOM IF YOU HAVE ANY OF THE FOLLOWING:      Difficulty breathing              Chills and/or fever over 101 F   Persistent vomiting and/or vomiting blood   Severe abdominal pain   Severe chest pain   Black, tarry stools   Bleeding- more than one tablespoon   Any other symptom or condition that you feel may need urgent attention  Your doctor recommends these additional instructions:  If any biopsies were taken, your doctors clinic will contact you in 1 to 2   weeks with any results.  - Return patient to hospital [facility] for ongoing care.   - Advance diet as tolerated.   - Continue present medications.   - Await pathology results.   - No recommendation at this time regarding repeat colonoscopy.  For questions, problems or results please call your physician - Fercho Ramirez MD at Work:  [phone].  [facility], EMERGENCY ROOM PHONE NUMBER: [phone]  IF A COMPLICATION OR EMERGENCY SITUATION ARISES AND YOU ARE UNABLE TO REACH   YOUR PHYSICIAN - GO DIRECTLY TO THE EMERGENCY ROOM.  Fercho Ramirez MD  2/21/2024 2:48:19 PM  This report has been verified and signed electronically.  Dear patient,  As a result of recent federal legislation (The Federal Cures Act), you may   receive lab or pathology results from your procedure in your [facility]   account before your physician is able to contact you. Your physician or   their representative will relay the results to you with their   recommendations at their soonest availability.  Thank you,  PROVATION

## 2024-02-21 NOTE — CARE UPDATE
Afternoon Assessment:    MD to bedside for PM rounds. Upon entry to room, patient resting comfortably in bed. Patient states she doing well after EGD/colonoscopy, just tired. She denies abdominal pain. She denies BM since scopes. She has not yet attempted PO intake since arriving back in room but has placed order for dinner.     Temp:  [97.4 °F (36.3 °C)-98.6 °F (37 °C)] 98 °F (36.7 °C)  Pulse:  [] 83  Resp:  [16-20] 18  SpO2:  [95 %-100 %] 98 %  BP: (113-173)/(59-78) 151/72    PE:  Abdomen: soft, non-distended, non tender    Labs:  Recent Labs   Lab 02/15/24  1459 02/15/24  1937 02/16/24  0406 02/17/24  0643 02/17/24  1457 02/19/24  0343 02/20/24  0512 02/21/24  0228    137   < > 141   < > 137 141 139   K 2.7* 2.5*   < > 4.0   < > 3.8 3.1* 2.8*   CL 97 97   < > 110   < > 109 109 108   CO2 29 27   < > 21*   < > 20* 23 23   BUN 8 7   < > 6   < > 10 11 9   CREATININE 0.7 0.7   < > 0.6   < > 0.6 0.6 0.6   * 160*   < > 117*   < > 112* 87 94   PROT 6.7 7.3  --   --   --   --   --   --    BILITOT 0.3 0.4  --   --   --   --   --   --    ALKPHOS 81 85  --   --   --   --   --   --    ALT 22 24  --   --   --   --   --   --    AST 25 28  --   --   --   --   --   --    MG  --  1.7   < > 2.0  --   --  1.9 1.8   PHOS  --  3.2   < > 2.4*   < > 2.6* 3.5 2.8    < > = values in this interval not displayed.      POCT BG 80    A/P:  - Regular diet  - Continue IV steroids per GI  - Continue K replacement   - Await biopsy pathology   - Continue anti-diarrheal bowel regimen  - CMP, Mag, Phos in AM   - All questions answered     Glenda Nair MD  OB/GYN PGY1

## 2024-02-21 NOTE — PROVATION PATIENT INSTRUCTIONS
Discharge Summary/Instructions after an Endoscopic Procedure  Patient Name: Sugar Diaz  Patient MRN: [id number]  Patient YOB: 1965  Wednesday, February 21, 2024  Fercho Ramirez MD  Dear patient,  As a result of recent federal legislation (The Federal Cures Act), you may   receive lab or pathology results from your procedure in your [facility]   account before your physician is able to contact you. Your physician or   their representative will relay the results to you with their   recommendations at their soonest availability.  Thank you,  RESTRICTIONS:  During your procedure today, you received medications for sedation.  These   medications may affect your judgment, balance and coordination.  Therefore,   for 24 hours, you have the following restrictions:   - DO NOT drive a car, operate machinery, make legal/financial decisions,   sign important papers or drink alcohol.    ACTIVITY:  Today: no heavy lifting, straining or running due to procedural   sedation/anesthesia.  The following day: return to full activity including work.  DIET:  Eat and drink normally unless instructed otherwise.     TREATMENT FOR COMMON SIDE EFFECTS:  - Mild abdominal pain, nausea, belching, bloating or excessive gas:  rest,   eat lightly and use a heating pad.  - Sore Throat: treat with throat lozenges and/or gargle with warm salt   water.  - Because air was used during the procedure, expelling large amounts of air   from your rectum or belching is normal.  - If a bowel prep was taken, you may not have a bowel movement for 1-3 days.    This is normal.  SYMPTOMS TO WATCH FOR AND REPORT TO YOUR PHYSICIAN:  1. Abdominal pain or bloating, other than gas cramps.  2. Chest pain.  3. Back pain.  4. Signs of infection such as: chills or fever occurring within 24 hours   after the procedure.  5. Rectal bleeding, which would show as bright red, maroon, or black stools.   (A tablespoon of blood from the rectum is not serious, especially  if   hemorrhoids are present.)  6. Vomiting.  7. Weakness or dizziness.  GO DIRECTLY TO THE NEAREST EMERGENCY ROOM IF YOU HAVE ANY OF THE FOLLOWING:      Difficulty breathing              Chills and/or fever over 101 F   Persistent vomiting and/or vomiting blood   Severe abdominal pain   Severe chest pain   Black, tarry stools   Bleeding- more than one tablespoon   Any other symptom or condition that you feel may need urgent attention  Your doctor recommends these additional instructions:  If any biopsies were taken, your doctors clinic will contact you in 1 to 2   weeks with any results.  - Await pathology results.   - Perform a colonoscopy now.   - See colonoscopy report for further recommendations.  For questions, problems or results please call your physician - Fercho Ramirez MD at Work:  [phone].  [facility], EMERGENCY ROOM PHONE NUMBER: [phone]  IF A COMPLICATION OR EMERGENCY SITUATION ARISES AND YOU ARE UNABLE TO REACH   YOUR PHYSICIAN - GO DIRECTLY TO THE EMERGENCY ROOM.  Fercho Ramirez MD  2/21/2024 2:45:10 PM  This report has been verified and signed electronically.  Dear patient,  As a result of recent federal legislation (The Federal Cures Act), you may   receive lab or pathology results from your procedure in your [facility]   account before your physician is able to contact you. Your physician or   their representative will relay the results to you with their   recommendations at their soonest availability.  Thank you,  PROVATION

## 2024-02-21 NOTE — ANESTHESIA POSTPROCEDURE EVALUATION
Anesthesia Post Evaluation    Patient: Sugar Diaz    Procedure(s) Performed: Procedure(s) (LRB):  EGD (ESOPHAGOGASTRODUODENOSCOPY) (N/A)  COLONOSCOPY (N/A)    Final Anesthesia Type: general      Patient location during evaluation: PACU  Patient participation: Yes- Able to Participate  Level of consciousness: awake and alert  Post-procedure vital signs: reviewed and stable  Pain management: adequate  Airway patency: patent  RONA mitigation strategies: Multimodal analgesia  PONV status at discharge: No PONV  Anesthetic complications: no      Cardiovascular status: stable  Respiratory status: unassisted and spontaneous ventilation  Hydration status: euvolemic  Follow-up not needed.              Vitals Value Taken Time   /72 02/21/24 1545   Temp 36.7 °C (98 °F) 02/21/24 1545   Pulse 83 02/21/24 1545   Resp 18 02/21/24 1545   SpO2 98 % 02/21/24 1545         Event Time   Out of Recovery 15:26:00         Pain/Cruz Score: Cruz Score: 10 (2/21/2024  3:00 PM)

## 2024-02-21 NOTE — PROGRESS NOTES
Armen Rosales - Oncology ([facility])  Gynecologic Oncology  Progress Note      Patient Name: Sugar Diaz  MRN: [id number]  Admission Date: 2/15/2024  Hospital Length of Stay: 6 days  Attending Provider: Lavell Rodríguez MD  Primary Care Provider: Dexter Khan MD  Principal Problem: Hypokalemia    Follow-up For: Procedure(s) (LRB):  EGD (ESOPHAGOGASTRODUODENOSCOPY) (N/A)  COLONOSCOPY (N/A)  Post-Operative Day: Day of Surgery  Subjective:      History of Present Illness:  Sugar Diaz is a 58 y.o.  with stage IIIC1 grade 1 endometrioid endometrial cancer who completed Cycle 3 Dostarlimab 1000mg  on  who was instructed to present to the ED after a critical lab value of her potassium resulted. Her K+ outpatient was 2.7. The patient endorses having loose stools and nausea, but denies fevers, chills, vomiting, chest pain, SOB, or muscle weakness. Denies recent illness.    At [facility] ED, the patient's CBC is wnL, K+ 2.5,  Glucose 160, Cr wnL. VSS and wnL. EKG reveal sinus tachycardia without ischemic changes.     Hospital Course:  02/15/2024: Admit to Gyn Onc for electrolyte repletion and close monitoring.  2024: NAEO. K+ low at 2.3. Pt poorly tolerating IV replacement, IV at slow rate. Will replace PO and IV. Will replace Mg, Phos. Reports more loose stools this morning. Will restart home regimen of loperamide and lomotil. Will consult GI.  2024: K improving, most recent 4.0. GI consulted for concerns of colitis, recommend IV solumedrol 40 mg daily which was started yesterday. Patient reports multiple episodes of diarrhea overnight. Denies abdominal pain. Overall feeling better.   2024: Reports one episode of diarrhea overnight. Hypokalemia noted on morning labs, phosphorous improved. Denies abdominal pain. Intermittent nausea but tolerating po without vomiting.   2024: Reports 3 episodes of diarrhea overnight. Denies nausea, vomiting, and abdominal pain. Is tolerating PO intake.  Hypokalemia resolved, phosphorus is mildly low. Will replete.   02/20/2024: Reports 13 episodes of diarrhea yesterday & overnight. Denies nausea, vomiting, and abdominal pain. Is tolerating PO intake. K 3.1, will replace. Plan for EGD and colonoscopy tomorrow with GI. CLD today, NPO at midnight.   02/21/2024: Reports some improvement in diarrhea which then worsened due to bowel prep. Denies nausea, vomiting, and abdominal pain. Is tolerating PO intake. K 3.1, will replace. EGD & colonoscopy today with GI.     Interval History: NAEON. Reports some improvement in diarrhea yesterday but then began bowel prep which worsened diarrhea. Denies nausea, vomiting, and abdominal pain. Currently NPO. Plan for EGD and colonoscopy today with GI.     Scheduled Meds:   diphenoxylate-atropine 2.5-0.025 mg  1 tablet Oral TID    DULoxetine  30 mg Oral Daily    enoxparin  40 mg Subcutaneous Q24H (prophylaxis, 1700)    famotidine  20 mg Oral BID    levothyroxine  100 mcg Oral Before breakfast    loperamide  4 mg Oral TID    methylPREDNISolone sodium succinate injection  1 mg/kg/day Intravenous Daily    potassium chloride  80 mEq Intravenous Once    valACYclovir  500 mg Oral BID     Continuous Infusions:  PRN Meds:acetaminophen, dextrose 10%, dextrose 10%, glucagon (human recombinant), glucose, glucose, insulin aspart U-100, ondansetron, oxyCODONE, oxyCODONE, sodium chloride 0.9%, zolpidem    Review of patient's allergies indicates:  No Known Allergies    Objective:     Vital Signs (Most Recent):  Temp: 97.4 °F (36.3 °C) (02/21/24 0353)  Pulse: 81 (02/21/24 0353)  Resp: 18 (02/21/24 0353)  BP: (!) 113/59 (02/21/24 0353)  SpO2: 96 % (02/21/24 0353) Vital Signs (24h Range):  Temp:  [97.4 °F (36.3 °C)-98 °F (36.7 °C)] 97.4 °F (36.3 °C)  Pulse:  [] 81  Resp:  [16-18] 18  SpO2:  [96 %-98 %] 96 %  BP: (113-160)/(59-74) 113/59     Weight: 74.3 kg (163 lb 12.8 oz)  Body mass index is 25.66 kg/m².    Intake/Output - Last 3 Shifts          02/19 0700  02/20 0659 02/20 0700  02/21 0659    P.O. 450 300    IV Piggyback  426.3    Total Intake(mL/kg) 450 (6.1) 726.3 (9.8)    Urine (mL/kg/hr)  0 (0)    Stool  3    Total Output  3    Net +450 +723.3          Urine Occurrence 1 x 8 x    Stool Occurrence 9 x 7 x                  Physical Exam:   Constitutional: She is oriented to person, place, and time. She appears well-developed and well-nourished. No distress.    HENT:   Head: Normocephalic and atraumatic.    Eyes: EOM are normal.     Cardiovascular:  Normal rate.             Pulmonary/Chest: Effort normal. No respiratory distress.        Abdominal: Soft. She exhibits no distension. There is no abdominal tenderness. There is no rebound and no guarding.             Musculoskeletal: Moves all extremeties.       Neurological: She is alert and oriented to person, place, and time.    Skin: Skin is warm and dry.    Psychiatric: She has a normal mood and affect. Her behavior is normal. Thought content normal.          Lines/Drains/Airways       Peripheral Intravenous Line  Duration                  Peripheral IV - Single Lumen 02/17/24 1854 20 G;1 3/4 in Anterior;Right Upper Arm 3 days                    Laboratory:  Recent Labs   Lab 02/15/24  1459 02/15/24  1937 02/16/24  0406 02/17/24  0643 02/17/24  1457 02/19/24  0343 02/20/24  0512 02/21/24  0228    137   < > 141   < > 137 141 139   K 2.7* 2.5*   < > 4.0   < > 3.8 3.1* 2.8*   CL 97 97   < > 110   < > 109 109 108   CO2 29 27   < > 21*   < > 20* 23 23   BUN 8 7   < > 6   < > 10 11 9   CREATININE 0.7 0.7   < > 0.6   < > 0.6 0.6 0.6   * 160*   < > 117*   < > 112* 87 94   PROT 6.7 7.3  --   --   --   --   --   --    BILITOT 0.3 0.4  --   --   --   --   --   --    ALKPHOS 81 85  --   --   --   --   --   --    ALT 22 24  --   --   --   --   --   --    AST 25 28  --   --   --   --   --   --    MG  --  1.7   < > 2.0  --   --  1.9 1.8   PHOS  --  3.2   < > 2.4*   < > 2.6* 3.5 2.8    < > = values in this  interval not displayed.         Assessment/Plan:     * Hypokalemia  - Most recent K 2.8  - Continue to replace as needed    Electrolyte abnormality  - Mag 1.8 this AM  - Phos 2.8 this AM  - Replete PRN    Hypothyroidism due to medication  - Stable  - TSH low, T4 free normal on admit  - Continue home synthroid    GERD (gastroesophageal reflux disease)  - Continue home famotidine    Diarrhea  - Denies abdominal pain or cramping   - Tolerating PO previously, currently NPO  - S/p colonoscopy 10/2023 - biopsies consistent with mild active colitis   - Recent Giardia/Cryptosporidium/C diff all negative  - s/p IVF as patient is tolerating po  - Continue lomotil and loperamide TID daily  - Quantity of diarrhea improving  - GI consulted  - Continue solumedrol 1 mg/kg daily    - Plan for EGD and colonoscopy today.   - Additional lab evaluation ordered per GI recommendations: CRP (normal), Hep B (normal), TB (neg), CMV (neg), Celiac panel (neg), GI pathogen panel pending    Endometrial cancer  -see once hx above  -s/p TRH/BSO/BSLN/RPLN/repair of obturator nerve in 6/2023  -Carboplatin/paclitaxel s/p 7 cycles  -Currently on immunotherapy: Dostarlimab (last cycle 7 weeks ago)  - Lovenox for DVT prophylaxis      VTE Risk Mitigation (From admission, onward)           Ordered     enoxaparin injection 40 mg  Every 24 hours         02/16/24 1454     IP VTE HIGH RISK PATIENT  Once         02/15/24 3924                    Glenda Nair MD  Gynecologic Oncology  [facility] - Oncology ([facility])

## 2024-02-22 PROBLEM — A02.9 SALMONELLA: Status: ACTIVE | Noted: 2024-02-22

## 2024-02-22 LAB
ANION GAP SERPL CALC-SCNC: 8 MMOL/L (ref 8–16)
BUN SERPL-MCNC: 12 MG/DL (ref 6–20)
CALCIUM SERPL-MCNC: 8.3 MG/DL (ref 8.7–10.5)
CAMPY SP DNA.DIARRHEA STL QL NAA+PROBE: NOT DETECTED
CHLORIDE SERPL-SCNC: 110 MMOL/L (ref 95–110)
CO2 SERPL-SCNC: 19 MMOL/L (ref 23–29)
COMMENT: NORMAL
CREAT SERPL-MCNC: 0.6 MG/DL (ref 0.5–1.4)
CRYPTOSP DNA SPEC QL NAA+PROBE: NOT DETECTED
E COLI O157H7 DNA SPEC QL NAA+PROBE: NOT DETECTED
E HISTOLYT DNA SPEC QL NAA+PROBE: NOT DETECTED
EST. GFR  (NO RACE VARIABLE): >60 ML/MIN/1.73 M^2
FINAL PATHOLOGIC DIAGNOSIS: NORMAL
G LAMBLIA DNA SPEC QL NAA+PROBE: NOT DETECTED
GLUCOSE SERPL-MCNC: 91 MG/DL (ref 70–110)
GPP - ADENOVIRUS 40/41: NOT DETECTED
GPP - ENTEROTOXIGENIC E COLI (ETEC): NOT DETECTED
GPP - SHIGELLA: NOT DETECTED
GROSS: NORMAL
LACTATE PLASV-SCNC: NOT DETECTED MMOL/L
Lab: NORMAL
MAGNESIUM SERPL-MCNC: 1.9 MG/DL (ref 1.6–2.6)
NOROVIRUS RNA STL QL NAA+PROBE: NOT DETECTED
PHOSPHATE SERPL-MCNC: 2.1 MG/DL (ref 2.7–4.5)
POCT GLUCOSE: 153 MG/DL (ref 70–110)
POCT GLUCOSE: 175 MG/DL (ref 70–110)
POCT GLUCOSE: 253 MG/DL (ref 70–110)
POTASSIUM SERPL-SCNC: 3.7 MMOL/L (ref 3.5–5.1)
RV DSRNA STL QL NAA+PROBE: NOT DETECTED
SALMONELLA DNA SPEC QL NAA+PROBE: POSITIVE
SODIUM SERPL-SCNC: 137 MMOL/L (ref 136–145)
V CHOLERAE DNA SPEC QL NAA+PROBE: NOT DETECTED
Y ENTERO RECN STL QL NAA+PROBE: NOT DETECTED

## 2024-02-22 PROCEDURE — 36415 COLL VENOUS BLD VENIPUNCTURE: CPT

## 2024-02-22 PROCEDURE — 99233 SBSQ HOSP IP/OBS HIGH 50: CPT | Mod: ,,, | Performed by: INTERNAL MEDICINE

## 2024-02-22 PROCEDURE — 99233 SBSQ HOSP IP/OBS HIGH 50: CPT | Mod: ,,, | Performed by: OBSTETRICS & GYNECOLOGY

## 2024-02-22 PROCEDURE — 25000003 PHARM REV CODE 250

## 2024-02-22 PROCEDURE — 20600001 HC STEP DOWN PRIVATE ROOM

## 2024-02-22 PROCEDURE — 83735 ASSAY OF MAGNESIUM: CPT

## 2024-02-22 PROCEDURE — 80048 BASIC METABOLIC PNL TOTAL CA: CPT

## 2024-02-22 PROCEDURE — 63600175 PHARM REV CODE 636 W HCPCS: Performed by: STUDENT IN AN ORGANIZED HEALTH CARE EDUCATION/TRAINING PROGRAM

## 2024-02-22 PROCEDURE — 84100 ASSAY OF PHOSPHORUS: CPT

## 2024-02-22 PROCEDURE — 25000003 PHARM REV CODE 250: Performed by: STUDENT IN AN ORGANIZED HEALTH CARE EDUCATION/TRAINING PROGRAM

## 2024-02-22 PROCEDURE — 63600175 PHARM REV CODE 636 W HCPCS

## 2024-02-22 RX ORDER — METHYLPREDNISOLONE 4 MG/1
32 TABLET ORAL 2 TIMES DAILY
Status: DISCONTINUED | OUTPATIENT
Start: 2024-02-22 | End: 2024-02-22

## 2024-02-22 RX ORDER — PREDNISONE 20 MG/1
60 TABLET ORAL DAILY
Status: DISCONTINUED | OUTPATIENT
Start: 2024-02-23 | End: 2024-02-22

## 2024-02-22 RX ORDER — PREDNISONE 20 MG/1
60 TABLET ORAL DAILY
Status: DISCONTINUED | OUTPATIENT
Start: 2024-02-23 | End: 2024-02-23 | Stop reason: HOSPADM

## 2024-02-22 RX ADMIN — METHYLPREDNISOLONE SODIUM SUCCINATE 74.3 MG: 40 INJECTION, POWDER, FOR SOLUTION INTRAMUSCULAR; INTRAVENOUS at 08:02

## 2024-02-22 RX ADMIN — ZOLPIDEM TARTRATE 5 MG: 5 TABLET ORAL at 09:02

## 2024-02-22 RX ADMIN — VALACYCLOVIR HYDROCHLORIDE 500 MG: 500 TABLET, FILM COATED ORAL at 08:02

## 2024-02-22 RX ADMIN — LEVOTHYROXINE SODIUM 100 MCG: 100 TABLET ORAL at 06:02

## 2024-02-22 RX ADMIN — FAMOTIDINE 20 MG: 20 TABLET, FILM COATED ORAL at 09:02

## 2024-02-22 RX ADMIN — POTASSIUM PHOSPHATE, MONOBASIC POTASSIUM PHOSPHATE, DIBASIC 15 MMOL: 224; 236 INJECTION, SOLUTION, CONCENTRATE INTRAVENOUS at 08:02

## 2024-02-22 RX ADMIN — VALACYCLOVIR HYDROCHLORIDE 500 MG: 500 TABLET, FILM COATED ORAL at 09:02

## 2024-02-22 RX ADMIN — FAMOTIDINE 20 MG: 20 TABLET, FILM COATED ORAL at 08:02

## 2024-02-22 RX ADMIN — DULOXETINE HYDROCHLORIDE 30 MG: 30 CAPSULE, DELAYED RELEASE ORAL at 08:02

## 2024-02-22 RX ADMIN — LOPERAMIDE HYDROCHLORIDE 4 MG: 2 CAPSULE ORAL at 08:02

## 2024-02-22 RX ADMIN — ENOXAPARIN SODIUM 40 MG: 40 INJECTION SUBCUTANEOUS at 05:02

## 2024-02-22 NOTE — PROGRESS NOTES
Gastroenterology Progress Note    Sugar Diaz is a 58 y.o. female admitted to hospital 2/15/2024 (Hospital Day: 8) due to Hypokalemia.     Interval History  EGD & colonoscopy on 2/21 unremarkable. Biopsies taken from stomach, duodenum, colon, and terminal ileum.     No BM after colonoscopy. Stool pathogen panel +ve for Salmonella. This was communicated to the patient.     Objective  Temp:  [97.6 °F (36.4 °C)-98.6 °F (37 °C)] 98.2 °F (36.8 °C) (02/22 1624)  Pulse:  [77-96] 95 (02/22 1624)  BP: ()/(47-62) 133/62 (02/22 1624)  Resp:  [18-20] 18 (02/22 1624)  SpO2:  [95 %-98 %] 97 % (02/22 1624)    General: Alert, Oriented x3, no distress  Abdomen: Normoactive bowel sounds. Non-distended. Normal tympany. Soft. Non-tender. No peritoneal signs.    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 Stool studies negative for infection. Based on colonoscopy results and pathology findings from 10/2023, suspect current diarrhea secondary to more microscopic/collagenous colitis rather than ICI colitis. She is status post initiation of Solumedrol 40 mg daily on 2/16. On this dose, no real change in frequency of BM. On 2/18, steroid dose increased to 1 mg/Kg/BW but no improvement. EGD & colonoscopy on 2/21 unremarkable. Biopsies taken from stomach, duodenum, colon, and terminal ileum. Stool pathogen panel +ve for Salmonella.     #acute on chronic diarrhea - self limited  Salmonella infection v/s ICI colitis    Recommendations  -ID consult for Salmonella. Given patient is immunocompromised, may need treatment.   -Stop IV steroids today & switch to PO prednisone 60 mg starting tomorrow. Plan to taper down by 10 mg weekly.   -Would also repeat stool calprotectin. .  -Continue electrolyte repletion.   -Low residue diet.   -Avoid use of NSAID products.   -In terms of long term management, could possibly transition to Budesonide versus Entyvio with continuation of immunotherapy.     Thank you for involving us in the care of Sugar Diaz. Please call with any additional questions, concerns or changes in the patient's clinical status.    Genaro Vargas MD, PGY-V  Gastroenterology Fellow  [facility]

## 2024-02-22 NOTE — PROGRESS NOTES
Admit Assessment    Patient Identification: Sugar Diaz   :  1965  Admit Date:  2/15/2024  Attending Provider:  Lavell Rodríguez MD              Referral:   Patient was admitted to [facility] with a diagnosis of Hypokalemia, and was admitted this hospital stay due to Hypokalemia [E87.6]  Abnormal laboratory test [R89.9].       is involved was referred to the Social Work Department via routine referral. Patient presents as a 58 y.o. year old  female.    Persons interviewed: Patient    Living Situation:    Patient reports she resides with her spouse in a single story home with no steps to enter.    Patient reports she has a great support system which includes, her spouse, mother, 3 children, sisters and grandchildren. Patient reports her sister was dx'd with Breast Cancer not long ago and she was there supporting her sister and now it's her turn that needs the support and her family has been wonderful. Patient reports the hardest part was when her granddaughter was born 3 months ago the patient was dx'd with RSV and could hold her granddaughter for 4 weeks.     Resides at:  [address]  [place] [address]   Phone: [phone] (Cell)      (RETIRED) Functional Status Prior  Ambulation Prior: 0-->independent  Transferrin-->independent  Toiletin-->independent    Current or Past Agencies and Description of Services/Supplies    DME  Agency Name: N/A    Home Health  Agency Name: N/A    IV Infusion  Agency Name: N/A    Nutrition: Regular Diet    Outpatient Pharmacy:     [id number] - [place] - [address] [facility] [address]  [address] [place] [address]  Phone: [phone] Fax: [phone]      Patient Preference of agencies include: [facility]     Patient/Caregiver informed of right to choose providers or agencies.  Patient provides permission to release any necessary information to  [facility] and to Non[facility] agencies as needed to facilitate patient care, treatment planning, and patient discharge planning.  Written and verbal resources provided.    Coping  Patient reports overall she has been coping well.    Adjustment to Diagnosis and Treatment  Patient reports she is adjusting well.     Emotional/Behavioral/Cognitive Issues  Patient reports no emotional, behavioral or cognitive issues at this time.     Employment: Patient reports she works at the [employer]    Finances: Bi-Weekly Income    Housing: Safe and permanent    Support: Good support system with her spouse, mother, sisters, 3 children and 6  grandchildren    Hobbies/Leisure/Recreation: Patient enjoys spending with her grandchildren.    Stephany: Holiness    Will: None at this time    Advance Directives: None at this time    Transportation: Patient's spouse will provide transportation upon d/c.    History/Current Symptoms of Anxiety/Depression: No    History/Current Substance Use:   Social History     Tobacco Use    Smoking status: Never    Smokeless tobacco: Never   Substance and Sexual Activity    Alcohol use: Never    Drug use: Never    Sexual activity: Not Currently     Partners: Male     Birth control/protection: Post-menopausal     Indications of Abuse/Neglect: No  Abuse Screen (yes response referral indicated)  Feels Unsafe at Home or Work/School: no  Feels Threatened by Someone: no  Does anyone try to keep you from having contact with others or doing things outside your home?: no  Physical Signs of Abuse Present: no    Financial:  Payer/Plan Subscr  Sex Relation Sub. Ins. ID Effective Group Num   1. [facility]* RITA ORELLANA* 1965 Female Self [id number]                                    [address] [id number], [place] [address]      Other identified concerns/needs: TBD    Plan: TBD    Interventions/Referrals: TBD    Patient/caregiver engaged in treatment planning process.    Social  Worker providing psychosocial and supportive counseling, resources, education, assistance and discharge planning as appropriate.  Patient/caregiver state understanding of  available resources,  following, remains available.    LEONCIO Kate, [facility]  Oncology Social Worker   [facility] - Oncology  [phone]

## 2024-02-22 NOTE — PROGRESS NOTES
Armen Rosales - Oncology ([facility])  Gynecologic Oncology  Progress Note      Patient Name: Sugar Diaz  MRN: [id number]  Admission Date: 2/15/2024  Hospital Length of Stay: 7 days  Attending Provider: Lavell Rodríguez MD  Primary Care Provider: Dexter Khan MD  Principal Problem: Hypokalemia    Follow-up For: Procedure(s) (LRB):  EGD (ESOPHAGOGASTRODUODENOSCOPY) (N/A)  COLONOSCOPY (N/A)  Post-Operative Day: 1 Day Post-Op  Subjective:      History of Present Illness:  Sugar Daiz is a 58 y.o.  with stage IIIC1 grade 1 endometrioid endometrial cancer who completed Cycle 3 Dostarlimab 1000mg  on  who was instructed to present to the ED after a critical lab value of her potassium resulted. Her K+ outpatient was 2.7. The patient endorses having loose stools and nausea, but denies fevers, chills, vomiting, chest pain, SOB, or muscle weakness. Denies recent illness.    At [facility] ED, the patient's CBC is wnL, K+ 2.5,  Glucose 160, Cr wnL. VSS and wnL. EKG reveal sinus tachycardia without ischemic changes.     Hospital Course:  02/15/2024: Admit to Gyn Onc for electrolyte repletion and close monitoring.  2024: NAEO. K+ low at 2.3. Pt poorly tolerating IV replacement, IV at slow rate. Will replace PO and IV. Will replace Mg, Phos. Reports more loose stools this morning. Will restart home regimen of loperamide and lomotil. Will consult GI.  2024: K improving, most recent 4.0. GI consulted for concerns of colitis, recommend IV solumedrol 40 mg daily which was started yesterday. Patient reports multiple episodes of diarrhea overnight. Denies abdominal pain. Overall feeling better.   2024: Reports one episode of diarrhea overnight. Hypokalemia noted on morning labs, phosphorous improved. Denies abdominal pain. Intermittent nausea but tolerating po without vomiting.   2024: Reports 3 episodes of diarrhea overnight. Denies nausea, vomiting, and abdominal pain. Is tolerating PO intake.  Hypokalemia resolved, phosphorus is mildly low. Will replete.   02/20/2024: Reports 13 episodes of diarrhea yesterday & overnight. Denies nausea, vomiting, and abdominal pain. Is tolerating PO intake. K 3.1, will replace. Plan for EGD and colonoscopy tomorrow with GI. CLD today, NPO at midnight.   02/21/2024: Reports some improvement in diarrhea which then worsened due to bowel prep. Denies nausea, vomiting, and abdominal pain. Is tolerating PO intake. K 3.1, will replace. EGD & colonoscopy today with GI.   02/22/2024: One episode of diarrhea, no other concern. Electrolytes repleted as needed.    Interval History: NAEON. Reports improvement in diarrhea with one episode.  Denies nausea, vomiting, and abdominal pain. Tolerating diet.    Scheduled Meds:   diphenoxylate-atropine 2.5-0.025 mg  1 tablet Oral TID    DULoxetine  30 mg Oral Daily    enoxparin  40 mg Subcutaneous Q24H (prophylaxis, 1700)    famotidine  20 mg Oral BID    levothyroxine  100 mcg Oral Before breakfast    loperamide  4 mg Oral TID    methylPREDNISolone sodium succinate injection  1 mg/kg/day Intravenous Daily    potassium phosphate IVPB  15 mmol Intravenous Once    valACYclovir  500 mg Oral BID     Continuous Infusions:  PRN Meds:acetaminophen, calcium carbonate, dextrose 10%, dextrose 10%, diphenhydrAMINE, glucagon (human recombinant), glucose, glucose, hydrALAZINE, insulin aspart U-100, ondansetron, oxyCODONE, oxyCODONE, simethicone, sodium chloride 0.9%, zolpidem    Review of patient's allergies indicates:  No Known Allergies    Objective:     Vital Signs (Most Recent):  Temp: 97.6 °F (36.4 °C) (02/22/24 0427)  Pulse: 83 (02/22/24 0427)  Resp: 18 (02/22/24 0427)  BP: (!) 105/54 (02/22/24 0427)  SpO2: 98 % (02/22/24 0427) Vital Signs (24h Range):  Temp:  [97.6 °F (36.4 °C)-98.6 °F (37 °C)] 97.6 °F (36.4 °C)  Pulse:  [] 83  Resp:  [16-20] 18  SpO2:  [95 %-100 %] 98 %  BP: ()/(47-78) 105/54     Weight: 74.3 kg (163 lb 12.8 oz)  Body  mass index is 25.66 kg/m².    Intake/Output - Last 3 Shifts         02/20 0700  02/21 0659 02/21 0700 02/22 0659 02/22 0700 02/23 0659    P.O. 300 300     IV Piggyback 426.3 725     Total Intake(mL/kg) 726.3 (9.8) 1025 (13.8)     Urine (mL/kg/hr) 0 (0) 650 (0.4)     Stool 3 0     Total Output 3 650     Net +723.3 +375            Urine Occurrence 8 x 8 x     Stool Occurrence 7 x 1 x                  Physical Exam:   Constitutional: She is oriented to person, place, and time. She appears well-developed and well-nourished. No distress.    HENT:   Head: Normocephalic and atraumatic.    Eyes: EOM are normal.     Cardiovascular:  Normal rate.             Pulmonary/Chest: Effort normal. No respiratory distress.        Abdominal: Soft. She exhibits no distension. There is no abdominal tenderness. There is no rebound and no guarding.             Musculoskeletal: Moves all extremeties.       Neurological: She is alert and oriented to person, place, and time.    Skin: Skin is warm and dry.    Psychiatric: She has a normal mood and affect. Her behavior is normal. Thought content normal.          Lines/Drains/Airways       Peripheral Intravenous Line  Duration                  Peripheral IV - Single Lumen 02/17/24 1854 20 G;1 3/4 in Anterior;Right Upper Arm 4 days         Peripheral IV - Single Lumen 02/21/24 1230 20 G Anterior;Left Wrist <1 day                    Laboratory:  Recent Labs   Lab 02/15/24  1459 02/15/24  1937 02/16/24  0406 02/20/24  0512 02/21/24  0228 02/22/24  0245    137   < > 141 139 137   K 2.7* 2.5*   < > 3.1* 2.8* 3.7   CL 97 97   < > 109 108 110   CO2 29 27   < > 23 23 19*   BUN 8 7   < > 11 9 12   CREATININE 0.7 0.7   < > 0.6 0.6 0.6   * 160*   < > 87 94 91   PROT 6.7 7.3  --   --   --   --    BILITOT 0.3 0.4  --   --   --   --    ALKPHOS 81 85  --   --   --   --    ALT 22 24  --   --   --   --    AST 25 28  --   --   --   --    MG  --  1.7   < > 1.9 1.8 1.9   PHOS  --  3.2   < > 3.5 2.8  2.1*    < > = values in this interval not displayed.           Assessment/Plan:     * Hypokalemia  - Most recent K 3.7  - Continue to replace as needed    Electrolyte abnormality  - Replete PRN    Hypothyroidism due to medication  - Stable  - TSH low, T4 free normal on admit  - Continue home synthroid    GERD (gastroesophageal reflux disease)  - Continue home famotidine    Diarrhea  - Denies abdominal pain or cramping   - Tolerating PO  - S/p colonoscopy 10/2023 - biopsies consistent with mild active colitis   - Recent Giardia/Cryptosporidium/C diff all negative  - s/p IVF as patient is tolerating po  - Continue lomotil and loperamide TID daily  - Quantity of diarrhea improving  - GI consulted  - Continue solumedrol 1 mg/kg daily    - EGD/colonoscopy done on 2/21  - Additional lab evaluation ordered per GI recommendations: CRP (normal), Hep B (normal), TB (neg), CMV (neg), Celiac panel (neg), GI pathogen panel pending    Endometrial cancer  -see once hx above  -s/p TRH/BSO/BSLN/RPLN/repair of obturator nerve in 6/2023  -Carboplatin/paclitaxel s/p 7 cycles  -Currently on immunotherapy: Dostarlimab (last cycle 7 weeks ago)  - Lovenox for DVT prophylaxis      VTE Risk Mitigation (From admission, onward)           Ordered     enoxaparin injection 40 mg  Every 24 hours         02/16/24 1454     IP VTE HIGH RISK PATIENT  Once         02/15/24 2216                    Jojo Shen MD  Gynecologic Oncology  [facility] - Oncology ([facility])

## 2024-02-22 NOTE — SUBJECTIVE & OBJECTIVE
Interval History: NAEON. Reports improvement in diarrhea with one episode.  Denies nausea, vomiting, and abdominal pain. Tolerating diet.    Scheduled Meds:   diphenoxylate-atropine 2.5-0.025 mg  1 tablet Oral TID    DULoxetine  30 mg Oral Daily    enoxparin  40 mg Subcutaneous Q24H (prophylaxis, 1700)    famotidine  20 mg Oral BID    levothyroxine  100 mcg Oral Before breakfast    loperamide  4 mg Oral TID    methylPREDNISolone sodium succinate injection  1 mg/kg/day Intravenous Daily    potassium phosphate IVPB  15 mmol Intravenous Once    valACYclovir  500 mg Oral BID     Continuous Infusions:  PRN Meds:acetaminophen, calcium carbonate, dextrose 10%, dextrose 10%, diphenhydrAMINE, glucagon (human recombinant), glucose, glucose, hydrALAZINE, insulin aspart U-100, ondansetron, oxyCODONE, oxyCODONE, simethicone, sodium chloride 0.9%, zolpidem    Review of patient's allergies indicates:  No Known Allergies    Objective:     Vital Signs (Most Recent):  Temp: 97.6 °F (36.4 °C) (02/22/24 0427)  Pulse: 83 (02/22/24 0427)  Resp: 18 (02/22/24 0427)  BP: (!) 105/54 (02/22/24 0427)  SpO2: 98 % (02/22/24 0427) Vital Signs (24h Range):  Temp:  [97.6 °F (36.4 °C)-98.6 °F (37 °C)] 97.6 °F (36.4 °C)  Pulse:  [] 83  Resp:  [16-20] 18  SpO2:  [95 %-100 %] 98 %  BP: ()/(47-78) 105/54     Weight: 74.3 kg (163 lb 12.8 oz)  Body mass index is 25.66 kg/m².    Intake/Output - Last 3 Shifts         02/20 0700 02/21 0659 02/21 0700 02/22 0659 02/22 0700 02/23 0659    P.O. 300 300     IV Piggyback 426.3 725     Total Intake(mL/kg) 726.3 (9.8) 1025 (13.8)     Urine (mL/kg/hr) 0 (0) 650 (0.4)     Stool 3 0     Total Output 3 650     Net +723.3 +375            Urine Occurrence 8 x 8 x     Stool Occurrence 7 x 1 x                   Physical Exam:   Constitutional: She is oriented to person, place, and time. She appears well-developed and well-nourished. No distress.    HENT:   Head: Normocephalic and atraumatic.    Eyes: EOM are  normal.     Cardiovascular:  Normal rate.             Pulmonary/Chest: Effort normal. No respiratory distress.        Abdominal: Soft. She exhibits no distension. There is no abdominal tenderness. There is no rebound and no guarding.             Musculoskeletal: Moves all extremeties.       Neurological: She is alert and oriented to person, place, and time.    Skin: Skin is warm and dry.    Psychiatric: She has a normal mood and affect. Her behavior is normal. Thought content normal.          Lines/Drains/Airways       Peripheral Intravenous Line  Duration                  Peripheral IV - Single Lumen 02/17/24 1854 20 G;1 3/4 in Anterior;Right Upper Arm 4 days         Peripheral IV - Single Lumen 02/21/24 1230 20 G Anterior;Left Wrist <1 day                    Laboratory:  Recent Labs   Lab 02/15/24  1459 02/15/24  1937 02/16/24  0406 02/20/24  0512 02/21/24  0228 02/22/24  0245    137   < > 141 139 137   K 2.7* 2.5*   < > 3.1* 2.8* 3.7   CL 97 97   < > 109 108 110   CO2 29 27   < > 23 23 19*   BUN 8 7   < > 11 9 12   CREATININE 0.7 0.7   < > 0.6 0.6 0.6   * 160*   < > 87 94 91   PROT 6.7 7.3  --   --   --   --    BILITOT 0.3 0.4  --   --   --   --    ALKPHOS 81 85  --   --   --   --    ALT 22 24  --   --   --   --    AST 25 28  --   --   --   --    MG  --  1.7   < > 1.9 1.8 1.9   PHOS  --  3.2   < > 3.5 2.8 2.1*    < > = values in this interval not displayed.

## 2024-02-22 NOTE — PHYSICIAN QUERY
PT Name: Sugar Diaz  MR #: [id number]     DOCUMENTATION CLARIFICATION     CDS/: ABEBE Nobles,RNC-MNN         Contact information:[email or web address]  This form is a permanent document in the medical record.    Query Date: February 22, 2024    By submitting this query, we are merely seeking further clarification of documentation. Please utilize your independent clinical judgment when addressing the question(s) below.    The Medical Record reflects the following:     Indicators   Supporting Clinical Findings Location in Medical Record   X Lab Value(s) Phosphorus=3.2-->2.2-->2.4--<1.7-->2.6-->3.5-->2.1 LAB 2/15-2/22   X Treatment/Medication Electrolyte abnormality  - P 2.6 this AM  - Replete PRN Gyn Onc Progress note 2/19@347pm   X Other Hypokalemia resolved, phosphorus is mildly low. Will replete.   Gyn Onc Progress note 2/19@347pm     Provider, please specify the diagnosis or diagnoses that correspond(s) to the above indicators. Andrade all that apply:    [  x ] Hypophosphatemia   [   ] Other electrolyte disturbance (please specify): __________   [   ]  Clinically Undetermined       Please document in your progress notes daily for the duration of treatment until resolved, and include in your discharge summary.    Form No. [id number]

## 2024-02-22 NOTE — ASSESSMENT & PLAN NOTE
- Denies abdominal pain or cramping   - Tolerating PO  - S/p colonoscopy 10/2023 - biopsies consistent with mild active colitis   - Recent Giardia/Cryptosporidium/C diff all negative  - s/p IVF as patient is tolerating po  - Continue lomotil and loperamide TID daily  - Quantity of diarrhea improving  - GI consulted  - Continue solumedrol 1 mg/kg daily    - EGD/colonoscopy done on 2/21  - Additional lab evaluation ordered per GI recommendations: CRP (normal), Hep B (normal), TB (neg), CMV (neg), Celiac panel (neg), GI pathogen panel pending

## 2024-02-22 NOTE — PLAN OF CARE
Plan of care reviewed with patient throughout shift. Pt VSS overnight (BP slightly lower than normal but stable), afebrile, a&o x4, on RA. Pt given Ambien for sleep. Family at bedside, bed alarm refused (education given), ambulating independently, fall precautions maintained. Bed locked in lowest position, side rails up x2, call light within reach, environment clear. Encouraged pt to call nurse with any concerns.

## 2024-02-23 ENCOUNTER — TELEPHONE (OUTPATIENT)
Dept: GYNECOLOGIC ONCOLOGY | Facility: CLINIC | Age: 59
End: 2024-02-23
Payer: COMMERCIAL

## 2024-02-23 ENCOUNTER — DOCUMENTATION ONLY (OUTPATIENT)
Dept: GASTROENTEROLOGY | Facility: CLINIC | Age: 59
End: 2024-02-23
Payer: COMMERCIAL

## 2024-02-23 VITALS
HEART RATE: 93 BPM | DIASTOLIC BLOOD PRESSURE: 53 MMHG | BODY MASS INDEX: 25.71 KG/M2 | RESPIRATION RATE: 18 BRPM | OXYGEN SATURATION: 94 % | WEIGHT: 163.81 LBS | HEIGHT: 67 IN | TEMPERATURE: 98 F | SYSTOLIC BLOOD PRESSURE: 110 MMHG

## 2024-02-23 DIAGNOSIS — A02.9 SALMONELLA: Primary | ICD-10-CM

## 2024-02-23 PROCEDURE — 25000003 PHARM REV CODE 250

## 2024-02-23 PROCEDURE — 63600175 PHARM REV CODE 636 W HCPCS: Performed by: STUDENT IN AN ORGANIZED HEALTH CARE EDUCATION/TRAINING PROGRAM

## 2024-02-23 PROCEDURE — 99239 HOSP IP/OBS DSCHRG MGMT >30: CPT | Mod: ,,, | Performed by: OBSTETRICS & GYNECOLOGY

## 2024-02-23 RX ORDER — CIPROFLOXACIN 750 MG/1
750 TABLET, FILM COATED ORAL EVERY 12 HOURS
Qty: 28 TABLET | Refills: 0 | Status: SHIPPED | OUTPATIENT
Start: 2024-02-23 | End: 2024-03-08

## 2024-02-23 RX ORDER — PREDNISONE 20 MG/1
TABLET ORAL
Qty: 71 TABLET | Refills: 0 | Status: SHIPPED | OUTPATIENT
Start: 2024-02-24 | End: 2024-03-28 | Stop reason: ALTCHOICE

## 2024-02-23 RX ORDER — DIPHENOXYLATE HYDROCHLORIDE AND ATROPINE SULFATE 2.5; .025 MG/1; MG/1
1 TABLET ORAL 3 TIMES DAILY
Status: DISCONTINUED | OUTPATIENT
Start: 2024-02-23 | End: 2024-02-23

## 2024-02-23 RX ORDER — LOPERAMIDE HYDROCHLORIDE 2 MG/1
4 CAPSULE ORAL 3 TIMES DAILY
Qty: 60 CAPSULE | Refills: 2 | Status: SHIPPED | OUTPATIENT
Start: 2024-02-23 | End: 2024-03-24

## 2024-02-23 RX ORDER — LOPERAMIDE HYDROCHLORIDE 2 MG/1
4 CAPSULE ORAL 3 TIMES DAILY
Status: DISCONTINUED | OUTPATIENT
Start: 2024-02-23 | End: 2024-02-23

## 2024-02-23 RX ADMIN — VALACYCLOVIR HYDROCHLORIDE 500 MG: 500 TABLET, FILM COATED ORAL at 08:02

## 2024-02-23 RX ADMIN — LEVOTHYROXINE SODIUM 100 MCG: 100 TABLET ORAL at 06:02

## 2024-02-23 RX ADMIN — DULOXETINE HYDROCHLORIDE 30 MG: 30 CAPSULE, DELAYED RELEASE ORAL at 08:02

## 2024-02-23 RX ADMIN — PREDNISONE 60 MG: 20 TABLET ORAL at 08:02

## 2024-02-23 RX ADMIN — FAMOTIDINE 20 MG: 20 TABLET, FILM COATED ORAL at 08:02

## 2024-02-23 NOTE — PLAN OF CARE
Plan of care reviewed with patient. Pt being discharged to home. Discharge instructions reviewed and given to patient. Prescription teaching performed by pharmacy. Peripheral line removed by Tabatha Swann. Dressing site C/D/I. Pt up independently, ambulating with steady gait.     Tabatha Swann  2/23/2024  10:09 AM

## 2024-02-23 NOTE — PLAN OF CARE
Pt A&Ox4, VSS on RA. Pt denies any pain this shift. PRN Ambien given per pt's request for sleep. POC discussed with pt and family at the bedside. Bed is in lowest position with wheels locked. Call bell and all personal items within reach. Frequent rounding performed throughout shift.

## 2024-02-23 NOTE — PROGRESS NOTES
Spoke to patient's oncologist, Dr. Rodríguez.   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 EGD/colonoscopy unrevealing for cause and GI path panel + salmonella.      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 Rodríguez to reach out to me or Dr Ru Warner so we can follow ICI colitis protocol created and expedite workup and treatment.     Jason Montero MD, FACG   Department of Gastroenterology  Medical Director, Inflammatory Bowel Disease

## 2024-02-23 NOTE — ASSESSMENT & PLAN NOTE
- see once hx above  - s/p TRH/BSO/BSLN/RPLN/repair of obturator nerve in 6/2023  - Carboplatin/paclitaxel s/p 7 cycles  - Currently on immunotherapy: Dostarlimab (last cycle 7 weeks ago)  - Lovenox for DVT prophylaxis

## 2024-02-23 NOTE — TELEPHONE ENCOUNTER
Spoke with our patient she will try and come in on Monday to  her stool study from the  by Mrs Kenyon. Message from Lavell Rodríguez MD sent at 2/23/2024  1:33 PM CST -----  Ahmet, can you please schedule that stool study I ordered?  Any time next week is fine.      Nika and Ania, please schedule CMP, TSH, T4, RONC, another cycle of chemotherapy in 2.5 weeks.  Thank you.  ----- Message -----  From: Jason Montero MD  Sent: 2/23/2024   1:09 PM CST  To: Ivan Warner MD; Marcia Weaver MD; #

## 2024-02-23 NOTE — DISCHARGE SUMMARY
[facility] On[facility] ([facility])  Gynecologic Oncology  Discharge Summary    Patient Name: Sugar Diaz  MRN: [id number]  Admission Date: 2/15/2024  Hospital Length of Stay: 8 days  Discharge Date and Time:  2024   Attending Physician: Lavell Rodríguez MD   Discharging Provider: Glenda Nair MD  Primary Care Provider: Dexter Khan MD    HPI:   Sugar Diaz is a 58 y.o.  with stage IIIC1 grade 1 endometrioid endometrial cancer who completed Cycle 3 Dostarlimab 1000mg  on  who was instructed to present to the ED after a critical lab value of her potassium resulted. Her K+ outpatient was 2.7. The patient endorses having loose stools and nausea, but denies fevers, chills, vomiting, chest pain, SOB, or muscle weakness. Denies recent illness.    At [facility] ED, the patient's CBC is wnL, K+ 2.5,  Glucose 160, Cr wnL. VSS and wnL. EKG reveal sinus tachycardia without ischemic changes.     Hospital Course:  02/15/2024: Admit to Gyn Onc for electrolyte repletion and close monitoring.  2024: NAEO. K+ low at 2.3. Pt poorly tolerating IV replacement, IV at slow rate. Will replace PO and IV. Will replace Mg, Phos. Reports more loose stools this morning. Will restart home regimen of loperamide and lomotil. Will consult GI.  2024: K improving, most recent 4.0. GI consulted for concerns of colitis, recommend IV solumedrol 40 mg daily which was started yesterday. Patient reports multiple episodes of diarrhea overnight. Denies abdominal pain. Overall feeling better.   2024: Reports one episode of diarrhea overnight. Hypokalemia noted on morning labs, phosphorous improved. Denies abdominal pain. Intermittent nausea but tolerating po without vomiting.   2024: Reports 3 episodes of diarrhea overnight. Denies nausea, vomiting, and abdominal pain. Is tolerating PO intake. Hypokalemia resolved, phosphorus is mildly low. Will replete.   2024: Reports 13 episodes of diarrhea yesterday &  overnight. Denies nausea, vomiting, and abdominal pain. Is tolerating PO intake. K 3.1, will replace. Plan for EGD and colonoscopy tomorrow with GI. CLD today, NPO at midnight.   02/21/2024: Reports some improvement in diarrhea which then worsened due to bowel prep. Denies nausea, vomiting, and abdominal pain. Is tolerating PO intake. K 3.1, will replace. EGD & colonoscopy today with GI.   02/22/2024: One episode of diarrhea, no other concern. Electrolytes repleted as needed.  02/23/2024: Contnued diarrhea. + Salmonella on GI pathogen panel, consulted ID. Transition to PO steroids. Patient stable for DC home. RX sent for cipro 750 BID x 14 days.     Goals of Care Treatment Preferences:  Code Status: Full Code      Procedure(s) (LRB):  EGD (ESOPHAGOGASTRODUODENOSCOPY) (N/A)  COLONOSCOPY (N/A)     Consults (From admission, onward)          Status Ordering Provider     Inpatient consult to Infectious Diseases  Once        Provider:  (Not yet assigned)    Acknowledged NIKOLAI LOPEZ     Inpatient consult to Midline team  Once        Provider:  (Not yet assigned)    Completed RAYMOND TORRES     Inpatient consult to Gastroenterology  Once        Provider:  (Not yet assigned)    Completed HAYLEY FELIX     Inpatient consult to Gynecologic Oncology  Once        Provider:  (Not yet assigned)    Completed BALTA GASCA            Significant Diagnostic Studies: Labs: All labs within the past 24 hours have been reviewed    Pending Diagnostic Studies:       None          Final Active Diagnoses:    Diagnosis Date Noted POA    PRINCIPAL PROBLEM:  Hypokalemia [E87.6] 02/15/2024 Yes    Salmonella [A02.9] 02/22/2024 Yes    Chronic diarrhea [K52.9] 02/20/2024 Yes    Colitis [K52.9] 02/20/2024 Yes    Electrolyte abnormality [E87.8] 02/17/2024 Yes    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 No, because she did not undergo surgery    Discharged Condition: good    Disposition: Home or Self Care    Follow Up:    Patient Instructions:      Diet Adult Regular     No driving until:   Order Comments: No driving until not taking narcotic pain medication.     Pelvic Rest   Order Comments: Pelvic rest until 6 weeks after discharge. Nothing in vagina -no sex, tampons, douching, etc.     Notify your health care provider if you experience any of the following:  temperature >100.4     Notify your health care provider if you experience any of the following:  persistent nausea and vomiting or diarrhea     Notify your health care provider if you experience any of the following:  severe uncontrolled pain     Notify your health care provider if you experience any of the following:  redness, tenderness, or signs of infection (pain, swelling, redness, odor or green/yellow discharge around incision site)     Notify your health care provider if you experience any of the following:  difficulty breathing or increased cough     Notify your health care provider if you experience any of the following:  severe persistent headache     Notify your health care provider if you experience any of the following:  worsening rash     Notify your health care provider if you experience any of the following:  persistent dizziness, light-headedness, or visual disturbances     Notify your health care provider if you experience any of the following:  increased confusion or weakness     Notify your health care provider if you experience any of the following:   Order Comments: Heavy vaginal bleeding saturating more than 1 pad per hr for at least consecutive 2 hrs.     Activity as tolerated     Medications:  Reconciled Home Medications:      Medication List        START taking these medications      ciprofloxacin  HCl 500 MG tablet  Commonly known as: CIPRO  Take 1.5 tablets (750 mg total) by mouth every 12 (twelve) hours. for 14 days     loperamide 2 mg capsule  Commonly known as: IMODIUM  Take 2 capsules (4 mg total) by mouth 3 (three) times daily.     predniSONE 20 MG tablet  Commonly known as: DELTASONE  Take 3 tablets (60 mg total) by mouth once daily for 6 days, THEN 2.5 tablets (50 mg total) once daily for 7 days, THEN 2 tablets (40 mg total) once daily for 7 days, THEN 1.5 tablets (30 mg total) once daily for 7 days, THEN 1 tablet (20 mg total) once daily for 7 days, THEN 0.5 tablets (10 mg total) once daily for 7 days.  Start taking on: February 24, 2024            CONTINUE taking these medications      acetaminophen 650 MG Tbsr  Commonly known as: TYLENOL  Take 1 tablet (650 mg total) by mouth every 6 (six) hours as needed (pain).     diclofenac sodium 1 % Gel  Commonly known as: VOLTAREN  Apply 2 g topically 2 (two) times daily.     diphenhydrAMINE 25 mg capsule  Commonly known as: BENADRYL  Take 25 mg by mouth every 6 (six) hours as needed for Itching.     diphenoxylate-atropine 2.5-0.025 mg 2.5-0.025 mg per tablet  Commonly known as: LOMOTIL  Take 1 tablet by mouth 4 (four) times daily as needed for Diarrhea.     DULoxetine 30 MG capsule  Commonly known as: CYMBALTA  Take 1 capsule (30 mg total) by mouth once daily AND 2 capsules (60 mg total) once daily.     famotidine 40 MG tablet  Commonly known as: PEPCID  Take 1 tablet (40 mg total) by mouth once daily.     ibuprofen 600 MG tablet  Commonly known as: ADVIL,MOTRIN  Take 1 tablet (600 mg total) by mouth every 6 (six) hours as needed for Pain.     levothyroxine 100 MCG tablet  Commonly known as: SYNTHROID  Take 1 tablet (100 mcg total) by mouth before breakfast.     loratadine 10 mg tablet  Commonly known as: CLARITIN  Take 1 tablet (10 mg total) by mouth once daily.     prochlorperazine 5 MG tablet  Commonly known as: COMPAZINE  Take every 6 hours for 3 days  beginning the day after chemotherapy     valACYclovir 1000 MG tablet  Commonly known as: VALTREX  Two pills at onset of fever blister and then repeat 2 pills 12 hr later     zolpidem 5 MG Tab  Commonly known as: AMBIEN  Take 1 tablet (5 mg total) by mouth nightly as needed (insomnia).            STOP taking these medications      polyethylene glycol 240-22.72-6.72 -5.84 gram Solr  Commonly known as: COLYTE     sulfamethoxazole-trimethoprim 800-160mg 800-160 mg Tab  Commonly known as: BACTRIM DS            ASK your doctor about these medications      dexAMETHasone 4 MG Tab  Commonly known as: DECADRON  Take daily beginning the day after chemotherapy for 3 days              Glenda Nair MD  Gynecologic Oncology  [facility] - Oncology ([facility])

## 2024-02-23 NOTE — SUBJECTIVE & OBJECTIVE
Interval History: NAEON. Reports continued diarrhea. Denies abdominal pain. Tolerating regular diet with no n/v. Ambulating without assistance, Voiding spontaneously.     Scheduled Meds:   DULoxetine  30 mg Oral Daily    enoxparin  40 mg Subcutaneous Q24H (prophylaxis, 1700)    famotidine  20 mg Oral BID    levothyroxine  100 mcg Oral Before breakfast    predniSONE  60 mg Oral Daily    valACYclovir  500 mg Oral BID     Continuous Infusions:  PRN Meds:acetaminophen, calcium carbonate, dextrose 10%, dextrose 10%, diphenhydrAMINE, hydrALAZINE, ondansetron, oxyCODONE, oxyCODONE, simethicone, sodium chloride 0.9%, zolpidem    Review of patient's allergies indicates:  No Known Allergies    Objective:     Vital Signs (Most Recent):  Temp: 97.9 °F (36.6 °C) (02/23/24 0527)  Pulse: 82 (02/23/24 0527)  Resp: 18 (02/23/24 0527)  BP: (!) 99/51 (02/23/24 0527)  SpO2: 97 % (02/23/24 0615) Vital Signs (24h Range):  Temp:  [97.8 °F (36.6 °C)-98.6 °F (37 °C)] 97.9 °F (36.6 °C)  Pulse:  [77-96] 82  Resp:  [16-18] 18  SpO2:  [94 %-97 %] 97 %  BP: ()/(51-62) 99/51     Weight: 74.3 kg (163 lb 12.8 oz)  Body mass index is 25.66 kg/m².    Intake/Output - Last 3 Shifts         02/21 0700  02/22 0659 02/22 0700 02/23 0659    P.O. 300 780    IV Piggyback 725 241.7    Total Intake(mL/kg) 1025 (13.8) 1021.7 (13.8)    Urine (mL/kg/hr) 650 (0.4)     Stool 0     Total Output 650     Net +375 +1021.7          Urine Occurrence 8 x 6 x    Stool Occurrence 1 x 3 x                  Physical Exam:   Constitutional: She is oriented to person, place, and time. She appears well-developed and well-nourished. No distress.    HENT:   Head: Normocephalic and atraumatic.    Eyes: EOM are normal.     Cardiovascular:  Normal rate.             Pulmonary/Chest: Effort normal. No respiratory distress.        Abdominal: Soft. She exhibits no distension. There is no abdominal tenderness.             Musculoskeletal: Moves all extremeties.       Neurological:  She is oriented to person, place, and time.    Skin: Skin is warm and dry.    Psychiatric: She has a normal mood and affect. Her behavior is normal. Thought content normal.          Lines/Drains/Airways       Peripheral Intravenous Line  Duration                  Peripheral IV - Single Lumen 02/17/24 1854 20 G;1 3/4 in Anterior;Right Upper Arm 5 days         Peripheral IV - Single Lumen 02/21/24 1230 20 G Anterior;Left Wrist 1 day                    Laboratory:  All pertinent labs from the last 24 hours have been reviewed.

## 2024-02-23 NOTE — ASSESSMENT & PLAN NOTE
- Denies abdominal pain or cramping   - Tolerating PO  - S/p colonoscopy 10/2023 - biopsies consistent with mild active colitis   - Recent Giardia/Cryptosporidium/C diff all negative  - s/p IVF as patient is tolerating po  - Continue lomotil and loperamide TID daily  - Quantity of diarrhea improving  - GI consulted  - Transition to PO steroids  - EGD/colonoscopy done on 2/21, biopsies pending   - Additional lab evaluation ordered per GI recommendations: CRP (normal), Hep B (normal), TB (neg), CMV (neg), Celiac panel (neg)  - GI pathogen panel: + Salmonella, - Campylobacter, Cryptosporidium, E coli O157, ETEC, STEC, Giardia, Norovirus, Rotavirus, Shigella, Adenovirus, Entamoeba histolytica, Yersenia, Vibrio  - ID consulted, appreciate recommendations

## 2024-02-23 NOTE — PROGRESS NOTES
[facility] - Oncology ([facility])  Gynecologic Oncology  Progress Note      Patient Name: Sugar Diaz  MRN: [id number]  Admission Date: 2/15/2024  Hospital Length of Stay: 8 days  Attending Provider: Lavell Rodríguez MD  Primary Care Provider: Dexter Khan MD  Principal Problem: Hypokalemia    Follow-up For: Procedure(s) (LRB):  EGD (ESOPHAGOGASTRODUODENOSCOPY) (N/A)  COLONOSCOPY (N/A)  Post-Operative Day: 2 Days Post-Op  Subjective:      History of Present Illness:  Sugar Diaz is a 58 y.o.  with stage IIIC1 grade 1 endometrioid endometrial cancer who completed Cycle 3 Dostarlimab 1000mg  on  who was instructed to present to the ED after a critical lab value of her potassium resulted. Her K+ outpatient was 2.7. The patient endorses having loose stools and nausea, but denies fevers, chills, vomiting, chest pain, SOB, or muscle weakness. Denies recent illness.    At [facility] ED, the patient's CBC is wnL, K+ 2.5,  Glucose 160, Cr wnL. VSS and wnL. EKG reveal sinus tachycardia without ischemic changes.     Hospital Course:  02/15/2024: Admit to Gyn Onc for electrolyte repletion and close monitoring.  2024: NAEO. K+ low at 2.3. Pt poorly tolerating IV replacement, IV at slow rate. Will replace PO and IV. Will replace Mg, Phos. Reports more loose stools this morning. Will restart home regimen of loperamide and lomotil. Will consult GI.  2024: K improving, most recent 4.0. GI consulted for concerns of colitis, recommend IV solumedrol 40 mg daily which was started yesterday. Patient reports multiple episodes of diarrhea overnight. Denies abdominal pain. Overall feeling better.   2024: Reports one episode of diarrhea overnight. Hypokalemia noted on morning labs, phosphorous improved. Denies abdominal pain. Intermittent nausea but tolerating po without vomiting.   2024: Reports 3 episodes of diarrhea overnight. Denies nausea, vomiting, and abdominal pain. Is tolerating PO intake.  Hypokalemia resolved, phosphorus is mildly low. Will replete.   02/20/2024: Reports 13 episodes of diarrhea yesterday & overnight. Denies nausea, vomiting, and abdominal pain. Is tolerating PO intake. K 3.1, will replace. Plan for EGD and colonoscopy tomorrow with GI. CLD today, NPO at midnight.   02/21/2024: Reports some improvement in diarrhea which then worsened due to bowel prep. Denies nausea, vomiting, and abdominal pain. Is tolerating PO intake. K 3.1, will replace. EGD & colonoscopy today with GI.   02/22/2024: One episode of diarrhea, no other concern. Electrolytes repleted as needed.  02/23/2024: Contnued diarrhea. + Salmonella on GI pathogen panel, consulted ID. Transition to PO steroids.     Interval History: NAEON. Reports continued diarrhea. Denies abdominal pain. Tolerating regular diet with no n/v. Ambulating without assistance, Voiding spontaneously.     Scheduled Meds:   DULoxetine  30 mg Oral Daily    enoxparin  40 mg Subcutaneous Q24H (prophylaxis, 1700)    famotidine  20 mg Oral BID    levothyroxine  100 mcg Oral Before breakfast    predniSONE  60 mg Oral Daily    valACYclovir  500 mg Oral BID     Continuous Infusions:  PRN Meds:acetaminophen, calcium carbonate, dextrose 10%, dextrose 10%, diphenhydrAMINE, hydrALAZINE, ondansetron, oxyCODONE, oxyCODONE, simethicone, sodium chloride 0.9%, zolpidem    Review of patient's allergies indicates:  No Known Allergies    Objective:     Vital Signs (Most Recent):  Temp: 97.9 °F (36.6 °C) (02/23/24 0527)  Pulse: 82 (02/23/24 0527)  Resp: 18 (02/23/24 0527)  BP: (!) 99/51 (02/23/24 0527)  SpO2: 97 % (02/23/24 0615) Vital Signs (24h Range):  Temp:  [97.8 °F (36.6 °C)-98.6 °F (37 °C)] 97.9 °F (36.6 °C)  Pulse:  [77-96] 82  Resp:  [16-18] 18  SpO2:  [94 %-97 %] 97 %  BP: ()/(51-62) 99/51     Weight: 74.3 kg (163 lb 12.8 oz)  Body mass index is 25.66 kg/m².    Intake/Output - Last 3 Shifts         02/21 0700 02/22 0659 02/22 0700 02/23 0659    P.O. 300  780    IV Piggyback 725 241.7    Total Intake(mL/kg) 1025 (13.8) 1021.7 (13.8)    Urine (mL/kg/hr) 650 (0.4)     Stool 0     Total Output 650     Net +375 +1021.7          Urine Occurrence 8 x 6 x    Stool Occurrence 1 x 3 x                  Physical Exam:   Constitutional: She is oriented to person, place, and time. She appears well-developed and well-nourished. No distress.    HENT:   Head: Normocephalic and atraumatic.    Eyes: EOM are normal.     Cardiovascular:  Normal rate.             Pulmonary/Chest: Effort normal. No respiratory distress.        Abdominal: Soft. She exhibits no distension. There is no abdominal tenderness.             Musculoskeletal: Moves all extremeties.       Neurological: She is oriented to person, place, and time.    Skin: Skin is warm and dry.    Psychiatric: She has a normal mood and affect. Her behavior is normal. Thought content normal.          Lines/Drains/Airways       Peripheral Intravenous Line  Duration                  Peripheral IV - Single Lumen 02/17/24 1854 20 G;1 3/4 in Anterior;Right Upper Arm 5 days         Peripheral IV - Single Lumen 02/21/24 1230 20 G Anterior;Left Wrist 1 day                    Laboratory:  All pertinent labs from the last 24 hours have been reviewed.      Assessment/Plan:     * Hypokalemia  - Most recent K 3.7  - Continue to replace as needed    Electrolyte abnormality  - Replete PRN    Hypothyroidism due to medication  - Stable  - TSH low, T4 free normal on admit  - Continue home synthroid    GERD (gastroesophageal reflux disease)  - Continue home famotidine    Diarrhea  - Denies abdominal pain or cramping   - Tolerating PO  - S/p colonoscopy 10/2023 - biopsies consistent with mild active colitis   - Recent Giardia/Cryptosporidium/C diff all negative  - s/p IVF as patient is tolerating po  - Continue lomotil and loperamide TID daily  - Quantity of diarrhea improving  - GI consulted  - Transition to PO steroids  - EGD/colonoscopy done on 2/21,  biopsies pending   - Additional lab evaluation ordered per GI recommendations: CRP (normal), Hep B (normal), TB (neg), CMV (neg), Celiac panel (neg)  - GI pathogen panel: + Salmonella, - Campylobacter, Cryptosporidium, E coli O157, ETEC, STEC, Giardia, Norovirus, Rotavirus, Shigella, Adenovirus, Entamoeba histolytica, Yersenia, Vibrio  - ID consulted, appreciate recommendations      Endometrial cancer  - see once hx above  - s/p TRH/BSO/BSLN/RPLN/repair of obturator nerve in 6/2023  - Carboplatin/paclitaxel s/p 7 cycles  - Currently on immunotherapy: Dostarlimab (last cycle 7 weeks ago)  - Lovenox for DVT prophylaxis      VTE Risk Mitigation (From admission, onward)           Ordered     enoxaparin injection 40 mg  Every 24 hours         02/16/24 1454     IP VTE HIGH RISK PATIENT  Once         02/15/24 6231                    Glenda Nair MD  Gynecologic Oncology  [facility] - Oncology ([facility])

## 2024-02-23 NOTE — PLAN OF CARE
Plan of care reviewed with patient. Verbalized understanding. Patient is oriented x 4 and able to voice needs.  Performs all ADLs independently. Denies pain and remained afebrile throughout shift. One episode of loose stool reported by patient. Ambulates without assistance. Call light in reach as well as bedside table throughout this shift. Denies any pain or weakness. Electrolytes replaced today. Safety precautions remain in place.

## 2024-02-26 ENCOUNTER — PATIENT OUTREACH (OUTPATIENT)
Dept: ADMINISTRATIVE | Facility: CLINIC | Age: 59
End: 2024-02-26
Payer: COMMERCIAL

## 2024-02-26 ENCOUNTER — TELEPHONE (OUTPATIENT)
Dept: GYNECOLOGIC ONCOLOGY | Facility: CLINIC | Age: 59
End: 2024-02-26
Payer: COMMERCIAL

## 2024-02-27 ENCOUNTER — TELEPHONE (OUTPATIENT)
Dept: GYNECOLOGIC ONCOLOGY | Facility: CLINIC | Age: 59
End: 2024-02-27
Payer: COMMERCIAL

## 2024-02-27 ENCOUNTER — LAB VISIT (OUTPATIENT)
Dept: LAB | Facility: HOSPITAL | Age: 59
End: 2024-02-27
Attending: OBSTETRICS & GYNECOLOGY
Payer: COMMERCIAL

## 2024-02-27 ENCOUNTER — OFFICE VISIT (OUTPATIENT)
Dept: GYNECOLOGIC ONCOLOGY | Facility: CLINIC | Age: 59
End: 2024-02-27
Payer: COMMERCIAL

## 2024-02-27 DIAGNOSIS — E03.2 HYPOTHYROIDISM DUE TO MEDICATION: ICD-10-CM

## 2024-02-27 DIAGNOSIS — C77.2 SECONDARY MALIGNANCY OF RETROPERITONEAL LYMPH NODES: ICD-10-CM

## 2024-02-27 DIAGNOSIS — A02.9 SALMONELLA: ICD-10-CM

## 2024-02-27 DIAGNOSIS — C54.1 MALIGNANT NEOPLASM OF ENDOMETRIUM: Primary | ICD-10-CM

## 2024-02-27 DIAGNOSIS — G62.0 CHEMOTHERAPY-INDUCED NEUROPATHY: ICD-10-CM

## 2024-02-27 DIAGNOSIS — T45.1X5A CHEMOTHERAPY-INDUCED NEUROPATHY: ICD-10-CM

## 2024-02-27 DIAGNOSIS — Z51.11 ENCOUNTER FOR ANTINEOPLASTIC CHEMOTHERAPY: ICD-10-CM

## 2024-02-27 PROCEDURE — 99215 OFFICE O/P EST HI 40 MIN: CPT | Mod: 95,,, | Performed by: OBSTETRICS & GYNECOLOGY

## 2024-02-27 PROCEDURE — 1111F DSCHRG MED/CURRENT MED MERGE: CPT | Mod: CPTII,95,, | Performed by: OBSTETRICS & GYNECOLOGY

## 2024-02-27 PROCEDURE — 83993 ASSAY FOR CALPROTECTIN FECAL: CPT | Performed by: OBSTETRICS & GYNECOLOGY

## 2024-02-27 NOTE — PROGRESS NOTES
The patient location is: home  The chief complaint leading to consultation is: cycle #3 of maintenance immunotherapy    Visit type: audiovisual    Face to Face time with patient: 10  30 minutes of total time spent on the encounter, which includes face to face time and non-face to face time preparing to see the patient (eg, review of tests), Obtaining and/or reviewing separately obtained history, Documenting clinical information in the electronic or other health record, Independently interpreting results (not separately reported) and communicating results to the patient/family/caregiver, or Care coordination (not separately reported).         Each patient to whom he or she provides medical services by telemedicine is:  (1) informed of the relationship between the physician and patient and the respective role of any other health care provider with respect to management of the patient; and (2) notified that he or she may decline to receive medical services by telemedicine and may withdraw from such care at any time.    Notes:      REFERRING PROVIDER  Omaira Evans MD     REASON FOR CONSULT  Sugar Diaz  is a 58 y.o.  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    HISTORY OF PRESENT ILLNESS    Chemotherapy regimen: Dostarlimab 1000mg 6 weeks  Cycle: 4  Previous dose limiting toxicities: Y  Cycle 3: Grade 4 diarrhea 2/2 Salmonella  Previous dose reductions: N  Previous breaks: Y  Cycle 4: 4 weeks 2/2 Grade 4 diarrhea 2/2 Salmonella  Previous changes in pre-medications: N    Energy level: baseline  Appetite: baseline  Fevers/chills/nights: no  Nausea: no  Diarrhea: yes, grade 3  Emesis: no  Neuropathy: yes, grade 3  Discoloration of lower extremities: no  Mucositis: no  Maculopapular rashes: yes, grade 2  Dyspnea or coughing: yes      Oncology History   Endometrial cancer   5/23/2023 Surgery    Hysteroscopy, D&C: grade 1 endometrioid EC     6/5/2023 Surgery     TRH/BSO/BSLN/RPLN/repair of obturator nerve    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 4.5 cm, grade 1, 96% MI (22/23 mm), +LUE, -LVSI, +MELF, -cervix, 1/2+ RPLN, 1/1+ LPLN, -ascites.     6/19/2023 Imaging Significant Findings    CT C/A/P w/: High L para-aortic at the level of the renal vessels, 1.5 cm.  Low R para-aortic at the LEAH, 4.1 cm in greatest dimension.     6/25/2023 Genomic Testing    [facility] (NGS): ARD1A, CHEK1, CTCF, , HDAC2, HNF1A, JAK1, KRAS, MAX, MSH6, P1K3R1, PPM1D, PTEN, ZFHX3, ZSR2     6/28/2023 Tumor Conference    No rule for debulking of para-aortic lymph nodes.  Adjuvant VBT.  Represent after completion of adjuvant therapy to discuss EBRT.        7/7/2023 - 10/19/2023 Chemotherapy    Carboplatin AUC 5/Paclitaxel 135 mg/m2/Dostarlimab 200mg q3 weeks x6    Previous dose limiting toxicities: Y  Cycle 4: Grade 2 colitis s/p steroid taper  Cycle 5: Grade 3 colitis  Previous dose reductions: N  Previous breaks: Y  Cycle 4: Dostarlimab omitted due to grade 2 colitis  Cycle 5: Dostarlimab 2/2 grade 3 colitis (negative colonoscopy with improvement in symptoms with steroid taper)     8/9/2023 - 9/13/2023 Radiation Therapy    Treating physician: Dr. Adriane Babb  Total Dose: 21 Gy HDR  Fractions: 3 vaginal cuff brachytherapy     8/22/2023 Imaging Significant Findings    CT A/P w/: No evidence of colitis, NV in high L para-aortic and low R para-aortic.     10/4/2023 Procedure    Colonoscopy: WNL     11/6/2023 Imaging Significant Findings    CT C/A/P w/: PATTI     11/6/2023 - 11/9/2023 Hospital Admission    Most Recent Admission Details  Admit Date: 11/6/23  Admitted for: RVS PNA superimposed by community acquitted PNA  Discharge date: 11/9/23  Discharged from: [facility] [address] at [facility] LOCATION ([email or web address])  Discharge disposition: Home or Self Care       11/22/2023 -  Maintenance Therapy    Dostarlimab  1000mg x     2/21/2024 Procedure    EGD, Colonoscopy: No gross evidence of colitis          The following portions of the patient's history were reviewed and updated as appropriate: allergies, current medications, family history, medical history, social history and surgical history.    REVIEW OF SYSTEMS  All systems reviewed and negative except as noted in HPI.    OBJECTIVE   There were no vitals filed for this visit.     There is no height or weight on file to calculate BMI.      1. General: Well appearing, no apparent distress, alert and oriented.  2. Lymph: Neck symmetric without cervical or supraclavicular adenopathy or mass.  3. Lungs: Normal respiratory rate, no accessory muscle use.  4. Psych: Normal affect.  5. Abdomen:  non-distended                  ECOG status: 1    LABORATORY DATA  Lab data reviewed.    RADIOLOGICAL DATA  Radiology data reviewed.    ASSESSMENT / PLAN     1. 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 Encounter for antineoplastic chemotherapy    4. Salmonella    5. Chemotherapy-induced neuropathy    6. Hypothyroidism due to medication        Resolution of grade 4 diarrhea.  Continue with Ciprofloxacin 750 mg BID (3/9) and Prednisone 60 mg qD taper.  Given that the findings on her colonoscopy were non-specific, she tested positive for Salmonella, and the clinical picture is improving, we will restart Dostarlimab maintenance therapy on the week of 3/14.  She will need a CMP, TSH, T4, RONC the week prior.    PATIENT EDUCATION  Ready to learn, no apparent learning barriers were identified; learning preferences include listening. Explained diagnosis and treatment plan; patient expressed understanding of the content.    ADMINISTRATIVE BILLING  Greater than 50% of was spent in counseling.     ONGOING COMPLEXITY BILLING  Visit today is associated with current or anticipated ongoing medical care related to this patients single serious  condition/complex condition: endometrial cancer

## 2024-02-27 NOTE — TELEPHONE ENCOUNTER
----- Message from Lavell Rodríguez MD sent at 2/27/2024  8:55 AM CST -----  Fabi and Ahmet, I know you guys asked about this yesterday.  She picked up the stool test but she does not know where to return it.  Can we please follow-up with her about it? Thanks.    Nika, I routed her chart.  Let me know if you have questions about her next treatment.  Thanks.

## 2024-02-28 ENCOUNTER — CLINICAL SUPPORT (OUTPATIENT)
Dept: REHABILITATION | Facility: HOSPITAL | Age: 59
End: 2024-02-28
Payer: COMMERCIAL

## 2024-02-28 DIAGNOSIS — C54.1 MALIGNANT NEOPLASM OF ENDOMETRIUM: Primary | ICD-10-CM

## 2024-02-28 DIAGNOSIS — Z51.11 ENCOUNTER FOR ANTINEOPLASTIC CHEMOTHERAPY: ICD-10-CM

## 2024-02-28 DIAGNOSIS — C77.2 SECONDARY MALIGNANCY OF RETROPERITONEAL LYMPH NODES: ICD-10-CM

## 2024-02-28 PROCEDURE — 97814 ACUP 1/> W/ESTIM EA ADDL 15: CPT | Performed by: ACUPUNCTURIST

## 2024-02-28 PROCEDURE — 97813 ACUP 1/> W/ESTIM 1ST 15 MIN: CPT | Performed by: ACUPUNCTURIST

## 2024-02-28 NOTE — PROGRESS NOTES
Acupuncture Evaluation Note     Name: Sugar Ball [facility] Number: [id number]    Traditional Chinese Medicine (TCM) Diagnosis: Qi Stagnation, Blood Stasis, and Qi Deficiency  Medical Diagnosis:   Encounter Diagnoses   Name Primary?    Malignant neoplasm of endometrium Yes    Secondary malignancy of retroperitoneal lymph nodes     Encounter for antineoplastic chemotherapy         Evaluation Date: 2/28/2024    Visit #/Visits authorized: 5    Precautions: Standard and cancer    Subjective     Chief Concern: CIPN, bilateral feet and hands - mostly right hand and feet.      Medical necessity is demonstrated by the following IMPAIRMENTS: Medical Necessity: Decreased mobility limits day to day activities, social, and emergent situations and Decreased quality of life              Aggravating Factors:  movement, lying down, standing, prolonged sitting, and pressure   Relieving Factors:  nothing    Symptom Description:     Quality:  Aching, Burning, Tight, Tingling, and Numb  Severity:  10  Frequency:  continuously      Objective       New Findings:  patient's diarrhea due to salmonella and is being treated with antibiotics, symptoms are improving     Treatment     Treatment Principles:  Move qi and blood, nourish qi, stop pain     Acupuncture points used:  4 DELMI, BA YUDI, BA KESHA , Gb34, Ki3, Sp6, Sp9, and St36    Bilateral points: KD1  Unilateral points:   Auricular Treatment:      Needles In: 27  Needles Out: 27  [place] W/ STIM placed: 3:50  Needles W/ STIM removed: 4:20      Assessment     After treatment, patient felt improvement in symptoms with acupuncture      Patient prognosis is Good.     Patient will continue to benefit from acupuncture treatment to address the deficits listed in the problem list box on initial evaluation, provide patient family education and to maximize pt's level of independence in the home and community environment.     Patient's spiritual, cultural and educational needs considered and pt  agreeable to plan of care and goals.     Anticipated barriers to treatment: none    Plan     Recommend 1 /week for 1 sessions then re-assess.      Education:  Patient is aware of cumulative benefit of acupuncture

## 2024-02-29 ENCOUNTER — OFFICE VISIT (OUTPATIENT)
Dept: INFECTIOUS DISEASES | Facility: CLINIC | Age: 59
End: 2024-02-29
Payer: COMMERCIAL

## 2024-02-29 VITALS
DIASTOLIC BLOOD PRESSURE: 69 MMHG | SYSTOLIC BLOOD PRESSURE: 110 MMHG | HEART RATE: 101 BPM | WEIGHT: 159.81 LBS | TEMPERATURE: 98 F | HEIGHT: 67 IN | BODY MASS INDEX: 25.08 KG/M2

## 2024-02-29 DIAGNOSIS — A02.9 SALMONELLA: Primary | ICD-10-CM

## 2024-02-29 PROCEDURE — 1159F MED LIST DOCD IN RCRD: CPT | Mod: CPTII,S$GLB,, | Performed by: INTERNAL MEDICINE

## 2024-02-29 PROCEDURE — 1111F DSCHRG MED/CURRENT MED MERGE: CPT | Mod: CPTII,S$GLB,, | Performed by: INTERNAL MEDICINE

## 2024-02-29 PROCEDURE — 3008F BODY MASS INDEX DOCD: CPT | Mod: CPTII,S$GLB,, | Performed by: INTERNAL MEDICINE

## 2024-02-29 PROCEDURE — 3074F SYST BP LT 130 MM HG: CPT | Mod: CPTII,S$GLB,, | Performed by: INTERNAL MEDICINE

## 2024-02-29 PROCEDURE — 99202 OFFICE O/P NEW SF 15 MIN: CPT | Mod: S$GLB,,, | Performed by: INTERNAL MEDICINE

## 2024-02-29 PROCEDURE — 3078F DIAST BP <80 MM HG: CPT | Mod: CPTII,S$GLB,, | Performed by: INTERNAL MEDICINE

## 2024-02-29 PROCEDURE — 99999 PR PBB SHADOW E&M-EST. PATIENT-LVL IV: CPT | Mod: PBBFAC,,, | Performed by: INTERNAL MEDICINE

## 2024-03-01 ENCOUNTER — TELEPHONE (OUTPATIENT)
Dept: GYNECOLOGIC ONCOLOGY | Facility: CLINIC | Age: 59
End: 2024-03-01
Payer: COMMERCIAL

## 2024-03-01 LAB — CALPROTECTIN STL-MCNT: 163 MCG/G

## 2024-03-04 DIAGNOSIS — Z51.11 ENCOUNTER FOR ANTINEOPLASTIC CHEMOTHERAPY: ICD-10-CM

## 2024-03-04 DIAGNOSIS — C54.1 MALIGNANT NEOPLASM OF ENDOMETRIUM: ICD-10-CM

## 2024-03-04 DIAGNOSIS — T45.1X5A CHEMOTHERAPY-INDUCED NEUROPATHY: ICD-10-CM

## 2024-03-04 DIAGNOSIS — C77.2 SECONDARY MALIGNANCY OF RETROPERITONEAL LYMPH NODES: ICD-10-CM

## 2024-03-04 DIAGNOSIS — G62.0 CHEMOTHERAPY-INDUCED NEUROPATHY: ICD-10-CM

## 2024-03-04 RX ORDER — DULOXETIN HYDROCHLORIDE 30 MG/1
CAPSULE, DELAYED RELEASE ORAL
Qty: 84 CAPSULE | Refills: 2 | OUTPATIENT
Start: 2024-03-04

## 2024-03-04 NOTE — PLAN OF CARE
[facility] EMERGENCY DEPARTMENT - VISIT NOTE    Patient Name: Shila Camara  MRN: [id number]  YOB: 1968  Date of Evaluation: 3/4/2024  Treating Resident Physician: Sundar Stout MD  Supervising Physician: Alejandra Brown MD     CHIEF COMPLAINT       Chief Complaint   Patient presents with    Jaw Pain    Dizziness       HISTORY OF PRESENT ILLNESS    HPI    History obtained from chart review and the patient.    Shila is a 55 y.o. old female who presents to the emergency department by Wheelchair for multiple complaints including chest pain, left-sided pain, left jaw pain, nausea, dizziness.  Patient reports the symptoms started about 5 days ago and progressively getting worse.  She reports she has numbness and tingling in her left arm and hand.  She reports that on the way to work today her symptoms got a lot worse and she thought she was going to vomit in her vehicle.  She is also having chest pain and chest pressure and some shortness of breath.  Reports he had a similar episode years ago when she was working [place].  She is also quite concerned about her cholesterol and has an appointment later this week to discuss this with her primary care physician    Chart reviewed, non-contributory      REVIEW OF SYSTEMS   Review of Systems  Negative unless documented in HPI    PAST MEDICAL AND SURGICAL HISTORY   Shila  has a past medical history of Asthma, Benign essential tremor, Dystonia, and Dystonia.    Shila  has a past surgical history that includes Hysterectomy, vaginal; Appendectomy (2022); Gallbladder surgery; turbinate resection; and rhinoplasty.    CURRENT MEDICATIONS   Shila has a current medication list which includes the following long-term medication(s): venlafaxine, albuterol sulfate hfa, estradiol, fluticasone-salmeterol, montelukast, primidone, propranolol, black cohosh, vitamin d, vitamin e, b complex-c, calcium carbonate-vitamin d, coenzyme q10-vitamin e, niacin, and red  [facility] ([address])      HOME HEALTH ORDERS  FACE TO FACE ENCOUNTER    Patient Name: Sugar Diaz  YOB: 1965    PCP: James Hillman MD   PCP Address: [address] [place] [address]  PCP Phone Number: [phone]  PCP Fax: [phone]    Encounter Date: 6/2/23    Admit to Home Health    Diagnoses:  Active Hospital Problems    Diagnosis  POA    *S/p RA-TLH/BSO/SLND [Z90.710]  No    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       Follow Up Appointments:  No future appointments.    Allergies:Review of patient's allergies indicates:  No Known Allergies    Medications: Review discharge medications with patient and family and provide education.    Current Facility-Administered Medications   Medication Dose Route Frequency Provider Last Rate Last Admin    acetaminophen tablet 1,000 mg  1,000 mg Oral Q6H Sera Hernandez MD   1,000 mg at 06/06/23 0535    celecoxib capsule 100 mg  100 mg Oral Daily Lavell Rodríguez MD        heparin (porcine) injection 5,000 Units  5,000 Units Subcutaneous Q8H Sera Hernandez MD   5,000 Units at 06/06/23 0536    HYDROmorphone (PF) injection 0.4 mg  0.4 mg Intravenous Q2H PRN Sera Hernandez MD        ketorolac injection 15 mg  15 mg Intravenous Q6H Sera Hernandez MD   15 mg at 06/06/23 0535    Followed by    ibuprofen tablet 200 mg  200 mg Oral Q6H Sera Hernandez MD        lactated ringers infusion   Intravenous Continuous Sera Hernandez MD 40 mL/hr at 06/05/23 1205 New Bag at 06/05/23 1205    magnesium hydroxide 400 mg/5 ml suspension 2,400 mg  30 mL Oral BID Sera Hernandez MD   2,400 mg at 06/05/23 2100    mupirocin 2 % ointment   Nasal BID Sera Hernandez MD   Given at 06/05/23 1350    naloxone 0.4 mg/mL injection 0.02 mg  0.02 mg Intravenous PRN Sera Hernandez MD        ondansetron injection 4 mg  4 mg Intravenous Q6H PRN  Sera Hernandez MD        oxyCODONE immediate release tablet 10 mg  10 mg Oral Q4H PRN Sera Hernandez MD        oxyCODONE immediate release tablet 5 mg  5 mg Oral Q4H PRN Sera Hernandez MD   5 mg at 06/05/23 2101    prochlorperazine injection Soln 5 mg  5 mg Intravenous Q6H PRN Sera Hernandez MD        senna tablet 8.6 mg  8.6 mg Oral BID Sera Hernandez MD   8.6 mg at 06/05/23 2101    zolpidem tablet 5 mg  5 mg Oral Nightly PRN Sera Hernandez MD   5 mg at 06/05/23 2101     Current Discharge Medication List        START taking these medications    Details   acetaminophen (TYLENOL) 650 MG TbSR Take 1 tablet (650 mg total) by mouth every 6 (six) hours as needed (pain).  Qty: 60 tablet, Refills: 0      !! ibuprofen (ADVIL,MOTRIN) 600 MG tablet Take 1 tablet (600 mg total) by mouth every 6 (six) hours as needed for Pain.  Qty: 60 tablet, Refills: 1      oxyCODONE (ROXICODONE) 5 MG immediate release tablet Take 1 tablet (5 mg total) by mouth every 4 (four) hours as needed for Pain.  Qty: 10 tablet, Refills: 0    Comments: Quantity prescribed more than 7 day supply? No      SENNA 8.6 mg tablet Take 1 tablet by mouth daily as needed for Constipation.  Qty: 60 tablet, Refills: 0       !! - Potential duplicate medications found. Please discuss with provider.        CONTINUE these medications which have NOT CHANGED    Details   acetaminophen (TYLENOL EXTRA STRENGTH) 500 MG tablet Take 2 tablets (1,000 mg total) by mouth every 6 (six) hours as needed for Pain.  Qty: 100 tablet, Refills: 2      azelastine (ASTELIN) 137 mcg (0.1 %) nasal spray 1 spray (137 mcg total) by Nasal route 2 (two) times daily.  Qty: 30 mL, Refills: 0    Associated Diagnoses: Acute non-recurrent maxillary sinusitis      diphenhydrAMINE (BENADRYL) 25 mg capsule Take 25 mg by mouth every 6 (six) hours as needed for Itching.      !! ibuprofen (ADVIL,MOTRIN) 800 MG tablet Take 1 tablet (800 mg total) by mouth every 8 (eight) hours  as needed for Pain.  Qty: 60 tablet, Refills: 2      loratadine (CLARITIN) 10 mg tablet Take 1 tablet (10 mg total) by mouth once daily.  Qty: 30 tablet, Refills: 11    Associated Diagnoses: Acute non-recurrent maxillary sinusitis      valACYclovir (VALTREX) 1000 MG tablet Two pills at onset of fever blister and then repeat 2 pills 12 hr later  Qty: 30 tablet, Refills: 11       !! - Potential duplicate medications found. Please discuss with provider.            I have seen and examined this patient within the last 30 days. My clinical findings that support the need for the home health skilled services and home bound status are the following:no   Weakness/numbness causing balance and gait disturbance due to Surgery making it taxing to leave home.     Diet:   regular diet    Referrals/ Consults  Physical Therapy to evaluate and treat. Evaluate for home safety and equipment needs; Establish/upgrade home exercise program. Perform / instruct on therapeutic exercises, gait training, transfer training, and Range of Motion.  Occupational Therapy to evaluate and treat. Evaluate home environment for safety and equipment needs. Perform/Instruct on transfers, ADL training, ROM, and therapeutic exercises.    Activities:   activity as tolerated    Nursing:   Agency to admit patient within 24 hours of hospital discharge unless specified on physician order or at patient request    SN to complete comprehensive assessment including routine vital signs. Instruct on disease process and s/s of complications to report to MD. Review/verify medication list sent home with the patient at time of discharge  and instruct patient/caregiver as needed. Frequency may be adjusted depending on start of care date.     Skilled nurse to perform up to 3 visits PRN for symptoms related to diagnosis    Notify MD if SBP > 160 or < 90; DBP > 90 or < 50; HR > 120 or < 50; Temp > 101; O2 < 88%; Other:       Ok to schedule additional visits based on staff  availability and patient request on consecutive days within the home health episode.    When multiple disciplines ordered:    Start of Care occurs on Sunday - Wednesday schedule remaining discipline evaluations as ordered on separate consecutive days following the start of care.    Thursday SOC -schedule subsequent evaluations Friday and Monday the following week.     Friday - Saturday SOC - schedule subsequent discipline evaluations on consecutive days starting Monday of the following week.    For all post-discharge communication and subsequent orders please contact patient's primary care physician. If unable to reach primary care physician or do not receive response within 30 minutes, please contact Alyssa Coulter for clinical staff order clarification      Home Health Aide:  Physical Therapy Three times weekly and Occupational Therapy Three times weekly    Wound Care Orders  no    I certify that this patient is confined to her home and needs physical therapy and occupational therapy.    Alyssa Coulter MD PGY-3  Obstetrics and Gynecology

## 2024-03-05 DIAGNOSIS — T45.1X5A CHEMOTHERAPY-INDUCED NEUROPATHY: Primary | ICD-10-CM

## 2024-03-05 DIAGNOSIS — G62.0 CHEMOTHERAPY-INDUCED NEUROPATHY: Primary | ICD-10-CM

## 2024-03-05 RX ORDER — DULOXETIN HYDROCHLORIDE 30 MG/1
CAPSULE, DELAYED RELEASE ORAL
Qty: 84 CAPSULE | Refills: 2 | Status: SHIPPED | OUTPATIENT
Start: 2024-03-05 | End: 2024-03-05

## 2024-03-05 RX ORDER — DULOXETIN HYDROCHLORIDE 60 MG/1
60 CAPSULE, DELAYED RELEASE ORAL DAILY
Qty: 30 CAPSULE | Refills: 2 | Status: SHIPPED | OUTPATIENT
Start: 2024-03-05 | End: 2024-06-05 | Stop reason: SDUPTHER

## 2024-03-05 NOTE — TELEPHONE ENCOUNTER
Refill Decision Note   Sugar Diaz  is requesting a refill authorization.  Brief Assessment and Rationale for Refill:  Quick Discontinue     Medication Therapy Plan: Pended in another encounter      Comments:     Note composed:8:29 PM 03/04/2024

## 2024-03-05 NOTE — TELEPHONE ENCOUNTER
Refill Routing Note   Medication(s) are not appropriate for processing by [facility] for the following reason(s):        Clarification of medication (Rx) details  ED/Hospital Visit since last OV with provider    [facility] action(s):  Defer        Medication Therapy Plan: Unclear if patient is taking once or twice daily; Defer      Appointments  past 12m or future 3m with PCP    Date Provider   Last Visit   2/27/2024 Lavell Rodríguez MD   Next Visit   3/4/2024 Lavell Rodríguez MD   ED visits in past 90 days: 0        Note composed:8:28 PM 03/04/2024

## 2024-03-06 ENCOUNTER — CLINICAL SUPPORT (OUTPATIENT)
Dept: REHABILITATION | Facility: HOSPITAL | Age: 59
End: 2024-03-06
Payer: COMMERCIAL

## 2024-03-06 DIAGNOSIS — C77.2 SECONDARY MALIGNANCY OF RETROPERITONEAL LYMPH NODES: ICD-10-CM

## 2024-03-06 DIAGNOSIS — C54.1 MALIGNANT NEOPLASM OF ENDOMETRIUM: Primary | ICD-10-CM

## 2024-03-06 DIAGNOSIS — Z51.11 ENCOUNTER FOR ANTINEOPLASTIC CHEMOTHERAPY: ICD-10-CM

## 2024-03-06 PROCEDURE — 97814 ACUP 1/> W/ESTIM EA ADDL 15: CPT | Performed by: ACUPUNCTURIST

## 2024-03-06 PROCEDURE — 97813 ACUP 1/> W/ESTIM 1ST 15 MIN: CPT | Performed by: ACUPUNCTURIST

## 2024-03-06 NOTE — PROGRESS NOTES
Acupuncture Evaluation Note     Name: Sugar Ball [facility] Number: [id number]    Traditional Chinese Medicine (TCM) Diagnosis: Qi Stagnation, Blood Stasis, and Qi Deficiency  Medical Diagnosis:   Encounter Diagnoses   Name Primary?    Malignant neoplasm of endometrium Yes    Secondary malignancy of retroperitoneal lymph nodes     Encounter for antineoplastic chemotherapy         Evaluation Date: 3/6/2024    Visit #/Visits authorized: 6    Precautions: Standard and cancer    Subjective     Chief Concern: CIPN, bilateral feet and hands - mostly right hand and feet.      Medical necessity is demonstrated by the following IMPAIRMENTS: Medical Necessity: Decreased mobility limits day to day activities, social, and emergent situations and Decreased quality of life              Aggravating Factors:  movement, lying down, standing, prolonged sitting, and pressure   Relieving Factors:  nothing    Symptom Description:     Quality:  Aching, Burning, Tight, Tingling, and Numb  Severity:  10  Frequency:  continuously      Objective       New Findings:      Treatment     Treatment Principles:  Move qi and blood, nourish qi, stop pain     Acupuncture points used:  4 AWAD, BA YUDI, BA KESHA , Gb34, Ki3, Sp6, Sp9, and St36    Bilateral points: KD1  Unilateral points:   Auricular Treatment:      Needles In: 27  Needles Out: 27  [place] W/ STIM placed: 4:20  Needles W/ STIM removed: 4:50      Assessment     After treatment, patient felt improvement in symptoms with acupuncture      Patient prognosis is Good.     Patient will continue to benefit from acupuncture treatment to address the deficits listed in the problem list box on initial evaluation, provide patient family education and to maximize pt's level of independence in the home and community environment.     Patient's spiritual, cultural and educational needs considered and pt agreeable to plan of care and goals.     Anticipated barriers to treatment: none    Plan      Recommend 1 /week for 6 sessions then re-assess.      Education:  Patient is aware of cumulative benefit of acupuncture

## 2024-03-07 ENCOUNTER — TELEPHONE (OUTPATIENT)
Dept: GASTROENTEROLOGY | Facility: CLINIC | Age: 59
End: 2024-03-07
Payer: COMMERCIAL

## 2024-03-07 NOTE — TELEPHONE ENCOUNTER
----- Message from Jason Montero MD sent at 3/5/2024  4:22 PM CST -----  This is a patient who I saw in clinic with ICI colitis and salmonella.   Let her know that biopsies from her colonoscopy did show some mild inflammation and the recent repeat stool calprotectin shows some inflammation.   Please call and assess her symptoms and I need to get her into a hospital f/u appt slot please. Work with Nisa on this- also copying Nisa on this note    SS

## 2024-03-07 NOTE — TELEPHONE ENCOUNTER
"- Current ICI colitis/ salmonella meds: prednisone 50 mg/ QD (tapered from 60 mg to 50 mg/ QD on 3/1/24), cipro 750 mg/ BID (due to complete 3/8/24)  - Reviewed stool calprotectin higher & biopsy results show inflammation   - Overall feels "like a whole new person"  - Reports 1 soft BM/ QD w/ no additional symptoms  - Dr. Montero updated  - Plan to get pt in to clinic in next 2-3 weeks w/ continued weekly prednisone updates.   "

## 2024-03-12 ENCOUNTER — LAB VISIT (OUTPATIENT)
Dept: LAB | Facility: HOSPITAL | Age: 59
End: 2024-03-12
Attending: OBSTETRICS & GYNECOLOGY
Payer: COMMERCIAL

## 2024-03-12 ENCOUNTER — OFFICE VISIT (OUTPATIENT)
Dept: GYNECOLOGIC ONCOLOGY | Facility: CLINIC | Age: 59
End: 2024-03-12
Payer: COMMERCIAL

## 2024-03-12 VITALS
BODY MASS INDEX: 24.92 KG/M2 | WEIGHT: 158.75 LBS | SYSTOLIC BLOOD PRESSURE: 114 MMHG | DIASTOLIC BLOOD PRESSURE: 54 MMHG | HEART RATE: 97 BPM | HEIGHT: 67 IN

## 2024-03-12 DIAGNOSIS — E03.2 HYPOTHYROIDISM DUE TO MEDICATION: ICD-10-CM

## 2024-03-12 DIAGNOSIS — C54.1 MALIGNANT NEOPLASM OF ENDOMETRIUM: ICD-10-CM

## 2024-03-12 DIAGNOSIS — Z51.11 ENCOUNTER FOR ANTINEOPLASTIC CHEMOTHERAPY: ICD-10-CM

## 2024-03-12 DIAGNOSIS — C54.1 MALIGNANT NEOPLASM OF ENDOMETRIUM: Primary | ICD-10-CM

## 2024-03-12 DIAGNOSIS — C77.2 SECONDARY MALIGNANCY OF RETROPERITONEAL LYMPH NODES: ICD-10-CM

## 2024-03-12 DIAGNOSIS — E03.8 SUBCLINICAL HYPOTHYROIDISM: ICD-10-CM

## 2024-03-12 DIAGNOSIS — A02.9 SALMONELLA: ICD-10-CM

## 2024-03-12 LAB
ALBUMIN SERPL BCP-MCNC: 3.6 G/DL (ref 3.5–5.2)
ALP SERPL-CCNC: 63 U/L (ref 55–135)
ALT SERPL W/O P-5'-P-CCNC: 24 U/L (ref 10–44)
ANION GAP SERPL CALC-SCNC: 10 MMOL/L (ref 8–16)
AST SERPL-CCNC: 18 U/L (ref 10–40)
BILIRUB SERPL-MCNC: 0.3 MG/DL (ref 0.1–1)
BUN SERPL-MCNC: 14 MG/DL (ref 6–20)
CALCIUM SERPL-MCNC: 9.6 MG/DL (ref 8.7–10.5)
CHLORIDE SERPL-SCNC: 102 MMOL/L (ref 95–110)
CO2 SERPL-SCNC: 26 MMOL/L (ref 23–29)
CREAT SERPL-MCNC: 0.8 MG/DL (ref 0.5–1.4)
ERYTHROCYTE [DISTWIDTH] IN BLOOD BY AUTOMATED COUNT: 13.9 % (ref 11.5–14.5)
EST. GFR  (NO RACE VARIABLE): >60 ML/MIN/1.73 M^2
GLUCOSE SERPL-MCNC: 110 MG/DL (ref 70–110)
HCT VFR BLD AUTO: 44.5 % (ref 37–48.5)
HGB BLD-MCNC: 14.7 G/DL (ref 12–16)
IMM GRANULOCYTES # BLD AUTO: 0.08 K/UL (ref 0–0.04)
MCH RBC QN AUTO: 30.9 PG (ref 27–31)
MCHC RBC AUTO-ENTMCNC: 33 G/DL (ref 32–36)
MCV RBC AUTO: 94 FL (ref 82–98)
NEUTROPHILS # BLD AUTO: 10 K/UL (ref 1.8–7.7)
PLATELET # BLD AUTO: 344 K/UL (ref 150–450)
PMV BLD AUTO: 8.4 FL (ref 9.2–12.9)
POTASSIUM SERPL-SCNC: 4.6 MMOL/L (ref 3.5–5.1)
PROT SERPL-MCNC: 6.7 G/DL (ref 6–8.4)
RBC # BLD AUTO: 4.76 M/UL (ref 4–5.4)
SODIUM SERPL-SCNC: 138 MMOL/L (ref 136–145)
T4 FREE SERPL-MCNC: 1.01 NG/DL (ref 0.71–1.51)
TSH SERPL DL<=0.005 MIU/L-ACNC: 5.6 UIU/ML (ref 0.4–4)
WBC # BLD AUTO: 12.13 K/UL (ref 3.9–12.7)

## 2024-03-12 PROCEDURE — 85027 COMPLETE CBC AUTOMATED: CPT | Performed by: OBSTETRICS & GYNECOLOGY

## 2024-03-12 PROCEDURE — 99999 PR PBB SHADOW E&M-EST. PATIENT-LVL III: CPT | Mod: PBBFAC,,, | Performed by: OBSTETRICS & GYNECOLOGY

## 2024-03-12 PROCEDURE — 84443 ASSAY THYROID STIM HORMONE: CPT | Performed by: OBSTETRICS & GYNECOLOGY

## 2024-03-12 PROCEDURE — 36415 COLL VENOUS BLD VENIPUNCTURE: CPT | Performed by: OBSTETRICS & GYNECOLOGY

## 2024-03-12 PROCEDURE — 84439 ASSAY OF FREE THYROXINE: CPT | Performed by: OBSTETRICS & GYNECOLOGY

## 2024-03-12 PROCEDURE — 1159F MED LIST DOCD IN RCRD: CPT | Mod: CPTII,S$GLB,, | Performed by: OBSTETRICS & GYNECOLOGY

## 2024-03-12 PROCEDURE — 1111F DSCHRG MED/CURRENT MED MERGE: CPT | Mod: CPTII,S$GLB,, | Performed by: OBSTETRICS & GYNECOLOGY

## 2024-03-12 PROCEDURE — 80053 COMPREHEN METABOLIC PANEL: CPT | Performed by: OBSTETRICS & GYNECOLOGY

## 2024-03-12 PROCEDURE — 3008F BODY MASS INDEX DOCD: CPT | Mod: CPTII,S$GLB,, | Performed by: OBSTETRICS & GYNECOLOGY

## 2024-03-12 PROCEDURE — 3074F SYST BP LT 130 MM HG: CPT | Mod: CPTII,S$GLB,, | Performed by: OBSTETRICS & GYNECOLOGY

## 2024-03-12 PROCEDURE — 3078F DIAST BP <80 MM HG: CPT | Mod: CPTII,S$GLB,, | Performed by: OBSTETRICS & GYNECOLOGY

## 2024-03-12 PROCEDURE — G2211 COMPLEX E/M VISIT ADD ON: HCPCS | Mod: S$GLB,,, | Performed by: OBSTETRICS & GYNECOLOGY

## 2024-03-12 PROCEDURE — 99215 OFFICE O/P EST HI 40 MIN: CPT | Mod: S$GLB,,, | Performed by: OBSTETRICS & GYNECOLOGY

## 2024-03-12 RX ORDER — EPINEPHRINE 0.3 MG/.3ML
0.3 INJECTION SUBCUTANEOUS ONCE AS NEEDED
Status: CANCELLED | OUTPATIENT
Start: 2024-03-15

## 2024-03-12 RX ORDER — HEPARIN 100 UNIT/ML
500 SYRINGE INTRAVENOUS
Status: CANCELLED | OUTPATIENT
Start: 2024-03-15

## 2024-03-12 RX ORDER — DIPHENHYDRAMINE HYDROCHLORIDE 50 MG/ML
50 INJECTION INTRAMUSCULAR; INTRAVENOUS ONCE AS NEEDED
Status: CANCELLED | OUTPATIENT
Start: 2024-03-15

## 2024-03-12 RX ORDER — SODIUM CHLORIDE 0.9 % (FLUSH) 0.9 %
10 SYRINGE (ML) INJECTION
Status: CANCELLED | OUTPATIENT
Start: 2024-03-15

## 2024-03-12 RX ORDER — PROCHLORPERAZINE EDISYLATE 5 MG/ML
5-10 INJECTION INTRAMUSCULAR; INTRAVENOUS ONCE AS NEEDED
Status: CANCELLED
Start: 2024-03-15

## 2024-03-12 NOTE — PROGRESS NOTES
REFERRING PROVIDER  Omaira Evans MD     REASON FOR CONSULT  Sugar Diaz  is a 58 y.o.  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    HISTORY OF PRESENT ILLNESS    Chemotherapy regimen: Dostarlimab 1000mg 6 weeks  Cycle: 4  Previous dose limiting toxicities: Y  Cycle 3: Grade 4 diarrhea 2/2 Salmonella  Previous dose reductions: N  Previous breaks: Y  Cycle 4: 4 weeks 2/2 Grade 4 diarrhea 2/2 Salmonella  Previous changes in pre-medications: N    Energy level: baseline  Appetite: baseline  Fevers/chills/nights: no  Nausea: no  Diarrhea: yes, grade 3  Emesis: no  Neuropathy: yes, grade 3  Discoloration of lower extremities: no  Mucositis: no  Maculopapular rashes: yes, grade 2  Dyspnea or coughing: yes      Oncology History   Endometrial cancer   5/23/2023 Surgery    Hysteroscopy, D&C: grade 1 endometrioid EC     6/5/2023 Surgery    TRH/BSO/BSLN/RPLN/repair of obturator nerve    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 4.5 cm, grade 1, 96% MI (22/23 mm), +LUE, -LVSI, +MELF, -cervix, 1/2+ RPLN, 1/1+ LPLN, -ascites.     6/19/2023 Imaging Significant Findings    CT C/A/P w/: High L para-aortic at the level of the renal vessels, 1.5 cm.  Low R para-aortic at the LEAH, 4.1 cm in greatest dimension.     6/25/2023 Genomic Testing    [facility] (NGS): ARD1A, CHEK1, CTCF, , HDAC2, HNF1A, JAK1, KRAS, MAX, MSH6, P1K3R1, PPM1D, PTEN, ZFHX3, ZSR2     6/28/2023 Tumor Conference    No rule for debulking of para-aortic lymph nodes.  Adjuvant VBT.  Represent after completion of adjuvant therapy to discuss EBRT.        7/7/2023 - 10/19/2023 Chemotherapy    Carboplatin AUC 5/Paclitaxel 135 mg/m2/Dostarlimab 200mg q3 weeks x6    Previous dose limiting toxicities: Y  Cycle 4: Grade 2 colitis s/p steroid taper  Cycle 5: Grade 3 colitis  Previous dose reductions: N  Previous breaks: Y  Cycle 4:  Dostarlimab omitted due to grade 2 colitis  Cycle 5: Dostarlimab 2/2 grade 3 colitis (negative colonoscopy with improvement in symptoms with steroid taper)     8/9/2023 - 9/13/2023 Radiation Therapy    Treating physician: Dr. Adriane Babb  Total Dose: 21 Gy HDR  Fractions: 3 vaginal cuff brachytherapy     8/22/2023 Imaging Significant Findings    CT A/P w/: No evidence of colitis, SD in high L para-aortic and low R para-aortic.     10/4/2023 Procedure    Colonoscopy: WNL     11/6/2023 Imaging Significant Findings    CT C/A/P w/: PATTI     11/6/2023 - 11/9/2023 Hospital Admission    Most Recent Admission Details  Admit Date: 11/6/23  Admitted for: RVS PNA superimposed by community acquitted PNA  Discharge date: 11/9/23  Discharged from: [facility] SURGICAL [address] at [facility]  Discharge disposition: Home or Self Care       11/22/2023 -  Maintenance Therapy    Dostarlimab 1000mg x     2/21/2024 Procedure    EGD, Colonoscopy: No gross evidence of colitis      Hospital Admission    Most Recent Admission Details  Admit Date: 2/15/24  Admitted for: Grade 4 colitis 2/2 Salmonella.    Discharge date: 2/23/24  Discharged from: [facility] ONCOLOGY AC[facility] at [facility]  Discharge disposition: Home or Self Care       2/21/2024 Procedure    Colonoscopy, EGD: No obvious ICI colitis          The following portions of the patient's history were reviewed and updated as appropriate: allergies, current medications, family history, medical history, social history and surgical history.    REVIEW OF SYSTEMS  All systems reviewed and negative except as noted in HPI.    OBJECTIVE   Vitals:    03/12/24 1442   BP: (!) 114/54   Pulse: 97        Body mass index is 24.86 kg/m².      1. General: Well appearing, no apparent distress, alert and oriented.  2. Lymph: Neck symmetric without cervical or supraclavicular adenopathy or mass.  3. Lungs: Normal respiratory rate, no accessory muscle use.  4. Psych: Normal  affect.  5. Abdomen:  non-distended                  ECOG status: 1    LABORATORY DATA  Lab data reviewed.    RADIOLOGICAL DATA  Radiology data reviewed.    ASSESSMENT / PLAN     1. 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 Encounter for antineoplastic chemotherapy    4. Salmonella    5. Hypothyroidism due to medication    6. Subclinical hypothyroidism          Resolution of grade 4 diarrhea.  Continue with Ciprofloxacin 750 mg BID (3/9) and Prednisone 60 mg qD taper.  Administer maintenance cycle #4.  CMP, TSH, RONC, cycle #4 6 weeks.    PATIENT EDUCATION  Ready to learn, no apparent learning barriers were identified; learning preferences include listening. Explained diagnosis and treatment plan; patient expressed understanding of the content.    ADMINISTRATIVE BILLING  Greater than 50% of was spent in counseling.     ONGOING COMPLEXITY BILLING  Visit today is associated with current or anticipated ongoing medical care related to this patients single serious condition/complex condition: endometrial cancer

## 2024-03-12 NOTE — PROGRESS NOTES
[facility]             Inflammatory Bowel Disease   Follow-up  Note              TODAY'S VISIT DATE:  3/19/2024    Chief Complaint:   Chief Complaint   Patient presents with    Follow-up     Hospital follow up     PCP: Dexter Khan    Previous History:  Sugar Diaz is a 58 y.o. female with endometrial cancer (diagnosed in 5/2023; status post TLH/BSO in 6/2023 and initiation of chemotherapy/radiation in 7/2023 including the use of Dostarlimab). She developed grade 2 and 3 colitis following fourth and fifth cycle of chemotherapy, respectively. In 10/2023, she underwent outpatient colonoscopy showing diverticulosis of sigmoid colon and normal mucosal lining with random colon biopsies showed mild active colitis with prominent crypt epithelial cell apoptosis and irregular subepithelial collagen. She was treated with Prednisone taper from 10/4 to 11/3 (starting dose 70 mg/day and tapering down by 20 mg per week). Dosartlimab was resumed in 11/2023. Following completion of prednisone taper, patient had recurrent diarrhea and referred patient to see Dr. Weaver who was contacted in early 2/2024 and arranged for repeat endoscopic evaluation and follow up though she had significant recurrent diarrhea with hypokalemia and hospitalized from 2/15-2/23/24. 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 On 2/20/24 patient's stool studies showed salmonella and she was placed on ciprofloxacin to treat the salmonella and placed on course of prednisone with improvement of symptoms with a plan to restart immunotherapy.      Interval History:  - current IBD meds: prednisone 30 mg/d (IV solumedrol 2/2024- discharged home 2/23/24 on pred 60 mg/d, 50  "mg/d 3/1, 40 mg/d 3/8, 30 mg/d 3/15)   - current immunotherapy: restarted dostarlimab infusions 3/14/24 and will continue every 6 weeks  - 1 formed BMs/d no blood, no urgency, no nocturnal  - S/P treatment for salmonella- 2 weeks cipro completed    Prior Pertinent Surgeries:   None    Pertinent Endoscopy/Imagin24 EGD: normal esophagus, Z line found at 40 cm, diffuse mild mucosal changes with congestion and erythema in the entire stomach with normal duodenum. Biopsies stomach with mild chronic inflammation and duodenum normal.   24 colonoscopy:  normal colon and TI except for some sigmoid and descending diverticulosis.  Biopsies of cecum/ascending/transverse/descending/sigmoid occ foci of epithelial apoptosis.  Biopsies of sigmoid and rectum with chronic active inflammation and increased epithelial apoptosis     Therapeutic Drug Monitoring Labs:  NA    Prior ICI colitis Therapies:  Prednisone (10/4-11/3/23- 70 mg/d and tapered by 20 mg every week)     Vaccinations:  No results found for: "HEPBSAB"  No results found for: "HEPBSURFABQU"  No results found for: "HEPAIGG"  No results found for: "VARICELLAZOS", "VARICELLAINT"  Immunization History   Administered Date(s) Administered    COVID-19, vector-nr, rS-Ad26, PF (Food on the Table) 03/10/2021    Influenza 10/12/2022    Influenza - Quadrivalent - PF *Preferred* (6 months and older) 10/19/2021   Below recommended but defer vaccines to oncologist:  Flu shot: recommended yearly   COVID vaccine/booster:  per CDC recommendations  RSV: recommended  Tetanus (Tdap):  every 10 years  PPSV 20: recommended   HPV: NA     Meningococcal: NA  Hepatitis B: check immunity  Hepatitis A: check immunity  MMR (live vaccine): immune         Chickenpox status/Varicella (live vaccine):  immune  Shingrix: recommended     Review of Systems   Constitutional:  Negative for chills, fever and weight loss.   HENT:          No oral ulcers, dysphagia, oral thrush   Eyes:  Negative for blurred " vision, pain and redness.   Respiratory:  Negative for cough and shortness of breath.    Cardiovascular:  Negative for chest pain.   Gastrointestinal:  Negative for abdominal pain, heartburn, nausea and vomiting.   Genitourinary:  Negative for dysuria and hematuria.   Musculoskeletal:  Negative for back pain and joint pain.   Skin:  Negative for rash.   Psychiatric/Behavioral:  Negative for depression. The patient is not nervous/anxious and does not have insomnia.      All Medical History/Surgical History/Family History/Social History/Allergies have been reviewed and updated in EMR    Outpatient Medications Marked as Taking for the 3/19/24 encounter (Office Visit) with Jason Montero MD   Medication Sig Dispense Refill    acetaminophen (TYLENOL) 650 MG TbSR Take 1 tablet (650 mg total) by mouth every 6 (six) hours as needed (pain). 60 tablet 0    diclofenac sodium (VOLTAREN) 1 % Gel Apply 2 g topically 2 (two) times daily. 100 g 0    diphenhydrAMINE (BENADRYL) 25 mg capsule Take 25 mg by mouth every 6 (six) hours as needed for Itching.      DULoxetine (CYMBALTA) 60 MG capsule Take 1 capsule (60 mg total) by mouth once daily. 30 capsule 2    famotidine (PEPCID) 40 MG tablet Take 1 tablet (40 mg total) by mouth once daily. 30 tablet 11    ibuprofen (ADVIL,MOTRIN) 600 MG tablet Take 1 tablet (600 mg total) by mouth every 6 (six) hours as needed for Pain. 30 tablet 3    levothyroxine (SYNTHROID) 100 MCG tablet Take 1 tablet (100 mcg total) by mouth before breakfast. 30 tablet 11    loratadine (CLARITIN) 10 mg tablet Take 1 tablet (10 mg total) by mouth once daily. 30 tablet 11    multivitamin (ONE DAILY MULTIVITAMIN) per tablet Take 1 tablet by mouth once daily.      predniSONE (DELTASONE) 20 MG tablet Take 3 tablets (60 mg total) by mouth once daily for 6 days, THEN 2.5 tablets (50 mg total) once daily for 7 days, THEN 2 tablets (40 mg total) once daily for 7 days, THEN 1.5 tablets (30 mg total) once daily for 7  "days, THEN 1 tablet (20 mg total) once daily for 7 days, THEN 0.5 tablets (10 mg total) once daily for 7 days. 71 tablet 0    valACYclovir (VALTREX) 1000 MG tablet Two pills at onset of fever blister and then repeat 2 pills 12 hr later 30 tablet 11     Vital Signs:  /70 (BP Location: Right arm, Patient Position: Sitting)   Pulse 101   Temp 97.7 °F (36.5 °C)   Ht 5' 7" (1.702 m)   Wt 72.3 kg (159 lb 6.3 oz)   LMP 11/15/2012   SpO2 98%   BMI 24.96 kg/m²    Physical Exam    Labs:   Lab Results   Component Value Date    CRP 3.0 02/16/2024    CALPROTECTIN 163.0 (H) 02/27/2024     Lab Results   Component Value Date    HEPBSAG Non-reactive 02/20/2024    HEPBCAB Non-reactive 02/16/2024     Lab Results   Component Value Date    TBGOLDPLUS Negative 02/16/2024     Lab Results   Component Value Date    WBC 12.13 03/12/2024    HGB 14.7 03/12/2024    HCT 44.5 03/12/2024    MCV 94 03/12/2024     03/12/2024     Lab Results   Component Value Date    CREATININE 0.8 03/12/2024    ALBUMIN 3.6 03/12/2024    BILITOT 0.3 03/12/2024    ALKPHOS 63 03/12/2024    AST 18 03/12/2024    ALT 24 03/12/2024     Assessment/Plan:  Sugar Diaz is a 58 y.o. female with endometrial cancer (diagnosed 5/2023; BRIANDA/BSO 6/2023 and initiation of chemotherapy/radiation in 7/2023 including the use of Dostarlimab), GERD, migraines.     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 Rodríguez and discussed MOA, risks, benefits of entyvio with patient in detail, pharmacist Roselia Joy, Pharm D present during this visit  - pt asked to notify us and Dr Rodríguez if any recurrent diarrhea or if she has any new infections and requires antibiotics     Total time: 60 min    Visit today is associated with current or anticipated ongoing medical care related to this patient's single serious condition/complex condition ICI colitis in setting of immunotherapy for endometrial cancer.     Follow up for to be determined.    Jason Montero MD  Department of Gastroenterology  Medical Director, Inflammatory Bowel Disease

## 2024-03-13 ENCOUNTER — CLINICAL SUPPORT (OUTPATIENT)
Dept: REHABILITATION | Facility: HOSPITAL | Age: 59
End: 2024-03-13
Payer: COMMERCIAL

## 2024-03-13 DIAGNOSIS — C77.2 SECONDARY MALIGNANCY OF RETROPERITONEAL LYMPH NODES: ICD-10-CM

## 2024-03-13 DIAGNOSIS — C54.1 MALIGNANT NEOPLASM OF ENDOMETRIUM: Primary | ICD-10-CM

## 2024-03-13 DIAGNOSIS — Z51.11 ENCOUNTER FOR ANTINEOPLASTIC CHEMOTHERAPY: ICD-10-CM

## 2024-03-13 PROCEDURE — 97814 ACUP 1/> W/ESTIM EA ADDL 15: CPT | Performed by: ACUPUNCTURIST

## 2024-03-13 PROCEDURE — 97813 ACUP 1/> W/ESTIM 1ST 15 MIN: CPT | Performed by: ACUPUNCTURIST

## 2024-03-14 ENCOUNTER — TELEPHONE (OUTPATIENT)
Dept: GASTROENTEROLOGY | Facility: CLINIC | Age: 59
End: 2024-03-14
Payer: COMMERCIAL

## 2024-03-14 ENCOUNTER — INFUSION (OUTPATIENT)
Dept: INFUSION THERAPY | Facility: OTHER | Age: 59
End: 2024-03-14
Attending: INTERNAL MEDICINE
Payer: COMMERCIAL

## 2024-03-14 VITALS
WEIGHT: 158.75 LBS | SYSTOLIC BLOOD PRESSURE: 117 MMHG | DIASTOLIC BLOOD PRESSURE: 57 MMHG | HEART RATE: 104 BPM | OXYGEN SATURATION: 99 % | BODY MASS INDEX: 24.92 KG/M2 | HEIGHT: 67 IN | RESPIRATION RATE: 16 BRPM

## 2024-03-14 DIAGNOSIS — C54.1 ENDOMETRIAL CANCER: Primary | ICD-10-CM

## 2024-03-14 PROCEDURE — 96413 CHEMO IV INFUSION 1 HR: CPT

## 2024-03-14 PROCEDURE — 63600175 PHARM REV CODE 636 W HCPCS: Mod: JZ,JG | Performed by: OBSTETRICS & GYNECOLOGY

## 2024-03-14 PROCEDURE — 96367 TX/PROPH/DG ADDL SEQ IV INF: CPT

## 2024-03-14 PROCEDURE — 25000003 PHARM REV CODE 250: Performed by: OBSTETRICS & GYNECOLOGY

## 2024-03-14 RX ORDER — SODIUM CHLORIDE 0.9 % (FLUSH) 0.9 %
10 SYRINGE (ML) INJECTION
Status: DISCONTINUED | OUTPATIENT
Start: 2024-03-14 | End: 2024-03-14 | Stop reason: HOSPADM

## 2024-03-14 RX ORDER — DIPHENHYDRAMINE HYDROCHLORIDE 50 MG/ML
50 INJECTION INTRAMUSCULAR; INTRAVENOUS ONCE AS NEEDED
Status: DISCONTINUED | OUTPATIENT
Start: 2024-03-14 | End: 2024-03-14 | Stop reason: HOSPADM

## 2024-03-14 RX ORDER — HEPARIN 100 UNIT/ML
500 SYRINGE INTRAVENOUS
Status: DISCONTINUED | OUTPATIENT
Start: 2024-03-14 | End: 2024-03-14 | Stop reason: HOSPADM

## 2024-03-14 RX ORDER — EPINEPHRINE 0.3 MG/.3ML
0.3 INJECTION SUBCUTANEOUS ONCE AS NEEDED
Status: DISCONTINUED | OUTPATIENT
Start: 2024-03-14 | End: 2024-03-14 | Stop reason: HOSPADM

## 2024-03-14 RX ADMIN — FOSAPREPITANT 150 MG: 150 INJECTION, POWDER, LYOPHILIZED, FOR SOLUTION INTRAVENOUS at 11:03

## 2024-03-14 RX ADMIN — SODIUM CHLORIDE 1000 MG: 9 INJECTION, SOLUTION INTRAVENOUS at 12:03

## 2024-03-14 NOTE — PLAN OF CARE
Dostarlimab infusion administered, no reaction. Patient tolerated well. Patient accompanied by family member for discharge home. 24 gauge IV removed, catheter intact. No apparent distress noted. Discharge instructions given to patient. Patient understands instructions. Follow up appointment scheduled.

## 2024-03-14 NOTE — TELEPHONE ENCOUNTER
"Spoke with Sugar:  ICI colitis    Prednisone 40 mg/d (tapered from 60 mg to 50 mg/ QD on 3/1/24, 40mg/d 3/8)    - 1 BM/d, formed  - denies blood, mucous, noc BM, tenesmus sxs, and pain  - overall feels about the same as last week "like a whole new person"  - plan of care to decrease Prednisone 10mg/wk  - RN check in 1 wk    Dr. Montero will be updated.  "

## 2024-03-14 NOTE — PROGRESS NOTES
Acupuncture Evaluation Note     Name: Sugar Ball [facility] Number: [id number]    Traditional Chinese Medicine (TCM) Diagnosis: Qi Stagnation, Blood Stasis, and Qi Deficiency  Medical Diagnosis:   Encounter Diagnoses   Name Primary?    Malignant neoplasm of endometrium Yes    Secondary malignancy of retroperitoneal lymph nodes     Encounter for antineoplastic chemotherapy         Evaluation Date: 3/14/2024    Visit #/Visits authorized: 12, 7/12    Precautions: Standard and cancer    Subjective     Chief Concern: CIPN, bilateral feet and hands - mostly right hand and feet.      Medical necessity is demonstrated by the following IMPAIRMENTS: Medical Necessity: Decreased mobility limits day to day activities, social, and emergent situations and Decreased quality of life              Aggravating Factors:  movement, lying down, standing, prolonged sitting, and pressure   Relieving Factors:  nothing    Symptom Description:     Quality:  Aching, Burning, Tight, Tingling, and Numb  Severity:  10  Frequency:  continuously      Objective       New Findings:      Treatment     Treatment Principles:  Move qi and blood, nourish qi, stop pain     Acupuncture points used:  4 AWAD, BA YUDI, BA KESHA , Gb34, Ki3, Sp6, Sp9, and St36    Bilateral points: KD1  Unilateral points:   Auricular Treatment:      Needles In: 27  Needles Out: 27  [place] W/ STIM placed: 1:50  Needles W/ STIM removed: 2:20      Assessment     After treatment, patient felt improvement in symptoms with acupuncture      Patient prognosis is Good.     Patient will continue to benefit from acupuncture treatment to address the deficits listed in the problem list box on initial evaluation, provide patient family education and to maximize pt's level of independence in the home and community environment.     Patient's spiritual, cultural and educational needs considered and pt agreeable to plan of care and goals.     Anticipated barriers to treatment: none    Plan      Recommend 1 /week for 5 sessions then re-assess.      Education:  Patient is aware of cumulative benefit of acupuncture

## 2024-03-18 ENCOUNTER — PATIENT MESSAGE (OUTPATIENT)
Dept: GYNECOLOGIC ONCOLOGY | Facility: CLINIC | Age: 59
End: 2024-03-18
Payer: COMMERCIAL

## 2024-03-19 ENCOUNTER — OFFICE VISIT (OUTPATIENT)
Dept: GASTROENTEROLOGY | Facility: CLINIC | Age: 59
End: 2024-03-19
Payer: COMMERCIAL

## 2024-03-19 VITALS
HEART RATE: 101 BPM | TEMPERATURE: 98 F | SYSTOLIC BLOOD PRESSURE: 114 MMHG | BODY MASS INDEX: 25.01 KG/M2 | HEIGHT: 67 IN | OXYGEN SATURATION: 98 % | WEIGHT: 159.38 LBS | DIASTOLIC BLOOD PRESSURE: 70 MMHG

## 2024-03-19 DIAGNOSIS — K52.9 COLITIS: Primary | ICD-10-CM

## 2024-03-19 DIAGNOSIS — R19.7 DIARRHEA, UNSPECIFIED TYPE: ICD-10-CM

## 2024-03-19 PROCEDURE — 99215 OFFICE O/P EST HI 40 MIN: CPT | Mod: S$GLB,,, | Performed by: INTERNAL MEDICINE

## 2024-03-19 PROCEDURE — 1111F DSCHRG MED/CURRENT MED MERGE: CPT | Mod: CPTII,S$GLB,, | Performed by: INTERNAL MEDICINE

## 2024-03-19 PROCEDURE — 1160F RVW MEDS BY RX/DR IN RCRD: CPT | Mod: CPTII,S$GLB,, | Performed by: INTERNAL MEDICINE

## 2024-03-19 PROCEDURE — 3078F DIAST BP <80 MM HG: CPT | Mod: CPTII,S$GLB,, | Performed by: INTERNAL MEDICINE

## 2024-03-19 PROCEDURE — 1159F MED LIST DOCD IN RCRD: CPT | Mod: CPTII,S$GLB,, | Performed by: INTERNAL MEDICINE

## 2024-03-19 PROCEDURE — 3008F BODY MASS INDEX DOCD: CPT | Mod: CPTII,S$GLB,, | Performed by: INTERNAL MEDICINE

## 2024-03-19 PROCEDURE — G2211 COMPLEX E/M VISIT ADD ON: HCPCS | Mod: S$GLB,,, | Performed by: INTERNAL MEDICINE

## 2024-03-19 PROCEDURE — 3074F SYST BP LT 130 MM HG: CPT | Mod: CPTII,S$GLB,, | Performed by: INTERNAL MEDICINE

## 2024-03-19 RX ORDER — MULTIVITAMIN
1 TABLET ORAL DAILY
COMMUNITY

## 2024-03-19 NOTE — PATIENT INSTRUCTIONS
Instructions:  -Labs in April as scheduled  -Turn in stool calprotectin within the week  -Decrease Prednisone from 30 mg daily to 20 mg daily on 3/22  -Dr Montero will discuss plan of care with Dr Rodríguez and be in touch      Vedolizumab (Entyvio)    Class: Integrin Blocker - Biologic product    Mechanism of Action: blocks alpha-4 beta-7 which plays a role in the inflammatory process for Inflammatory Bowel Disease (IBD) and works specifically in the gut.    Dosage/ Route/ Frequency: Intravenous (IV) Infusion that will be performed at an infusion center   - Loading Dose: 300 mg IV on week 0, 2, and 6  - Maintenance Dose: 300 mg IV every 8 weeks  - 30-minute infusion    Please call us immediately if you develop any of the below problems:  Please notify us if you are treated with antibiotics or experience signs of infection (ie. fevers, chills, sores, etc) given we may need to hold your medication during this time.     Side effects (>3% or 3 in 100 people):   Upper respiratory infections including nasopharyngitis (runny nose/sore throat), bronchitis, sinusitis, flu  Fatigue, headaches, back pain, cough, rash, fever, extremity/joint pain are rare    Serious side effects:     Infusion related reactions: These reactions might occur with the first or subsequent infusions. Includes rash, increased blood pressure and heart rate, swelling of your lips, shortness of breath, flushing etc.      Serious infections: viral/bacterial and fungal. Make sure to practice good hand hygiene and get the recommended vaccinations.     Liver Injury: This medication can increase your liver enzymes and is usually reversible with stopping the medication, your labs will be monitored regularly     PML (Progressive multifocal leukoencephalopathy): 1 case in a patient with multiple risk factors for PML (ie, HIV, CD4 ct 300, prolonged prior and concurrent immunosuppression).  Please be aware symptoms may include progressive weakness on one side of the  body or clumsiness of limbs, disturbance of vision, and changes in thinking, memory, and orientation leading to confusion and personality changes.     Labs/Monitoring:  Prior to starting this new agent, we will be checking labs to determine the safety of taking this medication including baseline blood counts, liver and kidney function tests, TB test and viral hepatitis testing.   Once started on the therapy we will monitor your blood count and your liver and kidney function tests as needed following evidence-based guidelines. TB test and viral hepatitis tests will be repeated every year. If you have any risk of exposure or travel to high risk area please notify us so we can do this testing sooner. If indicated your provider may also choose to check drug levels to monitor or optimize your response to the medication.     Vaccine counseling:   Avoid live vaccines which include:  Intranasal Influenza LAIV4 (FluMist Quadrivalent)  Measles, Mumps, Rubella (MMR)  Rotavirus (RotaTeq, Rotarix)  Typhoid oral capsule (Vivotif)  Varicella (Varivax)  Smallpox (Vaccinia)  Yellow Fever (YF-Vax)  Adenovirus  Cholera (Vaxchora)    Avoid consumption of raw seafood such as oysters/sushi.

## 2024-03-19 NOTE — Clinical Note
Be sure to speak with Dr. Marcos Chi approval in process Prednisone 30 mg to 20 mg/d on 3/22 and down to 15 mg/d 3/29 if okay

## 2024-03-19 NOTE — Clinical Note
HI Dr. Rodríguez- see my note for details. I am planning on starting entyvio to help prevent recurrent ICI colitis while she is on her current immunotherapy since she has required 2 courses of prednisone now. I will start approval process but if you can let me know you are on board with this.  I like to make sure oncologist and I are coordinating closely on these patients.   Roselia- please start approval process on entyvio and notify patient that we are going to start the authorization for this. She will [facility]. Be sure to document phone note after speaking with her and get Yun's help with the therapy plan if needed. Let me know when approved and start date. She will be due for pred taper on 3/29 Kalpana- please check with her on 3/28  Thanks Jason Montero

## 2024-03-21 ENCOUNTER — CLINICAL SUPPORT (OUTPATIENT)
Dept: REHABILITATION | Facility: HOSPITAL | Age: 59
End: 2024-03-21
Payer: COMMERCIAL

## 2024-03-21 DIAGNOSIS — C77.2 SECONDARY MALIGNANCY OF RETROPERITONEAL LYMPH NODES: ICD-10-CM

## 2024-03-21 DIAGNOSIS — C54.1 MALIGNANT NEOPLASM OF ENDOMETRIUM: Primary | ICD-10-CM

## 2024-03-21 DIAGNOSIS — Z51.11 ENCOUNTER FOR ANTINEOPLASTIC CHEMOTHERAPY: ICD-10-CM

## 2024-03-21 PROCEDURE — 97814 ACUP 1/> W/ESTIM EA ADDL 15: CPT | Performed by: ACUPUNCTURIST

## 2024-03-21 PROCEDURE — 97813 ACUP 1/> W/ESTIM 1ST 15 MIN: CPT | Performed by: ACUPUNCTURIST

## 2024-03-21 NOTE — PROGRESS NOTES
Acupuncture Evaluation Note     Name: Sugar Ball [facility] Number: [id number]    Traditional Chinese Medicine (TCM) Diagnosis: Qi Stagnation, Blood Stasis, and Qi Deficiency  Medical Diagnosis:   Encounter Diagnoses   Name Primary?    Malignant neoplasm of endometrium Yes    Secondary malignancy of retroperitoneal lymph nodes     Encounter for antineoplastic chemotherapy         Evaluation Date: 3/21/2024    Visit #/Visits authorized: 12, 8/12    Precautions: Standard and cancer    Subjective     Chief Concern: CIPN, bilateral feet and hands - mostly right hand and feet.      Medical necessity is demonstrated by the following IMPAIRMENTS: Medical Necessity: Decreased mobility limits day to day activities, social, and emergent situations and Decreased quality of life              Aggravating Factors:  movement, lying down, standing, prolonged sitting, and pressure   Relieving Factors:  nothing    Symptom Description:     Quality:  Aching, Burning, Tight, Tingling, and Numb  Severity:  10  Frequency:  continuously      Objective       New Findings:      Treatment     Treatment Principles:  Move qi and blood, nourish qi, stop pain     Acupuncture points used:  4 AWAD, BA YUDI, BA KESHA , Gb34, Ki3, Sp6, Sp9, and St36    Bilateral points: KD1  Unilateral points:   Auricular Treatment:      Needles In: 27  Needles Out: 27  [place] W/ STIM placed: 3:50  Needles W/ STIM removed: 4:20      Assessment     After treatment, patient felt change in symptoms over the week    Patient prognosis is Good.     Patient will continue to benefit from acupuncture treatment to address the deficits listed in the problem list box on initial evaluation, provide patient family education and to maximize pt's level of independence in the home and community environment.     Patient's spiritual, cultural and educational needs considered and pt agreeable to plan of care and goals.     Anticipated barriers to treatment: none    Plan      Recommend 1 /week for 4 sessions then re-assess.      Education:  Patient is aware of cumulative benefit of acupuncture

## 2024-03-22 ENCOUNTER — PATIENT MESSAGE (OUTPATIENT)
Dept: GYNECOLOGIC ONCOLOGY | Facility: CLINIC | Age: 59
End: 2024-03-22
Payer: COMMERCIAL

## 2024-03-24 PROBLEM — A02.9 SALMONELLA: Status: RESOLVED | Noted: 2024-02-22 | Resolved: 2024-03-24

## 2024-03-25 ENCOUNTER — LAB VISIT (OUTPATIENT)
Dept: LAB | Facility: HOSPITAL | Age: 59
End: 2024-03-25
Attending: INTERNAL MEDICINE
Payer: COMMERCIAL

## 2024-03-25 DIAGNOSIS — Z12.11 COLON CANCER SCREENING: ICD-10-CM

## 2024-03-25 DIAGNOSIS — R19.7 DIARRHEA, UNSPECIFIED TYPE: ICD-10-CM

## 2024-03-25 DIAGNOSIS — K52.9 COLITIS: ICD-10-CM

## 2024-03-25 PROCEDURE — 83993 ASSAY FOR CALPROTECTIN FECAL: CPT | Performed by: INTERNAL MEDICINE

## 2024-03-27 ENCOUNTER — TELEPHONE (OUTPATIENT)
Dept: GASTROENTEROLOGY | Facility: CLINIC | Age: 59
End: 2024-03-27
Payer: COMMERCIAL

## 2024-03-27 RX ORDER — IPRATROPIUM BROMIDE AND ALBUTEROL SULFATE 2.5; .5 MG/3ML; MG/3ML
3 SOLUTION RESPIRATORY (INHALATION)
Status: CANCELLED | OUTPATIENT
Start: 2024-03-27

## 2024-03-27 RX ORDER — DIPHENHYDRAMINE HYDROCHLORIDE 50 MG/ML
25 INJECTION INTRAMUSCULAR; INTRAVENOUS
Status: CANCELLED | OUTPATIENT
Start: 2024-03-27

## 2024-03-27 RX ORDER — ACETAMINOPHEN 325 MG/1
650 TABLET ORAL
Status: CANCELLED | OUTPATIENT
Start: 2024-03-27

## 2024-03-27 RX ORDER — HEPARIN 100 UNIT/ML
500 SYRINGE INTRAVENOUS
Status: CANCELLED | OUTPATIENT
Start: 2024-03-27

## 2024-03-27 RX ORDER — SODIUM CHLORIDE 0.9 % (FLUSH) 0.9 %
10 SYRINGE (ML) INJECTION
Status: CANCELLED | OUTPATIENT
Start: 2024-03-27

## 2024-03-27 RX ORDER — EPINEPHRINE 1 MG/ML
0.3 INJECTION, SOLUTION, CONCENTRATE INTRAVENOUS
Status: CANCELLED | OUTPATIENT
Start: 2024-03-27

## 2024-03-27 RX ORDER — METHYLPREDNISOLONE SOD SUCC 125 MG
40 VIAL (EA) INJECTION
Status: CANCELLED | OUTPATIENT
Start: 2024-03-27

## 2024-03-27 NOTE — TELEPHONE ENCOUNTER
- Patient with history of immunotherapy (dostarlimab) induced colitis currently on Prednisone taper  - Discussed plan for Entyvio treatment to prevent ICI colitis recurrence at recent office visit on 3/19/24  - Labs: TB negative 2/2024, repeat 2/2025; HepB negative 2/2024 repeat 2/2025; CBC and CMP 3/2024 repeat 9/2024  - Therapy plan For Entyvio 300 mg IV week 0, 2, 6 and then every 8 weeks sent to [facility]  - Case discussed with Dr Montero    - Spoke with patient- Advised her that Entyvio therapy plan has been sent to [facility].  Patient confirmed this is her preferred infusion center.    - Explained insurance authorization process  - Patient asked for recap of vaccines recommended during her office visit.  Provided this information and reminded patient to obtain guidance from oncologist before receiving any vaccination.  - Patient had no questions and verbalized understanding.

## 2024-03-28 ENCOUNTER — TELEPHONE (OUTPATIENT)
Dept: GASTROENTEROLOGY | Facility: CLINIC | Age: 59
End: 2024-03-28
Payer: COMMERCIAL

## 2024-03-28 ENCOUNTER — CLINICAL SUPPORT (OUTPATIENT)
Dept: REHABILITATION | Facility: HOSPITAL | Age: 59
End: 2024-03-28
Payer: COMMERCIAL

## 2024-03-28 DIAGNOSIS — C54.1 MALIGNANT NEOPLASM OF ENDOMETRIUM: Primary | ICD-10-CM

## 2024-03-28 DIAGNOSIS — C77.2 SECONDARY MALIGNANCY OF RETROPERITONEAL LYMPH NODES: ICD-10-CM

## 2024-03-28 DIAGNOSIS — K52.9 COLITIS: Primary | ICD-10-CM

## 2024-03-28 DIAGNOSIS — Z51.11 ENCOUNTER FOR ANTINEOPLASTIC CHEMOTHERAPY: ICD-10-CM

## 2024-03-28 LAB — CALPROTECTIN STL-MCNT: 90 MCG/G

## 2024-03-28 PROCEDURE — 97813 ACUP 1/> W/ESTIM 1ST 15 MIN: CPT | Performed by: ACUPUNCTURIST

## 2024-03-28 PROCEDURE — 97814 ACUP 1/> W/ESTIM EA ADDL 15: CPT | Performed by: ACUPUNCTURIST

## 2024-03-28 RX ORDER — PREDNISONE 5 MG/1
TABLET ORAL
Qty: 14 TABLET | Refills: 0 | Status: SHIPPED | OUTPATIENT
Start: 2024-03-28 | End: 2024-04-04 | Stop reason: SDUPTHER

## 2024-03-28 NOTE — TELEPHONE ENCOUNTER
Spoke with Sugar:  - reviewed Dr. Montero's recommendation  - needs Rx for Prednisone 5mg tabs as she only has 20mg tabs  - RN check in 1 wk - if stable may reduce to 10mg/d per Dr. Montero  - she states understanding and agrees with this plan

## 2024-03-28 NOTE — PROGRESS NOTES
Acupuncture Evaluation Note     Name: Sugar Ball [facility] Number: [id number]    Traditional Chinese Medicine (TCM) Diagnosis: Qi Stagnation, Blood Stasis, and Qi Deficiency  Medical Diagnosis:   Encounter Diagnoses   Name Primary?    Malignant neoplasm of endometrium Yes    Secondary malignancy of retroperitoneal lymph nodes     Encounter for antineoplastic chemotherapy         Evaluation Date: 3/28/2024    Visit #/Visits authorized: 12, 9/12    Precautions: Standard and cancer    Subjective     Chief Concern: CIPN, bilateral feet and hands - mostly right hand and feet.      Medical necessity is demonstrated by the following IMPAIRMENTS: Medical Necessity: Decreased mobility limits day to day activities, social, and emergent situations and Decreased quality of life              Aggravating Factors:  movement, lying down, standing, prolonged sitting, and pressure   Relieving Factors:  nothing    Symptom Description:     Quality:  Aching, Burning, Tight, Tingling, and Numb  Severity:  10  Frequency:  continuously      Objective       New Findings:      Treatment     Treatment Principles:  Move qi and blood, nourish qi, stop pain     Acupuncture points used:  4 AWAD, BA YUDI, BA KESHA , Gb34, Ki3, Sp6, Sp9, and St36    Bilateral points:   Unilateral points:   Auricular Treatment:      Needles In: 25  Needles Out: 25  [place] W/ STIM placed: 8:50  [place] W/ STIM removed: 9:20      Assessment     After treatment, patient felt improvement symptoms over the week    Patient prognosis is Good.     Patient will continue to benefit from acupuncture treatment to address the deficits listed in the problem list box on initial evaluation, provide patient family education and to maximize pt's level of independence in the home and community environment.     Patient's spiritual, cultural and educational needs considered and pt agreeable to plan of care and goals.     Anticipated barriers to treatment: none    Plan     Recommend  1 /week for 3 sessions then re-assess.      Education:  Patient is aware of cumulative benefit of acupuncture

## 2024-03-28 NOTE — TELEPHONE ENCOUNTER
Spoke with Sugar:  IBD meds:  - Entyvio - authorization in process    Prednisone 20mg/d (discharged home 2/23/24 on pred 60 mg/d, 50 mg/d 3/1, 40 mg/d 3/8, 30 mg/d 3/15)     - 1 BM/d, formed  - +false BR to pass gas 1-2 episodes in the last week  - denies blood, mucous, noc BM, and pain    Dr. Montero will be updated.

## 2024-04-03 ENCOUNTER — PATIENT MESSAGE (OUTPATIENT)
Dept: REHABILITATION | Facility: HOSPITAL | Age: 59
End: 2024-04-03
Payer: COMMERCIAL

## 2024-04-04 ENCOUNTER — CLINICAL SUPPORT (OUTPATIENT)
Dept: REHABILITATION | Facility: HOSPITAL | Age: 59
End: 2024-04-04
Payer: COMMERCIAL

## 2024-04-04 ENCOUNTER — TELEPHONE (OUTPATIENT)
Dept: GASTROENTEROLOGY | Facility: CLINIC | Age: 59
End: 2024-04-04
Payer: COMMERCIAL

## 2024-04-04 DIAGNOSIS — K52.9 COLITIS: ICD-10-CM

## 2024-04-04 DIAGNOSIS — Z51.11 ENCOUNTER FOR ANTINEOPLASTIC CHEMOTHERAPY: ICD-10-CM

## 2024-04-04 DIAGNOSIS — C77.2 SECONDARY MALIGNANCY OF RETROPERITONEAL LYMPH NODES: ICD-10-CM

## 2024-04-04 DIAGNOSIS — C54.1 MALIGNANT NEOPLASM OF ENDOMETRIUM: Primary | ICD-10-CM

## 2024-04-04 PROCEDURE — 97814 ACUP 1/> W/ESTIM EA ADDL 15: CPT | Performed by: ACUPUNCTURIST

## 2024-04-04 PROCEDURE — 97813 ACUP 1/> W/ESTIM 1ST 15 MIN: CPT | Performed by: ACUPUNCTURIST

## 2024-04-04 RX ORDER — PREDNISONE 5 MG/1
TABLET ORAL
Qty: 40 TABLET | Refills: 0 | Status: SHIPPED | OUTPATIENT
Start: 2024-04-04 | End: 2024-05-15 | Stop reason: SDUPTHER

## 2024-04-04 NOTE — TELEPHONE ENCOUNTER
Spoke with Sugar:  - reviewed Dr. Warner's recommendation, stay at 15mg/d  - RN check on 4/8, if better may reduce to 10mg/d  - she states understanding and agrees with this plan

## 2024-04-04 NOTE — TELEPHONE ENCOUNTER
"Spoke with Sugar:  IBD meds:  - Entyvio - approval received, scheduling should be reaching out to her to schedule infusion     Prednisone 15mg/d (discharged home 2/23/24 on pred 60 mg/d, 50 mg/d 3/1, 40 mg/d 3/8, 30 mg/d 3/15, 20mg/d 3/22, 15mg/d 3/28)    - sxs changed 4/2, developed diarrhea x 4 with 1 noc BM  - 4/3 no BM  - 4/4 1 BM, loose  - denies blood, mucous, tenesmus sxs, and pain  - unable to determine trigger for change in sxs, no close contacts with similar sxs  - overall she states she "feels good"  - plan of care per Dr. oMntero if doing well, taper to 10mg/d, however, pt sxs have changed and are improving from 4/2.   - advised to contact us if sxs worsen  - needs refill of Prednisone    Dr. Warner will be updated in Dr. Montero's absence for approval.   "

## 2024-04-08 ENCOUNTER — TELEPHONE (OUTPATIENT)
Dept: GASTROENTEROLOGY | Facility: CLINIC | Age: 59
End: 2024-04-08
Payer: COMMERCIAL

## 2024-04-08 NOTE — TELEPHONE ENCOUNTER
Dx: ICI colitis  - Entyvio has been approved, not started yet    Prednisone 15mg/d (discharged home 2/23/24 on pred 60 mg/d, 50 mg/d 3/1, 40 mg/d 3/8, 30 mg/d 3/15, 20mg/d 3/22, 15mg/d 3/28)     - on 4/5 soft stool and noc BM  - no issues since  - on Prednisone 15mg/d  - plan was to taper to 10mg/d on 4/4 but pt had sxs on 4/2  - plan updated to stay at 15mg/d until 4/8 with recheck to see if ok to taper    Dr. Warner will be updated for recommendation.

## 2024-04-08 NOTE — TELEPHONE ENCOUNTER
----- Message from Kalpana Oshea, RN sent at 4/4/2024  2:18 PM CDT -----  IBD meds:  - Entyvio - approval received, scheduling should be reaching out to her to schedule infusion     Prednisone 15mg/d (discharged home 2/23/24 on pred 60 mg/d, 50 mg/d 3/1, 40 mg/d 3/8, 30 mg/d 3/15, 20mg/d 3/22, 15mg/d 3/28, 15mg/d 4/4)    If ok 4/8, may go to 10mg/d per Dr. Warner

## 2024-04-11 ENCOUNTER — CLINICAL SUPPORT (OUTPATIENT)
Dept: REHABILITATION | Facility: HOSPITAL | Age: 59
End: 2024-04-11
Payer: COMMERCIAL

## 2024-04-11 DIAGNOSIS — C54.1 MALIGNANT NEOPLASM OF ENDOMETRIUM: Primary | ICD-10-CM

## 2024-04-11 DIAGNOSIS — C77.2 SECONDARY MALIGNANCY OF RETROPERITONEAL LYMPH NODES: ICD-10-CM

## 2024-04-11 DIAGNOSIS — Z51.11 ENCOUNTER FOR ANTINEOPLASTIC CHEMOTHERAPY: ICD-10-CM

## 2024-04-11 PROCEDURE — 97814 ACUP 1/> W/ESTIM EA ADDL 15: CPT | Performed by: ACUPUNCTURIST

## 2024-04-11 PROCEDURE — 97813 ACUP 1/> W/ESTIM 1ST 15 MIN: CPT | Performed by: ACUPUNCTURIST

## 2024-04-12 DIAGNOSIS — K52.9 COLITIS: Primary | ICD-10-CM

## 2024-04-12 RX ORDER — PREDNISONE 10 MG/1
20 TABLET ORAL DAILY
Qty: 35 TABLET | Refills: 0 | Status: SHIPPED | OUTPATIENT
Start: 2024-04-12 | End: 2024-04-19 | Stop reason: SDUPTHER

## 2024-04-12 NOTE — TELEPHONE ENCOUNTER
Dx: Immunotherapy induced colitis - Prednisone required  IBD meds:  - Entyvio 1st dose scheduled: 4/17    Prednisone 10mg/d (discharged home 2/23/24 on pred 60 mg/d, 50 mg/d 3/1, 40 mg/d 3/8, 30 mg/d 3/15, 20mg/d 3/22, 15mg/d 3/28, 10mg/d 4/8)    - 4-6 BM/d, diarrhea  - + noc BM  - denies blood, mucous, false BR trips, pain  - had some breakthrough sxs on Pred 15mg/d last week, advised to stay on 15mg/d with recheck on 4/8. PM sent 4/8 was to contact us with any symptoms and if none, could taper to 10mg/d.    Dr. Warner will be updated.

## 2024-04-12 NOTE — TELEPHONE ENCOUNTER
Per Dr. Warner:  - Increase Prednisone to 20mg/d  - RN check in 1 week    Spoke with Sugar:  - reviewed Dr. Warner's recommendation  - she states understanding and agrees with this plan

## 2024-04-12 NOTE — TELEPHONE ENCOUNTER
Allergies reviewed, Rx pended for approval    OV: none, follows with Oncology    Entyvio 1st dose 4/17

## 2024-04-15 NOTE — PROGRESS NOTES
Acupuncture Evaluation Note     Name: Sugar Ball [facility] Number: [id number]    Traditional Chinese Medicine (TCM) Diagnosis: Qi Stagnation, Blood Stasis, and Qi Deficiency  Medical Diagnosis:   Encounter Diagnoses   Name Primary?    Malignant neoplasm of endometrium Yes    Secondary malignancy of retroperitoneal lymph nodes     Encounter for antineoplastic chemotherapy         Evaluation Date: 4/15/2024    Visit #/Visits authorized: 12, 11/12    Precautions: Standard and cancer    Subjective     Chief Concern: CIPN, bilateral feet and hands - mostly right hand and feet.      Medical necessity is demonstrated by the following IMPAIRMENTS: Medical Necessity: Decreased mobility limits day to day activities, social, and emergent situations and Decreased quality of life              Aggravating Factors:  movement, lying down, standing, prolonged sitting, and pressure   Relieving Factors:  nothing    Symptom Description:     Quality:  Aching, Burning, Tight, Tingling, and Numb  Severity:  10  Frequency:  continuously      Objective       New Findings:      Treatment     Treatment Principles:  Move qi and blood, nourish qi, stop pain     Acupuncture points used:  4 AWAD, BA YUDI, BA KESHA , Gb34, Ki3, Sp6, Sp9, and St36    Bilateral points:   Unilateral points:   Auricular Treatment:      Needles In: 25  Needles Out: 25  [place] W/ STIM placed: 3:50  Needles W/ STIM removed: 4:20      Assessment     After treatment, patient felt improvement symptoms over the week    Patient prognosis is Good.     Patient will continue to benefit from acupuncture treatment to address the deficits listed in the problem list box on initial evaluation, provide patient family education and to maximize pt's level of independence in the home and community environment.     Patient's spiritual, cultural and educational needs considered and pt agreeable to plan of care and goals.     Anticipated barriers to treatment: none    Plan      Recommend 1 /week for 1 sessions then re-assess.      Education:  Patient is aware of cumulative benefit of acupuncture

## 2024-04-17 ENCOUNTER — INFUSION (OUTPATIENT)
Dept: INFUSION THERAPY | Facility: HOSPITAL | Age: 59
End: 2024-04-17
Attending: INTERNAL MEDICINE
Payer: COMMERCIAL

## 2024-04-17 VITALS
RESPIRATION RATE: 18 BRPM | TEMPERATURE: 99 F | SYSTOLIC BLOOD PRESSURE: 130 MMHG | OXYGEN SATURATION: 97 % | DIASTOLIC BLOOD PRESSURE: 64 MMHG | HEART RATE: 91 BPM

## 2024-04-17 DIAGNOSIS — K52.9 COLITIS: Primary | ICD-10-CM

## 2024-04-17 PROCEDURE — 96365 THER/PROPH/DIAG IV INF INIT: CPT

## 2024-04-17 PROCEDURE — 63600175 PHARM REV CODE 636 W HCPCS: Mod: JZ,JG | Performed by: INTERNAL MEDICINE

## 2024-04-17 PROCEDURE — 25000003 PHARM REV CODE 250: Performed by: INTERNAL MEDICINE

## 2024-04-17 RX ORDER — DIPHENHYDRAMINE HYDROCHLORIDE 50 MG/ML
25 INJECTION INTRAMUSCULAR; INTRAVENOUS
Status: DISCONTINUED | OUTPATIENT
Start: 2024-04-17 | End: 2024-04-17 | Stop reason: HOSPADM

## 2024-04-17 RX ORDER — HEPARIN 100 UNIT/ML
500 SYRINGE INTRAVENOUS
Status: CANCELLED | OUTPATIENT
Start: 2024-06-06

## 2024-04-17 RX ORDER — SODIUM CHLORIDE 0.9 % (FLUSH) 0.9 %
10 SYRINGE (ML) INJECTION
Status: CANCELLED | OUTPATIENT
Start: 2024-06-06

## 2024-04-17 RX ORDER — ACETAMINOPHEN 325 MG/1
650 TABLET ORAL
Status: ACTIVE | OUTPATIENT
Start: 2024-04-17 | End: 2024-04-17

## 2024-04-17 RX ORDER — EPINEPHRINE 1 MG/ML
0.3 INJECTION, SOLUTION, CONCENTRATE INTRAVENOUS
Status: CANCELLED | OUTPATIENT
Start: 2024-06-06

## 2024-04-17 RX ORDER — DIPHENHYDRAMINE HYDROCHLORIDE 50 MG/ML
25 INJECTION INTRAMUSCULAR; INTRAVENOUS
Status: CANCELLED | OUTPATIENT
Start: 2024-06-06

## 2024-04-17 RX ORDER — ACETAMINOPHEN 325 MG/1
650 TABLET ORAL
Status: CANCELLED | OUTPATIENT
Start: 2024-06-06

## 2024-04-17 RX ORDER — IPRATROPIUM BROMIDE AND ALBUTEROL SULFATE 2.5; .5 MG/3ML; MG/3ML
3 SOLUTION RESPIRATORY (INHALATION)
Status: CANCELLED | OUTPATIENT
Start: 2024-06-06

## 2024-04-17 RX ORDER — METHYLPREDNISOLONE SOD SUCC 125 MG
40 VIAL (EA) INJECTION
Status: CANCELLED | OUTPATIENT
Start: 2024-06-06

## 2024-04-17 RX ADMIN — VEDOLIZUMAB 300 MG: 300 INJECTION, POWDER, LYOPHILIZED, FOR SOLUTION INTRAVENOUS at 01:04

## 2024-04-17 NOTE — PLAN OF CARE
Oncology History   Endometrial cancer    Patient presented to unit for initial Entyvio infusion. VSS. Entyvio infused over 30 minutes. Patient monitored post infusion. VSS post observation. Next appts reviewed. Patient discharged from unit in NAD.

## 2024-04-17 NOTE — PLAN OF CARE
Problem: Adjustment to Illness (Bowel Disease, Inflammatory)  Goal: Optimal Adaptation to Chronic Illness  Outcome: Ongoing, Progressing     Problem: Diarrhea (Bowel Disease, Inflammatory)  Goal: Diarrhea Symptom Relief  Outcome: Ongoing, Progressing     Problem: Nutrition Impaired (Bowel Disease, Inflammatory)  Goal: Optimal Nutrition  Outcome: Ongoing, Progressing     Problem: Pain (Bowel Disease, Inflammatory)  Goal: Acceptable Pain Control  Outcome: Ongoing, Progressing

## 2024-04-18 ENCOUNTER — CLINICAL SUPPORT (OUTPATIENT)
Dept: REHABILITATION | Facility: HOSPITAL | Age: 59
End: 2024-04-18
Payer: COMMERCIAL

## 2024-04-18 ENCOUNTER — TELEPHONE (OUTPATIENT)
Dept: GASTROENTEROLOGY | Facility: CLINIC | Age: 59
End: 2024-04-18
Payer: COMMERCIAL

## 2024-04-18 DIAGNOSIS — C77.2 SECONDARY MALIGNANCY OF RETROPERITONEAL LYMPH NODES: ICD-10-CM

## 2024-04-18 DIAGNOSIS — C54.1 MALIGNANT NEOPLASM OF ENDOMETRIUM: Primary | ICD-10-CM

## 2024-04-18 DIAGNOSIS — Z51.11 ENCOUNTER FOR ANTINEOPLASTIC CHEMOTHERAPY: ICD-10-CM

## 2024-04-18 PROCEDURE — 97813 ACUP 1/> W/ESTIM 1ST 15 MIN: CPT | Performed by: ACUPUNCTURIST

## 2024-04-18 PROCEDURE — 97814 ACUP 1/> W/ESTIM EA ADDL 15: CPT | Performed by: ACUPUNCTURIST

## 2024-04-18 NOTE — TELEPHONE ENCOUNTER
----- Message from Kalpana Oshea, RN sent at 4/12/2024  1:44 PM CDT -----  IBD meds:  - Entyvio 1st dose scheduled: 4/17     Prednisone 20mg/d (discharged home 2/23/24 on pred 60 mg/d, 50 mg/d 3/1, 40 mg/d 3/8, 30 mg/d 3/15, 20mg/d 3/22, 15mg/d 3/28, 10mg/d 4/8, 20mg/d 4/12)

## 2024-04-18 NOTE — TELEPHONE ENCOUNTER
Spoke with Sugar:  - reviewed Dr. Montero's recommendation to stay at current dose and check in tomorrow with report on how tonight goes  - she states understanding and agrees with this plan

## 2024-04-18 NOTE — TELEPHONE ENCOUNTER
Spoke with Sugar:  IBD meds:  - Entyvio, LD: 4/17 #1, ND: 5/1 #2, 5/29 #3    Prednisone 20mg/d (discharged home 2/23/24 on pred 60 mg/d, 50 mg/d 3/1, 40 mg/d 3/8, 30 mg/d 3/15, 20mg/d 3/22, 15mg/d 3/28, 10mg/d 4/8, 20mg/d 4/12)     - 3-4 BM/d, liq  - + noc BM, 3 last night doesn't think she had any other than this in the last 7 days  - denies blood, mucous, tenesmus sxs, and pain  - overall states she feels the same as last week, doesn't feel bad or weak, disheartened over 3 noc BM last night  - no issues with Arti Montero will be updated.   OV: 5/8/24

## 2024-04-19 DIAGNOSIS — K52.9 COLITIS: ICD-10-CM

## 2024-04-19 RX ORDER — PREDNISONE 10 MG/1
20 TABLET ORAL DAILY
Qty: 40 TABLET | Refills: 0 | Status: SHIPPED | OUTPATIENT
Start: 2024-04-19

## 2024-04-19 NOTE — TELEPHONE ENCOUNTER
Spoke with Sugar:  - reviewed Dr. Montero's recommendation to stay on Pred 20mg/d  - RN check after 2nd dose of Entyvio scheduled 5/1 to determine further tapering  - she states she will need a refill of Prednisone within 2 weeks (#35 on 4/12)  - she states understanding and agrees with this plan

## 2024-04-19 NOTE — TELEPHONE ENCOUNTER
Spoke with Sugar:   - she had no noc BM last night  - assessment otherwise unchanged from 4/18    Dr. Montero will be updated.

## 2024-04-19 NOTE — TELEPHONE ENCOUNTER
Pt has 17 pills left. At current dose and projected taper schedule, will need additional 40 tabs 10mg.     Allergies reviewed, Rx pended for approval    OV: 5/8/24

## 2024-04-20 ENCOUNTER — HOSPITAL ENCOUNTER (EMERGENCY)
Facility: HOSPITAL | Age: 59
Discharge: HOME | End: 2024-04-20
Attending: STUDENT IN AN ORGANIZED HEALTH CARE EDUCATION/TRAINING PROGRAM
Payer: COMMERCIAL

## 2024-04-20 VITALS
BODY MASS INDEX: 25.9 KG/M2 | WEIGHT: 165 LBS | HEART RATE: 88 BPM | OXYGEN SATURATION: 95 % | DIASTOLIC BLOOD PRESSURE: 55 MMHG | SYSTOLIC BLOOD PRESSURE: 107 MMHG | TEMPERATURE: 98 F | HEIGHT: 67 IN | RESPIRATION RATE: 17 BRPM

## 2024-04-20 DIAGNOSIS — E87.6 HYPOKALEMIA: Primary | ICD-10-CM

## 2024-04-20 DIAGNOSIS — Z85.42 HISTORY OF ENDOMETRIAL CANCER: ICD-10-CM

## 2024-04-20 DIAGNOSIS — R40.20 LOSS OF CONSCIOUSNESS: ICD-10-CM

## 2024-04-20 LAB
ALBUMIN SERPL BCP-MCNC: 3.7 G/DL (ref 3.5–5.2)
ALP SERPL-CCNC: 58 U/L (ref 55–135)
ALT SERPL W/O P-5'-P-CCNC: 20 U/L (ref 10–44)
ANION GAP SERPL CALC-SCNC: 10 MMOL/L (ref 8–16)
AST SERPL-CCNC: 17 U/L (ref 10–40)
BASOPHILS # BLD AUTO: 0.03 K/UL (ref 0–0.2)
BASOPHILS NFR BLD: 0.2 % (ref 0–1.9)
BILIRUB SERPL-MCNC: 0.5 MG/DL (ref 0.1–1)
BUN SERPL-MCNC: 16 MG/DL (ref 6–20)
CALCIUM SERPL-MCNC: 9.3 MG/DL (ref 8.7–10.5)
CHLORIDE SERPL-SCNC: 109 MMOL/L (ref 95–110)
CO2 SERPL-SCNC: 22 MMOL/L (ref 23–29)
CREAT SERPL-MCNC: 0.7 MG/DL (ref 0.5–1.4)
DIFFERENTIAL METHOD BLD: ABNORMAL
EOSINOPHIL # BLD AUTO: 0.2 K/UL (ref 0–0.5)
EOSINOPHIL NFR BLD: 1.1 % (ref 0–8)
ERYTHROCYTE [DISTWIDTH] IN BLOOD BY AUTOMATED COUNT: 13.8 % (ref 11.5–14.5)
EST. GFR  (NO RACE VARIABLE): >60 ML/MIN/1.73 M^2
GLUCOSE SERPL-MCNC: 119 MG/DL (ref 70–110)
HCT VFR BLD AUTO: 45.7 % (ref 37–48.5)
HGB BLD-MCNC: 15.5 G/DL (ref 12–16)
IMM GRANULOCYTES # BLD AUTO: 0.05 K/UL (ref 0–0.04)
IMM GRANULOCYTES NFR BLD AUTO: 0.4 % (ref 0–0.5)
LIPASE SERPL-CCNC: 15 U/L (ref 4–60)
LYMPHOCYTES # BLD AUTO: 0.7 K/UL (ref 1–4.8)
LYMPHOCYTES NFR BLD: 5 % (ref 18–48)
MAGNESIUM SERPL-MCNC: 1.8 MG/DL (ref 1.6–2.6)
MCH RBC QN AUTO: 31.3 PG (ref 27–31)
MCHC RBC AUTO-ENTMCNC: 33.9 G/DL (ref 32–36)
MCV RBC AUTO: 92 FL (ref 82–98)
MONOCYTES # BLD AUTO: 0.7 K/UL (ref 0.3–1)
MONOCYTES NFR BLD: 5 % (ref 4–15)
NEUTROPHILS # BLD AUTO: 11.7 K/UL (ref 1.8–7.7)
NEUTROPHILS NFR BLD: 88.3 % (ref 38–73)
NRBC BLD-RTO: 0 /100 WBC
PHOSPHATE SERPL-MCNC: 3.1 MG/DL (ref 2.7–4.5)
PHOSPHATE SERPL-MCNC: 3.1 MG/DL (ref 2.7–4.5)
PLATELET # BLD AUTO: 286 K/UL (ref 150–450)
PMV BLD AUTO: 8.4 FL (ref 9.2–12.9)
POCT GLUCOSE: 112 MG/DL (ref 70–110)
POTASSIUM SERPL-SCNC: 3.2 MMOL/L (ref 3.5–5.1)
PROT SERPL-MCNC: 6.5 G/DL (ref 6–8.4)
RBC # BLD AUTO: 4.96 M/UL (ref 4–5.4)
SODIUM SERPL-SCNC: 141 MMOL/L (ref 136–145)
TSH SERPL DL<=0.005 MIU/L-ACNC: 0.92 UIU/ML (ref 0.4–4)
WBC # BLD AUTO: 13.28 K/UL (ref 3.9–12.7)

## 2024-04-20 PROCEDURE — 93005 ELECTROCARDIOGRAM TRACING: CPT

## 2024-04-20 PROCEDURE — 85025 COMPLETE CBC W/AUTO DIFF WBC: CPT | Performed by: STUDENT IN AN ORGANIZED HEALTH CARE EDUCATION/TRAINING PROGRAM

## 2024-04-20 PROCEDURE — 96365 THER/PROPH/DIAG IV INF INIT: CPT

## 2024-04-20 PROCEDURE — 84443 ASSAY THYROID STIM HORMONE: CPT | Performed by: STUDENT IN AN ORGANIZED HEALTH CARE EDUCATION/TRAINING PROGRAM

## 2024-04-20 PROCEDURE — 96375 TX/PRO/DX INJ NEW DRUG ADDON: CPT

## 2024-04-20 PROCEDURE — 96361 HYDRATE IV INFUSION ADD-ON: CPT

## 2024-04-20 PROCEDURE — 83690 ASSAY OF LIPASE: CPT | Performed by: STUDENT IN AN ORGANIZED HEALTH CARE EDUCATION/TRAINING PROGRAM

## 2024-04-20 PROCEDURE — 99285 EMERGENCY DEPT VISIT HI MDM: CPT | Mod: 25

## 2024-04-20 PROCEDURE — 83735 ASSAY OF MAGNESIUM: CPT | Performed by: STUDENT IN AN ORGANIZED HEALTH CARE EDUCATION/TRAINING PROGRAM

## 2024-04-20 PROCEDURE — 25000003 PHARM REV CODE 250: Performed by: STUDENT IN AN ORGANIZED HEALTH CARE EDUCATION/TRAINING PROGRAM

## 2024-04-20 PROCEDURE — 63600175 PHARM REV CODE 636 W HCPCS: Performed by: STUDENT IN AN ORGANIZED HEALTH CARE EDUCATION/TRAINING PROGRAM

## 2024-04-20 PROCEDURE — 80053 COMPREHEN METABOLIC PANEL: CPT | Performed by: STUDENT IN AN ORGANIZED HEALTH CARE EDUCATION/TRAINING PROGRAM

## 2024-04-20 PROCEDURE — 93010 ELECTROCARDIOGRAM REPORT: CPT | Mod: ,,, | Performed by: INTERNAL MEDICINE

## 2024-04-20 PROCEDURE — 84100 ASSAY OF PHOSPHORUS: CPT | Performed by: STUDENT IN AN ORGANIZED HEALTH CARE EDUCATION/TRAINING PROGRAM

## 2024-04-20 PROCEDURE — 82962 GLUCOSE BLOOD TEST: CPT

## 2024-04-20 RX ORDER — POTASSIUM CHLORIDE 7.45 MG/ML
10 INJECTION INTRAVENOUS ONCE
Qty: 100 ML | Refills: 0 | Status: COMPLETED | OUTPATIENT
Start: 2024-04-20 | End: 2024-04-20

## 2024-04-20 RX ORDER — ONDANSETRON 4 MG/1
4 TABLET, ORALLY DISINTEGRATING ORAL EVERY 6 HOURS PRN
Qty: 12 TABLET | Refills: 0 | Status: SHIPPED | OUTPATIENT
Start: 2024-04-20 | End: 2024-04-23

## 2024-04-20 RX ORDER — ONDANSETRON HYDROCHLORIDE 2 MG/ML
4 INJECTION, SOLUTION INTRAVENOUS
Status: COMPLETED | OUTPATIENT
Start: 2024-04-20 | End: 2024-04-20

## 2024-04-20 RX ADMIN — POTASSIUM CHLORIDE 10 MEQ: 7.46 INJECTION, SOLUTION INTRAVENOUS at 03:04

## 2024-04-20 RX ADMIN — ONDANSETRON 4 MG: 2 INJECTION INTRAMUSCULAR; INTRAVENOUS at 02:04

## 2024-04-20 RX ADMIN — POTASSIUM BICARBONATE 20 MEQ: 391 TABLET, EFFERVESCENT ORAL at 03:04

## 2024-04-20 RX ADMIN — SODIUM CHLORIDE 1000 ML: 9 INJECTION, SOLUTION INTRAVENOUS at 02:04

## 2024-04-20 NOTE — ED TRIAGE NOTES
Pt presents to ED with complaint of LOC. Pt states that she has been experiencing diarrhea x3 weeks, they recently changed her medication to help with diarrhea. Pt denies any relief. Today pt woke up and felt nauseous. Pt had one ep of vomiting accompanied by loc. Pt unsure if she hit her head, but  is complaining of head tenderness and right sided neck pain. Pt denies chest pain or sob.

## 2024-04-20 NOTE — ED PROVIDER NOTES
Encounter Date: 4/20/2024    SCRIBE #1 NOTE: I, Marina Bolaños, am scribing for, and in the presence of,  Shu Velez DO. I have scribed the following portions of the note - Other sections scribed: HPI, ROS, PE.       History     Chief Complaint   Patient presents with    Loss of Consciousness     Pt to ED reporting weakness, diarrhea, and vomiting x 2 weeks related to immunotherapy. Pt had a syncopal episode today that lasted a couple of minutes. Pt positive for hitting head. Pt denies vomiting post syncope.      Sugar Diaz is a 58 y.o. female, with a PMHx of endometrial cancer, GERD, neurtopenic fever, hypokalemia, hypothyroidism, who presents to the ED with loss of consciousness this morning at approximately 11 am. Patient reports she was in the bathroom, and is unsure if she hit her head. She states she has been feeling fatigued, had diarrhea, and this morning began having N/V. Endorsed an imodium at 9 am this morning but vomited directly after. States she had an Entyvio infusion on 04/17/2024 to try and treat her diarrhea, that she states she has been experiencing due to her immunotherapy treatments for her endometrial cancer. States her last immunotherapy infusion was 5 weeks ago and she is due for one next week. Notes that nausea and vomiting are not typical symptoms she usually experiences with her cancer treatment, but states her mother has been sick with similar symptoms as well as her grandson. Also endorses some chills and mild generalized abdominal pain. No other exacerbating or alleviating factors. Denies fever,lightheadedness, dizziness, or other associated symptoms.       The history is provided by the patient. No  was used.     Review of patient's allergies indicates:  No Known Allergies  Past Medical History:   Diagnosis Date    Chest pain 2002    NEGATIVE STRESS ECHO 2002    Endometrial cancer     2023    GERD (gastroesophageal reflux disease)      Lower back pain     Migraines     Mild allergic rhinitis     Postmenopausal bleeding      Past Surgical History:   Procedure Laterality Date    COLONOSCOPY N/A 10/06/2023    Procedure: COLONOSCOPY;  Surgeon: Marcia Weaver MD;  Location: [facility];  Service: Endoscopy;  Laterality: N/A;  10/4- referred by Dr. Weaver/ Subject: Needs colonoscopy ASAP within 1 week /Procedure Timing: < 1 week/Diagnosis: Diarrhea, evaluate for immunotherapy induced colitis/ Prep instructions to portal. AS    COLONOSCOPY N/A 2/21/2024    Procedure: COLONOSCOPY;  Surgeon: Fercho Ramirez MD;  Location: [facility] (2ND FLR);  Service: Endoscopy;  Laterality: N/A;    CYST REMOVAL Left 04/28/2022    Procedure: EXCISION, CYST-BACK;  Surgeon: Thomas Hurst MD;  Location: [facility];  Service: General;  Laterality: Left;  left upper back  RN PREOP ON 4/21/22-NF    ESOPHAGOGASTRODUODENOSCOPY N/A 2/21/2024    Procedure: EGD (ESOPHAGOGASTRODUODENOSCOPY);  Surgeon: Fercho Ramirez MD;  [facility]  Service: Endoscopy;  Laterality: N/A;    HYSTERECTOMY      after endometrial cancer diagnosis    HYSTEROSCOPY WITH DILATION AND CURETTAGE OF UTERUS N/A 05/23/2023    Procedure: HYSTEROSCOPY, WITH DILATION AND CURETTAGE OF UTERUS;  Surgeon: Omaira Evans MD;  Location: [facility];  Service: OB/GYN;  Laterality: N/A;  [facility] HAMMAD HERNANDEZ  [phone] TEXTED HAMMAD ON 5/2/2023 @ 3:51PM. HAMMAD RESPONDED ON 5/2/2023 @ 3:51PM-LO  RN PREOP 5/18/23  T & S; UPT ORDERED AND SCHEDULED 5/22/23    LYMPH NODE BIOPSY Left 06/05/2023    Procedure: BIOPSY, sentinel LYMPH NODE;  Surgeon: Lavell Rodríguez MD;  Location: [facility] OR;  Service: OB/GYN;  Laterality: Left;    LYMPHADENECTOMY, PELVIS Right 06/05/2023    Procedure: LYMPHADENECTOMY, PELVIS;  Surgeon: Lavell Rodríguez MD;  Location: [facility] OR;  Service: OB/GYN;  Laterality: Right;    MAPPING, LYMPH NODE, SENTINEL Bilateral 06/05/2023    Procedure: injection, LYMPH NODE, SENTINEL;  Surgeon: Lavell  MD Marcos;  Location: [facility];  Service: OB/GYN;  Laterality: Bilateral;    NERVE REPAIR Right 06/05/2023    Procedure: REPAIR, OBTURATOR NERVE;  Surgeon: Lavell Rodríguez MD;  Location: [facility];  Service: OB/GYN;  Laterality: Right;    TONSILLECTOMY      TONSILLECTOMY      TUBAL LIGATION      XI ROBOTIC HYSTERECTOMY, WITH SALPINGO-OOPHORECTOMY Bilateral 06/05/2023    Procedure: XI ROBOTIC HYSTERECTOMY,WITH SALPINGO-OOPHORECTOMY;  Surgeon: Lavell Rodríguez MD;  Location: [facility];  Service: OB/GYN;  Laterality: Bilateral;  removed through vagina     Family History   Problem Relation Name Age of Onset    No Known Problems Mother      Heart disease Father Abhilash     Breast cancer Sister  55    No Known Problems Sister      Colon cancer Paternal Uncle      Esophageal cancer Neg Hx       Social History     Tobacco Use    Smoking status: Never    Smokeless tobacco: Never   Substance Use Topics    Alcohol use: Never    Drug use: Never     Review of Systems   Constitutional:  Positive for chills and fatigue. Negative for fever.   HENT:  Negative for sore throat.    Respiratory:  Negative for cough and shortness of breath.    Cardiovascular:  Negative for chest pain and leg swelling.   Gastrointestinal:  Positive for abdominal pain, diarrhea, nausea and vomiting.   Genitourinary:  Negative for dysuria and hematuria.   Musculoskeletal:  Negative for back pain and neck pain.   Skin:  Negative for rash.   Neurological:  Negative for syncope.       Physical Exam     Initial Vitals   BP Pulse Resp Temp SpO2   04/20/24 1215 04/20/24 1215 04/20/24 1215 04/20/24 1227 04/20/24 1215   (!) 100/50 93 18 97.5 °F (36.4 °C) 99 %      MAP       --                Physical Exam    Nursing note and vitals reviewed.  Constitutional: She appears well-developed. She is not diaphoretic.  Non-toxic appearance.   Ill-appearing.    HENT:   Head: Normocephalic and atraumatic.   Right Ear: External ear normal.   Left Ear: External ear normal.   Nose: Nose  normal.   Dry mucous membranes.    Eyes: Conjunctivae and EOM are normal. Pupils are equal, round, and reactive to light. Right eye exhibits no discharge. Left eye exhibits no discharge. No scleral icterus.   Neck: Neck supple. No tracheal deviation present. No JVD present.   Normal range of motion.  Cardiovascular:  Normal rate, regular rhythm, normal heart sounds and intact distal pulses.     Exam reveals no gallop and no friction rub.       No murmur heard.  Pulmonary/Chest: Breath sounds normal. No stridor. No respiratory distress. She has no wheezes. She has no rhonchi. She has no rales.   Abdominal: Abdomen is soft. Bowel sounds are normal. She exhibits no distension and no mass. There is no abdominal tenderness. There is no rebound and no guarding.   Musculoskeletal:         General: No tenderness or edema. Normal range of motion.      Cervical back: Normal range of motion and neck supple.     Lymphadenopathy:     She has no cervical adenopathy.   Neurological: She is alert and oriented to person, place, and time. She has normal strength and normal reflexes. No cranial nerve deficit or sensory deficit.   Moves all extremities, follows all commands, no focal neurologic deficits.      Skin: Skin is warm and dry. No rash and no abscess noted. No erythema. No pallor.   Psychiatric: She has a normal mood and affect.         ED Course   Critical Care    Date/Time: 4/20/2024 10:18 PM    Performed by: Shu Velez DO  Authorized by: Shu Velez DO  Direct patient critical care time: 35 minutes  Additional history critical care time: 10 minutes  Ordering / reviewing critical care time: 10 minutes  Documentation critical care time: 10 minutes  Total critical care time (exclusive of procedural time) : 65 minutes  Critical care time was exclusive of separately billable procedures and treating other patients and teaching time.  Critical care was necessary to treat or prevent imminent or  life-threatening deterioration of the following conditions: metabolic crisis.  Critical care was time spent personally by me on the following activities: development of treatment plan with patient or surrogate, evaluation of patient's response to treatment, examination of patient, obtaining history from patient or surrogate, ordering and performing treatments and interventions, ordering and review of laboratory studies, ordering and review of radiographic studies, pulse oximetry, re-evaluation of patient's condition and review of old charts.        Labs Reviewed   CBC W/ AUTO DIFFERENTIAL - Abnormal; Notable for the following components:       Result Value    WBC 13.28 (*)     MCH 31.3 (*)     MPV 8.4 (*)     Gran # (ANC) 11.7 (*)     Immature Grans (Abs) 0.05 (*)     Lymph # 0.7 (*)     Gran % 88.3 (*)     Lymph % 5.0 (*)     All other components within normal limits   COMPREHENSIVE METABOLIC PANEL - Abnormal; Notable for the following components:    Potassium 3.2 (*)     CO2 22 (*)     Glucose 119 (*)     All other components within normal limits   POCT GLUCOSE - Abnormal; Notable for the following components:    POCT Glucose 112 (*)     All other components within normal limits   MAGNESIUM   PHOSPHORUS   TSH   PHOSPHORUS   LIPASE   LIPASE     EKG Readings: (Independently Interpreted)    EKG obtained at 1:56 p.m. shows a normal sinus rhythm with a heart rate of 81 beats per minute, normal axis and intervals, no acute ST segment changes.  EKG grossly unchanged when compared to previous EKG from February 2024.       Imaging Results              CT Head Without Contrast (Final result)  Result time 04/20/24 15:41:14      Final result by Alexis Moe MD (04/20/24 15:41:14)                   Impression:      No acute intracranial pathology.      Electronically signed by: Alexis Moe  Date:    04/20/2024  Time:    15:41               Narrative:    EXAMINATION:  CT HEAD WITHOUT CONTRAST    CLINICAL HISTORY:  Head trauma,  "moderate-severe;    TECHNIQUE:  Low dose axial CT images obtained throughout the head without intravenous contrast. Sagittal and coronal reconstructions were performed.    COMPARISON:  None.    FINDINGS:  Intracranial compartment:    Ventricles and sulci are normal in size for age without evidence of hydrocephalus. No extra-axial blood or fluid collections.    The brain parenchyma appears normal. No parenchymal mass, hemorrhage, edema or major vascular distribution infarct.  Partially empty sella configuration.    Skull/extracranial contents (limited evaluation): No fracture. Mastoid air cells and paranasal sinuses are essentially clear.  Large left nasal giselle bullosa.                                       Medications   sodium chloride 0.9% bolus 1,000 mL 1,000 mL (0 mLs Intravenous Stopped 4/20/24 1639)   ondansetron injection 4 mg (4 mg Intravenous Given 4/20/24 1434)   potassium chloride 10 mEq in 100 mL IVPB (0 mEq Intravenous Stopped 4/20/24 1638)   potassium bicarbonate disintegrating tablet 20 mEq (20 mEq Oral Given 4/20/24 1534)     Medical Decision Making  MDM  This is an emergent evaluation of a 58 y.o. female with a known past medical history of hypothyroidism, endometrial cancer, acid reflux, who is presenting after an episode of loss of consciousness this morning.  Patient's  at bedside states that he found the patient in the bathroom in a "weird position".  He is unsure if the patient hit her head.  The patient is also reporting associated chills, diffuse abdominal pain, nausea, vomiting and diarrhea.  Of note, the patient has had the diarrhea for the past 2 weeks.  She reports taking Imodium at home this morning for her symptoms however she could not keep it down.  She also reports her mom has similar vomiting and diarrhea.  She follows with oncology at [facility], Dr. Rodríguez.  She was currently receiving chemotherapy infusion.  Per chart review, her previous chemotherapy infusions have been " complicated with salmonella and diarrhea.  She was currently on prednisone.  Initial vitals in the ED   BP: (!) 100/50 [04/20/24 1215]  Pulse: 93 [04/20/24 1215]  Resp: 18 [04/20/24 1215]  Temp: 97.5 °F (36.4 °C) [04/20/24 1227]  SpO2: 99 % [04/20/24 1215] .     Physical exam noted above. DDx includes but is not limited to chemotherapy side effect, electrolyte abnormality, ALISA, dehydration, colitis, infectious diarrhea secondary to salmonella, orthostatic hypotension. Also considered but clinically less likely to be stroke, meningitis, sepsis, ACS. Will obtain labs and imaging including CBC, CMP, magnesium, phosphorus, UA, EKG, CT head, orthostatic vitals. Will also provide IV Zofran for nausea, IV fluid hydration. Will continue to monitor and frequently reassess pending results of labs, treatments and final disposition.    Patient is aware of plan and is amenable.     Shu Velez D.O  EMERGENCY MEDICINE  2:25 PM 04/20/2024    UPDATE  Labs reveal a hypokalemia with a potassium of 3.2 which appears decreased from 4.6 when compared to labs from 3/12/2024. Mag within normal limits. Mild leukocytosis which is likely due to known steroid use. Remainder of labs unremarkable. EKG shows no acute STEMI. Ct shows no intracranial abnormality. She was treated with IV zofran for nausea. IV fluids hydration and potassium replacement. On reassessment, the patient symptoms were improved. She was able to tolerate PO while in the ED. /55 with remainder of vitals reassuring. Patient requesting discharge as that her symptoms have resolved. She has follow-up scheduled for next week with her oncologist. Will discharge with Zofran, instructions for symptomatic treatment at home and ED return precautions. Patient and her spouse are aware and agreeable to the plan.     Shu Velez D.O  EMERGENCY MEDICINE   5:42 PM 04/20/2024       This chart was completed using dictation software, as a result there may be some  transcription errors      Amount and/or Complexity of Data Reviewed  External Data Reviewed: labs, radiology, ECG and notes.  Labs: ordered. Decision-making details documented in ED Course.  Radiology: ordered and independent interpretation performed. Decision-making details documented in ED Course.  ECG/medicine tests: ordered and independent interpretation performed. Decision-making details documented in ED Course.    Risk  OTC drugs.  Prescription drug management.                                      Clinical Impression:  Final diagnoses:  [R40.20] Loss of consciousness  [E87.6] Hypokalemia (Primary)  [Z85.42] History of endometrial cancer          ED Disposition Condition    Discharge Stable          ED Prescriptions       Medication Sig Dispense Start Date End Date Auth. Provider    ondansetron (ZOFRAN-ODT) 4 MG TbDL Take 1 tablet (4 mg total) by mouth every 6 (six) hours as needed (nausea and vomiting). 12 tablet 4/20/2024 4/23/2024 Shu Velez DO          Follow-up Information       Follow up With Specialties Details Why Contact Info    Lavell Rodríguez MD Gynecologic Oncology Go on 4/23/2024 Emergency Room Follow-up, please keep your previously scheduled follow-up appointment [address]  [address]  [facility] [address]  [phone]      Dexter Khan MD Internal Medicine Schedule an appointment as soon as possible for a visit in 3 days Emergency Room Follow-up [address]  [address]  [facility] [address]  [phone]      [facility] Emergency Medicine Go to  If symptoms worsen [address] [facility] [place] [address] [phone]          I, Shu Velez DO, personally performed the services described in this documentation. All medical record entries made by the scribe were at my direction and in my presence.  I have reviewed the chart and agree that the record reflects my personal performance  and is accurate and complete.      Shu Velez, DO  04/23/24 3201

## 2024-04-20 NOTE — DISCHARGE INSTRUCTIONS
Thank you for coming to our Emergency Department today. It is important to remember that some problems or medical conditions are difficult to diagnose and may not be found during your Emergency Department visit.     Be sure to follow up with your primary care doctor and review all labs/imaging/tests that were performed during your ER visit with them. Some labs/tests may be outside of the normal range and require non-emergent follow-up and further investigation to help diagnose/exclude/prevent complications or other potentially serious medical conditions that were not addressed during your ER visit.    If you do not have a primary care doctor, you may contact the one listed on your discharge paperwork or you may also call the [facility] Appointment Desk at [phone] to schedule an appointment and establish care with one. It is important to your health that you have a primary care doctor.    Please take all medications as directed. All medications may potentially have side-effects and it is impossible to predict which medications may give you side-effects or what side-effects (if any) they will give you.. If you feel that you are having a negative effect or side-effect of any medication you should immediately stop taking them and seek medical attention. If you feel that you are having a life-threatening reaction call 911.    Return to the ER with any questions/concerns, new/concerning symptoms, worsening or failure to improve.     Do not drive, swim, climb to height, take a bath, operate heavy machinery, drink alcohol or take potentially sedating medications, sign any legal documents or make any important decisions for 24 hours if you have received any pain medications, sedatives or mood altering drugs during your ER visit or within 24 hours of taking them if they have been prescribed to you.     You can find additional resources for Dentists, hearing aids, durable medical equipment, low cost pharmacies and  other resources at https://[facility].org    BELOW THIS LINE ONLY APPLIES IF YOU HAVE A COVID TEST PENDING OR IF YOU HAVE BEEN DIAGNOSED WITH COVID:  Please access [facility] to review the results of your test. Until the results of your COVID test return, you should isolate yourself so as not to potentially spread illness to others.   If your COVID test returns positive, you should isolate yourself so as not to spread illness to others. After five full days, if you are feeling better and you have not had fever for 24 hours, you can return to your typical daily activities, but you must wear a mask around others for an additional 5 days.   If your COVID test returns negative and you are either unvaccinated or more than six months out from your two-dose vaccine and are not yet boosted, you should still quarantine for 5 full days followed by strict mask use for an additional 5 full days.   If your COVID test returns negative and you have received your 2-dose initial vaccine as well as a booster, you should continue strict mask use for 10 full days after the exposure.  For all those exposed, best practice includes a test at day 5 after the exposure. This can be a home test or a test through one of the many testing centers throughout our community.   Masking is always advised to limit the spread of COVID. Cdc.gov is an excellent site to obtain the latest up to date recommendations regarding COVID and COVID testing.     CDC Testing and Quarantine Guidelines for patients with exposure to a known-positive COVID-19 person:  A close exposure is defined as anyone who has had an exposure (masked or unmasked) to a known COVID -19 positive person within 6 feet of someone for a cumulative total of 15 minutes or more over a 24-hour period.   Vaccinated and/or if you recently had a positive covid test within 90 days do NOT need to quarantine after contact with someone who had COVID-19 unless you develop symptoms.   Fully vaccinated  people who have not had a positive test within 90 days, should get tested 3-5 days after their exposure, even if they don't have symptoms and wear a mask indoors in public for 14 days following exposure or until their test result is negative.      Unvaccinated and/or NOT had a positive test within 90 days and meet close exposure  You are required by CDC guidelines to quarantine for at least 5 days from time of exposure followed by 5 days of strict masking. It is recommended, but not required to test after 5 days, unless you develop symptoms, in which case you should test at that time.  If you get tested after 5 days and your test is positive, your 5 day period of isolation starts the day of the positive test.    If your exposure does not meet the above definition, you can return to your normal daily activities to include social distancing, wearing a mask and frequent handwashing.      Here is a link to guidance from the CDC:  https://www.cdc.gov/media/releases/2021/s1227-isolation-quarantine-guidance.html      [place] Dept Of Health Testing Sites:  https://ldh.la.gov/page/3934      [facility] website with testing locations and guidance:  [email or web address]

## 2024-04-21 NOTE — PROGRESS NOTES
REFERRING PROVIDER  Omaira Evans MD Shah, Shamita MD    REASON FOR CONSULT  Sugar Diaz  is a 58 y.o.  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    HISTORY OF PRESENT ILLNESS    Chemotherapy regimen: Dostarlimab 1000mg 6 weeks  Cycle: 5  Previous dose limiting toxicities: Y  Cycle 3: Grade 4 diarrhea 2/2 Salmonella  Previous dose reductions: N  Previous breaks: Y  Cycle 4: 4 weeks 2/2 Grade 4 diarrhea 2/2 Salmonella  Previous changes in pre-medications: N    Energy level: baseline  Appetite: baseline  Fevers/chills/nights: no  Nausea: no  Diarrhea: yes, grade 1  Emesis: no  Neuropathy: yes, grade 3  Discoloration of lower extremities: no  Mucositis: no  Maculopapular rashes: no  Dyspnea or coughing: yes      Oncology History   Endometrial cancer   5/23/2023 Surgery    Hysteroscopy, D&C: grade 1 endometrioid EC     6/5/2023 Surgery    TRH/BSO/BSLN/RPLN/repair of obturator nerve    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 4.5 cm, grade 1, 96% MI (22/23 mm), +LUE, -LVSI, +MELF, -cervix, 1/2+ RPLN, 1/1+ LPLN, -ascites.     6/19/2023 Imaging Significant Findings    CT C/A/P w/: High L para-aortic at the level of the renal vessels, 1.5 cm.  Low R para-aortic at the LEAH, 4.1 cm in greatest dimension.     6/25/2023 Genomic Testing    [facility] (NGS): ARD1A, CHEK1, CTCF, , HDAC2, HNF1A, JAK1, KRAS, MAX, MSH6, P1K3R1, PPM1D, PTEN, ZFHX3, ZSR2     6/28/2023 Tumor Conference    No rule for debulking of para-aortic lymph nodes.  Adjuvant VBT.  Represent after completion of adjuvant therapy to discuss EBRT.        7/7/2023 - 10/19/2023 Chemotherapy    Carboplatin AUC 5/Paclitaxel 135 mg/m2/Dostarlimab 200mg q3 weeks x6    Previous dose limiting toxicities: Y  Cycle 4: Grade 2 colitis s/p steroid taper  Cycle 5: Grade 3 colitis  Previous dose reductions: N  Previous breaks: Y  Cycle  4: Dostarlimab omitted due to grade 2 colitis  Cycle 5: Dostarlimab 2/2 grade 3 colitis (negative colonoscopy with improvement in symptoms with steroid taper)     8/9/2023 - 9/13/2023 Radiation Therapy    Treating physician: Dr. Adriane Babb  Total Dose: 21 Gy HDR  Fractions: 3 vaginal cuff brachytherapy     8/22/2023 Imaging Significant Findings    CT A/P w/: No evidence of colitis, NH in high L para-aortic and low R para-aortic.     10/4/2023 Procedure    Colonoscopy: WNL     11/6/2023 Imaging Significant Findings    CT C/A/P w/: PATTI     11/6/2023 - 11/9/2023 Hospital Admission    Most Recent Admission Details  Admit Date: 11/6/23  Admitted for: RVS PNA superimposed by community acquitted PNA  Discharge date: 11/9/23  Discharged from: [facility] SURGICAL [address] at [facility] ([facility])  Discharge disposition: Home or Self Care       11/22/2023 -  Maintenance Therapy    Dostarlimab 1000mg x     2/21/2024 Procedure    EGD, Colonoscopy: No gross evidence of colitis      Hospital Admission    Most Recent Admission Details  Admit Date: 2/15/24  Admitted for: Grade 4 colitis 2/2 Salmonella.    Discharge date: 2/23/24  Discharged from: [facility] ONCOLOGY ACUTE at [facility]  Discharge disposition: Home or Self Care       2/21/2024 Procedure    Colonoscopy, EGD: No obvious ICI colitis          The following portions of the patient's history were reviewed and updated as appropriate: allergies, current medications, family history, medical history, social history and surgical history.    REVIEW OF SYSTEMS  All systems reviewed and negative except as noted in HPI.    OBJECTIVE   Vitals:    04/23/24 1133   BP: 127/80   Pulse: 98          Body mass index is 24.52 kg/m².      1. General: Well appearing, no apparent distress, alert and oriented.  2. Lymph: Neck symmetric without cervical or supraclavicular adenopathy or mass.  3. Lungs: Normal respiratory rate, no accessory muscle use.  4. Psych: Normal  affect.  5. Abdomen:  non-distended                  ECOG status: 1    LABORATORY DATA  Lab data reviewed.    RADIOLOGICAL DATA  Radiology data reviewed.    ASSESSMENT / PLAN     1. 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 Encounter for antineoplastic chemotherapy    4. Hypothyroidism due to medication    5. Subclinical hypothyroidism    6. Chemotherapy-induced neuropathy        Grade 1 diarrhea with Prednisone 20 mg qD and Entyvio (4/17 #1).   Administer maintenance cycle #5.  CMP, TSH, VV, cycle #6 6 weeks.    PATIENT EDUCATION  Ready to learn, no apparent learning barriers were identified; learning preferences include listening. Explained diagnosis and treatment plan; patient expressed understanding of the content.    ADMINISTRATIVE BILLING  Greater than 50% of was spent in counseling.     ONGOING COMPLEXITY BILLING  Visit today is associated with current or anticipated ongoing medical care related to this patients single serious condition/complex condition: endometrial cancer

## 2024-04-22 LAB
OHS QRS DURATION: 88 MS
OHS QTC CALCULATION: 443 MS

## 2024-04-23 ENCOUNTER — OFFICE VISIT (OUTPATIENT)
Dept: GYNECOLOGIC ONCOLOGY | Facility: CLINIC | Age: 59
End: 2024-04-23
Payer: COMMERCIAL

## 2024-04-23 ENCOUNTER — LAB VISIT (OUTPATIENT)
Dept: LAB | Facility: HOSPITAL | Age: 59
End: 2024-04-23
Attending: OBSTETRICS & GYNECOLOGY
Payer: COMMERCIAL

## 2024-04-23 VITALS
SYSTOLIC BLOOD PRESSURE: 127 MMHG | HEART RATE: 98 BPM | HEIGHT: 67 IN | BODY MASS INDEX: 24.56 KG/M2 | WEIGHT: 156.5 LBS | DIASTOLIC BLOOD PRESSURE: 80 MMHG

## 2024-04-23 DIAGNOSIS — K52.9 COLITIS: ICD-10-CM

## 2024-04-23 DIAGNOSIS — C54.1 MALIGNANT NEOPLASM OF ENDOMETRIUM: Primary | ICD-10-CM

## 2024-04-23 DIAGNOSIS — T45.1X5A CHEMOTHERAPY-INDUCED NEUROPATHY: ICD-10-CM

## 2024-04-23 DIAGNOSIS — E03.2 HYPOTHYROIDISM DUE TO MEDICATION: ICD-10-CM

## 2024-04-23 DIAGNOSIS — E03.8 SUBCLINICAL HYPOTHYROIDISM: ICD-10-CM

## 2024-04-23 DIAGNOSIS — C54.1 MALIGNANT NEOPLASM OF ENDOMETRIUM: ICD-10-CM

## 2024-04-23 DIAGNOSIS — G62.0 CHEMOTHERAPY-INDUCED NEUROPATHY: ICD-10-CM

## 2024-04-23 DIAGNOSIS — Z51.11 ENCOUNTER FOR ANTINEOPLASTIC CHEMOTHERAPY: ICD-10-CM

## 2024-04-23 DIAGNOSIS — C77.2 SECONDARY MALIGNANCY OF RETROPERITONEAL LYMPH NODES: ICD-10-CM

## 2024-04-23 LAB
ALBUMIN SERPL BCP-MCNC: 3.5 G/DL (ref 3.5–5.2)
ALP SERPL-CCNC: 65 U/L (ref 55–135)
ALT SERPL W/O P-5'-P-CCNC: 25 U/L (ref 10–44)
ANION GAP SERPL CALC-SCNC: 9 MMOL/L (ref 8–16)
AST SERPL-CCNC: 22 U/L (ref 10–40)
BILIRUB SERPL-MCNC: 0.3 MG/DL (ref 0.1–1)
BUN SERPL-MCNC: 13 MG/DL (ref 6–20)
CALCIUM SERPL-MCNC: 9.2 MG/DL (ref 8.7–10.5)
CHLORIDE SERPL-SCNC: 108 MMOL/L (ref 95–110)
CO2 SERPL-SCNC: 21 MMOL/L (ref 23–29)
CREAT SERPL-MCNC: 0.7 MG/DL (ref 0.5–1.4)
ERYTHROCYTE [DISTWIDTH] IN BLOOD BY AUTOMATED COUNT: 13.9 % (ref 11.5–14.5)
EST. GFR  (NO RACE VARIABLE): >60 ML/MIN/1.73 M^2
GLUCOSE SERPL-MCNC: 98 MG/DL (ref 70–110)
HAV IGG SER QL IA: NORMAL
HCT VFR BLD AUTO: 40 % (ref 37–48.5)
HGB BLD-MCNC: 14.1 G/DL (ref 12–16)
IMM GRANULOCYTES # BLD AUTO: 0.04 K/UL (ref 0–0.04)
MCH RBC QN AUTO: 32.3 PG (ref 27–31)
MCHC RBC AUTO-ENTMCNC: 35.3 G/DL (ref 32–36)
MCV RBC AUTO: 92 FL (ref 82–98)
NEUTROPHILS # BLD AUTO: 5.3 K/UL (ref 1.8–7.7)
PLATELET # BLD AUTO: 371 K/UL (ref 150–450)
PMV BLD AUTO: 9.1 FL (ref 9.2–12.9)
POTASSIUM SERPL-SCNC: 3.5 MMOL/L (ref 3.5–5.1)
PROT SERPL-MCNC: 6.4 G/DL (ref 6–8.4)
RBC # BLD AUTO: 4.37 M/UL (ref 4–5.4)
SODIUM SERPL-SCNC: 138 MMOL/L (ref 136–145)
T4 FREE SERPL-MCNC: 0.96 NG/DL (ref 0.71–1.51)
TSH SERPL DL<=0.005 MIU/L-ACNC: 1.53 UIU/ML (ref 0.4–4)
WBC # BLD AUTO: 9.1 K/UL (ref 3.9–12.7)

## 2024-04-23 PROCEDURE — 3008F BODY MASS INDEX DOCD: CPT | Mod: CPTII,S$GLB,, | Performed by: OBSTETRICS & GYNECOLOGY

## 2024-04-23 PROCEDURE — 1159F MED LIST DOCD IN RCRD: CPT | Mod: CPTII,S$GLB,, | Performed by: OBSTETRICS & GYNECOLOGY

## 2024-04-23 PROCEDURE — 86787 VARICELLA-ZOSTER ANTIBODY: CPT | Performed by: INTERNAL MEDICINE

## 2024-04-23 PROCEDURE — 84439 ASSAY OF FREE THYROXINE: CPT | Performed by: OBSTETRICS & GYNECOLOGY

## 2024-04-23 PROCEDURE — 3074F SYST BP LT 130 MM HG: CPT | Mod: CPTII,S$GLB,, | Performed by: OBSTETRICS & GYNECOLOGY

## 2024-04-23 PROCEDURE — 86735 MUMPS ANTIBODY: CPT | Performed by: INTERNAL MEDICINE

## 2024-04-23 PROCEDURE — 3079F DIAST BP 80-89 MM HG: CPT | Mod: CPTII,S$GLB,, | Performed by: OBSTETRICS & GYNECOLOGY

## 2024-04-23 PROCEDURE — 86762 RUBELLA ANTIBODY: CPT | Performed by: INTERNAL MEDICINE

## 2024-04-23 PROCEDURE — 99215 OFFICE O/P EST HI 40 MIN: CPT | Mod: S$GLB,,, | Performed by: OBSTETRICS & GYNECOLOGY

## 2024-04-23 PROCEDURE — 99999 PR PBB SHADOW E&M-EST. PATIENT-LVL III: CPT | Mod: PBBFAC,,, | Performed by: OBSTETRICS & GYNECOLOGY

## 2024-04-23 PROCEDURE — 86706 HEP B SURFACE ANTIBODY: CPT | Performed by: INTERNAL MEDICINE

## 2024-04-23 PROCEDURE — G2211 COMPLEX E/M VISIT ADD ON: HCPCS | Mod: S$GLB,,, | Performed by: OBSTETRICS & GYNECOLOGY

## 2024-04-23 PROCEDURE — 80053 COMPREHEN METABOLIC PANEL: CPT | Performed by: OBSTETRICS & GYNECOLOGY

## 2024-04-23 PROCEDURE — 86765 RUBEOLA ANTIBODY: CPT | Performed by: INTERNAL MEDICINE

## 2024-04-23 PROCEDURE — 84443 ASSAY THYROID STIM HORMONE: CPT | Performed by: OBSTETRICS & GYNECOLOGY

## 2024-04-23 PROCEDURE — 86790 VIRUS ANTIBODY NOS: CPT | Performed by: INTERNAL MEDICINE

## 2024-04-23 PROCEDURE — 85027 COMPLETE CBC AUTOMATED: CPT | Performed by: OBSTETRICS & GYNECOLOGY

## 2024-04-23 PROCEDURE — 36415 COLL VENOUS BLD VENIPUNCTURE: CPT | Performed by: INTERNAL MEDICINE

## 2024-04-24 LAB
MUMPS IGG INTERPRETATION: POSITIVE
MUMPS IGG SCREEN: 40.8 AU/ML
RUBEOLA IGG ANTIBODY: >300 AU/ML
RUBEOLA INTERPRETATION: POSITIVE
RUBV IGG SER-ACNC: 9.8 IU/ML
RUBV IGG SER-IMP: ABNORMAL
VARICELLA INTERPRETATION: POSITIVE
VARICELLA ZOSTER IGG: 1048

## 2024-04-24 NOTE — PROGRESS NOTES
Acupuncture Evaluation Note     [id number]    Traditional Chinese Medicine (TCM) Diagnosis: Qi Stagnation, Blood Stasis, and Qi Deficiency  Medical Diagnosis:   Encounter Diagnoses   Name Primary?    Malignant neoplasm of endometrium Yes    Secondary malignancy of retroperitoneal lymph nodes     Encounter for antineoplastic chemotherapy         Evaluation Date: 4/24/2024    Visit #/Visits authorized: 12, 12/12    Precautions: Standard and cancer    Subjective     Chief Concern: CIPN, bilateral feet and hands - mostly right hand and feet.      Medical necessity is demonstrated by the following IMPAIRMENTS: Medical Necessity: Decreased mobility limits day to day activities, social, and emergent situations and Decreased quality of life              Aggravating Factors:  movement, lying down, standing, prolonged sitting, and pressure   Relieving Factors:  nothing    Symptom Description:     Quality:  Aching, Burning, Tight, Tingling, and Numb  Severity:  10  Frequency:  continuously      Objective       New Findings:      Treatment     Treatment Principles:  Move qi and blood, nourish qi, stop pain     Acupuncture points used:  4 AWAD, BA YUDI, BA KESHA , Gb34, Ki3, Sp6, Sp9, and St36    Bilateral points:   Unilateral points:   Auricular Treatment:      Needles In: 25  Needles Out: 25  [place] W/ STIM placed: 3:50  Needles W/ STIM removed: 4:20      Assessment     After treatment, patient felt neuropathy is still present, but has improved a great deal, no longer has sharp pains.     Patient prognosis is Good.     Patient will continue to benefit from acupuncture treatment to address the deficits listed in the problem list box on initial evaluation, provide patient family education and to maximize pt's level of independence in the home and community environment.     Patient's spiritual, cultural and educational needs considered and pt agreeable to plan of care and goals.      Anticipated barriers to treatment: none    Plan     Recommend as needed for maintenance     Education:  Patient is aware of cumulative benefit of acupuncture

## 2024-04-25 ENCOUNTER — INFUSION (OUTPATIENT)
Dept: INFUSION THERAPY | Facility: OTHER | Age: 59
End: 2024-04-25
Attending: INTERNAL MEDICINE
Payer: COMMERCIAL

## 2024-04-25 VITALS
RESPIRATION RATE: 16 BRPM | HEART RATE: 94 BPM | HEIGHT: 67 IN | DIASTOLIC BLOOD PRESSURE: 63 MMHG | SYSTOLIC BLOOD PRESSURE: 133 MMHG | BODY MASS INDEX: 25.15 KG/M2 | WEIGHT: 160.25 LBS | TEMPERATURE: 99 F | OXYGEN SATURATION: 99 %

## 2024-04-25 DIAGNOSIS — C54.1 ENDOMETRIAL CANCER: Primary | ICD-10-CM

## 2024-04-25 LAB
HBV SURFACE AB SER QL IA: NEGATIVE
HBV SURFACE AB SERPL IA-ACNC: <3 MIU/ML

## 2024-04-25 PROCEDURE — 96413 CHEMO IV INFUSION 1 HR: CPT

## 2024-04-25 PROCEDURE — 25000003 PHARM REV CODE 250: Performed by: OBSTETRICS & GYNECOLOGY

## 2024-04-25 PROCEDURE — 96367 TX/PROPH/DG ADDL SEQ IV INF: CPT

## 2024-04-25 PROCEDURE — 63600175 PHARM REV CODE 636 W HCPCS: Mod: JZ,TB | Performed by: OBSTETRICS & GYNECOLOGY

## 2024-04-25 RX ORDER — EPINEPHRINE 0.3 MG/.3ML
0.3 INJECTION SUBCUTANEOUS ONCE AS NEEDED
Status: DISCONTINUED | OUTPATIENT
Start: 2024-04-25 | End: 2024-04-25 | Stop reason: HOSPADM

## 2024-04-25 RX ORDER — DIPHENHYDRAMINE HYDROCHLORIDE 50 MG/ML
50 INJECTION INTRAMUSCULAR; INTRAVENOUS ONCE AS NEEDED
Status: DISCONTINUED | OUTPATIENT
Start: 2024-04-25 | End: 2024-04-25 | Stop reason: HOSPADM

## 2024-04-25 RX ORDER — HEPARIN 100 UNIT/ML
500 SYRINGE INTRAVENOUS
Status: DISCONTINUED | OUTPATIENT
Start: 2024-04-25 | End: 2024-04-25 | Stop reason: HOSPADM

## 2024-04-25 RX ORDER — SODIUM CHLORIDE 0.9 % (FLUSH) 0.9 %
10 SYRINGE (ML) INJECTION
Status: DISCONTINUED | OUTPATIENT
Start: 2024-04-25 | End: 2024-04-25 | Stop reason: HOSPADM

## 2024-04-25 RX ADMIN — SODIUM CHLORIDE 1000 MG: 9 INJECTION, SOLUTION INTRAVENOUS at 12:04

## 2024-04-25 RX ADMIN — FOSAPREPITANT DIMEGLUMINE 150 MG: 150 INJECTION, POWDER, LYOPHILIZED, FOR SOLUTION INTRAVENOUS at 11:04

## 2024-04-25 NOTE — PLAN OF CARE
Dostarlimab infusion administered, no reaction. Patient tolerated well. 24 gauge IV removed, catheter intact. No apparent distress noted. Discharge instructions given to patient. Patient understands instructions. Follow up appointment scheduled.

## 2024-04-30 ENCOUNTER — CLINICAL SUPPORT (OUTPATIENT)
Dept: REHABILITATION | Facility: HOSPITAL | Age: 59
End: 2024-04-30
Attending: OBSTETRICS & GYNECOLOGY
Payer: COMMERCIAL

## 2024-04-30 DIAGNOSIS — C54.1 MALIGNANT NEOPLASM OF ENDOMETRIUM: ICD-10-CM

## 2024-04-30 PROCEDURE — 97813 ACUP 1/> W/ESTIM 1ST 15 MIN: CPT | Mod: PN | Performed by: ACUPUNCTURIST

## 2024-04-30 PROCEDURE — 97814 ACUP 1/> W/ESTIM EA ADDL 15: CPT | Mod: PN | Performed by: ACUPUNCTURIST

## 2024-04-30 NOTE — PROGRESS NOTES
"  Acupuncture Evaluation Note     Name: Sugar Diaz  Clinic Number: [id number]    Traditional Chinese Medicine (TCM) Diagnosis: Qi Stagnation, Qi Deficiency, and Blood Deficiency  Medical Diagnosis:   Encounter Diagnosis   Name Primary?    Malignant neoplasm of endometrium         Evaluation Date: 4/30/2024    Visit #/Visits authorized: 12, 1/12    Precautions: Standard and cancer    Subjective     Chief Concern: CIPN - feet and right hand. 2nd dose of immunotherapy tomorrow    Medical necessity is demonstrated by the following IMPAIRMENTS: Medical Necessity: Decreased mobility limits day to day activities, social, and emergent situations              Aggravating Factors:  movement and cold   Relieving Factors:  heat    Symptom Description:     Quality:  Tingling and Numb and Swollen (sensation, no actual edema)  Severity:  7  Frequency:  continuously    Previous Treatments Tried:  Acupuncture    HEENT:  no complaints    Chest:  no complaints    Digestion:     Diet: in general, a "healthy" diet     Fluids: denies use, is drinking plenty of fluids, none  Taste/Appetite: appetite normal, taste changes come and goes    Symptoms: diarrhea    Sleep: good    Energy Levels:  good    Psychological Symptoms:  no complaints     Other Symptoms:     GYN Symptoms: hot flashes 2-3x/day, with sweating. Night sweats, sleeps through but wakes up with hair wet    Objective     Observation:     Tongue:  reddish/purple/dusky color, red pointed tip     Body:     Color:     Coating:      Pulse:        wiry       New Findings:      Treatment     Treatment Principles:  Move qi, nourish qi and blood     Acupuncture points used:  4 DELMI, BA YUDI, BA KESHA , Du20, Gb34, Ki3, Li11, REN12, REN6, Sp6, Sp9, St25, and St36    Bilateral points:  Unilateral points:  Auricular Treatment:      Needles In: 30  Needles Out: 30  [place] W/ STIM placed: 7:55  Needles W/ STIM removed: 8:20      Assessment     After treatment, patient felt good, " acupuncture has helped sharp pains associated with neuropathy      Patient prognosis is Good.     Patient will continue to benefit from acupuncture treatment to address the deficits listed in the problem list box on initial evaluation, provide patient family education and to maximize pt's level of independence in the home and community environment.     Patient's spiritual, cultural and educational needs considered and pt agreeable to plan of care and goals.     Anticipated barriers to treatment: none    Plan     Recommend 1 /week for 5 sessions then re-assess.      Education:  Patient is aware of cumulative benefit of acupuncture

## 2024-05-01 ENCOUNTER — INFUSION (OUTPATIENT)
Dept: INFUSION THERAPY | Facility: HOSPITAL | Age: 59
End: 2024-05-01
Attending: INTERNAL MEDICINE
Payer: COMMERCIAL

## 2024-05-01 VITALS
RESPIRATION RATE: 16 BRPM | BODY MASS INDEX: 25.07 KG/M2 | WEIGHT: 160.06 LBS | SYSTOLIC BLOOD PRESSURE: 127 MMHG | OXYGEN SATURATION: 98 % | HEART RATE: 94 BPM | TEMPERATURE: 98 F | DIASTOLIC BLOOD PRESSURE: 61 MMHG

## 2024-05-01 DIAGNOSIS — K52.9 COLITIS: Primary | ICD-10-CM

## 2024-05-01 PROCEDURE — 25000003 PHARM REV CODE 250: Performed by: INTERNAL MEDICINE

## 2024-05-01 PROCEDURE — 96365 THER/PROPH/DIAG IV INF INIT: CPT

## 2024-05-01 PROCEDURE — 63600175 PHARM REV CODE 636 W HCPCS: Mod: JZ,JG | Performed by: INTERNAL MEDICINE

## 2024-05-01 RX ORDER — METHYLPREDNISOLONE SOD SUCC 125 MG
40 VIAL (EA) INJECTION
Status: ACTIVE | OUTPATIENT
Start: 2024-05-01 | End: 2024-05-01

## 2024-05-01 RX ORDER — DIPHENHYDRAMINE HYDROCHLORIDE 50 MG/ML
25 INJECTION INTRAMUSCULAR; INTRAVENOUS
Status: CANCELLED | OUTPATIENT
Start: 2024-06-06

## 2024-05-01 RX ORDER — SODIUM CHLORIDE 0.9 % (FLUSH) 0.9 %
10 SYRINGE (ML) INJECTION
Status: CANCELLED | OUTPATIENT
Start: 2024-06-06

## 2024-05-01 RX ORDER — DIPHENHYDRAMINE HYDROCHLORIDE 50 MG/ML
25 INJECTION INTRAMUSCULAR; INTRAVENOUS
Status: DISCONTINUED | OUTPATIENT
Start: 2024-05-01 | End: 2024-05-01 | Stop reason: HOSPADM

## 2024-05-01 RX ORDER — EPINEPHRINE 1 MG/ML
0.3 INJECTION, SOLUTION, CONCENTRATE INTRAVENOUS
Status: DISPENSED | OUTPATIENT
Start: 2024-05-01 | End: 2024-05-01

## 2024-05-01 RX ORDER — HEPARIN 100 UNIT/ML
500 SYRINGE INTRAVENOUS
Status: CANCELLED | OUTPATIENT
Start: 2024-06-06

## 2024-05-01 RX ORDER — ACETAMINOPHEN 325 MG/1
650 TABLET ORAL
Status: ACTIVE | OUTPATIENT
Start: 2024-05-01 | End: 2024-05-01

## 2024-05-01 RX ORDER — EPINEPHRINE 1 MG/ML
0.3 INJECTION, SOLUTION, CONCENTRATE INTRAVENOUS
Status: CANCELLED | OUTPATIENT
Start: 2024-06-06

## 2024-05-01 RX ORDER — IPRATROPIUM BROMIDE AND ALBUTEROL SULFATE 2.5; .5 MG/3ML; MG/3ML
3 SOLUTION RESPIRATORY (INHALATION)
Status: CANCELLED | OUTPATIENT
Start: 2024-06-06

## 2024-05-01 RX ORDER — METHYLPREDNISOLONE SOD SUCC 125 MG
40 VIAL (EA) INJECTION
Status: CANCELLED | OUTPATIENT
Start: 2024-06-06

## 2024-05-01 RX ORDER — IPRATROPIUM BROMIDE AND ALBUTEROL SULFATE 2.5; .5 MG/3ML; MG/3ML
3 SOLUTION RESPIRATORY (INHALATION)
Status: ACTIVE | OUTPATIENT
Start: 2024-05-01 | End: 2024-05-01

## 2024-05-01 RX ORDER — ACETAMINOPHEN 325 MG/1
650 TABLET ORAL
Status: CANCELLED | OUTPATIENT
Start: 2024-06-06

## 2024-05-01 RX ADMIN — VEDOLIZUMAB 300 MG: 300 INJECTION, POWDER, LYOPHILIZED, FOR SOLUTION INTRAVENOUS at 01:05

## 2024-05-01 NOTE — PLAN OF CARE
Patient presented to unit for Entyvio (week 2). VSS. No complaints voiced. Entyvio infused over 30 minutes as ordered. Tolerated well and vitals remained stable at the end of treatment. Next appointment confirmed. Patient ambulatory and in NAD at time of discharge.    Problem: Bowel Disease, Inflammatory (Ulcerative Colitis or Crohn's Disease)  Goal: Optimal Adaptation to Chronic Illness  Outcome: Progressing  Goal: Diarrhea Symptom Relief  Outcome: Progressing  Goal: Absence of Infection Signs and Symptoms  Outcome: Progressing  Goal: Optimal Nutrition Delivery  Outcome: Progressing  Goal: Optimal Pain Control and Function  Outcome: Progressing

## 2024-05-02 ENCOUNTER — TELEPHONE (OUTPATIENT)
Dept: GASTROENTEROLOGY | Facility: CLINIC | Age: 59
End: 2024-05-02
Payer: COMMERCIAL

## 2024-05-02 NOTE — TELEPHONE ENCOUNTER
----- Message from Kalpana Oshea RN sent at 4/19/2024 11:56 AM CDT -----  IBD meds:  - Entyvio, LD: 4/17 #1, ND: 5/1 #2, 5/29 #3     Prednisone 20mg/d (discharged home 2/23/24 on pred 60 mg/d, 50 mg/d 3/1, 40 mg/d 3/8, 30 mg/d 3/15, 20mg/d 3/22, 15mg/d 3/28, 10mg/d 4/8, 20mg/d 4/12, 20mg/d 4/19 check in after #2 Entyvio)

## 2024-05-08 ENCOUNTER — OFFICE VISIT (OUTPATIENT)
Dept: GASTROENTEROLOGY | Facility: CLINIC | Age: 59
End: 2024-05-08
Payer: COMMERCIAL

## 2024-05-08 VITALS
TEMPERATURE: 97 F | RESPIRATION RATE: 18 BRPM | DIASTOLIC BLOOD PRESSURE: 68 MMHG | SYSTOLIC BLOOD PRESSURE: 118 MMHG | HEIGHT: 67 IN | WEIGHT: 160.5 LBS | HEART RATE: 84 BPM | BODY MASS INDEX: 25.19 KG/M2 | OXYGEN SATURATION: 99 %

## 2024-05-08 DIAGNOSIS — R19.7 DIARRHEA, UNSPECIFIED TYPE: Primary | ICD-10-CM

## 2024-05-08 DIAGNOSIS — K52.9 COLITIS: ICD-10-CM

## 2024-05-08 PROBLEM — E87.6 HYPOKALEMIA: Status: RESOLVED | Noted: 2024-02-15 | Resolved: 2024-05-08

## 2024-05-08 PROBLEM — D70.9 NEUTROPENIC FEVER: Status: RESOLVED | Noted: 2023-11-01 | Resolved: 2024-05-08

## 2024-05-08 PROBLEM — E87.8 ELECTROLYTE IMBALANCE: Status: RESOLVED | Noted: 2023-11-01 | Resolved: 2024-05-08

## 2024-05-08 PROBLEM — R50.81 NEUTROPENIC FEVER: Status: RESOLVED | Noted: 2023-11-01 | Resolved: 2024-05-08

## 2024-05-08 PROBLEM — E87.8 ELECTROLYTE ABNORMALITY: Status: RESOLVED | Noted: 2024-02-17 | Resolved: 2024-05-08

## 2024-05-08 PROCEDURE — 1160F RVW MEDS BY RX/DR IN RCRD: CPT | Mod: CPTII,S$GLB,, | Performed by: INTERNAL MEDICINE

## 2024-05-08 PROCEDURE — 99214 OFFICE O/P EST MOD 30 MIN: CPT | Mod: S$GLB,,, | Performed by: INTERNAL MEDICINE

## 2024-05-08 PROCEDURE — 3078F DIAST BP <80 MM HG: CPT | Mod: CPTII,S$GLB,, | Performed by: INTERNAL MEDICINE

## 2024-05-08 PROCEDURE — 1159F MED LIST DOCD IN RCRD: CPT | Mod: CPTII,S$GLB,, | Performed by: INTERNAL MEDICINE

## 2024-05-08 PROCEDURE — 3008F BODY MASS INDEX DOCD: CPT | Mod: CPTII,S$GLB,, | Performed by: INTERNAL MEDICINE

## 2024-05-08 PROCEDURE — 3074F SYST BP LT 130 MM HG: CPT | Mod: CPTII,S$GLB,, | Performed by: INTERNAL MEDICINE

## 2024-05-08 RX ORDER — MULTIVIT WITH MINERALS/HERBS
1 TABLET ORAL DAILY
COMMUNITY

## 2024-05-08 RX ORDER — DIPHENOXYLATE HYDROCHLORIDE AND ATROPINE SULFATE 2.5; .025 MG/1; MG/1
1 TABLET ORAL DAILY PRN
COMMUNITY

## 2024-05-08 RX ORDER — VEDOLIZUMAB 300 MG/5ML
300 INJECTION, POWDER, LYOPHILIZED, FOR SOLUTION INTRAVENOUS
COMMUNITY

## 2024-05-08 NOTE — PROGRESS NOTES
[facility]             Inflammatory Bowel Disease   Follow-up  Note              TODAY'S VISIT DATE:  5/8/2024    Chief Complaint:   Chief Complaint   Patient presents with    Inflammatory Bowel Disease     PCP: Dexter Khan    Previous History:  Sugar Diaz is a 58 y.o. female with endometrial cancer (diagnosed in 5/2023; status post TLH/BSO in 6/2023 and initiation of chemotherapy/radiation in 7/2023 including the use of Dostarlimab). She developed grade 2 and 3 colitis following fourth and fifth cycle of chemotherapy, respectively. In 10/2023, she underwent outpatient colonoscopy showing diverticulosis of sigmoid colon and normal mucosal lining with random colon biopsies showed mild active colitis with prominent crypt epithelial cell apoptosis and irregular subepithelial collagen. She was treated with Prednisone taper from 10/4 to 11/3 (starting dose 70 mg/day and tapering down by 20 mg per week). Dosartlimab was resumed in 11/2023. Following completion of prednisone taper, patient had recurrent diarrhea and referred patient to see Dr. Weaver who was contacted in early 2/2024 and arranged for repeat endoscopic evaluation and follow up though she had significant recurrent diarrhea with hypokalemia and hospitalized from 2/15-2/23/24. 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 She was treated for salmonelle with cipro in 2/2024 and started on IV solumedrol followed by oral pred taper. She initiated entyvio 4/17/24 due to pred dependency and restarting immunotherapy with dostarlimab on 3/14/14.        Interval History:  - current IBD meds: Entyvio every 8 weeks (started 4/17/24, #2 5/1, #3 5/29), prednisone 20 mg/d (IV  solumedrol 2024- discharged home 24 on pred 60 mg/d, 50 mg/d 3/1, 40 mg/d 3/8, 30 mg/d 3/15, 20 mg/d 3/22, 15 mg/d 3/28, 10 mg/d , 20 mg/d , 20 mg/d , 15 mg/d 5/3)  - current immunotherapy: restarted dostarlimab infusions 3/14/24 and will continue every 6 weeks  - 2-4 loose - loose BM started when pred was decreased to 10 mg/d (24) however previous to that 1-2 formed BMs/d  - no recent infections or changes to oncology plan of care- next appt with Dr su 6/3/24 and he will order CT Scan per pt     Prior Pertinent Surgeries:   None    Last pertinent Endoscopy/Imagin24 EGD: normal esophagus, Z line found at 40 cm, diffuse mild mucosal changes with congestion and erythema in the entire stomach with normal duodenum. Biopsies stomach with mild chronic inflammation and duodenum normal.   24 colonoscopy:  normal colon and TI except for some sigmoid and descending diverticulosis.  Biopsies of cecum/ascending/transverse/descending/sigmoid occ foci of epithelial apoptosis.  Biopsies of sigmoid and rectum with chronic active inflammation and increased epithelial apoptosis     Prior ICI colitis Therapies:  Prednisone (10/4-11/3/23- 70 mg/d and tapered by 20 mg every week)     Review of Systems   Constitutional:  Negative for chills, fever and weight loss.   HENT:          No oral ulcers, dysphagia, oral thrush   Eyes:  Negative for blurred vision, pain and redness.   Respiratory:  Positive for cough. Negative for shortness of breath.    Cardiovascular:  Negative for chest pain.   Gastrointestinal:  Negative for abdominal pain, heartburn, nausea and vomiting.   Genitourinary:  Negative for dysuria and hematuria.   Musculoskeletal:  Negative for back pain and joint pain.   Skin:  Negative for rash.   Psychiatric/Behavioral:  Negative for depression. The patient is not nervous/anxious and does not have insomnia.      All Medical History/Surgical History/Family History/Social History/Allergies  "have been reviewed and updated in EMR    Outpatient Medications Marked as Taking for the 5/8/24 encounter (Office Visit) with Jason Montero MD   Medication Sig Dispense Refill    acetaminophen (TYLENOL) 650 MG TbSR Take 1 tablet (650 mg total) by mouth every 6 (six) hours as needed (pain). 60 tablet 0    b complex vitamins tablet Take 1 tablet by mouth once daily.      diclofenac sodium (VOLTAREN) 1 % Gel Apply 2 g topically 2 (two) times daily. 100 g 0    diphenhydrAMINE (BENADRYL) 25 mg capsule Take 25 mg by mouth every 6 (six) hours as needed for Itching.      DULoxetine (CYMBALTA) 60 MG capsule Take 1 capsule (60 mg total) by mouth once daily. 30 capsule 2    ibuprofen (ADVIL,MOTRIN) 600 MG tablet Take 1 tablet (600 mg total) by mouth every 6 (six) hours as needed for Pain. 30 tablet 3    levothyroxine (SYNTHROID) 100 MCG tablet Take 1 tablet (100 mcg total) by mouth before breakfast. 30 tablet 11    multivitamin (ONE DAILY MULTIVITAMIN) per tablet Take 1 tablet by mouth once daily.      predniSONE (DELTASONE) 10 MG tablet Take 2 tablets (20 mg total) by mouth once daily. Or as directed by Dr. Montero 40 tablet 0    predniSONE (DELTASONE) 5 MG tablet Take as directed by Dr. Montero's office 40 tablet 0    valACYclovir (VALTREX) 1000 MG tablet Two pills at onset of fever blister and then repeat 2 pills 12 hr later 30 tablet 11    vedolizumab (ENTYVIO) 300 mg SolR injection Inject 300 mg into the vein. Week 0, 2, and 6 followed by every 8 weeks       Vital Signs:  /68 (BP Location: Right arm, Patient Position: Sitting, BP Method: Small (Automatic))   Pulse 84   Temp 96.8 °F (36 °C) (Tympanic)   Resp 18   Ht 5' 7" (1.702 m)   Wt 72.8 kg (160 lb 7.9 oz)   LMP 11/15/2012   SpO2 99%   BMI 25.14 kg/m²    Physical Exam  Abdominal:      General: Bowel sounds are normal. There is no distension.      Palpations: Abdomen is soft.      Tenderness: There is no abdominal tenderness.     Labs:   Lab Results "   Component Value Date    CRP 3.0 02/16/2024    CALPROTECTIN 90.0 (H) 03/25/2024     Lab Results   Component Value Date    HEPBSAG Non-reactive 02/20/2024    HEPBCAB Non-reactive 02/16/2024     Lab Results   Component Value Date    TBGOLDPLUS Negative 02/16/2024     Lab Results   Component Value Date    WBC 9.10 04/23/2024    HGB 14.1 04/23/2024    HCT 40.0 04/23/2024    MCV 92 04/23/2024     04/23/2024     Lab Results   Component Value Date    CREATININE 0.7 04/23/2024    ALBUMIN 3.5 04/23/2024    BILITOT 0.3 04/23/2024    ALKPHOS 65 04/23/2024    AST 22 04/23/2024    ALT 25 04/23/2024     Assessment/Plan:  Sugar Diaz is a 58 y.o. female with endometrial cancer (diagnosed 5/2023; BRIANDA/BSO 6/2023 and initiation of chemotherapy/radiation in 7/2023 including the use of Dostarlimab), h/o salmonella (2/2024), GERD, migraines.     Since her last visit she has started entyvio though when she went to pred 10 mg/d her diarrhea came back though mild and about 2-4 loose BMs/d so we will proceed with repeat stool studies including C diff, cultures and calprotectin. For now she will continue pred 15 mg po daily.  It may take entyvio more time to work and can take up to 14-24 weeks to be fully effective though if continued colitis symptoms despite entyvio will need to consider alternate agent such as remicade.     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 Rodríguez    I personally examined the patient and discussed above plan in collaboration with Roselia Joy, PharmD.      Visit today is associated  with current or anticipated ongoing medical care related to this patient's single serious condition/complex condition ICI colitis in setting of immunotherapy for endometrial cancer.     Follow up in about 7 weeks (around 6/26/2024).    Jason Montero MD  Department of Gastroenterology  Medical Director, Inflammatory Bowel Disease

## 2024-05-08 NOTE — PROGRESS NOTES
[facility]                                                              Inflammatory Bowel Disease   Follow-up  Note                                                          TODAY'S VISIT DATE:  5/8/2024     Chief Complaint:   No chief complaint on file.     PCP: Dexter Khan     Previous History:  Sugar Diaz is a 58 y.o. female with endometrial cancer (diagnosed in 5/2023; status post TLH/BSO in 6/2023 and initiation of chemotherapy/radiation in 7/2023 including the use of Dostarlimab). She developed grade 2 and 3 colitis following fourth and fifth cycle of chemotherapy, respectively. In 10/2023, she underwent outpatient colonoscopy showing diverticulosis of sigmoid colon and normal mucosal lining with random colon biopsies showed mild active colitis with prominent crypt epithelial cell apoptosis and irregular subepithelial collagen. She was treated with Prednisone taper from 10/4 to 11/3 (starting dose 70 mg/day and tapering down by 20 mg per week). Dosartlimab was resumed in 11/2023. Following completion of prednisone taper, patient had recurrent diarrhea and referred patient to see Dr. Weaver who was contacted in early 2/2024 and arranged for repeat endoscopic evaluation and follow up though she had significant recurrent diarrhea with hypokalemia and hospitalized from 2/15-2/23/24. 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 She was treated for salmonella with cipro x 2 weeks in 2/2024 and started on IV solumedrol followed by oral pred taper. She initiated entyvio 4/17/24 due to pred dependency and restarting immunotherapy with dostarlimab on 3/14/14.         Interval  History:  - current IBD meds: Entyvio every 8 weeks (started 24, #2 , #3 ), prednisone 20 mg/d (IV solumedrol 2024- discharged home 24 on pred 60 mg/d, 50 mg/d 3/1, 40 mg/d 3/8, 30 mg/d 3/15, 20 mg/d 3/22, 15 mg/d 3/28, 10 mg/d , 20 mg/d , 20 mg/d , 15 mg/d 5/3)  - current immunotherapy: restarted dostarlimab infusions 3/14/24 and will continue every 6 weeks  - 2-4 loose BM/d (previously 1 formed BM/d in 3/2024. Loose stool began when prednisone decreased to 10 mg/d on 24). Pt takes Lomotil 1-2x per week for diarrhea.  -patient reports new-onset mild headache that occurs once per week on average in the last 4 weeks.  Pt takes APAP which typically resolves the headache.  Rarely needs NSAID like Advil if APAP does not resolve headache.  - no recent infections or changes to oncology plan of care       Prior Pertinent Surgeries:   None     Last pertinent Endoscopy/Imagin24 EGD: normal esophagus, Z line found at 40 cm, diffuse mild mucosal changes with congestion and erythema in the entire stomach with normal duodenum. Biopsies stomach with mild chronic inflammation and duodenum normal.   24 colonoscopy:  normal colon and TI except for some sigmoid and descending diverticulosis.  Biopsies of cecum/ascending/transverse/descending/sigmoid occ foci of epithelial apoptosis.  Biopsies of sigmoid and rectum with chronic active inflammation and increased epithelial apoptosis     Therapeutic Drug Monitoring Labs:  NA     Prior ICI colitis Therapies:  Prednisone (10/4-11/3/23- 70 mg/d and tapered by 20 mg every week)      Review of Systems   Constitutional:  Negative for chills, fever and weight loss.   HENT:          No oral ulcers, dysphagia, oral thrush   Eyes:  Negative for blurred vision, pain and redness.   Respiratory:  Positive for cough. Negative for shortness of breath.    Cardiovascular:  Negative for chest pain.   Gastrointestinal:  Negative for abdominal pain,  heartburn, nausea and vomiting.   Genitourinary:  Negative for dysuria and hematuria.   Musculoskeletal:  Negative for back pain and joint pain.   Skin:  Negative for rash.   Psychiatric/Behavioral:  Negative for depression. The patient is not nervous/anxious and does not have insomnia.       All Medical History/Surgical History/Family History/Social History/Allergies have been reviewed and updated in EMR     No outpatient medications have been marked as taking for the 5/8/24 encounter (Appointment) with Jason Montero MD.      Vital Signs:  LMP 11/15/2012    Physical Exam     Labs:         Lab Results   Component Value Date     CRP 3.0 02/16/2024     CALPROTECTIN 90.0 (H) 03/25/2024            Lab Results   Component Value Date     HEPBSAG Non-reactive 02/20/2024     HEPBCAB Non-reactive 02/16/2024            Lab Results   Component Value Date     TBGOLDPLUS Negative 02/16/2024            Lab Results   Component Value Date     WBC 9.10 04/23/2024     HGB 14.1 04/23/2024     HCT 40.0 04/23/2024     MCV 92 04/23/2024      04/23/2024            Lab Results   Component Value Date     CREATININE 0.7 04/23/2024     ALBUMIN 3.5 04/23/2024     BILITOT 0.3 04/23/2024     ALKPHOS 65 04/23/2024     AST 22 04/23/2024     ALT 25 04/23/2024      Assessment/Plan:  Sugar Diaz is a 58 y.o. female with endometrial cancer (diagnosed 5/2023; BRIANDA/BSO 6/2023 and initiation of chemotherapy/radiation in 7/2023 including the use of Dostarlimab), h/o salmonella (2/2024), GERD, migraines.      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  Advised patient to avoid anti-motility agents such as Lomotil if  active GI infection.   Repeat calprotectin to assess inflammation.   - Continue prednisone 15 mg/d for now- will provide guidance on taper instructions when stool tests result  - Pt tolerating Entyvio infusions well- next infusion (week 6) on 5/29/24.    - Pt advised that Entyvio may need more time for clinical benefit- next clinic f/u 4 weeks after week 6 Entyvio infusion   - New onset headaches in the last month not likely due to Entyvio given they started before 1st Entyvio infusion.  Continue to monitor, report any worsening, and update oncologist at next apt.  Avoid NSAIDs as much as possible given NSAIDs can cause colitis  - Pt has oncology f/u apt with Dr Rodríguez on 6/3/24.  CT scan will be ordered.  - Pt advised to notify us and Dr Rodríguez if any recurrent diarrhea or if she has any new infections and requires antibiotics   - Defer vaccinations to oncology     Total time: 60 min     Visit today is associated with current or anticipated ongoing medical care related to this patient's single serious condition/complex condition ICI colitis in setting of immunotherapy for endometrial cancer.      No follow-ups on file.     Jason Montero MD  Department of Gastroenterology  Medical Director, Inflammatory Bowel Disease

## 2024-05-08 NOTE — PROGRESS NOTES
IBD PATIENT INTAKE:    Confirm current PCP: Dexter Kahn  If no PCP-  number given to establish [phone]: Yes    IBD THERAPY (name, dose/frequency):  Entyvio  Last dose:  05/01/2024    Next dose:  05/29/2024    Antibiotics (past 30 Days):  No  If yes   Indication:  Name of antibiotic:  Completion date:       Answers submitted by the patient for this visit:  Established Patient Questionnaire  (Submitted on 5/7/2024)  cough: Yes

## 2024-05-10 ENCOUNTER — CLINICAL SUPPORT (OUTPATIENT)
Dept: REHABILITATION | Facility: HOSPITAL | Age: 59
End: 2024-05-10
Payer: COMMERCIAL

## 2024-05-10 DIAGNOSIS — C54.1 MALIGNANT NEOPLASM OF ENDOMETRIUM: Primary | ICD-10-CM

## 2024-05-10 PROCEDURE — 97813 ACUP 1/> W/ESTIM 1ST 15 MIN: CPT | Mod: PN | Performed by: ACUPUNCTURIST

## 2024-05-10 PROCEDURE — 97814 ACUP 1/> W/ESTIM EA ADDL 15: CPT | Mod: PN | Performed by: ACUPUNCTURIST

## 2024-05-10 NOTE — PROGRESS NOTES
Acupuncture Evaluation Note     Name: Sugar Ball [facility] Number: [id number]    Traditional Chinese Medicine (TCM) Diagnosis: Qi Stagnation, Qi Deficiency, and Blood Deficiency  Medical Diagnosis:   Encounter Diagnosis   Name Primary?    Malignant neoplasm of endometrium Yes        Evaluation Date: 5/10/2024    Visit #/Visits authorized: 12, 2/12    Precautions: Standard and cancer    Subjective     Chief Concern: CIPN - feet and right hand.     Medical necessity is demonstrated by the following IMPAIRMENTS: Medical Necessity: Decreased mobility limits day to day activities, social, and emergent situations              Aggravating Factors:  movement and cold   Relieving Factors:  heat    Symptom Description:     Quality:  Tingling and Numb and Swollen (sensation, no actual edema)  Severity:  7  Frequency:  continuously      Objective       New Findings:      Treatment     Treatment Principles:  Move qi, nourish qi and blood     Acupuncture points used:  4 DELMI, BA YUDI, BA KESHA , Gb34, Ki3, Sp6, Sp9, and St36    Bilateral points:  Unilateral points:  Auricular Treatment:      Needles In: 30  Needles Out: 30  Anderson W/ STIM placed: 7:50  Needles W/ STIM removed: 8:20      Assessment     After treatment, patient felt good, acupuncture has helped sharp pains associated with neuropathy      Patient prognosis is Good.     Patient will continue to benefit from acupuncture treatment to address the deficits listed in the problem list box on initial evaluation, provide patient family education and to maximize pt's level of independence in the home and community environment.     Patient's spiritual, cultural and educational needs considered and pt agreeable to plan of care and goals.     Anticipated barriers to treatment: none    Plan     Recommend 1 /week for 4 sessions then re-assess.      Education:  Patient is aware of cumulative benefit of acupuncture             No

## 2024-05-13 NOTE — TRANSFER OF CARE
"Anesthesia Transfer of Care Note    Patient: Suagr Diaz    Procedure(s) Performed: Procedure(s) (LRB):  EGD (ESOPHAGOGASTRODUODENOSCOPY) (N/A)  COLONOSCOPY (N/A)    Patient location: PACU    Anesthesia Type: general    Transport from OR: Transported from OR on room air with adequate spontaneous ventilation    Post pain: adequate analgesia    Post assessment: no apparent anesthetic complications and tolerated procedure well    Post vital signs: stable    Level of consciousness: awake, alert and oriented    Nausea/Vomiting: no nausea/vomiting    Complications: none    Transfer of care protocol was followed    Last vitals: Visit Vitals  BP (!) 173/78 (BP Location: Left arm, Patient Position: Lying)   Pulse 100   Temp 36.7 °C (98.1 °F) (Oral)   Resp 16   Ht 5' 7" (1.702 m)   Wt 74.3 kg (163 lb 12.8 oz)   LMP 11/15/2012   SpO2 100%   BMI 25.66 kg/m²     " Oncology Consultation and Second Opinion:      Reason:  new left breast cancer    HPI:    Clinical/anatomic stage IIA (cT2, cN0, cM0) invasive ductal carcinoma of the upper-outer quadrant of the left breast  - Diagnosed 3/13/2024 via left breast biopsy  - ER (positive - 80%), NM (positive - 10%), HER2 (low, score of 1+ by IHC)  - Is[place] grade I histology on biopsy   - Tumor size: 2.7 cm by MRI    HISTORY OF PRESENT ILLNESS:  Killian (formerly Gregoria at birth) Sarita is a very pleasant gentleman who is evaluated as a new consultation for LEFT breast cancer.     Patient was evaluated for  top  surgery and as part of the evaluation a left breast abnormality was found. Screening mammogram in March 2024 revealed an area of architectural distortion in the left breast 6 oclock position.  Diagnostic mammogram revealed 1.3 cm mass.  A biopsy revealed invasive breast cancer ER ++ NM + HER2 negative.  MRI imaging revealed no other specific foci of disease and there was no clinical or radiographic evidence of lymphadenopathy.  He met with Dr. Martin Ruano at [facility] and was scheduled for top surgery.  By breast MRI, this measured 2.7 cm.  Lymph nodes appeared normal.      Killian comes in today for consultation.  He is scheduled for bilateral mastectomy in June 2024.  He recently started anastrazole.  He is tolerating it reasonably well.  Occasional hot flashes.  He has ongoing athralgias, myalgias.      Overall, Killian has been feeling very overwhelmed.  He went out on diability to a work related injury last Sept; this is when his first top surgery had been planned.  This was delayed and not rescheduled.  With the new diagnosis of left breast cancer, Killian has been feeling more overwhelmed, which has brought up other layers of his prior PTSD.  The recent diagnosis has contributed to significant turmoil in his life. He has some support in terms of  but not much in terms of family or friend support for post surgery  "recovery. He describes one set of trans-individuals who he meets with regularly that have provided some support.    Has not noticed other new lumps or bumps on his body. No previous history of cancer. No unintentional weight loss.     ROS: 10 point ROS neg other than the symptoms noted above in the HPI.       PMH:       Pelvic floor weakness   Status post total knee replacement, right   Thoracic aortic aneurysm without rupture (HRC)    Controlled substance agreement signed   Gender dysphoria   Generalized anxiety disorder (HRC)   PTSD (post-traumatic stress disorder) (HRC)   RLS (restless legs syndrome)   ASHLYN (obstructive sleep apnea)   Severe episode of recurrent major depressive disorder, without psychotic features (HRC)   Financial difficulties  Primary hypertension (HRC)   Malignant neoplasm of upper-outer quadrant of left female breast (HRC)   Gender dysphoria in adult   Invasive ductal carcinoma of breast, stage 1, left (HRC)   Malignant neoplasm of female breast (HRC)     Past Surgical History: eye surgery, R knee surgery s/p total knee arthroplasty 7/19/18     FAMILY HISTORY:  His mother is living without any history of malignancy. His father passed away with a history of myocardial infarction. He is one brother and two sisters, no history of cancer. There is a maternal uncle that passed away with a history of lung cancer. He does not have children.  Has not seen genetics or had testing yet.      SOCIAL HISTORY:  Retired registered nurse.   Also has worked at [employer].   Quit smoking greater than 30 years ago.   Denies any frequent/recent history of ethanol overuse.    Current Outpatient Medications   Medication Sig Dispense Refill    albuterol (PROAIR HFA/PROVENTIL HFA/VENTOLIN HFA) 108 (90 Base) MCG/ACT inhaler Inhale 2 puffs into the lungs every 4 hours as needed      BD HYPODERMIC NEEDLE 18G X 1\" MISC INJECT ONCE PER WEEK      clonazePAM (KLONOPIN) 0.5 MG tablet Take 0.5 mg by mouth 2 times " daily as needed      clonazePAM (KLONOPIN) 1 MG tablet       gabapentin (NEURONTIN) 300 MG capsule Take 3 capsules (900 mg) by mouth At Bedtime 270 capsule 0    hydrochlorothiazide (HYDRODIURIL) 12.5 MG tablet Take 12.5 mg by mouth daily      omeprazole (PRILOSEC) 40 MG DR capsule TAKE 1 CAPSULE BY MOUTH EVERY DAY BEFORE A MEAL      pramipexole (MIRAPEX) 0.25 MG tablet Take 3 tablets (0.75 mg) by mouth At Bedtime 90 tablet 3    testosterone cypionate (DEPOTESTOSTERONE) 200 MG/ML injection Inject 70 mg Subcutaneous once a week on Fridays      UNABLE TO FIND Pt is using Medical cannabis       No current facility-administered medications for this visit.     PE:   Gen : well appearing  HEENT:  no icterus  NECK: supple  CV :rrr   Lungs: clear  Chest: symmetric breasts with ecchymoses over left breast, I am unable to appreciate discrete mass, no axillary adenopathy appreciated  Abd: soft, nt nd + bs  Ext : no edema    Reviewed labs, imaging and pathology as outlined below.      Diagnostic imaging followed on 3/13/24.  MAMMOGRAPHIC FINDINGS: Left full-field digital diagnostic mammogram performed. There are scattered areas of fibroglandular density. Images evaluated with the assistance of CAD. Breast tomosynthesis was used in interpretation. On additional views, the architectural distortion in the left breast persists and is associated with an irregular shape, spiculated margin, equal density mass in the left breast 1:00 position at posterior depth.   ULTRASOUND FINDINGS: Targeted sonographic images of the left breast 1:00 position 6 cm from nipple demonstrates a 1.3 x 1.1 x 1.3 cm irregular shape, spiculated margin, parallel, hypoechoic mass with posterior shadowing and adjacent vascularity. This corresponds the left breast mammographic mass with architectural distortion. Targeted sonographic images of the left axilla demonstrates normal morphology, nonenlarged left axillary lymph nodes.     3/13/24 - Case: [id number]  VAHID   Breast, left, 1:00, 6cm FN, needle core biopsy:   Invasive ductal carcinoma, Tomasz grade 1  Estrogen Receptor (ER): Positive (80%, moderate)  Progesterone Receptor (MT): Positive (10% moderate)  HER2 by Immunohistochemistry - Negative     BREAST MRI:    RIGHT BREAST: There is scattered fibroglandular tissue.  There is minimal background parenchymal enhancement. No suspicious enhancing mass or nonmass enhancement. No enlarged lymph nodes identified in the right axilla. There is no right internal mammary chain lymphadenopathy.     LEFT BREAST: There is scattered fibroglandular tissue.  There is minimal background parenchymal enhancement. In the left breast 1:00 position at middle to posterior depth, there is a 1.4 x 2.7 x 2.6 (transverse by AP by cc) enhancing mass with surrounding nonmass enhancement with biopsy clip internally in the superior portion (axial image 58 of 128 and sagittal image 47 of 180). No enlarged lymph nodes identified in the left axilla. There is no left internal mammary chain lymphadenopathy.     A/P:  65 y/o transmale here with a new LEFT breast cancer clinical T2N0 Er + MT + HER2 negative, prognostic staging 1A, scheduled for upcoming surgery.      Breast cancer - I reviewed with Killian that all of our breast cancer treatment is curative in intent.  We discussed by older staging classifications, he technically has a stage 2 breast cancer.  Prognostic staging is 1A.      I discussed with him that the typical plan is surgical resection with lumpectomy and radiation vs mastectomy.  He is opting for bilateral mastectomy, and will need SNLB on the left.  I do not anticipate he will need radiation.  Surgery is planned in early June.    We discussed that I think it is unlikely he would need chemotherapy.  We discussed using genomic testing with [facility] to determine risk of recurrence.  Given delays in surgery, I advised this be sent off of his biopsy.      He is on anastrazole as an  antiestrogen.  I discussed with him the role of antiestrogen therapy and agree with anastrazole for five years.  We reviewed the side effects.      2. Bone health - given use of anastrazole, he will eventually need baseline dexa scan.    3. Transmasculine - discussed his use of testosterone and reviewed plan.      4. Coping - he is having a lot of anxiety.  He is working with multiple healthcare providers.  He met with [facility] to discuss ongoing support.      5. He would benefit from genetics evaluation.  We will rediscuss this at our next visit.    6. HTN - he works closely with Dr Davis.  Will follow .      Jeaneth Linares MD     60 min were spent in reviewing records, counseling and coordinating care.

## 2024-05-15 ENCOUNTER — TELEPHONE (OUTPATIENT)
Dept: GASTROENTEROLOGY | Facility: CLINIC | Age: 59
End: 2024-05-15
Payer: COMMERCIAL

## 2024-05-15 DIAGNOSIS — K52.9 COLITIS: ICD-10-CM

## 2024-05-15 NOTE — TELEPHONE ENCOUNTER
Reviewed stool calprotectin which has improved but still borderline elevated and pt concerned about tapering prednisone due to worsening recently when she decreased to 10 mg/d. She is still on 15 mg/d with 3rd induction dose of entyvio 5/29.      Plan:  - continue prednisone 15 mg po daily- continue until next stool calprotectin is back  - proceed with 3rd infusion of entyvio 5/29  - repeat stool calprotectin 2 weeks after 3rd infusion on 6/12- standing orders in and pt will  stool container from [facility]  - if pt not able to get off of pred by then and still ongoing inflammation will consider alternative such as remicade

## 2024-05-16 RX ORDER — PREDNISONE 5 MG/1
TABLET ORAL
Qty: 105 TABLET | Refills: 0 | Status: SHIPPED | OUTPATIENT
Start: 2024-05-16

## 2024-05-16 NOTE — TELEPHONE ENCOUNTER
"Primary provider: Winston     IBD medications: Entyvio every 8 weeks (started 4/17/24, #2 5/1, #3 5/29), prednisone 15 mg/d (IV solumedrol 2/2024- discharged home 2/23/24 on pred 60 mg/d, 50 mg/d 3/1, 40 mg/d 3/8, 30 mg/d 3/15, 20 mg/d 3/22, 15 mg/d 3/28, 10 mg/d 4/8, 20 mg/d 4/12, 20 mg/d 4/19, 15 mg/d 5/3)      Refill request for Prednisone 5 mg daily- take as directed by Dr Montero     Allergies reviewed Yes     Drug Monitoring labs/frequency for all IBD meds:  CBC CMP q6 months, TB and HepB yearly           Lab Results   Component Value Date     HEPBSAG Non-reactive 02/20/2024     HEPBCAB Non-reactive 02/16/2024            Lab Results   Component Value Date     TBGOLDPLUS Negative 02/16/2024      No results found for: "QUANTNILVALU", "QUANTIFERON", "QUANTTBGDPL"   No results found for: "PSOEIYJV51XX", "IHGSAGGO71"        Lab Results   Component Value Date     WBC 9.10 04/23/2024     HGB 14.1 04/23/2024     HCT 40.0 04/23/2024     MCV 92 04/23/2024      04/23/2024            Lab Results   Component Value Date     CREATININE 0.7 04/23/2024     ALBUMIN 3.5 04/23/2024     BILITOT 0.3 04/23/2024     ALKPHOS 65 04/23/2024     AST 22 04/23/2024     ALT 25 04/23/2024         Lab due date (mo/yr):  10/2024     Labs scheduled: no - next apt with Dr Montero 7/5/24     Next appt: 7/5/24     RX refill sent to provider for amount until next prednisone assessment: Yes  Patient to continue Prednisone 15 mg/d until next stool calprotectin is back (scheduled to turn in 6/12/24).  Will authorize quantity sufficient through 6/19/24.      "

## 2024-05-17 ENCOUNTER — CLINICAL SUPPORT (OUTPATIENT)
Dept: REHABILITATION | Facility: HOSPITAL | Age: 59
End: 2024-05-17
Payer: COMMERCIAL

## 2024-05-17 DIAGNOSIS — C54.1 MALIGNANT NEOPLASM OF ENDOMETRIUM: Primary | ICD-10-CM

## 2024-05-17 PROCEDURE — 97813 ACUP 1/> W/ESTIM 1ST 15 MIN: CPT | Mod: PN | Performed by: ACUPUNCTURIST

## 2024-05-17 PROCEDURE — 97814 ACUP 1/> W/ESTIM EA ADDL 15: CPT | Mod: PN | Performed by: ACUPUNCTURIST

## 2024-05-17 NOTE — PROGRESS NOTES
Acupuncture Evaluation Note     Name: [facility] Number: [id number]    Traditional Chinese Medicine (TCM) Diagnosis: Qi Stagnation, Qi Deficiency, and Blood Deficiency  Medical Diagnosis:   Encounter Diagnosis   Name Primary?    Malignant neoplasm of endometrium Yes        Evaluation Date: 5/17/2024    Visit #/Visits authorized: 12, 3/12    Precautions: Standard and cancer    Subjective     Chief Concern: CIPN - feet and right hand.     Medical necessity is demonstrated by the following IMPAIRMENTS: Medical Necessity: Decreased mobility limits day to day activities, social, and emergent situations              Aggravating Factors:  movement and cold   Relieving Factors:  heat    Symptom Description:     Quality:  Tingling and Numb and Swollen (sensation, no actual edema)  Severity:  7  Frequency:  continuously      Objective       New Findings:  head-cold with sinus congestion     Treatment     Treatment Principles:  Move qi, nourish qi and blood     Acupuncture points used:  4 DELMI, BA YUDI, BA KESHA , Gb34, Ki3, Sp6, Sp9, and St36    Bilateral points:  Unilateral points:  Auricular Treatment:      Needles In: 30  Needles Out: 30  [place] W/ STIM placed: 7:50  Needles W/ STIM removed: 8:20      Assessment     After treatment, patient felt good, acupuncture has helped sharp pains associated with neuropathy      Patient prognosis is Good.     Patient will continue to benefit from acupuncture treatment to address the deficits listed in the problem list box on initial evaluation, provide patient family education and to maximize pt's level of independence in the home and community environment.     Patient's spiritual, cultural and educational needs considered and pt agreeable to plan of care and goals.     Anticipated barriers to treatment: none    Plan     Recommend 1 /week for 3 sessions then re-assess.      Education:  Patient is aware of cumulative benefit of acupuncture

## 2024-05-21 ENCOUNTER — CLINICAL SUPPORT (OUTPATIENT)
Dept: REHABILITATION | Facility: HOSPITAL | Age: 59
End: 2024-05-21
Payer: COMMERCIAL

## 2024-05-21 DIAGNOSIS — C54.1 MALIGNANT NEOPLASM OF ENDOMETRIUM: Primary | ICD-10-CM

## 2024-05-21 PROCEDURE — 97813 ACUP 1/> W/ESTIM 1ST 15 MIN: CPT | Mod: PN | Performed by: ACUPUNCTURIST

## 2024-05-21 PROCEDURE — 97814 ACUP 1/> W/ESTIM EA ADDL 15: CPT | Mod: PN | Performed by: ACUPUNCTURIST

## 2024-05-21 NOTE — PROGRESS NOTES
Acupuncture Evaluation Note     Name: [facility] Number: [id number]    Traditional Chinese Medicine (TCM) Diagnosis: Qi Stagnation, Qi Deficiency, and Blood Deficiency  Medical Diagnosis:   Encounter Diagnosis   Name Primary?    Malignant neoplasm of endometrium Yes        Evaluation Date: 5/21/2024    Visit #/Visits authorized: 12, 4/12    Precautions: Standard and cancer    Subjective     Chief Concern: CIPN - feet and right hand.     Medical necessity is demonstrated by the following IMPAIRMENTS: Medical Necessity: Decreased mobility limits day to day activities, social, and emergent situations              Aggravating Factors:  movement and cold   Relieving Factors:  heat    Symptom Description:     Quality:  Tingling and Numb and Swollen (sensation, no actual edema)  Severity:  7  Frequency:  continuously      Objective       New Findings:      Treatment     Treatment Principles:  Move qi, nourish qi and blood     Acupuncture points used:  4 DELMI, BA YUDI, BA KESHA , Gb34, Ki3, Sp6, Sp9, and St36    Bilateral points:  Unilateral points:  Auricular Treatment:      Needles In: 30  Needles Out: 30  [place] W/ STIM placed: 7:50  Needles W/ STIM removed: 8:20      Assessment     After treatment, patient felt positive changes in neuropathy this week       Patient prognosis is Good.     Patient will continue to benefit from acupuncture treatment to address the deficits listed in the problem list box on initial evaluation, provide patient family education and to maximize pt's level of independence in the home and community environment.     Patient's spiritual, cultural and educational needs considered and pt agreeable to plan of care and goals.     Anticipated barriers to treatment: none    Plan     Recommend 1 /week for 2 sessions then re-assess.      Education:  Patient is aware of cumulative benefit of acupuncture

## 2024-05-29 ENCOUNTER — INFUSION (OUTPATIENT)
Dept: INFUSION THERAPY | Facility: HOSPITAL | Age: 59
End: 2024-05-29
Attending: INTERNAL MEDICINE
Payer: COMMERCIAL

## 2024-05-29 ENCOUNTER — TELEPHONE (OUTPATIENT)
Dept: GASTROENTEROLOGY | Facility: CLINIC | Age: 59
End: 2024-05-29
Payer: COMMERCIAL

## 2024-05-29 VITALS
HEART RATE: 101 BPM | SYSTOLIC BLOOD PRESSURE: 129 MMHG | TEMPERATURE: 98 F | WEIGHT: 162.06 LBS | BODY MASS INDEX: 25.38 KG/M2 | DIASTOLIC BLOOD PRESSURE: 61 MMHG | RESPIRATION RATE: 16 BRPM | OXYGEN SATURATION: 98 %

## 2024-05-29 DIAGNOSIS — K52.9 COLITIS: Primary | ICD-10-CM

## 2024-05-29 PROCEDURE — 96365 THER/PROPH/DIAG IV INF INIT: CPT

## 2024-05-29 PROCEDURE — 63600175 PHARM REV CODE 636 W HCPCS: Mod: JZ,JG | Performed by: INTERNAL MEDICINE

## 2024-05-29 PROCEDURE — 25000003 PHARM REV CODE 250: Performed by: INTERNAL MEDICINE

## 2024-05-29 RX ORDER — SODIUM CHLORIDE 0.9 % (FLUSH) 0.9 %
10 SYRINGE (ML) INJECTION
Status: DISCONTINUED | OUTPATIENT
Start: 2024-05-29 | End: 2024-05-29 | Stop reason: HOSPADM

## 2024-05-29 RX ORDER — METHYLPREDNISOLONE SOD SUCC 125 MG
40 VIAL (EA) INJECTION
OUTPATIENT
Start: 2024-06-06

## 2024-05-29 RX ORDER — ACETAMINOPHEN 325 MG/1
650 TABLET ORAL
OUTPATIENT
Start: 2024-06-06

## 2024-05-29 RX ORDER — EPINEPHRINE 1 MG/ML
0.3 INJECTION, SOLUTION, CONCENTRATE INTRAVENOUS
OUTPATIENT
Start: 2024-06-06

## 2024-05-29 RX ORDER — HEPARIN 100 UNIT/ML
500 SYRINGE INTRAVENOUS
OUTPATIENT
Start: 2024-06-06

## 2024-05-29 RX ORDER — SODIUM CHLORIDE 0.9 % (FLUSH) 0.9 %
10 SYRINGE (ML) INJECTION
OUTPATIENT
Start: 2024-06-06

## 2024-05-29 RX ORDER — IPRATROPIUM BROMIDE AND ALBUTEROL SULFATE 2.5; .5 MG/3ML; MG/3ML
3 SOLUTION RESPIRATORY (INHALATION)
OUTPATIENT
Start: 2024-06-06

## 2024-05-29 RX ORDER — DIPHENHYDRAMINE HYDROCHLORIDE 50 MG/ML
25 INJECTION INTRAMUSCULAR; INTRAVENOUS
OUTPATIENT
Start: 2024-06-06

## 2024-05-29 RX ORDER — HEPARIN 100 UNIT/ML
500 SYRINGE INTRAVENOUS
Status: DISCONTINUED | OUTPATIENT
Start: 2024-05-29 | End: 2024-05-29 | Stop reason: HOSPADM

## 2024-05-29 RX ADMIN — VEDOLIZUMAB 300 MG: 300 INJECTION, POWDER, LYOPHILIZED, FOR SOLUTION INTRAVENOUS at 02:05

## 2024-06-03 ENCOUNTER — LAB VISIT (OUTPATIENT)
Dept: LAB | Facility: HOSPITAL | Age: 59
End: 2024-06-03
Attending: OBSTETRICS & GYNECOLOGY
Payer: COMMERCIAL

## 2024-06-03 DIAGNOSIS — C54.1 MALIGNANT NEOPLASM OF ENDOMETRIUM: ICD-10-CM

## 2024-06-03 LAB
ALBUMIN SERPL BCP-MCNC: 3.6 G/DL (ref 3.5–5.2)
ALP SERPL-CCNC: 61 U/L (ref 55–135)
ALT SERPL W/O P-5'-P-CCNC: 23 U/L (ref 10–44)
ANION GAP SERPL CALC-SCNC: 12 MMOL/L (ref 8–16)
AST SERPL-CCNC: 20 U/L (ref 10–40)
BILIRUB SERPL-MCNC: 0.4 MG/DL (ref 0.1–1)
BUN SERPL-MCNC: 14 MG/DL (ref 6–20)
CALCIUM SERPL-MCNC: 9.1 MG/DL (ref 8.7–10.5)
CHLORIDE SERPL-SCNC: 105 MMOL/L (ref 95–110)
CO2 SERPL-SCNC: 26 MMOL/L (ref 23–29)
CREAT SERPL-MCNC: 0.9 MG/DL (ref 0.5–1.4)
EST. GFR  (NO RACE VARIABLE): >60 ML/MIN/1.73 M^2
GLUCOSE SERPL-MCNC: 132 MG/DL (ref 70–110)
POTASSIUM SERPL-SCNC: 3.4 MMOL/L (ref 3.5–5.1)
PROT SERPL-MCNC: 6.5 G/DL (ref 6–8.4)
SODIUM SERPL-SCNC: 143 MMOL/L (ref 136–145)
TSH SERPL DL<=0.005 MIU/L-ACNC: 1.09 UIU/ML (ref 0.4–4)

## 2024-06-03 PROCEDURE — 84443 ASSAY THYROID STIM HORMONE: CPT | Performed by: OBSTETRICS & GYNECOLOGY

## 2024-06-03 PROCEDURE — 36415 COLL VENOUS BLD VENIPUNCTURE: CPT | Performed by: OBSTETRICS & GYNECOLOGY

## 2024-06-03 PROCEDURE — 80053 COMPREHEN METABOLIC PANEL: CPT | Performed by: OBSTETRICS & GYNECOLOGY

## 2024-06-04 ENCOUNTER — OFFICE VISIT (OUTPATIENT)
Dept: GYNECOLOGIC ONCOLOGY | Facility: CLINIC | Age: 59
End: 2024-06-04
Payer: COMMERCIAL

## 2024-06-04 DIAGNOSIS — E03.2 HYPOTHYROIDISM DUE TO MEDICATION: ICD-10-CM

## 2024-06-04 DIAGNOSIS — K52.1 CHEMOTHERAPY INDUCED DIARRHEA: ICD-10-CM

## 2024-06-04 DIAGNOSIS — C77.2 SECONDARY MALIGNANCY OF RETROPERITONEAL LYMPH NODES: ICD-10-CM

## 2024-06-04 DIAGNOSIS — C54.1 MALIGNANT NEOPLASM OF ENDOMETRIUM: Primary | ICD-10-CM

## 2024-06-04 DIAGNOSIS — T45.1X5A CHEMOTHERAPY INDUCED DIARRHEA: ICD-10-CM

## 2024-06-04 DIAGNOSIS — Z79.52 LONG TERM (CURRENT) USE OF SYSTEMIC STEROIDS: ICD-10-CM

## 2024-06-04 DIAGNOSIS — Z51.11 ENCOUNTER FOR ANTINEOPLASTIC CHEMOTHERAPY: ICD-10-CM

## 2024-06-04 PROCEDURE — 99215 OFFICE O/P EST HI 40 MIN: CPT | Mod: 95,,, | Performed by: OBSTETRICS & GYNECOLOGY

## 2024-06-04 PROCEDURE — G2211 COMPLEX E/M VISIT ADD ON: HCPCS | Mod: 95,,, | Performed by: OBSTETRICS & GYNECOLOGY

## 2024-06-04 RX ORDER — PROCHLORPERAZINE EDISYLATE 5 MG/ML
5-10 INJECTION INTRAMUSCULAR; INTRAVENOUS ONCE AS NEEDED
Status: CANCELLED
Start: 2024-06-04

## 2024-06-04 RX ORDER — DIPHENHYDRAMINE HYDROCHLORIDE 50 MG/ML
50 INJECTION INTRAMUSCULAR; INTRAVENOUS ONCE AS NEEDED
Status: CANCELLED | OUTPATIENT
Start: 2024-06-04

## 2024-06-04 RX ORDER — EPINEPHRINE 0.3 MG/.3ML
0.3 INJECTION SUBCUTANEOUS ONCE AS NEEDED
Status: CANCELLED | OUTPATIENT
Start: 2024-06-04

## 2024-06-04 RX ORDER — SODIUM CHLORIDE 0.9 % (FLUSH) 0.9 %
10 SYRINGE (ML) INJECTION
Status: CANCELLED | OUTPATIENT
Start: 2024-06-04

## 2024-06-04 RX ORDER — HEPARIN 100 UNIT/ML
500 SYRINGE INTRAVENOUS
Status: CANCELLED | OUTPATIENT
Start: 2024-06-04

## 2024-06-04 NOTE — PROGRESS NOTES
The patient location is: home  The chief complaint leading to consultation is: cycle #6 of immunotherapy    Visit type: audiovisual    Face to Face time with patient: 10  15 minutes of total time spent on the encounter, which includes face to face time and non-face to face time preparing to see the patient (eg, review of tests), Obtaining and/or reviewing separately obtained history, Documenting clinical information in the electronic or other health record, Independently interpreting results (not separately reported) and communicating results to the patient/family/caregiver, or Care coordination (not separately reported).         Each patient to whom he or she provides medical services by telemedicine is:  (1) informed of the relationship between the physician and patient and the respective role of any other health care provider with respect to management of the patient; and (2) notified that he or she may decline to receive medical services by telemedicine and may withdraw from such care at any time.    Notes:        REFERRING PROVIDER  Omaira Evans MD Shah, Shamita MD    REASON FOR CONSULT  Sugar Diaz  is a 58 y.o.  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    HISTORY OF PRESENT ILLNESS    Chemotherapy regimen: Dostarlimab 1000mg 6 weeks  Cycle: 6  Previous dose limiting toxicities: Y  Cycle 3: Grade 4 diarrhea 2/2 Salmonella  Previous dose reductions: N  Previous breaks: Y  Cycle 4: 4 weeks 2/2 Grade 4 diarrhea 2/2 Salmonella  Previous changes in pre-medications: N    Energy level: baseline  Appetite: baseline  Fevers/chills/nights: no  Nausea: no  Diarrhea: yes, grade 1  Emesis: no  Neuropathy: yes, grade 3  Discoloration of lower extremities: no  Mucositis: no  Maculopapular rashes: no  Dyspnea or coughing: yes      Oncology History   Endometrial cancer   5/23/2023 Surgery    Hysteroscopy, D&C: grade 1 endometrioid EC     6/5/2023 Surgery     TRH/BSO/BSLN/RPLN/repair of obturator nerve    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 4.5 cm, grade 1, 96% MI (22/23 mm), +LUE, -LVSI, +MELF, -cervix, 1/2+ RPLN, 1/1+ LPLN, -ascites.     6/19/2023 Imaging Significant Findings    CT C/A/P w/: High L para-aortic at the level of the renal vessels, 1.5 cm.  Low R para-aortic at the LEAH, 4.1 cm in greatest dimension.     6/25/2023 Genomic Testing    [facility] (NGS): ARD1A, CHEK1, CTCF, , HDAC2, HNF1A, JAK1, KRAS, MAX, MSH6, P1K3R1, PPM1D, PTEN, ZFHX3, ZSR2     6/28/2023 Tumor Conference    No rule for debulking of para-aortic lymph nodes.  Adjuvant VBT.  Represent after completion of adjuvant therapy to discuss EBRT.        7/7/2023 - 10/19/2023 Chemotherapy    Carboplatin AUC 5/Paclitaxel 135 mg/m2/Dostarlimab 200mg q3 weeks x6    Previous dose limiting toxicities: Y  Cycle 4: Grade 2 colitis s/p steroid taper  Cycle 5: Grade 3 colitis  Previous dose reductions: N  Previous breaks: Y  Cycle 4: Dostarlimab omitted due to grade 2 colitis  Cycle 5: Dostarlimab 2/2 grade 3 colitis (negative colonoscopy with improvement in symptoms with steroid taper)     8/9/2023 - 9/13/2023 Radiation Therapy    Treating physician: Dr. Adriane Babb  Total Dose: 21 Gy HDR  Fractions: 3 vaginal cuff brachytherapy     8/22/2023 Imaging Significant Findings    CT A/P w/: No evidence of colitis, SD in high L para-aortic and low R para-aortic.     10/4/2023 Procedure    Colonoscopy: WNL     11/6/2023 Imaging Significant Findings    CT C/A/P w/: PATTI     11/6/2023 - 11/9/2023 Hospital Admission    Most Recent Admission Details  Admit Date: 11/6/23  Admitted for: RVS PNA superimposed by community acquitted PNA  Discharge date: 11/9/23  Discharged from: [facility] [address] [facility] [place] [facility] ([email or web address]  Discharge disposition: Home or Self Care       11/22/2023 -  Maintenance Therapy    Dostarlimab  1000mg x     2/21/2024 Procedure    EGD, Colonoscopy: No gross evidence of colitis      Hospital Admission    Most Recent Admission Details  Admit Date: 2/15/24  Admitted for: Grade 4 colitis 2/2 Salmonella.    Discharge date: 2/23/24  Discharged from: [facility]  Discharge disposition: Home or Self Care       2/21/2024 Procedure    Colonoscopy, EGD: No obvious ICI colitis          The following portions of the patient's history were reviewed and updated as appropriate: allergies, current medications, family history, medical history, social history and surgical history.    REVIEW OF SYSTEMS  All systems reviewed and negative except as noted in HPI.    OBJECTIVE   There were no vitals filed for this visit.         There is no height or weight on file to calculate BMI.      1. General: Well appearing, no apparent distress, alert and oriented.  2. Lymph: Neck symmetric without cervical or supraclavicular adenopathy or mass.  3. Lungs: Normal respiratory rate, no accessory muscle use.  4. Psych: Normal affect.  5. Abdomen:  non-distended                  ECOG status: 1    LABORATORY DATA  Lab data reviewed.    RADIOLOGICAL DATA  Radiology data reviewed.    ASSESSMENT / PLAN     1. 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 Encounter for antineoplastic chemotherapy    4. Hypothyroidism due to medication    5. Chemotherapy induced diarrhea    6. Long term (current) use of systemic steroids          Grade 1 diarrhea with Prednisone 15mg qD and Entyvio (5/29 #3).   Administer maintenance cycle #6.  CMP, TSH, CT C/A/P w/ w/o, RONC, cycle #7 6 weeks.    PATIENT EDUCATION  Ready to learn, no apparent learning barriers were identified; learning preferences include listening. Explained diagnosis and treatment plan; patient expressed understanding of the content.    ADMINISTRATIVE BILLING  Greater than 50% of was spent in counseling.      ONGOING COMPLEXITY BILLING  Visit today is associated with current or anticipated ongoing medical care related to this patients single serious condition/complex condition: endometrial cancer

## 2024-06-05 DIAGNOSIS — G62.0 CHEMOTHERAPY-INDUCED NEUROPATHY: ICD-10-CM

## 2024-06-05 DIAGNOSIS — T45.1X5A CHEMOTHERAPY-INDUCED NEUROPATHY: ICD-10-CM

## 2024-06-06 ENCOUNTER — INFUSION (OUTPATIENT)
Dept: INFUSION THERAPY | Facility: OTHER | Age: 59
End: 2024-06-06
Attending: INTERNAL MEDICINE
Payer: COMMERCIAL

## 2024-06-06 VITALS
WEIGHT: 162.06 LBS | HEIGHT: 67 IN | RESPIRATION RATE: 16 BRPM | DIASTOLIC BLOOD PRESSURE: 60 MMHG | HEART RATE: 97 BPM | SYSTOLIC BLOOD PRESSURE: 123 MMHG | BODY MASS INDEX: 25.44 KG/M2 | OXYGEN SATURATION: 99 %

## 2024-06-06 DIAGNOSIS — C54.1 ENDOMETRIAL CANCER: Primary | ICD-10-CM

## 2024-06-06 PROCEDURE — 96367 TX/PROPH/DG ADDL SEQ IV INF: CPT

## 2024-06-06 PROCEDURE — 63600175 PHARM REV CODE 636 W HCPCS: Mod: JZ,TB | Performed by: OBSTETRICS & GYNECOLOGY

## 2024-06-06 PROCEDURE — 96413 CHEMO IV INFUSION 1 HR: CPT

## 2024-06-06 PROCEDURE — 25000003 PHARM REV CODE 250: Performed by: OBSTETRICS & GYNECOLOGY

## 2024-06-06 RX ORDER — EPINEPHRINE 0.3 MG/.3ML
0.3 INJECTION SUBCUTANEOUS ONCE AS NEEDED
Status: DISCONTINUED | OUTPATIENT
Start: 2024-06-06 | End: 2024-06-06 | Stop reason: HOSPADM

## 2024-06-06 RX ORDER — DIPHENHYDRAMINE HYDROCHLORIDE 50 MG/ML
50 INJECTION INTRAMUSCULAR; INTRAVENOUS ONCE AS NEEDED
Status: DISCONTINUED | OUTPATIENT
Start: 2024-06-06 | End: 2024-06-06 | Stop reason: HOSPADM

## 2024-06-06 RX ORDER — HEPARIN 100 UNIT/ML
500 SYRINGE INTRAVENOUS
Status: DISCONTINUED | OUTPATIENT
Start: 2024-06-06 | End: 2024-06-06 | Stop reason: HOSPADM

## 2024-06-06 RX ORDER — DULOXETIN HYDROCHLORIDE 60 MG/1
60 CAPSULE, DELAYED RELEASE ORAL DAILY
Qty: 30 CAPSULE | Refills: 2 | Status: SHIPPED | OUTPATIENT
Start: 2024-06-06 | End: 2025-06-06

## 2024-06-06 RX ORDER — SODIUM CHLORIDE 0.9 % (FLUSH) 0.9 %
10 SYRINGE (ML) INJECTION
Status: DISCONTINUED | OUTPATIENT
Start: 2024-06-06 | End: 2024-06-06 | Stop reason: HOSPADM

## 2024-06-06 RX ADMIN — FOSAPREPITANT DIMEGLUMINE 150 MG: 150 INJECTION, POWDER, LYOPHILIZED, FOR SOLUTION INTRAVENOUS at 11:06

## 2024-06-06 RX ADMIN — SODIUM CHLORIDE 1000 MG: 9 INJECTION, SOLUTION INTRAVENOUS at 12:06

## 2024-06-06 NOTE — PLAN OF CARE
Problem: Nausea and Vomiting (Chemotherapy Effects)  Goal: Fluid and Electrolyte Balance  Outcome: Progressing     Problem: Adult Inpatient Plan of Care  Goal: Plan of Care Review  Outcome: Progressing  Goal: Optimal Comfort and Wellbeing  Outcome: Progressing       Patient presented today for iv dostarlimab infusion. PIV started to right wrist and infusion given with no issues. VS remained stable throughout visit and assessment provided no issues. PIV removed w/o complications and all questions answered. Patient scheduled for next appt on 7-18-24 and left clinic ambulatory in no acute distress.

## 2024-06-10 ENCOUNTER — CLINICAL SUPPORT (OUTPATIENT)
Dept: REHABILITATION | Facility: HOSPITAL | Age: 59
End: 2024-06-10
Payer: COMMERCIAL

## 2024-06-10 DIAGNOSIS — C54.1 MALIGNANT NEOPLASM OF ENDOMETRIUM: Primary | ICD-10-CM

## 2024-06-10 PROCEDURE — 97813 ACUP 1/> W/ESTIM 1ST 15 MIN: CPT | Mod: PN | Performed by: ACUPUNCTURIST

## 2024-06-10 PROCEDURE — 97814 ACUP 1/> W/ESTIM EA ADDL 15: CPT | Mod: PN | Performed by: ACUPUNCTURIST

## 2024-06-13 ENCOUNTER — TELEPHONE (OUTPATIENT)
Dept: GASTROENTEROLOGY | Facility: CLINIC | Age: 59
End: 2024-06-13
Payer: COMMERCIAL

## 2024-06-13 NOTE — TELEPHONE ENCOUNTER
----- Message from Nisa Hood, RN sent at 5/16/2024  8:38 AM CDT -----  Plan: continue pred 15 w/ repeat calpro 6/12. Taper recs TBD based on results. Was this submitted?    IBD meds:   - Arti, LD: 4/17 #1, ND: 5/1 #2, 5/29 #3      Prednisone 15mg/d (discharged home 2/23/24 on pred 60 mg/d, 50 mg/d 3/1, 40 mg/d 3/8, 30 mg/d 3/15, 20mg/d 3/22, 15mg/d 3/28, 10mg/d 4/8, 20mg/d 4/12, 20mg/d 4/19 check in after #2 Entyvio, 15 mg/ QD 5/3/24)

## 2024-06-13 NOTE — TELEPHONE ENCOUNTER
Spoke with Sugar:  - did not turn in stool as planned yesterday  - will turn in tomorrow  - confirms she is taking Prednisone 15mg/d at present  - will monitor closely

## 2024-06-14 NOTE — TELEPHONE ENCOUNTER
Spoke with Sugar:  IBD meds:   - Entyvio, LD: 5/29 #3, ND: 7/24 (1st maintenance)     Prednisone 15mg/d (discharged home 2/23/24 on pred 60 mg/d, 50 mg/d 3/1, 40 mg/d 3/8, 30 mg/d 3/15, 20mg/d 3/22, 15mg/d 3/28, 10mg/d 4/8, 20mg/d 4/12, 20mg/d 4/19 check in after #2 Entyvio, 15 mg/ QD 5/3/24)     - unable to have BM since we spoke yesterday  - will turn in today if able to produce a sample  - 0-1 BM/d  - denies blood, mucous, noc BM, tenesmus sxs, and pain  - plan was to turn in stool sample 6/12 and Prednisone taper would depend on results    Dr. Montero will be updated.   Principal Discharge DX:	Anemia, unspecified type  Goal:	Follow up with hematologist  Instructions for follow-up, activity and diet:	You were admitted with severe anemia  Secondary Diagnosis:	Hyperparathyroidism due to renal insufficiency  Secondary Diagnosis:	ESRD (end stage renal disease) on dialysis  Secondary Diagnosis:	Hyperphosphatemia  Secondary Diagnosis:	Hb-SS disease without crisis Principal Discharge DX:	Anemia, unspecified type  Goal:	Follow up with hematologist  Instructions for follow-up, activity and diet:	You were admitted with severe anemia with no obvious source of acute blood loss  and had blood transfusion   H & H to be monitored and need to have hematological evaluation  However you wanted to sign out against medical advice  Follow up with primary care physician and hematologist as soon as possible  Secondary Diagnosis:	Hyperparathyroidism due to renal insufficiency  Instructions for follow-up, activity and diet:	Continue calcitriol  Secondary Diagnosis:	ESRD (end stage renal disease) on dialysis  Instructions for follow-up, activity and diet:	Continue peritoneal dialysis  Follow up with your nephrologist  Secondary Diagnosis:	Hyperphosphatemia  Instructions for follow-up, activity and diet:	Continue current medication  Secondary Diagnosis:	Hb-SS disease without crisis  Instructions for follow-up, activity and diet:	Follow up with your primary care physician and hematologist Principal Discharge DX:	Anemia, unspecified type  Goal:	Follow up with hematologist  Instructions for follow-up, activity and diet:	You were admitted with severe anemia with no obvious source of acute blood loss  and had blood transfusion   H & H to be monitored and need to have hematological evaluation  However you wanted to sign out against medical advice  Please Follow up with primary care physician and hematologist as soon as possible  Secondary Diagnosis:	Hyperparathyroidism due to renal insufficiency  Instructions for follow-up, activity and diet:	Continue calcitriol  Secondary Diagnosis:	ESRD (end stage renal disease) on dialysis  Instructions for follow-up, activity and diet:	Continue peritoneal dialysis  Follow up with your nephrologist  Secondary Diagnosis:	Hyperphosphatemia  Instructions for follow-up, activity and diet:	Continue current medication  No concentrated phosphate  Secondary Diagnosis:	Hb-SS disease without crisis  Instructions for follow-up, activity and diet:	Follow up with your primary care physician and hematologist

## 2024-06-14 NOTE — TELEPHONE ENCOUNTER
Spoke with Sugar:  - reviewed Dr. Montero's recommendation to reduce Prednisone to 10mg/d  - turn in stool   - RN check in 1 wk  - she states understanding and agrees with this plan

## 2024-06-15 ENCOUNTER — LAB VISIT (OUTPATIENT)
Dept: LAB | Facility: HOSPITAL | Age: 59
End: 2024-06-15
Attending: INTERNAL MEDICINE
Payer: COMMERCIAL

## 2024-06-15 DIAGNOSIS — R19.7 DIARRHEA, UNSPECIFIED TYPE: ICD-10-CM

## 2024-06-15 DIAGNOSIS — K52.9 COLITIS: ICD-10-CM

## 2024-06-15 PROCEDURE — 83993 ASSAY FOR CALPROTECTIN FECAL: CPT | Performed by: INTERNAL MEDICINE

## 2024-06-16 NOTE — PROGRESS NOTES
Acupuncture Evaluation Note     Name: Sugar Ball [facility] Number: [id number]    Traditional Chinese Medicine (TCM) Diagnosis: Qi Stagnation, Qi Deficiency, and Blood Deficiency  Medical Diagnosis:   Encounter Diagnosis   Name Primary?    Malignant neoplasm of endometrium Yes        Evaluation Date: 6/16/2024    Visit #/Visits authorized: 12, 5/12    Precautions: Standard and cancer    Subjective     Chief Concern: CIPN - feet and right hand.     Medical necessity is demonstrated by the following IMPAIRMENTS: Medical Necessity: Decreased mobility limits day to day activities, social, and emergent situations              Aggravating Factors:  movement and cold   Relieving Factors:  heat    Symptom Description:     Quality:  Tingling and Numb and Swollen (sensation, no edema)  Severity:  7  Frequency:  continuously      Objective       New Findings:      Treatment     Treatment Principles:  Move qi, nourish qi and blood     Acupuncture points used:  4 DELMI, BA YUDI, BA KESHA , Gb34, Ki3, Sp6, Sp9, and St36    Bilateral points:  Unilateral points:  Auricular Treatment:      Needles In: 30  Needles Out: 30  [place] W/ STIM placed: 7:50  Needles W/ STIM removed: 8:20      Assessment     After treatment, patient felt positive changes in neuropathy this week       Patient prognosis is Good.     Patient will continue to benefit from acupuncture treatment to address the deficits listed in the problem list box on initial evaluation, provide patient family education and to maximize pt's level of independence in the home and community environment.     Patient's spiritual, cultural and educational needs considered and pt agreeable to plan of care and goals.     Anticipated barriers to treatment: none    Plan     Recommend 1 /week for 1 sessions then re-assess.      Education:  Patient is aware of cumulative benefit of acupuncture

## 2024-06-17 ENCOUNTER — CLINICAL SUPPORT (OUTPATIENT)
Dept: REHABILITATION | Facility: HOSPITAL | Age: 59
End: 2024-06-17
Payer: COMMERCIAL

## 2024-06-17 DIAGNOSIS — C54.1 MALIGNANT NEOPLASM OF ENDOMETRIUM: Primary | ICD-10-CM

## 2024-06-17 PROCEDURE — 97813 ACUP 1/> W/ESTIM 1ST 15 MIN: CPT | Mod: PN | Performed by: ACUPUNCTURIST

## 2024-06-17 PROCEDURE — 97814 ACUP 1/> W/ESTIM EA ADDL 15: CPT | Mod: PN | Performed by: ACUPUNCTURIST

## 2024-06-17 NOTE — PROGRESS NOTES
Acupuncture Evaluation Note     Name: Sugar Ball [facility] Number: [id number]    Traditional Chinese Medicine (TCM) Diagnosis: Qi Stagnation, Qi Deficiency, and Blood Deficiency  Medical Diagnosis:   Encounter Diagnosis   Name Primary?    Malignant neoplasm of endometrium Yes        Evaluation Date: 6/17/2024    Visit #/Visits authorized: 12, 6/12    Precautions: Standard and cancer    Subjective     Chief Concern: CIPN - feet and right hand.     Medical necessity is demonstrated by the following IMPAIRMENTS: Medical Necessity: Decreased mobility limits day to day activities, social, and emergent situations              Aggravating Factors:  movement and cold   Relieving Factors:  heat    Symptom Description:     Quality:  Tingling and Numb and Swollen (sensation, no edema)  Severity:  7  Frequency:  continuously      Objective       New Findings:      Treatment     Treatment Principles:  Move qi, nourish qi and blood     Acupuncture points used:  4 DELMI, BA YUDI, BA KESHA , Gb34, Ki3, Sp6, Sp9, and St36    Bilateral points:  Unilateral points:  Auricular Treatment:      Needles In: 30  Needles Out: 30  [place] W/ STIM placed: 7:50  Needles W/ STIM removed: 8:20      Assessment     After treatment, patient felt slightly beneficial changes in neuropathy this week       Patient prognosis is Good.     Patient will continue to benefit from acupuncture treatment to address the deficits listed in the problem list box on initial evaluation, provide patient family education and to maximize pt's level of independence in the home and community environment.     Patient's spiritual, cultural and educational needs considered and pt agreeable to plan of care and goals.     Anticipated barriers to treatment: none    Plan     Recommend 1 /week for 6 sessions then re-assess.      Education:  Patient is aware of cumulative benefit of acupuncture

## 2024-06-19 ENCOUNTER — PATIENT MESSAGE (OUTPATIENT)
Dept: GASTROENTEROLOGY | Facility: CLINIC | Age: 59
End: 2024-06-19
Payer: COMMERCIAL

## 2024-06-19 DIAGNOSIS — K52.9 COLITIS: Primary | ICD-10-CM

## 2024-06-19 DIAGNOSIS — R19.7 DIARRHEA, UNSPECIFIED TYPE: ICD-10-CM

## 2024-06-19 LAB — CALPROTECTIN STL-MCNT: 42.2 MCG/G

## 2024-06-24 ENCOUNTER — CLINICAL SUPPORT (OUTPATIENT)
Dept: REHABILITATION | Facility: HOSPITAL | Age: 59
End: 2024-06-24
Payer: COMMERCIAL

## 2024-06-24 DIAGNOSIS — C54.1 MALIGNANT NEOPLASM OF ENDOMETRIUM: Primary | ICD-10-CM

## 2024-06-24 PROCEDURE — 97814 ACUP 1/> W/ESTIM EA ADDL 15: CPT | Mod: PN | Performed by: ACUPUNCTURIST

## 2024-06-24 PROCEDURE — 97813 ACUP 1/> W/ESTIM 1ST 15 MIN: CPT | Mod: PN | Performed by: ACUPUNCTURIST

## 2024-06-24 NOTE — PROGRESS NOTES
Acupuncture Evaluation Note     Name: Sugar Ball [facility] Number: [id number]    Traditional Chinese Medicine (TCM) Diagnosis: Qi Stagnation, Qi Deficiency, and Blood Deficiency  Medical Diagnosis:   Encounter Diagnosis   Name Primary?    Malignant neoplasm of endometrium Yes        Evaluation Date: 6/24/2024    Visit #/Visits authorized: 12, 7/12    Precautions: Standard and cancer    Subjective     Chief Concern: CIPN - feet and right hand.     Medical necessity is demonstrated by the following IMPAIRMENTS: Medical Necessity: Decreased mobility limits day to day activities, social, and emergent situations              Aggravating Factors:  movement and cold   Relieving Factors:  heat    Symptom Description:     Quality:  Tingling and Numb and Swollen (sensation, no edema)  Severity:  7  Frequency:  continuously      Objective       New Findings:      Treatment     Treatment Principles:  Move qi, nourish qi and blood     Acupuncture points used:  4 DELMI, BA YUDI, BA KESHA , Gb34, Ki3, Sp6, Sp9, and St36    Bilateral points:  Unilateral points:  Auricular Treatment:      Needles In: 30  Needles Out: 30  [place] W/ STIM placed: 7:50  Needles W/ STIM removed: 8:20      Assessment     After treatment, patient felt improvements in neuropathy this week, less sharp pains. Numbness present in toes and balls of feet, not arches or heels.        Patient prognosis is Good.     Patient will continue to benefit from acupuncture treatment to address the deficits listed in the problem list box on initial evaluation, provide patient family education and to maximize pt's level of independence in the home and community environment.     Patient's spiritual, cultural and educational needs considered and pt agreeable to plan of care and goals.     Anticipated barriers to treatment: none    Plan     Recommend 1 /week for 5 sessions then re-assess.      Education:  Patient is aware of cumulative benefit of  acupuncture

## 2024-06-25 NOTE — TELEPHONE ENCOUNTER
Culture taken of wound- Infectious disease following.     Awaiting MRI results.     Discussed with patient treatment options in details. Patient understands the risks and benefits and alternate treatment options.     Discussed with patient that given the severity of the infection in his foot, I recommend partial foot amputation in order to remove the infected portion of his foot.   Patient is agreeable to partial foot amputation- awaiting MRI results to determine the level of amputation necessary.     Will plan to take patient to the OR at some point this week    Patient will need to be non weight bearing following partial foot amputation until the sutures are removed. Discussed this with patient.      Counseled patient on increasing protein intake, not getting wound wet, keeping dressing clean dry and intact, following a healthy diet, elevating legs when able, removing pressure from wound        call - looks like not seen since 2018 but going thru cancer tx    Point out Dr GOODWIN got a notice in the computer you were sent home but noted he had not seen you since 2018 and might be good to have a check up

## 2024-06-27 ENCOUNTER — HOSPITAL ENCOUNTER (OUTPATIENT)
Dept: RADIOLOGY | Facility: OTHER | Age: 59
Discharge: HOME | End: 2024-06-27
Attending: STUDENT IN AN ORGANIZED HEALTH CARE EDUCATION/TRAINING PROGRAM
Payer: COMMERCIAL

## 2024-06-27 ENCOUNTER — OFFICE VISIT (OUTPATIENT)
Dept: INTERNAL MEDICINE | Facility: CLINIC | Age: 59
End: 2024-06-27
Payer: COMMERCIAL

## 2024-06-27 ENCOUNTER — PATIENT MESSAGE (OUTPATIENT)
Dept: REHABILITATION | Facility: HOSPITAL | Age: 59
End: 2024-06-27
Payer: COMMERCIAL

## 2024-06-27 VITALS
SYSTOLIC BLOOD PRESSURE: 121 MMHG | TEMPERATURE: 99 F | WEIGHT: 161.63 LBS | BODY MASS INDEX: 25.37 KG/M2 | OXYGEN SATURATION: 97 % | DIASTOLIC BLOOD PRESSURE: 66 MMHG | HEART RATE: 110 BPM | HEIGHT: 67 IN

## 2024-06-27 DIAGNOSIS — J06.9 VIRAL URI WITH COUGH: ICD-10-CM

## 2024-06-27 DIAGNOSIS — J06.9 VIRAL URI WITH COUGH: Primary | ICD-10-CM

## 2024-06-27 PROCEDURE — 71046 X-RAY EXAM CHEST 2 VIEWS: CPT | Mod: 26,,, | Performed by: RADIOLOGY

## 2024-06-27 PROCEDURE — 3008F BODY MASS INDEX DOCD: CPT | Mod: CPTII,S$GLB,, | Performed by: STUDENT IN AN ORGANIZED HEALTH CARE EDUCATION/TRAINING PROGRAM

## 2024-06-27 PROCEDURE — 99213 OFFICE O/P EST LOW 20 MIN: CPT | Mod: S$GLB,,, | Performed by: STUDENT IN AN ORGANIZED HEALTH CARE EDUCATION/TRAINING PROGRAM

## 2024-06-27 PROCEDURE — 3074F SYST BP LT 130 MM HG: CPT | Mod: CPTII,S$GLB,, | Performed by: STUDENT IN AN ORGANIZED HEALTH CARE EDUCATION/TRAINING PROGRAM

## 2024-06-27 PROCEDURE — 3078F DIAST BP <80 MM HG: CPT | Mod: CPTII,S$GLB,, | Performed by: STUDENT IN AN ORGANIZED HEALTH CARE EDUCATION/TRAINING PROGRAM

## 2024-06-27 PROCEDURE — 71046 X-RAY EXAM CHEST 2 VIEWS: CPT | Mod: TC,FY

## 2024-06-27 PROCEDURE — 99999 PR PBB SHADOW E&M-EST. PATIENT-LVL IV: CPT | Mod: PBBFAC,,, | Performed by: STUDENT IN AN ORGANIZED HEALTH CARE EDUCATION/TRAINING PROGRAM

## 2024-06-27 PROCEDURE — 1159F MED LIST DOCD IN RCRD: CPT | Mod: CPTII,S$GLB,, | Performed by: STUDENT IN AN ORGANIZED HEALTH CARE EDUCATION/TRAINING PROGRAM

## 2024-06-27 RX ORDER — AZELASTINE 1 MG/ML
1 SPRAY, METERED NASAL 2 TIMES DAILY
Qty: 30 ML | Refills: 0 | Status: SHIPPED | OUTPATIENT
Start: 2024-06-27 | End: 2025-06-27

## 2024-06-27 RX ORDER — BENZONATATE 100 MG/1
100 CAPSULE ORAL 3 TIMES DAILY PRN
Qty: 30 CAPSULE | Refills: 0 | Status: SHIPPED | OUTPATIENT
Start: 2024-06-27 | End: 2024-07-07

## 2024-06-27 NOTE — PROGRESS NOTES
" [facility]  Subjective:     Patient ID: Sugar Diaz is a 58 y.o. female.  Chief Complaint: Cough   HPI:  Patient is a 58 y.o. female who presents for the above chief complaint.     Sx started middle of last week. She started coughing. Coughing has gradually gotten worse. Also has headache and runny nose. No chest pain or shortness of breath. Sometimes coughs up some phlegm.     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     Took otc sinus medicine. Did not really work.  Takes Flonase    Current Outpatient Medications   Medication Instructions    acetaminophen (TYLENOL) 650 mg, Oral, Every 6 hours PRN    azelastine (ASTELIN) 137 mcg, Nasal, 2 times daily    b complex vitamins tablet 1 tablet, Oral, Daily    benzonatate (TESSALON) 100 mg, Oral, 3 times daily PRN    diclofenac sodium (VOLTAREN) 2 g, Topical (Top), 2 times daily    diphenhydrAMINE (BENADRYL) 25 mg, Oral, Every 6 hours PRN    diphenoxylate-atropine 2.5-0.025 mg (LOMOTIL) 2.5-0.025 mg per tablet 1 tablet, Oral, Daily PRN    DULoxetine (CYMBALTA) 60 mg, Oral, Daily    ENTYVIO 300 mg, Intravenous, Week 0, 2, and 6 followed by every 8 weeks    ibuprofen (ADVIL,MOTRIN) 600 mg, Oral, Every 6 hours PRN    levothyroxine (SYNTHROID) 100 mcg, Oral, Before breakfast    loratadine (CLARITIN) 10 mg, Oral, Daily    multivitamin (ONE DAILY MULTIVITAMIN) per tablet 1 tablet, Oral, Daily    predniSONE (DELTASONE) 5 MG tablet Take as directed by Dr. Montero's office    predniSONE (DELTASONE) 20 mg, Oral, Daily, Or as directed by Dr. Montero    valACYclovir (VALTREX) 1000 MG tablet Two pills at onset of fever blister and then repeat 2 pills 12 hr later     Objective:      Body mass index is 25.31 kg/m².  Vitals:    06/27/24 1551   BP: 121/66   Pulse: 110   Temp: 98.8 °F (37.1 °C)   SpO2: 97%   Weight: 73.3 kg (161 lb 9.6 oz)   Height: 5' 7" (1.702 m)   PainSc: 0-No pain "     Physical Exam  Constitutional:       Appearance: Normal appearance.   Cardiovascular:      Rate and Rhythm: Normal rate.      Heart sounds: No murmur heard.  Pulmonary:      Effort: Pulmonary effort is normal. No respiratory distress.      Breath sounds: Rhonchi present. No wheezing or rales.   Abdominal:      General: Abdomen is flat.   Musculoskeletal:      Right lower leg: No edema.      Left lower leg: No edema.   Lymphadenopathy:      Cervical: No cervical adenopathy.   Neurological:      Mental Status: She is alert.           Assessment:       1. Viral URI with cough        Plan:   Likely viral URI with cough.  No signs or symptoms of bacterial infection.  Lungs clear to auscultation.  Given she is immunocompromised on Entyvio and prednisone obtain chest x-ray to rule out underlying consolidation which would indicate an occult pneumonia.       Viral URI with cough  -     X-Ray Chest PA And Lateral; Future; Expected date: 06/27/2024  -     benzonatate (TESSALON) 100 MG capsule; Take 1 capsule (100 mg total) by mouth 3 (three) times daily as needed for Cough.  Dispense: 30 capsule; Refill: 0  -     azelastine (ASTELIN) 137 mcg (0.1 %) nasal spray; 1 spray (137 mcg total) by Nasal route 2 (two) times daily.  Dispense: 30 mL; Refill: 0        All of your core healthy metrics are met.    No follow-ups on file. or sooner prn (as needed)          Dexter Khan  [facility]  [address] [address]  Phone [phone]  Fax [phone]    This note is dictated using the M*Modal Fluency Direct word recognition program. It may contain word recognition mistakes or wrong word substitutions (commonly he/she and is/was substitutions) that were missed on review.  Answers submitted by the patient for this visit:  Cough Questionnaire (Submitted on 6/27/2024)  Chief Complaint: Cough  Chronicity: new  Onset: in the past 7 days  Progression since onset: gradually  worsening  Frequency: hourly  Cough characteristics: productive of sputum  chest pain: No  chills: No  ear congestion: Yes  ear pain: No  fever: No  headaches: Yes  heartburn: No  hemoptysis: No  myalgias: No  nasal congestion: Yes  postnasal drip: Yes  rash: No  rhinorrhea: Yes  shortness of breath: No  sore throat: No  sweats: No  weight loss: No  wheezing: No  Aggravated by: lying down  asthma: No  bronchiectasis: No  bronchitis: Yes  COPD: No  emphysema: No  environmental allergies: No  pneumonia: Yes  Treatments tried: OTC cough suppressant  Improvement on treatment: no relief

## 2024-07-01 DIAGNOSIS — C54.1 MALIGNANT NEOPLASM OF ENDOMETRIUM: Primary | ICD-10-CM

## 2024-07-01 NOTE — TELEPHONE ENCOUNTER
Spoke with Sugar:  - reviewed Dr. Montero's recommendations: turn in stool sample and resume Prednisone 10mg/d until results  - she states understanding and agrees to this plan

## 2024-07-02 ENCOUNTER — CLINICAL SUPPORT (OUTPATIENT)
Dept: REHABILITATION | Facility: HOSPITAL | Age: 59
End: 2024-07-02
Payer: COMMERCIAL

## 2024-07-02 DIAGNOSIS — C54.1 MALIGNANT NEOPLASM OF ENDOMETRIUM: Primary | ICD-10-CM

## 2024-07-02 PROCEDURE — 97813 ACUP 1/> W/ESTIM 1ST 15 MIN: CPT | Mod: PN | Performed by: ACUPUNCTURIST

## 2024-07-02 PROCEDURE — 97814 ACUP 1/> W/ESTIM EA ADDL 15: CPT | Mod: PN | Performed by: ACUPUNCTURIST

## 2024-07-02 NOTE — PROGRESS NOTES
Acupuncture Evaluation Note     Name: Sugar Ball [facility] Number: [id number]    Traditional Chinese Medicine (TCM) Diagnosis: Qi Stagnation, Qi Deficiency, and Blood Deficiency  Medical Diagnosis:   Encounter Diagnosis   Name Primary?    Malignant neoplasm of endometrium Yes        Evaluation Date: 7/2/2024    Visit #/Visits authorized: 12, 8/12    Precautions: Standard and cancer    Subjective     Chief Concern: CIPN - feet and right hand.     Medical necessity is demonstrated by the following IMPAIRMENTS: Medical Necessity: Decreased mobility limits day to day activities, social, and emergent situations              Aggravating Factors:  movement and cold   Relieving Factors:  heat    Symptom Description:     Quality:  Tingling and Numb and Swollen (sensation, no edema)  Severity:  7  Frequency:  continuously      Objective       New Findings:  coming off steriods and feeling joint pain and overall achy pain, diarrhea, head cold, fatigue    Treatment     Treatment Principles:  Move qi, nourish qi and blood     Acupuncture points used:  4 AWAD, BA YUDI, BA KESHA , Gb34, Ki3, Sp6, Sp9, and St36    Bilateral points:  Unilateral points:  Auricular Treatment:      Needles In: 30  Needles Out: 30  [place] W/ STIM placed: 8:20  Needles W/ STIM removed: 8:50      Assessment     After treatment, patient felt improvements in neuropathy this week, less sharp pains. Numbness present in toes and balls of feet, not arches or heels.        Patient prognosis is Good.     Patient will continue to benefit from acupuncture treatment to address the deficits listed in the problem list box on initial evaluation, provide patient family education and to maximize pt's level of independence in the home and community environment.     Patient's spiritual, cultural and educational needs considered and pt agreeable to plan of care and goals.     Anticipated barriers to treatment: none    Plan     Recommend 1 /week for 4 sessions then  re-assess.      Education:  Patient is aware of cumulative benefit of acupuncture

## 2024-07-03 ENCOUNTER — TELEPHONE (OUTPATIENT)
Dept: GASTROENTEROLOGY | Facility: CLINIC | Age: 59
End: 2024-07-03
Payer: COMMERCIAL

## 2024-07-03 DIAGNOSIS — K52.9 COLITIS: ICD-10-CM

## 2024-07-03 RX ORDER — PREDNISONE 10 MG/1
40 TABLET ORAL DAILY
Qty: 70 TABLET | Refills: 0 | Status: SHIPPED | OUTPATIENT
Start: 2024-07-03

## 2024-07-03 NOTE — TELEPHONE ENCOUNTER
Spoke with Sugar:   IBD meds:  - Entyvio q8w, LD: 5/29, ND: 7/24  - Prednisone 10mg/d - started 7/2 confirmed    - reviewed negative stool studies, stool mary pending  - 6 BM/d, liquid  - 2 noc BM last night  - denies blood, mucous, false BR trips, and pain  - OV 7/11/24    Dr. Montero will be updated.

## 2024-07-03 NOTE — TELEPHONE ENCOUNTER
Spoke with Sugar:  - reviewed Dr. Montero's recommendation to increase Prednisone to 40mg/d  - will re-evaluate when stool mary result is posted  - stool cultures are negative  - she states understanding and agrees with this plan

## 2024-07-03 NOTE — TELEPHONE ENCOUNTER
----- Message from Jason Montero MD sent at 7/3/2024  3:18 PM CDT -----  Let her know stool studies negative for infection  She had stopped prednisone 6/27   Restart pred 10 mg daily on 7/1- just confirm please    Get update on symptoms please

## 2024-07-08 ENCOUNTER — LAB VISIT (OUTPATIENT)
Dept: LAB | Facility: HOSPITAL | Age: 59
End: 2024-07-08
Attending: OBSTETRICS & GYNECOLOGY
Payer: COMMERCIAL

## 2024-07-08 DIAGNOSIS — C54.1 MALIGNANT NEOPLASM OF ENDOMETRIUM: ICD-10-CM

## 2024-07-08 LAB
ANION GAP SERPL CALC-SCNC: 10 MMOL/L (ref 8–16)
BUN SERPL-MCNC: 12 MG/DL (ref 6–20)
CALCIUM SERPL-MCNC: 9.2 MG/DL (ref 8.7–10.5)
CHLORIDE SERPL-SCNC: 107 MMOL/L (ref 95–110)
CO2 SERPL-SCNC: 23 MMOL/L (ref 23–29)
CREAT SERPL-MCNC: 0.8 MG/DL (ref 0.5–1.4)
EST. GFR  (NO RACE VARIABLE): >60 ML/MIN/1.73 M^2
GLUCOSE SERPL-MCNC: 79 MG/DL (ref 70–110)
POTASSIUM SERPL-SCNC: 3.6 MMOL/L (ref 3.5–5.1)
SODIUM SERPL-SCNC: 140 MMOL/L (ref 136–145)

## 2024-07-08 PROCEDURE — 80048 BASIC METABOLIC PNL TOTAL CA: CPT | Performed by: OBSTETRICS & GYNECOLOGY

## 2024-07-08 PROCEDURE — 36415 COLL VENOUS BLD VENIPUNCTURE: CPT | Performed by: OBSTETRICS & GYNECOLOGY

## 2024-07-08 RX ORDER — SODIUM CHLORIDE 0.9 % (FLUSH) 0.9 %
10 SYRINGE (ML) INJECTION
OUTPATIENT
Start: 2024-07-08

## 2024-07-08 RX ORDER — EPINEPHRINE 0.3 MG/.3ML
0.3 INJECTION SUBCUTANEOUS ONCE AS NEEDED
OUTPATIENT
Start: 2024-07-08

## 2024-07-08 RX ORDER — PROCHLORPERAZINE EDISYLATE 5 MG/ML
5-10 INJECTION INTRAMUSCULAR; INTRAVENOUS ONCE AS NEEDED
Start: 2024-07-08

## 2024-07-08 RX ORDER — HEPARIN 100 UNIT/ML
500 SYRINGE INTRAVENOUS
OUTPATIENT
Start: 2024-07-08

## 2024-07-08 RX ORDER — DIPHENHYDRAMINE HYDROCHLORIDE 50 MG/ML
50 INJECTION INTRAMUSCULAR; INTRAVENOUS ONCE AS NEEDED
OUTPATIENT
Start: 2024-07-08

## 2024-07-09 ENCOUNTER — TELEPHONE (OUTPATIENT)
Dept: GASTROENTEROLOGY | Facility: CLINIC | Age: 59
End: 2024-07-09
Payer: COMMERCIAL

## 2024-07-09 ENCOUNTER — HOSPITAL ENCOUNTER (OUTPATIENT)
Dept: RADIOLOGY | Facility: HOSPITAL | Age: 59
Discharge: HOME | End: 2024-07-09
Attending: OBSTETRICS & GYNECOLOGY
Payer: COMMERCIAL

## 2024-07-09 DIAGNOSIS — C77.2 SECONDARY MALIGNANCY OF RETROPERITONEAL LYMPH NODES: ICD-10-CM

## 2024-07-09 DIAGNOSIS — C54.1 MALIGNANT NEOPLASM OF ENDOMETRIUM: ICD-10-CM

## 2024-07-09 DIAGNOSIS — Z51.11 ENCOUNTER FOR ANTINEOPLASTIC CHEMOTHERAPY: ICD-10-CM

## 2024-07-09 PROCEDURE — 25500020 PHARM REV CODE 255: Performed by: OBSTETRICS & GYNECOLOGY

## 2024-07-09 PROCEDURE — 74177 CT ABD & PELVIS W/CONTRAST: CPT | Mod: TC

## 2024-07-09 RX ADMIN — IOHEXOL 75 ML: 350 INJECTION, SOLUTION INTRAVENOUS at 08:07

## 2024-07-09 RX ADMIN — IOHEXOL 15 ML: 300 INJECTION, SOLUTION INTRAVENOUS at 08:07

## 2024-07-09 NOTE — PROGRESS NOTES
REFERRING PROVIDER  Omaira Evans MD Shah, Shamita MD    REASON FOR CONSULT  Sugar Diaz  is a 58 y.o.  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    HISTORY OF PRESENT ILLNESS    Chemotherapy regimen: Dostarlimab 1000mg 6 weeks  Cycle: 7  Previous dose limiting toxicities: Y  Cycle 3: Grade 4 diarrhea 2/2 Salmonella  Previous dose reductions: N  Previous breaks: Y  Cycle 4: 4 weeks 2/2 Grade 4 diarrhea 2/2 Salmonella  Previous changes in pre-medications: N    Energy level: baseline  Appetite: baseline  Fevers/chills/nights: no  Nausea: no  Diarrhea: yes, grade 1  Emesis: no  Neuropathy: yes, grade 3  Discoloration of lower extremities: no  Mucositis: no  Maculopapular rashes: no  Dyspnea or coughing: yes      Oncology History   Endometrial cancer   5/23/2023 Surgery    Hysteroscopy, D&C: grade 1 endometrioid EC     6/5/2023 Surgery    TRH/BSO/BSLN/RPLN/repair of obturator nerve    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 4.5 cm, grade 1, 96% MI (22/23 mm), +LUE, -LVSI, +MELF, -cervix, 1/2+ RPLN, 1/1+ LPLN, -ascites.     6/19/2023 Imaging Significant Findings    CT C/A/P w/: High L para-aortic at the level of the renal vessels, 1.5 cm.  Low R para-aortic at the LEAH, 4.1 cm in greatest dimension.     6/25/2023 Genomic Testing    [facility] (NGS): ARD1A, CHEK1, CTCF, , HDAC2, HNF1A, JAK1, KRAS, MAX, MSH6, P1K3R1, PPM1D, PTEN, ZFHX3, ZSR2     6/28/2023 Tumor Conference    No rule for debulking of para-aortic lymph nodes.  Adjuvant VBT.  Represent after completion of adjuvant therapy to discuss EBRT.        7/7/2023 - 10/19/2023 Chemotherapy    Carboplatin AUC 5/Paclitaxel 135 mg/m2/Dostarlimab 200mg q3 weeks x6    Previous dose limiting toxicities: Y  Cycle 4: Grade 2 colitis s/p steroid taper  Cycle 5: Grade 3 colitis  Previous dose reductions: N  Previous breaks: Y  Cycle  4: Dostarlimab omitted due to grade 2 colitis  Cycle 5: Dostarlimab 2/2 grade 3 colitis (negative colonoscopy with improvement in symptoms with steroid taper)     8/9/2023 - 9/13/2023 Radiation Therapy    Treating physician: Dr. Adriane Babb  Total Dose: 21 Gy HDR  Fractions: 3 vaginal cuff brachytherapy     8/22/2023 Imaging Significant Findings    CT A/P w/: No evidence of colitis, OK in high L para-aortic and low R para-aortic.     10/4/2023 Procedure    Colonoscopy: WNL     11/6/2023 Imaging Significant Findings    CT C/A/P w/: PATTI     11/6/2023 - 11/9/2023 Hospital Admission    Most Recent Admission Details  Admit Date: 11/6/23  Admitted for: RVS PNA superimposed by community acquitted PNA  Discharge date: 11/9/23  Discharged from: [facility] SURGICAL [address] at [facility] ([facility])  Discharge disposition: Home or Self Care       11/22/2023 -  Maintenance Therapy    Dostarlimab 1000mg x     2/21/2024 Procedure    EGD, Colonoscopy: No gross evidence of colitis      Hospital Admission    Most Recent Admission Details  Admit Date: 2/15/24  Admitted for: Grade 4 colitis 2/2 Salmonella.    Discharge date: 2/23/24  Discharged from: [facility] ONCOLOGY ACUTE at [facility]  Discharge disposition: Home or Self Care       2/21/2024 Procedure    Colonoscopy, EGD: No obvious ICI colitis     7/9/2024 Imaging Significant Findings    CT C/A/P w/: PATTI          The following portions of the patient's history were reviewed and updated as appropriate: allergies, current medications, family history, medical history, social history and surgical history.    REVIEW OF SYSTEMS  All systems reviewed and negative except as noted in HPI.    OBJECTIVE   Vitals:    07/16/24 1111   BP: 132/70   Pulse: 83            Body mass index is 25.55 kg/m².      1. General: Well appearing, no apparent distress, alert and oriented.  2. Lymph: Neck symmetric without cervical or supraclavicular adenopathy or mass.  3. Lungs:  Normal respiratory rate, no accessory muscle use.  4. Psych: Normal affect.  5. Abdomen:  non-distended, soft, non-tender, are no masses, no ascites, no hepatosplenomegaly.  6. Skin: Warm, dry, no rashes or lesions.   7. Extremities: Bilateral lower extremities without edema or tenderness.  8. Genitourinary               Pelvic Examination including (female chaperone was present for the exam):               a. External genitalia are normal in appearance. No lesions noted.               b. Urethral meatus is normal size, location, and appearance.               c. Urethra is negative.               d. Bladder is nontender. No masses noted.               e. Vagina has normal mucosa with physiologic discharge. No lesions noted.              f. Uterus absent              g. Adnexa absent   h. Rectum deferred                    ECOG status: 1    LABORATORY DATA  Lab data reviewed.    RADIOLOGICAL DATA  Radiology data reviewed.    ASSESSMENT / PLAN     1. Malignant neoplasm of endometrium    2. Chemotherapy-induced neuropathy    3. Encounter for antineoplastic chemotherapy    4. Hypothyroidism due to medication    5. Chemotherapy induced diarrhea    6. Long term (current) use of systemic steroids    7. Subclinical hypothyroidism        Grade 1 diarrhea with Prednisone 30mg qD (beginning taper per GI) and Entyvio.   Administer maintenance cycle #7.  CMP, TSH, VV, cycle #8 6 weeks.    PATIENT EDUCATION  Ready to learn, no apparent learning barriers were identified; learning preferences include listening. Explained diagnosis and treatment plan; patient expressed understanding of the content.    ADMINISTRATIVE BILLING  Greater than 50% of was spent in counseling.     ONGOING COMPLEXITY BILLING  Visit today is associated with current or anticipated ongoing medical care related to this patients single serious condition/complex condition: endometrial cancer

## 2024-07-09 NOTE — TELEPHONE ENCOUNTER
Spoke with Sugar:  IBD meds:  - Entyvio q8w, LD: 5/29, ND: 7/24    Prednisone 40mg/d (discharged home 2/23/24 on pred 60 mg/d, 50 mg/d 3/1, 40 mg/d 3/8, 30 mg/d 3/15, 20mg/d 3/22, 15mg/d 3/28, 10mg/d 4/8, 20mg/d 4/12, 20mg/d 4/19 check in after #2 Entyvio, 15 mg/ QD 5/3/24, 10mg 5/8 - pt did not taper stayed at 15mg/d, 10mg/d 6/14, 5mg/d 6/20, 6/27 stop, 10mg/d 7/2, 40mg/d 7/3)     - 1 BM/d, soft formed  - denies blood, mucous, noc BM, tenesmus sxs, and pain  - stool calprotectin 10.2  - per Dr. Montero, advised to reduce Prednisone to 30mg/d  - OV 7/11/24

## 2024-07-09 NOTE — TELEPHONE ENCOUNTER
----- Message from Jason Montero MD sent at 7/8/2024  8:57 PM CDT -----  Call and get symptom assessment and let her know stool calprotectin reassuring and normal and so we want to get her off of prednisone soon so ask her to decrease to 30 mg daily and she has appt with me on 7/11 and I will advise further taper.     SS  ----- Message -----  From: Kalpana Oshea RN  Sent: 7/5/2024   4:41 PM CDT  To: Jason Montero MD    Prednisone increased to 40mg/d on 7/3/24

## 2024-07-11 ENCOUNTER — OFFICE VISIT (OUTPATIENT)
Dept: GASTROENTEROLOGY | Facility: CLINIC | Age: 59
End: 2024-07-11
Payer: COMMERCIAL

## 2024-07-11 VITALS
HEIGHT: 67 IN | OXYGEN SATURATION: 99 % | SYSTOLIC BLOOD PRESSURE: 110 MMHG | WEIGHT: 161.63 LBS | TEMPERATURE: 98 F | HEART RATE: 90 BPM | BODY MASS INDEX: 25.37 KG/M2 | DIASTOLIC BLOOD PRESSURE: 71 MMHG

## 2024-07-11 DIAGNOSIS — K52.9 COLITIS: Primary | ICD-10-CM

## 2024-07-11 PROCEDURE — 1160F RVW MEDS BY RX/DR IN RCRD: CPT | Mod: CPTII,S$GLB,, | Performed by: INTERNAL MEDICINE

## 2024-07-11 PROCEDURE — 99215 OFFICE O/P EST HI 40 MIN: CPT | Mod: S$GLB,,, | Performed by: INTERNAL MEDICINE

## 2024-07-11 PROCEDURE — G2211 COMPLEX E/M VISIT ADD ON: HCPCS | Mod: S$GLB,,, | Performed by: INTERNAL MEDICINE

## 2024-07-11 PROCEDURE — 3008F BODY MASS INDEX DOCD: CPT | Mod: CPTII,S$GLB,, | Performed by: INTERNAL MEDICINE

## 2024-07-11 PROCEDURE — 1159F MED LIST DOCD IN RCRD: CPT | Mod: CPTII,S$GLB,, | Performed by: INTERNAL MEDICINE

## 2024-07-11 PROCEDURE — 3074F SYST BP LT 130 MM HG: CPT | Mod: CPTII,S$GLB,, | Performed by: INTERNAL MEDICINE

## 2024-07-11 PROCEDURE — 3078F DIAST BP <80 MM HG: CPT | Mod: CPTII,S$GLB,, | Performed by: INTERNAL MEDICINE

## 2024-07-11 NOTE — PROGRESS NOTES
[facility]             Inflammatory Bowel Disease   Follow-up  Note              TODAY'S VISIT DATE:  7/11/2024    Chief Complaint:   Chief Complaint   Patient presents with    colitis     PCP: Dexter Khan    Previous History:  Sugar Diaz is a 58 y.o. female with endometrial cancer (diagnosed in 5/2023; status post TLH/BSO in 6/2023 and initiation of chemotherapy/radiation in 7/2023 including the use of Dostarlimab). She developed grade 2 and 3 colitis following fourth and fifth cycle of chemotherapy, respectively. In 10/2023, she underwent outpatient colonoscopy showing diverticulosis of sigmoid colon and normal mucosal lining with random colon biopsies showed mild active colitis with prominent crypt epithelial cell apoptosis and irregular subepithelial collagen. She was treated with Prednisone taper from 10/4 to 11/3 (starting dose 70 mg/day and tapering down by 20 mg per week). Dosartlimab was resumed in 11/2023. Following completion of prednisone taper, patient had recurrent diarrhea and referred patient to see Dr. Weaver who was contacted in early 2/2024 and arranged for repeat endoscopic evaluation and follow up though she had significant recurrent diarrhea with hypokalemia and hospitalized from 2/15-2/23/24. 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 She was treated for salmonelle with cipro in 2/2024 and started on IV solumedrol followed by oral pred taper. She initiated entyvio 4/17/24 due to pred dependency and restarting immunotherapy with dostarlimab on 3/14/14.        Interval History:  - current IBD meds: Entyvio 300 mg SC q 8 weeks (started 4/17/24, LD 5/29, ND 7/24), prednisone 30 mg/d (IV solumedrol  2024- discharged home 24 on pred 60 mg/d and tapered off 24, restarted pred 10 mg daily , increased to 40 mg/d 7/3, 30 mg/d )  - current immunotherapy: dostarlimab infusions - restarted 3/14/24 and on q 6 week cycle with ND   - 24- 6 loose to soft BMs/d wtihout blood, + urgency and 1-2 nocturnal BMs and initially on  started pred 10 mg/d and on 7/3 increased to pred 40 mg/d due ongoing symptoms while awaiting  stoolc alprotectin given stool C diff and cultures were negative.  Since increasing prednisone she is now having 1 formed BMs/d.  No SE from prednisone.     Prior Pertinent Surgeries:   None    Last pertinent Endoscopy/Imagin24 EGD: normal esophagus, Z line found at 40 cm, diffuse mild mucosal changes with congestion and erythema in the entire stomach with normal duodenum. Biopsies stomach with mild chronic inflammation and duodenum normal.   24 colonoscopy:  normal colon and TI except for some sigmoid and descending diverticulosis.  Biopsies of cecum/ascending/transverse/descending/sigmoid occ foci of epithelial apoptosis.  Biopsies of sigmoid and rectum with chronic active inflammation and increased epithelial apoptosis     Prior ICI colitis Therapies:  Prednisone (10/4-11/3/23- 70 mg/d and tapered by 20 mg every week, )     Review of Systems   Constitutional:  Negative for chills, fever and weight loss.   HENT:          No oral ulcers, dysphagia, oral thrush   Eyes:  Negative for blurred vision, pain and redness.   Respiratory:  Positive for cough. Negative for shortness of breath.    Cardiovascular:  Negative for chest pain.   Gastrointestinal:  Negative for abdominal pain, heartburn, nausea and vomiting.   Genitourinary:  Negative for dysuria and hematuria.   Musculoskeletal:  Positive for back pain. Negative for joint pain.   Skin:  Negative for rash.   Psychiatric/Behavioral:  Negative for depression. The patient is not nervous/anxious and does not have  insomnia.      All Medical History/Surgical History/Family History/Social History/Allergies have been reviewed and updated in EMR    Outpatient Medications Marked as Taking for the 7/11/24 encounter (Office Visit) with Jason Montero MD   Medication Sig Dispense Refill    acetaminophen (TYLENOL) 650 MG TbSR Take 1 tablet (650 mg total) by mouth every 6 (six) hours as needed (pain). 60 tablet 0    azelastine (ASTELIN) 137 mcg (0.1 %) nasal spray 1 spray (137 mcg total) by Nasal route 2 (two) times daily. 30 mL 0    b complex vitamins tablet Take 1 tablet by mouth once daily.      diclofenac sodium (VOLTAREN) 1 % Gel Apply 2 g topically 2 (two) times daily. 100 g 0    DULoxetine (CYMBALTA) 60 MG capsule Take 1 capsule (60 mg total) by mouth once daily. 30 capsule 2    levothyroxine (SYNTHROID) 100 MCG tablet Take 1 tablet (100 mcg total) by mouth before breakfast. 30 tablet 11    multivitamin (ONE DAILY MULTIVITAMIN) per tablet Take 1 tablet by mouth once daily.      predniSONE (DELTASONE) 10 MG tablet Take 4 tablets (40 mg total) by mouth once daily. Or as directed by Dr. Montero 70 tablet 0    valACYclovir (VALTREX) 1000 MG tablet Two pills at onset of fever blister and then repeat 2 pills 12 hr later 30 tablet 11    vedolizumab (ENTYVIO) 300 mg SolR injection Inject 300 mg into the vein. Week 0, 2, and 6 followed by every 8 weeks       Vital Signs:  [facility] 11/15/2012    Physical Exam  Abdominal:      General: Bowel sounds are normal. There is no distension.      Palpations: Abdomen is soft.      Tenderness: There is no abdominal tenderness.     Labs:   Lab Results   Component Value Date    CRP 3.0 02/16/2024    CALPROTECTIN 10.2 07/01/2024     Lab Results   Component Value Date    HEPBSAG Non-reactive 02/20/2024    HEPBCAB Non-reactive 02/16/2024     Lab Results   Component Value Date    TBGOLDPLUS Negative 02/16/2024     Lab Results   Component Value Date    WBC 9.10 04/23/2024    HGB 14.1 04/23/2024    HCT 40.0  04/23/2024    MCV 92 04/23/2024     04/23/2024     Lab Results   Component Value Date    CREATININE 0.8 07/08/2024    ALBUMIN 3.6 06/03/2024    BILITOT 0.4 06/03/2024    ALKPHOS 61 06/03/2024    AST 20 06/03/2024    ALT 23 06/03/2024     Assessment/Plan:  Sugar Diaz is a 58 y.o. female with endometrial cancer (diagnosed 5/2023; BRIANDA/BSO 6/2023 and initiation of chemotherapy/radiation in 7/2023 including the use of Dostarlimab), h/o salmonella (2/2024), GERD, migraines.     Patient tapered off of prednisone 6/27 but within 4 days of tapering off started with diarrhea which has responded to prednisone though stool studies neg for infection and normal calprotectin so I am not certain this was her ICI colitis or just early and not reflecting on her stool test. At this time she restarted pred 40 mg daily on 7/3 and now down to 30 mg/d and will taper by 10 mg every week until down to 10 mg/d. She reports to me that treatment is working for her cancer based on recent CT and she will be continuing on this immunotherapy for at least 2 years.  I am going to get entyvio trough levels and based on this optimize the frequency but I would prefer this rather than remicade due to other risks with remicade including higher infection risk.  I will advise tapering of prednisone after she is on 10 mg/d based on the entyvio levels and our overall plan of care.     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     Follow up in about 6 months (around 1/11/2025) for in person.    Jason Montero MD  Department of Gastroenterology  Medical Director, Inflammatory Bowel Disease

## 2024-07-11 NOTE — PATIENT INSTRUCTIONS
- prednisone- decrease by 10 mg daily every Tuesday- next taper should be down to 20 mg daily on 7/16 and then 10 mg/d on 7/23- stay on 10 mg/d until I get the entyvio levels back   - continue entyvio as scheduled  - I will notify Dr. Rodríguez today  - okay to proceed with next immunotherapy infusion  - take precautions for covid  - plans for entyvio levels prior to next dose

## 2024-07-15 ENCOUNTER — CLINICAL SUPPORT (OUTPATIENT)
Dept: REHABILITATION | Facility: HOSPITAL | Age: 59
End: 2024-07-15
Payer: COMMERCIAL

## 2024-07-15 DIAGNOSIS — C54.1 MALIGNANT NEOPLASM OF ENDOMETRIUM: Primary | ICD-10-CM

## 2024-07-15 PROCEDURE — 97813 ACUP 1/> W/ESTIM 1ST 15 MIN: CPT | Mod: PN | Performed by: ACUPUNCTURIST

## 2024-07-15 PROCEDURE — 97814 ACUP 1/> W/ESTIM EA ADDL 15: CPT | Mod: PN | Performed by: ACUPUNCTURIST

## 2024-07-15 NOTE — PROGRESS NOTES
Acupuncture Evaluation Note     Name: Sugar Ball [facility] Number: [id number]    Traditional Chinese Medicine (TCM) Diagnosis: Qi Stagnation, Qi Deficiency, and Blood Deficiency  Medical Diagnosis:   Encounter Diagnosis   Name Primary?    Malignant neoplasm of endometrium Yes        Evaluation Date: 7/15/2024    Visit #/Visits authorized: 12, 9/12    Precautions: Standard and cancer    Subjective     Chief Concern: CIPN - feet and right hand.     Medical necessity is demonstrated by the following IMPAIRMENTS: Medical Necessity: Decreased mobility limits day to day activities, social, and emergent situations              Aggravating Factors:  movement and cold   Relieving Factors:  heat    Symptom Description:     Quality:  Tingling and Numb and feel Swollen  Severity:  7  Frequency:  continuously      Objective       New Findings:      Treatment     Treatment Principles:  Move qi, nourish qi and blood     Acupuncture points used:  4 DELMI, BA YUDI, BA KESHA , Gb34, Ki3, Sp6, Sp9, and St36    Bilateral points:  Unilateral points:  Auricular Treatment:      Needles In: 30  Needles Out: 30  [place] W/ STIM placed: 7:50  Needles W/ STIM removed: 8:20      Assessment     After treatment, patient has felt improvements in neuropathy with less pain, but numbness in feet is still present.     Patient prognosis is Good.     Patient will continue to benefit from acupuncture treatment to address the deficits listed in the problem list box on initial evaluation, provide patient family education and to maximize pt's level of independence in the home and community environment.     Patient's spiritual, cultural and educational needs considered and pt agreeable to plan of care and goals.     Anticipated barriers to treatment: none    Plan     Recommend 1 /week for 3 sessions then re-assess.      Education:  Patient is aware of cumulative benefit of acupuncture

## 2024-07-16 ENCOUNTER — OFFICE VISIT (OUTPATIENT)
Dept: GYNECOLOGIC ONCOLOGY | Facility: CLINIC | Age: 59
End: 2024-07-16
Payer: COMMERCIAL

## 2024-07-16 ENCOUNTER — LAB VISIT (OUTPATIENT)
Dept: LAB | Facility: HOSPITAL | Age: 59
End: 2024-07-16
Attending: OBSTETRICS & GYNECOLOGY
Payer: COMMERCIAL

## 2024-07-16 VITALS
HEIGHT: 67 IN | HEART RATE: 83 BPM | BODY MASS INDEX: 25.6 KG/M2 | DIASTOLIC BLOOD PRESSURE: 70 MMHG | SYSTOLIC BLOOD PRESSURE: 132 MMHG | WEIGHT: 163.13 LBS

## 2024-07-16 DIAGNOSIS — E03.8 SUBCLINICAL HYPOTHYROIDISM: ICD-10-CM

## 2024-07-16 DIAGNOSIS — K52.1 CHEMOTHERAPY INDUCED DIARRHEA: ICD-10-CM

## 2024-07-16 DIAGNOSIS — T45.1X5A CHEMOTHERAPY-INDUCED NEUROPATHY: ICD-10-CM

## 2024-07-16 DIAGNOSIS — E03.2 HYPOTHYROIDISM DUE TO MEDICATION: ICD-10-CM

## 2024-07-16 DIAGNOSIS — G62.0 CHEMOTHERAPY-INDUCED NEUROPATHY: ICD-10-CM

## 2024-07-16 DIAGNOSIS — T45.1X5A CHEMOTHERAPY INDUCED DIARRHEA: ICD-10-CM

## 2024-07-16 DIAGNOSIS — Z51.11 ENCOUNTER FOR ANTINEOPLASTIC CHEMOTHERAPY: ICD-10-CM

## 2024-07-16 DIAGNOSIS — C54.1 MALIGNANT NEOPLASM OF ENDOMETRIUM: Primary | ICD-10-CM

## 2024-07-16 DIAGNOSIS — Z79.52 LONG TERM (CURRENT) USE OF SYSTEMIC STEROIDS: ICD-10-CM

## 2024-07-16 DIAGNOSIS — C54.1 MALIGNANT NEOPLASM OF ENDOMETRIUM: ICD-10-CM

## 2024-07-16 LAB
ALBUMIN SERPL BCP-MCNC: 3.4 G/DL (ref 3.5–5.2)
ALP SERPL-CCNC: 69 U/L (ref 55–135)
ALT SERPL W/O P-5'-P-CCNC: 17 U/L (ref 10–44)
ANION GAP SERPL CALC-SCNC: 7 MMOL/L (ref 8–16)
AST SERPL-CCNC: 16 U/L (ref 10–40)
BILIRUB SERPL-MCNC: 0.2 MG/DL (ref 0.1–1)
BUN SERPL-MCNC: 11 MG/DL (ref 6–20)
CALCIUM SERPL-MCNC: 9.5 MG/DL (ref 8.7–10.5)
CHLORIDE SERPL-SCNC: 108 MMOL/L (ref 95–110)
CO2 SERPL-SCNC: 26 MMOL/L (ref 23–29)
CREAT SERPL-MCNC: 0.7 MG/DL (ref 0.5–1.4)
ERYTHROCYTE [DISTWIDTH] IN BLOOD BY AUTOMATED COUNT: 12.3 % (ref 11.5–14.5)
EST. GFR  (NO RACE VARIABLE): >60 ML/MIN/1.73 M^2
GLUCOSE SERPL-MCNC: 96 MG/DL (ref 70–110)
HCT VFR BLD AUTO: 43.3 % (ref 37–48.5)
HGB BLD-MCNC: 14.9 G/DL (ref 12–16)
IMM GRANULOCYTES # BLD AUTO: 0.1 K/UL (ref 0–0.04)
MCH RBC QN AUTO: 33.3 PG (ref 27–31)
MCHC RBC AUTO-ENTMCNC: 34.4 G/DL (ref 32–36)
MCV RBC AUTO: 97 FL (ref 82–98)
NEUTROPHILS # BLD AUTO: 10.7 K/UL (ref 1.8–7.7)
PLATELET # BLD AUTO: 324 K/UL (ref 150–450)
PMV BLD AUTO: 8.6 FL (ref 9.2–12.9)
POTASSIUM SERPL-SCNC: 4.1 MMOL/L (ref 3.5–5.1)
PROT SERPL-MCNC: 6.2 G/DL (ref 6–8.4)
RBC # BLD AUTO: 4.47 M/UL (ref 4–5.4)
SODIUM SERPL-SCNC: 141 MMOL/L (ref 136–145)
T4 FREE SERPL-MCNC: 1.03 NG/DL (ref 0.71–1.51)
TSH SERPL DL<=0.005 MIU/L-ACNC: 0.95 UIU/ML (ref 0.4–4)
WBC # BLD AUTO: 15.2 K/UL (ref 3.9–12.7)

## 2024-07-16 PROCEDURE — 99215 OFFICE O/P EST HI 40 MIN: CPT | Mod: S$GLB,,, | Performed by: OBSTETRICS & GYNECOLOGY

## 2024-07-16 PROCEDURE — 85027 COMPLETE CBC AUTOMATED: CPT | Performed by: OBSTETRICS & GYNECOLOGY

## 2024-07-16 PROCEDURE — G2211 COMPLEX E/M VISIT ADD ON: HCPCS | Mod: S$GLB,,, | Performed by: OBSTETRICS & GYNECOLOGY

## 2024-07-16 PROCEDURE — 36415 COLL VENOUS BLD VENIPUNCTURE: CPT | Performed by: OBSTETRICS & GYNECOLOGY

## 2024-07-16 PROCEDURE — 84439 ASSAY OF FREE THYROXINE: CPT | Performed by: OBSTETRICS & GYNECOLOGY

## 2024-07-16 PROCEDURE — 1159F MED LIST DOCD IN RCRD: CPT | Mod: CPTII,S$GLB,, | Performed by: OBSTETRICS & GYNECOLOGY

## 2024-07-16 PROCEDURE — 3078F DIAST BP <80 MM HG: CPT | Mod: CPTII,S$GLB,, | Performed by: OBSTETRICS & GYNECOLOGY

## 2024-07-16 PROCEDURE — 80053 COMPREHEN METABOLIC PANEL: CPT | Performed by: OBSTETRICS & GYNECOLOGY

## 2024-07-16 PROCEDURE — 3008F BODY MASS INDEX DOCD: CPT | Mod: CPTII,S$GLB,, | Performed by: OBSTETRICS & GYNECOLOGY

## 2024-07-16 PROCEDURE — 84443 ASSAY THYROID STIM HORMONE: CPT | Performed by: OBSTETRICS & GYNECOLOGY

## 2024-07-16 PROCEDURE — 99999 PR PBB SHADOW E&M-EST. PATIENT-LVL III: CPT | Mod: PBBFAC,,, | Performed by: OBSTETRICS & GYNECOLOGY

## 2024-07-16 PROCEDURE — 3075F SYST BP GE 130 - 139MM HG: CPT | Mod: CPTII,S$GLB,, | Performed by: OBSTETRICS & GYNECOLOGY

## 2024-07-18 ENCOUNTER — INFUSION (OUTPATIENT)
Dept: INFUSION THERAPY | Facility: OTHER | Age: 59
End: 2024-07-18
Attending: INTERNAL MEDICINE
Payer: COMMERCIAL

## 2024-07-18 VITALS
WEIGHT: 164 LBS | SYSTOLIC BLOOD PRESSURE: 134 MMHG | OXYGEN SATURATION: 97 % | BODY MASS INDEX: 25.74 KG/M2 | HEIGHT: 67 IN | DIASTOLIC BLOOD PRESSURE: 77 MMHG | TEMPERATURE: 98 F | HEART RATE: 101 BPM | RESPIRATION RATE: 18 BRPM

## 2024-07-18 DIAGNOSIS — C54.1 ENDOMETRIAL CANCER: Primary | ICD-10-CM

## 2024-07-18 PROCEDURE — 63600175 PHARM REV CODE 636 W HCPCS: Mod: JZ,TB | Performed by: OBSTETRICS & GYNECOLOGY

## 2024-07-18 PROCEDURE — 96413 CHEMO IV INFUSION 1 HR: CPT

## 2024-07-18 PROCEDURE — 96367 TX/PROPH/DG ADDL SEQ IV INF: CPT

## 2024-07-18 PROCEDURE — 25000003 PHARM REV CODE 250: Performed by: OBSTETRICS & GYNECOLOGY

## 2024-07-18 RX ORDER — HEPARIN 100 UNIT/ML
500 SYRINGE INTRAVENOUS
Status: DISCONTINUED | OUTPATIENT
Start: 2024-07-18 | End: 2024-07-18 | Stop reason: HOSPADM

## 2024-07-18 RX ORDER — EPINEPHRINE 0.3 MG/.3ML
0.3 INJECTION SUBCUTANEOUS ONCE AS NEEDED
Status: DISCONTINUED | OUTPATIENT
Start: 2024-07-18 | End: 2024-07-18 | Stop reason: HOSPADM

## 2024-07-18 RX ORDER — SODIUM CHLORIDE 0.9 % (FLUSH) 0.9 %
10 SYRINGE (ML) INJECTION
Status: DISCONTINUED | OUTPATIENT
Start: 2024-07-18 | End: 2024-07-18 | Stop reason: HOSPADM

## 2024-07-18 RX ORDER — DIPHENHYDRAMINE HYDROCHLORIDE 50 MG/ML
50 INJECTION INTRAMUSCULAR; INTRAVENOUS ONCE AS NEEDED
Status: DISCONTINUED | OUTPATIENT
Start: 2024-07-18 | End: 2024-07-18 | Stop reason: HOSPADM

## 2024-07-18 RX ADMIN — FOSAPREPITANT 150 MG: 150 INJECTION, POWDER, LYOPHILIZED, FOR SOLUTION INTRAVENOUS at 11:07

## 2024-07-18 RX ADMIN — SODIUM CHLORIDE 1000 MG: 9 INJECTION, SOLUTION INTRAVENOUS at 11:07

## 2024-07-18 NOTE — PLAN OF CARE
Dostarlimab infusion complete. Pt tolerated well. VSS. NAD. IV to right wrist DC'd. Pt verbalized understanding of discharge instructions before leaving.

## 2024-07-22 ENCOUNTER — CLINICAL SUPPORT (OUTPATIENT)
Dept: REHABILITATION | Facility: HOSPITAL | Age: 59
End: 2024-07-22
Payer: COMMERCIAL

## 2024-07-22 DIAGNOSIS — C54.1 MALIGNANT NEOPLASM OF ENDOMETRIUM: Primary | ICD-10-CM

## 2024-07-22 PROCEDURE — 97813 ACUP 1/> W/ESTIM 1ST 15 MIN: CPT | Mod: PN | Performed by: ACUPUNCTURIST

## 2024-07-22 PROCEDURE — 97814 ACUP 1/> W/ESTIM EA ADDL 15: CPT | Mod: PN | Performed by: ACUPUNCTURIST

## 2024-07-22 NOTE — PROGRESS NOTES
Acupuncture Evaluation Note     Name: Sugar Ball [facility] Number: [id number]    Traditional Chinese Medicine (TCM) Diagnosis: Qi Stagnation, Qi Deficiency, and Blood Deficiency  Medical Diagnosis:   Encounter Diagnosis   Name Primary?    Malignant neoplasm of endometrium Yes        Evaluation Date: 7/22/2024    Visit #/Visits authorized: 12, 10/12    Precautions: Standard and cancer    Subjective     Chief Concern: CIPN - feet and right hand.     Medical necessity is demonstrated by the following IMPAIRMENTS: Medical Necessity: Decreased mobility limits day to day activities, social, and emergent situations              Aggravating Factors:  movement and cold   Relieving Factors:  heat    Symptom Description:     Quality:  Tingling and Numb and feel Swollen  Severity:  7  Frequency:  continuously      Objective       New Findings:      Treatment     Treatment Principles:  Move qi, nourish qi and blood     Acupuncture points used:  4 AWAD, BA YUDI, BA KESHA , Du20, Gb34, Gb41, Ki3, Li11, Sj5, Sp6, Sp9, St40, and YIN FRANCIS    Bilateral points:  Unilateral points:  Auricular Treatment:      Needles In: 28  Needles Out: 28  [place] W/ STIM placed: 7:50  Needles W/ STIM removed: 8:20      Assessment     After treatment, patient has felt improvements in neuropathy with less pain and swelling, numbness in balls of feet and toes is still present.     Patient prognosis is Good.     Patient will continue to benefit from acupuncture treatment to address the deficits listed in the problem list box on initial evaluation, provide patient family education and to maximize pt's level of independence in the home and community environment.     Patient's spiritual, cultural and educational needs considered and pt agreeable to plan of care and goals.     Anticipated barriers to treatment: none    Plan     Recommend 1 /week for 2 sessions then re-assess.      Education:  Patient is aware of cumulative benefit of  acupuncture

## 2024-07-24 ENCOUNTER — LAB VISIT (OUTPATIENT)
Dept: LAB | Facility: HOSPITAL | Age: 59
End: 2024-07-24
Attending: INTERNAL MEDICINE
Payer: COMMERCIAL

## 2024-07-24 DIAGNOSIS — K52.9 COLITIS: ICD-10-CM

## 2024-07-24 PROCEDURE — 80280 DRUG ASSAY VEDOLIZUMAB: CPT | Performed by: INTERNAL MEDICINE

## 2024-07-24 PROCEDURE — 36415 COLL VENOUS BLD VENIPUNCTURE: CPT | Performed by: INTERNAL MEDICINE

## 2024-07-24 PROCEDURE — 82397 CHEMILUMINESCENT ASSAY: CPT | Performed by: INTERNAL MEDICINE

## 2024-07-30 ENCOUNTER — CLINICAL SUPPORT (OUTPATIENT)
Dept: REHABILITATION | Facility: HOSPITAL | Age: 59
End: 2024-07-30
Payer: COMMERCIAL

## 2024-07-30 DIAGNOSIS — C54.1 MALIGNANT NEOPLASM OF ENDOMETRIUM: Primary | ICD-10-CM

## 2024-07-30 PROCEDURE — 97814 ACUP 1/> W/ESTIM EA ADDL 15: CPT | Mod: PN | Performed by: ACUPUNCTURIST

## 2024-07-30 PROCEDURE — 97813 ACUP 1/> W/ESTIM 1ST 15 MIN: CPT | Mod: PN | Performed by: ACUPUNCTURIST

## 2024-07-30 NOTE — PROGRESS NOTES
Acupuncture Evaluation Note     Name: Sugar Ball [facility] Number: [id number]    Traditional Chinese Medicine (TCM) Diagnosis: Qi Stagnation, Qi Deficiency, and Blood Deficiency  Medical Diagnosis:   Encounter Diagnosis   Name Primary?    Malignant neoplasm of endometrium Yes        Evaluation Date: 7/30/2024    Visit #/Visits authorized: 12, 11/12    Precautions: Standard and cancer    Subjective     Chief Concern: CIPN - feet and right hand.     Medical necessity is demonstrated by the following IMPAIRMENTS: Medical Necessity: Decreased mobility limits day to day activities, social, and emergent situations              Aggravating Factors:  movement and cold   Relieving Factors:  heat    Symptom Description:     Quality:  Tingling and Numb and feel Swollen  Severity:  7  Frequency:  continuously      Objective       New Findings:      Treatment     Treatment Principles:  Move qi, nourish qi and blood     Acupuncture points used:  4 DELMI, BA YUDI, BA KESHA , Gb34, Ki3, Li11, Sp6, Sp9, and St36    Bilateral points: SP4  Unilateral points:  Auricular Treatment:      Needles In: 29  Needles Out: 29  [place] W/ STIM placed: 8:20  Needles W/ STIM removed: 8:50      Assessment     After treatment, patient felt numbness in balls of feet and toes is still present, with a swollen, tight feeling in feet.     Patient prognosis is Good.     Patient will continue to benefit from acupuncture treatment to address the deficits listed in the problem list box on initial evaluation, provide patient family education and to maximize pt's level of independence in the home and community environment.     Patient's spiritual, cultural and educational needs considered and pt agreeable to plan of care and goals.     Anticipated barriers to treatment: none    Plan     Recommend 1 /week for 1 sessions then re-assess.      Education:  Patient is aware of cumulative benefit of acupuncture

## 2024-07-31 ENCOUNTER — PATIENT MESSAGE (OUTPATIENT)
Dept: ADMINISTRATIVE | Facility: HOSPITAL | Age: 59
End: 2024-07-31
Payer: COMMERCIAL

## 2024-08-22 DIAGNOSIS — K52.9 COLITIS: ICD-10-CM

## 2024-08-23 DIAGNOSIS — K52.9 COLITIS: Primary | ICD-10-CM

## 2024-08-23 RX ORDER — PREDNISONE 10 MG/1
10 TABLET ORAL DAILY
Qty: 30 TABLET | Refills: 0 | Status: SHIPPED | OUTPATIENT
Start: 2024-08-23

## 2024-08-23 NOTE — TELEPHONE ENCOUNTER
Spoke with pt  Current IBD meds: Entyvio 300 mg SC q 8 weeks (started 4/17/24, LD 7/24, ND 9/18), prednisone 10 mg/d (IV solumedrol 2/2024- discharged home 2/23/24 on pred 60 mg/d and tapered off 6/27/24, restarted pred 10 mg daily 7/1, increased to 40 mg/d 7/3, 30 mg/d 7/9, 20 mg/d 7/16, 10 mg/d 7/23)     Symptom update:  - 3-8 BM/d (varies) brown, watery with little specks of formed pieces; nocturnal BM about 2x per week  - no specific factors that she can think of cause days with more BM/d  - urgency with most BM  - no blood or mucus  - no abd pain    - overall feels about the same since 7/11/24 visit with Dr Montero except BM consistency has worsened (more watery)    Advised pt that Dr Montero will be notified of symptom update and we will get back to her with plan of care.

## 2024-08-23 NOTE — TELEPHONE ENCOUNTER
Spoke with pt  - Reviewed Dr Montero's recommendation to remain on Pred 10 mg/d for now and submit stool studies.    - Prednisone plan of care will depend on stool study results.  Patient to remain on Pred 10 mg/d until she hears back regarding plan of care.   - Pt will go to lab on 8/26/24 to submit her stool sample.

## 2024-08-23 NOTE — TELEPHONE ENCOUNTER
Refill request for Prednisone    7/11/24 office visit Pred plan: continue prednisone 30 mg/d and taper by 10 mg every week on Tuesdays so next taper to be on 7/16 and once on 10 mg/d will hold until we check in and review entyvio levels    7/24 VDZ level resulted 18     Will confirm plan for prednisone with Dr Montero

## 2024-08-25 NOTE — PROGRESS NOTES
The patient location is: home  The chief complaint leading to consultation is: cycle #8 of maintenance therapy    Visit type: audiovisual    Face to Face time with patient: 10  15 minutes of total time spent on the encounter, which includes face to face time and non-face to face time preparing to see the patient (eg, review of tests), Obtaining and/or reviewing separately obtained history, Documenting clinical information in the electronic or other health record, Independently interpreting results (not separately reported) and communicating results to the patient/family/caregiver, or Care coordination (not separately reported).         Each patient to whom he or she provides medical services by telemedicine is:  (1) informed of the relationship between the physician and patient and the respective role of any other health care provider with respect to management of the patient; and (2) notified that he or she may decline to receive medical services by telemedicine and may withdraw from such care at any time.    Notes:      REFERRING PROVIDER  Omaira Evans MD Shah, Shamita MD    REASON FOR CONSULT  Sugar Diaz  is a 58 y.o.  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    HISTORY OF PRESENT ILLNESS    Chemotherapy regimen: Dostarlimab 1000mg 6 weeks (3 years)  Cycle: 8  Previous dose limiting toxicities: Y  Cycle 3: Grade 4 diarrhea 2/2 Salmonella  Previous dose reductions: N  Previous breaks: Y  Cycle 4: 4 weeks 2/2 Grade 4 diarrhea 2/2 Salmonella  Previous changes in pre-medications: N    Energy level: baseline  Appetite: baseline  Fevers/chills/nights: no  Nausea: no  Diarrhea: yes, grade 1  Emesis: no  Neuropathy: yes, grade 3  Discoloration of lower extremities: no  Mucositis: no  Maculopapular rashes: no  Dyspnea or coughing: yes      Oncology History   Endometrial cancer   5/23/2023 Surgery    Hysteroscopy, D&C: grade 1 endometrioid EC     6/5/2023  Surgery    TRH/BSO/BSLN/RPLN/repair of obturator nerve    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 4.5 cm, grade 1, 96% MI (22/23 mm), +LUE, -LVSI, +MELF, -cervix, 1/2+ RPLN, 1/1+ LPLN, -ascites.     6/19/2023 Imaging Significant Findings    CT C/A/P w/: High L para-aortic at the level of the renal vessels, 1.5 cm.  Low R para-aortic at the LEAH, 4.1 cm in greatest dimension.     6/25/2023 Genomic Testing    [facility] (NGS): ARD1A, CHEK1, CTCF, , HDAC2, HNF1A, JAK1, KRAS, MAX, MSH6, P1K3R1, PPM1D, PTEN, ZFHX3, ZSR2     6/28/2023 Tumor Conference    No rule for debulking of para-aortic lymph nodes.  Adjuvant VBT.  Represent after completion of adjuvant therapy to discuss EBRT.        7/7/2023 - 10/19/2023 Chemotherapy    Carboplatin AUC 5/Paclitaxel 135 mg/m2/Dostarlimab 200mg q3 weeks x6    Previous dose limiting toxicities: Y  Cycle 4: Grade 2 colitis s/p steroid taper  Cycle 5: Grade 3 colitis  Previous dose reductions: N  Previous breaks: Y  Cycle 4: Dostarlimab omitted due to grade 2 colitis  Cycle 5: Dostarlimab 2/2 grade 3 colitis (negative colonoscopy with improvement in symptoms with steroid taper)     8/9/2023 - 9/13/2023 Radiation Therapy    Treating physician: Dr. Adriane Babb  Total Dose: 21 Gy HDR  Fractions: 3 vaginal cuff brachytherapy     8/22/2023 Imaging Significant Findings    CT A/P w/: No evidence of colitis, NM in high L para-aortic and low R para-aortic.     10/4/2023 Procedure    Colonoscopy: WNL     11/6/2023 Imaging Significant Findings    CT C/A/P w/: PATTI     11/6/2023 - 11/9/2023 Hospital Admission    Most Recent Admission Details  Admit Date: 11/6/23  Admitted for: RVS PNA superimposed by community acquitted PNA  Discharge date: 11/9/23  Discharged from: [facility] LOCATION ([id number])  Discharge disposition: Home or Self Care       11/22/2023 -  Maintenance Therapy     Dostarlimab 1000mg x     2/21/2024 Procedure    EGD, Colonoscopy: No gross evidence of colitis      Hospital Admission    Most Recent Admission Details  Admit Date: 2/15/24  Admitted for: Grade 4 colitis 2/2 Salmonella.    Discharge date: 2/23/24  Discharged from: [facility] at [facility]  Discharge disposition: Home or Self Care       2/21/2024 Procedure    Colonoscopy, EGD: No obvious ICI colitis     7/9/2024 Imaging Significant Findings    CT C/A/P w/: PATTI          The following portions of the patient's history were reviewed and updated as appropriate: allergies, current medications, family history, medical history, social history and surgical history.    REVIEW OF SYSTEMS  All systems reviewed and negative except as noted in HPI.    OBJECTIVE   There were no vitals filed for this visit.           There is no height or weight on file to calculate BMI.      1. General: Well appearing, no apparent distress, alert and oriented.  2. Lymph: Neck symmetric without cervical or supraclavicular adenopathy or mass.  3. Lungs: Normal respiratory rate, no accessory muscle use.  4. Psych: Normal affect.  5. Abdomen:  non-distended                ECOG status: 1    LABORATORY DATA  Lab data reviewed.    RADIOLOGICAL DATA  Radiology data reviewed.    ASSESSMENT / PLAN     1. Malignant neoplasm of endometrium    2. Chemotherapy-induced neuropathy    3. Encounter for antineoplastic chemotherapy    4. Hypothyroidism due to medication        Grade 1-2 diarrhea with Prednisone 10mg qD (7/23-) and Entyvio (ND 9/18).  Stool studies pending.  Administer maintenance cycle #8.  CMP, TSH, VV, cycle #9 6 weeks.    PATIENT EDUCATION  Ready to learn, no apparent learning barriers were identified; learning preferences include listening. Explained diagnosis and treatment plan; patient expressed understanding of the content.    ADMINISTRATIVE BILLING  Greater than 50% of was spent in counseling.     ONGOING COMPLEXITY  BILLING  Visit today is associated with current or anticipated ongoing medical care related to this patients single serious condition/complex condition: endometrial cancer

## 2024-08-26 ENCOUNTER — LAB VISIT (OUTPATIENT)
Dept: LAB | Facility: HOSPITAL | Age: 59
End: 2024-08-26
Attending: OBSTETRICS & GYNECOLOGY
Payer: COMMERCIAL

## 2024-08-26 DIAGNOSIS — C54.1 MALIGNANT NEOPLASM OF ENDOMETRIUM: ICD-10-CM

## 2024-08-26 LAB
ALBUMIN SERPL BCP-MCNC: 3.5 G/DL (ref 3.5–5.2)
ALP SERPL-CCNC: 66 U/L (ref 55–135)
ALT SERPL W/O P-5'-P-CCNC: 18 U/L (ref 10–44)
ANION GAP SERPL CALC-SCNC: 11 MMOL/L (ref 8–16)
AST SERPL-CCNC: 19 U/L (ref 10–40)
BILIRUB SERPL-MCNC: 0.2 MG/DL (ref 0.1–1)
BUN SERPL-MCNC: 15 MG/DL (ref 6–20)
CALCIUM SERPL-MCNC: 9 MG/DL (ref 8.7–10.5)
CHLORIDE SERPL-SCNC: 110 MMOL/L (ref 95–110)
CO2 SERPL-SCNC: 21 MMOL/L (ref 23–29)
CREAT SERPL-MCNC: 0.8 MG/DL (ref 0.5–1.4)
EST. GFR  (NO RACE VARIABLE): >60 ML/MIN/1.73 M^2
GLUCOSE SERPL-MCNC: 83 MG/DL (ref 70–110)
POTASSIUM SERPL-SCNC: 3.7 MMOL/L (ref 3.5–5.1)
PROT SERPL-MCNC: 6.2 G/DL (ref 6–8.4)
SODIUM SERPL-SCNC: 142 MMOL/L (ref 136–145)
TSH SERPL DL<=0.005 MIU/L-ACNC: 3.28 UIU/ML (ref 0.4–4)

## 2024-08-26 PROCEDURE — 80053 COMPREHEN METABOLIC PANEL: CPT | Performed by: OBSTETRICS & GYNECOLOGY

## 2024-08-26 PROCEDURE — 84443 ASSAY THYROID STIM HORMONE: CPT | Performed by: OBSTETRICS & GYNECOLOGY

## 2024-08-26 PROCEDURE — 36415 COLL VENOUS BLD VENIPUNCTURE: CPT | Performed by: OBSTETRICS & GYNECOLOGY

## 2024-08-26 RX ORDER — EPINEPHRINE 0.3 MG/.3ML
0.3 INJECTION SUBCUTANEOUS ONCE AS NEEDED
Status: CANCELLED | OUTPATIENT
Start: 2024-08-26

## 2024-08-26 RX ORDER — PROCHLORPERAZINE EDISYLATE 5 MG/ML
5-10 INJECTION INTRAMUSCULAR; INTRAVENOUS ONCE AS NEEDED
Status: CANCELLED
Start: 2024-08-26

## 2024-08-26 RX ORDER — SODIUM CHLORIDE 0.9 % (FLUSH) 0.9 %
10 SYRINGE (ML) INJECTION
Status: CANCELLED | OUTPATIENT
Start: 2024-08-26

## 2024-08-26 RX ORDER — DIPHENHYDRAMINE HYDROCHLORIDE 50 MG/ML
50 INJECTION INTRAMUSCULAR; INTRAVENOUS ONCE AS NEEDED
Status: CANCELLED | OUTPATIENT
Start: 2024-08-26

## 2024-08-26 RX ORDER — HEPARIN 100 UNIT/ML
500 SYRINGE INTRAVENOUS
Status: CANCELLED | OUTPATIENT
Start: 2024-08-26

## 2024-08-27 ENCOUNTER — OFFICE VISIT (OUTPATIENT)
Dept: GYNECOLOGIC ONCOLOGY | Facility: CLINIC | Age: 59
End: 2024-08-27
Payer: COMMERCIAL

## 2024-08-27 DIAGNOSIS — G62.0 CHEMOTHERAPY-INDUCED NEUROPATHY: ICD-10-CM

## 2024-08-27 DIAGNOSIS — C54.1 MALIGNANT NEOPLASM OF ENDOMETRIUM: Primary | ICD-10-CM

## 2024-08-27 DIAGNOSIS — E03.2 HYPOTHYROIDISM DUE TO MEDICATION: ICD-10-CM

## 2024-08-27 DIAGNOSIS — T45.1X5A CHEMOTHERAPY-INDUCED NEUROPATHY: ICD-10-CM

## 2024-08-27 DIAGNOSIS — Z51.11 ENCOUNTER FOR ANTINEOPLASTIC CHEMOTHERAPY: ICD-10-CM

## 2024-08-27 PROCEDURE — 99215 OFFICE O/P EST HI 40 MIN: CPT | Mod: 95,,, | Performed by: OBSTETRICS & GYNECOLOGY

## 2024-08-27 PROCEDURE — G2211 COMPLEX E/M VISIT ADD ON: HCPCS | Mod: 95,,, | Performed by: OBSTETRICS & GYNECOLOGY

## 2024-08-29 ENCOUNTER — INFUSION (OUTPATIENT)
Dept: INFUSION THERAPY | Facility: OTHER | Age: 59
End: 2024-08-29
Attending: INTERNAL MEDICINE
Payer: COMMERCIAL

## 2024-08-29 ENCOUNTER — TELEPHONE (OUTPATIENT)
Dept: GASTROENTEROLOGY | Facility: CLINIC | Age: 59
End: 2024-08-29
Payer: COMMERCIAL

## 2024-08-29 VITALS
HEART RATE: 83 BPM | WEIGHT: 161.63 LBS | DIASTOLIC BLOOD PRESSURE: 62 MMHG | BODY MASS INDEX: 25.37 KG/M2 | OXYGEN SATURATION: 98 % | RESPIRATION RATE: 17 BRPM | TEMPERATURE: 98 F | HEIGHT: 67 IN | SYSTOLIC BLOOD PRESSURE: 127 MMHG

## 2024-08-29 DIAGNOSIS — C54.1 ENDOMETRIAL CANCER: Primary | ICD-10-CM

## 2024-08-29 PROCEDURE — 96367 TX/PROPH/DG ADDL SEQ IV INF: CPT

## 2024-08-29 PROCEDURE — 25000003 PHARM REV CODE 250: Performed by: OBSTETRICS & GYNECOLOGY

## 2024-08-29 PROCEDURE — 63600175 PHARM REV CODE 636 W HCPCS: Performed by: OBSTETRICS & GYNECOLOGY

## 2024-08-29 PROCEDURE — 96413 CHEMO IV INFUSION 1 HR: CPT

## 2024-08-29 PROCEDURE — A4216 STERILE WATER/SALINE, 10 ML: HCPCS | Performed by: OBSTETRICS & GYNECOLOGY

## 2024-08-29 RX ORDER — DIPHENHYDRAMINE HYDROCHLORIDE 50 MG/ML
50 INJECTION INTRAMUSCULAR; INTRAVENOUS ONCE AS NEEDED
Status: DISCONTINUED | OUTPATIENT
Start: 2024-08-29 | End: 2024-08-29 | Stop reason: HOSPADM

## 2024-08-29 RX ORDER — SODIUM CHLORIDE 0.9 % (FLUSH) 0.9 %
10 SYRINGE (ML) INJECTION
Status: DISCONTINUED | OUTPATIENT
Start: 2024-08-29 | End: 2024-08-29 | Stop reason: HOSPADM

## 2024-08-29 RX ORDER — HEPARIN 100 UNIT/ML
500 SYRINGE INTRAVENOUS
Status: DISCONTINUED | OUTPATIENT
Start: 2024-08-29 | End: 2024-08-29 | Stop reason: HOSPADM

## 2024-08-29 RX ORDER — EPINEPHRINE 0.3 MG/.3ML
0.3 INJECTION SUBCUTANEOUS ONCE AS NEEDED
Status: DISCONTINUED | OUTPATIENT
Start: 2024-08-29 | End: 2024-08-29 | Stop reason: HOSPADM

## 2024-08-29 RX ADMIN — FOSAPREPITANT 150 MG: 150 INJECTION, POWDER, LYOPHILIZED, FOR SOLUTION INTRAVENOUS at 12:08

## 2024-08-29 RX ADMIN — SODIUM CHLORIDE: 9 INJECTION, SOLUTION INTRAVENOUS at 01:08

## 2024-08-29 RX ADMIN — Medication 10 ML: at 12:08

## 2024-08-29 NOTE — TELEPHONE ENCOUNTER
----- Message from Jason Montero MD sent at 8/28/2024  4:03 PM CDT -----  Please call and reassess symptoms. Last we discussed we recommended repeat stool calprotectin- I don't see that she has turned this in and is still on pred 10 mg daily    SS

## 2024-08-29 NOTE — TELEPHONE ENCOUNTER
Spoke with Sugar:  IBD meds:  - Entyvio q8w, LD: 7/24, ND 9/18    Prednisone 10mg/d (tapered off Pred 6/27, developed sxs, 40mg/d 7/3, advised by Dr. Montero to taper by 10mg/d once weekly until down to 10mg/d)    - 2-8 BM/d, liq to hard formed  - 1 noc BM a week  - denies blood, mucous, tenesmus sxs, and pain  - overall feels good  - picked up stool kit yesterday and plans to turn in tomorrow  - OV: 1/14/24     Dr. Montero will be updated.

## 2024-08-29 NOTE — PLAN OF CARE
Vital Signs Stable, No apparent distress noted; Pt tolerated __Dostarlimab-gxly__w/o difficulty.  24g piv removed;No bleeding,swelling or drainage noted to the site.  AVS/Discharge instructions reviewed;all questions answered ;Pt verbalizes understanding. Next appointments scheduled and sent via [facility]; ambulated from clinic in [facility] 10/10/24

## 2024-08-31 ENCOUNTER — LAB VISIT (OUTPATIENT)
Dept: LAB | Facility: HOSPITAL | Age: 59
End: 2024-08-31
Attending: INTERNAL MEDICINE
Payer: COMMERCIAL

## 2024-08-31 DIAGNOSIS — K52.9 COLITIS: ICD-10-CM

## 2024-08-31 PROCEDURE — 87046 STOOL CULTR AEROBIC BACT EA: CPT | Mod: 59 | Performed by: INTERNAL MEDICINE

## 2024-08-31 PROCEDURE — 87449 NOS EACH ORGANISM AG IA: CPT | Performed by: INTERNAL MEDICINE

## 2024-08-31 PROCEDURE — 87427 SHIGA-LIKE TOXIN AG IA: CPT | Performed by: INTERNAL MEDICINE

## 2024-08-31 PROCEDURE — 87045 FECES CULTURE AEROBIC BACT: CPT | Performed by: INTERNAL MEDICINE

## 2024-08-31 PROCEDURE — 83993 ASSAY FOR CALPROTECTIN FECAL: CPT | Performed by: INTERNAL MEDICINE

## 2024-09-01 LAB
E COLI SXT1 STL QL IA: NEGATIVE
E COLI SXT2 STL QL IA: NEGATIVE

## 2024-09-02 LAB — BACTERIA STL CULT: NORMAL

## 2024-09-05 ENCOUNTER — TELEPHONE (OUTPATIENT)
Dept: GASTROENTEROLOGY | Facility: CLINIC | Age: 59
End: 2024-09-05
Payer: COMMERCIAL

## 2024-09-05 LAB — CALPROTECTIN STL-MCNT: 47 MCG/G

## 2024-09-05 NOTE — TELEPHONE ENCOUNTER
Spoke with Sugar:  Disease: ICI, Endometrial cancer    IBD meds:  - Entyvio q8w, LD: 7/24, ND 9/18:    Prednisone 10mg/d (tapered off Pred 6/27, developed sxs, 40mg/d 7/3, advised by Dr. Montero to taper by 10mg/d once weekly until down to 10mg/d)     - 2-8 BM/d: liq to tracie  - blood: no  - mucous: no  - 1 noc BM/wk  - 0 false BR trips  - Pain: no    - Overall feels: well    Per Dr. Montero:  - reviewed stool studies neg for infection  - stool mary WNL  - advised to reduce Prednisone to 5mg/d and stop in 2 wks (9/19 last dose)  - stool mary in 1 mo  - PM will be sent with stool mary order  - OV: 1/14/24    Dr. Montero will be updated.

## 2024-09-05 NOTE — TELEPHONE ENCOUNTER
----- Message from Jason Montero MD sent at 9/5/2024 12:42 PM CDT -----  IBD nurse team- Reassess symptoms.  Let pt know stool studies neg for infection  Stool calprotectin still in normal range  Decrease prednisone to 5 mg daily and in 2 weeks stop  Repeat stool calprotectin in 1 month    Dr. Jesus BRANHAM

## 2024-09-10 DIAGNOSIS — G62.0 CHEMOTHERAPY-INDUCED NEUROPATHY: ICD-10-CM

## 2024-09-10 DIAGNOSIS — T45.1X5A CHEMOTHERAPY-INDUCED NEUROPATHY: ICD-10-CM

## 2024-09-10 RX ORDER — DULOXETIN HYDROCHLORIDE 60 MG/1
CAPSULE, DELAYED RELEASE ORAL
Qty: 30 CAPSULE | Refills: 2 | Status: SHIPPED | OUTPATIENT
Start: 2024-09-10

## 2024-09-18 ENCOUNTER — INFUSION (OUTPATIENT)
Dept: INFUSION THERAPY | Facility: HOSPITAL | Age: 59
End: 2024-09-18
Attending: INTERNAL MEDICINE
Payer: COMMERCIAL

## 2024-09-18 VITALS
RESPIRATION RATE: 18 BRPM | OXYGEN SATURATION: 98 % | TEMPERATURE: 98 F | HEART RATE: 97 BPM | SYSTOLIC BLOOD PRESSURE: 129 MMHG | DIASTOLIC BLOOD PRESSURE: 65 MMHG

## 2024-09-18 DIAGNOSIS — K52.9 COLITIS: Primary | ICD-10-CM

## 2024-09-18 PROCEDURE — 96365 THER/PROPH/DIAG IV INF INIT: CPT

## 2024-09-18 PROCEDURE — 63600175 PHARM REV CODE 636 W HCPCS: Mod: JZ,JG | Performed by: INTERNAL MEDICINE

## 2024-09-18 PROCEDURE — 25000003 PHARM REV CODE 250: Performed by: INTERNAL MEDICINE

## 2024-09-18 RX ORDER — METHYLPREDNISOLONE SOD SUCC 125 MG
40 VIAL (EA) INJECTION
OUTPATIENT
Start: 2024-11-13

## 2024-09-18 RX ORDER — SODIUM CHLORIDE 0.9 % (FLUSH) 0.9 %
10 SYRINGE (ML) INJECTION
OUTPATIENT
Start: 2024-11-13

## 2024-09-18 RX ORDER — DIPHENHYDRAMINE HYDROCHLORIDE 50 MG/ML
25 INJECTION INTRAMUSCULAR; INTRAVENOUS
OUTPATIENT
Start: 2024-11-13

## 2024-09-18 RX ORDER — ACETAMINOPHEN 325 MG/1
650 TABLET ORAL
OUTPATIENT
Start: 2024-11-13

## 2024-09-18 RX ORDER — HEPARIN 100 UNIT/ML
500 SYRINGE INTRAVENOUS
OUTPATIENT
Start: 2024-11-13

## 2024-09-18 RX ORDER — IPRATROPIUM BROMIDE AND ALBUTEROL SULFATE 2.5; .5 MG/3ML; MG/3ML
3 SOLUTION RESPIRATORY (INHALATION)
OUTPATIENT
Start: 2024-11-13

## 2024-09-18 RX ORDER — EPINEPHRINE 1 MG/ML
0.3 INJECTION, SOLUTION, CONCENTRATE INTRAVENOUS
OUTPATIENT
Start: 2024-11-13

## 2024-09-18 RX ADMIN — VEDOLIZUMAB 300 MG: 300 INJECTION, POWDER, LYOPHILIZED, FOR SOLUTION INTRAVENOUS at 11:09

## 2024-09-18 NOTE — PLAN OF CARE
Pt arrived to the Infusion unit for IV medication administration. Pt reports no symptoms or concerns at this time. During visit, pt began to feel lightheaded while obtaining IV access. Ice pack applied, feet elevated, and vital signs reassessed. GCS 15 - pt able to follow commands. Pt IV obtained via ultrasound access. Entyvio 300 mg infused over 30 minutes. Pt reports no adverse reactions and tolerated medication well. Vital signs reassessed and stable after completion of treatment. Pt verbalizes no other needs at this time. Pt ambulates independently with steady gait upon discharge.

## 2024-09-30 ENCOUNTER — PATIENT MESSAGE (OUTPATIENT)
Dept: GASTROENTEROLOGY | Facility: CLINIC | Age: 59
End: 2024-09-30
Payer: COMMERCIAL

## 2024-10-01 NOTE — TELEPHONE ENCOUNTER
Per PM:  Disease: Immunotherapy induced colitis    IBD meds:  - Entyvio q8w, LD: 9/18, ND: 11/13    Sxs: fatigue, dry cough, itchy skin, diarrhea to soft formed, occ incontinent of stool     - Fever: N  - Nausea: Yes  - Vomiting: N  - BM/d: 2-8, watery to soft formed  - blood: N  - mucous: N  - noc BM/d: 1-2 per week  - false BR trips/d: N  - Pain: N  - Recent exposure: N  - reports not being fully honest about sxs d/t fear of being taken off Entyvio and end immunotherapy. Questions if she has to end immunotherapy, would these sxs resolve.    - OV: 1/14/25    Dr. Montero will be updated.

## 2024-10-01 NOTE — TELEPHONE ENCOUNTER
Spoke with Sugar:  - reviewed Dr. Montero's recommendations to continue the Entyvio, stool mary normal is reassuring  - enc her to discuss her concerns with her Oncologist to make sure they are aware as well  - use Tylenol for headache relief  - start fiber supplement Citracel to help bulk up stool  - she states understanding and agrees to this plan

## 2024-10-07 ENCOUNTER — TELEPHONE (OUTPATIENT)
Dept: GYNECOLOGIC ONCOLOGY | Facility: CLINIC | Age: 59
End: 2024-10-07
Payer: COMMERCIAL

## 2024-10-07 ENCOUNTER — LAB VISIT (OUTPATIENT)
Dept: LAB | Facility: HOSPITAL | Age: 59
End: 2024-10-07
Attending: OBSTETRICS & GYNECOLOGY
Payer: COMMERCIAL

## 2024-10-07 DIAGNOSIS — C54.1 MALIGNANT NEOPLASM OF ENDOMETRIUM: ICD-10-CM

## 2024-10-07 LAB
T4 FREE SERPL-MCNC: 0.79 NG/DL (ref 0.71–1.51)
TSH SERPL DL<=0.005 MIU/L-ACNC: 5.29 UIU/ML (ref 0.4–4)

## 2024-10-07 PROCEDURE — 84443 ASSAY THYROID STIM HORMONE: CPT | Performed by: OBSTETRICS & GYNECOLOGY

## 2024-10-07 PROCEDURE — 36415 COLL VENOUS BLD VENIPUNCTURE: CPT | Performed by: OBSTETRICS & GYNECOLOGY

## 2024-10-07 PROCEDURE — 84439 ASSAY OF FREE THYROXINE: CPT | Performed by: OBSTETRICS & GYNECOLOGY

## 2024-10-07 RX ORDER — PROCHLORPERAZINE EDISYLATE 5 MG/ML
5-10 INJECTION INTRAMUSCULAR; INTRAVENOUS ONCE AS NEEDED
Status: CANCELLED
Start: 2024-10-07

## 2024-10-07 RX ORDER — EPINEPHRINE 0.3 MG/.3ML
0.3 INJECTION SUBCUTANEOUS ONCE AS NEEDED
Status: CANCELLED | OUTPATIENT
Start: 2024-10-07

## 2024-10-07 RX ORDER — HEPARIN 100 UNIT/ML
500 SYRINGE INTRAVENOUS
Status: CANCELLED | OUTPATIENT
Start: 2024-10-07

## 2024-10-07 RX ORDER — DIPHENHYDRAMINE HYDROCHLORIDE 50 MG/ML
50 INJECTION INTRAMUSCULAR; INTRAVENOUS ONCE AS NEEDED
Status: CANCELLED | OUTPATIENT
Start: 2024-10-07

## 2024-10-07 RX ORDER — SODIUM CHLORIDE 0.9 % (FLUSH) 0.9 %
10 SYRINGE (ML) INJECTION
Status: CANCELLED | OUTPATIENT
Start: 2024-10-07

## 2024-10-07 NOTE — PROGRESS NOTES
The patient location is: home  The chief complaint leading to consultation is: cycle #8 of maintenance therapy    Visit type: audiovisual    Face to Face time with patient: 10  15 minutes of total time spent on the encounter, which includes face to face time and non-face to face time preparing to see the patient (eg, review of tests), Obtaining and/or reviewing separately obtained history, Documenting clinical information in the electronic or other health record, Independently interpreting results (not separately reported) and communicating results to the patient/family/caregiver, or Care coordination (not separately reported).         Each patient to whom he or she provides medical services by telemedicine is:  (1) informed of the relationship between the physician and patient and the respective role of any other health care provider with respect to management of the patient; and (2) notified that he or she may decline to receive medical services by telemedicine and may withdraw from such care at any time.    Notes:      REFERRING PROVIDER  Omaira Evans MD Shah, Shamita MD    REASON FOR CONSULT  Sugar Diaz  is a 59 y.o.  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    HISTORY OF PRESENT ILLNESS    Chemotherapy regimen: Dostarlimab 1000mg 6 weeks (3 years)  Cycle: 9  Previous dose limiting toxicities: Y  Cycle 3: Grade 4 diarrhea 2/2 Salmonella  Previous dose reductions: N  Previous breaks: Y  Cycle 4: 4 weeks 2/2 Grade 4 diarrhea 2/2 Salmonella  Previous changes in pre-medications: N    Energy level: baseline  Appetite: baseline  Fevers/chills/nights: no  Nausea: no  Diarrhea: yes, grade 2  Emesis: no  Neuropathy: yes, grade 3  Discoloration of lower extremities: no  Mucositis: no  Maculopapular rashes: no  Dyspnea or coughing: yes      Oncology History   Endometrial cancer   5/23/2023 Surgery    Hysteroscopy, D&C: grade 1 endometrioid EC     6/5/2023  Surgery    TRH/BSO/BSLN/RPLN/repair of obturator nerve    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 4.5 cm, grade 1, 96% MI (22/23 mm), +LUE, -LVSI, +MELF, -cervix, 1/2+ RPLN, 1/1+ LPLN, -ascites.     6/19/2023 Imaging Significant Findings    CT C/A/P w/: High L para-aortic at the level of the renal vessels, 1.5 cm.  Low R para-aortic at the LEAH, 4.1 cm in greatest dimension.     6/25/2023 Genomic Testing    [facility] (NGS): ARD1A, CHEK1, CTCF, , HDAC2, HNF1A, JAK1, KRAS, MAX, MSH6, P1K3R1, PPM1D, PTEN, ZFHX3, ZSR2     6/28/2023 Tumor Conference    No rule for debulking of para-aortic lymph nodes.  Adjuvant VBT.  Represent after completion of adjuvant therapy to discuss EBRT.        7/7/2023 - 10/19/2023 Chemotherapy    Carboplatin AUC 5/Paclitaxel 135 mg/m2/Dostarlimab 200mg q3 weeks x6    Previous dose limiting toxicities: Y  Cycle 4: Grade 2 colitis s/p steroid taper  Cycle 5: Grade 3 colitis  Previous dose reductions: N  Previous breaks: Y  Cycle 4: Dostarlimab omitted due to grade 2 colitis  Cycle 5: Dostarlimab 2/2 grade 3 colitis (negative colonoscopy with improvement in symptoms with steroid taper)     8/9/2023 - 9/13/2023 Radiation Therapy    Treating physician: Dr. Adriane Babb  Total Dose: 21 Gy HDR  Fractions: 3 vaginal cuff brachytherapy     8/22/2023 Imaging Significant Findings    CT A/P w/: No evidence of colitis, RI in high L para-aortic and low R para-aortic.     10/4/2023 Procedure    Colonoscopy: WNL     11/6/2023 Imaging Significant Findings    CT C/A/P w/: PATTI     11/6/2023 - 11/9/2023 Hospital Admission    Most Recent Admission Details  Admit Date: 11/6/23  Admitted for: RVS PNA superimposed by community acquitted PNA  Discharge date: 11/9/23  Discharged from: [facility] LOCATION ([facility])  Discharge disposition: Home or Self Care       11/22/2023 -  Maintenance Therapy     Dostarlimab 1000mg x     2/21/2024 Procedure    EGD, Colonoscopy: No gross evidence of colitis      Hospital Admission    Most Recent Admission Details  Admit Date: 2/15/24  Admitted for: Grade 4 colitis 2/2 Salmonella.    Discharge date: 2/23/24  Discharged from: [facility] at [facility]  Discharge disposition: Home or Self Care       2/21/2024 Procedure    Colonoscopy, EGD: No obvious ICI colitis     7/9/2024 Imaging Significant Findings    CT C/A/P w/: PATTI          The following portions of the patient's history were reviewed and updated as appropriate: allergies, current medications, family history, medical history, social history and surgical history.    REVIEW OF SYSTEMS  All systems reviewed and negative except as noted in HPI.    OBJECTIVE   There were no vitals filed for this visit.           There is no height or weight on file to calculate BMI.      1. General: Well appearing, no apparent distress, alert and oriented.  2. Lymph: Neck symmetric without cervical or supraclavicular adenopathy or mass.  3. Lungs: Normal respiratory rate, no accessory muscle use.  4. Psych: Normal affect.  5. Abdomen:  non-distended                ECOG status: 1    LABORATORY DATA  Lab data reviewed.    RADIOLOGICAL DATA  Radiology data reviewed.    ASSESSMENT / PLAN     1. Malignant neoplasm of endometrium    2. Encounter for antineoplastic chemotherapy    3. Chemotherapy-induced neuropathy    4. Chemotherapy induced diarrhea    5. Hypothyroidism due to medication      F/U IR Port-a-catheter port    Grade 1-2 diarrhea with Entyvio (ND 11/13).  Stool studies WNL (10/6/24).  We had an extensive discussion about proceeding vs discontinuing maintenance therapy. The patient would like to continue therapy which I think is reasonable.    Administer maintenance cycle #9.  CBC< CMP, TSH, ACTH, cortisol 8 am, RONC, CT C/A/P w/ w/o, cycle #10 6 weeks.    PATIENT EDUCATION  Ready to learn, no apparent learning  barriers were identified; learning preferences include listening. Explained diagnosis and treatment plan; patient expressed understanding of the content.    ADMINISTRATIVE BILLING  Greater than 50% of was spent in counseling.     ONGOING COMPLEXITY BILLING  Visit today is associated with current or anticipated ongoing medical care related to this patients single serious condition/complex condition: endometrial cancer

## 2024-10-07 NOTE — TELEPHONE ENCOUNTER
----- Message from Des sent at 10/7/2024  8:48 AM CDT -----  Regarding: Sample  Name of Who is Calling:  Criss Luna calling from [facility]          What is the request in detail:  Please contact Criss she is calling about a sample for the patient            Can the clinic reply by [facility]: No            What Number to Call Back if not in [facility]: [phone]

## 2024-10-08 ENCOUNTER — OFFICE VISIT (OUTPATIENT)
Dept: GYNECOLOGIC ONCOLOGY | Facility: CLINIC | Age: 59
End: 2024-10-08
Payer: COMMERCIAL

## 2024-10-08 ENCOUNTER — TELEPHONE (OUTPATIENT)
Dept: GYNECOLOGIC ONCOLOGY | Facility: CLINIC | Age: 59
End: 2024-10-08

## 2024-10-08 DIAGNOSIS — G62.0 CHEMOTHERAPY-INDUCED NEUROPATHY: ICD-10-CM

## 2024-10-08 DIAGNOSIS — K52.1 CHEMOTHERAPY INDUCED DIARRHEA: ICD-10-CM

## 2024-10-08 DIAGNOSIS — T45.1X5A CHEMOTHERAPY INDUCED DIARRHEA: ICD-10-CM

## 2024-10-08 DIAGNOSIS — C54.1 MALIGNANT NEOPLASM OF ENDOMETRIUM: Primary | ICD-10-CM

## 2024-10-08 DIAGNOSIS — Z51.11 ENCOUNTER FOR ANTINEOPLASTIC CHEMOTHERAPY: ICD-10-CM

## 2024-10-08 DIAGNOSIS — E03.2 HYPOTHYROIDISM DUE TO MEDICATION: ICD-10-CM

## 2024-10-08 DIAGNOSIS — T45.1X5A CHEMOTHERAPY-INDUCED NEUROPATHY: ICD-10-CM

## 2024-10-08 NOTE — TELEPHONE ENCOUNTER
----- Message from CARLY Angel sent at 10/8/2024  1:09 PM CDT -----  Good afternoon,    Would you get this patient scheduled for a CT scan just prior to her 11/19 appt with Dr. Rodríguez? Order is in. Thanks!

## 2024-10-08 NOTE — Clinical Note
Can we please make sure this patient gets scheduled to see IR for a port placement?  Order and referral is in.  Thank you.

## 2024-10-10 ENCOUNTER — INFUSION (OUTPATIENT)
Dept: INFUSION THERAPY | Facility: OTHER | Age: 59
End: 2024-10-10
Attending: INTERNAL MEDICINE
Payer: COMMERCIAL

## 2024-10-10 VITALS
TEMPERATURE: 98 F | BODY MASS INDEX: 25.37 KG/M2 | SYSTOLIC BLOOD PRESSURE: 131 MMHG | WEIGHT: 161.63 LBS | DIASTOLIC BLOOD PRESSURE: 60 MMHG | HEIGHT: 67 IN | HEART RATE: 92 BPM | OXYGEN SATURATION: 98 % | RESPIRATION RATE: 17 BRPM

## 2024-10-10 DIAGNOSIS — C54.1 ENDOMETRIAL CANCER: Primary | ICD-10-CM

## 2024-10-10 PROCEDURE — 25000003 PHARM REV CODE 250: Performed by: OBSTETRICS & GYNECOLOGY

## 2024-10-10 PROCEDURE — 96413 CHEMO IV INFUSION 1 HR: CPT

## 2024-10-10 PROCEDURE — 63600175 PHARM REV CODE 636 W HCPCS: Mod: JZ,JG | Performed by: OBSTETRICS & GYNECOLOGY

## 2024-10-10 PROCEDURE — 96367 TX/PROPH/DG ADDL SEQ IV INF: CPT

## 2024-10-10 RX ORDER — SODIUM CHLORIDE 0.9 % (FLUSH) 0.9 %
10 SYRINGE (ML) INJECTION
Status: DISCONTINUED | OUTPATIENT
Start: 2024-10-10 | End: 2024-10-10 | Stop reason: HOSPADM

## 2024-10-10 RX ORDER — DIPHENHYDRAMINE HYDROCHLORIDE 50 MG/ML
50 INJECTION INTRAMUSCULAR; INTRAVENOUS ONCE AS NEEDED
Status: DISCONTINUED | OUTPATIENT
Start: 2024-10-10 | End: 2024-10-10 | Stop reason: HOSPADM

## 2024-10-10 RX ORDER — HEPARIN 100 UNIT/ML
500 SYRINGE INTRAVENOUS
Status: DISCONTINUED | OUTPATIENT
Start: 2024-10-10 | End: 2024-10-10 | Stop reason: HOSPADM

## 2024-10-10 RX ORDER — EPINEPHRINE 0.3 MG/.3ML
0.3 INJECTION SUBCUTANEOUS ONCE AS NEEDED
Status: DISCONTINUED | OUTPATIENT
Start: 2024-10-10 | End: 2024-10-10 | Stop reason: HOSPADM

## 2024-10-10 RX ADMIN — SODIUM CHLORIDE 1000 MG: 9 INJECTION, SOLUTION INTRAVENOUS at 01:10

## 2024-10-10 RX ADMIN — FOSAPREPITANT 150 MG: 150 INJECTION, POWDER, LYOPHILIZED, FOR SOLUTION INTRAVENOUS at 12:10

## 2024-10-10 NOTE — PLAN OF CARE
Dostarlimab chemotherapy infusion administered, no reaction. Patient tolerated well. Patient accompanied by family member for discharge home. 24 gauge IV removed, catheter intact. Discharge instructions given to patient. Patient understands instructions. Follow up appointment scheduled.

## 2024-10-11 ENCOUNTER — PATIENT MESSAGE (OUTPATIENT)
Dept: INTERVENTIONAL RADIOLOGY/VASCULAR | Facility: HOSPITAL | Age: 59
End: 2024-10-11
Payer: COMMERCIAL

## 2024-10-14 ENCOUNTER — LAB VISIT (OUTPATIENT)
Dept: LAB | Facility: HOSPITAL | Age: 59
End: 2024-10-14
Attending: NURSE PRACTITIONER
Payer: COMMERCIAL

## 2024-10-14 DIAGNOSIS — C54.1 MALIGNANT NEOPLASM OF ENDOMETRIUM: Primary | ICD-10-CM

## 2024-10-14 DIAGNOSIS — R19.7 DIARRHEA, UNSPECIFIED TYPE: ICD-10-CM

## 2024-10-14 DIAGNOSIS — K52.9 COLITIS: ICD-10-CM

## 2024-10-14 DIAGNOSIS — C54.1 ENDOMETRIAL CANCER: ICD-10-CM

## 2024-10-14 DIAGNOSIS — Z51.11 ENCOUNTER FOR ANTINEOPLASTIC CHEMOTHERAPY: ICD-10-CM

## 2024-10-14 LAB
BASOPHILS # BLD AUTO: 0.05 K/UL (ref 0–0.2)
BASOPHILS NFR BLD: 0.8 % (ref 0–1.9)
DIFFERENTIAL METHOD BLD: ABNORMAL
EOSINOPHIL # BLD AUTO: 0.5 K/UL (ref 0–0.5)
EOSINOPHIL NFR BLD: 7.9 % (ref 0–8)
ERYTHROCYTE [DISTWIDTH] IN BLOOD BY AUTOMATED COUNT: 12.2 % (ref 11.5–14.5)
HCT VFR BLD AUTO: 39.9 % (ref 37–48.5)
HGB BLD-MCNC: 13.4 G/DL (ref 12–16)
IMM GRANULOCYTES # BLD AUTO: 0.01 K/UL (ref 0–0.04)
IMM GRANULOCYTES NFR BLD AUTO: 0.2 % (ref 0–0.5)
INR PPP: 0.9 (ref 0.8–1.2)
LYMPHOCYTES # BLD AUTO: 1.9 K/UL (ref 1–4.8)
LYMPHOCYTES NFR BLD: 30.4 % (ref 18–48)
MCH RBC QN AUTO: 32.2 PG (ref 27–31)
MCHC RBC AUTO-ENTMCNC: 33.6 G/DL (ref 32–36)
MCV RBC AUTO: 96 FL (ref 82–98)
MONOCYTES # BLD AUTO: 0.7 K/UL (ref 0.3–1)
MONOCYTES NFR BLD: 10.7 % (ref 4–15)
NEUTROPHILS # BLD AUTO: 3.1 K/UL (ref 1.8–7.7)
NEUTROPHILS NFR BLD: 50 % (ref 38–73)
NRBC BLD-RTO: 0 /100 WBC
PLATELET # BLD AUTO: 326 K/UL (ref 150–450)
PMV BLD AUTO: 8.6 FL (ref 9.2–12.9)
PROTHROMBIN TIME: 10.4 SEC (ref 9–12.5)
RBC # BLD AUTO: 4.16 M/UL (ref 4–5.4)
WBC # BLD AUTO: 6.18 K/UL (ref 3.9–12.7)

## 2024-10-14 PROCEDURE — 36415 COLL VENOUS BLD VENIPUNCTURE: CPT | Performed by: NURSE PRACTITIONER

## 2024-10-14 PROCEDURE — 85610 PROTHROMBIN TIME: CPT | Performed by: NURSE PRACTITIONER

## 2024-10-14 PROCEDURE — 85025 COMPLETE CBC W/AUTO DIFF WBC: CPT | Performed by: NURSE PRACTITIONER

## 2024-10-14 PROCEDURE — 83993 ASSAY FOR CALPROTECTIN FECAL: CPT | Performed by: INTERNAL MEDICINE

## 2024-10-16 ENCOUNTER — HOSPITAL ENCOUNTER (OUTPATIENT)
Dept: PREADMISSION TESTING | Facility: HOSPITAL | Age: 59
Discharge: HOME | End: 2024-10-16
Attending: OBSTETRICS & GYNECOLOGY
Payer: COMMERCIAL

## 2024-10-16 VITALS — WEIGHT: 161 LBS | HEIGHT: 67 IN | BODY MASS INDEX: 25.27 KG/M2

## 2024-10-16 LAB — CALPROTECTIN STL-MCNT: 45.7 MCG/G

## 2024-10-16 NOTE — PLAN OF CARE
Pre-operative instructions, medication directives and pain scales reviewed with patient. All questions the patient had were answered. Re-assurance about surgical procedure and day of surgery routine given as needed, patient verbalized understanding of the pre-op instructions.  Patient instructed to report to [facility] for surgery.  Phone preop done.

## 2024-10-18 ENCOUNTER — HOSPITAL ENCOUNTER (OUTPATIENT)
Dept: INTERVENTIONAL RADIOLOGY/VASCULAR | Facility: HOSPITAL | Age: 59
Discharge: HOME | End: 2024-10-18
Attending: OBSTETRICS & GYNECOLOGY
Payer: COMMERCIAL

## 2024-10-18 VITALS
HEART RATE: 83 BPM | DIASTOLIC BLOOD PRESSURE: 65 MMHG | TEMPERATURE: 98 F | RESPIRATION RATE: 16 BRPM | SYSTOLIC BLOOD PRESSURE: 140 MMHG | OXYGEN SATURATION: 99 %

## 2024-10-18 DIAGNOSIS — T45.1X5A CHEMOTHERAPY-INDUCED NEUROPATHY: ICD-10-CM

## 2024-10-18 DIAGNOSIS — K52.1 CHEMOTHERAPY INDUCED DIARRHEA: ICD-10-CM

## 2024-10-18 DIAGNOSIS — C54.1 MALIGNANT NEOPLASM OF ENDOMETRIUM: ICD-10-CM

## 2024-10-18 DIAGNOSIS — T45.1X5A CHEMOTHERAPY INDUCED DIARRHEA: ICD-10-CM

## 2024-10-18 DIAGNOSIS — C54.1 ENDOMETRIAL CANCER: ICD-10-CM

## 2024-10-18 DIAGNOSIS — Z51.11 ENCOUNTER FOR ANTINEOPLASTIC CHEMOTHERAPY: ICD-10-CM

## 2024-10-18 DIAGNOSIS — G62.0 CHEMOTHERAPY-INDUCED NEUROPATHY: ICD-10-CM

## 2024-10-18 PROCEDURE — 99152 MOD SED SAME PHYS/QHP 5/>YRS: CPT | Performed by: RADIOLOGY

## 2024-10-18 PROCEDURE — 76937 US GUIDE VASCULAR ACCESS: CPT | Mod: TC | Performed by: RADIOLOGY

## 2024-10-18 PROCEDURE — 99152 MOD SED SAME PHYS/QHP 5/>YRS: CPT

## 2024-10-18 PROCEDURE — C1894 INTRO/SHEATH, NON-LASER: HCPCS

## 2024-10-18 PROCEDURE — 76937 US GUIDE VASCULAR ACCESS: CPT | Mod: 26,,, | Performed by: RADIOLOGY

## 2024-10-18 PROCEDURE — 36561 INSERT TUNNELED CV CATH: CPT | Mod: RT,,, | Performed by: RADIOLOGY

## 2024-10-18 PROCEDURE — 63600175 PHARM REV CODE 636 W HCPCS: Performed by: RADIOLOGY

## 2024-10-18 PROCEDURE — 77001 FLUOROGUIDE FOR VEIN DEVICE: CPT | Mod: TC | Performed by: RADIOLOGY

## 2024-10-18 PROCEDURE — C1788 PORT, INDWELLING, IMP: HCPCS

## 2024-10-18 PROCEDURE — 77001 FLUOROGUIDE FOR VEIN DEVICE: CPT | Mod: 26,,, | Performed by: RADIOLOGY

## 2024-10-18 PROCEDURE — 36561 INSERT TUNNELED CV CATH: CPT | Mod: RT | Performed by: RADIOLOGY

## 2024-10-18 PROCEDURE — 99152 MOD SED SAME PHYS/QHP 5/>YRS: CPT | Mod: ,,, | Performed by: RADIOLOGY

## 2024-10-18 RX ORDER — HEPARIN 100 UNIT/ML
SYRINGE INTRAVENOUS
Status: COMPLETED | OUTPATIENT
Start: 2024-10-18 | End: 2024-10-18

## 2024-10-18 RX ORDER — LIDOCAINE HYDROCHLORIDE 10 MG/ML
INJECTION, SOLUTION INFILTRATION; PERINEURAL
Status: COMPLETED | OUTPATIENT
Start: 2024-10-18 | End: 2024-10-18

## 2024-10-18 RX ORDER — SODIUM CHLORIDE 9 MG/ML
INJECTION, SOLUTION INTRAVENOUS CONTINUOUS
Status: DISCONTINUED | OUTPATIENT
Start: 2024-10-18 | End: 2024-10-19 | Stop reason: HOSPADM

## 2024-10-18 RX ORDER — FENTANYL CITRATE 50 UG/ML
INJECTION, SOLUTION INTRAMUSCULAR; INTRAVENOUS
Status: COMPLETED | OUTPATIENT
Start: 2024-10-18 | End: 2024-10-18

## 2024-10-18 RX ORDER — LIDOCAINE HYDROCHLORIDE 10 MG/ML
1 INJECTION, SOLUTION EPIDURAL; INFILTRATION; INTRACAUDAL; PERINEURAL ONCE
Status: DISCONTINUED | OUTPATIENT
Start: 2024-10-18 | End: 2024-10-19 | Stop reason: HOSPADM

## 2024-10-18 RX ORDER — MIDAZOLAM HYDROCHLORIDE 2 MG/2ML
INJECTION, SOLUTION INTRAMUSCULAR; INTRAVENOUS
Status: COMPLETED | OUTPATIENT
Start: 2024-10-18 | End: 2024-10-18

## 2024-10-18 RX ADMIN — Medication 4 ML: at 10:10

## 2024-10-18 RX ADMIN — FENTANYL CITRATE 50 MCG: 50 INJECTION, SOLUTION INTRAMUSCULAR; INTRAVENOUS at 10:10

## 2024-10-18 RX ADMIN — MIDAZOLAM HYDROCHLORIDE 1 MG: 1 INJECTION, SOLUTION INTRAMUSCULAR; INTRAVENOUS at 10:10

## 2024-10-18 RX ADMIN — LIDOCAINE HYDROCHLORIDE 7 ML: 10 INJECTION, SOLUTION INFILTRATION; PERINEURAL at 10:10

## 2024-10-18 NOTE — DISCHARGE INSTRUCTIONS
What you should know:    Call the office for and appointment if one has not already been made.    You may experience common minor surgical & anesthesia related discomforts such as: ?  muscle aches, headaches, pain/discomfort, nausea & vomiting. These should improve in   24-48 hrs.    Swelling and discoloration/bruising are common around surgical incision; a cold pack over the   wound during the first 24 hours will help to minimize swelling and bruising.     You have received sedation/anesthesia today: You may NOT drive, drink alcohol, sign legal documents for the next 24 hours.    Rest today: A responsible adult should stay with you the first night after your procedure.  You may resume regular activities 24 hours after the procedure.    You may slowly resume your regular diet as tolerated. Drink plenty of fluids the first 48 hours and you may resume your   usual diet.     Activity: No heavy lifting (over 10 pounds), pushing or pulling until your   post op visit. Your doctor's office may have told you to limit your lifting to less weight, or even no weight.  Be sure to follow those instructions.    Call the doctor for any of the following problems:   fever above 101,   severe pain, bleeding, or abdominal distention (swelling).   If constipated you may take any stool softener you choose.     Occasionally small areas of skin numbness or an unpleasant skin sensation can result. Also, you may find that your incision is swollen and tender for a few days.  Some redness around sutures and staples is a normal reaction, but if the discomfort persists or worsens, call you doctor. Please go to nearest Emergency Room/call 911 if you feel your symptoms need immediate attention.     Fall Prevention  Millions of people fall every year and injure themselves. You may have had anesthesia or sedation which may increase your risk of falling. You may have health issues that put you at an increased risk of falling.     Here are ways to  reduce your risk of falling.    Make your home safe by keeping walkways clear of objects you may trip over.  Use non-slip pads under rugs. Do not use area rugs or small throw rugs.  Use non-slip mats in bathtubs and showers.  Install handrails and lights on staircases.  Do not walk in poorly lit areas.  Do not stand on chairs or wobbly ladders.  Use caution when reaching overhead or looking upward. This position can cause a loss of balance.  Be sure your shoes fit properly, have non-slip bottoms and are in good condition.   Wear shoes both inside and out. Avoid going barefoot or wearing slippers.  Be cautious when going up and down stairs, curbs, and when walking on uneven sidewalks.  If your balance is poor, consider using a cane or walker.  If your fall was related to alcohol use, stop or limit alcohol intake.   If your fall was related to use of sleeping medicines, talk to your doctor about this. You may need to reduce your dosage at bedtime if you awaken during the night to go to the bathroom.    To reduce the need for nighttime bathroom trips:  Avoid drinking fluids for several hours before going to bed  Empty your bladder before going to bed  Men can keep a urinal at the bedside  Stay as active as you can. Balance, flexibility, strength, and endurance all come from exercise. They all play a role in preventing falls. Ask your healthcare provider which types of activity are right for you.  Get your vision checked on a regular basis.  If you have pets, know where they are before you stand up or walk so you don't trip over them.  Use night lights.

## 2024-10-18 NOTE — Clinical Note
Right: Chest.   Scrubbed with Chlorhexidine/Alcohol.    Hair: N/A.  Skin prep dry before draping.  Prepped by: Ibrahima Campos RT 10/18/2024 10:33 AM.

## 2024-10-18 NOTE — H&P
Radiology History & Physical      SUBJECTIVE:     Chief Complaint: endometrial CA    History of Present Illness:  Sugar Diaz is a 59 y.o. female who presents for chest port placement.    Past Medical History:   Diagnosis Date    Chest pain 2002    NEGATIVE STRESS ECHO 2002    Endometrial cancer     2023    GERD (gastroesophageal reflux disease)     Lower back pain     Migraines     Mild allergic rhinitis     Postmenopausal bleeding      Past Surgical History:   Procedure Laterality Date    COLONOSCOPY N/A 10/06/2023    Procedure: COLONOSCOPY;  Surgeon: Marcia Weaver MD;  Location: [facility];  Service: Endoscopy;  Laterality: N/A;  10/4- referred by Dr. Weaver/ Subject: Needs colonoscopy ASAP within 1 week /Procedure Timing: < 1 week/Diagnosis: Diarrhea, evaluate for immunotherapy induced colitis/ Prep instructions to portal. AS    COLONOSCOPY N/A 2/21/2024    Procedure: COLONOSCOPY;  Surgeon: Fercho Ramirez MD;  Location: [facility] ([address] [place] [address]);  Service: Endoscopy;  Laterality: N/A;    CYST REMOVAL Left 04/28/2022    Procedure: EXCISION, CYST-BACK;  Surgeon: Thomas Hurst MD;  Location: [facility];  Service: General;  Laterality: Left;  left upper back  RN PREOP ON 4/21/22-NF    ESOPHAGOGASTRODUODENOSCOPY N/A 2/21/2024    Procedure: EGD (ESOPHAGOGASTRODUODENOSCOPY);  Surgeon: Fercho Ramirez MD;  Location: [facility] ([address] [place] [address]);  Service: Endoscopy;  Laterality: N/A;    HYSTERECTOMY      after endometrial cancer diagnosis    HYSTEROSCOPY WITH DILATION AND CURETTAGE OF UTERUS N/A 05/23/2023    Procedure: HYSTEROSCOPY, WITH DILATION AND CURETTAGE OF UTERUS;  Surgeon: Omaira Evans MD;  Location: [facility];  Service: OB/GYN;  Laterality: N/A;  DESTINY HERNANDEZ  [phone] TEXTED H[facility] ON 5/2/2023 @ 3:51PM. [facility] RESPONDED ON 5/2/2023 @ 3:51PM-LO  RN PREOP 5/18/23  T & S; UPT ORDERED AND SCHEDULED 5/22/23    LYMPH NODE BIOPSY Left 06/05/2023    Procedure: BIOPSY, sentinel LYMPH NODE;  Surgeon:  Lavell Rodríguez MD;  Location: [facility] OR;  Service: OB/GYN;  Laterality: Left;    LYMPHADENECTOMY, PELVIS Right 06/05/2023    Procedure: LYMPHADENECTOMY, PELVIS;  Surgeon: Lavell Rodríguez MD;  Location: [facility] OR;  Service: OB/GYN;  Laterality: Right;    MAPPING, LYMPH NODE, SENTINEL Bilateral 06/05/2023    Procedure: injection, LYMPH NODE, SENTINEL;  Surgeon: Lavell Rodríguez MD;  Location: [facility] OR;  Service: OB/GYN;  Laterality: Bilateral;    NERVE REPAIR Right 06/05/2023    Procedure: REPAIR, OBTURATOR NERVE;  Surgeon: Lavell Rodríguez MD;  Location: [facility] OR;  Service: OB/GYN;  Laterality: Right;    TONSILLECTOMY      TONSILLECTOMY      TUBAL LIGATION      XI ROBOTIC HYSTERECTOMY, WITH SALPINGO-OOPHORECTOMY Bilateral 06/05/2023    Procedure: XI ROBOTIC HYSTERECTOMY,WITH SALPINGO-OOPHORECTOMY;  Surgeon: Lavell Rodríguez MD;  Location: [facility] OR;  Service: OB/GYN;  Laterality: Bilateral;  removed through vagina       Home Meds:   Prior to Admission medications    Medication Sig Start Date End Date Taking? Authorizing Provider   acetaminophen (TYLENOL) 650 MG TbSR Take 1 tablet (650 mg total) by mouth every 6 (six) hours as needed (pain). 6/5/23  Yes Elsa Owen MD   DULoxetine (CYMBALTA) 60 MG capsule TAKE 1 CAPSULE(60 MG) BY MOUTH DAILY 9/10/24  Yes Jackelyn Soto, NP   levothyroxine (SYNTHROID) 100 MCG tablet Take 1 tablet (100 mcg total) by mouth before breakfast. 1/3/24 1/2/25 Yes Lavell Rodríguez MD   multivitamin (ONE DAILY MULTIVITAMIN) per tablet Take 1 tablet by mouth once daily.   Yes Provider, Historical   vedolizumab (ENTYVIO) 300 mg SolR injection Inject 300 mg into the vein. Week 0, 2, and 6 followed by every 8 weeks   Yes Provider, Historical   azelastine (ASTELIN) 137 mcg (0.1 %) nasal spray 1 spray (137 mcg total) by Nasal route 2 (two) times daily. 6/27/24 6/27/25  Dexter Khan MD   b complex vitamins tablet Take 1 tablet by mouth once daily.    Provider, Historical   diclofenac sodium (VOLTAREN)  1 % Gel Apply 2 g topically 2 (two) times daily. 9/12/23   Kaity Santos PA   diphenoxylate-atropine 2.5-0.025 mg (LOMOTIL) 2.5-0.025 mg per tablet Take 1 tablet by mouth daily as needed for Diarrhea.    Provider, Historical   ibuprofen (ADVIL,MOTRIN) 600 MG tablet Take 1 tablet (600 mg total) by mouth every 6 (six) hours as needed for Pain. 11/9/23   Dominic Mooney MD   predniSONE (DELTASONE) 10 MG tablet Take 1 tablet (10 mg total) by mouth once daily. Or as directed by Dr. Montero 8/23/24   Jason Montero MD   valACYclovir (VALTREX) 1000 MG tablet Two pills at onset of fever blister and then repeat 2 pills 12 hr later 6/14/22   James Hillman MD     Anticoagulants/Antiplatelets: no anticoagulation    Allergies: Review of patient's allergies indicates:  No Known Allergies  Sedation History:  no adverse reactions          OBJECTIVE:     Vital Signs (Most Recent)  Temp: 98.1 °F (36.7 °C) (10/18/24 0849)  Pulse: 80 (10/18/24 0849)  Resp: 16 (10/18/24 0849)  BP: (!) 120/58 (10/18/24 0849)  SpO2: 97 % (10/18/24 0849)    Physical Exam:  ASA: 2  Mallampati: 2    General: no acute distress  Mental Status: alert and oriented to person, place and time  HEENT: normocephalic, atraumatic  Chest: unlabored breathing  Heart: regular heart rate  Abdomen: nondistended  Extremity: moves all extremities    Laboratory  Lab Results   Component Value Date    INR 0.9 10/14/2024       Lab Results   Component Value Date    WBC 6.18 10/14/2024    HGB 13.4 10/14/2024    HCT 39.9 10/14/2024    MCV 96 10/14/2024     10/14/2024      Lab Results   Component Value Date    GLU 93 10/07/2024     10/07/2024    K 4.2 10/07/2024     10/07/2024    CO2 26 10/07/2024    BUN 14 10/07/2024    CREATININE 0.7 10/07/2024    CALCIUM 9.6 10/07/2024    MG 1.8 04/20/2024    ALT 17 10/07/2024    AST 21 10/07/2024    ALBUMIN 3.7 10/07/2024    BILITOT 0.2 10/07/2024       ASSESSMENT/PLAN:     Sedation Plan: moderate  sedation  Patient will undergo chest port placement.    Dexter Zafar MD  Interventional Radiology  Department of Radiology

## 2024-10-18 NOTE — PROCEDURES
Radiology Post-Procedure Note    Pre Op Diagnosis: Endometrial CA  Post Op Diagnosis: Same    Procedure: Right internal jugular vein single lumen chest port placement    Procedure performed by: Dexter Zafar MD    Written Informed Consent Obtained: Yes  Specimen Removed: NO  Estimated Blood Loss: Minimal    Findings:   Widely patent right internal jugular vein. The port is ready for immediate use.     Patient tolerated procedure well.    Dexter Zafar MD  Interventional Radiology  Department of Radiology

## 2024-10-21 ENCOUNTER — TELEPHONE (OUTPATIENT)
Dept: INTERVENTIONAL RADIOLOGY/VASCULAR | Facility: HOSPITAL | Age: 59
End: 2024-10-21
Payer: COMMERCIAL

## 2024-10-23 DIAGNOSIS — Z12.31 OTHER SCREENING MAMMOGRAM: ICD-10-CM

## 2024-10-25 ENCOUNTER — PATIENT MESSAGE (OUTPATIENT)
Dept: GYNECOLOGIC ONCOLOGY | Facility: CLINIC | Age: 59
End: 2024-10-25
Payer: COMMERCIAL

## 2024-10-25 DIAGNOSIS — C54.1 MALIGNANT NEOPLASM OF ENDOMETRIUM: Primary | ICD-10-CM

## 2024-10-25 RX ORDER — CELECOXIB 100 MG/1
100 CAPSULE ORAL DAILY
Qty: 30 CAPSULE | Refills: 3 | Status: SHIPPED | OUTPATIENT
Start: 2024-10-25

## 2024-10-31 ENCOUNTER — PATIENT MESSAGE (OUTPATIENT)
Dept: INTERNAL MEDICINE | Facility: CLINIC | Age: 59
End: 2024-10-31
Payer: COMMERCIAL

## 2024-10-31 ENCOUNTER — TELEPHONE (OUTPATIENT)
Dept: INTERNAL MEDICINE | Facility: CLINIC | Age: 59
End: 2024-10-31
Payer: COMMERCIAL

## 2024-11-01 ENCOUNTER — HOSPITAL ENCOUNTER (EMERGENCY)
Facility: HOSPITAL | Age: 59
Discharge: HOME | End: 2024-11-01
Attending: EMERGENCY MEDICINE
Payer: COMMERCIAL

## 2024-11-01 VITALS
SYSTOLIC BLOOD PRESSURE: 137 MMHG | WEIGHT: 160 LBS | TEMPERATURE: 98 F | HEART RATE: 79 BPM | HEIGHT: 67 IN | BODY MASS INDEX: 25.11 KG/M2 | RESPIRATION RATE: 15 BRPM | DIASTOLIC BLOOD PRESSURE: 62 MMHG | OXYGEN SATURATION: 96 %

## 2024-11-01 DIAGNOSIS — B99.9 INFECTION: ICD-10-CM

## 2024-11-01 DIAGNOSIS — L03.90 CELLULITIS, UNSPECIFIED CELLULITIS SITE: Primary | ICD-10-CM

## 2024-11-01 LAB
ALBUMIN SERPL BCP-MCNC: 3.2 G/DL (ref 3.5–5.2)
ALP SERPL-CCNC: 77 U/L (ref 40–150)
ALT SERPL W/O P-5'-P-CCNC: 17 U/L (ref 10–44)
ANION GAP SERPL CALC-SCNC: 8 MMOL/L (ref 8–16)
APTT PPP: 30.2 SEC (ref 21–32)
AST SERPL-CCNC: 19 U/L (ref 10–40)
BASOPHILS # BLD AUTO: 0.03 K/UL (ref 0–0.2)
BASOPHILS NFR BLD: 0.4 % (ref 0–1.9)
BILIRUB SERPL-MCNC: 0.3 MG/DL (ref 0.1–1)
BILIRUB UR QL STRIP: NEGATIVE
BUN SERPL-MCNC: 11 MG/DL (ref 8–23)
CALCIUM SERPL-MCNC: 8.9 MG/DL (ref 8.7–10.5)
CHLORIDE SERPL-SCNC: 109 MMOL/L (ref 95–110)
CLARITY UR: CLEAR
CO2 SERPL-SCNC: 25 MMOL/L (ref 23–29)
COLOR UR: YELLOW
CREAT SERPL-MCNC: 0.7 MG/DL (ref 0.5–1.4)
DIFFERENTIAL METHOD BLD: ABNORMAL
EOSINOPHIL # BLD AUTO: 0.5 K/UL (ref 0–0.5)
EOSINOPHIL NFR BLD: 5.6 % (ref 0–8)
ERYTHROCYTE [DISTWIDTH] IN BLOOD BY AUTOMATED COUNT: 11.9 % (ref 11.5–14.5)
EST. GFR  (NO RACE VARIABLE): >60 ML/MIN/1.73 M^2
GLUCOSE SERPL-MCNC: 96 MG/DL (ref 70–110)
GLUCOSE UR QL STRIP: NEGATIVE
HCT VFR BLD AUTO: 38.8 % (ref 37–48.5)
HGB BLD-MCNC: 13.3 G/DL (ref 12–16)
HGB UR QL STRIP: NEGATIVE
IMM GRANULOCYTES # BLD AUTO: 0.02 K/UL (ref 0–0.04)
IMM GRANULOCYTES NFR BLD AUTO: 0.2 % (ref 0–0.5)
INR PPP: 1 (ref 0.8–1.2)
KETONES UR QL STRIP: NEGATIVE
LACTATE SERPL-SCNC: 1.6 MMOL/L (ref 0.5–2.2)
LEUKOCYTE ESTERASE UR QL STRIP: NEGATIVE
LYMPHOCYTES # BLD AUTO: 1.6 K/UL (ref 1–4.8)
LYMPHOCYTES NFR BLD: 19.4 % (ref 18–48)
MCH RBC QN AUTO: 32 PG (ref 27–31)
MCHC RBC AUTO-ENTMCNC: 34.3 G/DL (ref 32–36)
MCV RBC AUTO: 94 FL (ref 82–98)
MONOCYTES # BLD AUTO: 0.7 K/UL (ref 0.3–1)
MONOCYTES NFR BLD: 8.3 % (ref 4–15)
NEUTROPHILS # BLD AUTO: 5.5 K/UL (ref 1.8–7.7)
NEUTROPHILS NFR BLD: 66.1 % (ref 38–73)
NITRITE UR QL STRIP: NEGATIVE
NRBC BLD-RTO: 0 /100 WBC
PH UR STRIP: 7 [PH] (ref 5–8)
PLATELET # BLD AUTO: 319 K/UL (ref 150–450)
PMV BLD AUTO: 8.6 FL (ref 9.2–12.9)
POCT GLUCOSE: 90 MG/DL (ref 70–110)
POTASSIUM SERPL-SCNC: 3.9 MMOL/L (ref 3.5–5.1)
PROCALCITONIN SERPL IA-MCNC: 0.02 NG/ML
PROT SERPL-MCNC: 6.2 G/DL (ref 6–8.4)
PROT UR QL STRIP: NEGATIVE
PROTHROMBIN TIME: 10.5 SEC (ref 9–12.5)
RBC # BLD AUTO: 4.15 M/UL (ref 4–5.4)
SODIUM SERPL-SCNC: 142 MMOL/L (ref 136–145)
SP GR UR STRIP: 1.01 (ref 1–1.03)
URN SPEC COLLECT METH UR: NORMAL
UROBILINOGEN UR STRIP-ACNC: NEGATIVE EU/DL
WBC # BLD AUTO: 8.36 K/UL (ref 3.9–12.7)

## 2024-11-01 PROCEDURE — 93010 ELECTROCARDIOGRAM REPORT: CPT | Mod: ,,, | Performed by: INTERNAL MEDICINE

## 2024-11-01 PROCEDURE — 83605 ASSAY OF LACTIC ACID: CPT | Performed by: EMERGENCY MEDICINE

## 2024-11-01 PROCEDURE — 85025 COMPLETE CBC W/AUTO DIFF WBC: CPT | Performed by: EMERGENCY MEDICINE

## 2024-11-01 PROCEDURE — 99285 EMERGENCY DEPT VISIT HI MDM: CPT | Mod: 25

## 2024-11-01 PROCEDURE — 85610 PROTHROMBIN TIME: CPT | Performed by: EMERGENCY MEDICINE

## 2024-11-01 PROCEDURE — 25500020 PHARM REV CODE 255: Performed by: EMERGENCY MEDICINE

## 2024-11-01 PROCEDURE — 25000003 PHARM REV CODE 250: Performed by: EMERGENCY MEDICINE

## 2024-11-01 PROCEDURE — 81003 URINALYSIS AUTO W/O SCOPE: CPT | Performed by: EMERGENCY MEDICINE

## 2024-11-01 PROCEDURE — 85730 THROMBOPLASTIN TIME PARTIAL: CPT | Performed by: EMERGENCY MEDICINE

## 2024-11-01 PROCEDURE — 82962 GLUCOSE BLOOD TEST: CPT

## 2024-11-01 PROCEDURE — 87040 BLOOD CULTURE FOR BACTERIA: CPT | Performed by: EMERGENCY MEDICINE

## 2024-11-01 PROCEDURE — 93005 ELECTROCARDIOGRAM TRACING: CPT

## 2024-11-01 PROCEDURE — 84145 PROCALCITONIN (PCT): CPT | Performed by: EMERGENCY MEDICINE

## 2024-11-01 PROCEDURE — 80053 COMPREHEN METABOLIC PANEL: CPT | Performed by: EMERGENCY MEDICINE

## 2024-11-01 RX ORDER — SULFAMETHOXAZOLE AND TRIMETHOPRIM 800; 160 MG/1; MG/1
1 TABLET ORAL 2 TIMES DAILY
Qty: 28 TABLET | Refills: 0 | Status: SHIPPED | OUTPATIENT
Start: 2024-11-01 | End: 2024-11-15

## 2024-11-01 RX ORDER — SULFAMETHOXAZOLE AND TRIMETHOPRIM 800; 160 MG/1; MG/1
1 TABLET ORAL
Status: COMPLETED | OUTPATIENT
Start: 2024-11-01 | End: 2024-11-01

## 2024-11-01 RX ADMIN — IOHEXOL 75 ML: 350 INJECTION, SOLUTION INTRAVENOUS at 12:11

## 2024-11-01 RX ADMIN — SULFAMETHOXAZOLE AND TRIMETHOPRIM 1 TABLET: 800; 160 TABLET ORAL at 12:11

## 2024-11-01 NOTE — CONSULTS
Radiology Consult    Sugar Diaz is a 59 y.o. female with a history of endometrial CA s/p right internal jugular vein chest port placement on 10/18/24 presents to the ER with irritation and redness around the port site. Patient states that she was recovering well from the procedure without complaints until yesterday she noticed some redness around the port site and some mild irritation. The port has not been used yet, plan for first infusion on 11/12 as per patient. Patient denies any associated symptoms including fever, chills, or drainage from the incision.    Past Medical History:   Diagnosis Date    Chest pain 2002    NEGATIVE STRESS ECHO 2002    Endometrial cancer     2023    GERD (gastroesophageal reflux disease)     Lower back pain     Migraines     Mild allergic rhinitis     Postmenopausal bleeding      Past Surgical History:   Procedure Laterality Date    COLONOSCOPY N/A 10/06/2023    Procedure: COLONOSCOPY;  Surgeon: Marcia Weaver MD;  Location: [facility] ENDO;  Service: Endoscopy;  Laterality: N/A;  10/4- referred by Dr. Weaver/ Subject: Needs colonoscopy ASAP within 1 week /Procedure Timing: < 1 week/Diagnosis: Diarrhea, evaluate for immunotherapy induced colitis/ Prep instructions to portal. AS    COLONOSCOPY N/A 2/21/2024    Procedure: COLONOSCOPY;  Surgeon: Fercho Ramirez MD;  Location: [facility] ENDO (2ND FLR);  Service: Endoscopy;  Laterality: N/A;    CYST REMOVAL Left 04/28/2022    Procedure: EXCISION, CYST-BACK;  Surgeon: Thomas Hurst MD;  Location: [facility] OR;  Service: General;  Laterality: Left;  left upper back  RN PREOP ON 4/21/22-NF    ESOPHAGOGASTRODUODENOSCOPY N/A 2/21/2024    Procedure: EGD (ESOPHAGOGASTRODUODENOSCOPY);  Surgeon: Fercho Ramirez MD;  Location: [facility] ELIAN (2ND FLR);  Service: Endoscopy;  Laterality: N/A;    HYSTERECTOMY      after endometrial cancer diagnosis    HYSTEROSCOPY WITH DILATION AND CURETTAGE OF UTERUS N/A 05/23/2023    Procedure: HYSTEROSCOPY, WITH  DILATION AND CURETTAGE OF UTERUS;  Surgeon: Omaira Evans MD;  Location: [facility] OR;  Service: OB/GYN;  Laterality: N/A;  DESTIYN HERNANDEZ  [phone] TEXTED [facility] ON 5/2/2023 @ 3:51PM. [facility] RESPONDED ON 5/2/2023 @ 3:51PM-LO  RN PREOP 5/18/23  T & S; UPT ORDERED AND SCHEDULED 5/22/23    LYMPH NODE BIOPSY Left 06/05/2023    Procedure: BIOPSY, sentinel LYMPH NODE;  Surgeon: Lavell Rodríguez MD;  Location: [facility] OR;  Service: OB/GYN;  Laterality: Left;    LYMPHADENECTOMY, PELVIS Right 06/05/2023    Procedure: LYMPHADENECTOMY, PELVIS;  Surgeon: Lavell Rodríguez MD;  Location: [facility] OR;  Service: OB/GYN;  Laterality: Right;    MAPPING, LYMPH NODE, SENTINEL Bilateral 06/05/2023    Procedure: injection, LYMPH NODE, SENTINEL;  Surgeon: Lavell Rodríguez MD;  Location: [facility];  Service: OB/GYN;  Laterality: Bilateral;    NERVE REPAIR Right 06/05/2023    Procedure: REPAIR, OBTURATOR NERVE;  Surgeon: Lavlel Rodríguez MD;  Location: [facility];  Service: OB/GYN;  Laterality: Right;    TONSILLECTOMY      TONSILLECTOMY      TUBAL LIGATION      XI ROBOTIC HYSTERECTOMY, WITH SALPINGO-OOPHORECTOMY Bilateral 06/05/2023    Procedure: XI ROBOTIC HYSTERECTOMY,WITH SALPINGO-OOPHORECTOMY;  Surgeon: Lavell Rodríguez MD;  Location: [facility];  Service: OB/GYN;  Laterality: Bilateral;  removed through vagina       Discussed with primary team, Dr. Hung.    Imaging: CT chest w/ contrast: Right internal jugular vein chest port in good position without an underlying fluid collection or evidence of inflammation around the port. Mild skin thickening over the port site.    Scheduled Meds:   Continuous Infusions:   PRN Meds:    Allergies: Review of patient's allergies indicates:  No Known Allergies    Labs:  Recent Labs   Lab 11/01/24  1002   INR 1.0       Recent Labs   Lab 11/01/24  1002   WBC 8.36   HGB 13.3   HCT 38.8   MCV 94         Recent Labs   Lab 11/01/24  1002   GLU 96      K 3.9      CO2 25   BUN 11   CREATININE 0.7   CALCIUM  8.9   ALT 17   AST 19   ALBUMIN 3.2*   BILITOT 0.3     PE:  Chest: Well healed port incision with mild erythema along the lateral aspect of the port site. No fluctuance or fluid drainage from the incision.    Vitals (Most Recent):  Temp: 98.1 °F (36.7 °C) (11/01/24 0908)  Pulse: 87 (11/01/24 1132)  Resp: 18 (11/01/24 0908)  BP: 125/62 (11/01/24 1132)  SpO2: 96 % (11/01/24 1132)    Plan:   1. Given the absence of an underlying fluid collection, normal WBC and physical exam, this appears to represent a mild cellulitis at the port site. Recommend course of PO ABX as per ER staff and follow-up in IR clinic in one week. Patient instructed to return to the ER if her symptoms worsen or fail to improve or if she develops any drainage from the incision. If symptoms progress or fail to improve, will plan to remove the port and place one on the left side. Please reach out to IR with any questions or concerns.     Dexter Zafar MD  Interventional Radiology  Department of Radiology

## 2024-11-01 NOTE — ED PROVIDER NOTES
"Encounter Date: 11/1/2024       History     Chief Complaint   Patient presents with    Vascular Access Problem     Pt to ER with reports of " I think my prot is infected. Pt has right upper port placed two weeks ago for immunotherapy treatment. + redness/swelling/ pain.      59-year-old female past medical history of endometrial cancer, GERD, migraines, recent port placed in the right chest by Dr. Zafar presenting today secondary to erythema and some tenderness around her port site.  No fevers chills chest pain shortness of breath or any other complaints.  No urinary complaints.  No weakness numbness tingling.  Notice this yesterday.        Review of patient's allergies indicates:  No Known Allergies  Past Medical History:   Diagnosis Date    Chest pain 2002    NEGATIVE STRESS ECHO 2002    Endometrial cancer     2023    GERD (gastroesophageal reflux disease)     Lower back pain     Migraines     Mild allergic rhinitis     Postmenopausal bleeding      Past Surgical History:   Procedure Laterality Date    COLONOSCOPY N/A 10/06/2023    Procedure: COLONOSCOPY;  Surgeon: Marcia Weaver MD;  Location: [facility];  Service: Endoscopy;  Laterality: N/A;  10/4- referred by Dr. Weaver/ Subject: Needs colonoscopy ASAP within 1 week /Procedure Timing: < 1 week/Diagnosis: Diarrhea, evaluate for immunotherapy induced colitis/ Prep instructions to portal. AS    COLONOSCOPY N/A 2/21/2024    Procedure: COLONOSCOPY;  Surgeon: Fercho Ramirez MD;  Location: [facility] ELIAN (2ND FLR);  Service: Endoscopy;  Laterality: N/A;    CYST REMOVAL Left 04/28/2022    Procedure: EXCISION, CYST-BACK;  Surgeon: Thomas Hurst MD;  Location: [facility] OR;  Service: General;  Laterality: Left;  left upper back  RN PREOP ON 4/21/22-NF    ESOPHAGOGASTRODUODENOSCOPY N/A 2/21/2024    Procedure: EGD (ESOPHAGOGASTRODUODENOSCOPY);  Surgeon: Fercho Ramirez MD;  Location: [facility] ELIAN (2ND FLR);  Service: Endoscopy;  Laterality: N/A;    HYSTERECTOMY      after " endometrial cancer diagnosis    HYSTEROSCOPY WITH DILATION AND CURETTAGE OF UTERUS N/A 05/23/2023    Procedure: HYSTEROSCOPY, WITH DILATION AND CURETTAGE OF UTERUS;  Surgeon: Omaira Evans MD;  Location: [facility];  Service: OB/GYN;  Laterality: N/A;  DESTINY HERNANDEZ  [phone] TEXTED [facility] ON 5/2/2023 @ 3:51PM. [facility] RESPONDED ON 5/2/2023 @ 3:51PM-LO  RN PREOP 5/18/23  T & S; UPT ORDERED AND SCHEDULED 5/22/23    LYMPH NODE BIOPSY Left 06/05/2023    Procedure: BIOPSY, sentinel LYMPH NODE;  Surgeon: Lavell Rodríguez MD;  Location: [facility];  Service: OB/GYN;  Laterality: Left;    LYMPHADENECTOMY, PELVIS Right 06/05/2023    Procedure: LYMPHADENECTOMY, PELVIS;  Surgeon: Lavell Rodríguez MD;  Location: [facility];  Service: OB/GYN;  Laterality: Right;    MAPPING, LYMPH NODE, SENTINEL Bilateral 06/05/2023    Procedure: injection, LYMPH NODE, SENTINEL;  Surgeon: Lavell Rodríguez MD;  Location: [facility];  Service: OB/GYN;  Laterality: Bilateral;    NERVE REPAIR Right 06/05/2023    Procedure: REPAIR, OBTURATOR NERVE;  Surgeon: Lavell Rodríguez MD;  Location: [facility];  Service: OB/GYN;  Laterality: Right;    TONSILLECTOMY      TONSILLECTOMY      TUBAL LIGATION      XI ROBOTIC HYSTERECTOMY, WITH SALPINGO-OOPHORECTOMY Bilateral 06/05/2023    Procedure: XI ROBOTIC HYSTERECTOMY,WITH SALPINGO-OOPHORECTOMY;  Surgeon: Lavell Rodríguez MD;  Location: [facility];  Service: OB/GYN;  Laterality: Bilateral;  removed through vagina     Family History   Problem Relation Name Age of Onset    No Known Problems Mother      Heart disease Father Abhilash     Breast cancer Sister  55    No Known Problems Sister      Colon cancer Paternal Uncle      Esophageal cancer Neg Hx       Social History     Tobacco Use    Smoking status: Never    Smokeless tobacco: Never   Substance Use Topics    Alcohol use: Never    Drug use: Never     Review of Systems   Constitutional:  Negative for fever.   HENT:  Negative for sore throat.    Respiratory:  Negative for  shortness of breath.    Cardiovascular:  Negative for chest pain.   Gastrointestinal:  Negative for nausea.   Genitourinary:  Negative for dysuria.   Musculoskeletal:  Negative for back pain.   Skin:  Positive for rash.   Neurological:  Negative for weakness.   Hematological:  Does not bruise/bleed easily.       Physical Exam     Initial Vitals [11/01/24 0908]   BP Pulse Resp Temp SpO2   (!) 133/59 104 18 98.1 °F (36.7 °C) 99 %      MAP       --         Physical Exam    Nursing note and vitals reviewed.  Constitutional: She appears well-developed and well-nourished.   HENT:   Head: Normocephalic and atraumatic. Mouth/Throat: Oropharynx is clear and moist and mucous membranes are normal.   Eyes: Conjunctivae and EOM are normal. Pupils are equal, round, and reactive to light. Right conjunctiva is not injected. Left conjunctiva is not injected. No scleral icterus.   Neck: Neck supple.   Normal range of motion.   Full passive range of motion without pain.     Cardiovascular:  Normal rate, regular rhythm, S1 normal, S2 normal, normal heart sounds and normal pulses.     Exam reveals no gallop and no friction rub.       No murmur heard.  Pulses:       Radial pulses are 2+ on the right side and 2+ on the left side.   Pulmonary/Chest: Effort normal and breath sounds normal. No respiratory distress.   Abdominal: Abdomen is soft. She exhibits no distension. There is no abdominal tenderness.   Musculoskeletal:         General: No edema. Normal range of motion.      Cervical back: Full passive range of motion without pain, normal range of motion and neck supple.      Comments: Good active ROM of all extremities. No lower extremity edema or cyanosis.      Neurological: No cranial nerve deficit. Gait normal.   A&Ox4, normal speech.   Skin: Skin is warm. No ecchymosis and no rash noted.   Port in place in the right chest.  Erythema around it.  Please look at media.   Psychiatric: She has a normal mood and affect. Thought content  normal.         ED Course   Procedures  Labs Reviewed   COMPREHENSIVE METABOLIC PANEL - Abnormal       Result Value    Sodium 142      Potassium 3.9      Chloride 109      CO2 25      Glucose 96      BUN 11      Creatinine 0.7      Calcium 8.9      Total Protein 6.2      Albumin 3.2 (*)     Total Bilirubin 0.3      Alkaline Phosphatase 77      AST 19      ALT 17      eGFR >60      Anion Gap 8     CBC W/ AUTO DIFFERENTIAL - Abnormal    WBC 8.36      RBC 4.15      Hemoglobin 13.3      Hematocrit 38.8      MCV 94      MCH 32.0 (*)     MCHC 34.3      RDW 11.9      Platelets 319      MPV 8.6 (*)     Immature Granulocytes 0.2      Gran # (ANC) 5.5      Immature Grans (Abs) 0.02      Lymph # 1.6      Mono # 0.7      Eos # 0.5      Baso # 0.03      nRBC 0      Gran % 66.1      Lymph % 19.4      Mono % 8.3      Eosinophil % 5.6      Basophil % 0.4      Differential Method Automated     CULTURE, BLOOD   CULTURE, BLOOD   APTT    aPTT 30.2     PROTIME-INR    Prothrombin Time 10.5      INR 1.0     URINALYSIS, REFLEX TO URINE CULTURE    Specimen UA Urine, Clean Catch      Color, UA Yellow      Appearance, UA Clear      pH, UA 7.0      Specific Gravity, UA 1.010      Protein, UA Negative      Glucose, UA Negative      Ketones, UA Negative      Bilirubin (UA) Negative      Occult Blood UA Negative      Nitrite, UA Negative      Urobilinogen, UA Negative      Leukocytes, UA Negative      Narrative:     Specimen Source->Urine   LACTIC ACID, PLASMA    Lactate (Lactic Acid) 1.6     PROCALCITONIN    Procalcitonin 0.02     POCT GLUCOSE    POCT Glucose 90     POCT GLUCOSE MONITORING CONTINUOUS     EKG Readings: (Independently Interpreted)   Rhythm: Normal Sinus Rhythm. Heart Rate: 95. Ectopy: No Ectopy. Conduction: Normal. ST Segments: Normal ST Segments. T Waves: Normal. Clinical Impression: Normal Sinus Rhythm       Imaging Results              CT Chest With Contrast (Final result)  Result time 11/01/24 13:21:19      Final result by  Viet Lincoln MD (11/01/24 13:21:19)                   Impression:      1. No definite drainable fluid collection/abscess about the right chest port, as questioned.  2. Few nonspecific areas of patchy ground-glass type attenuation could reflect infectious or noninfectious inflammatory etiology.  Few small, sub 6 mm solid pulmonary nodules may additionally relate to the above.  3. Additional details, as provided in the body of the report.      Electronically signed by: Viet Lincoln  Date:    11/01/2024  Time:    13:21               Narrative:    EXAMINATION:  CT CHEST WITH CONTRAST    CLINICAL HISTORY:  Soft tissue mass, chest, US/xray nondiagnostic;port with possible infection;    TECHNIQUE:  Low dose axial images, sagittal and coronal reformations were obtained from the thoracic inlet to the lung bases following the IV administration of 75 mL of Omnipaque 350.    COMPARISON:  CT of the chest, abdomen, and pelvis performed 07/09/2024.    FINDINGS:  Support tubes and lines: Right internal jugular approach port catheter, reservoir in the anterior chest wall.  No drainable fluid collection or abscess is noted about the visualized course of the device.    Aorta: Nonaneurysmal.  Left-sided arch.  Mild-to-moderate atherosclerosis.  Short segment common origin of the brachiocephalic and left common arteries.    Heart: Normal size.    Coronary arteries: No calcifications.    Pericardium: Normal. No effusion, thickening, or calcification.    Central pulmonary arteries: Normal caliber.    Base of neck/thyroid: Unremarkable.    Lymph nodes: No supraclavicular, axillary, internal mammary, mediastinal, or hilar adenopathy.  Mildly prominent increased attenuation in the anterior mediastinal fat planes could reflect thymic hyperplasia/rebound.    Esophagus: Unremarkable.    Pleura: No effusion, thickening, or calcification.    Upper abdomen: Unremarkable.    Body wall: Unremarkable.    Airways: Central airways grossly  patent.    Lungs: Assessment compromised by respiratory motion.  Atelectasis scar.  Patchy areas of ground-glass type attenuation are noted in both lungs.  Calcified granuloma are seen.  Several noncalcified solid nodules are noted.  Reference 3 mm solid nodule left lower lobe (series 4, image 326).  2-3 mm solid nodule right middle lobe (series 4, image 255).    Bones: Unremarkable.                                       X-Ray Chest AP Portable (Final result)  Result time 11/01/24 10:40:55      Final result by Viet Lincoln MD (11/01/24 10:40:55)                   Impression:      No convincing evidence of acute cardiopulmonary disease.      Electronically signed by: Viet Lincoln  Date:    11/01/2024  Time:    10:40               Narrative:    EXAMINATION:  XR CHEST AP PORTABLE    CLINICAL HISTORY:  infection;    TECHNIQUE:  Single frontal view of the chest was performed.    COMPARISON:  Chest radiograph performed 06/26/2024, 16:47 hours.    FINDINGS:  Monitoring leads overlie the chest.  Right chest port tip projects within the SVC.    Cardiac contours appear to be within normal limits.  Lungs appear essentially clear.    No definite pneumothorax or large volume pleural effusion.    No acute findings in the visualized abdomen.    Osseous and soft tissue structures appear without definite acute change.                                       Medications   iohexoL (OMNIPAQUE 350) injection 75 mL (75 mLs Intravenous Given 11/1/24 1211)   sulfamethoxazole-trimethoprim 800-160mg per tablet 1 tablet (1 tablet Oral Given 11/1/24 1252)     Medical Decision Making  Differential diagnosis includes infected port, cellulitis, abscess, contact dermatitis    59-year-old female presenting secondary redness around the right port.  No obvious fluctuance.  Contacted Interventional Radiology who came by and saw patient.  Vitals reassuring.  Imaging and labs reassuring.  Outpatient follow-up already set up with Interventional  Radiology.  Starting her on Bactrim.  Will do 2 weeks.  Patient very comfortable with plan. I discussed with the patient/family the diagnosis, treatment plan, indications for return to the emergency department, and for expected follow-up. The patient/family verbalized an understanding. The patient/family is asked if there are any questions or concerns. We discuss the case, until all issues are addressed to the patient/family's satisfaction. Patient/family understands and is agreeable to the plan.   Yao Hung    DISCLAIMER: This note was prepared with [employer] voice recognition transcription software. Garbled syntax, mangled pronouns, and other bizarre constructions may be attributed to that software system.      Amount and/or Complexity of Data Reviewed  Labs: ordered.  Radiology: ordered.    Risk  Prescription drug management.                                      Clinical Impression:  Final diagnoses:  [B99.9] Infection  [L03.90] Cellulitis, unspecified cellulitis site (Primary)          ED Disposition Condition    Discharge Stable          ED Prescriptions       Medication Sig Dispense Start Date End Date Auth. Provider    sulfamethoxazole-trimethoprim 800-160mg (BACTRIM DS) 800-160 mg Tab Take 1 tablet by mouth 2 (two) times daily. for 14 days 28 tablet 11/1/2024 11/15/2024 Yao Hung MD          Follow-up Information       Follow up With Specialties Details Why Contact Dexter Quintero MD Internal Medicine Schedule an appointment as soon as possible for a visit in 2 days  [address] [facility]  [address]  [facility] [address]  [phone]               Yao Hung MD  11/01/24 0624

## 2024-11-01 NOTE — DISCHARGE INSTRUCTIONS
Please follow-up with Interventional Radiology like was discussed with you    Thank you for coming to our Emergency Department today. It is important to remember that some problems or medical conditions are difficult to diagnose and may not be found or addressed during your Emergency Department visit.  These conditions often start with non-specific symptoms and can only be diagnosed on follow up visits with your primary care physician or specialist when the symptoms continue or change. Please remember that all medical conditions can change, and we cannot predict how you will be feeling tomorrow or the next day. Return to the ER with any questions/concerns, new/concerning symptoms, worsening or failure to improve.       Be sure to follow up with your primary care doctor and review all labs/imaging/tests that were performed during your ER visit with them. It is very common for us to identify non-emergent incidental findings which must be followed up with your primary care physician.  Some labs/imaging/tests may be outside of the normal range, and require non-emergent follow-up and/or further investigation/treatment/procedures/testing to help diagnose/exclude/prevent complications or other potentially serious medical conditions. Some abnormalities may not have been discussed or addressed during your ER visit. Some lab results may not return during your ER visit but can be accessible by downloading the free [facility] eden or by visiting [email or web address] . It is important for you to review all labs/imaging/tests which are outside of the normal range with your physician.    An ER visit does not replace a primary care visit, and many screening tests or follow-up tests cannot be ordered by an ER doctor or performed by the ER. Some tests may even require pre-approval.    If you do not have a primary care doctor, you may contact the one listed on your discharge paperwork or you may also call the [facility]  Appointment Desk at [phone] , or [address] [place] [address] at  [phone] to schedule an appointment, or establish care with a primary care doctor or even a specialist and to obtain information about local resources. It is important to your health that you have a primary care doctor.    Please take all medications as directed. We have done our best to select a medication for you that will treat your condition however, all medications may potentially have side-effects and it is impossible to predict which medications may give you side-effects or what those side-effects (if any) those medications may give you.  If you feel that you are having a negative effect or side-effect of any medication you should stop taking those medications immediately and seek medical attention. If you feel that you are having a life-threatening reaction call 911.        Do not drive, swim, climb to height, take a bath, operate heavy machinery, drink alcohol or take potentially sedating medications, sign any legal documents or make any important decisions for 24 hours if you have received any pain medications, sedatives or mood altering drugs during your ER visit or within 24 hours of taking them if they have been prescribed to you.     You can find additional resources for Dentists, hearing aids, durable medical equipment, low cost pharmacies and other resources at [email or web address]

## 2024-11-01 NOTE — Clinical Note
"Sugar Katzmy" Emily was seen and treated in our emergency department on 11/1/2024.  She may return to work on 11/04/2024.       If you have any questions or concerns, please don't hesitate to call.      Yao Hung MD"

## 2024-11-02 LAB
OHS QRS DURATION: 90 MS
OHS QTC CALCULATION: 462 MS

## 2024-11-04 ENCOUNTER — TELEPHONE (OUTPATIENT)
Dept: INTERVENTIONAL RADIOLOGY/VASCULAR | Facility: CLINIC | Age: 59
End: 2024-11-04
Payer: COMMERCIAL

## 2024-11-05 LAB
BACTERIA BLD CULT: NORMAL
BACTERIA BLD CULT: NORMAL

## 2024-11-07 ENCOUNTER — OFFICE VISIT (OUTPATIENT)
Dept: INTERVENTIONAL RADIOLOGY/VASCULAR | Facility: CLINIC | Age: 59
End: 2024-11-07
Payer: COMMERCIAL

## 2024-11-07 VITALS
WEIGHT: 159.31 LBS | HEART RATE: 112 BPM | HEIGHT: 67 IN | BODY MASS INDEX: 25 KG/M2 | SYSTOLIC BLOOD PRESSURE: 121 MMHG | DIASTOLIC BLOOD PRESSURE: 66 MMHG

## 2024-11-07 DIAGNOSIS — L03.90 CELLULITIS, UNSPECIFIED CELLULITIS SITE: Primary | ICD-10-CM

## 2024-11-07 PROCEDURE — 3074F SYST BP LT 130 MM HG: CPT | Mod: CPTII,S$GLB,, | Performed by: PHYSICIAN ASSISTANT

## 2024-11-07 PROCEDURE — 3078F DIAST BP <80 MM HG: CPT | Mod: CPTII,S$GLB,, | Performed by: PHYSICIAN ASSISTANT

## 2024-11-07 PROCEDURE — 99213 OFFICE O/P EST LOW 20 MIN: CPT | Mod: S$GLB,,, | Performed by: PHYSICIAN ASSISTANT

## 2024-11-07 PROCEDURE — 99999 PR PBB SHADOW E&M-EST. PATIENT-LVL III: CPT | Mod: PBBFAC,,, | Performed by: PHYSICIAN ASSISTANT

## 2024-11-07 PROCEDURE — 3008F BODY MASS INDEX DOCD: CPT | Mod: CPTII,S$GLB,, | Performed by: PHYSICIAN ASSISTANT

## 2024-11-07 PROCEDURE — 1159F MED LIST DOCD IN RCRD: CPT | Mod: CPTII,S$GLB,, | Performed by: PHYSICIAN ASSISTANT

## 2024-11-07 NOTE — PROGRESS NOTES
Subjective     Patient ID: Sugar Diaz is a 59 y.o. female.    Chief Complaint: No chief complaint on file.    IR follow up after ED visit 11/1 for cellulitis surrounding port.  58 y/o F with PMH endometrial cancer, GERD, migraines, recent port placed in the right chest by Dr. Zafar on 10/18 presented to ED on 11/1 secondary to erythema and some tenderness around her port site.  No fevers chills chest pain shortness of breath or any other complaints.  Pt discharged on bactrim and on day 7/14 with noted improvement.  Surrounding erythema has resolved and swelling improved.  Pt does report rash on inner right elbow which started 11/2 improved with OTC hydrocortisone cream.  Pt continues to deny fever, chills, SOB, CP.      Review of Systems   Constitutional:  Negative for activity change, appetite change, chills, fatigue, fever and unexpected weight change.   Respiratory:  Negative for cough and shortness of breath.    Cardiovascular:  Negative for chest pain and leg swelling.   Gastrointestinal:  Negative for abdominal distention, abdominal pain, constipation, diarrhea, nausea and vomiting.   Genitourinary:  Negative for difficulty urinating and flank pain.   Musculoskeletal:  Negative for back pain and gait problem.   Allergic/Immunologic:        Rash   Neurological:  Negative for weakness and memory loss.   Psychiatric/Behavioral:  Negative for confusion and sleep disturbance.           Objective     Physical Exam  Constitutional:       General: She is not in acute distress.     Appearance: She is well-developed. She is not diaphoretic.   HENT:      Head: Normocephalic and atraumatic.   Pulmonary:      Effort: Pulmonary effort is normal. No respiratory distress.   Skin:     Comments: Port site C/D/I surrounding erythema and swelling prev seen 11/1 now resolved.  No increased warmth.  Inner right elbow and forearm with slight erythema.  No streaking from port site down arm   Neurological:      Mental Status:  She is alert and oriented to person, place, and time.   Psychiatric:         Behavior: Behavior normal.         Thought Content: Thought content normal.         Judgment: Judgment normal.                 ED visit 11/1/2024    Imaging reviewed     EXAMINATION:  CT CHEST WITH CONTRAST     CLINICAL HISTORY:  Soft tissue mass, chest, US/xray nondiagnostic;port with possible infection;     TECHNIQUE:  Low dose axial images, sagittal and coronal reformations were obtained from the thoracic inlet to the lung bases following the IV administration of 75 mL of Omnipaque 350.     COMPARISON:  CT of the chest, abdomen, and pelvis performed 07/09/2024.     FINDINGS:  Support tubes and lines: Right internal jugular approach port catheter, reservoir in the anterior chest wall.  No drainable fluid collection or abscess is noted about the visualized course of the device.     Aorta: Nonaneurysmal.  Left-sided arch.  Mild-to-moderate atherosclerosis.  Short segment common origin of the brachiocephalic and left common arteries.     Heart: Normal size.     Coronary arteries: No calcifications.     Pericardium: Normal. No effusion, thickening, or calcification.     Central pulmonary arteries: Normal caliber.     Base of neck/thyroid: Unremarkable.     Lymph nodes: No supraclavicular, axillary, internal mammary, mediastinal, or hilar adenopathy.  Mildly prominent increased attenuation in the anterior mediastinal fat planes could reflect thymic hyperplasia/rebound.     Esophagus: Unremarkable.     Pleura: No effusion, thickening, or calcification.     Upper abdomen: Unremarkable.     Body wall: Unremarkable.     Airways: Central airways grossly patent.     Lungs: Assessment compromised by respiratory motion.  Atelectasis scar.  Patchy areas of ground-glass type attenuation are noted in both lungs.  Calcified granuloma are seen.  Several noncalcified solid nodules are noted.  Reference 3 mm solid nodule left lower lobe (series 4, image  326).  2-3 mm solid nodule right middle lobe (series 4, image 255).     Bones: Unremarkable.     Impression:     1. No definite drainable fluid collection/abscess about the right chest port, as questioned.  2. Few nonspecific areas of patchy ground-glass type attenuation could reflect infectious or noninfectious inflammatory etiology.  Few small, sub 6 mm solid pulmonary nodules may additionally relate to the above.  3. Additional details, as provided in the body of the report.        Electronically signed by:Viet Lincoln  Date:                                            11/01/2024  Time:                                           13:21       Assessment and Plan     1. Cellulitis, unspecified cellulitis site    60 y/o F s/p port placement 10/18 with cellulitis 11/1 on bactrim day 7/14 with noted improvement.  Will confirm with Dr. Zafar port can be used on 11/12/24.  Suspect rash surrounding inner elbow and R forearm is contact dermatitis, pt reports improving with OTC cream.  Discussed if rash on R arm worsens contact PCP or go to UC.  This appears to be separate issue from cellulitis surrounding port.  Pt does not have evidence of rash anywhere else on her body.  -Continue full course of bactrim         No follow-ups on file.

## 2024-11-11 ENCOUNTER — TELEPHONE (OUTPATIENT)
Dept: INTERVENTIONAL RADIOLOGY/VASCULAR | Facility: CLINIC | Age: 59
End: 2024-11-11
Payer: COMMERCIAL

## 2024-11-11 ENCOUNTER — PATIENT MESSAGE (OUTPATIENT)
Dept: INTERNAL MEDICINE | Facility: CLINIC | Age: 59
End: 2024-11-11
Payer: COMMERCIAL

## 2024-11-11 DIAGNOSIS — L03.90 CELLULITIS, UNSPECIFIED CELLULITIS SITE: Primary | ICD-10-CM

## 2024-11-11 RX ORDER — CEPHALEXIN 500 MG/1
500 TABLET, FILM COATED ORAL 3 TIMES DAILY
Qty: 15 TABLET | Refills: 0 | Status: SHIPPED | OUTPATIENT
Start: 2024-11-11 | End: 2024-11-16

## 2024-11-11 RX ORDER — CETIRIZINE HYDROCHLORIDE 5 MG/1
5 TABLET ORAL DAILY
Qty: 30 TABLET | Refills: 0 | Status: SHIPPED | OUTPATIENT
Start: 2024-11-11 | End: 2025-11-11

## 2024-11-11 NOTE — TELEPHONE ENCOUNTER
Discussed case with Dr. Zafar.  Agrees port site cellulitis improved. Called pt and rash from elbow has progressed and now all over her body.  Pruritic.  No fevers, sore throat, change in voice, trouble swallowing.  Pt took bactrim this morning.    Will change to keflex for duration of antibiotic course.  5 days prescribed.  Stop bactrim due to drug eruption.  Recommended oral antihistamine, zyrtec or loratidine.  Recommended contacting PCP for visit.  IR follow in 2 weeks to check port site.  Port may be used.    Yarely Encarnacion PA-C  Interventional Radiology

## 2024-11-12 ENCOUNTER — HOSPITAL ENCOUNTER (OUTPATIENT)
Dept: RADIOLOGY | Facility: HOSPITAL | Age: 59
Discharge: HOME | End: 2024-11-12
Attending: OBSTETRICS & GYNECOLOGY
Payer: COMMERCIAL

## 2024-11-12 DIAGNOSIS — G62.0 CHEMOTHERAPY-INDUCED NEUROPATHY: ICD-10-CM

## 2024-11-12 DIAGNOSIS — T45.1X5A CHEMOTHERAPY-INDUCED NEUROPATHY: ICD-10-CM

## 2024-11-12 DIAGNOSIS — Z51.11 ENCOUNTER FOR ANTINEOPLASTIC CHEMOTHERAPY: ICD-10-CM

## 2024-11-12 DIAGNOSIS — K52.1 CHEMOTHERAPY INDUCED DIARRHEA: ICD-10-CM

## 2024-11-12 DIAGNOSIS — C54.1 MALIGNANT NEOPLASM OF ENDOMETRIUM: ICD-10-CM

## 2024-11-12 DIAGNOSIS — T45.1X5A CHEMOTHERAPY INDUCED DIARRHEA: ICD-10-CM

## 2024-11-13 ENCOUNTER — INFUSION (OUTPATIENT)
Dept: INFUSION THERAPY | Facility: HOSPITAL | Age: 59
End: 2024-11-13
Attending: INTERNAL MEDICINE
Payer: COMMERCIAL

## 2024-11-13 VITALS
SYSTOLIC BLOOD PRESSURE: 130 MMHG | TEMPERATURE: 99 F | HEART RATE: 102 BPM | RESPIRATION RATE: 18 BRPM | DIASTOLIC BLOOD PRESSURE: 55 MMHG | OXYGEN SATURATION: 98 %

## 2024-11-13 DIAGNOSIS — K52.9 COLITIS: Primary | ICD-10-CM

## 2024-11-13 PROCEDURE — 63600175 PHARM REV CODE 636 W HCPCS: Performed by: INTERNAL MEDICINE

## 2024-11-13 PROCEDURE — 25000003 PHARM REV CODE 250: Performed by: INTERNAL MEDICINE

## 2024-11-13 PROCEDURE — 96365 THER/PROPH/DIAG IV INF INIT: CPT

## 2024-11-13 RX ORDER — SODIUM CHLORIDE 0.9 % (FLUSH) 0.9 %
10 SYRINGE (ML) INJECTION
OUTPATIENT
Start: 2025-01-08

## 2024-11-13 RX ORDER — HEPARIN 100 UNIT/ML
500 SYRINGE INTRAVENOUS
OUTPATIENT
Start: 2025-01-08

## 2024-11-13 RX ORDER — IPRATROPIUM BROMIDE AND ALBUTEROL SULFATE 2.5; .5 MG/3ML; MG/3ML
3 SOLUTION RESPIRATORY (INHALATION)
OUTPATIENT
Start: 2025-01-08

## 2024-11-13 RX ORDER — EPINEPHRINE 1 MG/ML
0.3 INJECTION, SOLUTION, CONCENTRATE INTRAVENOUS
OUTPATIENT
Start: 2025-01-08

## 2024-11-13 RX ORDER — HEPARIN 100 UNIT/ML
500 SYRINGE INTRAVENOUS
Status: DISCONTINUED | OUTPATIENT
Start: 2024-11-13 | End: 2024-11-13 | Stop reason: HOSPADM

## 2024-11-13 RX ORDER — DIPHENHYDRAMINE HYDROCHLORIDE 50 MG/ML
25 INJECTION INTRAMUSCULAR; INTRAVENOUS
OUTPATIENT
Start: 2025-01-08

## 2024-11-13 RX ORDER — METHYLPREDNISOLONE SOD SUCC 125 MG
40 VIAL (EA) INJECTION
OUTPATIENT
Start: 2025-01-08

## 2024-11-13 RX ORDER — ACETAMINOPHEN 325 MG/1
650 TABLET ORAL
OUTPATIENT
Start: 2025-01-08

## 2024-11-13 RX ADMIN — VEDOLIZUMAB 300 MG: 300 INJECTION, POWDER, LYOPHILIZED, FOR SOLUTION INTRAVENOUS at 12:11

## 2024-11-13 RX ADMIN — HEPARIN 500 UNITS: 100 SYRINGE at 01:11

## 2024-11-13 NOTE — PLAN OF CARE
Patient presented to unit for Entyvio infusion. VSS. No complaints voiced. Entyvio infused over 30 minutes as ordered. Tolerated well. Next appointment confirmed. Patient ambulatory and in NAD at time of discharge.      Problem: Bowel Disease, Inflammatory (Ulcerative Colitis or Crohn's Disease)  Goal: Optimal Adaptation to Chronic Illness  Outcome: Progressing  Goal: Diarrhea Symptom Relief  Outcome: Progressing  Goal: Absence of Infection Signs and Symptoms  Outcome: Progressing  Goal: Optimal Nutrition Delivery  Outcome: Progressing  Goal: Optimal Pain Control and Function  Outcome: Progressing

## 2024-11-15 ENCOUNTER — HOSPITAL ENCOUNTER (OUTPATIENT)
Dept: RADIOLOGY | Facility: HOSPITAL | Age: 59
Discharge: HOME | End: 2024-11-15
Attending: OBSTETRICS & GYNECOLOGY
Payer: COMMERCIAL

## 2024-11-15 ENCOUNTER — INFUSION (OUTPATIENT)
Dept: INFUSION THERAPY | Facility: HOSPITAL | Age: 59
End: 2024-11-15
Attending: INTERNAL MEDICINE
Payer: COMMERCIAL

## 2024-11-15 DIAGNOSIS — C54.1 ENDOMETRIAL CANCER: Primary | ICD-10-CM

## 2024-11-15 PROCEDURE — 71260 CT THORAX DX C+: CPT | Mod: TC

## 2024-11-15 PROCEDURE — 71260 CT THORAX DX C+: CPT | Mod: 26,,, | Performed by: RADIOLOGY

## 2024-11-15 PROCEDURE — 96523 IRRIG DRUG DELIVERY DEVICE: CPT

## 2024-11-15 PROCEDURE — 74177 CT ABD & PELVIS W/CONTRAST: CPT | Mod: 26,,, | Performed by: RADIOLOGY

## 2024-11-15 PROCEDURE — 63600175 PHARM REV CODE 636 W HCPCS: Performed by: OBSTETRICS & GYNECOLOGY

## 2024-11-15 PROCEDURE — 25500020 PHARM REV CODE 255: Performed by: OBSTETRICS & GYNECOLOGY

## 2024-11-15 RX ORDER — HEPARIN 100 UNIT/ML
500 SYRINGE INTRAVENOUS
OUTPATIENT
Start: 2024-11-15

## 2024-11-15 RX ORDER — SODIUM CHLORIDE 0.9 % (FLUSH) 0.9 %
10 SYRINGE (ML) INJECTION
Status: DISCONTINUED | OUTPATIENT
Start: 2024-11-15 | End: 2024-11-15 | Stop reason: HOSPADM

## 2024-11-15 RX ORDER — SODIUM CHLORIDE 0.9 % (FLUSH) 0.9 %
10 SYRINGE (ML) INJECTION
OUTPATIENT
Start: 2024-11-15

## 2024-11-15 RX ORDER — HEPARIN 100 UNIT/ML
500 SYRINGE INTRAVENOUS
Status: DISCONTINUED | OUTPATIENT
Start: 2024-11-15 | End: 2024-11-15 | Stop reason: HOSPADM

## 2024-11-15 RX ADMIN — HEPARIN 500 UNITS: 100 SYRINGE at 09:11

## 2024-11-15 RX ADMIN — IOHEXOL 75 ML: 350 INJECTION, SOLUTION INTRAVENOUS at 09:11

## 2024-11-15 RX ADMIN — IOHEXOL 15 ML: 300 INJECTION, SOLUTION INTRAVENOUS at 09:11

## 2024-11-18 ENCOUNTER — OFFICE VISIT (OUTPATIENT)
Dept: INTERNAL MEDICINE | Facility: CLINIC | Age: 59
End: 2024-11-18
Payer: COMMERCIAL

## 2024-11-18 ENCOUNTER — LAB VISIT (OUTPATIENT)
Dept: LAB | Facility: HOSPITAL | Age: 59
End: 2024-11-18
Attending: NURSE PRACTITIONER
Payer: COMMERCIAL

## 2024-11-18 VITALS
DIASTOLIC BLOOD PRESSURE: 57 MMHG | HEART RATE: 93 BPM | OXYGEN SATURATION: 97 % | WEIGHT: 162 LBS | HEIGHT: 67 IN | SYSTOLIC BLOOD PRESSURE: 119 MMHG | BODY MASS INDEX: 25.43 KG/M2

## 2024-11-18 DIAGNOSIS — R09.82 POST-NASAL DRIP: Primary | ICD-10-CM

## 2024-11-18 DIAGNOSIS — G62.0 CHEMOTHERAPY-INDUCED NEUROPATHY: ICD-10-CM

## 2024-11-18 DIAGNOSIS — C54.1 MALIGNANT NEOPLASM OF ENDOMETRIUM: ICD-10-CM

## 2024-11-18 DIAGNOSIS — K52.1 CHEMOTHERAPY INDUCED DIARRHEA: ICD-10-CM

## 2024-11-18 DIAGNOSIS — Z88.1 ALLERGY TO TRIMETHOPRIM/SULFAMETHOXAZOLE: ICD-10-CM

## 2024-11-18 DIAGNOSIS — T45.1X5A CHEMOTHERAPY INDUCED DIARRHEA: ICD-10-CM

## 2024-11-18 DIAGNOSIS — Z88.2 ALLERGY TO TRIMETHOPRIM/SULFAMETHOXAZOLE: ICD-10-CM

## 2024-11-18 DIAGNOSIS — Z51.11 ENCOUNTER FOR ANTINEOPLASTIC CHEMOTHERAPY: ICD-10-CM

## 2024-11-18 DIAGNOSIS — T45.1X5A CHEMOTHERAPY-INDUCED NEUROPATHY: ICD-10-CM

## 2024-11-18 DIAGNOSIS — E03.2 HYPOTHYROIDISM DUE TO MEDICATION: ICD-10-CM

## 2024-11-18 LAB
ALBUMIN SERPL BCP-MCNC: 3.2 G/DL (ref 3.5–5.2)
ALP SERPL-CCNC: 112 U/L (ref 40–150)
ALT SERPL W/O P-5'-P-CCNC: 32 U/L (ref 10–44)
ANION GAP SERPL CALC-SCNC: 10 MMOL/L (ref 8–16)
AST SERPL-CCNC: 23 U/L (ref 10–40)
BILIRUB SERPL-MCNC: 0.3 MG/DL (ref 0.1–1)
BUN SERPL-MCNC: 10 MG/DL (ref 8–23)
CALCIUM SERPL-MCNC: 8.9 MG/DL (ref 8.7–10.5)
CHLORIDE SERPL-SCNC: 106 MMOL/L (ref 95–110)
CO2 SERPL-SCNC: 22 MMOL/L (ref 23–29)
CORTIS SERPL-MCNC: 10.8 UG/DL (ref 4.3–22.4)
CREAT SERPL-MCNC: 0.8 MG/DL (ref 0.5–1.4)
ERYTHROCYTE [DISTWIDTH] IN BLOOD BY AUTOMATED COUNT: 11.9 % (ref 11.5–14.5)
EST. GFR  (NO RACE VARIABLE): >60 ML/MIN/1.73 M^2
GLUCOSE SERPL-MCNC: 135 MG/DL (ref 70–110)
HCT VFR BLD AUTO: 38.5 % (ref 37–48.5)
HGB BLD-MCNC: 12.8 G/DL (ref 12–16)
IMM GRANULOCYTES # BLD AUTO: 0.02 K/UL (ref 0–0.04)
MCH RBC QN AUTO: 31 PG (ref 27–31)
MCHC RBC AUTO-ENTMCNC: 33.2 G/DL (ref 32–36)
MCV RBC AUTO: 93 FL (ref 82–98)
NEUTROPHILS # BLD AUTO: 8 K/UL (ref 1.8–7.7)
PLATELET # BLD AUTO: 372 K/UL (ref 150–450)
PMV BLD AUTO: 8.4 FL (ref 9.2–12.9)
POTASSIUM SERPL-SCNC: 3.8 MMOL/L (ref 3.5–5.1)
PROT SERPL-MCNC: 6.4 G/DL (ref 6–8.4)
RBC # BLD AUTO: 4.13 M/UL (ref 4–5.4)
SODIUM SERPL-SCNC: 138 MMOL/L (ref 136–145)
T4 FREE SERPL-MCNC: 0.83 NG/DL (ref 0.71–1.51)
TSH SERPL DL<=0.005 MIU/L-ACNC: 1.96 UIU/ML (ref 0.4–4)
WBC # BLD AUTO: 12.05 K/UL (ref 3.9–12.7)

## 2024-11-18 PROCEDURE — 99999 PR PBB SHADOW E&M-EST. PATIENT-LVL III: CPT | Mod: PBBFAC,,, | Performed by: STUDENT IN AN ORGANIZED HEALTH CARE EDUCATION/TRAINING PROGRAM

## 2024-11-18 PROCEDURE — 36415 COLL VENOUS BLD VENIPUNCTURE: CPT | Performed by: OBSTETRICS & GYNECOLOGY

## 2024-11-18 PROCEDURE — 3078F DIAST BP <80 MM HG: CPT | Mod: CPTII,S$GLB,, | Performed by: STUDENT IN AN ORGANIZED HEALTH CARE EDUCATION/TRAINING PROGRAM

## 2024-11-18 PROCEDURE — 3074F SYST BP LT 130 MM HG: CPT | Mod: CPTII,S$GLB,, | Performed by: STUDENT IN AN ORGANIZED HEALTH CARE EDUCATION/TRAINING PROGRAM

## 2024-11-18 PROCEDURE — 99213 OFFICE O/P EST LOW 20 MIN: CPT | Mod: S$GLB,,, | Performed by: STUDENT IN AN ORGANIZED HEALTH CARE EDUCATION/TRAINING PROGRAM

## 2024-11-18 PROCEDURE — 82533 TOTAL CORTISOL: CPT | Performed by: OBSTETRICS & GYNECOLOGY

## 2024-11-18 PROCEDURE — 85027 COMPLETE CBC AUTOMATED: CPT | Performed by: OBSTETRICS & GYNECOLOGY

## 2024-11-18 PROCEDURE — 80053 COMPREHEN METABOLIC PANEL: CPT | Performed by: OBSTETRICS & GYNECOLOGY

## 2024-11-18 PROCEDURE — 3008F BODY MASS INDEX DOCD: CPT | Mod: CPTII,S$GLB,, | Performed by: STUDENT IN AN ORGANIZED HEALTH CARE EDUCATION/TRAINING PROGRAM

## 2024-11-18 PROCEDURE — 82024 ASSAY OF ACTH: CPT | Performed by: OBSTETRICS & GYNECOLOGY

## 2024-11-18 PROCEDURE — 84443 ASSAY THYROID STIM HORMONE: CPT | Performed by: OBSTETRICS & GYNECOLOGY

## 2024-11-18 PROCEDURE — 84439 ASSAY OF FREE THYROXINE: CPT | Performed by: OBSTETRICS & GYNECOLOGY

## 2024-11-18 RX ORDER — FLUTICASONE PROPIONATE 50 MCG
1 SPRAY, SUSPENSION (ML) NASAL DAILY
Qty: 16 G | Refills: 1 | Status: SHIPPED | OUTPATIENT
Start: 2024-11-18 | End: 2024-12-18

## 2024-11-18 RX ORDER — HEPARIN 100 UNIT/ML
500 SYRINGE INTRAVENOUS
OUTPATIENT
Start: 2024-11-18

## 2024-11-18 RX ORDER — EPINEPHRINE 0.3 MG/.3ML
0.3 INJECTION SUBCUTANEOUS ONCE AS NEEDED
OUTPATIENT
Start: 2024-11-18

## 2024-11-18 RX ORDER — DIPHENHYDRAMINE HYDROCHLORIDE 50 MG/ML
50 INJECTION INTRAMUSCULAR; INTRAVENOUS ONCE AS NEEDED
OUTPATIENT
Start: 2024-11-18

## 2024-11-18 RX ORDER — PROCHLORPERAZINE EDISYLATE 5 MG/ML
5-10 INJECTION INTRAMUSCULAR; INTRAVENOUS ONCE AS NEEDED
Start: 2024-11-18

## 2024-11-18 RX ORDER — SODIUM CHLORIDE 0.9 % (FLUSH) 0.9 %
10 SYRINGE (ML) INJECTION
OUTPATIENT
Start: 2024-11-18

## 2024-11-19 ENCOUNTER — PATIENT MESSAGE (OUTPATIENT)
Dept: GYNECOLOGIC ONCOLOGY | Facility: CLINIC | Age: 59
End: 2024-11-19
Payer: COMMERCIAL

## 2024-11-19 LAB — ACTH PLAS-MCNC: 7 PG/ML (ref 0–46)

## 2024-11-19 NOTE — PROGRESS NOTES
[facility]  Subjective:     Patient ID: Sugar Diaz is a 59 y.o. female.  History of Present Illness    CHIEF COMPLAINT:  Patient presents with a severe episodic cough that started about 5 days ago, causing choking, tearing eyes, and exhaustion.    HPI:  Patient reports a cough that began approximately 5 days ago, starting as mild and progressing to severe episodes. The cough is characterized by intense attacks lasting 2-3 minutes, leading to difficulty catching breath and a temporary but severe headache immediately following each episode. Patient feels the cough in her chest and stomach. The coughing episodes cause choking, tearing of the eyes, and complete exhaustion.    Patient has a runny nose but is unable to produce nasal discharge when attempting to blow her nose. She has shortness of breath after coughing. The cough is non-productive. Patient denies fever or chest pain, except during coughing episodes.    Patient has tried NyQuil, DayQuil, and prescription Tessalon pearls for treatment, but reports no relief. She has taken NyQuil 3 times during the night but still is unable to sleep due to the cough.    Patient has a history of immunotherapy-induced ulcerative colitis, for which she receives Entyvio treatments every 8 weeks. She recently started taking Zyrtec for a rash caused by Bactrim, which was prescribed for a skin infection. The rash has mostly resolved (90% improvement) after switching from Bactrim to Keflex.    Patient had a CT on November 15th, the same day the cough attacks started.    Recently took bactrim for skin infection near port. She then got a rash so this was switched to Keflex. Infection is resolved today.         Current Outpatient Medications   Medication Instructions    acetaminophen (TYLENOL) 650 mg, Oral, Every 6 hours PRN    azelastine (ASTELIN) 137 mcg, Nasal, 2 times daily    b complex vitamins tablet 1 tablet, Daily    celecoxib (CELEBREX) 100 mg,  "Oral, Daily    cetirizine (ZYRTEC) 5 mg, Oral, Daily    diclofenac sodium (VOLTAREN) 2 g, Topical (Top), 2 times daily    diphenoxylate-atropine 2.5-0.025 mg (LOMOTIL) 2.5-0.025 mg per tablet 1 tablet, Daily PRN    DULoxetine (CYMBALTA) 60 MG capsule TAKE 1 CAPSULE(60 MG) BY MOUTH DAILY    ENTYVIO 300 mg    fluticasone propionate (FLONASE) 50 mcg, Each Nostril, Daily    ibuprofen (ADVIL,MOTRIN) 600 mg, Oral, Every 6 hours PRN    levothyroxine (SYNTHROID) 100 mcg, Oral, Before breakfast    multivitamin (ONE DAILY MULTIVITAMIN) per tablet 1 tablet, Daily    valACYclovir (VALTREX) 1000 MG tablet Two pills at onset of fever blister and then repeat 2 pills 12 hr later     Objective:      Body mass index is 25.37 kg/m².  Vitals:    11/18/24 1033   BP: (!) 119/57   Pulse: 93   SpO2: 97%   Weight: 73.5 kg (162 lb)   Height: 5' 7" (1.702 m)   PainSc:   4   PainLoc: Head     Physical Exam   Physical Exam    General: No acute distress. Normal appearance.  Head: Normocephalic.  Eyes: Extraocular movements intact.  Cardiovascular: Normal rate and rhythm. No murmur heard.  Lungs: Pulmonary effort is normal. Normal breath sounds. No wheezing. No rales.  Abdomen: Flat.  Musculoskeletal: No edema lower extremities.  Skin: Warm. Dry.  Neurological: Alert.  Psychiatric: Normal mood. Normal behavior.        Assessment:       1. Post-nasal drip    2. Allergy to trimethoprim/sulfamethoxazole        Plan:   Assessment & Plan    PLAN SUMMARY:  Likely acute bronchitis though due to immunotherapy more serious conditions like hypersensitivity should be considered.  Repeat imaging if this condition worsens. No w/u needed if it improves. Discussed with patient.     \      Post-nasal drip  -     fluticasone propionate (FLONASE) 50 mcg/actuation nasal spray; 1 spray (50 mcg total) by Each Nostril route once daily.  Dispense: 16 g; Refill: 1    Allergy to trimethoprim/sulfamethoxazole  -     Ambulatory referral/consult to Allergy; Future; Expected " date: 11/25/2024        Tests to Keep You Healthy    Mammogram: ORDERED BUT NOT SCHEDULED  Colon Cancer Screening: Met on 2/21/2024    No follow-ups on file. or sooner prn (as needed)          Dexter Wuclifton Khan  [facility]  [address]  [address]  [place] [address]  Phone [phone]  Fax [phone]    Part of this note is dictated using the [employer] Direct word recognition program. It may contain word recognition mistakes or wrong word substitutions (commonly he/she and is/was substitutions) that were missed on review.    Part of this note was generated with the assistance of ambient listening technology. Verbal consent was obtained by the patient and accompanying visitor(s) for the recording of patient appointment to facilitate this note. I attest to having reviewed and edited the generated note for accuracy, though some syntax or spelling errors may persist. Please contact the author of this note for any clarification.    Answers submitted by the patient for this visit:  Cough Questionnaire (Submitted on 11/18/2024)  Chief Complaint: Cough  Chronicity: recurrent  Onset: in the past 7 days  Progression since onset: gradually worsening  Frequency: hourly  Cough characteristics: productive of sputum  chest pain: No  chills: No  ear congestion: Yes  ear pain: No  fever: No  headaches: Yes  heartburn: No  hemoptysis: No  myalgias: No  nasal congestion: No  postnasal drip: Yes  rash: No  rhinorrhea: Yes  shortness of breath: No  sore throat: Yes  sweats: No  weight loss: No  wheezing: No  Aggravated by: lying down  asthma: No  bronchitis: Yes  COPD: No  emphysema: No  environmental allergies: No  pneumonia: Yes  Treatments tried: OTC cough suppressant  Improvement on treatment: no relief

## 2024-11-20 ENCOUNTER — E-CONSULT (OUTPATIENT)
Dept: HEMATOLOGY/ONCOLOGY | Facility: CLINIC | Age: 59
End: 2024-11-20
Payer: COMMERCIAL

## 2024-11-20 ENCOUNTER — OFFICE VISIT (OUTPATIENT)
Dept: GYNECOLOGIC ONCOLOGY | Facility: CLINIC | Age: 59
End: 2024-11-20
Payer: COMMERCIAL

## 2024-11-20 ENCOUNTER — HOSPITAL ENCOUNTER (OUTPATIENT)
Dept: RADIOLOGY | Facility: OTHER | Age: 59
Discharge: HOME | End: 2024-11-20
Attending: OBSTETRICS & GYNECOLOGY
Payer: COMMERCIAL

## 2024-11-20 ENCOUNTER — PATIENT MESSAGE (OUTPATIENT)
Dept: INTERNAL MEDICINE | Facility: CLINIC | Age: 59
End: 2024-11-20
Payer: COMMERCIAL

## 2024-11-20 ENCOUNTER — PATIENT MESSAGE (OUTPATIENT)
Dept: GYNECOLOGIC ONCOLOGY | Facility: CLINIC | Age: 59
End: 2024-11-20
Payer: COMMERCIAL

## 2024-11-20 VITALS
HEART RATE: 99 BPM | TEMPERATURE: 98 F | BODY MASS INDEX: 24.83 KG/M2 | HEIGHT: 67 IN | WEIGHT: 158.19 LBS | DIASTOLIC BLOOD PRESSURE: 65 MMHG | SYSTOLIC BLOOD PRESSURE: 121 MMHG

## 2024-11-20 DIAGNOSIS — R91.8 LUNG INFILTRATE ON CT: ICD-10-CM

## 2024-11-20 DIAGNOSIS — R05.1 ACUTE COUGH: ICD-10-CM

## 2024-11-20 DIAGNOSIS — C54.1 MALIGNANT NEOPLASM OF ENDOMETRIUM: Primary | ICD-10-CM

## 2024-11-20 DIAGNOSIS — C54.1 MALIGNANT NEOPLASM OF ENDOMETRIUM: ICD-10-CM

## 2024-11-20 DIAGNOSIS — J18.9 COMMUNITY ACQUIRED PNEUMONIA OF RIGHT LOWER LOBE OF LUNG: ICD-10-CM

## 2024-11-20 PROCEDURE — 1159F MED LIST DOCD IN RCRD: CPT | Mod: CPTII,S$GLB,, | Performed by: OBSTETRICS & GYNECOLOGY

## 2024-11-20 PROCEDURE — 71260 CT THORAX DX C+: CPT | Mod: 26,,, | Performed by: RADIOLOGY

## 2024-11-20 PROCEDURE — 99999 PR PBB SHADOW E&M-EST. PATIENT-LVL IV: CPT | Mod: PBBFAC,,, | Performed by: OBSTETRICS & GYNECOLOGY

## 2024-11-20 PROCEDURE — 25500020 PHARM REV CODE 255: Performed by: OBSTETRICS & GYNECOLOGY

## 2024-11-20 PROCEDURE — 3074F SYST BP LT 130 MM HG: CPT | Mod: CPTII,S$GLB,, | Performed by: OBSTETRICS & GYNECOLOGY

## 2024-11-20 PROCEDURE — 3078F DIAST BP <80 MM HG: CPT | Mod: CPTII,S$GLB,, | Performed by: OBSTETRICS & GYNECOLOGY

## 2024-11-20 PROCEDURE — G2211 COMPLEX E/M VISIT ADD ON: HCPCS | Mod: S$GLB,,, | Performed by: OBSTETRICS & GYNECOLOGY

## 2024-11-20 PROCEDURE — 3008F BODY MASS INDEX DOCD: CPT | Mod: CPTII,S$GLB,, | Performed by: OBSTETRICS & GYNECOLOGY

## 2024-11-20 PROCEDURE — 71260 CT THORAX DX C+: CPT | Mod: TC

## 2024-11-20 PROCEDURE — 99215 OFFICE O/P EST HI 40 MIN: CPT | Mod: S$GLB,,, | Performed by: OBSTETRICS & GYNECOLOGY

## 2024-11-20 RX ORDER — DOXYCYCLINE 100 MG/1
100 CAPSULE ORAL EVERY 12 HOURS
Qty: 14 CAPSULE | Refills: 0 | Status: SHIPPED | OUTPATIENT
Start: 2024-11-20 | End: 2024-11-27

## 2024-11-20 RX ORDER — PROMETHAZINE HYDROCHLORIDE AND DEXTROMETHORPHAN HYDROBROMIDE 6.25; 15 MG/5ML; MG/5ML
5 SYRUP ORAL EVERY 4 HOURS PRN
Qty: 473 ML | Refills: 3 | Status: SHIPPED | OUTPATIENT
Start: 2024-11-20 | End: 2024-11-30

## 2024-11-20 RX ORDER — AMOXICILLIN AND CLAVULANATE POTASSIUM 875; 125 MG/1; MG/1
1 TABLET, FILM COATED ORAL EVERY 12 HOURS
Qty: 14 TABLET | Refills: 0 | Status: SHIPPED | OUTPATIENT
Start: 2024-11-20 | End: 2024-11-27

## 2024-11-20 RX ADMIN — IOHEXOL 75 ML: 350 INJECTION, SOLUTION INTRAVENOUS at 01:11

## 2024-11-20 NOTE — PROGRESS NOTES
REFERRING PROVIDER  Omaira Evans MD Shah, Shamita MD    REASON FOR CONSULT  Sugar Diaz  is a 59 y.o.  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    HISTORY OF PRESENT ILLNESS    Chemotherapy regimen: Dostarlimab 1000mg 6 weeks (3 years)  Cycle: 10  Previous dose limiting toxicities: Y  Cycle 3: Grade 4 diarrhea 2/2 Salmonella  Previous dose reductions: N  Previous breaks: Y  Cycle 4: 4 weeks 2/2 Grade 4 diarrhea 2/2 Salmonella  Previous changes in pre-medications: N    Energy level: baseline  Appetite: baseline  Fevers/chills/nights: no  Nausea: no  Diarrhea: yes, grade 2  Emesis: no  Neuropathy: yes, grade 3  Discoloration of lower extremities: no  Mucositis: no  Maculopapular rashes: no  Dyspnea or coughing: yes      Oncology History   Malignant neoplasm of endometrium   5/23/2023 Surgery    Hysteroscopy, D&C: grade 1 endometrioid EC     6/5/2023 Surgery    TRH/BSO/BSLN/RPLN/repair of obturator nerve    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 4.5 cm, grade 1, 96% MI (22/23 mm), +LUE, -LVSI, +MELF, -cervix, 1/2+ RPLN, 1/1+ LPLN, -ascites.     6/19/2023 Imaging Significant Findings    CT C/A/P w/: High L para-aortic at the level of the renal vessels, 1.5 cm.  Low R para-aortic at the LEAH, 4.1 cm in greatest dimension.     6/25/2023 Genomic Testing    [employer] (NGS): ARD1A, CHEK1, CTCF, , HDAC2, HNF1A, JAK1, KRAS, MAX, MSH6, P1K3R1, PPM1D, PTEN, ZFHX3, ZSR2     6/28/2023 Tumor Conference    No rule for debulking of para-aortic lymph nodes.  Adjuvant VBT.  Represent after completion of adjuvant therapy to discuss EBRT.        7/7/2023 - 10/19/2023 Chemotherapy    Carboplatin AUC 5/Paclitaxel 135 mg/m2/Dostarlimab 200mg q3 weeks x6    Previous dose limiting toxicities: Y  Cycle 4: Grade 2 colitis s/p steroid taper  Cycle 5: Grade 3 colitis  Previous dose reductions:  N  Previous breaks: Y  Cycle 4: Dostarlimab omitted due to grade 2 colitis  Cycle 5: Dostarlimab 2/2 grade 3 colitis (negative colonoscopy with improvement in symptoms with steroid taper)     8/9/2023 - 9/13/2023 Radiation Therapy    Treating physician: Dr. Adriane Babb  Total Dose: 21 Gy HDR  Fractions: 3 vaginal cuff brachytherapy     8/22/2023 Imaging Significant Findings    CT A/P w/: No evidence of colitis, MN in high L para-aortic and low R para-aortic.     10/4/2023 Procedure    Colonoscopy: WNL     11/6/2023 Imaging Significant Findings    CT C/A/P w/: PATTI     11/6/2023 - 11/9/2023 Hospital Admission    Most Recent Admission Details  Admit Date: 11/6/23  Admitted for: RVS PNA superimposed by community acquitted PNA  Discharge date: 11/9/23  Discharged from: [address] at [facility] ([facility])  Discharge disposition: Home or Self Care       11/22/2023 -  Maintenance Therapy    Dostarlimab 1000mg x     2/21/2024 Procedure    EGD, Colonoscopy: No gross evidence of colitis      Hospital Admission    Most Recent Admission Details  Admit Date: 2/15/24  Admitted for: Grade 4 colitis 2/2 Salmonella.    Discharge date: 2/23/24  Discharged from: [facility] at [facility]  Discharge disposition: Home or Self Care       2/21/2024 Procedure    Colonoscopy, EGD: No obvious ICI colitis     7/9/2024 Imaging Significant Findings    CT C/A/P w/: PATTI     11/14/2024 Imaging Significant Findings    CT C/A/P w/: PATTI          The following portions of the patient's history were reviewed and updated as appropriate: allergies, current medications, family history, medical history, social history and surgical history.    REVIEW OF SYSTEMS  All systems reviewed and negative except as noted in HPI.    OBJECTIVE   Vitals:    11/20/24 1016   BP: 121/65   Pulse: 99   Temp: 98.3 °F (36.8 °C)              Body mass index is 24.78 kg/m².      1. General: Well appearing, no apparent  distress, alert and oriented.  2. Lymph: Neck symmetric without cervical or supraclavicular adenopathy or mass.  3. Lungs: Normal respiratory rate, no accessory muscle use.  4. Psych: Normal affect.  5. Abdomen:  non-distended, soft, non-tender, are no masses, no ascites, no hepatosplenomegaly.  6. Skin: Warm, dry, no rashes or lesions.   7. Extremities: Bilateral lower extremities without edema or tenderness.  8. Genitourinary: Declined                        ECOG status: 1    LABORATORY DATA  Lab data reviewed.    RADIOLOGICAL DATA  Radiology data reviewed.    ASSESSMENT / PLAN     1. Malignant neoplasm of endometrium    2. Acute cough    3. Community acquired pneumonia of right lower lobe of lung      F/U GI    Persistent grade 1-2 diarrhea with Entyvio (ND 11/13).  Stool studies WNL (10/6/24).  We had an extensive discussion about proceeding vs discontinuing maintenance therapy. The patient would like to continue therapy which I think is reasonable.    The patient is experience respiratory symptoms x 5 days.  When she had her CT scan she was already symptomatic.  The clinical picture is not consistent with immunotherapy related pneumonitis but out of an abundance of caution, we will delay her treatment by 2 weeks and repeat a CT Chest w/.  We will admit for IV steroids if the clinical picture is consistent with immunotherapy related pneumonitis.  Otherwise, we will defer to her PCP for infectious vs non-infectious treatments.    Delay maintenance cycle #10 by 2 weeks.  VV prior.  CBC, CMP, TSH, VV, cycle #11 6 weeks after cycle #10.      Addendum: Personally reviewed CT Chest w/o.  Findings are not obvious for pneumonitis and seem more consistent with PNA.  Out of an abundance of caution, will electronically consult ICI team.  In the meantime, we will proceed with treatment for CAP including Augmentin 875 mg BID and Doxycline 100 mg BID.  I recommend F/U with PCP.  I will call the patient and speak to her about  high dose steroids if there is concern for a pneumonitis.     PATIENT EDUCATION  Ready to learn, no apparent learning barriers were identified; learning preferences include listening. Explained diagnosis and treatment plan; patient expressed understanding of the content.    ADMINISTRATIVE BILLING  Greater than 50% of was spent in counseling.     ONGOING COMPLEXITY BILLING  Visit today is associated with current or anticipated ongoing medical care related to this patients single serious condition/complex condition: endometrial cancer

## 2024-11-20 NOTE — NURSING
Right chest wall port accessed using sterile technique, pt tolerated well, blood return confirmed. After exam port flushed with 20ml NS and de-accesed. Bandaid to site.

## 2024-11-20 NOTE — CONSULTS
[facility] - Hem Onc 3rd Fl  Response for E-Consult     Patient Name: Sugar Diaz  MRN: [id number]  Primary Care Provider: Dexter Khan MD   Requesting Provider: Lavell Rodríguez MD  Consults    59 F with new onset cough, SOB on maintenance dostarlimab.  CT Chest 11/20 with findings concerning for PNA.      Recommendation: Reviewed imaging, and this appears to be more consistent with a pneumonia or aspiration than a drug-induced pneumonitis. Recommend treating with antibiotics for CAP. If not improving or worsening, could treat with a steroid taper starting at prednisone 1 mg/kg.    Total time of Consultation: 10 minute    I did not speak to the requesting provider verbally about this.     *This eConsult is based on the clinical data available to me and is furnished without benefit of a physical examination. The eConsult will need to be interpreted in light of any clinical issues or changes in patient status not available to me at the time of filing this eConsults. Significant changes in patient condition or level of acuity should result in immediate formal consultation and reevaluation. Please alert me if you have further questions.    Thank you for this eConsult referral.     Jose Bone MD  [facility] - Hem Onc 3rd Fl

## 2024-11-20 NOTE — Clinical Note
See my note at the bottom in bold.  Let me know if you have questions.  Probably easier to talk by phone if so.

## 2024-11-20 NOTE — LETTER
November 21, 2024      [facility] - Gyn Onc 2nd Fl  [address]  [facility] [address]  Phone: [phone]       Patient: Sugar Diaz   YOB: 1965  Date of Visit: 11/21/2024    To Whom It May Concern:    Jovan Diaz  was at [facility] on 11/20/2024. The patient may return to work/school on 11/25/2024 with no restrictions. If you have any questions or concerns, or if I can be of further assistance, please do not hesitate to contact me.      Sincerely,    Fabi CARROLL/Dr. JUANCHO Rodríguez

## 2024-11-21 ENCOUNTER — TELEPHONE (OUTPATIENT)
Dept: GYNECOLOGIC ONCOLOGY | Facility: CLINIC | Age: 59
End: 2024-11-21
Payer: COMMERCIAL

## 2024-11-25 ENCOUNTER — HOSPITAL ENCOUNTER (EMERGENCY)
Facility: HOSPITAL | Age: 59
Discharge: HOME | End: 2024-11-25
Attending: STUDENT IN AN ORGANIZED HEALTH CARE EDUCATION/TRAINING PROGRAM
Payer: COMMERCIAL

## 2024-11-25 ENCOUNTER — PATIENT OUTREACH (OUTPATIENT)
Dept: ADMINISTRATIVE | Facility: OTHER | Age: 59
End: 2024-11-25
Payer: COMMERCIAL

## 2024-11-25 VITALS
RESPIRATION RATE: 22 BRPM | OXYGEN SATURATION: 96 % | DIASTOLIC BLOOD PRESSURE: 72 MMHG | TEMPERATURE: 98 F | HEART RATE: 92 BPM | BODY MASS INDEX: 25.11 KG/M2 | WEIGHT: 160 LBS | HEIGHT: 67 IN | SYSTOLIC BLOOD PRESSURE: 138 MMHG

## 2024-11-25 DIAGNOSIS — J18.9 COMMUNITY ACQUIRED PNEUMONIA OF RIGHT LOWER LOBE OF LUNG: ICD-10-CM

## 2024-11-25 DIAGNOSIS — R06.02 SOB (SHORTNESS OF BREATH): Primary | ICD-10-CM

## 2024-11-25 DIAGNOSIS — J18.9 PNEUMONIA: ICD-10-CM

## 2024-11-25 DIAGNOSIS — K21.9 GASTROESOPHAGEAL REFLUX DISEASE, UNSPECIFIED WHETHER ESOPHAGITIS PRESENT: ICD-10-CM

## 2024-11-25 LAB
ALBUMIN SERPL BCP-MCNC: 3.5 G/DL (ref 3.5–5.2)
ALP SERPL-CCNC: 104 U/L (ref 40–150)
ALT SERPL W/O P-5'-P-CCNC: 25 U/L (ref 10–44)
ANION GAP SERPL CALC-SCNC: 10 MMOL/L (ref 8–16)
AST SERPL-CCNC: 32 U/L (ref 10–40)
BASOPHILS # BLD AUTO: 0.11 K/UL (ref 0–0.2)
BASOPHILS NFR BLD: 0.9 % (ref 0–1.9)
BILIRUB SERPL-MCNC: 0.2 MG/DL (ref 0.1–1)
BNP SERPL-MCNC: <10 PG/ML (ref 0–99)
BUN SERPL-MCNC: 13 MG/DL (ref 8–23)
CALCIUM SERPL-MCNC: 9.3 MG/DL (ref 8.7–10.5)
CHLORIDE SERPL-SCNC: 107 MMOL/L (ref 95–110)
CO2 SERPL-SCNC: 22 MMOL/L (ref 23–29)
CREAT SERPL-MCNC: 0.8 MG/DL (ref 0.5–1.4)
CTP QC/QA: YES
CTP QC/QA: YES
DIFFERENTIAL METHOD BLD: ABNORMAL
EOSINOPHIL # BLD AUTO: 1.7 K/UL (ref 0–0.5)
EOSINOPHIL NFR BLD: 13.2 % (ref 0–8)
ERYTHROCYTE [DISTWIDTH] IN BLOOD BY AUTOMATED COUNT: 12.6 % (ref 11.5–14.5)
EST. GFR  (NO RACE VARIABLE): >60 ML/MIN/1.73 M^2
GLUCOSE SERPL-MCNC: 95 MG/DL (ref 70–110)
HCT VFR BLD AUTO: 43.2 % (ref 37–48.5)
HGB BLD-MCNC: 14.5 G/DL (ref 12–16)
IMM GRANULOCYTES # BLD AUTO: 0.1 K/UL (ref 0–0.04)
IMM GRANULOCYTES NFR BLD AUTO: 0.8 % (ref 0–0.5)
LYMPHOCYTES # BLD AUTO: 2.7 K/UL (ref 1–4.8)
LYMPHOCYTES NFR BLD: 21 % (ref 18–48)
MAGNESIUM SERPL-MCNC: 1.7 MG/DL (ref 1.6–2.6)
MCH RBC QN AUTO: 31.3 PG (ref 27–31)
MCHC RBC AUTO-ENTMCNC: 33.6 G/DL (ref 32–36)
MCV RBC AUTO: 93 FL (ref 82–98)
MONOCYTES # BLD AUTO: 0.9 K/UL (ref 0.3–1)
MONOCYTES NFR BLD: 6.8 % (ref 4–15)
NEUTROPHILS # BLD AUTO: 7.3 K/UL (ref 1.8–7.7)
NEUTROPHILS NFR BLD: 57.3 % (ref 38–73)
NRBC BLD-RTO: 0 /100 WBC
PLATELET # BLD AUTO: 418 K/UL (ref 150–450)
PMV BLD AUTO: 8.6 FL (ref 9.2–12.9)
POC MOLECULAR INFLUENZA A AGN: NEGATIVE
POC MOLECULAR INFLUENZA B AGN: NEGATIVE
POTASSIUM SERPL-SCNC: 5.2 MMOL/L (ref 3.5–5.1)
PROT SERPL-MCNC: 7.4 G/DL (ref 6–8.4)
RBC # BLD AUTO: 4.64 M/UL (ref 4–5.4)
SARS-COV-2 RDRP RESP QL NAA+PROBE: NEGATIVE
SODIUM SERPL-SCNC: 139 MMOL/L (ref 136–145)
TROPONIN I SERPL DL<=0.01 NG/ML-MCNC: <0.006 NG/ML (ref 0–0.03)
WBC # BLD AUTO: 12.74 K/UL (ref 3.9–12.7)

## 2024-11-25 PROCEDURE — 99285 EMERGENCY DEPT VISIT HI MDM: CPT | Mod: 25

## 2024-11-25 PROCEDURE — 94640 AIRWAY INHALATION TREATMENT: CPT

## 2024-11-25 PROCEDURE — 63600175 PHARM REV CODE 636 W HCPCS: Performed by: STUDENT IN AN ORGANIZED HEALTH CARE EDUCATION/TRAINING PROGRAM

## 2024-11-25 PROCEDURE — 80053 COMPREHEN METABOLIC PANEL: CPT | Performed by: STUDENT IN AN ORGANIZED HEALTH CARE EDUCATION/TRAINING PROGRAM

## 2024-11-25 PROCEDURE — 87635 SARS-COV-2 COVID-19 AMP PRB: CPT | Performed by: STUDENT IN AN ORGANIZED HEALTH CARE EDUCATION/TRAINING PROGRAM

## 2024-11-25 PROCEDURE — 87502 INFLUENZA DNA AMP PROBE: CPT

## 2024-11-25 PROCEDURE — 84484 ASSAY OF TROPONIN QUANT: CPT | Performed by: STUDENT IN AN ORGANIZED HEALTH CARE EDUCATION/TRAINING PROGRAM

## 2024-11-25 PROCEDURE — 83880 ASSAY OF NATRIURETIC PEPTIDE: CPT | Performed by: STUDENT IN AN ORGANIZED HEALTH CARE EDUCATION/TRAINING PROGRAM

## 2024-11-25 PROCEDURE — 83735 ASSAY OF MAGNESIUM: CPT | Performed by: STUDENT IN AN ORGANIZED HEALTH CARE EDUCATION/TRAINING PROGRAM

## 2024-11-25 PROCEDURE — 25000003 PHARM REV CODE 250: Performed by: STUDENT IN AN ORGANIZED HEALTH CARE EDUCATION/TRAINING PROGRAM

## 2024-11-25 PROCEDURE — 25000242 PHARM REV CODE 250 ALT 637 W/ HCPCS: Performed by: STUDENT IN AN ORGANIZED HEALTH CARE EDUCATION/TRAINING PROGRAM

## 2024-11-25 PROCEDURE — 96374 THER/PROPH/DIAG INJ IV PUSH: CPT

## 2024-11-25 PROCEDURE — 96375 TX/PRO/DX INJ NEW DRUG ADDON: CPT

## 2024-11-25 PROCEDURE — 85025 COMPLETE CBC W/AUTO DIFF WBC: CPT | Performed by: STUDENT IN AN ORGANIZED HEALTH CARE EDUCATION/TRAINING PROGRAM

## 2024-11-25 RX ORDER — DEXTROMETHORPHAN HYDROBROMIDE, GUAIFENESIN 5; 100 MG/5ML; MG/5ML
650 LIQUID ORAL EVERY 8 HOURS PRN
Qty: 30 TABLET | Refills: 0 | Status: SHIPPED | OUTPATIENT
Start: 2024-11-25

## 2024-11-25 RX ORDER — FAMOTIDINE 10 MG/ML
20 INJECTION INTRAVENOUS
Status: COMPLETED | OUTPATIENT
Start: 2024-11-25 | End: 2024-11-25

## 2024-11-25 RX ORDER — KETOROLAC TROMETHAMINE 30 MG/ML
15 INJECTION, SOLUTION INTRAMUSCULAR; INTRAVENOUS
Status: COMPLETED | OUTPATIENT
Start: 2024-11-25 | End: 2024-11-25

## 2024-11-25 RX ORDER — DOCUSATE SODIUM 100 MG
300 CAPSULE ORAL ONCE
Status: COMPLETED | OUTPATIENT
Start: 2024-11-25 | End: 2024-11-25

## 2024-11-25 RX ORDER — AMOXICILLIN AND CLAVULANATE POTASSIUM 875; 125 MG/1; MG/1
1 TABLET, FILM COATED ORAL EVERY 12 HOURS
Qty: 20 TABLET | Refills: 0 | Status: SHIPPED | OUTPATIENT
Start: 2024-11-25 | End: 2024-12-05

## 2024-11-25 RX ORDER — ALUMINUM HYDROXIDE, MAGNESIUM HYDROXIDE, AND SIMETHICONE 1200; 120; 1200 MG/30ML; MG/30ML; MG/30ML
30 SUSPENSION ORAL
Status: COMPLETED | OUTPATIENT
Start: 2024-11-25 | End: 2024-11-25

## 2024-11-25 RX ORDER — ALUMINUM HYDROXIDE, MAGNESIUM HYDROXIDE, AND SIMETHICONE 2400; 240; 2400 MG/30ML; MG/30ML; MG/30ML
15 SUSPENSION ORAL EVERY 6 HOURS PRN
Qty: 335 ML | Refills: 0 | Status: SHIPPED | OUTPATIENT
Start: 2024-11-25 | End: 2024-12-09

## 2024-11-25 RX ORDER — DOXYCYCLINE 100 MG/1
100 CAPSULE ORAL EVERY 12 HOURS
Qty: 20 CAPSULE | Refills: 0 | Status: SHIPPED | OUTPATIENT
Start: 2024-11-25 | End: 2024-12-05

## 2024-11-25 RX ORDER — ACETAMINOPHEN 500 MG
1000 TABLET ORAL
Status: COMPLETED | OUTPATIENT
Start: 2024-11-25 | End: 2024-11-25

## 2024-11-25 RX ORDER — IPRATROPIUM BROMIDE AND ALBUTEROL SULFATE 2.5; .5 MG/3ML; MG/3ML
3 SOLUTION RESPIRATORY (INHALATION)
Status: COMPLETED | OUTPATIENT
Start: 2024-11-25 | End: 2024-11-25

## 2024-11-25 RX ADMIN — ACETAMINOPHEN 1000 MG: 500 TABLET ORAL at 01:11

## 2024-11-25 RX ADMIN — ALUMINUM HYDROXIDE, MAGNESIUM HYDROXIDE, AND SIMETHICONE 30 ML: 1200; 120; 1200 SUSPENSION ORAL at 11:11

## 2024-11-25 RX ADMIN — FAMOTIDINE 20 MG: 10 INJECTION, SOLUTION INTRAVENOUS at 11:11

## 2024-11-25 RX ADMIN — IPRATROPIUM BROMIDE AND ALBUTEROL SULFATE 3 ML: 2.5; .5 SOLUTION RESPIRATORY (INHALATION) at 11:11

## 2024-11-25 RX ADMIN — Medication 300 ML: at 11:11

## 2024-11-25 RX ADMIN — KETOROLAC TROMETHAMINE 15 MG: 30 INJECTION, SOLUTION INTRAMUSCULAR; INTRAVENOUS at 11:11

## 2024-11-25 NOTE — Clinical Note
"Sugar Foley" Emily was seen and treated in our emergency department on 11/25/2024.  She may return to work on 12/02/2024.       If you have any questions or concerns, please don't hesitate to call.      Marcia Teran RN    "

## 2024-11-25 NOTE — Clinical Note
"Sugar Foley" Emily was seen and treated in our emergency department on 11/25/2024.  She may return to work on 12/09/2024.       If you have any questions or concerns, please don't hesitate to call.      Marcia Teran RN    "

## 2024-11-25 NOTE — PROGRESS NOTES
CHW - Initial Contact    This Community Health Worker completed the Social Determinant of Health questionnaire with patient  bedside  today.    Pt identified barriers of most importance are: has no needs at this time.   Referrals to community agencies completed with patient/caregiver consent outside of [facility] include: no: none at this time.  Referrals were put through [facility] - no: none at this time.  Support and Services: has no support at this time.  Other information discussed the patient needs / wants help with: Completed SDOH with patient at bedside today, pt has no needs at this time. Will follow up in one week to check pt's future needs.    Follow up required: yes.  Follow-up Outreach - Due: 12/1/2024

## 2024-11-25 NOTE — TELEPHONE ENCOUNTER
"Verfied name/. Patient answered phone with very ab voice while coughing. States "I feel bad and dizzy." reports symptoms have worsen. Advise patient to go to the ER for further evaluation. Reports she does have a ride. Discuss that I will let the providers know of her situation.   "

## 2024-11-25 NOTE — DISCHARGE INSTRUCTIONS
You were seen in the ER today for your cough, and pneumonia.  Please continue taking Augmentin and doxycycline for the next 5 days, stopped taking ibuprofen and take Tylenol instead for headache as it may be contributing to acid reflux, and you can take Mylanta for cough especially when it occurs while you are lying flat as this may be due to acid reflux.  Follow up with your primary care doctor regarding increased eosinophils, and for pneumonia follow up.  If you develop shortness breath or worsening chest pain, return to the ER.    Thank you for coming to our Emergency Department today. It is important to remember that some problems or medical conditions are difficult to diagnose and may not be found or addressed during your Emergency Department visit.  These conditions often start with non-specific symptoms and can only be diagnosed on follow up visits with your primary care physician or specialist when the symptoms continue or change. Please remember that all medical conditions can change, and we cannot predict how you will be feeling tomorrow or the next day. Return to the ER with any questions/concerns, new/concerning symptoms, worsening or failure to improve.       Be sure to follow up with your primary care doctor and review all labs/imaging/tests that were performed during your ER visit with them. It is very common for us to identify non-emergent incidental findings which must be followed up with your primary care physician.  Some labs/imaging/tests may be outside of the normal range, and require non-emergent follow-up and/or further investigation/treatment/procedures/testing to help diagnose/exclude/prevent complications or other potentially serious medical conditions. Some abnormalities may not have been discussed or addressed during your ER visit. Some lab results may not return during your ER visit but can be accessible by downloading the free [facility] eden or by visiting [email or web address] .  It is important for you to review all labs/imaging/tests which are outside of the normal range with your physician.    An ER visit does not replace a primary care visit, and many screening tests or follow-up tests cannot be ordered by an ER doctor or performed by the ER. Some tests may even require pre-approval.    If you do not have a primary care doctor, you may contact the one listed on your discharge paperwork or you may also call the [facility] Appointment Desk at [phone] , or [address] [place] [address] at  [phone] to schedule an appointment, or establish care with a primary care doctor or even a specialist and to obtain information about local resources. It is important to your health that you have a primary care doctor.    Please take all medications as directed. We have done our best to select a medication for you that will treat your condition however, all medications may potentially have side-effects and it is impossible to predict which medications may give you side-effects or what those side-effects (if any) those medications may give you.  If you feel that you are having a negative effect or side-effect of any medication you should stop taking those medications immediately and seek medical attention. If you feel that you are having a life-threatening reaction call 911.        Do not drive, swim, climb to height, take a bath, operate heavy machinery, drink alcohol or take potentially sedating medications, sign any legal documents or make any important decisions for 24 hours if you have received any pain medications, sedatives or mood altering drugs during your ER visit or within 24 hours of taking them if they have been prescribed to you.     You can find additional resources for Dentists, hearing aids, durable medical equipment, low cost pharmacies and other resources at [email or web address]

## 2024-11-25 NOTE — ED PROVIDER NOTES
"Encounter Date: 11/25/2024       History     Chief Complaint   Patient presents with    Shortness of Breath     Pt dx pneumonia Wednesday, has been taking antibiotics as prescribed. Pt reports worse SOB. PT tearful at triage.      A 59 year old female with endometrial CA on immuno modulators and immunotherapy induced UC presents to the ED complaining of SOB. She was diagnosed with pneumonia via CT chest on 11/20/24. She states her symptoms originally started about 1 week before the diagnosis. Since then she states her SOB has worsened, and her cough has improved. She was prescribed Augmentin and doxycyline after her pneumonia diagnosis, and has not missed a dose in the last 5 days. She also admits to a HA, especially after coughing and some mild L ear pain. Ibuprofen minimally helps the HA. She has been experiencing some confusion that has worsened today.  Her  in the room states that she sometimes "loses her train of thought." She states she stopped taking her promethazine due to dizziness and confusion. Denies CP, fever, vomiting, syncope. Allergic to bactrim.         Review of patient's allergies indicates:   Allergen Reactions    Bactrim [sulfamethoxazole-trimethoprim] Rash     Past Medical History:   Diagnosis Date    Chest pain 2002    NEGATIVE STRESS ECHO 2002    Endometrial cancer     2023    GERD (gastroesophageal reflux disease)     Lower back pain     Migraines     Mild allergic rhinitis     Postmenopausal bleeding      Past Surgical History:   Procedure Laterality Date    COLONOSCOPY N/A 10/06/2023    Procedure: COLONOSCOPY;  Surgeon: Marcia Weaver MD;  Location: [facility];  Service: Endoscopy;  Laterality: N/A;  10/4- referred by Dr. Weaver/ Subject: Needs colonoscopy ASAP within 1 week /Procedure Timing: < 1 week/Diagnosis: Diarrhea, evaluate for immunotherapy induced colitis/ Prep instructions to portal. AS    COLONOSCOPY N/A 2/21/2024    Procedure: COLONOSCOPY;  Surgeon: Fercho Ramirez, " MD;  Location: [facility] (2ND FLR);  Service: Endoscopy;  Laterality: N/A;    CYST REMOVAL Left 04/28/2022    Procedure: EXCISION, CYST-BACK;  Surgeon: Thomas Hurst MD;  Location: [facility];  Service: General;  Laterality: Left;  left upper back  RN PREOP ON 4/21/22-NF    ESOPHAGOGASTRODUODENOSCOPY N/A 2/21/2024    Procedure: EGD (ESOPHAGOGASTRODUODENOSCOPY);  Surgeon: Fercho Ramirez MD;  Location: [facility] (2ND FLR);  Service: Endoscopy;  Laterality: N/A;    HYSTERECTOMY      after endometrial cancer diagnosis    HYSTEROSCOPY WITH DILATION AND CURETTAGE OF UTERUS N/A 05/23/2023    Procedure: HYSTEROSCOPY, WITH DILATION AND CURETTAGE OF UTERUS;  Surgeon: Omaira Evans MD;  Location: [facility];  Service: OB/GYN;  Laterality: N/A;  [employer] [place] [facility] AGUILAR[facility]EANA  [phone] TEXTED [facility] ON 5/2/2023 @ 3:51PM. [facility] RESPONDED ON 5/2/2023 @ 3:51PM-LO  RN PREOP 5/18/23  T & S; UPT ORDERED AND SCHEDULED 5/22/23    LYMPH NODE BIOPSY Left 06/05/2023    Procedure: BIOPSY, sentinel LYMPH NODE;  Surgeon: Lavell Rodríguez MD;  Location: [facility];  Service: OB/GYN;  Laterality: Left;    LYMPHADENECTOMY, PELVIS Right 06/05/2023    Procedure: LYMPHADENECTOMY, PELVIS;  Surgeon: Lavell Rodríguez MD;  Location: [facility];  Service: OB/GYN;  Laterality: Right;    MAPPING, LYMPH NODE, SENTINEL Bilateral 06/05/2023    Procedure: injection, LYMPH NODE, SENTINEL;  Surgeon: Lavell Rodríguez MD;  Location: [facility];  Service: OB/GYN;  Laterality: Bilateral;    NERVE REPAIR Right 06/05/2023    Procedure: REPAIR, OBTURATOR NERVE;  Surgeon: Lavell Rodríguez MD;  Location: [facility];  Service: OB/GYN;  Laterality: Right;    TONSILLECTOMY      TONSILLECTOMY      TUBAL LIGATION      XI ROBOTIC HYSTERECTOMY, WITH SALPINGO-OOPHORECTOMY Bilateral 06/05/2023    Procedure: XI ROBOTIC HYSTERECTOMY,WITH SALPINGO-OOPHORECTOMY;  Surgeon: Lavell Rodríguez MD;  Location: [facility];  Service: OB/GYN;  Laterality: Bilateral;  removed through vagina     Family History   Problem  Relation Name Age of Onset    No Known Problems Mother      Heart disease Father Abhilash     Breast cancer Sister  55    No Known Problems Sister      Colon cancer Paternal Uncle      Esophageal cancer Neg Hx       Social History     Tobacco Use    Smoking status: Never    Smokeless tobacco: Never   Substance Use Topics    Alcohol use: Never    Drug use: Never     Review of Systems   Constitutional:  Negative for fever.   HENT:  Positive for ear pain (L). Negative for ear discharge, rhinorrhea and sore throat.    Respiratory:  Positive for cough, shortness of breath and wheezing.    Cardiovascular:  Negative for chest pain and leg swelling.   Gastrointestinal:  Positive for nausea. Negative for vomiting.   Genitourinary:  Negative for dysuria.   Musculoskeletal:  Negative for back pain.   Skin:  Negative for rash.   Neurological:  Positive for dizziness. Negative for weakness.   Hematological:  Does not bruise/bleed easily.       Physical Exam     Initial Vitals [11/25/24 0957]   BP Pulse Resp Temp SpO2   138/86 100 20 98 °F (36.7 °C) 95 %      MAP       --         Physical Exam    Constitutional: She appears well-developed and well-nourished. She is not diaphoretic.   HENT:   Head: Normocephalic and atraumatic.   Right Ear: Tympanic membrane, external ear and ear canal normal.   Left Ear: Tympanic membrane, external ear and ear canal normal.   Eyes: Conjunctivae and lids are normal.   Cardiovascular:  Normal rate and regular rhythm.           Pulmonary/Chest: Effort normal. No accessory muscle usage. No respiratory distress. She has wheezes (diffuse).     Neurological: She is alert. She is not disoriented.         ED Course   Procedures  Labs Reviewed   CBC W/ AUTO DIFFERENTIAL - Abnormal       Result Value    WBC 12.74 (*)     RBC 4.64      Hemoglobin 14.5      Hematocrit 43.2      MCV 93      MCH 31.3 (*)     MCHC 33.6      RDW 12.6      Platelets 418      MPV 8.6 (*)     Immature Granulocytes 0.8 (*)     Gran #  (ANC) 7.3      Immature Grans (Abs) 0.10 (*)     Lymph # 2.7      Mono # 0.9      Eos # 1.7 (*)     Baso # 0.11      nRBC 0      Gran % 57.3      Lymph % 21.0      Mono % 6.8      Eosinophil % 13.2 (*)     Basophil % 0.9      Differential Method Automated     COMPREHENSIVE METABOLIC PANEL - Abnormal    Sodium 139      Potassium 5.2 (*)     Chloride 107      CO2 22 (*)     Glucose 95      BUN 13      Creatinine 0.8      Calcium 9.3      Total Protein 7.4      Albumin 3.5      Total Bilirubin 0.2      Alkaline Phosphatase 104      AST 32      ALT 25      eGFR >60      Anion Gap 10     MAGNESIUM    Magnesium 1.7     TROPONIN I    Troponin I <0.006     B-TYPE NATRIURETIC PEPTIDE   SARS-COV-2 RDRP GENE    POC Rapid COVID Negative       Acceptable Yes     POCT INFLUENZA A/B MOLECULAR    POC Molecular Influenza A Ag Negative      POC Molecular Influenza B Ag Negative       Acceptable Yes            Imaging Results              X-Ray Chest PA And Lateral (Final result)  Result time 11/25/24 10:27:17      Final result by Maria Del Carmen Mejias MD (11/25/24 10:27:17)                   Impression:      Opacity at the right lung base correlates with infiltrate identified on recent prior CT.  This could represent pneumonia as in clinical history.  Recommend follow-up radiographs to resolution.      Electronically signed by: Maria Del Carmen Mejias MD  Date:    11/25/2024  Time:    10:27               Narrative:    EXAMINATION:  XR CHEST PA AND LATERAL    CLINICAL HISTORY:  Pneumonia, unspecified organism    TECHNIQUE:  PA and lateral views of the chest were performed.    COMPARISON:  November 1, 2024 radiograph, CT November 20, 2024    FINDINGS:  Heart size is not enlarged.  There is a right subclavian central venous catheter with tip in the superior vena cava.  No significant pleural effusion is present.  Subtle parenchymal opacity within the medial right lung base correlates with new infiltrate on prior CT.   Osseous structures are intact.                                       Medications   famotidine (PF) injection 20 mg (has no administration in time range)   aluminum-magnesium hydroxide-simethicone 200-200-20 mg/5 mL suspension 30 mL (has no administration in time range)   albuterol-ipratropium 2.5 mg-0.5 mg/3 mL nebulizer solution 3 mL (3 mLs Nebulization Given 11/25/24 1101)   electrolytes-dextrose (Pedialyte) oral solution 300 mL (300 mLs Oral Given 11/25/24 1117)   ketorolac injection 15 mg (15 mg Intravenous Given 11/25/24 1114)     Medical Decision Making  Amount and/or Complexity of Data Reviewed  Labs: ordered.  Radiology: ordered.    Risk  OTC drugs.  Prescription drug management.       A 59 year old female with a past medical history of endometrial CA presents to the ED complaining of SOB. She was diagnosed with pneumonia on 11/20/24.    DDX: pneumonia, PE, CHF, COVID, flu     Clinical Tests:   Magnesium, CBC, CMP, troponin, BNP, Covid, flu: all labs reviewed   CXR: imaging reviewed    ED Course:   Duoneb given due to diffuse wheezing which improved symptoms. CXR shows opacity at the right lung base that correlates with infiltrate identified on recent prior CT. SOB most likely due to previously diagnosed pneumonia. HR 92, SpO2 98%, recent CT of chest with CT contrast without PE makes occurrence of PE less likely. Patient was given fluids due to complaints of dizziness upon standing and toradol for HA. Eosinophilia noted on labs. Given dyspnea while laying flat, eosinophilia may be due to GERD, especially given her recent NSAID use. Pepcid given. Patient advised to follow up with PCP to manage eosinophilia. Augmentin and doxycyline extended for 5 days to continue pneumonia treatment. Ambulatory oxygen at 97%.          ED Course as of 11/25/24 1152   Mon Nov 25, 2024   1005 EKG independently interpreted by me with normal sinus rhythm rate of 95, new right axis deviation not previously seen on most recent EKG,  , no STEMI. No S1Q3T3 [LF]   1042 CXR: Opacity at the right lung base correlates with infiltrate identified on recent prior CT.  This could represent pneumonia as in clinical history.  Recommend follow-up radiographs to resolution. [LF]      ED Course User Index  [LF] Marilee Michel MD                           Clinical Impression:  Final diagnoses:  [J18.9] Pneumonia                 Geovani Tavarez, ECHO  11/25/24 0796

## 2024-11-25 NOTE — ED NOTES
RA walk study done: SPO2 was 95 to 97% and HR was 95 up to 105 B/M walking approx 25 feet. Denies SOB unless she is actively coughing.

## 2024-11-26 ENCOUNTER — OFFICE VISIT (OUTPATIENT)
Dept: GYNECOLOGIC ONCOLOGY | Facility: CLINIC | Age: 59
End: 2024-11-26
Payer: COMMERCIAL

## 2024-11-26 ENCOUNTER — TELEPHONE (OUTPATIENT)
Dept: GYNECOLOGIC ONCOLOGY | Facility: CLINIC | Age: 59
End: 2024-11-26

## 2024-11-26 DIAGNOSIS — J18.9 COMMUNITY ACQUIRED PNEUMONIA OF RIGHT LOWER LOBE OF LUNG: ICD-10-CM

## 2024-11-26 DIAGNOSIS — R05.1 ACUTE COUGH: ICD-10-CM

## 2024-11-26 DIAGNOSIS — C54.1 MALIGNANT NEOPLASM OF ENDOMETRIUM: Primary | ICD-10-CM

## 2024-11-26 PROCEDURE — 99215 OFFICE O/P EST HI 40 MIN: CPT | Mod: 95,,, | Performed by: OBSTETRICS & GYNECOLOGY

## 2024-11-26 RX ORDER — CODEINE PHOSPHATE AND GUAIFENESIN 10; 100 MG/5ML; MG/5ML
5 SOLUTION ORAL 3 TIMES DAILY PRN
Qty: 10 ML | Refills: 0 | Status: SHIPPED | OUTPATIENT
Start: 2024-11-26 | End: 2024-12-06

## 2024-11-26 NOTE — PROGRESS NOTES
The patient location is: home  The chief complaint leading to consultation is: chemotherapy    Visit type: audiovisual    Face to Face time with patient: 10  15 minutes of total time spent on the encounter, which includes face to face time and non-face to face time preparing to see the patient (eg, review of tests), Obtaining and/or reviewing separately obtained history, Documenting clinical information in the electronic or other health record, Independently interpreting results (not separately reported) and communicating results to the patient/family/caregiver, or Care coordination (not separately reported).         Each patient to whom he or she provides medical services by telemedicine is:  (1) informed of the relationship between the physician and patient and the respective role of any other health care provider with respect to management of the patient; and (2) notified that he or she may decline to receive medical services by telemedicine and may withdraw from such care at any time.    Notes:       REFERRING PROVIDER  Omaira Evans MD Shah, Shamita MD    REASON FOR CONSULT  Sugar Diaz  is a 59 y.o.  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    HISTORY OF PRESENT ILLNESS    Chemotherapy regimen: Dostarlimab 1000mg 6 weeks (3 years)  Cycle: 10  Previous dose limiting toxicities: Y  Cycle 3: Grade 4 diarrhea 2/2 Salmonella  Previous dose reductions: N  Previous breaks: Y  Cycle 4: 4 weeks 2/2 Grade 4 diarrhea 2/2 Salmonella  Previous changes in pre-medications: N    Energy level: baseline  Appetite: baseline  Fevers/chills/nights: no  Nausea: no  Diarrhea: yes, grade 2  Emesis: no  Neuropathy: yes, grade 3  Discoloration of lower extremities: no  Mucositis: no  Maculopapular rashes: no  Dyspnea or coughing: yes      Oncology History   Malignant neoplasm of endometrium   5/23/2023 Surgery    Hysteroscopy, D&C: grade 1 endometrioid EC     6/5/2023  Surgery    TRH/BSO/BSLN/RPLN/repair of obturator nerve    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 4.5 cm, grade 1, 96% MI (22/23 mm), +LUE, -LVSI, +MELF, -cervix, 1/2+ RPLN, 1/1+ LPLN, -ascites.     6/19/2023 Imaging Significant Findings    CT C/A/P w/: High L para-aortic at the level of the renal vessels, 1.5 cm.  Low R para-aortic at the LEAH, 4.1 cm in greatest dimension.     6/25/2023 Genomic Testing    [facility] (NGS): ARD1A, CHEK1, CTCF, , HDAC2, HNF1A, JAK1, KRAS, MAX, MSH6, P1K3R1, PPM1D, PTEN, ZFHX3, ZSR2     6/28/2023 Tumor Conference    No rule for debulking of para-aortic lymph nodes.  Adjuvant VBT.  Represent after completion of adjuvant therapy to discuss EBRT.        7/7/2023 - 10/19/2023 Chemotherapy    Carboplatin AUC 5/Paclitaxel 135 mg/m2/Dostarlimab 200mg q3 weeks x6    Previous dose limiting toxicities: Y  Cycle 4: Grade 2 colitis s/p steroid taper  Cycle 5: Grade 3 colitis  Previous dose reductions: N  Previous breaks: Y  Cycle 4: Dostarlimab omitted due to grade 2 colitis  Cycle 5: Dostarlimab 2/2 grade 3 colitis (negative colonoscopy with improvement in symptoms with steroid taper)     8/9/2023 - 9/13/2023 Radiation Therapy    Treating physician: Dr. Adriane Babb  Total Dose: 21 Gy HDR  Fractions: 3 vaginal cuff brachytherapy     8/22/2023 Imaging Significant Findings    CT A/P w/: No evidence of colitis, MN in high L para-aortic and low R para-aortic.     10/4/2023 Procedure    Colonoscopy: WNL     11/6/2023 Imaging Significant Findings    CT C/A/P w/: PATTI     11/6/2023 - 11/9/2023 Hospital Admission    Most Recent Admission Details  Admit Date: 11/6/23  Admitted for: RVS PNA superimposed by community acquitted PNA  Discharge date: 11/9/23  Discharged from: [facility] LOCATION ([id number])  Discharge disposition: Home or Self Care       11/22/2023 -  Maintenance Therapy     Dostarlimab 1000mg x     2/21/2024 Procedure    EGD, Colonoscopy: No gross evidence of colitis      Hospital Admission    Most Recent Admission Details  Admit Date: 2/15/24  Admitted for: Grade 4 colitis 2/2 Salmonella.    Discharge date: 2/23/24  Discharged from: [facility]  Discharge disposition: Home or Self Care       2/21/2024 Procedure    Colonoscopy, EGD: No obvious ICI colitis     7/9/2024 Imaging Significant Findings    CT C/A/P w/: PATIT     11/14/2024 Imaging Significant Findings    CT C/A/P w/: PATTI          The following portions of the patient's history were reviewed and updated as appropriate: allergies, current medications, family history, medical history, social history and surgical history.    REVIEW OF SYSTEMS  All systems reviewed and negative except as noted in HPI.    OBJECTIVE   There were no vitals filed for this visit.             There is no height or weight on file to calculate BMI.      1. General: Well appearing, no apparent distress, alert and oriented.  2. Lymph: Neck symmetric without cervical or supraclavicular adenopathy or mass.  3. Lungs: Normal respiratory rate, no accessory muscle use.  4. Psych: Normal affect.  5. Abdomen:  non-distended                ECOG status: 1    LABORATORY DATA  Lab data reviewed.    RADIOLOGICAL DATA  Radiology data reviewed.    ASSESSMENT / PLAN     1. Malignant neoplasm of endometrium    2. Acute cough    3. Community acquired pneumonia of right lower lobe of lung        Persistent grade 1-2 diarrhea with Entyvio (ND 11/13).  Stool studies WNL (10/6/24).  We had an extensive discussion about proceeding vs discontinuing maintenance therapy. The patient would like to continue therapy which I think is reasonable.    The clinical picture is slightly improved.  We will continue with antibiotics and reassess in 1 week.  We discussed symptomatic management of her headaches and cough and a new prescription was sent to her  pharmacy.      PATIENT EDUCATION  Ready to learn, no apparent learning barriers were identified; learning preferences include listening. Explained diagnosis and treatment plan; patient expressed understanding of the content.    ADMINISTRATIVE BILLING  Greater than 50% of was spent in counseling.     ONGOING COMPLEXITY BILLING  Visit today is associated with current or anticipated ongoing medical care related to this patients single serious condition/complex condition: endometrial cancer

## 2024-11-26 NOTE — Clinical Note
Please schedule a RONC next Tuesday.  Thanks.  No. MARYAN screening performed.  STOP BANG Legend: 0-2 = LOW Risk; 3-4 = INTERMEDIATE Risk; 5-8 = HIGH Risk

## 2024-11-27 LAB
OHS QRS DURATION: 86 MS
OHS QTC CALCULATION: 432 MS

## 2024-12-03 ENCOUNTER — OFFICE VISIT (OUTPATIENT)
Dept: GYNECOLOGIC ONCOLOGY | Facility: CLINIC | Age: 59
End: 2024-12-03
Payer: COMMERCIAL

## 2024-12-03 VITALS
WEIGHT: 159.19 LBS | HEART RATE: 102 BPM | TEMPERATURE: 99 F | BODY MASS INDEX: 24.99 KG/M2 | DIASTOLIC BLOOD PRESSURE: 65 MMHG | SYSTOLIC BLOOD PRESSURE: 141 MMHG | HEIGHT: 67 IN | OXYGEN SATURATION: 99 %

## 2024-12-03 DIAGNOSIS — C54.1 MALIGNANT NEOPLASM OF ENDOMETRIUM: Primary | ICD-10-CM

## 2024-12-03 DIAGNOSIS — K52.1 CHEMOTHERAPY INDUCED DIARRHEA: ICD-10-CM

## 2024-12-03 DIAGNOSIS — J18.9 COMMUNITY ACQUIRED PNEUMONIA OF RIGHT LOWER LOBE OF LUNG: ICD-10-CM

## 2024-12-03 DIAGNOSIS — H91.92 HEARING LOSS OF LEFT EAR, UNSPECIFIED HEARING LOSS TYPE: ICD-10-CM

## 2024-12-03 DIAGNOSIS — Z51.11 ENCOUNTER FOR ANTINEOPLASTIC CHEMOTHERAPY: ICD-10-CM

## 2024-12-03 DIAGNOSIS — T45.1X5A CHEMOTHERAPY INDUCED DIARRHEA: ICD-10-CM

## 2024-12-03 PROCEDURE — 99999 PR PBB SHADOW E&M-EST. PATIENT-LVL III: CPT | Mod: PBBFAC,,, | Performed by: OBSTETRICS & GYNECOLOGY

## 2024-12-03 NOTE — Clinical Note
Paul Angel, let me know if the plan makes sense.  If not call me and we can discuss.  Hope you had a good Thanksgiving.

## 2024-12-03 NOTE — PROGRESS NOTES
REFERRING PROVIDER  Omaira Evans MD Shah, Shamita MD    REASON FOR CONSULT  Sugar Diaz  is a 59 y.o.  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    HISTORY OF PRESENT ILLNESS    Chemotherapy regimen: Dostarlimab 1000mg 6 weeks (3 years)  Cycle: 10  Previous dose limiting toxicities: Y  Cycle 3: Grade 4 diarrhea 2/2 Salmonella  Previous dose reductions: N  Previous breaks: Y  Cycle 4: 4 weeks 2/2 Grade 4 diarrhea 2/2 Salmonella  Cycle 10: 4 weeks 2/2 PNA  Previous changes in pre-medications: N    Energy level: baseline  Appetite: baseline  Fevers/chills/nights: no  Nausea: no  Diarrhea: yes, grade 2  Emesis: no  Neuropathy: yes, grade 3  Discoloration of lower extremities: no  Mucositis: no  Maculopapular rashes: no  Dyspnea or coughing: yes      Oncology History   Malignant neoplasm of endometrium   5/23/2023 Surgery    Hysteroscopy, D&C: grade 1 endometrioid EC     6/5/2023 Surgery    TRH/BSO/BSLN/RPLN/repair of obturator nerve    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 4.5 cm, grade 1, 96% MI (22/23 mm), +LUE, -LVSI, +MELF, -cervix, 1/2+ RPLN, 1/1+ LPLN, -ascites.     6/19/2023 Imaging Significant Findings    CT C/A/P w/: High L para-aortic at the level of the renal vessels, 1.5 cm.  Low R para-aortic at the LEAH, 4.1 cm in greatest dimension.     6/25/2023 Genomic Testing    [facility] (NGS): ARD1A, CHEK1, CTCF, , HDAC2, HNF1A, JAK1, KRAS, MAX, MSH6, P1K3R1, PPM1D, PTEN, ZFHX3, ZSR2     6/28/2023 Tumor Conference    No rule for debulking of para-aortic lymph nodes.  Adjuvant VBT.  Represent after completion of adjuvant therapy to discuss EBRT.        7/7/2023 - 10/19/2023 Chemotherapy    Carboplatin AUC 5/Paclitaxel 135 mg/m2/Dostarlimab 200mg q3 weeks x6    Previous dose limiting toxicities: Y  Cycle 4: Grade 2 colitis s/p steroid taper  Cycle 5: Grade 3  colitis  Previous dose reductions: N  Previous breaks: Y  Cycle 4: Dostarlimab omitted due to grade 2 colitis  Cycle 5: Dostarlimab 2/2 grade 3 colitis (negative colonoscopy with improvement in symptoms with steroid taper)     8/9/2023 - 9/13/2023 Radiation Therapy    Treating physician: Dr. Adriane Babb  Total Dose: 21 Gy HDR  Fractions: 3 vaginal cuff brachytherapy     8/22/2023 Imaging Significant Findings    CT A/P w/: No evidence of colitis, IA in high L para-aortic and low R para-aortic.     10/4/2023 Procedure    Colonoscopy: WNL     11/6/2023 Imaging Significant Findings    CT C/A/P w/: PATTI     11/6/2023 - 11/9/2023 Hospital Admission    Most Recent Admission Details  Admit Date: 11/6/23  Admitted for: RVS PNA superimposed by community acquitted PNA  Discharge date: 11/9/23  Discharged from: [address] at [facility] ([facility])  Discharge disposition: Home or Self Care       11/22/2023 -  Maintenance Therapy    Dostarlimab 1000mg x     2/21/2024 Procedure    EGD, Colonoscopy: No gross evidence of colitis      Hospital Admission    Most Recent Admission Details  Admit Date: 2/15/24  Admitted for: Grade 4 colitis 2/2 Salmonella.    Discharge date: 2/23/24  Discharged from: [facility] ONCOLOGY ACUTE at [facility]  Discharge disposition: Home or Self Care       2/21/2024 Procedure    Colonoscopy, EGD: No obvious ICI colitis     7/9/2024 Imaging Significant Findings    CT C/A/P w/: PATTI     11/14/2024 Imaging Significant Findings    CT C/A/P w/: PATTI          The following portions of the patient's history were reviewed and updated as appropriate: allergies, current medications, family history, medical history, social history and surgical history.    REVIEW OF SYSTEMS  All systems reviewed and negative except as noted in HPI.    OBJECTIVE   Vitals:    12/03/24 1520   BP: (!) 141/65   Pulse: 102   Temp: 98.5 °F (36.9 °C)        Body mass index is 24.93 kg/m².      1. General:  Well appearing, no apparent distress, alert and oriented.  2. Lymph: Neck symmetric without cervical or supraclavicular adenopathy or mass.  3. Lungs: Normal respiratory rate, no accessory muscle use.  Bilateral crackles and rhonchi.   4. Psych: Normal affect.  5. Abdomen:  non-distended                ECOG status: 1    LABORATORY DATA  Lab data reviewed.    RADIOLOGICAL DATA  Radiology data reviewed.    ASSESSMENT / PLAN     1. Malignant neoplasm of endometrium    2. Encounter for antineoplastic chemotherapy    3. Community acquired pneumonia of right lower lobe of lung    4. Chemotherapy induced diarrhea        Persistent grade 1-2 diarrhea with Entyvio (ND 11/13).  Stool studies WNL (10/6/24).  We had an extensive discussion about proceeding vs discontinuing maintenance therapy. The patient would like to continue therapy which I think is reasonable.    The clinical picture continues to improve and is most consistent with a PNA, however she is not mentally and physically ready for treatment.  We will further delay her treatment by 2 week to allow her to complete her course of antibiotics.  She does not need labs re-drawn.  In the meantime, we will consult with Pulmonology and ENT.  She will need a VV prior to cycle #10.  TSH, T4, CBC, CMP, RONC, maintenance cycle #11 6 weeks after cycle #10.     PATIENT EDUCATION  Ready to learn, no apparent learning barriers were identified; learning preferences include listening. Explained diagnosis and treatment plan; patient expressed understanding of the content.    ADMINISTRATIVE BILLING  Greater than 50% of was spent in counseling.     ONGOING COMPLEXITY BILLING  Visit today is associated with current or anticipated ongoing medical care related to this patients single serious condition/complex condition: endometrial cancer

## 2024-12-10 ENCOUNTER — OFFICE VISIT (OUTPATIENT)
Dept: PULMONOLOGY | Facility: CLINIC | Age: 59
End: 2024-12-10
Payer: COMMERCIAL

## 2024-12-10 VITALS
BODY MASS INDEX: 25.3 KG/M2 | OXYGEN SATURATION: 98 % | DIASTOLIC BLOOD PRESSURE: 74 MMHG | WEIGHT: 161.19 LBS | SYSTOLIC BLOOD PRESSURE: 132 MMHG | HEART RATE: 97 BPM | HEIGHT: 67 IN

## 2024-12-10 DIAGNOSIS — J18.9 COMMUNITY ACQUIRED PNEUMONIA OF RIGHT LOWER LOBE OF LUNG: ICD-10-CM

## 2024-12-10 DIAGNOSIS — G62.0 CHEMOTHERAPY-INDUCED NEUROPATHY: ICD-10-CM

## 2024-12-10 DIAGNOSIS — T45.1X5A CHEMOTHERAPY-INDUCED NEUROPATHY: ICD-10-CM

## 2024-12-10 DIAGNOSIS — Z51.11 ENCOUNTER FOR ANTINEOPLASTIC CHEMOTHERAPY: ICD-10-CM

## 2024-12-10 DIAGNOSIS — C54.1 MALIGNANT NEOPLASM OF ENDOMETRIUM: ICD-10-CM

## 2024-12-10 PROCEDURE — 99999 PR PBB SHADOW E&M-EST. PATIENT-LVL IV: CPT | Mod: PBBFAC,,, | Performed by: INTERNAL MEDICINE

## 2024-12-10 RX ORDER — DULOXETIN HYDROCHLORIDE 60 MG/1
CAPSULE, DELAYED RELEASE ORAL
Qty: 30 CAPSULE | Refills: 2 | Status: SHIPPED | OUTPATIENT
Start: 2024-12-10

## 2024-12-10 NOTE — ASSESSMENT & PLAN NOTE
Patient with what appears to be community-acquired pneumonia of the right lower lobe.  There are some scattered areas of ground-glass in the upper lobes that appear to be very mild, julio fissural, and may reflect atelectasis.  She received antibiotic therapy with improvement in symptoms.  She had a recent infected port that was treated conservatively.  Given the unilateral and lower lobe appearance of her significant parenchymal infiltrate, I heavily favor pneumonia as the etiology.  Immunotherapy related pneumonitis typically is more diffuse.  Furthermore patient was only treated with antibiotics with improvement in symptoms.  I feel it  would be unlikely if this were a pneumonitis picture.  She is still within the timeframe of development of immunotherapy related pneumonitis (within 3-27 weeks).  Her resolution argues against this entity.  - would repeat imaging 6 weeks following completion of treatment if symptoms do not completely resolve.  Repeat sooner as clinically indicated.    - if she continues having issues with infection I would revisit her port to be sure it is not still harboring infection.    - if she develops recurrent symptoms of respiratory distress following immunotherapy I recommend immediate CT scan of chest.

## 2024-12-10 NOTE — PROGRESS NOTES
"Subjective:      Patient ID: Sugar Diaz is a 59 y.o. female.    Chief Complaint: Pneumonia    Sugar Diaz has an active medical problem list that includes:  Cervical stenosis, postmenopausal bleeding, endometrial malignancy, hypothyroidism, gastroesophageal reflux disease, immunotherapy induced colitis, who presents as a new patient referral for "community-acquired pneumonia".      Tobacco/vape: never  Occupation: clerical work  Exertional capacity: no limitations  Prior lung disease: pneumonia x 2  Sputum production:  only recently, improving with antibiotics  Allergies/sinusitis: seasonal, taking PRN OTC meds  GERD/Aspiration: none  Pets: 1 dog  Travel/TB: not to her knowledge  Respiratory regimen: none  Prior cancer: active endometrial cancer undergoing therapy  Prior hospitalization/intubation: ER visit for her current pneumonia, but not admitted.  Admitted last year for pneumonia and RSV  Snoring/apnea: none     Today she is doing better.  She finished her recent abx course on Sunday. She was treated with cephalexin and Augmentin during separate courses after failure to improve.  She just completed 10 days of Augmentin and symptoms are improving. Her last immunotherapy was last given 8 weeks ago. She had an infected port on 11/1 that required an ER visit.  She developed pneumonia symptoms In mid November.  Her last treatment with immunotherapy was 10/10/2024.        Review of Systems   Constitutional:  Negative for fever, activity change and fatigue.   HENT:  Negative for postnasal drip and congestion.    Respiratory:  Positive for cough, sputum production and previous hospitalization due to pulmonary problems. Negative for shortness of breath, wheezing, dyspnea on extertion and use of rescue inhaler.    Cardiovascular:  Negative for chest pain.   Gastrointestinal:  Negative for acid reflux.   Neurological: Negative.    Psychiatric/Behavioral: Negative.       Objective:     Vitals:    12/10/24 0856 " "  BP: 132/74   BP Location: Left arm   Patient Position: Sitting   Pulse: 97   SpO2: 98%   Weight: 73.1 kg (161 lb 2.5 oz)   Height: 5' 7" (1.702 m)       Physical Exam   Constitutional: She is oriented to person, place, and time. She appears well-developed and well-nourished. She appears not cachectic. No distress. She is not obese.   HENT:   Head: Normocephalic.   Neck: No JVD present.   Cardiovascular: Normal rate, regular rhythm and normal heart sounds.   Pulmonary/Chest: Normal expansion, symmetric chest wall expansion, effort normal and breath sounds normal.   Abdominal: Soft. Bowel sounds are normal.   Musculoskeletal:         General: No edema. Normal range of motion.      Cervical back: Normal range of motion and neck supple.   Neurological: She is alert and oriented to person, place, and time.   Skin: Skin is warm and dry. She is not diaphoretic.   Psychiatric: She has a normal mood and affect. Her behavior is normal. Judgment and thought content normal.       Personal Diagnostic Review     Chest x-ray 11/25/2024  Opacity at the right lung base correlates with infiltrate identified on recent prior CT. This could represent pneumonia as in clinical history.     CT Thorax 11/20/2024  New infiltrate right lung worrisome for pneumonia versus aspiration.      CT Thorax 11/1/2024  1. No definite drainable fluid collection/abscess about the right chest port, as questioned.  2. Few nonspecific areas of patchy ground-glass type attenuation could reflect infectious or noninfectious inflammatory etiology.  Few small, sub 6 mm solid pulmonary nodules may additionally relate to the above.        12/10/2024     8:56 AM 12/3/2024     3:20 PM 11/25/2024     2:05 PM 11/25/2024    12:02 PM 11/25/2024    11:01 AM 11/25/2024    10:08 AM 11/25/2024     9:57 AM   Pulmonary Function Tests   SpO2 98 % 99 % 96 % 96 % 98 % 99 % 95 %   Height 5' 7" (1.702 m) 5' 7" (1.702 m)     5' 7" (1.702 m)   Weight 73.1 kg (161 lb 2.5 oz) 72.2 kg " (159 lb 2.8 oz)     72.6 kg (160 lb)   BMI (Calculated) 25.2 24.9     25.1        Assessment:     1. Malignant neoplasm of endometrium    2. Encounter for antineoplastic chemotherapy    3. Community acquired pneumonia of right lower lobe of lung         Outpatient Encounter Medications as of 12/10/2024   Medication Sig Dispense Refill    acetaminophen (TYLENOL) 650 MG TbSR Take 1 tablet (650 mg total) by mouth every 8 (eight) hours as needed (Headache). 30 tablet 0    azelastine (ASTELIN) 137 mcg (0.1 %) nasal spray 1 spray (137 mcg total) by Nasal route 2 (two) times daily. 30 mL 0    b complex vitamins tablet Take 1 tablet by mouth once daily.      celecoxib (CELEBREX) 100 MG capsule Take 1 capsule (100 mg total) by mouth once daily. 30 capsule 3    cetirizine (ZYRTEC) 5 MG tablet Take 1 tablet (5 mg total) by mouth once daily. 30 tablet 0    diclofenac sodium (VOLTAREN) 1 % Gel Apply 2 g topically 2 (two) times daily. 100 g 0    diphenoxylate-atropine 2.5-0.025 mg (LOMOTIL) 2.5-0.025 mg per tablet Take 1 tablet by mouth daily as needed for Diarrhea.      DULoxetine (CYMBALTA) 60 MG capsule TAKE 1 CAPSULE(60 MG) BY MOUTH DAILY 30 capsule 2    fluticasone propionate (FLONASE) 50 mcg/actuation nasal spray 1 spray (50 mcg total) by Each Nostril route once daily. 16 g 1    ibuprofen (ADVIL,MOTRIN) 600 MG tablet Take 1 tablet (600 mg total) by mouth every 6 (six) hours as needed for Pain. 30 tablet 3    levothyroxine (SYNTHROID) 100 MCG tablet Take 1 tablet (100 mcg total) by mouth before breakfast. 30 tablet 11    multivitamin (ONE DAILY MULTIVITAMIN) per tablet Take 1 tablet by mouth once daily.      valACYclovir (VALTREX) 1000 MG tablet Two pills at onset of fever blister and then repeat 2 pills 12 hr later 30 tablet 11    vedolizumab (ENTYVIO) 300 mg SolR injection Inject 300 mg into the vein. Week 0, 2, and 6 followed by every 8 weeks      aluminum & magnesium hydroxide-simethicone (MYLANTA MAX STRENGTH)  400-400-40 mg/5 mL suspension Take 15 mLs by mouth every 6 (six) hours as needed for Indigestion. 335 mL 0    [] amoxicillin-clavulanate 875-125mg (AUGMENTIN) 875-125 mg per tablet Take 1 tablet by mouth every 12 (twelve) hours. for 10 days 20 tablet 0    [] doxycycline (VIBRAMYCIN) 100 MG Cap Take 1 capsule (100 mg total) by mouth every 12 (twelve) hours. for 10 days 20 capsule 0    [] guaiFENesin-codeine 100-10 mg/5 ml (TUSSI-ORGANIDIN NR)  mg/5 mL syrup Take 5 mLs by mouth 3 (three) times daily as needed for Cough. 10 mL 0    [DISCONTINUED] DULoxetine (CYMBALTA) 60 MG capsule TAKE 1 CAPSULE(60 MG) BY MOUTH DAILY 30 capsule 2     No facility-administered encounter medications on file as of 12/10/2024.     No orders of the defined types were placed in this encounter.      Plan:     Problem List Items Addressed This Visit          Pulmonary    Community acquired pneumonia of right lower lobe of lung    Current Assessment & Plan     Patient with what appears to be community-acquired pneumonia of the right lower lobe.  There are some scattered areas of ground-glass in the upper lobes that appear to be very mild, julio fissural, and may reflect atelectasis.  She received antibiotic therapy with improvement in symptoms.  She had a recent infected port that was treated conservatively.  Given the unilateral and lower lobe appearance of her significant parenchymal infiltrate, I heavily favor pneumonia as the etiology.  Immunotherapy related pneumonitis typically is more diffuse.  Furthermore patient was only treated with antibiotics with improvement in symptoms.  I feel it  would be unlikely if this were a pneumonitis picture.  She is still within the timeframe of development of immunotherapy related pneumonitis (within 3-27 weeks).  Her resolution argues against this entity.  - would repeat imaging 6 weeks following completion of treatment if symptoms do not completely resolve.  Repeat sooner as  clinically indicated.    - if she continues having issues with infection I would revisit her port to be sure it is not still harboring infection.    - if she develops recurrent symptoms of respiratory distress following immunotherapy I recommend immediate CT scan of chest.            Oncology    Malignant neoplasm of endometrium    Current Assessment & Plan     And with Oncology and currently receiving immunotherapy.  The question is is does her current respiratory decompensation reflect a pneumonia or does it reflect immunotherapy induced pneumonitis.          Other Visit Diagnoses       Encounter for antineoplastic chemotherapy              RTC PRN    Darian Billings MD  [facility]

## 2024-12-10 NOTE — ASSESSMENT & PLAN NOTE
And with Oncology and currently receiving immunotherapy.  The question is is does her current respiratory decompensation reflect a pneumonia or does it reflect immunotherapy induced pneumonitis.

## 2024-12-11 DIAGNOSIS — E03.8 SUBCLINICAL HYPOTHYROIDISM: ICD-10-CM

## 2024-12-11 DIAGNOSIS — C54.1 MALIGNANT NEOPLASM OF ENDOMETRIUM: ICD-10-CM

## 2024-12-11 RX ORDER — LEVOTHYROXINE SODIUM 100 UG/1
100 TABLET ORAL
Qty: 30 TABLET | Refills: 11 | Status: SHIPPED | OUTPATIENT
Start: 2024-12-11

## 2024-12-11 NOTE — TELEPHONE ENCOUNTER
Refill Decision Note   Sugar Diaz  is requesting a refill authorization.  Brief Assessment and Rationale for Refill:  Approve     Medication Therapy Plan:  12/3/24 LOV with Dr. Rodríguez; ED 11/25/24 for SOB      Comments:     Note composed:1:34 PM 12/11/2024

## 2024-12-12 NOTE — PROGRESS NOTES
The patient location is: home  The chief complaint leading to consultation is: chemotherapy    Visit type: audiovisual    Face to Face time with patient: 10  15 minutes of total time spent on the encounter, which includes face to face time and non-face to face time preparing to see the patient (eg, review of tests), Obtaining and/or reviewing separately obtained history, Documenting clinical information in the electronic or other health record, Independently interpreting results (not separately reported) and communicating results to the patient/family/caregiver, or Care coordination (not separately reported).         Each patient to whom he or she provides medical services by telemedicine is:  (1) informed of the relationship between the physician and patient and the respective role of any other health care provider with respect to management of the patient; and (2) notified that he or she may decline to receive medical services by telemedicine and may withdraw from such care at any time.    Notes:       REFERRING PROVIDER  Omaira Evans MD Shah, Shamita MD    REASON FOR CONSULT  Sugar Diaz  is a 59 y.o.  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    HISTORY OF PRESENT ILLNESS    Chemotherapy regimen: Dostarlimab 1000mg 6 weeks (3 years)  Cycle: 10  Previous dose limiting toxicities: Y  Cycle 3: Grade 4 diarrhea 2/2 Salmonella  Previous dose reductions: N  Previous breaks: Y  Cycle 4: 4 weeks 2/2 Grade 4 diarrhea 2/2 Salmonella  Cycle 10: 8 weeks 2/2 PNA and immunotherapy holiday  Previous changes in pre-medications: N    Energy level: baseline  Appetite: baseline  Fevers/chills/nights: no  Nausea: no  Diarrhea: yes, grade 2  Emesis: no  Neuropathy: yes, grade 3  Discoloration of lower extremities: no  Mucositis: no  Maculopapular rashes: no  Dyspnea or coughing: yes      Oncology History   Malignant neoplasm of endometrium   5/23/2023 Surgery     Hysteroscopy, D&C: grade 1 endometrioid EC     6/5/2023 Surgery    TRH/BSO/BSLN/RPLN/repair of obturator nerve    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 4.5 cm, grade 1, 96% MI (22/23 mm), +LUE, -LVSI, +MELF, -cervix, 1/2+ RPLN, 1/1+ LPLN, -ascites.     6/19/2023 Imaging Significant Findings    CT C/A/P w/: High L para-aortic at the level of the renal vessels, 1.5 cm.  Low R para-aortic at the LEAH, 4.1 cm in greatest dimension.     6/25/2023 Genomic Testing    [facility] (NGS): ARD1A, CHEK1, CTCF, , HDAC2, HNF1A, JAK1, KRAS, MAX, MSH6, P1K3R1, PPM1D, PTEN, ZFHX3, ZSR2     6/28/2023 Tumor Conference    No rule for debulking of para-aortic lymph nodes.  Adjuvant VBT.  Represent after completion of adjuvant therapy to discuss EBRT.        7/7/2023 - 10/19/2023 Chemotherapy    Carboplatin AUC 5/Paclitaxel 135 mg/m2/Dostarlimab 200mg q3 weeks x6    Previous dose limiting toxicities: Y  Cycle 4: Grade 2 colitis s/p steroid taper  Cycle 5: Grade 3 colitis  Previous dose reductions: N  Previous breaks: Y  Cycle 4: Dostarlimab omitted due to grade 2 colitis  Cycle 5: Dostarlimab 2/2 grade 3 colitis (negative colonoscopy with improvement in symptoms with steroid taper)     8/9/2023 - 9/13/2023 Radiation Therapy    Treating physician: Dr. Adriane Babb  Total Dose: 21 Gy HDR  Fractions: 3 vaginal cuff brachytherapy     8/22/2023 Imaging Significant Findings    CT A/P w/: No evidence of colitis, [place] in high L para-aortic and low R para-aortic.     10/4/2023 Procedure    Colonoscopy: WNL     11/6/2023 Imaging Significant Findings    CT C/A/P w/: PATTI     11/6/2023 - 11/9/2023 Hospital Admission    Most Recent Admission Details  Admit Date: 11/6/23  Admitted for: RVS PNA superimposed by community acquitted PNA  Discharge date: 11/9/23  Discharged from: [facility] MEDICAL SURGICAL [address] at [facility] LOCATION ([facility])  Discharge disposition: Home  or Self Care       11/22/2023 -  Maintenance Therapy    Dostarlimab 1000mg x     2/21/2024 Procedure    EGD, Colonoscopy: No gross evidence of colitis      Hospital Admission    Most Recent Admission Details  Admit Date: 2/15/24  Admitted for: Grade 4 colitis 2/2 Salmonella.    Discharge date: 2/23/24  Discharged from: [facility] ONCOLOGY ACUTE at [facility]  Discharge disposition: Home or Self Care       2/21/2024 Procedure    Colonoscopy, EGD: No obvious ICI colitis     7/9/2024 Imaging Significant Findings    CT C/A/P w/: PATTI     11/14/2024 Imaging Significant Findings    CT C/A/P w/: PATTI          The following portions of the patient's history were reviewed and updated as appropriate: allergies, current medications, family history, medical history, social history and surgical history.    REVIEW OF SYSTEMS  All systems reviewed and negative except as noted in HPI.    OBJECTIVE   There were no vitals filed for this visit.       There is no height or weight on file to calculate BMI.      1. General: Well appearing, no apparent distress, alert and oriented.  2. Lymph: Neck symmetric without cervical or supraclavicular adenopathy or mass.  3. Lungs: Normal respiratory rate                ECOG status: 1    LABORATORY DATA  Lab data reviewed.    RADIOLOGICAL DATA  Radiology data reviewed.    ASSESSMENT / PLAN     1. Malignant neoplasm of endometrium    2. Subclinical hypothyroidism    3. Chemotherapy induced diarrhea    4. Encounter for antineoplastic chemotherapy    5. Community acquired pneumonia of right lower lobe of lung        F/U Pulm PRN  F/U GI      Persistent grade 1-2 diarrhea with Entyvio (ND 11/13).  Stool studies WNL (10/6/24).  We had an extensive discussion about proceeding vs discontinuing maintenance therapy. The patient would like to continue therapy which I think is reasonable.    No dose limiting toxicities or signs of progression, but she will like a holiday until the middle of January.   TSH, T4, CBC, CMP, RONC maintenance cycle #10 4 weeks.    PATIENT EDUCATION  Ready to learn, no apparent learning barriers were identified; learning preferences include listening. Explained diagnosis and treatment plan; patient expressed understanding of the content.    ADMINISTRATIVE BILLING  Greater than 50% of was spent in counseling.     ONGOING COMPLEXITY BILLING  Visit today is associated with current or anticipated ongoing medical care related to this patients single serious condition/complex condition: endometrial cancer

## 2024-12-17 ENCOUNTER — OFFICE VISIT (OUTPATIENT)
Dept: GYNECOLOGIC ONCOLOGY | Facility: CLINIC | Age: 59
End: 2024-12-17
Payer: COMMERCIAL

## 2024-12-17 ENCOUNTER — TELEPHONE (OUTPATIENT)
Dept: GYNECOLOGIC ONCOLOGY | Facility: CLINIC | Age: 59
End: 2024-12-17

## 2024-12-17 DIAGNOSIS — J18.9 COMMUNITY ACQUIRED PNEUMONIA OF RIGHT LOWER LOBE OF LUNG: ICD-10-CM

## 2024-12-17 DIAGNOSIS — C54.1 MALIGNANT NEOPLASM OF ENDOMETRIUM: Primary | ICD-10-CM

## 2024-12-17 DIAGNOSIS — T45.1X5A CHEMOTHERAPY INDUCED DIARRHEA: ICD-10-CM

## 2024-12-17 DIAGNOSIS — Z51.11 ENCOUNTER FOR ANTINEOPLASTIC CHEMOTHERAPY: ICD-10-CM

## 2024-12-17 DIAGNOSIS — K52.1 CHEMOTHERAPY INDUCED DIARRHEA: ICD-10-CM

## 2024-12-17 DIAGNOSIS — E03.8 SUBCLINICAL HYPOTHYROIDISM: ICD-10-CM

## 2024-12-17 PROCEDURE — 99215 OFFICE O/P EST HI 40 MIN: CPT | Mod: 95,,, | Performed by: OBSTETRICS & GYNECOLOGY

## 2024-12-17 PROCEDURE — G2211 COMPLEX E/M VISIT ADD ON: HCPCS | Mod: 95,,, | Performed by: OBSTETRICS & GYNECOLOGY

## 2024-12-19 ENCOUNTER — HOSPITAL ENCOUNTER (OUTPATIENT)
Dept: RADIOLOGY | Facility: HOSPITAL | Age: 59
Discharge: HOME | End: 2024-12-19
Attending: STUDENT IN AN ORGANIZED HEALTH CARE EDUCATION/TRAINING PROGRAM
Payer: COMMERCIAL

## 2024-12-19 DIAGNOSIS — Z12.31 OTHER SCREENING MAMMOGRAM: ICD-10-CM

## 2024-12-19 PROCEDURE — 77067 SCR MAMMO BI INCL CAD: CPT | Mod: TC

## 2024-12-24 ENCOUNTER — PATIENT OUTREACH (OUTPATIENT)
Dept: ADMINISTRATIVE | Facility: OTHER | Age: 59
End: 2024-12-24
Payer: COMMERCIAL

## 2025-01-04 DIAGNOSIS — T45.1X5A CHEMOTHERAPY-INDUCED NEUROPATHY: ICD-10-CM

## 2025-01-04 DIAGNOSIS — G62.0 CHEMOTHERAPY-INDUCED NEUROPATHY: ICD-10-CM

## 2025-01-04 DIAGNOSIS — C54.1 MALIGNANT NEOPLASM OF ENDOMETRIUM: ICD-10-CM

## 2025-01-04 DIAGNOSIS — Z51.11 ENCOUNTER FOR ANTINEOPLASTIC CHEMOTHERAPY: ICD-10-CM

## 2025-01-04 DIAGNOSIS — C77.2 SECONDARY MALIGNANCY OF RETROPERITONEAL LYMPH NODES: ICD-10-CM

## 2025-01-06 ENCOUNTER — TELEPHONE (OUTPATIENT)
Dept: GYNECOLOGIC ONCOLOGY | Facility: CLINIC | Age: 60
End: 2025-01-06
Payer: COMMERCIAL

## 2025-01-06 ENCOUNTER — CLINICAL SUPPORT (OUTPATIENT)
Dept: AUDIOLOGY | Facility: CLINIC | Age: 60
End: 2025-01-06
Payer: COMMERCIAL

## 2025-01-06 ENCOUNTER — OFFICE VISIT (OUTPATIENT)
Dept: OTOLARYNGOLOGY | Facility: CLINIC | Age: 60
End: 2025-01-06
Payer: COMMERCIAL

## 2025-01-06 VITALS
BODY MASS INDEX: 25.3 KG/M2 | HEIGHT: 67 IN | WEIGHT: 161.19 LBS | DIASTOLIC BLOOD PRESSURE: 66 MMHG | SYSTOLIC BLOOD PRESSURE: 105 MMHG

## 2025-01-06 DIAGNOSIS — G47.01 INSOMNIA DUE TO MEDICAL CONDITION: Primary | ICD-10-CM

## 2025-01-06 DIAGNOSIS — H91.92 HEARING LOSS OF LEFT EAR, UNSPECIFIED HEARING LOSS TYPE: ICD-10-CM

## 2025-01-06 DIAGNOSIS — H90.3 SENSORINEURAL HEARING LOSS (SNHL) OF BOTH EARS: Primary | ICD-10-CM

## 2025-01-06 DIAGNOSIS — H90.3 SENSORINEURAL HEARING LOSS (SNHL), BILATERAL: Primary | ICD-10-CM

## 2025-01-06 DIAGNOSIS — H93.13 TINNITUS OF BOTH EARS: ICD-10-CM

## 2025-01-06 PROCEDURE — 3008F BODY MASS INDEX DOCD: CPT | Mod: CPTII,S$GLB,, | Performed by: NURSE PRACTITIONER

## 2025-01-06 PROCEDURE — 92557 COMPREHENSIVE HEARING TEST: CPT | Mod: S$GLB,,,

## 2025-01-06 PROCEDURE — 3074F SYST BP LT 130 MM HG: CPT | Mod: CPTII,S$GLB,, | Performed by: NURSE PRACTITIONER

## 2025-01-06 PROCEDURE — 92567 TYMPANOMETRY: CPT | Mod: S$GLB,,,

## 2025-01-06 PROCEDURE — 3078F DIAST BP <80 MM HG: CPT | Mod: CPTII,S$GLB,, | Performed by: NURSE PRACTITIONER

## 2025-01-06 PROCEDURE — 99203 OFFICE O/P NEW LOW 30 MIN: CPT | Mod: S$GLB,,, | Performed by: NURSE PRACTITIONER

## 2025-01-06 RX ORDER — DULOXETIN HYDROCHLORIDE 30 MG/1
30 CAPSULE, DELAYED RELEASE ORAL DAILY
Qty: 30 CAPSULE | Refills: 11 | Status: SHIPPED | OUTPATIENT
Start: 2025-01-06 | End: 2026-01-06

## 2025-01-06 RX ORDER — DULOXETIN HYDROCHLORIDE 30 MG/1
CAPSULE, DELAYED RELEASE ORAL
Qty: 90 CAPSULE | Refills: 2 | Status: SHIPPED | OUTPATIENT
Start: 2025-01-06 | End: 2025-01-06 | Stop reason: SDUPTHER

## 2025-01-06 NOTE — PROGRESS NOTES
OTOLARYNGOLOGY CLINIC NOTE  Date:  01/06/2025     Chief complaint:  Chief Complaint   Patient presents with    Hearing Loss     Left ear in November 2024, feels better now but not 100%     History of Present Illness  Sugar Diaz is a 59 y.o. female  presenting today for a new evaluation and treatment of hearing loss.  Pt reports in November she had pneumonia and that's when she noticed the change.  Pt denies any otalgia, otorrhea, ear fullness or pressure.  Pt reports her hearing has improved since then but was advised by her PCP to have it checked.  Pt reports having some tinnitus to both ears.  Pt reports she does hear it more at night.      Past Medical History  Past Medical History:   Diagnosis Date    Chest pain 2002    NEGATIVE STRESS ECHO 2002    Endometrial cancer     2023    GERD (gastroesophageal reflux disease)     Lower back pain     Migraines     Mild allergic rhinitis     Postmenopausal bleeding       Past Surgical History  Past Surgical History:   Procedure Laterality Date    COLONOSCOPY N/A 10/06/2023    Procedure: COLONOSCOPY;  Surgeon: Marcia Weaver MD;  Location: [facility];  Service: Endoscopy;  Laterality: N/A;  10/4- referred by Dr. Weaver/ Subject: Needs colonoscopy ASAP within 1 week /Procedure Timing: < 1 week/Diagnosis: Diarrhea, evaluate for immunotherapy induced colitis/ Prep instructions to portal. AS    COLONOSCOPY N/A 2/21/2024    Procedure: COLONOSCOPY;  Surgeon: Fercho Ramirez MD;  [facility]  Service: Endoscopy;  Laterality: N/A;    CYST REMOVAL Left 04/28/2022    Procedure: EXCISION, CYST-BACK;  Surgeon: Thomas Hurst MD;  Location: [facility];  Service: General;  Laterality: Left;  left upper back  RN PREOP ON 4/21/22-NF    ESOPHAGOGASTRODUODENOSCOPY N/A 2/21/2024    Procedure: EGD (ESOPHAGOGASTRODUODENOSCOPY);  Surgeon: Fercho Ramirez MD;  Location: [facility] ([address] [place] [address]);  Service: Endoscopy;  Laterality: N/A;    HYSTERECTOMY      after  endometrial cancer diagnosis    HYSTEROSCOPY WITH DILATION AND CURETTAGE OF UTERUS N/A 05/23/2023    Procedure: HYSTEROSCOPY, WITH DILATION AND CURETTAGE OF UTERUS;  Surgeon: Omaira Evans MD;  Location: [facility];  Service: OB/GYN;  Laterality: N/A;  DESTINY HERNANDEZ  [phone] TEXTED [facility] ON 5/2/2023 @ 3:51PM. [facility] RESPONDED ON 5/2/2023 @ 3:51PM-LO  RN PREOP 5/18/23  T & S; UPT ORDERED AND SCHEDULED 5/22/23    LYMPH NODE BIOPSY Left 06/05/2023    Procedure: BIOPSY, sentinel LYMPH NODE;  Surgeon: Lavell Rodríguez MD;  Location: [facility];  Service: OB/GYN;  Laterality: Left;    LYMPHADENECTOMY, PELVIS Right 06/05/2023    Procedure: LYMPHADENECTOMY, PELVIS;  Surgeon: Lavell Rodríguez MD;  Location: [facility];  Service: OB/GYN;  Laterality: Right;    MAPPING, LYMPH NODE, SENTINEL Bilateral 06/05/2023    Procedure: injection, LYMPH NODE, SENTINEL;  Surgeon: Lavell Rodríguez MD;  Location: [facility];  Service: OB/GYN;  Laterality: Bilateral;    NERVE REPAIR Right 06/05/2023    Procedure: REPAIR, OBTURATOR NERVE;  Surgeon: Lavell Rodríguez MD;  Location: [facility];  Service: OB/GYN;  Laterality: Right;    TONSILLECTOMY      TONSILLECTOMY      TUBAL LIGATION      XI ROBOTIC HYSTERECTOMY, WITH SALPINGO-OOPHORECTOMY Bilateral 06/05/2023    Procedure: XI ROBOTIC HYSTERECTOMY,WITH SALPINGO-OOPHORECTOMY;  Surgeon: Lavell Rodríguez MD;  Location: [facility];  Service: OB/GYN;  Laterality: Bilateral;  removed through vagina      Medications  Current Outpatient Medications on File Prior to Visit   Medication Sig Dispense Refill    acetaminophen (TYLENOL) 650 MG TbSR Take 1 tablet (650 mg total) by mouth every 8 (eight) hours as needed (Headache). 30 tablet 0    azelastine (ASTELIN) 137 mcg (0.1 %) nasal spray 1 spray (137 mcg total) by Nasal route 2 (two) times daily. 30 mL 0    b complex vitamins tablet Take 1 tablet by mouth once daily.      celecoxib (CELEBREX) 100 MG capsule Take 1 capsule (100 mg total) by mouth once daily. 30  capsule 3    cetirizine (ZYRTEC) 5 MG tablet Take 1 tablet (5 mg total) by mouth once daily. 30 tablet 0    diclofenac sodium (VOLTAREN) 1 % Gel Apply 2 g topically 2 (two) times daily. 100 g 0    diphenoxylate-atropine 2.5-0.025 mg (LOMOTIL) 2.5-0.025 mg per tablet Take 1 tablet by mouth daily as needed for Diarrhea.      DULoxetine (CYMBALTA) 30 MG capsule TAKE 1 CAPSULE BY MOUTH ONCE DAILY AND 2 CAPSULES ONCE DAILY 90 capsule 2    ibuprofen (ADVIL,MOTRIN) 600 MG tablet Take 1 tablet (600 mg total) by mouth every 6 (six) hours as needed for Pain. 30 tablet 3    levothyroxine (SYNTHROID) 100 MCG tablet TAKE 1 TABLET(100 MCG) BY MOUTH BEFORE BREAKFAST 30 tablet 11    multivitamin (ONE DAILY MULTIVITAMIN) per tablet Take 1 tablet by mouth once daily.      valACYclovir (VALTREX) 1000 MG tablet Two pills at onset of fever blister and then repeat 2 pills 12 hr later 30 tablet 11    vedolizumab (ENTYVIO) 300 mg SolR injection Inject 300 mg into the vein. Week 0, 2, and 6 followed by every 8 weeks      aluminum & magnesium hydroxide-simethicone (MYLANTA MAX STRENGTH) 400-400-40 mg/5 mL suspension Take 15 mLs by mouth every 6 (six) hours as needed for Indigestion. 335 mL 0    [DISCONTINUED] DULoxetine (CYMBALTA) 60 MG capsule TAKE 1 CAPSULE(60 MG) BY MOUTH DAILY 30 capsule 2     No current facility-administered medications on file prior to visit.     Review of Systems  Review of Systems   Constitutional:  Positive for malaise/fatigue.   HENT:  Positive for hearing loss.    Eyes: Negative.    Respiratory:  Positive for cough and wheezing.    Cardiovascular: Negative.    Gastrointestinal: Negative.    Genitourinary: Negative.    Musculoskeletal: Negative.    Skin: Negative.    Neurological: Negative.    Psychiatric/Behavioral: Negative.        Social History   reports that she has never smoked. She has never used smokeless tobacco. She reports that she does not drink alcohol and does not use drugs.     Family History  Family  "History   Problem Relation Name Age of Onset    No Known Problems Mother      Heart disease Father Abhilash     Breast cancer Sister  55    No Known Problems Sister      Colon cancer Paternal Uncle      Esophageal cancer Neg Hx        Physical Exam   Vitals:    01/06/25 0826   BP: 105/66    Body mass index is 25.24 kg/m².  Weight: 73.1 kg (161 lb 2.5 oz)   Height: 5' 7" (170.2 cm)     GENERAL: no acute distress.  HEAD: normocephalic.   EYES: lids and lashes normal. No scleral icterus  EARS: external ear without lesion, normal pinna shape and position.  External auditory canal with normal cerumen, tympanic membrane fully visible, no perforation , no retraction. No middle ear effusion. Ossicles intact.   NOSE: external nose without significant bony abnormality  ORAL CAVITY/OROPHARYNX: tongue midline and mobile. Symmetric palate rise. Uvula midline.   NECK: trachea midline.   LYMPH NODES:No cervical lymphadenopathy.  RESPIRATORY: no stridor, no stertor. Voice normal. Respirations nonlabored.  NEURO: alert, responds to questions appropriately.   Cranial nerve exam as indicated in above sections and additionally showed facial movement symmetric with good eye closure and symmetric smile.   PSYCH:mood appropriate    Imaging:  The patient does not have any pertinent and/or recent imaging of the head and neck.     Labs:  CBC  Recent Labs   Lab 11/01/24  1002 11/18/24  0926 11/25/24  1111   WBC 8.36 12.05 12.74 H   Hemoglobin 13.3 12.8 14.5   Hematocrit 38.8 38.5 43.2   MCV 94 93 93   Platelets 319 372 418     BMP  Recent Labs   Lab 02/21/24  0228 02/22/24  0245 03/12/24  1419 04/20/24  1348 04/23/24  1126 11/01/24  1002 11/18/24  0926 11/25/24  1111   Glucose 94 91   < > 119 H   < > 96 135 H 95   Sodium 139 137   < > 141   < > 142 138 139   Potassium 2.8 L 3.7   < > 3.2 L   < > 3.9 3.8 5.2 H   Chloride 108 110   < > 109   < > 109 106 107   CO2 23 19 L   < > 22 L   < > 25 22 L 22 L   BUN 9 12   < > 16   < > 11 10 13   Creatinine " 0.6 0.6   < > 0.7   < > 0.7 0.8 0.8   Calcium 8.4 L 8.3 L   < > 9.3   < > 8.9 8.9 9.3   Phosphorus 2.8 2.1 L  --  3.1  3.1  --   --   --   --    Magnesium 1.8 1.9  --  1.8  --   --   --  1.7    < > = values in this interval not displayed.     COAGS  Recent Labs   Lab 10/14/24  0742 11/01/24  1002   INR 0.9 1.0         AUDIOLOGY RESULTS  Audiometric evaluation including audiogram, tympanometry, acoustic reflexes, and speech discrimination which was performed  was personally reviewed and interpreted.  Notable findings on the audiogram were mild-moderate sensorineural hearing loss (SNHL) bilaterally.  Tympanometry revealed Type A tympanogram on the left and Type A tympanogram on the right. Speech discrimination was 100% on the left, and 100% on the right. Report of the audiologist performing this audiometric testing was also reviewed.     Assessment  1. Sensorineural hearing loss (SNHL), bilateral    2. Tinnitus of both ears     Plan:  SNHL:  Audiogram reviewed and discussed with patient. Recheck hearing in 2 year or sooner if subjective change noted. Encouraged hearing protection while in noisy environment.  Additionally, amplification with hearing aids is generally the best option for hearing rehabilitation, except where the hearing loss is profound.  Tinnitus:  Pt advised there is no cure for condition but methods to help control. Pt advised to avoid caffeine, alcohol, tobacco and stress.  Pt advised hearing aids sometimes help with tinnitus.  Pt can also try using white noise machine in quiet environment. Pt advised to wear hearing protection in noisy environment.  F/u prn.  Discussed plan of care with patient in detail and all questions answered. Patient reported understanding of plan of care.

## 2025-01-06 NOTE — TELEPHONE ENCOUNTER
Spoke to Ms Diaz and clarified she is taking cymbalta 30mg. Returned call to pharmacy to confirm  Jackelyn Soto, DEVI, AOCNP  Gynecologic Oncology          LM with patient to clarify dose before returning pharmacy call.    ----- Message from Summer sent at 1/6/2025  8:03 AM CST -----  Regarding: DULoxetine (CYMBALTA) 30 MG capsule  Type: Pharmacy calling to clarify an Rx     Name of caller:Lexi     [facility]     Prescription name:DULoxetine (CYMBALTA) 30 MG capsule     What do they need to clarify? On how to take medication      Best call back number:[phone]

## 2025-01-07 ENCOUNTER — PATIENT MESSAGE (OUTPATIENT)
Dept: GASTROENTEROLOGY | Facility: CLINIC | Age: 60
End: 2025-01-07
Payer: COMMERCIAL

## 2025-01-07 NOTE — PROGRESS NOTES
Please click on link to view Audiogram:  Document on 1/6/2025 8:23 AM by Jessica Carlton AU.D: Audiogram    Ms. Sugar Diaz, a 59 y.o. female, was seen in the clinic today for an audiological evaluation.     Otoscopy clear bilaterally. Tympanometry testing revealed a Type A tympanogram for the right ear and a Type A tympanogram for the left ear.     Audiological testing revealed an essentially mild to moderately-severe sensorineural hearing loss (SNHL) bilaterally. A speech reception threshold was obtained at 35 dBHL for the right ear and at 35 dBHL for the left ear. Speech discrimination was 100% for the right ear and 100% for the left ear.      Recommendations:  1. Otologic evaluation  2. Annual audiological evaluation, sooner if medically necessary or if hearing changes  3. Hearing protection when in noise   4. Hearing aid consultation following medical clearance

## 2025-01-08 ENCOUNTER — INFUSION (OUTPATIENT)
Dept: INFUSION THERAPY | Facility: HOSPITAL | Age: 60
End: 2025-01-08
Attending: INTERNAL MEDICINE
Payer: COMMERCIAL

## 2025-01-08 VITALS
TEMPERATURE: 98 F | OXYGEN SATURATION: 99 % | SYSTOLIC BLOOD PRESSURE: 138 MMHG | HEART RATE: 89 BPM | DIASTOLIC BLOOD PRESSURE: 62 MMHG | RESPIRATION RATE: 18 BRPM

## 2025-01-08 DIAGNOSIS — K52.9 COLITIS: Primary | ICD-10-CM

## 2025-01-08 PROCEDURE — 63600175 PHARM REV CODE 636 W HCPCS: Performed by: INTERNAL MEDICINE

## 2025-01-08 PROCEDURE — 25000003 PHARM REV CODE 250: Performed by: INTERNAL MEDICINE

## 2025-01-08 PROCEDURE — 96365 THER/PROPH/DIAG IV INF INIT: CPT

## 2025-01-08 RX ORDER — SODIUM CHLORIDE 0.9 % (FLUSH) 0.9 %
10 SYRINGE (ML) INJECTION
Status: DISCONTINUED | OUTPATIENT
Start: 2025-01-08 | End: 2025-01-08 | Stop reason: HOSPADM

## 2025-01-08 RX ORDER — DIPHENHYDRAMINE HYDROCHLORIDE 50 MG/ML
25 INJECTION INTRAMUSCULAR; INTRAVENOUS
OUTPATIENT
Start: 2025-03-05

## 2025-01-08 RX ORDER — ACETAMINOPHEN 325 MG/1
650 TABLET ORAL
OUTPATIENT
Start: 2025-03-05

## 2025-01-08 RX ORDER — IPRATROPIUM BROMIDE AND ALBUTEROL SULFATE 2.5; .5 MG/3ML; MG/3ML
3 SOLUTION RESPIRATORY (INHALATION)
OUTPATIENT
Start: 2025-03-05

## 2025-01-08 RX ORDER — HEPARIN 100 UNIT/ML
500 SYRINGE INTRAVENOUS
OUTPATIENT
Start: 2025-03-05

## 2025-01-08 RX ORDER — EPINEPHRINE 1 MG/ML
0.3 INJECTION, SOLUTION, CONCENTRATE INTRAVENOUS
OUTPATIENT
Start: 2025-03-05

## 2025-01-08 RX ORDER — SODIUM CHLORIDE 0.9 % (FLUSH) 0.9 %
10 SYRINGE (ML) INJECTION
OUTPATIENT
Start: 2025-03-05

## 2025-01-08 RX ORDER — METHYLPREDNISOLONE SOD SUCC 125 MG
40 VIAL (EA) INJECTION
OUTPATIENT
Start: 2025-03-05

## 2025-01-08 RX ORDER — HEPARIN 100 UNIT/ML
500 SYRINGE INTRAVENOUS
Status: DISCONTINUED | OUTPATIENT
Start: 2025-01-08 | End: 2025-01-08 | Stop reason: HOSPADM

## 2025-01-08 RX ADMIN — HEPARIN SODIUM (PORCINE) LOCK FLUSH IV SOLN 100 UNIT/ML 500 UNITS: 100 SOLUTION at 01:01

## 2025-01-08 RX ADMIN — VEDOLIZUMAB 300 MG: 300 INJECTION, POWDER, LYOPHILIZED, FOR SOLUTION INTRAVENOUS at 12:01

## 2025-01-08 NOTE — PLAN OF CARE
Arrived on unit with strong steady gait for Entyvio infusion. Denies pain or discomfort. R. PAC Accessed without difficulty. Good Blood return. Entyvio infused over 30 mins per pump. PAC flushed and Heparin locked. Deaccessed. Bandaid applie to site. Patient left per self in NAD. Uses My Chart for appts.

## 2025-01-09 NOTE — PROGRESS NOTES
REFERRING PROVIDER  Omaira Evans MD Shah, Shamita MD    REASON FOR CONSULT  Sugar Diaz  is a 59 y.o.  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    HISTORY OF PRESENT ILLNESS    Chemotherapy regimen: Dostarlimab 1000mg 6 weeks (3 years)  Cycle: 10  Previous dose limiting toxicities: Y  Cycle 3: Grade 4 diarrhea 2/2 Salmonella  Previous dose reductions: N  Previous breaks: Y  Cycle 4: 4 weeks 2/2 Grade 4 diarrhea 2/2 Salmonella  Cycle 10: 8 weeks 2/2 PNA and immunotherapy holiday  Previous changes in pre-medications: N    Energy level: baseline  Appetite: baseline  Fevers/chills/nights: no  Nausea: no  Diarrhea: yes, grade 2  Emesis: no  Neuropathy: yes, grade 3  Discoloration of lower extremities: no  Mucositis: no  Maculopapular rashes: no  Dyspnea or coughing: yes      Oncology History   Malignant neoplasm of endometrium   5/23/2023 Surgery    Hysteroscopy, D&C: grade 1 endometrioid EC     6/5/2023 Surgery    TRH/BSO/BSLN/RPLN/repair of obturator nerve    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 4.5 cm, grade 1, 96% MI (22/23 mm), +LUE, -LVSI, +MELF, -cervix, 1/2+ RPLN, 1/1+ LPLN, -ascites.     6/19/2023 Imaging Significant Findings    CT C/A/P w/: High L para-aortic at the level of the renal vessels, 1.5 cm.  Low R para-aortic at the LEAH, 4.1 cm in greatest dimension.     6/25/2023 Genomic Testing    [facility] (NGS): ARD1A, CHEK1, CTCF, , HDAC2, HNF1A, JAK1, KRAS, MAX, MSH6, P1K3R1, PPM1D, PTEN, ZFHX3, ZSR2     6/28/2023 Tumor Conference    No rule for debulking of para-aortic lymph nodes.  Adjuvant VBT.  Represent after completion of adjuvant therapy to discuss EBRT.        7/7/2023 - 10/19/2023 Chemotherapy    Carboplatin AUC 5/Paclitaxel 135 mg/m2/Dostarlimab 200mg q3 weeks x6    Previous dose limiting toxicities: Y  Cycle 4: Grade 2 colitis s/p steroid  taper  Cycle 5: Grade 3 colitis  Previous dose reductions: N  Previous breaks: Y  Cycle 4: Dostarlimab omitted due to grade 2 colitis  Cycle 5: Dostarlimab 2/2 grade 3 colitis (negative colonoscopy with improvement in symptoms with steroid taper)     8/9/2023 - 9/13/2023 Radiation Therapy    Treating physician: Dr. Adriane Babb  Total Dose: 21 Gy HDR  Fractions: 3 vaginal cuff brachytherapy     8/22/2023 Imaging Significant Findings    CT A/P w/: No evidence of colitis, IN in high L para-aortic and low R para-aortic.     10/4/2023 Procedure    Colonoscopy: WNL     11/6/2023 Imaging Significant Findings    CT C/A/P w/: PATTI     11/6/2023 - 11/9/2023 Hospital Admission    Most Recent Admission Details  Admit Date: 11/6/23  Admitted for: RVS PNA superimposed by community acquitted PNA  Discharge date: 11/9/23  Discharged from: [address] at [facility] ([facility]  Discharge disposition: Home or Self Care       11/22/2023 -  Maintenance Therapy    Dostarlimab 1000mg x     2/21/2024 Procedure    EGD, Colonoscopy: No gross evidence of colitis      Hospital Admission    Most Recent Admission Details  Admit Date: 2/15/24  Admitted for: Grade 4 colitis 2/2 Salmonella.    Discharge date: 2/23/24  Discharged from: [facility]  Discharge disposition: Home or Self Care       2/21/2024 Procedure    Colonoscopy, EGD: No obvious ICI colitis     7/9/2024 Imaging Significant Findings    CT C/A/P w/: PATTI     11/14/2024 Imaging Significant Findings    CT C/A/P w/: PATTI          The following portions of the patient's history were reviewed and updated as appropriate: allergies, current medications, family history, medical history, social history and surgical history.    REVIEW OF SYSTEMS  All systems reviewed and negative except as noted in HPI.    OBJECTIVE   Vitals:    01/15/25 1057   BP: (!) 142/60   Pulse: 88   Temp: 97.7 °F (36.5 °C)          Body mass index is 24.59  kg/m².      1. General: Well appearing, no apparent distress, alert and oriented.  2. Lymph: Neck symmetric without cervical or supraclavicular adenopathy or mass.  3. Lungs: Normal respiratory rate                ECOG status: 1    LABORATORY DATA  Lab data reviewed.    RADIOLOGICAL DATA  Radiology data reviewed.    ASSESSMENT / PLAN     1. Malignant neoplasm of endometrium    2. Encounter for antineoplastic chemotherapy    3. Chemotherapy induced diarrhea      F/U GI  Persistent grade 1-2 diarrhea with Entyvio (ND 1/8).  Stool studies WNL (10/6/24).  We had an extensive discussion about proceeding vs discontinuing maintenance therapy. The patient would like to continue therapy which I think is reasonable.    No dose limiting toxicities or signs of progression.  Administer cycle #10.  TSH, T4, CBC, CMP, VV, maintenance cycle #11 6 weeks.    PATIENT EDUCATION  Ready to learn, no apparent learning barriers were identified; learning preferences include listening. Explained diagnosis and treatment plan; patient expressed understanding of the content.    ADMINISTRATIVE BILLING  Greater than 50% of was spent in counseling.     ONGOING COMPLEXITY BILLING  Visit today is associated with current or anticipated ongoing medical care related to this patients single serious condition/complex condition: endometrial cancer

## 2025-01-13 ENCOUNTER — LAB VISIT (OUTPATIENT)
Dept: LAB | Facility: HOSPITAL | Age: 60
End: 2025-01-13
Attending: OBSTETRICS & GYNECOLOGY
Payer: COMMERCIAL

## 2025-01-13 DIAGNOSIS — C54.1 MALIGNANT NEOPLASM OF ENDOMETRIUM: ICD-10-CM

## 2025-01-13 LAB
ALBUMIN SERPL BCP-MCNC: 3.7 G/DL (ref 3.5–5.2)
ALP SERPL-CCNC: 84 U/L (ref 40–150)
ALT SERPL W/O P-5'-P-CCNC: 16 U/L (ref 10–44)
ANION GAP SERPL CALC-SCNC: 7 MMOL/L (ref 8–16)
AST SERPL-CCNC: 20 U/L (ref 10–40)
BILIRUB SERPL-MCNC: 0.3 MG/DL (ref 0.1–1)
BUN SERPL-MCNC: 13 MG/DL (ref 8–23)
CALCIUM SERPL-MCNC: 9.1 MG/DL (ref 8.7–10.5)
CHLORIDE SERPL-SCNC: 107 MMOL/L (ref 95–110)
CO2 SERPL-SCNC: 27 MMOL/L (ref 23–29)
CREAT SERPL-MCNC: 0.8 MG/DL (ref 0.5–1.4)
ERYTHROCYTE [DISTWIDTH] IN BLOOD BY AUTOMATED COUNT: 12.6 % (ref 11.5–14.5)
EST. GFR  (NO RACE VARIABLE): >60 ML/MIN/1.73 M^2
GLUCOSE SERPL-MCNC: 92 MG/DL (ref 70–110)
HCT VFR BLD AUTO: 40.8 % (ref 37–48.5)
HGB BLD-MCNC: 13.1 G/DL (ref 12–16)
IMM GRANULOCYTES # BLD AUTO: 0.01 K/UL (ref 0–0.04)
MCH RBC QN AUTO: 30.4 PG (ref 27–31)
MCHC RBC AUTO-ENTMCNC: 32.1 G/DL (ref 32–36)
MCV RBC AUTO: 95 FL (ref 82–98)
NEUTROPHILS # BLD AUTO: 4.4 K/UL (ref 1.8–7.7)
PLATELET # BLD AUTO: 319 K/UL (ref 150–450)
PMV BLD AUTO: 8.6 FL (ref 9.2–12.9)
POTASSIUM SERPL-SCNC: 4 MMOL/L (ref 3.5–5.1)
PROT SERPL-MCNC: 6.6 G/DL (ref 6–8.4)
RBC # BLD AUTO: 4.31 M/UL (ref 4–5.4)
SODIUM SERPL-SCNC: 141 MMOL/L (ref 136–145)
T4 FREE SERPL-MCNC: 0.82 NG/DL (ref 0.71–1.51)
TSH SERPL DL<=0.005 MIU/L-ACNC: 16.93 UIU/ML (ref 0.4–4)
WBC # BLD AUTO: 6.85 K/UL (ref 3.9–12.7)

## 2025-01-13 PROCEDURE — 80053 COMPREHEN METABOLIC PANEL: CPT | Performed by: OBSTETRICS & GYNECOLOGY

## 2025-01-13 PROCEDURE — 84443 ASSAY THYROID STIM HORMONE: CPT | Performed by: OBSTETRICS & GYNECOLOGY

## 2025-01-13 PROCEDURE — 84439 ASSAY OF FREE THYROXINE: CPT | Performed by: OBSTETRICS & GYNECOLOGY

## 2025-01-13 PROCEDURE — 85027 COMPLETE CBC AUTOMATED: CPT | Performed by: OBSTETRICS & GYNECOLOGY

## 2025-01-13 PROCEDURE — 36415 COLL VENOUS BLD VENIPUNCTURE: CPT | Performed by: OBSTETRICS & GYNECOLOGY

## 2025-01-14 ENCOUNTER — OFFICE VISIT (OUTPATIENT)
Dept: GASTROENTEROLOGY | Facility: CLINIC | Age: 60
End: 2025-01-14
Payer: COMMERCIAL

## 2025-01-14 VITALS
HEIGHT: 67 IN | HEART RATE: 101 BPM | OXYGEN SATURATION: 99 % | BODY MASS INDEX: 24.63 KG/M2 | WEIGHT: 156.94 LBS | SYSTOLIC BLOOD PRESSURE: 94 MMHG | TEMPERATURE: 98 F | DIASTOLIC BLOOD PRESSURE: 58 MMHG

## 2025-01-14 DIAGNOSIS — K51.20 ULCERATIVE (CHRONIC) PROCTITIS WITHOUT COMPLICATIONS: Primary | ICD-10-CM

## 2025-01-14 DIAGNOSIS — C54.1 MALIGNANT NEOPLASM OF ENDOMETRIUM: ICD-10-CM

## 2025-01-14 PROBLEM — J18.9 COMMUNITY ACQUIRED PNEUMONIA OF RIGHT LOWER LOBE OF LUNG: Status: RESOLVED | Noted: 2023-11-06 | Resolved: 2025-01-14

## 2025-01-14 PROBLEM — R19.7 DIARRHEA: Status: RESOLVED | Noted: 2023-08-22 | Resolved: 2025-01-14

## 2025-01-14 PROCEDURE — 99214 OFFICE O/P EST MOD 30 MIN: CPT | Mod: S$GLB,,, | Performed by: INTERNAL MEDICINE

## 2025-01-14 PROCEDURE — 1159F MED LIST DOCD IN RCRD: CPT | Mod: CPTII,S$GLB,, | Performed by: INTERNAL MEDICINE

## 2025-01-14 PROCEDURE — 3078F DIAST BP <80 MM HG: CPT | Mod: CPTII,S$GLB,, | Performed by: INTERNAL MEDICINE

## 2025-01-14 PROCEDURE — 3074F SYST BP LT 130 MM HG: CPT | Mod: CPTII,S$GLB,, | Performed by: INTERNAL MEDICINE

## 2025-01-14 PROCEDURE — G2211 COMPLEX E/M VISIT ADD ON: HCPCS | Mod: S$GLB,,, | Performed by: INTERNAL MEDICINE

## 2025-01-14 PROCEDURE — 3008F BODY MASS INDEX DOCD: CPT | Mod: CPTII,S$GLB,, | Performed by: INTERNAL MEDICINE

## 2025-01-14 NOTE — PROGRESS NOTES
[facility]             Inflammatory Bowel Disease   Follow-up  Note              TODAY'S VISIT DATE:  1/14/2025    Chief Complaint:   Chief Complaint   Patient presents with    Ulcerative Colitis     PCP: Dexter Khan    Previous History:  Sugar Diaz is a 59 y.o. female with endometrial cancer (diagnosed in 5/2023; status post TLH/BSO in 6/2023 and initiation of chemotherapy/radiation in 7/2023 including the use of Dostarlimab). She developed grade 2 and 3 colitis following fourth and fifth cycle of chemotherapy, respectively. In 10/2023, she underwent outpatient colonoscopy showing diverticulosis of sigmoid colon and normal mucosal lining with random colon biopsies showed mild active colitis with prominent crypt epithelial cell apoptosis and irregular subepithelial collagen. She was treated with Prednisone taper from 10/4 to 11/3 (starting dose 70 mg/day and tapering down by 20 mg per week). Dosartlimab was resumed in 11/2023. Following completion of prednisone taper, patient had recurrent diarrhea and referred patient to see Dr. Weaver who was contacted in early 2/2024 and arranged for repeat endoscopic evaluation and follow up though she had significant recurrent diarrhea with hypokalemia and hospitalized from 2/15-2/23/24. 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 She was treated for salmonelle with cipro in 2/2024 and started on IV solumedrol followed by oral pred taper. She initiated entyvio 4/17/24 due to pred dependency and restarting immunotherapy with dostarlimab on 3/14/14.        Interval History:  - current IBD meds: Entyvio 300 mg SC q 8 weeks (started 4/17/24, LD 1/8, ND 3/5)  - recent ICI colitis meds:   prednisone (IV solumedrol 2024, oral prednsione 60 mg/d 24, tapering doses and discontinued 24)   - current immunotherapy: dostarlimab infusions - 3/14/24-24, Off 10/10 due to pneumonia and due to restart 1/15/25  - port infection 24- resolved   - weight loss- 5 pounds, low appetite  - 1 formed BMs/day, BMs/d, no blood/urgency/nocturnal BMs  - NSAID use:  ibuprofen- 10/2024    Prior Pertinent Surgeries:   None    Last pertinent Endoscopy/Imagin24 EGD: normal esophagus, Z line found at 40 cm, diffuse mild mucosal changes with congestion and erythema in the entire stomach with normal duodenum. Biopsies stomach with mild chronic inflammation and duodenum normal.   24 colonoscopy:  normal colon and TI except for some sigmoid and descending diverticulosis.  Biopsies of cecum/ascending/transverse/descending/sigmoid occ foci of epithelial apoptosis.  Biopsies of sigmoid and rectum with chronic active inflammation and increased epithelial apoptosis     Prior ICI colitis Therapies:  Prednisone (10/4-11/3/23, 2024-24) - effective     Review of Systems   Constitutional:  Negative for chills, fever and weight loss.   HENT:          No oral ulcers, dysphagia, oral thrush   Eyes:  Negative for blurred vision, pain and redness.   Respiratory:  Negative for cough and shortness of breath.    Cardiovascular:  Negative for chest pain.   Gastrointestinal:  Positive for nausea. Negative for abdominal pain, heartburn and vomiting.   Genitourinary:  Negative for dysuria and hematuria.   Musculoskeletal:  Negative for back pain and joint pain.   Skin:  Negative for rash.   Psychiatric/Behavioral:  Negative for depression. The patient is not nervous/anxious and does not have insomnia.      All Medical History/Surgical History/Family History/Social History/Allergies have been reviewed and updated in EMR    Outpatient Medications Marked as Taking for the 25 encounter (Office Visit) with Winston  "Jason CALDERÓN MD   Medication Sig Dispense Refill    acetaminophen (TYLENOL) 650 MG TbSR Take 1 tablet (650 mg total) by mouth every 8 (eight) hours as needed (Headache). 30 tablet 0    azelastine (ASTELIN) 137 mcg (0.1 %) nasal spray 1 spray (137 mcg total) by Nasal route 2 (two) times daily. 30 mL 0    b complex vitamins tablet Take 1 tablet by mouth once daily.      celecoxib (CELEBREX) 100 MG capsule Take 1 capsule (100 mg total) by mouth once daily. 30 capsule 3    cetirizine (ZYRTEC) 5 MG tablet Take 1 tablet (5 mg total) by mouth once daily. 30 tablet 0    diclofenac sodium (VOLTAREN) 1 % Gel Apply 2 g topically 2 (two) times daily. 100 g 0    DULoxetine (CYMBALTA) 30 MG capsule Take 1 capsule (30 mg total) by mouth once daily. 30 capsule 11    ibuprofen (ADVIL,MOTRIN) 600 MG tablet Take 1 tablet (600 mg total) by mouth every 6 (six) hours as needed for Pain. 30 tablet 3    levothyroxine (SYNTHROID) 100 MCG tablet TAKE 1 TABLET(100 MCG) BY MOUTH BEFORE BREAKFAST 30 tablet 11    multivitamin (ONE DAILY MULTIVITAMIN) per tablet Take 1 tablet by mouth once daily.      valACYclovir (VALTREX) 1000 MG tablet Two pills at onset of fever blister and then repeat 2 pills 12 hr later 30 tablet 11    vedolizumab (ENTYVIO) 300 mg SolR injection Inject 300 mg into the vein. Week 0, 2, and 6 followed by every 8 weeks       Vital Signs:  BP (!) 94/58 (BP Location: Left arm, Patient Position: Sitting)   Pulse 101   Temp 97.5 °F (36.4 °C) (Skin)   Ht 5' 7" (1.702 m)   Wt 71.2 kg (156 lb 15.5 oz)   LMP 11/15/2012   SpO2 99%   BMI 24.58 kg/m²    Physical Exam  Cardiovascular:      Rate and Rhythm: Normal rate and regular rhythm.      Pulses: Normal pulses.      Heart sounds: Normal heart sounds. No murmur heard.  Pulmonary:      Effort: Pulmonary effort is normal. No respiratory distress.      Breath sounds: Normal breath sounds.   Abdominal:      General: Bowel sounds are normal. There is no distension.      Palpations: " Abdomen is soft.      Tenderness: There is no abdominal tenderness.   Musculoskeletal:      Right lower leg: No edema.      Left lower leg: No edema.     Labs:   Lab Results   Component Value Date    CRP 3.0 02/16/2024    CALPROTECTIN 45.7 10/13/2024     Lab Results   Component Value Date    HEPBSAG Non-reactive 02/20/2024    HEPBCAB Non-reactive 02/16/2024     Lab Results   Component Value Date    TBGOLDPLUS Negative 02/16/2024     Lab Results   Component Value Date    WBC 6.85 01/13/2025    HGB 13.1 01/13/2025    HCT 40.8 01/13/2025    MCV 95 01/13/2025     01/13/2025     Lab Results   Component Value Date    CREATININE 0.8 01/13/2025    ALBUMIN 3.7 01/13/2025    BILITOT 0.3 01/13/2025    ALKPHOS 84 01/13/2025    AST 20 01/13/2025    ALT 16 01/13/2025     Assessment/Plan:  Sugar Diaz is a 59 y.o. female with endometrial cancer (diagnosed 5/2023; BRIANDA/BSO 6/2023 and initiation of chemotherapy/radiation in 7/2023 including the use of Dostarlimab), h/o salmonella (2/2024), GERD, migraines.     Patient tapered off of prednisone 9/19/24 and feeling well on entyvio every 8 weeks.  She has had port infection and more recently recovered from pneumonia. I reminded her in addition to letting her oncologist know about infection we should be notified in case we need to hold her entyvio. We will continue to monitor her calprotectin and she will let us know if any recurrent diarrhea. If she stops/completes immunotherapy then entyvio can also be discontinued.     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     Follow up in about 8 months (around 9/14/2025) for virtual Wed AM.    Jason Montero MD  Department of Gastroenterology  Medical Director, Inflammatory Bowel Disease

## 2025-01-14 NOTE — PATIENT INSTRUCTIONS
- stool calprotectin every 6 mos- next will be due 4/2025  - let me know immediately if you have any bowel habits changes  - let me know immediately if you are being treated for an infection given I may have to delay a dose of entyvio  - continue entyvio every 8 weeks  - if you stop immunotherapy long term please make sure you let me know and have oncologist reach out to me

## 2025-01-15 ENCOUNTER — OFFICE VISIT (OUTPATIENT)
Dept: GYNECOLOGIC ONCOLOGY | Facility: CLINIC | Age: 60
End: 2025-01-15
Payer: COMMERCIAL

## 2025-01-15 ENCOUNTER — INFUSION (OUTPATIENT)
Dept: INFUSION THERAPY | Facility: OTHER | Age: 60
End: 2025-01-15
Attending: INTERNAL MEDICINE
Payer: COMMERCIAL

## 2025-01-15 VITALS
SYSTOLIC BLOOD PRESSURE: 142 MMHG | OXYGEN SATURATION: 100 % | BODY MASS INDEX: 24.59 KG/M2 | WEIGHT: 157 LBS | TEMPERATURE: 98 F | DIASTOLIC BLOOD PRESSURE: 60 MMHG | HEART RATE: 88 BPM

## 2025-01-15 VITALS — BODY MASS INDEX: 24.64 KG/M2 | HEIGHT: 67 IN | WEIGHT: 157 LBS

## 2025-01-15 DIAGNOSIS — R09.82 POST-NASAL DRIP: Primary | ICD-10-CM

## 2025-01-15 DIAGNOSIS — Z51.11 ENCOUNTER FOR ANTINEOPLASTIC CHEMOTHERAPY: ICD-10-CM

## 2025-01-15 DIAGNOSIS — C54.1 MALIGNANT NEOPLASM OF ENDOMETRIUM: Primary | ICD-10-CM

## 2025-01-15 DIAGNOSIS — K52.1 CHEMOTHERAPY INDUCED DIARRHEA: ICD-10-CM

## 2025-01-15 DIAGNOSIS — T45.1X5A CHEMOTHERAPY INDUCED DIARRHEA: ICD-10-CM

## 2025-01-15 PROCEDURE — 3077F SYST BP >= 140 MM HG: CPT | Mod: CPTII,S$GLB,, | Performed by: OBSTETRICS & GYNECOLOGY

## 2025-01-15 PROCEDURE — 63600175 PHARM REV CODE 636 W HCPCS: Performed by: OBSTETRICS & GYNECOLOGY

## 2025-01-15 PROCEDURE — G2211 COMPLEX E/M VISIT ADD ON: HCPCS | Mod: S$GLB,,, | Performed by: OBSTETRICS & GYNECOLOGY

## 2025-01-15 PROCEDURE — 96367 TX/PROPH/DG ADDL SEQ IV INF: CPT

## 2025-01-15 PROCEDURE — 1159F MED LIST DOCD IN RCRD: CPT | Mod: CPTII,S$GLB,, | Performed by: OBSTETRICS & GYNECOLOGY

## 2025-01-15 PROCEDURE — 3078F DIAST BP <80 MM HG: CPT | Mod: CPTII,S$GLB,, | Performed by: OBSTETRICS & GYNECOLOGY

## 2025-01-15 PROCEDURE — 99215 OFFICE O/P EST HI 40 MIN: CPT | Mod: S$GLB,,, | Performed by: OBSTETRICS & GYNECOLOGY

## 2025-01-15 PROCEDURE — 99999 PR PBB SHADOW E&M-EST. PATIENT-LVL IV: CPT | Mod: PBBFAC,,, | Performed by: OBSTETRICS & GYNECOLOGY

## 2025-01-15 PROCEDURE — 25000003 PHARM REV CODE 250: Performed by: OBSTETRICS & GYNECOLOGY

## 2025-01-15 PROCEDURE — 3008F BODY MASS INDEX DOCD: CPT | Mod: CPTII,S$GLB,, | Performed by: OBSTETRICS & GYNECOLOGY

## 2025-01-15 PROCEDURE — 96413 CHEMO IV INFUSION 1 HR: CPT

## 2025-01-15 RX ORDER — DIPHENHYDRAMINE HYDROCHLORIDE 50 MG/ML
50 INJECTION INTRAMUSCULAR; INTRAVENOUS ONCE AS NEEDED
Status: DISCONTINUED | OUTPATIENT
Start: 2025-01-15 | End: 2025-01-15 | Stop reason: HOSPADM

## 2025-01-15 RX ORDER — HEPARIN 100 UNIT/ML
500 SYRINGE INTRAVENOUS
Status: DISCONTINUED | OUTPATIENT
Start: 2025-01-15 | End: 2025-01-15 | Stop reason: HOSPADM

## 2025-01-15 RX ORDER — FLUTICASONE PROPIONATE 50 MCG
1 SPRAY, SUSPENSION (ML) NASAL DAILY
COMMUNITY
End: 2025-01-15 | Stop reason: SDUPTHER

## 2025-01-15 RX ORDER — SODIUM CHLORIDE 0.9 % (FLUSH) 0.9 %
10 SYRINGE (ML) INJECTION
Status: DISCONTINUED | OUTPATIENT
Start: 2025-01-15 | End: 2025-01-15 | Stop reason: HOSPADM

## 2025-01-15 RX ORDER — EPINEPHRINE 0.3 MG/.3ML
0.3 INJECTION SUBCUTANEOUS ONCE AS NEEDED
Status: DISCONTINUED | OUTPATIENT
Start: 2025-01-15 | End: 2025-01-15 | Stop reason: HOSPADM

## 2025-01-15 RX ORDER — FLUTICASONE PROPIONATE 50 MCG
1 SPRAY, SUSPENSION (ML) NASAL DAILY
Qty: 32 G | Refills: 3 | Status: SHIPPED | OUTPATIENT
Start: 2025-01-15

## 2025-01-15 RX ADMIN — SODIUM CHLORIDE 150 MG: 9 INJECTION, SOLUTION INTRAVENOUS at 11:01

## 2025-01-15 RX ADMIN — SODIUM CHLORIDE 1000 MG: 9 INJECTION, SOLUTION INTRAVENOUS at 12:01

## 2025-01-15 RX ADMIN — HEPARIN 500 UNITS: 100 SYRINGE at 12:01

## 2025-01-15 NOTE — TELEPHONE ENCOUNTER
Received paper refill request for medication not previously listed on med list.  Added med to list

## 2025-01-15 NOTE — PLAN OF CARE
Dostarlimab infusion administered, no reaction. Patient tolerated well. Port A Cath 20 gauge 3/4 inch needle deaccessed/ removed, heparin locked and blood return present. Discharge instructions given to patient. Patient understands instructions. Follow up appointment scheduled.

## 2025-01-15 NOTE — TELEPHONE ENCOUNTER
Refill Routing Note   Medication(s) are not appropriate for processing by [facility] for the following reason(s):        No active prescription written by provider  ED/Hospital Visit since last OV with provider    [facility] action(s):  Defer             Extended chart review required: Yes     Appointments  past 12m or future 3m with PCP    Date Provider   Last Visit   11/18/2024 Dexter Khan MD   Next Visit   Visit date not found Dexter Khan MD   ED visits in past 90 days: 2        Note composed:1:05 PM 01/15/2025

## 2025-01-15 NOTE — TELEPHONE ENCOUNTER
Refill Encounter    PCP Visits: Recent Visits  Date Type Provider Dept   24 Office Visit Dexter Khan MD [facility]   24 Office Visit Dexter Khan MD [facility]   Showing recent visits within past 360 days and meeting all other requirements  Future Appointments  No visits were found meeting these conditions.  Showing future appointments within next 720 days and meeting all other requirements     Last 3 Blood Pressure:   BP Readings from Last 3 Encounters:   01/15/25 (!) 142/60   25 (!) 94/58   25 138/62     Preferred Pharmacy:   [id number] - [place] - [address] AT [facility] [id number]  [place] [address]  Phone: [phone] Fax: [phone]    Requested RX:  Requested Prescriptions     Pending Prescriptions Disp Refills    fluticasone propionate (FLONASE) 50 mcg/actuation nasal spray 16 g 0     Si spray (50 mcg total) by Each Nostril route once daily.      RX Route: Normal

## 2025-02-15 NOTE — H&P
Called back ED, no answer at this time. Charge nurse notified.   [facility] Emergency Dept  Gynecologic Oncology  H&P    Patient Name: Sugar Diaz  MRN: [id number]  Admission Date: 2023  Primary Care Provider: Dexter Khan MD   Principal Problem: Neutropenic fever    Subjective:     Chief Complaint/Reason for Admission: Neutropenia, URI    History of Present Illness:  Sugar Diaz is a 58 y.o.  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 Symptoms started 3 days ago and have progressively worsened. Cough is not productive. Shortness of breath is worsened with exertion. She denies SOB at rest or when lying down. She describes her headache was frontal throbbing, worsened with coughing. She has taken tylenol and ibuprofen with improvement in pain. She denies dizziness/lightheadedness, syncope, vision changes, sore throat, abdominal pain, N/V, constipation, diarrhea. Patient has a history of colitis with chemo, however, has been having daily soft bowel movements.      Patient presented to her PCP yesterday and was noted to be febrile to 100.8F, tachycardic in the 130, hypoxic (94% on RA). Labs were positive for WBC 1 (ANC 0.9). She was advised to present to the ED, however, elected to return to clinic today. Symptoms worsened and vital sign abnormalities persisted so she was instructed to present to the ED for further workup and management.       Oncology History   Malignant neoplasm of endometrium   2023 Surgery     Hysteroscopy, D&C: grade 1 endometrioid EC      2023 Surgery     TRH/BSO/BSLN/RPLN/repair of obturator nerve     Final pathology c/w MMRd, p53 WT stage IIIC1 grade 1 endometrioid EC. 4.5 cm, grade 1, 96% MI (22/23 mm), +LUE, -LVSI, +MELF, -cervix, 1/2+ RPLN, 1/+ LPLN, -ascites      2023 Imaging Significant Findings     CT C/A/P w/: High L para-aortic at the level of the renal vessels, 1.5 cm.  Low R para-aortic at the LEAH,  4.1 cm in greatest dimension.      6/25/2023 Genomic Testing     [facility] (NGS): ARD1A, CHEK1, CTCF, , HDAC2, HNF1A, JAK1, KRAS, MAX, MSH6, P1K3R1, PPM1D, PTEN, ZFHX3, ZSR2      6/28/2023 Tumor Conference     No rule for debulking of para-aortic lymph nodes.  Adjuvant VBT.  Represent after completion of adjuvant therapy to discuss EBRT.          8/9/2023 - 9/13/2023 Radiation Therapy     Treating physician: Dr. Adriane Babb  Total Dose: 21 Gy HDR  Fractions: 3 vaginal cuff brachytherapy      8/22/2023 Imaging Significant Findings     CT A/P w/: No evidence of colitis, MO in high L para-aortic and low R para-aortic.      10/4/2023 Procedure     Colonoscopy: WNL   07/07/2023 - 10/19/2023: C1-6 carbo/taxol/dsotarlimab       Hospital Course:  11/01/2023: Admit for neutropenic fever, suspect URI. WBC 1.8, ANC 0.9. CXR, CTA negative. COVID, flu neg. COVID PCR, resp panel, urine/blood cultures pending. Initiate zosyn.       Scheduled Meds:   cetirizine  10 mg Oral Daily    famotidine  40 mg Oral Daily    magnesium sulfate IVPB  2 g Intravenous Once    piperacillin-tazobactam (Zosyn) IV (PEDS and ADULTS) (extended infusion is not appropriate)  4.5 g Intravenous Q8H    potassium phosphate IVPB  15 mmol Intravenous Once     Continuous Infusions:  PRN Meds:calcium carbonate, diphenoxylate-atropine 2.5-0.025 mg, hydrALAZINE, ibuprofen, ondansetron, oxyCODONE, oxyCODONE, prochlorperazine, simethicone, sodium chloride 0.9%, zolpidem    Review of patient's allergies indicates:  No Known Allergies    Objective:     Vital Signs (Most Recent):  Temp: 98.4 °F (36.9 °C) (11/01/23 1414)  Pulse: 109 (11/01/23 1412)  Resp: (!) 24 (11/01/23 0950)  BP: 119/69 (11/01/23 1402)  SpO2: 98 % (11/01/23 1412) Vital Signs (24h Range):  Temp:  [98.4 °F (36.9 °C)-102.5 °F (39.2 °C)] 98.4 °F (36.9 °C)  Pulse:  [106-135] 109  Resp:  [24] 24  SpO2:  [91 %-98 %] 98 %  BP: (119-129)/(57-69) 119/69     Weight: 78.5 kg (173 lb 1 oz)  Body mass  index is 27.11 kg/m².    Intake/Output - Last 3 Shifts         10/30 0700  10/31 0659 10/31 0700  11/01 0659 11/01 0700  11/02 0659    IV Piggyback   98.2    Total Intake(mL/kg)   98.2 (1.3)    Net   +98.2                         Physical Exam:   Constitutional: She is oriented to person, place, and time. She appears well-developed and well-nourished.   Appears fatigued and ill    HENT:   Head: Normocephalic and atraumatic.    Eyes: EOM are normal.     Cardiovascular:  Regular rhythm.    Tachycardia present.          Pulmonary/Chest: Effort normal.   Coarse breath sounds with poor inspiratory effort        Abdominal: Soft. There is no abdominal tenderness.     Genitourinary:    Genitourinary Comments: Deferred             Musculoskeletal: Normal range of motion.       Neurological: She is alert and oriented to person, place, and time.    Skin: Skin is warm and dry.    Psychiatric: She has a normal mood and affect. Her behavior is normal.          Lines/Drains/Airways       Peripheral Intravenous Line  Duration                  Peripheral IV - Single Lumen 11/01/23 1033 20 G Right Wrist <1 day                    Laboratory:  Recent Results (from the past 24 hour(s))   CBC auto differential    Collection Time: 11/01/23 10:08 AM   Result Value Ref Range    WBC 1.83 (LL) 3.90 - 12.70 K/uL    RBC 2.77 (L) 4.00 - 5.40 M/uL    Hemoglobin 10.1 (L) 12.0 - 16.0 g/dL    Hematocrit 28.8 (L) 37.0 - 48.5 %     (H) 82 - 98 fL    MCH 36.5 (H) 27.0 - 31.0 pg    MCHC 35.1 32.0 - 36.0 g/dL    RDW 15.3 (H) 11.5 - 14.5 %    Platelets 125 (L) 150 - 450 K/uL    MPV 9.2 9.2 - 12.9 fL    Immature Granulocytes Test Not Performed 0.0 - 0.5 %    Gran # (ANC) Test Not Performed 1.8 - 7.7 K/uL    Immature Grans (Abs) Test Not Performed 0.00 - 0.04 K/uL    Lymph # Test Not Performed 1.0 - 4.8 K/uL    Mono # Test Not Performed 0.3 - 1.0 K/uL    Eos # Test Not Performed 0.0 - 0.5 K/uL    Baso # Test Not Performed 0.00 - 0.20 K/uL    nRBC 0 0  /100 WBC    Gran % 50.0 38.0 - 73.0 %    Lymph % 26.0 18.0 - 48.0 %    Mono % 11.0 4.0 - 15.0 %    Eosinophil % 3.0 0.0 - 8.0 %    Basophil % 1.0 0.0 - 1.9 %    Bands 8.0 %    Metamyelocytes 1.0 %    Platelet Estimate Decreased (A)     Toxic Granulation Present     Differential Method Manual    Comprehensive metabolic panel    Collection Time: 11/01/23 10:08 AM   Result Value Ref Range    Sodium 135 (L) 136 - 145 mmol/L    Potassium 3.7 3.5 - 5.1 mmol/L    Chloride 100 95 - 110 mmol/L    CO2 24 23 - 29 mmol/L    Glucose 162 (H) 70 - 110 mg/dL    BUN 6 6 - 20 mg/dL    Creatinine 0.7 0.5 - 1.4 mg/dL    Calcium 9.1 8.7 - 10.5 mg/dL    Total Protein 6.2 6.0 - 8.4 g/dL    Albumin 2.8 (L) 3.5 - 5.2 g/dL    Total Bilirubin 0.3 0.1 - 1.0 mg/dL    Alkaline Phosphatase 59 55 - 135 U/L    AST 32 10 - 40 U/L    ALT 46 (H) 10 - 44 U/L    eGFR >60 >60 mL/min/1.73 m^2    Anion Gap 11 8 - 16 mmol/L   Lactic Acid, Plasma #1    Collection Time: 11/01/23 10:08 AM   Result Value Ref Range    Lactate (Lactic Acid) 1.7 0.5 - 2.2 mmol/L   Magnesium    Collection Time: 11/01/23 10:08 AM   Result Value Ref Range    Magnesium 1.5 (L) 1.6 - 2.6 mg/dL   Phosphorus    Collection Time: 11/01/23 10:08 AM   Result Value Ref Range    Phosphorus 1.9 (L) 2.7 - 4.5 mg/dL   POCT COVID-19 Rapid Screening    Collection Time: 11/01/23 10:24 AM   Result Value Ref Range    POC Rapid COVID Negative Negative     Acceptable Yes    POCT Influenza A/B Molecular    Collection Time: 11/01/23 10:24 AM   Result Value Ref Range    POC Molecular Influenza A Ag Negative Negative, Not Reported    POC Molecular Influenza B Ag Negative Negative, Not Reported     Acceptable Yes    Urinalysis, Reflex to Urine Culture Urine, Clean Catch    Collection Time: 11/01/23  1:43 PM    Specimen: Urine   Result Value Ref Range    Specimen UA Urine, Clean Catch     Color, UA Colorless (A) Yellow, Straw, Radha    Appearance, UA Clear Clear    pH, UA 7.0 5.0  - 8.0    Specific Gravity, UA >1.030 (A) 1.005 - 1.030    Protein, UA Trace (A) Negative    Glucose, UA 2+ (A) Negative    Ketones, UA Negative Negative    Bilirubin (UA) Negative Negative    Occult Blood UA Negative Negative    Nitrite, UA Negative Negative    Urobilinogen, UA Negative <2.0 EU/dL    Leukocytes, UA Negative Negative   Lactic acid, plasma    Collection Time: 11/01/23  3:04 PM   Result Value Ref Range    Lactate (Lactic Acid) 2.1 0.5 - 2.2 mmol/L   Respiratory Infection Panel (PCR), Nasopharyngeal    Collection Time: 11/01/23  3:24 PM    Specimen: Nasopharyngeal Swab   Result Value Ref Range    Respiratory Infection Panel Source NP Swab        Diagnostic Results:  Imaging Results              CTA Chest Non-Coronary (PE Studies) (Final result)  Result time 11/01/23 12:54:22      Final result by Darcy Coburn MD (11/01/23 12:54:22)                   Impression:      No evidence of pulmonary embolus.    Patchy ground-glass opacity at the dependent portion of the bilateral lung basis suggestive of mild atelectasis.    Few subcentimeter pulmonary nodules, For multiple solid nodules all <6 mm, Fleischner Society 2017 guidelines recommend no routine follow up for a low risk patient, or follow up with non-contrast chest CT at 12 months after discovery in a high risk patient.      Electronically signed by: Darcy Coburn MD  Date:    11/01/2023  Time:    12:54               Narrative:    EXAMINATION:  CTA CHEST NON CORONARY (PE STUDIES)    CLINICAL HISTORY:  Pulmonary embolism (PE) suspected, positive D-dimer;    TECHNIQUE:  Low dose axial images, sagittal and coronal reformations were obtained from the thoracic inlet to the lung bases following the IV administration of 75 mL of Omnipaque 350.  Contrast timing was optimized to evaluate the pulmonary arteries.  MIP images were performed.    COMPARISON:  CT chest 06/16/2023    FINDINGS:  Vasculature: There is good opacification of the pulmonary  arterial system.  No acute pulmonary embolus identified. Thoracic aorta normal caliber. No evidence of dissection.    Lungs: The tracheobronchial tree is clear. Mild increased ground-glass opacity at the bilateral dependent portion of the lung basis possibly atelectasis, new from the prior study.  0.4 cm nodule right upper lobe (3:179).  Tiny nodule left lower lobe, unchanged from 2023 (3: 327-324).  No emphysematous lung changes.No pleural effusion.    Mediastinum: No lymphadenopathy.  The cardiac silhouette is normal in size..  No pericardial effusion.The esophagus course normally.    Upper abdomen (visualized portion):No abnormality seen.    Bones/chest wall: No marrow replacement process.                                       X-Ray Chest 1 View (Final result)  Result time 23 10::19      Final result by Viet Lincoln MD (23 10:31:19)                   Impression:      No convincing evidence of acute cardiopulmonary disease.      Electronically signed by: Viet Lincoln  Date:    2023  Time:    10:               Narrative:    EXAMINATION:  XR CHEST 1 VIEW    CLINICAL HISTORY:  Sepsis;    TECHNIQUE:  Single frontal view of the chest was performed.    COMPARISON:  Chest radiograph performed 10/31/2023    FINDINGS:  Cardiomediastinal contours grossly unchanged.  Lungs essentially clear.  No definite pneumothorax or large volume pleural effusion.    No acute findings in the visualized abdomen.    Osseous and soft tissue structures appear without definite acute change.                                    Assessment/Plan:     * Neutropenic fever  Neutropenic fever:  58 y.o.  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  - Febrile to 101.4, tachycardic to 100-130s, O2 sats 91% on RA (97%+ on 2L NC)  - CBC 1.8 (ANC 0.9)/10/29/125  - Cr 0.7, K 3.7, mag 1.5, phos 1.9  - Lactate 1.5  - Rapid COVID, flu negative   -  UA: trace protein, glucose. No nitrites or leukocytes   - Blood cultures x2 and urine culture collected, pending  - CXR: no acute processes   - CTA: no evidence of PE    Plan:  - Broad spectrum antibiotics, zosyn 4.5g Q8H  - Obtain COVID PCR and respiratory panel   - Follow blood and urine cultures closely   - If patient is still febrile after 48 hours of antibiotic therapy will add vancomycin (gram pos coverage), fluconazole (fungal coverage) and consult ID for assistance   - Will not administer nupogen   - Neutropenic precautions     GERD (gastroesophageal reflux disease)  - Continue home pepcid     Electrolyte imbalance  - Mag 1.5, phos 1.9, K 3.7 on admit   - Will replete        Elsa Owen MD  Gynecologic Oncology  [facility] - Emergency Dept

## 2025-02-18 ENCOUNTER — PATIENT MESSAGE (OUTPATIENT)
Dept: GASTROENTEROLOGY | Facility: CLINIC | Age: 60
End: 2025-02-18
Payer: COMMERCIAL

## 2025-02-18 NOTE — TELEPHONE ENCOUNTER
- Reason for Call: Change in condition constipation; blood on TP/stool  - Contact: Called & spoke to patient  - Date of symptom onset: 2/15  - Symptom details:   Small amount of bright red blood on TP (a little bigger than size of quarter) on Saturday 2/15.  Constipated x2 days (no BM 2/16, 2/17)  Blood again on TP and some on stool this morning (2/18).   Denies straining when having a BM  - Disease phenotype: Immunotherapy induced colitis  - Current IBD meds: Entyvio 300 mg SC q 8 weeks  (started 4/17/24; LD: 1/8, ND:3/5); RX Adherence: Adherent   - Other pertinent meds:   - Additional pertinent information (recent lab, scope, imaging, etc.):   She advised she does not normally have constipation; normally has 1 BM/d formed   Denies changes to diet  Does not currently take any miralax   - Next OV: 9/3  - Provider: Dr. Winston colon

## 2025-02-21 DIAGNOSIS — C54.1 MALIGNANT NEOPLASM OF ENDOMETRIUM: ICD-10-CM

## 2025-02-21 RX ORDER — CELECOXIB 100 MG/1
CAPSULE ORAL
Qty: 30 CAPSULE | Refills: 3 | Status: SHIPPED | OUTPATIENT
Start: 2025-02-21

## 2025-02-24 ENCOUNTER — LAB VISIT (OUTPATIENT)
Dept: LAB | Facility: HOSPITAL | Age: 60
End: 2025-02-24
Attending: OBSTETRICS & GYNECOLOGY
Payer: COMMERCIAL

## 2025-02-24 DIAGNOSIS — E03.8 SUBCLINICAL HYPOTHYROIDISM: ICD-10-CM

## 2025-02-24 DIAGNOSIS — C54.1 MALIGNANT NEOPLASM OF ENDOMETRIUM: ICD-10-CM

## 2025-02-24 LAB
ALBUMIN SERPL BCP-MCNC: 3.6 G/DL (ref 3.5–5.2)
ALP SERPL-CCNC: 80 U/L (ref 40–150)
ALT SERPL W/O P-5'-P-CCNC: 17 U/L (ref 10–44)
ANION GAP SERPL CALC-SCNC: 10 MMOL/L (ref 8–16)
AST SERPL-CCNC: 19 U/L (ref 10–40)
BILIRUB SERPL-MCNC: 0.3 MG/DL (ref 0.1–1)
BUN SERPL-MCNC: 16 MG/DL (ref 8–23)
CALCIUM SERPL-MCNC: 9.1 MG/DL (ref 8.7–10.5)
CHLORIDE SERPL-SCNC: 108 MMOL/L (ref 95–110)
CO2 SERPL-SCNC: 24 MMOL/L (ref 23–29)
CREAT SERPL-MCNC: 0.8 MG/DL (ref 0.5–1.4)
ERYTHROCYTE [DISTWIDTH] IN BLOOD BY AUTOMATED COUNT: 12.7 % (ref 11.5–14.5)
EST. GFR  (NO RACE VARIABLE): >60 ML/MIN/1.73 M^2
GLUCOSE SERPL-MCNC: 88 MG/DL (ref 70–110)
HCT VFR BLD AUTO: 40.8 % (ref 37–48.5)
HGB BLD-MCNC: 13.4 G/DL (ref 12–16)
IMM GRANULOCYTES # BLD AUTO: 0.02 K/UL (ref 0–0.04)
MCH RBC QN AUTO: 31.1 PG (ref 27–31)
MCHC RBC AUTO-ENTMCNC: 32.8 G/DL (ref 32–36)
MCV RBC AUTO: 95 FL (ref 82–98)
NEUTROPHILS # BLD AUTO: 4.6 K/UL (ref 1.8–7.7)
PLATELET # BLD AUTO: 308 K/UL (ref 150–450)
PMV BLD AUTO: 8.6 FL (ref 9.2–12.9)
POTASSIUM SERPL-SCNC: 4 MMOL/L (ref 3.5–5.1)
PROT SERPL-MCNC: 7 G/DL (ref 6–8.4)
RBC # BLD AUTO: 4.31 M/UL (ref 4–5.4)
SODIUM SERPL-SCNC: 142 MMOL/L (ref 136–145)
T4 FREE SERPL-MCNC: 0.52 NG/DL (ref 0.71–1.51)
TSH SERPL DL<=0.005 MIU/L-ACNC: 55.45 UIU/ML (ref 0.4–4)
WBC # BLD AUTO: 8.88 K/UL (ref 3.9–12.7)

## 2025-02-24 PROCEDURE — 84439 ASSAY OF FREE THYROXINE: CPT | Performed by: OBSTETRICS & GYNECOLOGY

## 2025-02-24 PROCEDURE — 80053 COMPREHEN METABOLIC PANEL: CPT | Performed by: OBSTETRICS & GYNECOLOGY

## 2025-02-24 PROCEDURE — 85027 COMPLETE CBC AUTOMATED: CPT | Performed by: OBSTETRICS & GYNECOLOGY

## 2025-02-24 PROCEDURE — 84443 ASSAY THYROID STIM HORMONE: CPT | Performed by: OBSTETRICS & GYNECOLOGY

## 2025-02-24 PROCEDURE — 36415 COLL VENOUS BLD VENIPUNCTURE: CPT | Performed by: OBSTETRICS & GYNECOLOGY

## 2025-02-24 RX ORDER — DIPHENHYDRAMINE HYDROCHLORIDE 50 MG/ML
50 INJECTION INTRAMUSCULAR; INTRAVENOUS ONCE AS NEEDED
Status: CANCELLED | OUTPATIENT
Start: 2025-02-24

## 2025-02-24 RX ORDER — SODIUM CHLORIDE 0.9 % (FLUSH) 0.9 %
10 SYRINGE (ML) INJECTION
Status: CANCELLED | OUTPATIENT
Start: 2025-02-24

## 2025-02-24 RX ORDER — LEVOTHYROXINE SODIUM 125 UG/1
125 TABLET ORAL
Qty: 30 TABLET | Refills: 5 | Status: SHIPPED | OUTPATIENT
Start: 2025-02-24

## 2025-02-24 RX ORDER — EPINEPHRINE 0.3 MG/.3ML
0.3 INJECTION SUBCUTANEOUS ONCE AS NEEDED
Status: CANCELLED | OUTPATIENT
Start: 2025-02-24

## 2025-02-24 RX ORDER — PROCHLORPERAZINE EDISYLATE 5 MG/ML
5-10 INJECTION INTRAMUSCULAR; INTRAVENOUS ONCE AS NEEDED
Status: CANCELLED
Start: 2025-02-24

## 2025-02-24 RX ORDER — HEPARIN 100 UNIT/ML
500 SYRINGE INTRAVENOUS
Status: CANCELLED | OUTPATIENT
Start: 2025-02-24

## 2025-02-25 ENCOUNTER — PATIENT MESSAGE (OUTPATIENT)
Dept: GASTROENTEROLOGY | Facility: CLINIC | Age: 60
End: 2025-02-25
Payer: COMMERCIAL

## 2025-02-26 ENCOUNTER — OFFICE VISIT (OUTPATIENT)
Dept: GYNECOLOGIC ONCOLOGY | Facility: CLINIC | Age: 60
End: 2025-02-26
Payer: COMMERCIAL

## 2025-02-26 ENCOUNTER — INFUSION (OUTPATIENT)
Dept: INFUSION THERAPY | Facility: OTHER | Age: 60
End: 2025-02-26
Attending: INTERNAL MEDICINE
Payer: COMMERCIAL

## 2025-02-26 VITALS
OXYGEN SATURATION: 99 % | BODY MASS INDEX: 24.7 KG/M2 | RESPIRATION RATE: 16 BRPM | HEIGHT: 67 IN | DIASTOLIC BLOOD PRESSURE: 60 MMHG | HEIGHT: 67 IN | BODY MASS INDEX: 24.7 KG/M2 | TEMPERATURE: 98 F | WEIGHT: 157.38 LBS | WEIGHT: 157.38 LBS | SYSTOLIC BLOOD PRESSURE: 127 MMHG | HEART RATE: 99 BPM

## 2025-02-26 DIAGNOSIS — E03.8 SUBCLINICAL HYPOTHYROIDISM: ICD-10-CM

## 2025-02-26 DIAGNOSIS — C54.1 MALIGNANT NEOPLASM OF ENDOMETRIUM: Primary | ICD-10-CM

## 2025-02-26 DIAGNOSIS — C77.2 SECONDARY MALIGNANCY OF RETROPERITONEAL LYMPH NODES: ICD-10-CM

## 2025-02-26 DIAGNOSIS — K52.1 CHEMOTHERAPY INDUCED DIARRHEA: ICD-10-CM

## 2025-02-26 DIAGNOSIS — Z51.12 ENCOUNTER FOR ANTINEOPLASTIC IMMUNOTHERAPY: Primary | ICD-10-CM

## 2025-02-26 DIAGNOSIS — T45.1X5A CHEMOTHERAPY INDUCED DIARRHEA: ICD-10-CM

## 2025-02-26 PROCEDURE — 3008F BODY MASS INDEX DOCD: CPT | Mod: CPTII,S$GLB,, | Performed by: OBSTETRICS & GYNECOLOGY

## 2025-02-26 PROCEDURE — 99999 PR PBB SHADOW E&M-EST. PATIENT-LVL III: CPT | Mod: PBBFAC,,, | Performed by: OBSTETRICS & GYNECOLOGY

## 2025-02-26 PROCEDURE — 63600175 PHARM REV CODE 636 W HCPCS: Performed by: OBSTETRICS & GYNECOLOGY

## 2025-02-26 PROCEDURE — 99215 OFFICE O/P EST HI 40 MIN: CPT | Mod: 24,S$GLB,, | Performed by: OBSTETRICS & GYNECOLOGY

## 2025-02-26 PROCEDURE — 96413 CHEMO IV INFUSION 1 HR: CPT

## 2025-02-26 PROCEDURE — 3078F DIAST BP <80 MM HG: CPT | Mod: CPTII,S$GLB,, | Performed by: OBSTETRICS & GYNECOLOGY

## 2025-02-26 PROCEDURE — 3074F SYST BP LT 130 MM HG: CPT | Mod: CPTII,S$GLB,, | Performed by: OBSTETRICS & GYNECOLOGY

## 2025-02-26 PROCEDURE — 96367 TX/PROPH/DG ADDL SEQ IV INF: CPT

## 2025-02-26 PROCEDURE — 25000003 PHARM REV CODE 250: Performed by: OBSTETRICS & GYNECOLOGY

## 2025-02-26 RX ORDER — DIPHENHYDRAMINE HYDROCHLORIDE 50 MG/ML
50 INJECTION INTRAMUSCULAR; INTRAVENOUS ONCE AS NEEDED
Status: DISCONTINUED | OUTPATIENT
Start: 2025-02-26 | End: 2025-02-26 | Stop reason: HOSPADM

## 2025-02-26 RX ORDER — HEPARIN 100 UNIT/ML
500 SYRINGE INTRAVENOUS
Status: DISCONTINUED | OUTPATIENT
Start: 2025-02-26 | End: 2025-02-26 | Stop reason: HOSPADM

## 2025-02-26 RX ORDER — EPINEPHRINE 0.3 MG/.3ML
0.3 INJECTION SUBCUTANEOUS ONCE AS NEEDED
Status: DISCONTINUED | OUTPATIENT
Start: 2025-02-26 | End: 2025-02-26 | Stop reason: HOSPADM

## 2025-02-26 RX ORDER — SODIUM CHLORIDE 0.9 % (FLUSH) 0.9 %
10 SYRINGE (ML) INJECTION
Status: DISCONTINUED | OUTPATIENT
Start: 2025-02-26 | End: 2025-02-26 | Stop reason: HOSPADM

## 2025-02-26 RX ADMIN — FOSAPREPITANT 150 MG: 150 INJECTION, POWDER, LYOPHILIZED, FOR SOLUTION INTRAVENOUS at 11:02

## 2025-02-26 RX ADMIN — HEPARIN 500 UNITS: 100 SYRINGE at 01:02

## 2025-02-26 RX ADMIN — SODIUM CHLORIDE 1000 MG: 9 INJECTION, SOLUTION INTRAVENOUS at 12:02

## 2025-02-26 NOTE — PLAN OF CARE
Dostarlimab infusion administered, no reaction. Patient tolerated well. Patient accompanied by family member for discharge home. Port A Cath 20 gauge 3/4 inch needle deaccessed/ removed, heparin locked and blood return present. Discharge instructions given to patient. Patient understands instructions. Follow up appointment scheduled.

## 2025-02-26 NOTE — PROGRESS NOTES
REFERRING PROVIDER  Omaira Evans MD Shah, Shamita MD    REASON FOR CONSULT  Sugar Diaz  is a 59 y.o.  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    HISTORY OF PRESENT ILLNESS    Chemotherapy regimen: Dostarlimab 1000mg 6 weeks (3 years)  Cycle: 11  Previous dose limiting toxicities: Y  Cycle 3: Grade 4 diarrhea 2/2 Salmonella  Previous dose reductions: N  Previous breaks: Y  Cycle 4: 4 weeks 2/2 Grade 4 diarrhea 2/2 Salmonella  Cycle 10: 8 weeks 2/2 PNA and immunotherapy holiday  Previous changes in pre-medications: N    Energy level: baseline  Appetite: baseline  Fevers/chills/nights: no  Nausea: no  Diarrhea: yes, grade 2  Emesis: no  Neuropathy: yes, grade 3  Discoloration of lower extremities: no  Mucositis: no  Maculopapular rashes: no  Dyspnea or coughing: yes      Oncology History   Malignant neoplasm of endometrium   5/23/2023 Surgery    Hysteroscopy, D&C: grade 1 endometrioid EC     6/5/2023 Surgery    TRH/BSO/BSLN/RPLN/repair of obturator nerve    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 4.5 cm, grade 1, 96% MI (22/23 mm), +LUE, -LVSI, +MELF, -cervix, 1/2+ RPLN, 1/1+ LPLN, -ascites.     6/19/2023 Imaging Significant Findings    CT C/A/P w/: High L para-aortic at the level of the renal vessels, 1.5 cm.  Low R para-aortic at the LEAH, 4.1 cm in greatest dimension.     6/25/2023 Genomic Testing    [employer] (NGS): ARD1A, CHEK1, CTCF, , HDAC2, HNF1A, JAK1, KRAS, MAX, MSH6, P1K3R1, PPM1D, PTEN, ZFHX3, ZSR2     6/28/2023 Tumor Conference    No rule for debulking of para-aortic lymph nodes.  Adjuvant VBT.  Represent after completion of adjuvant therapy to discuss EBRT.        7/7/2023 - 10/19/2023 Chemotherapy    Carboplatin AUC 5/Paclitaxel 135 mg/m2/Dostarlimab 200mg q3 weeks x6    Previous dose limiting toxicities: Y  Cycle 4: Grade 2 colitis s/p steroid  taper  Cycle 5: Grade 3 colitis  Previous dose reductions: N  Previous breaks: Y  Cycle 4: Dostarlimab omitted due to grade 2 colitis  Cycle 5: Dostarlimab 2/2 grade 3 colitis (negative colonoscopy with improvement in symptoms with steroid taper)     8/9/2023 - 9/13/2023 Radiation Therapy    Treating physician: Dr. Adriane Babb  Total Dose: 21 Gy HDR  Fractions: 3 vaginal cuff brachytherapy     8/22/2023 Imaging Significant Findings    CT A/P w/: No evidence of colitis, [place] in high L para-aortic and low R para-aortic.     10/4/2023 Procedure    Colonoscopy: WNL     11/6/2023 Imaging Significant Findings    CT C/A/P w/: PATTI     11/6/2023 - 11/9/2023 Hospital Admission    Most Recent Admission Details  Admit Date: 11/6/23  Admitted for: RVS PNA superimposed by community acquitted PNA  Discharge date: 11/9/23  Discharged from: [address] at [facility] ([facility])  Discharge disposition: Home or Self Care       11/22/2023 -  Maintenance Therapy    Dostarlimab 1000mg x     2/21/2024 Procedure    EGD, Colonoscopy: No gross evidence of colitis      Hospital Admission    Most Recent Admission Details  Admit Date: 2/15/24  Admitted for: Grade 4 colitis 2/2 Salmonella.    Discharge date: 2/23/24  Discharged from: [facility] ONCOLOGY ACUTE at [facility]  Discharge disposition: Home or Self Care       2/21/2024 Procedure    Colonoscopy, EGD: No obvious ICI colitis     7/9/2024 Imaging Significant Findings    CT C/A/P w/: PATTI     11/14/2024 Imaging Significant Findings    CT C/A/P w/: PATTI          The following portions of the patient's history were reviewed and updated as appropriate: allergies, current medications, family history, medical history, social history and surgical history.    REVIEW OF SYSTEMS  All systems reviewed and negative except as noted in HPI.    OBJECTIVE   Vitals:    02/26/25 1103   BP: 127/60   Pulse: 99   Temp: 98 °F (36.7 °C)            Body mass index is 24.65  kg/m².      1. General: Well appearing, no apparent distress, alert and oriented.  2. Lymph: Neck symmetric without cervical or supraclavicular adenopathy or mass.  3. Lungs: Normal respiratory rate                ECOG status: 1    LABORATORY DATA  Lab data reviewed.    RADIOLOGICAL DATA  Radiology data reviewed.    ASSESSMENT / PLAN     1. 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 Chemotherapy induced diarrhea    4. Subclinical hypothyroidism      F/U GI    Persistent grade 1-2 diarrhea with Entyvio (ND 3/5) now with changes in stool notable for constipation and hematochezia.  Stool studies WNL (10/6/24).  We had an extensive discussion about proceeding vs discontinuing maintenance therapy. The patient would like to continue therapy which I think is reasonable.    No signs of progression.  Grade 1 hypothyroidism.  Will increase Synthroid to 125 mcg (2/26-).  Administer cycle #11.  TSH, T4, CBC, CMP, RONC, maintenance cycle #12, CT C/A/P w/ w/o 6 weeks.    PATIENT EDUCATION  Ready to learn, no apparent learning barriers were identified; learning preferences include listening. Explained diagnosis and treatment plan; patient expressed understanding of the content.    ADMINISTRATIVE BILLING  Greater than 50% of was spent in counseling.     ONGOING COMPLEXITY BILLING  Visit today is associated with current or anticipated ongoing medical care related to this patients single serious condition/complex condition: endometrial cancer

## 2025-03-05 ENCOUNTER — INFUSION (OUTPATIENT)
Dept: INFUSION THERAPY | Facility: HOSPITAL | Age: 60
End: 2025-03-05
Attending: INTERNAL MEDICINE
Payer: COMMERCIAL

## 2025-03-05 VITALS
DIASTOLIC BLOOD PRESSURE: 67 MMHG | BODY MASS INDEX: 24.86 KG/M2 | TEMPERATURE: 99 F | OXYGEN SATURATION: 100 % | RESPIRATION RATE: 18 BRPM | HEART RATE: 90 BPM | SYSTOLIC BLOOD PRESSURE: 147 MMHG | WEIGHT: 158.75 LBS

## 2025-03-05 DIAGNOSIS — K52.9 COLITIS: Primary | ICD-10-CM

## 2025-03-05 PROCEDURE — 96365 THER/PROPH/DIAG IV INF INIT: CPT

## 2025-03-05 PROCEDURE — A4216 STERILE WATER/SALINE, 10 ML: HCPCS | Performed by: INTERNAL MEDICINE

## 2025-03-05 PROCEDURE — 63600175 PHARM REV CODE 636 W HCPCS: Performed by: INTERNAL MEDICINE

## 2025-03-05 PROCEDURE — 25000003 PHARM REV CODE 250: Performed by: INTERNAL MEDICINE

## 2025-03-05 RX ORDER — IPRATROPIUM BROMIDE AND ALBUTEROL SULFATE 2.5; .5 MG/3ML; MG/3ML
3 SOLUTION RESPIRATORY (INHALATION)
OUTPATIENT
Start: 2025-04-30

## 2025-03-05 RX ORDER — ACETAMINOPHEN 325 MG/1
650 TABLET ORAL
OUTPATIENT
Start: 2025-04-30

## 2025-03-05 RX ORDER — EPINEPHRINE 1 MG/ML
0.3 INJECTION, SOLUTION, CONCENTRATE INTRAVENOUS
OUTPATIENT
Start: 2025-04-30

## 2025-03-05 RX ORDER — SODIUM CHLORIDE 0.9 % (FLUSH) 0.9 %
10 SYRINGE (ML) INJECTION
OUTPATIENT
Start: 2025-04-30

## 2025-03-05 RX ORDER — HEPARIN 100 UNIT/ML
500 SYRINGE INTRAVENOUS
OUTPATIENT
Start: 2025-04-30

## 2025-03-05 RX ORDER — METHYLPREDNISOLONE SOD SUCC 125 MG
40 VIAL (EA) INJECTION
OUTPATIENT
Start: 2025-04-30

## 2025-03-05 RX ORDER — HEPARIN 100 UNIT/ML
500 SYRINGE INTRAVENOUS
Status: DISCONTINUED | OUTPATIENT
Start: 2025-03-05 | End: 2025-03-05 | Stop reason: HOSPADM

## 2025-03-05 RX ORDER — SODIUM CHLORIDE 0.9 % (FLUSH) 0.9 %
10 SYRINGE (ML) INJECTION
Status: DISCONTINUED | OUTPATIENT
Start: 2025-03-05 | End: 2025-03-05 | Stop reason: HOSPADM

## 2025-03-05 RX ORDER — DIPHENHYDRAMINE HYDROCHLORIDE 50 MG/ML
25 INJECTION INTRAMUSCULAR; INTRAVENOUS
OUTPATIENT
Start: 2025-04-30

## 2025-03-05 RX ADMIN — VEDOLIZUMAB 300 MG: 300 INJECTION, POWDER, LYOPHILIZED, FOR SOLUTION INTRAVENOUS at 01:03

## 2025-03-05 RX ADMIN — HEPARIN SODIUM (PORCINE) LOCK FLUSH IV SOLN 100 UNIT/ML 500 UNITS: 100 SOLUTION at 02:03

## 2025-03-05 RX ADMIN — Medication 10 ML: at 02:03

## 2025-03-05 NOTE — PLAN OF CARE
Pt arrived to unit, ambulatory, for IV therapy Entyvio. Pt alert and oriented, states she undergoing chemotherapy at another location and reports fatigue and variable appetite, but no further complaints or concerns at this time. Pt reports the Entyvio infusions have been helping with her bowel disease symptoms. Port accessed using sterile technique - flushes well with blood return present. Entyvio administered and infused over 30 minutes - pt tolerated with no complaints or S&S of reaction. Port flushed and heparin locked. Pt discharged home upon completion in NAD.

## 2025-03-13 DIAGNOSIS — C54.1 MALIGNANT NEOPLASM OF ENDOMETRIUM: ICD-10-CM

## 2025-03-13 DIAGNOSIS — R09.82 POST-NASAL DRIP: ICD-10-CM

## 2025-03-13 RX ORDER — FLUTICASONE PROPIONATE 50 MCG
1 SPRAY, SUSPENSION (ML) NASAL DAILY
Qty: 32 G | Refills: 2 | Status: SHIPPED | OUTPATIENT
Start: 2025-03-13

## 2025-03-13 RX ORDER — CELECOXIB 100 MG/1
100 CAPSULE ORAL DAILY
Qty: 30 CAPSULE | Refills: 3 | Status: SHIPPED | OUTPATIENT
Start: 2025-03-13

## 2025-03-13 NOTE — TELEPHONE ENCOUNTER
No care due was identified.  [facility] Embedded Care Due Messages. Reference number: [id number].   3/13/2025 9:13:03 AM CDT

## 2025-03-13 NOTE — TELEPHONE ENCOUNTER
Refill Routing Note   Medication(s) are not appropriate for processing by [facility] for the following reason(s):        New or recently adjusted medication  ED/Hospital Visit since last OV with provider    [facility] action(s):  Defer             Appointments  past 12m or future 3m with PCP    Date Provider   Last Visit   11/18/2024 Dexter Khan MD   Next Visit   Visit date not found Dexter Khan MD   ED visits in past 90 days: 0        Note composed:12:26 PM 03/13/2025

## 2025-04-02 ENCOUNTER — HOSPITAL ENCOUNTER (OUTPATIENT)
Dept: RADIOLOGY | Facility: OTHER | Age: 60
Discharge: HOME | End: 2025-04-02
Attending: OBSTETRICS & GYNECOLOGY
Payer: COMMERCIAL

## 2025-04-02 ENCOUNTER — RESULTS FOLLOW-UP (OUTPATIENT)
Dept: GYNECOLOGIC ONCOLOGY | Facility: CLINIC | Age: 60
End: 2025-04-02

## 2025-04-02 DIAGNOSIS — T45.1X5A CHEMOTHERAPY INDUCED DIARRHEA: ICD-10-CM

## 2025-04-02 DIAGNOSIS — C77.2 SECONDARY MALIGNANCY OF RETROPERITONEAL LYMPH NODES: ICD-10-CM

## 2025-04-02 DIAGNOSIS — C54.1 MALIGNANT NEOPLASM OF ENDOMETRIUM: ICD-10-CM

## 2025-04-02 DIAGNOSIS — K52.1 CHEMOTHERAPY INDUCED DIARRHEA: ICD-10-CM

## 2025-04-02 PROCEDURE — 74177 CT ABD & PELVIS W/CONTRAST: CPT | Mod: TC

## 2025-04-02 PROCEDURE — 71260 CT THORAX DX C+: CPT | Mod: 26,,, | Performed by: RADIOLOGY

## 2025-04-02 PROCEDURE — A9698 NON-RAD CONTRAST MATERIALNOC: HCPCS | Performed by: OBSTETRICS & GYNECOLOGY

## 2025-04-02 PROCEDURE — 74177 CT ABD & PELVIS W/CONTRAST: CPT | Mod: 26,,, | Performed by: RADIOLOGY

## 2025-04-02 PROCEDURE — 25500020 PHARM REV CODE 255: Performed by: OBSTETRICS & GYNECOLOGY

## 2025-04-02 RX ORDER — PROCHLORPERAZINE EDISYLATE 5 MG/ML
5-10 INJECTION INTRAMUSCULAR; INTRAVENOUS ONCE AS NEEDED
Start: 2025-04-07

## 2025-04-02 RX ORDER — EPINEPHRINE 0.3 MG/.3ML
0.3 INJECTION SUBCUTANEOUS ONCE AS NEEDED
OUTPATIENT
Start: 2025-04-07

## 2025-04-02 RX ORDER — HEPARIN 100 UNIT/ML
500 SYRINGE INTRAVENOUS
OUTPATIENT
Start: 2025-04-07

## 2025-04-02 RX ORDER — DIPHENHYDRAMINE HYDROCHLORIDE 50 MG/ML
50 INJECTION, SOLUTION INTRAMUSCULAR; INTRAVENOUS ONCE AS NEEDED
OUTPATIENT
Start: 2025-04-07

## 2025-04-02 RX ORDER — SODIUM CHLORIDE 0.9 % (FLUSH) 0.9 %
10 SYRINGE (ML) INJECTION
OUTPATIENT
Start: 2025-04-07

## 2025-04-02 RX ADMIN — IOHEXOL 1000 ML: 9 SOLUTION ORAL at 01:04

## 2025-04-02 RX ADMIN — IOHEXOL 65 ML: 350 INJECTION, SOLUTION INTRAVENOUS at 01:04

## 2025-04-03 NOTE — PROGRESS NOTES
REFERRING PROVIDER  Omaira Evans MD Shah, Shamita MD    REASON FOR CONSULT  Sugar Diaz  is a 59 y.o.  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    HISTORY OF PRESENT ILLNESS    Chemotherapy regimen: Dostarlimab 1000mg 6 weeks (3 years)  Cycle: 12  Previous dose limiting toxicities: Y  Cycle 3: Grade 4 diarrhea 2/2 Salmonella  Previous dose reductions: N  Previous breaks: Y  Cycle 4: 4 weeks 2/2 Grade 4 diarrhea 2/2 Salmonella  Cycle 10: 8 weeks 2/2 PNA and immunotherapy holiday  Previous changes in pre-medications: N    Energy level: baseline  Appetite: baseline  Fevers/chills/nights: no  Nausea: no  Diarrhea: yes, grade 2  Emesis: no  Neuropathy: yes, grade 3  Discoloration of lower extremities: no  Mucositis: no  Maculopapular rashes: no  Dyspnea or coughing: yes      Oncology History   Malignant neoplasm of endometrium   5/23/2023 Surgery    Hysteroscopy, D&C: grade 1 endometrioid EC     6/5/2023 Surgery    TRH/BSO/BSLN/RPLN/repair of obturator nerve    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 4.5 cm, grade 1, 96% MI (22/23 mm), +LUE, -LVSI, +MELF, -cervix, 1/2+ RPLN, 1/1+ LPLN, -ascites.     6/19/2023 Imaging Significant Findings    CT C/A/P w/: High L para-aortic at the level of the renal vessels, 1.5 cm.  Low R para-aortic at the LEAH, 4.1 cm in greatest dimension.     6/25/2023 Genomic Testing    [employer] (NGS): ARD1A, CHEK1, CTCF, , HDAC2, HNF1A, JAK1, KRAS, MAX, MSH6, P1K3R1, PPM1D, PTEN, ZFHX3, ZSR2     6/28/2023 Tumor Conference    No rule for debulking of para-aortic lymph nodes.  Adjuvant VBT.  Represent after completion of adjuvant therapy to discuss EBRT.        7/7/2023 - 10/19/2023 Chemotherapy    Carboplatin AUC 5/Paclitaxel 135 mg/m2/Dostarlimab 200mg q3 weeks x6    Previous dose limiting toxicities: Y  Cycle 4: Grade 2 colitis s/p steroid  taper  Cycle 5: Grade 3 colitis  Previous dose reductions: N  Previous breaks: Y  Cycle 4: Dostarlimab omitted due to grade 2 colitis  Cycle 5: Dostarlimab 2/2 grade 3 colitis (negative colonoscopy with improvement in symptoms with steroid taper)     8/9/2023 - 9/13/2023 Radiation Therapy    Treating physician: Dr. Adriane Babb  Total Dose: 21 Gy HDR  Fractions: 3 vaginal cuff brachytherapy     8/22/2023 Imaging Significant Findings    CT A/P w/: No evidence of colitis, [place] in high L para-aortic and low R para-aortic.     10/4/2023 Procedure    Colonoscopy: WNL     11/6/2023 Imaging Significant Findings    CT C/A/P w/: PATTI     11/6/2023 - 11/9/2023 Hospital Admission    Most Recent Admission Details  Admit Date: 11/6/23  Admitted for: RVS PNA superimposed by community acquitted PNA  Discharge date: 11/9/23  Discharged from: [address] at [facility] ([facility])  Discharge disposition: Home or Self Care       11/22/2023 -  Maintenance Therapy    Dostarlimab 1000mg x     2/21/2024 Procedure    EGD, Colonoscopy: No gross evidence of colitis      Hospital Admission    Most Recent Admission Details  Admit Date: 2/15/24  Admitted for: Grade 4 colitis 2/2 Salmonella.    Discharge date: 2/23/24  Discharged from: [facility] ONCOLOGY ACUTE at [facility]  Discharge disposition: Home or Self Care       2/21/2024 Procedure    Colonoscopy, EGD: No obvious ICI colitis     7/9/2024 Imaging Significant Findings    CT C/A/P w/: PATTI     11/14/2024 Imaging Significant Findings    CT C/A/P w/: PATTI     4/2/2025 Imaging Significant Findings    CT C/A/P w/: PATTI          The following portions of the patient's history were reviewed and updated as appropriate: allergies, current medications, family history, medical history, social history and surgical history.    REVIEW OF SYSTEMS  All systems reviewed and negative except as noted in HPI.    OBJECTIVE   Vitals:    04/09/25 1102   BP: 135/63   Pulse: 89    Temp: 98 °F (36.7 °C)              Body mass index is 24.25 kg/m².   1. General: Well appearing, no apparent distress, alert and oriented.  2. Lymph: Neck symmetric without cervical or supraclavicular adenopathy or mass.  3. Lungs: Normal respiratory rate, no accessory muscle use.  4. Psych: Normal affect.  5. Abdomen:  non-distended, soft, non-tender, are no masses, no ascites, no hepatosplenomegaly.  6. Skin: Warm, dry, no rashes or lesions.   7. Extremities: Bilateral lower extremities without edema or tenderness.  8. Genitourinary               Pelvic Examination including (female chaperone was present for the exam):               a. External genitalia are normal in appearance. No lesions noted.               b. Urethral meatus is normal size, location, and appearance.               c. Urethra is negative.               d. Bladder is nontender. No masses noted.               e. Vagina has normal mucosa with physiologic discharge. No lesions noted.              f. Uterus absent              g. Adnexa absent   h. Rectum deferred                 ECOG status: 1    LABORATORY DATA  Lab data reviewed.    RADIOLOGICAL DATA  Radiology data reviewed.    ASSESSMENT / PLAN     1. 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 Chemotherapy induced diarrhea    4. Subclinical hypothyroidism      F/U GI    Persistent grade 1-2 diarrhea with Entyvio (ND 3/5) now with changes in stool notable for constipation and hematochezia.  Stool studies WNL (10/6/24).  We had an extensive discussion about proceeding vs discontinuing maintenance therapy. The patient would like to continue therapy which I think is reasonable.    No signs of progression.  Grade 1 hypothyroidism.  Will increase Synthroid to 125 mcg (2/26-).  Administer cycle #12.  TSH, T4, CBC, CMP, RONC, maintenance cycle #13 MO.  We will repeat imaging after cycle #15.    PATIENT EDUCATION  Ready to learn, no apparent  learning barriers were identified; learning preferences include listening. Explained diagnosis and treatment plan; patient expressed understanding of the content.    ADMINISTRATIVE BILLING  Greater than 50% of was spent in counseling.     ONGOING COMPLEXITY BILLING  Visit today is associated with current or anticipated ongoing medical care related to this patients single serious condition/complex condition: endometrial cancer

## 2025-04-08 ENCOUNTER — TELEPHONE (OUTPATIENT)
Dept: GASTROENTEROLOGY | Facility: CLINIC | Age: 60
End: 2025-04-08
Payer: COMMERCIAL

## 2025-04-09 ENCOUNTER — INFUSION (OUTPATIENT)
Dept: INFUSION THERAPY | Facility: OTHER | Age: 60
End: 2025-04-09
Attending: INTERNAL MEDICINE
Payer: COMMERCIAL

## 2025-04-09 ENCOUNTER — OFFICE VISIT (OUTPATIENT)
Dept: GYNECOLOGIC ONCOLOGY | Facility: CLINIC | Age: 60
End: 2025-04-09
Payer: COMMERCIAL

## 2025-04-09 VITALS
SYSTOLIC BLOOD PRESSURE: 135 MMHG | WEIGHT: 154.81 LBS | HEIGHT: 67 IN | TEMPERATURE: 98 F | HEART RATE: 89 BPM | DIASTOLIC BLOOD PRESSURE: 63 MMHG | BODY MASS INDEX: 24.3 KG/M2 | OXYGEN SATURATION: 95 %

## 2025-04-09 DIAGNOSIS — K52.1 CHEMOTHERAPY INDUCED DIARRHEA: ICD-10-CM

## 2025-04-09 DIAGNOSIS — C54.1 MALIGNANT NEOPLASM OF ENDOMETRIUM: Primary | ICD-10-CM

## 2025-04-09 DIAGNOSIS — C77.2 SECONDARY MALIGNANCY OF RETROPERITONEAL LYMPH NODES: ICD-10-CM

## 2025-04-09 DIAGNOSIS — T45.1X5A CHEMOTHERAPY INDUCED DIARRHEA: ICD-10-CM

## 2025-04-09 DIAGNOSIS — E03.8 SUBCLINICAL HYPOTHYROIDISM: ICD-10-CM

## 2025-04-09 PROCEDURE — 96413 CHEMO IV INFUSION 1 HR: CPT

## 2025-04-09 PROCEDURE — 25000003 PHARM REV CODE 250: Performed by: OBSTETRICS & GYNECOLOGY

## 2025-04-09 PROCEDURE — 3078F DIAST BP <80 MM HG: CPT | Mod: CPTII,S$GLB,, | Performed by: OBSTETRICS & GYNECOLOGY

## 2025-04-09 PROCEDURE — G2211 COMPLEX E/M VISIT ADD ON: HCPCS | Mod: S$GLB,,, | Performed by: OBSTETRICS & GYNECOLOGY

## 2025-04-09 PROCEDURE — 1159F MED LIST DOCD IN RCRD: CPT | Mod: CPTII,S$GLB,, | Performed by: OBSTETRICS & GYNECOLOGY

## 2025-04-09 PROCEDURE — 99215 OFFICE O/P EST HI 40 MIN: CPT | Mod: S$GLB,,, | Performed by: OBSTETRICS & GYNECOLOGY

## 2025-04-09 PROCEDURE — 96367 TX/PROPH/DG ADDL SEQ IV INF: CPT

## 2025-04-09 PROCEDURE — 63600175 PHARM REV CODE 636 W HCPCS: Performed by: OBSTETRICS & GYNECOLOGY

## 2025-04-09 PROCEDURE — 3075F SYST BP GE 130 - 139MM HG: CPT | Mod: CPTII,S$GLB,, | Performed by: OBSTETRICS & GYNECOLOGY

## 2025-04-09 PROCEDURE — 3008F BODY MASS INDEX DOCD: CPT | Mod: CPTII,S$GLB,, | Performed by: OBSTETRICS & GYNECOLOGY

## 2025-04-09 PROCEDURE — 99999 PR PBB SHADOW E&M-EST. PATIENT-LVL IV: CPT | Mod: PBBFAC,,, | Performed by: OBSTETRICS & GYNECOLOGY

## 2025-04-09 RX ORDER — EPINEPHRINE 0.3 MG/.3ML
0.3 INJECTION SUBCUTANEOUS ONCE AS NEEDED
Status: DISCONTINUED | OUTPATIENT
Start: 2025-04-09 | End: 2025-04-09 | Stop reason: HOSPADM

## 2025-04-09 RX ORDER — SODIUM CHLORIDE 0.9 % (FLUSH) 0.9 %
10 SYRINGE (ML) INJECTION
Status: DISCONTINUED | OUTPATIENT
Start: 2025-04-09 | End: 2025-04-09 | Stop reason: HOSPADM

## 2025-04-09 RX ORDER — HEPARIN 100 UNIT/ML
500 SYRINGE INTRAVENOUS
Status: DISCONTINUED | OUTPATIENT
Start: 2025-04-09 | End: 2025-04-09 | Stop reason: HOSPADM

## 2025-04-09 RX ORDER — DIPHENHYDRAMINE HYDROCHLORIDE 50 MG/ML
50 INJECTION, SOLUTION INTRAMUSCULAR; INTRAVENOUS ONCE AS NEEDED
Status: DISCONTINUED | OUTPATIENT
Start: 2025-04-09 | End: 2025-04-09 | Stop reason: HOSPADM

## 2025-04-09 RX ADMIN — SODIUM CHLORIDE 1000 MG: 9 INJECTION, SOLUTION INTRAVENOUS at 12:04

## 2025-04-09 RX ADMIN — HEPARIN 500 UNITS: 100 SYRINGE at 01:04

## 2025-04-09 RX ADMIN — FOSAPREPITANT 150 MG: 150 INJECTION, POWDER, LYOPHILIZED, FOR SOLUTION INTRAVENOUS at 12:04

## 2025-04-09 NOTE — PLAN OF CARE
Problem: Chemotherapy Effects  Goal: Nausea and Vomiting Relief  Outcome: Progressing         Patient presented today for dostarlimab infusion. Port accessed and infusion given with no issues. VS remained stable throughout visit and assessment provided no issues. Port de-accessed w/o complications and all questions answered. Patient scheduled for next appt and left clinic in no acute distress.

## 2025-04-11 DIAGNOSIS — Z51.12 ENCOUNTER FOR ANTINEOPLASTIC IMMUNOTHERAPY: Primary | ICD-10-CM

## 2025-04-30 ENCOUNTER — INFUSION (OUTPATIENT)
Dept: INFUSION THERAPY | Facility: HOSPITAL | Age: 60
End: 2025-04-30
Attending: INTERNAL MEDICINE
Payer: COMMERCIAL

## 2025-04-30 VITALS
HEART RATE: 84 BPM | TEMPERATURE: 98 F | WEIGHT: 155.63 LBS | DIASTOLIC BLOOD PRESSURE: 67 MMHG | BODY MASS INDEX: 24.38 KG/M2 | SYSTOLIC BLOOD PRESSURE: 110 MMHG | OXYGEN SATURATION: 98 % | RESPIRATION RATE: 18 BRPM

## 2025-04-30 DIAGNOSIS — K52.9 COLITIS: Primary | ICD-10-CM

## 2025-04-30 PROCEDURE — 25000003 PHARM REV CODE 250: Performed by: INTERNAL MEDICINE

## 2025-04-30 PROCEDURE — 63600175 PHARM REV CODE 636 W HCPCS: Performed by: INTERNAL MEDICINE

## 2025-04-30 PROCEDURE — 96365 THER/PROPH/DIAG IV INF INIT: CPT

## 2025-04-30 RX ORDER — HEPARIN 100 UNIT/ML
500 SYRINGE INTRAVENOUS
Status: DISCONTINUED | OUTPATIENT
Start: 2025-04-30 | End: 2025-04-30 | Stop reason: HOSPADM

## 2025-04-30 RX ADMIN — HEPARIN 500 UNITS: 100 SYRINGE at 02:04

## 2025-04-30 RX ADMIN — VEDOLIZUMAB 300 MG: 300 INJECTION, POWDER, LYOPHILIZED, FOR SOLUTION INTRAVENOUS at 01:04

## 2025-04-30 NOTE — PLAN OF CARE
Patient arrived to unit, ambulatory, for IV therapy Entyvio. Pt alert and oriented with no new or worsening complaints upon arrival.     Port accessed using sterile technique - flushes well with blood return present.    Entyvio administered without incident. Pt tolerated with no complaints or S&S of reaction.     Port flushed and heparin locked.    Next appointments provided and patient discharged home upon completion in [facility].

## 2025-05-08 DIAGNOSIS — E03.8 SUBCLINICAL HYPOTHYROIDISM: ICD-10-CM

## 2025-05-08 DIAGNOSIS — C54.1 MALIGNANT NEOPLASM OF ENDOMETRIUM: ICD-10-CM

## 2025-05-09 RX ORDER — LEVOTHYROXINE SODIUM 125 UG/1
125 TABLET ORAL
Qty: 30 TABLET | Refills: 5 | Status: SHIPPED | OUTPATIENT
Start: 2025-05-09

## 2025-05-19 ENCOUNTER — LAB VISIT (OUTPATIENT)
Dept: LAB | Facility: HOSPITAL | Age: 60
End: 2025-05-19
Attending: OBSTETRICS & GYNECOLOGY
Payer: COMMERCIAL

## 2025-05-19 DIAGNOSIS — Z51.12 ENCOUNTER FOR ANTINEOPLASTIC IMMUNOTHERAPY: ICD-10-CM

## 2025-05-19 LAB
ABSOLUTE NEUTROPHIL COUNT (OHS): 2.81 K/UL (ref 1.8–7.7)
ALBUMIN SERPL BCP-MCNC: 3.5 G/DL (ref 3.5–5.2)
ALP SERPL-CCNC: 79 UNIT/L (ref 40–150)
ALT SERPL W/O P-5'-P-CCNC: 17 UNIT/L (ref 10–44)
ANION GAP (OHS): 6 MMOL/L (ref 8–16)
AST SERPL-CCNC: 21 UNIT/L (ref 11–45)
BILIRUB SERPL-MCNC: 0.3 MG/DL (ref 0.1–1)
BUN SERPL-MCNC: 12 MG/DL (ref 8–23)
CALCIUM SERPL-MCNC: 8.6 MG/DL (ref 8.7–10.5)
CHLORIDE SERPL-SCNC: 110 MMOL/L (ref 95–110)
CO2 SERPL-SCNC: 24 MMOL/L (ref 23–29)
CREAT SERPL-MCNC: 0.8 MG/DL (ref 0.5–1.4)
ERYTHROCYTE [DISTWIDTH] IN BLOOD BY AUTOMATED COUNT: 12.7 % (ref 11.5–14.5)
GFR SERPLBLD CREATININE-BSD FMLA CKD-EPI: >60 ML/MIN/1.73/M2
GLUCOSE SERPL-MCNC: 104 MG/DL (ref 70–110)
HCT VFR BLD AUTO: 39.1 % (ref 37–48.5)
HGB BLD-MCNC: 12.9 GM/DL (ref 12–16)
IMM GRANULOCYTES # BLD AUTO: 0.01 K/UL (ref 0–0.04)
MCH RBC QN AUTO: 31.4 PG (ref 27–31)
MCHC RBC AUTO-ENTMCNC: 33 G/DL (ref 32–36)
MCV RBC AUTO: 95 FL (ref 82–98)
PLATELET # BLD AUTO: 299 K/UL (ref 150–450)
PMV BLD AUTO: 9.6 FL (ref 9.2–12.9)
POTASSIUM SERPL-SCNC: 3.7 MMOL/L (ref 3.5–5.1)
PROT SERPL-MCNC: 6.6 GM/DL (ref 6–8.4)
RBC # BLD AUTO: 4.11 M/UL (ref 4–5.4)
SODIUM SERPL-SCNC: 140 MMOL/L (ref 136–145)
T4 FREE SERPL-MCNC: 1.18 NG/DL (ref 0.71–1.51)
T4 FREE SERPL-MCNC: 1.18 NG/DL (ref 0.71–1.51)
TSH SERPL-ACNC: 2.73 UIU/ML (ref 0.4–4)
WBC # BLD AUTO: 7.36 K/UL (ref 3.9–12.7)

## 2025-05-19 PROCEDURE — 84443 ASSAY THYROID STIM HORMONE: CPT

## 2025-05-19 PROCEDURE — 82040 ASSAY OF SERUM ALBUMIN: CPT

## 2025-05-19 PROCEDURE — 85027 COMPLETE CBC AUTOMATED: CPT

## 2025-05-19 PROCEDURE — 36415 COLL VENOUS BLD VENIPUNCTURE: CPT

## 2025-05-20 NOTE — PROGRESS NOTES
REFERRING PROVIDER  Omaira Evans MD Shah, Shamita MD    REASON FOR CONSULT  Sugar Diaz  is a 59 y.o.  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    HISTORY OF PRESENT ILLNESS    Chemotherapy regimen: Dostarlimab 1000mg 6 weeks (3 years)  Cycle: 13  Previous dose limiting toxicities: Y  Cycle 3: Grade 4 diarrhea 2/2 Salmonella  Previous dose reductions: N  Previous breaks: Y  Cycle 4: 4 weeks 2/2 Grade 4 diarrhea 2/2 Salmonella  Cycle 10: 8 weeks 2/2 PNA and immunotherapy holiday  Previous changes in pre-medications: N    Energy level: baseline  Appetite: baseline  Fevers/chills/nights: no  Nausea: no  Diarrhea: no  Emesis: no  Neuropathy: yes, grade 3, stable to feet  Discoloration of lower extremities: no  Mucositis: no  Maculopapular rashes: no  Dyspnea or coughing: no      Oncology History   Malignant neoplasm of endometrium   5/23/2023 Surgery    Hysteroscopy, D&C: grade 1 endometrioid EC     6/5/2023 Surgery    TRH/BSO/BSLN/RPLN/repair of obturator nerve    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 4.5 cm, grade 1, 96% MI (22/23 mm), +LUE, -LVSI, +MELF, -cervix, 1/2+ RPLN, 1/1+ LPLN, -ascites.     6/19/2023 Imaging Significant Findings    CT C/A/P w/: High L para-aortic at the level of the renal vessels, 1.5 cm.  Low R para-aortic at the LEAH, 4.1 cm in greatest dimension.     6/25/2023 Genomic Testing    [employer] (NGS): ARD1A, CHEK1, CTCF, , HDAC2, HNF1A, JAK1, KRAS, MAX, MSH6, P1K3R1, PPM1D, PTEN, ZFHX3, ZSR2     6/28/2023 Tumor Conference    No rule for debulking of para-aortic lymph nodes.  Adjuvant VBT.  Represent after completion of adjuvant therapy to discuss EBRT.        7/7/2023 - 10/19/2023 Chemotherapy    Carboplatin AUC 5/Paclitaxel 135 mg/m2/Dostarlimab 200mg q3 weeks x6    Previous dose limiting toxicities: Y  Cycle 4: Grade 2 colitis s/p steroid  taper  Cycle 5: Grade 3 colitis  Previous dose reductions: N  Previous breaks: Y  Cycle 4: Dostarlimab omitted due to grade 2 colitis  Cycle 5: Dostarlimab 2/2 grade 3 colitis (negative colonoscopy with improvement in symptoms with steroid taper)     8/9/2023 - 9/13/2023 Radiation Therapy    Treating physician: Dr. Adriane Babb  Total Dose: 21 Gy HDR  Fractions: 3 vaginal cuff brachytherapy     8/22/2023 Imaging Significant Findings    CT A/P w/: No evidence of colitis, [place] in high L para-aortic and low R para-aortic.     10/4/2023 Procedure    Colonoscopy: WNL     11/6/2023 Imaging Significant Findings    CT C/A/P w/: PATTI     11/6/2023 - 11/9/2023 Hospital Admission    Most Recent Admission Details  Admit Date: 11/6/23  Admitted for: RVS PNA superimposed by community acquitted PNA  Discharge date: 11/9/23  Discharged from: [address] at [facility] ([facility])  Discharge disposition: Home or Self Care       11/22/2023 -  Maintenance Therapy    Dostarlimab 1000mg x     2/21/2024 Procedure    EGD, Colonoscopy: No gross evidence of colitis      Hospital Admission    Most Recent Admission Details  Admit Date: 2/15/24  Admitted for: Grade 4 colitis 2/2 Salmonella.    Discharge date: 2/23/24  Discharged from: [facility] ONCOLOGY ACUTE at [facility]  Discharge disposition: Home or Self Care       2/21/2024 Procedure    Colonoscopy, EGD: No obvious ICI colitis     7/9/2024 Imaging Significant Findings    CT C/A/P w/: PATTI     11/14/2024 Imaging Significant Findings    CT C/A/P w/: PATTI     4/2/2025 Imaging Significant Findings    CT C/A/P w/: PATTI          The following portions of the patient's history were reviewed and updated as appropriate: allergies, current medications, family history, medical history, social history and surgical history.    REVIEW OF SYSTEMS  All systems reviewed and negative except as noted in HPI.    OBJECTIVE   Vitals:    05/21/25 1041   BP: (!) 118/58   Pulse:  81                Body mass index is 23.99 kg/m².   1. General: Well appearing, no apparent distress, alert and oriented.  2. Lymph: Neck symmetric without cervical or supraclavicular adenopathy or mass.  3. Lungs: Normal respiratory rate, no accessory muscle use.  4. Psych: Normal affect.  5. Abdomen:  non-distended, soft, non-tender, no ascites  6. Skin: Warm, dry, no rashes or lesions.   7. Extremities: Bilateral lower extremities without edema or tenderness.  8. Genitourinary: deferred              ECOG status: 1    LABORATORY DATA  Lab data reviewed.    RADIOLOGICAL DATA  Radiology data reviewed.    ASSESSMENT / PLAN     1. Malignant neoplasm of endometrium    2. Subclinical hypothyroidism    3. Chemotherapy-induced neuropathy    4. Examination prior to chemotherapy        Persistent grade 1-2 diarrhea with Entyvio (ND 3/5) now with changes in stool notable for constipation and hematochezia.  Stool studies WNL (10/6/24).  We had an extensive discussion about proceeding vs discontinuing maintenance therapy. The patient would like to continue therapy which I think is reasonable.  5/21/25 today reports no further issues with diarrhea    No signs of progression.  Grade 1 hypothyroidism.  Will increase Synthroid to 125 mcg (2/26-).  Administer cycle #13.  TSH, T4, CBC, CMP, RONC, maintenance cycle #14 Dr Rodríguez.  We will repeat imaging after cycle #15.    PATIENT EDUCATION  Ready to learn, no apparent learning barriers were identified; learning preferences include listening. Explained diagnosis and treatment plan; patient expressed understanding of the content.    ADMINISTRATIVE BILLING  Greater than 50% of was spent in counseling.     ONGOING COMPLEXITY BILLING  Visit today is associated with current or anticipated ongoing medical care related to this patients single serious condition/complex condition: endometrial cancer

## 2025-05-21 ENCOUNTER — OFFICE VISIT (OUTPATIENT)
Dept: GYNECOLOGIC ONCOLOGY | Facility: CLINIC | Age: 60
End: 2025-05-21
Payer: COMMERCIAL

## 2025-05-21 ENCOUNTER — INFUSION (OUTPATIENT)
Dept: INFUSION THERAPY | Facility: OTHER | Age: 60
End: 2025-05-21
Attending: INTERNAL MEDICINE
Payer: COMMERCIAL

## 2025-05-21 VITALS
DIASTOLIC BLOOD PRESSURE: 58 MMHG | OXYGEN SATURATION: 99 % | SYSTOLIC BLOOD PRESSURE: 118 MMHG | WEIGHT: 153.19 LBS | RESPIRATION RATE: 16 BRPM | BODY MASS INDEX: 24.04 KG/M2 | OXYGEN SATURATION: 99 % | HEART RATE: 81 BPM | HEIGHT: 67 IN

## 2025-05-21 DIAGNOSIS — T45.1X5A CHEMOTHERAPY-INDUCED NEUROPATHY: ICD-10-CM

## 2025-05-21 DIAGNOSIS — G62.0 CHEMOTHERAPY-INDUCED NEUROPATHY: ICD-10-CM

## 2025-05-21 DIAGNOSIS — Z01.818 EXAMINATION PRIOR TO CHEMOTHERAPY: ICD-10-CM

## 2025-05-21 DIAGNOSIS — C54.1 MALIGNANT NEOPLASM OF ENDOMETRIUM: Primary | ICD-10-CM

## 2025-05-21 DIAGNOSIS — E03.8 SUBCLINICAL HYPOTHYROIDISM: ICD-10-CM

## 2025-05-21 DIAGNOSIS — C54.1 MALIGNANT NEOPLASM OF ENDOMETRIUM: ICD-10-CM

## 2025-05-21 PROCEDURE — 63600175 PHARM REV CODE 636 W HCPCS: Performed by: NURSE PRACTITIONER

## 2025-05-21 PROCEDURE — 25000003 PHARM REV CODE 250: Performed by: NURSE PRACTITIONER

## 2025-05-21 PROCEDURE — 96413 CHEMO IV INFUSION 1 HR: CPT

## 2025-05-21 PROCEDURE — 96367 TX/PROPH/DG ADDL SEQ IV INF: CPT

## 2025-05-21 PROCEDURE — 99999 PR PBB SHADOW E&M-EST. PATIENT-LVL IV: CPT | Mod: PBBFAC,,, | Performed by: NURSE PRACTITIONER

## 2025-05-21 RX ORDER — EPINEPHRINE 0.3 MG/.3ML
0.3 INJECTION SUBCUTANEOUS ONCE AS NEEDED
Status: CANCELLED | OUTPATIENT
Start: 2025-05-21 | End: 2036-10-16

## 2025-05-21 RX ORDER — SODIUM CHLORIDE 0.9 % (FLUSH) 0.9 %
10 SYRINGE (ML) INJECTION
Status: CANCELLED | OUTPATIENT
Start: 2025-05-21

## 2025-05-21 RX ORDER — SODIUM CHLORIDE 0.9 % (FLUSH) 0.9 %
10 SYRINGE (ML) INJECTION
Status: DISCONTINUED | OUTPATIENT
Start: 2025-05-21 | End: 2025-05-21 | Stop reason: HOSPADM

## 2025-05-21 RX ORDER — EPINEPHRINE 0.3 MG/.3ML
0.3 INJECTION SUBCUTANEOUS ONCE AS NEEDED
Status: DISCONTINUED | OUTPATIENT
Start: 2025-05-21 | End: 2025-05-21 | Stop reason: HOSPADM

## 2025-05-21 RX ORDER — PROCHLORPERAZINE EDISYLATE 5 MG/ML
5-10 INJECTION INTRAMUSCULAR; INTRAVENOUS ONCE AS NEEDED
Status: CANCELLED
Start: 2025-05-21

## 2025-05-21 RX ORDER — DIPHENHYDRAMINE HYDROCHLORIDE 50 MG/ML
50 INJECTION, SOLUTION INTRAMUSCULAR; INTRAVENOUS ONCE AS NEEDED
Status: CANCELLED | OUTPATIENT
Start: 2025-05-21 | End: 2036-10-16

## 2025-05-21 RX ORDER — HEPARIN 100 UNIT/ML
500 SYRINGE INTRAVENOUS
Status: CANCELLED | OUTPATIENT
Start: 2025-05-21

## 2025-05-21 RX ORDER — DIPHENHYDRAMINE HYDROCHLORIDE 50 MG/ML
50 INJECTION, SOLUTION INTRAMUSCULAR; INTRAVENOUS ONCE AS NEEDED
Status: DISCONTINUED | OUTPATIENT
Start: 2025-05-21 | End: 2025-05-21 | Stop reason: HOSPADM

## 2025-05-21 RX ORDER — HEPARIN 100 UNIT/ML
500 SYRINGE INTRAVENOUS
Status: DISCONTINUED | OUTPATIENT
Start: 2025-05-21 | End: 2025-05-21 | Stop reason: HOSPADM

## 2025-05-21 RX ORDER — LEVOTHYROXINE SODIUM 125 UG/1
125 TABLET ORAL
Qty: 30 TABLET | Refills: 5 | OUTPATIENT
Start: 2025-05-21

## 2025-05-21 RX ADMIN — HEPARIN 500 UNITS: 100 SYRINGE at 12:05

## 2025-05-21 RX ADMIN — FOSAPREPITANT 150 MG: 150 INJECTION, POWDER, LYOPHILIZED, FOR SOLUTION INTRAVENOUS at 11:05

## 2025-05-21 RX ADMIN — SODIUM CHLORIDE 1000 MG: 9 INJECTION, SOLUTION INTRAVENOUS at 11:05

## 2025-05-21 NOTE — PLAN OF CARE
Dostarlimab infusion complete. Pt tolerated well. VSS. NAD. Port to chest de-accessed after heparinized per protocol.  Next infusion appointment confirmed. Pt verbalized understanding of discharge instructions before leaving.

## 2025-05-27 DIAGNOSIS — G47.01 INSOMNIA DUE TO MEDICAL CONDITION: ICD-10-CM

## 2025-05-27 RX ORDER — DULOXETIN HYDROCHLORIDE 30 MG/1
30 CAPSULE, DELAYED RELEASE ORAL DAILY
Qty: 30 CAPSULE | Refills: 11 | Status: SHIPPED | OUTPATIENT
Start: 2025-05-27 | End: 2026-05-27

## 2025-06-03 DIAGNOSIS — C54.1 MALIGNANT NEOPLASM OF ENDOMETRIUM: ICD-10-CM

## 2025-06-03 RX ORDER — CELECOXIB 100 MG/1
100 CAPSULE ORAL
Qty: 30 CAPSULE | Refills: 2 | Status: SHIPPED | OUTPATIENT
Start: 2025-06-03

## 2025-06-25 ENCOUNTER — PATIENT MESSAGE (OUTPATIENT)
Dept: GASTROENTEROLOGY | Facility: CLINIC | Age: 60
End: 2025-06-25
Payer: COMMERCIAL

## 2025-06-25 DIAGNOSIS — K52.9 COLITIS: Primary | ICD-10-CM

## 2025-06-25 DIAGNOSIS — R19.7 DIARRHEA, UNSPECIFIED TYPE: ICD-10-CM

## 2025-06-25 NOTE — TELEPHONE ENCOUNTER
Reason for Call: Change in condition diarrhea and cold  Contact: Called & spoke to patient  Date of symptom onset: 10-14 days ago ( around 6/15)  Symptom details:   BM/d: 10-12, liquid, no substance  Denies blood or mucus, false trips  Reports urgency   Noc BMs, every night for about 7 days, 2x per night   Decreased appetite, occasionally nauseated, no vomiting, still tolerating PO fluids   Abd pain:  intermittent cramping in bilateral lower quadrants   Nothing makes pain better or worse  Productive cough, green mucus, sore throat, denies HA denies fever, fatigue, (talking OTC cough medication and throat lozenges)    Current IBD meds: Entyvio every 8 weeks IV  (started 4/17/24 ; LD: 4/30, ND: 6/25, canceled )  Other pertinent meds: no GI meds   Additional pertinent information (recent lab, scope, imaging, etc.):   10/13/2024 last stool calprotectin: 45.7 (did not submit another on 4/2025 like requested)   Next OV: 9/3 VV  Provider: Dr. Winston colon

## 2025-06-26 ENCOUNTER — OFFICE VISIT (OUTPATIENT)
Dept: INTERNAL MEDICINE | Facility: CLINIC | Age: 60
End: 2025-06-26
Payer: COMMERCIAL

## 2025-06-26 ENCOUNTER — TELEPHONE (OUTPATIENT)
Dept: INTERNAL MEDICINE | Facility: CLINIC | Age: 60
End: 2025-06-26

## 2025-06-26 ENCOUNTER — LAB VISIT (OUTPATIENT)
Dept: LAB | Facility: OTHER | Age: 60
End: 2025-06-26
Attending: STUDENT IN AN ORGANIZED HEALTH CARE EDUCATION/TRAINING PROGRAM
Payer: COMMERCIAL

## 2025-06-26 ENCOUNTER — NURSE TRIAGE (OUTPATIENT)
Dept: ADMINISTRATIVE | Facility: CLINIC | Age: 60
End: 2025-06-26
Payer: COMMERCIAL

## 2025-06-26 ENCOUNTER — RESULTS FOLLOW-UP (OUTPATIENT)
Dept: GASTROENTEROLOGY | Facility: CLINIC | Age: 60
End: 2025-06-26

## 2025-06-26 VITALS
DIASTOLIC BLOOD PRESSURE: 70 MMHG | WEIGHT: 152.13 LBS | OXYGEN SATURATION: 95 % | SYSTOLIC BLOOD PRESSURE: 110 MMHG | BODY MASS INDEX: 23.88 KG/M2 | HEART RATE: 97 BPM | HEIGHT: 67 IN

## 2025-06-26 DIAGNOSIS — R05.1 ACUTE COUGH: Primary | ICD-10-CM

## 2025-06-26 DIAGNOSIS — R19.7 DIARRHEA, UNSPECIFIED TYPE: ICD-10-CM

## 2025-06-26 DIAGNOSIS — K52.9 COLITIS: ICD-10-CM

## 2025-06-26 DIAGNOSIS — Z51.12 ENCOUNTER FOR ANTINEOPLASTIC IMMUNOTHERAPY: ICD-10-CM

## 2025-06-26 LAB
ANION GAP (OHS): 12 MMOL/L (ref 8–16)
BUN SERPL-MCNC: 9 MG/DL (ref 8–23)
CALCIUM SERPL-MCNC: 9.4 MG/DL (ref 8.7–10.5)
CHLORIDE SERPL-SCNC: 106 MMOL/L (ref 95–110)
CO2 SERPL-SCNC: 25 MMOL/L (ref 23–29)
CREAT SERPL-MCNC: 0.7 MG/DL (ref 0.5–1.4)
GFR SERPLBLD CREATININE-BSD FMLA CKD-EPI: >60 ML/MIN/1.73/M2
GLUCOSE SERPL-MCNC: 93 MG/DL (ref 70–110)
POTASSIUM SERPL-SCNC: 4.2 MMOL/L (ref 3.5–5.1)
SODIUM SERPL-SCNC: 143 MMOL/L (ref 136–145)
T4 FREE SERPL-MCNC: 1.35 NG/DL (ref 0.71–1.51)

## 2025-06-26 PROCEDURE — 99999 PR PBB SHADOW E&M-EST. PATIENT-LVL III: CPT | Mod: PBBFAC,,, | Performed by: NURSE PRACTITIONER

## 2025-06-26 PROCEDURE — 84439 ASSAY OF FREE THYROXINE: CPT

## 2025-06-26 PROCEDURE — 36415 COLL VENOUS BLD VENIPUNCTURE: CPT

## 2025-06-26 PROCEDURE — 80048 BASIC METABOLIC PNL TOTAL CA: CPT

## 2025-06-26 RX ORDER — DULOXETIN HYDROCHLORIDE 60 MG/1
60 CAPSULE, DELAYED RELEASE ORAL
COMMUNITY
Start: 2025-02-17

## 2025-06-26 RX ORDER — METHYLPREDNISOLONE 4 MG/1
TABLET ORAL
Qty: 21 EACH | Refills: 0 | Status: SHIPPED | OUTPATIENT
Start: 2025-06-26 | End: 2025-07-17

## 2025-06-26 RX ORDER — BENZONATATE 100 MG/1
100 CAPSULE ORAL 3 TIMES DAILY PRN
Qty: 42 CAPSULE | Refills: 0 | Status: SHIPPED | OUTPATIENT
Start: 2025-06-26 | End: 2025-07-10

## 2025-06-26 NOTE — PROGRESS NOTES
Subjective     Patient ID: Sugar Diaz is a 59 y.o. female.    Chief Complaint: Cough    Cough  This is a new problem. The current episode started 1 to 4 weeks ago (x1 week ago). The problem has been gradually worsening. The problem occurs every few hours (coughs for like 2 to 3 minutes straight like an attack). The cough is Productive of sputum (greenish in color). Pertinent negatives include no chest pain, chills, ear congestion, ear pain, fever, headaches, heartburn, hemoptysis, myalgias, nasal congestion, postnasal drip, rash, rhinorrhea, sore throat, shortness of breath, sweats, weight loss or wheezing. Associated symptoms comments: Decreased appetite  . The symptoms are aggravated by lying down and dust (talking). Risk factors for lung disease include occupational exposure. Treatments tried: OTC cough medicine, prescription cough syrup, Delsym cough syrup. The treatment provided no relief. Her past medical history is significant for pneumonia. There is no history of asthma, bronchiectasis, bronchitis, COPD, emphysema or environmental allergies.         Review of Systems   Constitutional: Negative.  Negative for chills, fever and weight loss.   HENT: Negative.  Negative for ear pain, postnasal drip, rhinorrhea and sore throat.    Eyes: Negative.    Respiratory:  Positive for cough. Negative for hemoptysis, shortness of breath and wheezing.    Cardiovascular:  Negative for chest pain.   Gastrointestinal: Negative.  Negative for heartburn.   Endocrine: Negative.    Genitourinary: Negative.    Musculoskeletal: Negative.  Negative for myalgias.   Integumentary:  Negative for rash. Negative.   Allergic/Immunologic: Negative for environmental allergies.   Neurological: Negative.  Negative for headaches.   Hematological: Negative.    Psychiatric/Behavioral: Negative.            Objective     Physical Exam  Vitals reviewed.   Constitutional:       General: She is not in acute distress.     Appearance: Normal  appearance. She is well-developed and normal weight. She is not ill-appearing, toxic-appearing or diaphoretic.   HENT:      Head: Normocephalic and atraumatic. Not macrocephalic and not microcephalic. No raccoon eyes, Haung's sign, abrasion, contusion, right periorbital erythema, left periorbital erythema or laceration. Hair is normal.      Right Ear: Tympanic membrane, ear canal and external ear normal. No decreased hearing noted. No laceration, drainage, swelling or tenderness. No middle ear effusion. There is no impacted cerumen. No foreign body. No mastoid tenderness. No hemotympanum. Tympanic membrane is not injected, scarred, perforated, erythematous, retracted or bulging. Tympanic membrane has normal mobility.      Left Ear: Tympanic membrane, ear canal and external ear normal. No decreased hearing noted. No laceration, drainage, swelling or tenderness.  No middle ear effusion. There is no impacted cerumen. No foreign body. No mastoid tenderness. No hemotympanum. Tympanic membrane is not injected, scarred, perforated, erythematous, retracted or bulging. Tympanic membrane has normal mobility.      Nose: Nose normal. No nasal deformity, laceration, mucosal edema, congestion or rhinorrhea.      Mouth/Throat:      Mouth: Mucous membranes are moist.      Pharynx: Oropharynx is clear. Uvula midline. No oropharyngeal exudate or posterior oropharyngeal erythema.   Eyes:      General: Lids are normal. No scleral icterus.        Right eye: No discharge.         Left eye: No discharge.      Extraocular Movements: Extraocular movements intact.      Conjunctiva/sclera: Conjunctivae normal.      Pupils: Pupils are equal, round, and reactive to light.   Neck:      Thyroid: No thyroid mass or thyromegaly.      Trachea: Trachea normal.   Cardiovascular:      Rate and Rhythm: Normal rate and regular rhythm.      Heart sounds: No murmur heard.     No friction rub. No gallop.   Pulmonary:      Effort: Pulmonary effort is  normal. No respiratory distress.      Breath sounds: Normal breath sounds. No stridor. No wheezing, rhonchi or rales.   Chest:      Chest wall: No tenderness.   Abdominal:      Palpations: Abdomen is soft.   Musculoskeletal:         General: Normal range of motion.      Cervical back: Normal range of motion and neck supple. No edema, erythema or rigidity. No spinous process tenderness or muscular tenderness. Normal range of motion.   Lymphadenopathy:      Head:      Right side of head: No submental, submandibular, tonsillar, preauricular or posterior auricular adenopathy.      Left side of head: No submental, submandibular, tonsillar, preauricular, posterior auricular or occipital adenopathy.      Cervical: No cervical adenopathy.   Skin:     General: Skin is warm and dry.   Neurological:      Mental Status: She is alert and oriented to person, place, and time.   Psychiatric:         Behavior: Behavior normal.         Thought Content: Thought content normal.         Judgment: Judgment normal.          Assessment and Plan     1. Acute cough  -     benzonatate (TESSALON) 100 MG capsule; Take 1 capsule (100 mg total) by mouth 3 (three) times daily as needed for Cough.  Dispense: 42 capsule; Refill: 0  -     methylPREDNISolone (MEDROL DOSEPACK) 4 mg tablet; use as directed  Dispense: 21 each; Refill: 0      Fluids.  Rest.  Lozenges, warm tea, and soups.  Oral Hygiene (Biotene mouthwash for oral moisture).  Air Humidifier/Purifier.  F/U with PCP in 1 week or prn sooner if symptoms worsen.         No follow-ups on file.

## 2025-06-26 NOTE — TELEPHONE ENCOUNTER
Patient from emergent que. Patient states she is not SOB, she has a cough and would like appointment with Dr. Khan.Triage process explained. States has had cough for about a week and and feels it has been too long. Denies SOB, fever or CP. Unable to make appointment with Dr. Khan. Appointment made with another provider. Time, location and provider verified with patient. Verb understanding.   Reason for Disposition   Continuous (nonstop) coughing interferes with work or school and no improvement using cough treatment per Care Advice    Additional Information   Negative: Bluish (or gray) lips or face   Negative: SEVERE difficulty breathing (e.g., struggling for each breath, speaks in single words)   Negative: Rapid onset of cough and has hives   Negative: Coughing started suddenly after medicine, an allergic food or bee sting   Negative: Difficulty breathing after exposure to flames, smoke, or fumes   Negative: Sounds like a life-threatening emergency to the triager   Negative: MODERATE difficulty breathing (e.g., speaks in phrases, SOB even at rest, pulse 100-120) and still present when not coughing   Negative: Chest pain present when not coughing   Negative: Passed out (e.g., fainted, lost consciousness, blacked out and was not responding)   Negative: Patient sounds very sick or weak to the triager   Negative: MILD difficulty breathing (e.g., minimal/no SOB at rest, SOB with walking, pulse <100) and still present when not coughing   Negative: Coughed up > 1 tablespoon (15 ml) blood (Exception: Blood-tinged sputum.)   Negative: Fever > 103 F (39.4 C)   Negative: Fever > 101 F (38.3 C) and over 60 years of age   Negative: Fever > 100 F (37.8 C) and has diabetes mellitus or a weak immune system (e.g., HIV positive, cancer chemotherapy, organ transplant, splenectomy, chronic steroids)   Negative: Fever > 100 F (37.8 C) and bedridden (e.g., CVA, chronic illness, recovering from surgery)   Negative: Increasing ankle  swelling   Negative: Wheezing is present   Negative: SEVERE coughing spells (e.g., whooping sound after coughing, vomiting after coughing)   Negative: Coughing up mary-colored (reddish-brown) or blood-tinged sputum   Negative: Fever present > 3 days (72 hours)   Negative: Fever returns after gone for over 24 hours and symptoms worse or not improved   Negative: Using nasal washes and pain medicine > 24 hours and sinus pain persists   Negative: Known COPD or other severe lung disease (i.e., bronchiectasis, cystic fibrosis, lung surgery) and symptoms getting worse (i.e., increased sputum purulence or amount, increased breathing difficulty)    Protocols used: Cough-A-OH

## 2025-06-26 NOTE — TELEPHONE ENCOUNTER
FYI: triage notes, hospital follow up scheduled. Routing to MD galavizing for MD bank.   
Alcohol intoxication, uncomplicated

## 2025-06-27 RX ORDER — DIPHENHYDRAMINE HYDROCHLORIDE 50 MG/ML
50 INJECTION, SOLUTION INTRAMUSCULAR; INTRAVENOUS ONCE AS NEEDED
OUTPATIENT
Start: 2025-06-30

## 2025-06-27 RX ORDER — PROCHLORPERAZINE EDISYLATE 5 MG/ML
5-10 INJECTION INTRAMUSCULAR; INTRAVENOUS ONCE AS NEEDED
Start: 2025-06-30

## 2025-06-27 RX ORDER — EPINEPHRINE 0.3 MG/.3ML
0.3 INJECTION SUBCUTANEOUS ONCE AS NEEDED
OUTPATIENT
Start: 2025-06-30

## 2025-06-27 RX ORDER — HEPARIN 100 UNIT/ML
500 SYRINGE INTRAVENOUS
OUTPATIENT
Start: 2025-06-30

## 2025-06-27 RX ORDER — SODIUM CHLORIDE 0.9 % (FLUSH) 0.9 %
10 SYRINGE (ML) INJECTION
OUTPATIENT
Start: 2025-06-30

## 2025-06-30 ENCOUNTER — PATIENT MESSAGE (OUTPATIENT)
Dept: GYNECOLOGIC ONCOLOGY | Facility: CLINIC | Age: 60
End: 2025-06-30
Payer: COMMERCIAL

## 2025-06-30 ENCOUNTER — LAB VISIT (OUTPATIENT)
Dept: LAB | Facility: HOSPITAL | Age: 60
End: 2025-06-30
Attending: OBSTETRICS & GYNECOLOGY
Payer: COMMERCIAL

## 2025-06-30 DIAGNOSIS — Z51.12 ENCOUNTER FOR ANTINEOPLASTIC IMMUNOTHERAPY: ICD-10-CM

## 2025-06-30 DIAGNOSIS — R19.7 DIARRHEA, UNSPECIFIED TYPE: ICD-10-CM

## 2025-06-30 DIAGNOSIS — K52.9 COLITIS: ICD-10-CM

## 2025-06-30 LAB
ABSOLUTE NEUTROPHIL COUNT (OHS): 6.55 K/UL (ref 1.8–7.7)
ALBUMIN SERPL BCP-MCNC: 3.5 G/DL (ref 3.5–5.2)
ALP SERPL-CCNC: 79 UNIT/L (ref 40–150)
ALT SERPL W/O P-5'-P-CCNC: 16 UNIT/L (ref 10–44)
ANION GAP (OHS): 11 MMOL/L (ref 8–16)
AST SERPL-CCNC: 15 UNIT/L (ref 11–45)
BILIRUB SERPL-MCNC: 0.3 MG/DL (ref 0.1–1)
BUN SERPL-MCNC: 15 MG/DL (ref 8–23)
CALCIUM SERPL-MCNC: 8.7 MG/DL (ref 8.7–10.5)
CHLORIDE SERPL-SCNC: 104 MMOL/L (ref 95–110)
CO2 SERPL-SCNC: 25 MMOL/L (ref 23–29)
CREAT SERPL-MCNC: 0.7 MG/DL (ref 0.5–1.4)
ERYTHROCYTE [DISTWIDTH] IN BLOOD BY AUTOMATED COUNT: 12.6 % (ref 11.5–14.5)
GFR SERPLBLD CREATININE-BSD FMLA CKD-EPI: >60 ML/MIN/1.73/M2
GLUCOSE SERPL-MCNC: 118 MG/DL (ref 70–110)
HCT VFR BLD AUTO: 40.4 % (ref 37–48.5)
HGB BLD-MCNC: 13.1 GM/DL (ref 12–16)
IMM GRANULOCYTES # BLD AUTO: 0.1 K/UL (ref 0–0.04)
MCH RBC QN AUTO: 31 PG (ref 27–31)
MCHC RBC AUTO-ENTMCNC: 32.4 G/DL (ref 32–36)
MCV RBC AUTO: 96 FL (ref 82–98)
PLATELET # BLD AUTO: 419 K/UL (ref 150–450)
PMV BLD AUTO: 8.7 FL (ref 9.2–12.9)
POTASSIUM SERPL-SCNC: 4.2 MMOL/L (ref 3.5–5.1)
PROT SERPL-MCNC: 7.1 GM/DL (ref 6–8.4)
RBC # BLD AUTO: 4.22 M/UL (ref 4–5.4)
SODIUM SERPL-SCNC: 140 MMOL/L (ref 136–145)
T4 FREE SERPL-MCNC: 1.32 NG/DL (ref 0.71–1.51)
T4 FREE SERPL-MCNC: 1.36 NG/DL (ref 0.71–1.51)
TSH SERPL-ACNC: 0.07 UIU/ML (ref 0.4–4)
WBC # BLD AUTO: 10.84 K/UL (ref 3.9–12.7)

## 2025-06-30 PROCEDURE — 85027 COMPLETE CBC AUTOMATED: CPT

## 2025-06-30 PROCEDURE — 87186 SC STD MICRODIL/AGAR DIL: CPT

## 2025-06-30 PROCEDURE — 84443 ASSAY THYROID STIM HORMONE: CPT

## 2025-06-30 PROCEDURE — 80053 COMPREHEN METABOLIC PANEL: CPT

## 2025-06-30 PROCEDURE — 36415 COLL VENOUS BLD VENIPUNCTURE: CPT

## 2025-06-30 PROCEDURE — 83993 ASSAY FOR CALPROTECTIN FECAL: CPT

## 2025-06-30 PROCEDURE — 87427 SHIGA-LIKE TOXIN AG IA: CPT

## 2025-07-01 ENCOUNTER — TELEPHONE (OUTPATIENT)
Dept: GASTROENTEROLOGY | Facility: CLINIC | Age: 60
End: 2025-07-01
Payer: COMMERCIAL

## 2025-07-01 LAB
CALPROTECTIN INTERP (OHS): NORMAL
CALPROTECTIN STOOL (OHS): 36.9 ΜG/G
E COLI SXT1 STL QL IA: NEGATIVE
E COLI SXT2 STL QL IA: NEGATIVE

## 2025-07-01 RX ORDER — PROCHLORPERAZINE EDISYLATE 5 MG/ML
5-10 INJECTION INTRAMUSCULAR; INTRAVENOUS ONCE AS NEEDED
Start: 2025-08-11

## 2025-07-01 RX ORDER — HEPARIN 100 UNIT/ML
500 SYRINGE INTRAVENOUS
OUTPATIENT
Start: 2025-08-11

## 2025-07-01 RX ORDER — EPINEPHRINE 0.3 MG/.3ML
0.3 INJECTION SUBCUTANEOUS ONCE AS NEEDED
OUTPATIENT
Start: 2025-08-11

## 2025-07-01 RX ORDER — DIPHENHYDRAMINE HYDROCHLORIDE 50 MG/ML
50 INJECTION, SOLUTION INTRAMUSCULAR; INTRAVENOUS ONCE AS NEEDED
OUTPATIENT
Start: 2025-08-11

## 2025-07-01 RX ORDER — SODIUM CHLORIDE 0.9 % (FLUSH) 0.9 %
10 SYRINGE (ML) INJECTION
OUTPATIENT
Start: 2025-08-11

## 2025-07-01 NOTE — TELEPHONE ENCOUNTER
Called [facility] ([phone]) - spoke w/ Saloni Chi peer to peer scheduled 7/3/25 between 12:30 PM- 1 PM central time  Case ref # [id number]

## 2025-07-01 NOTE — TELEPHONE ENCOUNTER
From: Salome Moreno   Sent: 2025  12:08 PM CDT   To: Kathi Obrien; Jason Montero MD; Macey PERRY*   Subject: Vedolizumab ENTYVIO - DENIAL                     Good day,     Please see denial details below     [facility] code:    Medication name: Vedolizumab ENTYVIO   The request has been denied for the following reason (be specific):   Not Medically Necessary   - the request does not meet the medical necessity criteria in the policy.   - Your health notes does not show that your condition is severe enough to receive this medicine at this time. and you rhealth notes do not show that you have tried and failed or have side effects infliiximab. or your health notes do not show that you have inflammatin of the liver.     Instructions for appeal:   Name: Sugar Diaz   : 1965   Primary Coverage Payor: [facility]   Primary Coverage ID: [id number]   Primary Group Number: [id number]   Case reference # [id number]   Phone # for appeal: [phone]   Fax # for appeal:   [phone] (fast) / standard appeal: [phone]   Deadline for completing peer to peer: 180 days

## 2025-07-02 ENCOUNTER — OFFICE VISIT (OUTPATIENT)
Dept: GYNECOLOGIC ONCOLOGY | Facility: CLINIC | Age: 60
End: 2025-07-02
Payer: COMMERCIAL

## 2025-07-02 ENCOUNTER — INFUSION (OUTPATIENT)
Dept: INFUSION THERAPY | Facility: OTHER | Age: 60
End: 2025-07-02
Attending: INTERNAL MEDICINE
Payer: COMMERCIAL

## 2025-07-02 VITALS
DIASTOLIC BLOOD PRESSURE: 63 MMHG | OXYGEN SATURATION: 100 % | HEART RATE: 88 BPM | SYSTOLIC BLOOD PRESSURE: 134 MMHG | WEIGHT: 150 LBS | BODY MASS INDEX: 23.49 KG/M2

## 2025-07-02 DIAGNOSIS — C54.1 MALIGNANT NEOPLASM OF ENDOMETRIUM: Primary | ICD-10-CM

## 2025-07-02 DIAGNOSIS — T45.1X5A CHEMOTHERAPY INDUCED DIARRHEA: ICD-10-CM

## 2025-07-02 DIAGNOSIS — K52.1 CHEMOTHERAPY INDUCED DIARRHEA: ICD-10-CM

## 2025-07-02 DIAGNOSIS — C77.2 SECONDARY MALIGNANCY OF RETROPERITONEAL LYMPH NODES: ICD-10-CM

## 2025-07-02 DIAGNOSIS — E03.8 SUBCLINICAL HYPOTHYROIDISM: ICD-10-CM

## 2025-07-02 LAB — BACTERIA STL CULT: NORMAL

## 2025-07-02 PROCEDURE — 1159F MED LIST DOCD IN RCRD: CPT | Mod: CPTII,S$GLB,, | Performed by: OBSTETRICS & GYNECOLOGY

## 2025-07-02 PROCEDURE — 3075F SYST BP GE 130 - 139MM HG: CPT | Mod: CPTII,S$GLB,, | Performed by: OBSTETRICS & GYNECOLOGY

## 2025-07-02 PROCEDURE — 96413 CHEMO IV INFUSION 1 HR: CPT

## 2025-07-02 PROCEDURE — 3008F BODY MASS INDEX DOCD: CPT | Mod: CPTII,S$GLB,, | Performed by: OBSTETRICS & GYNECOLOGY

## 2025-07-02 PROCEDURE — 3078F DIAST BP <80 MM HG: CPT | Mod: CPTII,S$GLB,, | Performed by: OBSTETRICS & GYNECOLOGY

## 2025-07-02 PROCEDURE — 99999 PR PBB SHADOW E&M-EST. PATIENT-LVL III: CPT | Mod: PBBFAC,,, | Performed by: OBSTETRICS & GYNECOLOGY

## 2025-07-02 PROCEDURE — 63600175 PHARM REV CODE 636 W HCPCS: Mod: JZ,TB | Performed by: OBSTETRICS & GYNECOLOGY

## 2025-07-02 PROCEDURE — G2211 COMPLEX E/M VISIT ADD ON: HCPCS | Mod: S$GLB,,, | Performed by: OBSTETRICS & GYNECOLOGY

## 2025-07-02 PROCEDURE — 99215 OFFICE O/P EST HI 40 MIN: CPT | Mod: S$GLB,,, | Performed by: OBSTETRICS & GYNECOLOGY

## 2025-07-02 PROCEDURE — 25000003 PHARM REV CODE 250: Performed by: OBSTETRICS & GYNECOLOGY

## 2025-07-02 RX ORDER — DIPHENHYDRAMINE HYDROCHLORIDE 50 MG/ML
50 INJECTION, SOLUTION INTRAMUSCULAR; INTRAVENOUS ONCE AS NEEDED
Status: DISCONTINUED | OUTPATIENT
Start: 2025-07-02 | End: 2025-07-02 | Stop reason: HOSPADM

## 2025-07-02 RX ORDER — HEPARIN 100 UNIT/ML
500 SYRINGE INTRAVENOUS
Status: DISCONTINUED | OUTPATIENT
Start: 2025-07-02 | End: 2025-07-02 | Stop reason: HOSPADM

## 2025-07-02 RX ORDER — EPINEPHRINE 0.3 MG/.3ML
0.3 INJECTION SUBCUTANEOUS ONCE AS NEEDED
Status: DISCONTINUED | OUTPATIENT
Start: 2025-07-02 | End: 2025-07-02 | Stop reason: HOSPADM

## 2025-07-02 RX ORDER — SODIUM CHLORIDE 0.9 % (FLUSH) 0.9 %
10 SYRINGE (ML) INJECTION
Status: DISCONTINUED | OUTPATIENT
Start: 2025-07-02 | End: 2025-07-02 | Stop reason: HOSPADM

## 2025-07-02 RX ADMIN — SODIUM CHLORIDE 1000 MG: 9 INJECTION, SOLUTION INTRAVENOUS at 11:07

## 2025-07-02 RX ADMIN — HEPARIN 500 UNITS: 100 SYRINGE at 12:07

## 2025-07-02 NOTE — PROGRESS NOTES
REFERRING PROVIDER  Omaira Evans MD Shah, Shamita MD    REASON FOR CONSULT  Sugar Diaz  is a 59 y.o.  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    HISTORY OF PRESENT ILLNESS    Chemotherapy regimen: Dostarlimab 1000mg 6 weeks (3 years)  Cycle: 12  Previous dose limiting toxicities: Y  Cycle 3: Grade 4 diarrhea 2/2 Salmonella  Previous dose reductions: N  Previous breaks: Y  Cycle 4: 4 weeks 2/2 Grade 4 diarrhea 2/2 Salmonella  Cycle 10: 8 weeks 2/2 PNA and immunotherapy holiday  Previous changes in pre-medications: N    Energy level: baseline  Appetite: baseline  Fevers/chills/nights: no  Nausea: no  Diarrhea: yes, grade 2  Emesis: no  Neuropathy: yes, grade 3  Discoloration of lower extremities: no  Mucositis: no  Maculopapular rashes: no  Dyspnea or coughing: yes      Oncology History   Malignant neoplasm of endometrium   5/23/2023 Surgery    Hysteroscopy, D&C: grade 1 endometrioid EC     6/5/2023 Surgery    TRH/BSO/BSLN/RPLN/repair of obturator nerve    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 4.5 cm, grade 1, 96% MI (22/23 mm), +LUE, -LVSI, +MELF, -cervix, 1/2+ RPLN, 1/1+ LPLN, -ascites.     6/19/2023 Imaging Significant Findings    CT C/A/P w/: High L para-aortic at the level of the renal vessels, 1.5 cm.  Low R para-aortic at the LEAH, 4.1 cm in greatest dimension.     6/25/2023 Genomic Testing    [facility] (NGS): ARD1A, CHEK1, CTCF, , HDAC2, HNF1A, JAK1, KRAS, MAX, MSH6, P1K3R1, PPM1D, PTEN, ZFHX3, ZSR2     6/28/2023 Tumor Conference    No rule for debulking of para-aortic lymph nodes.  Adjuvant VBT.  Represent after completion of adjuvant therapy to discuss EBRT.        7/7/2023 - 10/19/2023 Chemotherapy    Carboplatin AUC 5/Paclitaxel 135 mg/m2/Dostarlimab 200mg q3 weeks x6    Previous dose limiting toxicities: Y  Cycle 4: Grade 2 colitis s/p steroid  taper  Cycle 5: Grade 3 colitis  Previous dose reductions: N  Previous breaks: Y  Cycle 4: Dostarlimab omitted due to grade 2 colitis  Cycle 5: Dostarlimab 2/2 grade 3 colitis (negative colonoscopy with improvement in symptoms with steroid taper)     8/9/2023 - 9/13/2023 Radiation Therapy    Treating physician: Dr. Adriane Babb  Total Dose: 21 Gy HDR  Fractions: 3 vaginal cuff brachytherapy     8/22/2023 Imaging Significant Findings    CT A/P w/: No evidence of colitis, AL in high L para-aortic and low R para-aortic.     10/4/2023 Procedure    Colonoscopy: WNL     11/6/2023 Imaging Significant Findings    CT C/A/P w/: PATTI     11/6/2023 - 11/9/2023 Hospital Admission    Most Recent Admission Details  Admit Date: 11/6/23  Admitted for: RVS PNA superimposed by community acquitted PNA  Discharge date: 11/9/23  Discharged from: [address] at [facility] ([facility])  Discharge disposition: Home or Self Care       11/22/2023 -  Maintenance Therapy    Dostarlimab 1000mg x     2/21/2024 Procedure    EGD, Colonoscopy: No gross evidence of colitis      Hospital Admission    Most Recent Admission Details  Admit Date: 2/15/24  Admitted for: Grade 4 colitis 2/2 Salmonella.    Discharge date: 2/23/24  Discharged from: [facility] at [facility]  Discharge disposition: Home or Self Care       2/21/2024 Procedure    Colonoscopy, EGD: No obvious ICI colitis     7/9/2024 Imaging Significant Findings    CT C/A/P w/: PATTI     11/14/2024 Imaging Significant Findings    CT C/A/P w/: PATTI     4/2/2025 Imaging Significant Findings    CT C/A/P w/: PATTI          The following portions of the patient's history were reviewed and updated as appropriate: allergies, current medications, family history, medical history, social history and surgical history.    REVIEW OF SYSTEMS  All systems reviewed and negative except as noted in HPI.    OBJECTIVE   Vitals:    07/02/25 1038   BP: 134/63   Pulse: 88               Body mass index is 23.49 kg/m².   1. General: Well appearing, no apparent distress, alert and oriented.  2. Lymph: Neck symmetric without cervical or supraclavicular adenopathy or mass.  3. Lungs: Normal respiratory rate, no accessory muscle use.  4. Psych: Normal affect.  5. Abdomen:  non-distended, soft, non-tender, are no masses, no ascites, no hepatosplenomegaly.  6. Skin: Warm, dry, no rashes or lesions.   7. Extremities: Bilateral lower extremities without edema or tenderness.  8. Genitourinary               Pelvic Examination including (female chaperone was present for the exam):               a. External genitalia are normal in appearance. No lesions noted.               b. Urethral meatus is normal size, location, and appearance.               c. Urethra is negative.               d. Bladder is nontender. No masses noted.               e. Vagina has normal mucosa with physiologic discharge. No lesions noted.              f. Uterus absent              g. Adnexa absent   h. Rectum deferred                 ECOG status: 1    LABORATORY DATA  Lab data reviewed.    RADIOLOGICAL DATA  Radiology data reviewed.    ASSESSMENT / PLAN     1. 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 Subclinical hypothyroidism    4. Chemotherapy induced diarrhea      F/U GI    Persistent grade 1-2 diarrhea with Entyvio (ND 3/5) now with changes in stool notable for constipation and hematochezia.  Stool studies WNL (10/6/24).  We had an extensive discussion about proceeding vs discontinuing maintenance therapy. The patient would like to continue therapy which I think is reasonable.    No signs of progression.  Grade 1 hypothyroidism.  Will increase Synthroid to 125 mcg (2/26-).  Administer cycle #12.  TSH, T4, CBC, CMP, RONC, maintenance cycle #13 MO.  We will repeat imaging after cycle #15.    PATIENT EDUCATION  Ready to learn, no apparent learning barriers were  identified; learning preferences include listening. Explained diagnosis and treatment plan; patient expressed understanding of the content.    ADMINISTRATIVE BILLING  Greater than 50% of was spent in counseling.     ONGOING COMPLEXITY BILLING  Visit today is associated with current or anticipated ongoing medical care related to this patients single serious condition/complex condition: endometrial cancer

## 2025-07-02 NOTE — PLAN OF CARE
Problem: Chemotherapy Effects  Goal: Nausea and Vomiting Relief  Outcome: Progressing  Goal: Anemia Symptom Improvement  Outcome: Progressing  Goal: Safety Maintained  Outcome: Progressing  Goal: Absence of Infection  Outcome: Progressing  Problem: Adult Inpatient Plan of Care  Goal: Plan of Care Review  Outcome: Progressing  Goal: Patient-Specific Goal (Individualized)  Outcome: Progressing  Goal: Absence of Hospital-Acquired Illness or Injury  Outcome: Progressing  Goal: Optimal Comfort and Wellbeing  Outcome: Progressing  Goal: Readiness for Transition of Care  Outcome: Progressing       Patient presented today for dostarlimab infusion. Port accessed and infusion given with no issues. VS remained stable throughout visit and assessment provided no issues. Port de-accessed w/o complications and all questions answered. Patient scheduled for next appt and left clinic in no acute distress.

## 2025-07-03 NOTE — TELEPHONE ENCOUNTER
Incoming call from [place] Dr Jimenez for Entyvio peer to peer- Clarified that pt is still on immunotherapy for endometrial cancer.  Reviewed pt previously pred dependent before starting Entyvio and pt doing well on Entyvio.   Dr Jimenez mention plan prefers IFX over Entyvio-  reviewed risk of immunogenicity if Entyvio stopped, pt with multiple recent infections (pneumonia, port infection, salmonelle) and lower risk of infection with Entyvio given gut specific.     Peer to peer approved   Auth # [id number]  Dr Jimenez only able to approve for 3 doses because that is the # of doses requested on initial PA  Approval will be faxed

## 2025-07-09 ENCOUNTER — PATIENT MESSAGE (OUTPATIENT)
Dept: GYNECOLOGIC ONCOLOGY | Facility: CLINIC | Age: 60
End: 2025-07-09
Payer: COMMERCIAL

## 2025-07-23 ENCOUNTER — INFUSION (OUTPATIENT)
Dept: INFUSION THERAPY | Facility: HOSPITAL | Age: 60
End: 2025-07-23
Payer: COMMERCIAL

## 2025-07-23 VITALS
TEMPERATURE: 98 F | OXYGEN SATURATION: 98 % | SYSTOLIC BLOOD PRESSURE: 125 MMHG | HEART RATE: 100 BPM | DIASTOLIC BLOOD PRESSURE: 62 MMHG

## 2025-07-23 DIAGNOSIS — K52.9 COLITIS: Primary | ICD-10-CM

## 2025-07-23 PROCEDURE — 25000003 PHARM REV CODE 250: Performed by: INTERNAL MEDICINE

## 2025-07-23 PROCEDURE — 96365 THER/PROPH/DIAG IV INF INIT: CPT

## 2025-07-23 PROCEDURE — 63600175 PHARM REV CODE 636 W HCPCS: Performed by: INTERNAL MEDICINE

## 2025-07-23 RX ORDER — HEPARIN 100 UNIT/ML
500 SYRINGE INTRAVENOUS
Status: DISCONTINUED | OUTPATIENT
Start: 2025-07-23 | End: 2025-07-23 | Stop reason: HOSPADM

## 2025-07-23 RX ADMIN — HEPARIN 500 UNITS: 100 SYRINGE at 01:07

## 2025-07-23 RX ADMIN — VEDOLIZUMAB 300 MG: 300 INJECTION, POWDER, LYOPHILIZED, FOR SOLUTION INTRAVENOUS at 01:07

## 2025-07-23 NOTE — PLAN OF CARE
Pt arrived to clinic by foot without difficulty, AAOx4. Denies any new or worsening of symptoms since last visit. Pt received Entyvio infusion via accessed port with 20g, 3/4 in, power palma needle. Dsg c/d/i; port site free of s/s of infection. Pt tolerated medication well. No S&S of an adverse reaction, VSS. Port flushed before with NS and after infusion with heparin; patent with blood return. Denies pain or discomfort. Care plan discussed with pt. Pt verbalized understanding. Pt aware of upcoming appointment via [facility]. Pt ambulatory upon discharge in [facility].

## 2025-07-31 DIAGNOSIS — C54.1 MALIGNANT NEOPLASM OF ENDOMETRIUM: ICD-10-CM

## 2025-07-31 RX ORDER — CELECOXIB 100 MG/1
100 CAPSULE ORAL
Qty: 30 CAPSULE | Refills: 11 | Status: SHIPPED | OUTPATIENT
Start: 2025-07-31

## 2025-08-11 ENCOUNTER — LAB VISIT (OUTPATIENT)
Dept: LAB | Facility: HOSPITAL | Age: 60
End: 2025-08-11
Attending: OBSTETRICS & GYNECOLOGY
Payer: COMMERCIAL

## 2025-08-11 DIAGNOSIS — Z51.12 ENCOUNTER FOR ANTINEOPLASTIC IMMUNOTHERAPY: ICD-10-CM

## 2025-08-11 LAB
ABSOLUTE NEUTROPHIL COUNT (OHS): 2.44 K/UL (ref 1.8–7.7)
ALBUMIN SERPL BCP-MCNC: 3.5 G/DL (ref 3.5–5.2)
ALP SERPL-CCNC: 79 UNIT/L (ref 40–150)
ALT SERPL W/O P-5'-P-CCNC: 12 UNIT/L (ref 10–44)
ANION GAP (OHS): 9 MMOL/L (ref 8–16)
AST SERPL-CCNC: 21 UNIT/L (ref 11–45)
BILIRUB SERPL-MCNC: 0.2 MG/DL (ref 0.1–1)
BUN SERPL-MCNC: 13 MG/DL (ref 8–23)
CALCIUM SERPL-MCNC: 8.7 MG/DL (ref 8.7–10.5)
CHLORIDE SERPL-SCNC: 107 MMOL/L (ref 95–110)
CO2 SERPL-SCNC: 25 MMOL/L (ref 23–29)
CREAT SERPL-MCNC: 0.7 MG/DL (ref 0.5–1.4)
ERYTHROCYTE [DISTWIDTH] IN BLOOD BY AUTOMATED COUNT: 12.5 % (ref 11.5–14.5)
GFR SERPLBLD CREATININE-BSD FMLA CKD-EPI: >60 ML/MIN/1.73/M2
GLUCOSE SERPL-MCNC: 102 MG/DL (ref 70–110)
HCT VFR BLD AUTO: 38.5 % (ref 37–48.5)
HGB BLD-MCNC: 12.5 GM/DL (ref 12–16)
IMM GRANULOCYTES # BLD AUTO: 0.01 K/UL (ref 0–0.04)
MCH RBC QN AUTO: 30.6 PG (ref 27–31)
MCHC RBC AUTO-ENTMCNC: 32.5 G/DL (ref 32–36)
MCV RBC AUTO: 94 FL (ref 82–98)
PLATELET # BLD AUTO: 335 K/UL (ref 150–450)
PMV BLD AUTO: 8.6 FL (ref 9.2–12.9)
POTASSIUM SERPL-SCNC: 3.8 MMOL/L (ref 3.5–5.1)
PROT SERPL-MCNC: 6.3 GM/DL (ref 6–8.4)
RBC # BLD AUTO: 4.08 M/UL (ref 4–5.4)
SODIUM SERPL-SCNC: 141 MMOL/L (ref 136–145)
T4 FREE SERPL-MCNC: 1.19 NG/DL (ref 0.71–1.51)
T4 FREE SERPL-MCNC: 1.19 NG/DL (ref 0.71–1.51)
TSH SERPL-ACNC: 0.16 UIU/ML (ref 0.4–4)
WBC # BLD AUTO: 5.64 K/UL (ref 3.9–12.7)

## 2025-08-11 PROCEDURE — 85027 COMPLETE CBC AUTOMATED: CPT

## 2025-08-11 PROCEDURE — 82040 ASSAY OF SERUM ALBUMIN: CPT

## 2025-08-11 PROCEDURE — 36415 COLL VENOUS BLD VENIPUNCTURE: CPT

## 2025-08-11 PROCEDURE — 84439 ASSAY OF FREE THYROXINE: CPT

## 2025-08-13 ENCOUNTER — OFFICE VISIT (OUTPATIENT)
Dept: GYNECOLOGIC ONCOLOGY | Facility: CLINIC | Age: 60
End: 2025-08-13
Payer: COMMERCIAL

## 2025-08-13 ENCOUNTER — INFUSION (OUTPATIENT)
Dept: INFUSION THERAPY | Facility: OTHER | Age: 60
End: 2025-08-13
Attending: INTERNAL MEDICINE
Payer: COMMERCIAL

## 2025-08-13 VITALS — RESPIRATION RATE: 16 BRPM | OXYGEN SATURATION: 99 %

## 2025-08-13 VITALS
WEIGHT: 152 LBS | DIASTOLIC BLOOD PRESSURE: 53 MMHG | HEIGHT: 67 IN | TEMPERATURE: 98 F | BODY MASS INDEX: 23.86 KG/M2 | HEART RATE: 80 BPM | SYSTOLIC BLOOD PRESSURE: 117 MMHG

## 2025-08-13 DIAGNOSIS — C54.1 MALIGNANT NEOPLASM OF ENDOMETRIUM: Primary | ICD-10-CM

## 2025-08-13 DIAGNOSIS — G62.0 CHEMOTHERAPY-INDUCED NEUROPATHY: ICD-10-CM

## 2025-08-13 DIAGNOSIS — T45.1X5A CHEMOTHERAPY-INDUCED NEUROPATHY: ICD-10-CM

## 2025-08-13 DIAGNOSIS — Z01.818 EXAMINATION PRIOR TO CHEMOTHERAPY: ICD-10-CM

## 2025-08-13 DIAGNOSIS — E03.8 SUBCLINICAL HYPOTHYROIDISM: ICD-10-CM

## 2025-08-13 PROCEDURE — 96413 CHEMO IV INFUSION 1 HR: CPT

## 2025-08-13 PROCEDURE — 99999 PR PBB SHADOW E&M-EST. PATIENT-LVL III: CPT | Mod: PBBFAC,,, | Performed by: NURSE PRACTITIONER

## 2025-08-13 PROCEDURE — 63600175 PHARM REV CODE 636 W HCPCS: Mod: JZ,TB | Performed by: OBSTETRICS & GYNECOLOGY

## 2025-08-13 PROCEDURE — 25000003 PHARM REV CODE 250: Performed by: OBSTETRICS & GYNECOLOGY

## 2025-08-13 RX ORDER — DIPHENHYDRAMINE HYDROCHLORIDE 50 MG/ML
50 INJECTION, SOLUTION INTRAMUSCULAR; INTRAVENOUS ONCE AS NEEDED
Status: DISCONTINUED | OUTPATIENT
Start: 2025-08-13 | End: 2025-08-13 | Stop reason: HOSPADM

## 2025-08-13 RX ORDER — SODIUM CHLORIDE 0.9 % (FLUSH) 0.9 %
10 SYRINGE (ML) INJECTION
Status: DISCONTINUED | OUTPATIENT
Start: 2025-08-13 | End: 2025-08-13 | Stop reason: HOSPADM

## 2025-08-13 RX ORDER — HEPARIN 100 UNIT/ML
500 SYRINGE INTRAVENOUS
Status: DISCONTINUED | OUTPATIENT
Start: 2025-08-13 | End: 2025-08-13 | Stop reason: HOSPADM

## 2025-08-13 RX ORDER — EPINEPHRINE 0.3 MG/.3ML
0.3 INJECTION SUBCUTANEOUS ONCE AS NEEDED
Status: DISCONTINUED | OUTPATIENT
Start: 2025-08-13 | End: 2025-08-13 | Stop reason: HOSPADM

## 2025-08-13 RX ADMIN — HEPARIN 500 UNITS: 100 SYRINGE at 12:08

## 2025-08-13 RX ADMIN — SODIUM CHLORIDE 1000 MG: 9 INJECTION, SOLUTION INTRAVENOUS at 11:08

## 2025-09-02 PROBLEM — D84.821 IMMUNODEFICIENCY DUE TO LONG TERM IMMUNOSUPPRESSIVE DRUG THERAPY: Status: ACTIVE | Noted: 2025-09-02

## 2025-09-02 PROBLEM — Z79.60 IMMUNODEFICIENCY DUE TO LONG TERM IMMUNOSUPPRESSIVE DRUG THERAPY: Status: ACTIVE | Noted: 2025-09-02

## 2025-09-02 PROBLEM — R19.7 DIARRHEA: Status: RESOLVED | Noted: 2023-08-22 | Resolved: 2025-09-02

## 2025-09-02 PROBLEM — T45.1X5A IMMUNODEFICIENCY DUE TO LONG TERM IMMUNOSUPPRESSIVE DRUG THERAPY: Status: ACTIVE | Noted: 2025-09-02

## 2025-09-03 ENCOUNTER — OFFICE VISIT (OUTPATIENT)
Dept: GASTROENTEROLOGY | Facility: CLINIC | Age: 60
End: 2025-09-03
Payer: COMMERCIAL

## 2025-09-03 VITALS — HEIGHT: 67 IN | WEIGHT: 152 LBS | BODY MASS INDEX: 23.86 KG/M2

## 2025-09-03 DIAGNOSIS — Z79.60 IMMUNODEFICIENCY DUE TO LONG TERM IMMUNOSUPPRESSIVE DRUG THERAPY: ICD-10-CM

## 2025-09-03 DIAGNOSIS — T45.1X5A IMMUNODEFICIENCY DUE TO LONG TERM IMMUNOSUPPRESSIVE DRUG THERAPY: ICD-10-CM

## 2025-09-03 DIAGNOSIS — D84.821 IMMUNODEFICIENCY DUE TO LONG TERM IMMUNOSUPPRESSIVE DRUG THERAPY: ICD-10-CM

## 2025-09-03 DIAGNOSIS — K52.9 COLITIS: Primary | ICD-10-CM

## 2025-09-03 DIAGNOSIS — C54.1 MALIGNANT NEOPLASM OF ENDOMETRIUM: ICD-10-CM

## 2025-09-03 DIAGNOSIS — R19.7 DIARRHEA, UNSPECIFIED TYPE: ICD-10-CM

## (undated) DEVICE — SET TRI-LUMEN FILTERED TUBE

## (undated) DEVICE — SEAL LENS SCOPE MYOSURE

## (undated) DEVICE — INSERT CUSHIONPRONE VIEW LARGE

## (undated) DEVICE — IRRIGATOR ENDOSCOPY DISP.

## (undated) DEVICE — PACK FLUENT DISPOSABLE

## (undated) DEVICE — PAD SANITARY OB STERILE

## (undated) DEVICE — SOL NORMAL USPCA 0.9%

## (undated) DEVICE — BAG TISSUE RETRIEVAL 5MM

## (undated) DEVICE — SEE MEDLINE ITEM 157181

## (undated) DEVICE — POSITIONER HEAD ADULT

## (undated) DEVICE — SUT VICRYL+ 27 UR-6 VIOL

## (undated) DEVICE — SUT MCRYL PLUS 4-0 PS2 27IN

## (undated) DEVICE — TOWEL OR DISP STRL BLUE 4/PK

## (undated) DEVICE — ELECTRODE BLADE TEFLON 6

## (undated) DEVICE — SOL NACL IRR 3000ML

## (undated) DEVICE — SEE MEDLINE ITEM 154981

## (undated) DEVICE — SEAL UNIVERSAL 5MM-8MM XI

## (undated) DEVICE — SOL IRRI STRL WATER 1000ML

## (undated) DEVICE — CANISTER SUCTION 2 LTR

## (undated) DEVICE — DRAPE COLUMN DAVINCI XI

## (undated) DEVICE — COVER OVERHEAD SURG LT BLUE

## (undated) DEVICE — GLOVE SURGICAL LATEX SZ 7

## (undated) DEVICE — BLADE SURG STAINLESS STEEL #11

## (undated) DEVICE — UNDERGLOVE BIOGEL PI SZ 6.5 LF

## (undated) DEVICE — CLIPPER BLADE MOD 4406 (CAREF)

## (undated) DEVICE — PORT AIRSEAL 12/120MM LPI

## (undated) DEVICE — MAT QUICK 40X30 FLOOR FLUID LF

## (undated) DEVICE — GOWN NONREINF SET-IN SLV XL

## (undated) DEVICE — SOL POVIDONE PREP IODINE 4 OZ

## (undated) DEVICE — TROCAR KII BLLN 12MM 10CM

## (undated) DEVICE — DRESSING TELFA N ADH 3X8

## (undated) DEVICE — APPLICATOR CHLORAPREP ORN 26ML

## (undated) DEVICE — OBTURATOR BLADELESS 8MM XI CLR

## (undated) DEVICE — SYS SEE SHARP SCP ANTIFG LNG

## (undated) DEVICE — JELLY SURGILUBE 5GR

## (undated) DEVICE — SEE MEDLINE ITEM 107746

## (undated) DEVICE — JELLY LUBRICANT STERILE 4 OZ

## (undated) DEVICE — PAD PREP 50/CA

## (undated) DEVICE — SUT VICRYL 2-0 CT-2 VCP269H

## (undated) DEVICE — SYR 10CC LUER LOCK

## (undated) DEVICE — SEE L#120831

## (undated) DEVICE — SUT PROLENE 4-0 RB-1 BL MO

## (undated) DEVICE — DRAPE ARM DAVINCI XI

## (undated) DEVICE — CATH SELF-CATH FEMALE 14FR 6IN

## (undated) DEVICE — KIT CUSTOM MINOR PROC ST

## (undated) DEVICE — Device

## (undated) DEVICE — SOL ELECTROLUBE ANTI-STIC

## (undated) DEVICE — ADHESIVE DERMABOND MINI HV

## (undated) DEVICE — SOL IRR SOD CHL .9% POUR

## (undated) DEVICE — SHEET THYROID W/ISO-BAC

## (undated) DEVICE — KIT WING PAD POSITIONING

## (undated) DEVICE — KIT PROCEDURE STER INLET CLOSU

## (undated) DEVICE — ELECTRODE REM PLYHSV RETURN 9

## (undated) DEVICE — SYR 50ML CATH TIP

## (undated) DEVICE — UNDERGLOVES BIOGEL PI SIZE 7.5

## (undated) DEVICE — TRAY DO THE ROBOT

## (undated) DEVICE — NDL SPINAL 20GX3.5 HUB

## (undated) DEVICE — SUT STRATAFIX 0 PDS+ 22CM 9IN

## (undated) DEVICE — SEE MEDLINE ITEM 157110

## (undated) DEVICE — DRAPE INCISE IOBAN 2 23X17IN

## (undated) DEVICE — SEE MEDLINE ITEM 146292

## (undated) DEVICE — GLOVE SURGICAL LATEX SZ 6

## (undated) DEVICE — NDL HYPO REG 25G X 1 1/2

## (undated) DEVICE — BLANKET UPPER BODY 78.7X29.9IN

## (undated) DEVICE — UNDERGLOVES BIOGEL PI SZ 7 LF